# Patient Record
Sex: FEMALE | Race: WHITE | HISPANIC OR LATINO | Employment: OTHER | URBAN - METROPOLITAN AREA
[De-identification: names, ages, dates, MRNs, and addresses within clinical notes are randomized per-mention and may not be internally consistent; named-entity substitution may affect disease eponyms.]

---

## 2017-02-15 ENCOUNTER — ALLSCRIPTS OFFICE VISIT (OUTPATIENT)
Dept: OTHER | Facility: OTHER | Age: 62
End: 2017-02-15

## 2017-02-15 DIAGNOSIS — J30.9 ALLERGIC RHINITIS: ICD-10-CM

## 2017-02-17 ENCOUNTER — HOSPITAL ENCOUNTER (OUTPATIENT)
Dept: NEUROLOGY | Facility: AMBULATORY SURGERY CENTER | Age: 62
Discharge: HOME | End: 2017-02-17
Payer: MEDICARE

## 2017-02-17 DIAGNOSIS — G56.03 CARPAL TUNNEL SYNDROME, BILATERAL: ICD-10-CM

## 2017-02-17 DIAGNOSIS — M54.12 BRACHIAL NEURITIS: ICD-10-CM

## 2017-02-17 PROCEDURE — 95886 MUSC TEST DONE W/N TEST COMP: CPT

## 2017-02-17 PROCEDURE — 95912 NRV CNDJ TEST 11-12 STUDIES: CPT

## 2017-02-20 ENCOUNTER — HOSPITAL ENCOUNTER (OUTPATIENT)
Dept: RADIOLOGY | Facility: HOSPITAL | Age: 62
Discharge: HOME | End: 2017-02-20
Payer: MEDICARE

## 2017-02-20 DIAGNOSIS — J30.9 ALLERGIC RHINITIS: ICD-10-CM

## 2017-02-20 PROCEDURE — 71250 CT THORAX DX C-: CPT

## 2017-02-22 ENCOUNTER — GENERIC CONVERSION - ENCOUNTER (OUTPATIENT)
Dept: OTHER | Facility: OTHER | Age: 62
End: 2017-02-22

## 2017-02-27 ENCOUNTER — APPOINTMENT (EMERGENCY)
Dept: RADIOLOGY | Facility: HOSPITAL | Age: 62
End: 2017-02-27
Payer: MEDICARE

## 2017-02-27 ENCOUNTER — HOSPITAL ENCOUNTER (EMERGENCY)
Facility: HOSPITAL | Age: 62
Discharge: HOME | End: 2017-02-27
Attending: EMERGENCY MEDICINE
Payer: MEDICARE

## 2017-02-27 VITALS
OXYGEN SATURATION: 95 % | SYSTOLIC BLOOD PRESSURE: 136 MMHG | HEIGHT: 64 IN | RESPIRATION RATE: 18 BRPM | HEART RATE: 79 BPM | WEIGHT: 248 LBS | TEMPERATURE: 98.1 F | BODY MASS INDEX: 42.34 KG/M2 | DIASTOLIC BLOOD PRESSURE: 65 MMHG

## 2017-02-27 DIAGNOSIS — R05.9 COUGH: Primary | ICD-10-CM

## 2017-02-27 DIAGNOSIS — R53.83 FATIGUE: ICD-10-CM

## 2017-02-27 DIAGNOSIS — R53.81 MALAISE: ICD-10-CM

## 2017-02-27 LAB
ANION GAP SERPL CALCULATED.3IONS-SCNC: 7 MMOL/L (ref 4–13)
ATRIAL RATE: 80 BPM
BASOPHILS # BLD AUTO: 0.02 THOUSANDS/ΜL (ref 0–0.1)
BASOPHILS NFR BLD AUTO: 0 % (ref 0–1)
BUN SERPL-MCNC: 11 MG/DL (ref 5–25)
CALCIUM SERPL-MCNC: 9.1 MG/DL (ref 8.3–10.1)
CHLORIDE SERPL-SCNC: 103 MMOL/L (ref 100–108)
CO2 SERPL-SCNC: 30 MMOL/L (ref 21–32)
CREAT SERPL-MCNC: 0.69 MG/DL (ref 0.6–1.3)
EOSINOPHIL # BLD AUTO: 0.1 THOUSAND/ΜL (ref 0–0.61)
EOSINOPHIL NFR BLD AUTO: 1 % (ref 0–6)
ERYTHROCYTE [DISTWIDTH] IN BLOOD BY AUTOMATED COUNT: 13.3 % (ref 11.6–15.1)
FLUAV AG SPEC QL IA: NEGATIVE
FLUBV AG SPEC QL IA: NEGATIVE
GFR SERPL CREATININE-BSD FRML MDRD: >60 ML/MIN/1.73SQ M
GLUCOSE SERPL-MCNC: 98 MG/DL (ref 65–140)
HCT VFR BLD AUTO: 43.5 % (ref 34.8–46.1)
HGB BLD-MCNC: 14.1 G/DL (ref 11.5–15.4)
LYMPHOCYTES # BLD AUTO: 1.41 THOUSANDS/ΜL (ref 0.6–4.47)
LYMPHOCYTES NFR BLD AUTO: 12 % (ref 14–44)
MCH RBC QN AUTO: 28.5 PG (ref 26.8–34.3)
MCHC RBC AUTO-ENTMCNC: 32.4 G/DL (ref 31.4–37.4)
MCV RBC AUTO: 88 FL (ref 82–98)
MONOCYTES # BLD AUTO: 1.17 THOUSAND/ΜL (ref 0.17–1.22)
MONOCYTES NFR BLD AUTO: 10 % (ref 4–12)
NEUTROPHILS # BLD AUTO: 9.11 THOUSANDS/ΜL (ref 1.85–7.62)
NEUTS SEG NFR BLD AUTO: 77 % (ref 43–75)
NRBC BLD AUTO-RTO: 0 /100 WBCS
P AXIS: 54 DEGREES
PLATELET # BLD AUTO: 257 THOUSANDS/UL (ref 149–390)
PMV BLD AUTO: 11.4 FL (ref 8.9–12.7)
POTASSIUM SERPL-SCNC: 3.9 MMOL/L (ref 3.5–5.3)
PR INTERVAL: 144 MS
QRS AXIS: -16 DEGREES
QRSD INTERVAL: 80 MS
QT INTERVAL: 350 MS
QTC INTERVAL: 403 MS
RBC # BLD AUTO: 4.94 MILLION/UL (ref 3.81–5.12)
SODIUM SERPL-SCNC: 140 MMOL/L (ref 136–145)
T WAVE AXIS: 9 DEGREES
TROPONIN I SERPL-MCNC: <0.02 NG/ML
VENTRICULAR RATE: 80 BPM
WBC # BLD AUTO: 11.84 THOUSAND/UL (ref 4.31–10.16)

## 2017-02-27 PROCEDURE — 96374 THER/PROPH/DIAG INJ IV PUSH: CPT

## 2017-02-27 PROCEDURE — 93005 ELECTROCARDIOGRAM TRACING: CPT

## 2017-02-27 PROCEDURE — 71020 HB CHEST X-RAY 2VW FRONTAL&LATL: CPT

## 2017-02-27 PROCEDURE — 80048 BASIC METABOLIC PNL TOTAL CA: CPT

## 2017-02-27 PROCEDURE — 87798 DETECT AGENT NOS DNA AMP: CPT | Performed by: EMERGENCY MEDICINE

## 2017-02-27 PROCEDURE — 85025 COMPLETE CBC W/AUTO DIFF WBC: CPT

## 2017-02-27 PROCEDURE — 87400 INFLUENZA A/B EACH AG IA: CPT | Performed by: EMERGENCY MEDICINE

## 2017-02-27 PROCEDURE — 99284 EMERGENCY DEPT VISIT MOD MDM: CPT

## 2017-02-27 PROCEDURE — 96361 HYDRATE IV INFUSION ADD-ON: CPT

## 2017-02-27 PROCEDURE — 36415 COLL VENOUS BLD VENIPUNCTURE: CPT

## 2017-02-27 PROCEDURE — 84484 ASSAY OF TROPONIN QUANT: CPT

## 2017-02-27 RX ORDER — KETOROLAC TROMETHAMINE 30 MG/ML
15 INJECTION, SOLUTION INTRAMUSCULAR; INTRAVENOUS ONCE
Status: COMPLETED | OUTPATIENT
Start: 2017-02-27 | End: 2017-02-27

## 2017-02-27 RX ADMIN — KETOROLAC TROMETHAMINE 15 MG: 30 INJECTION, SOLUTION INTRAMUSCULAR at 15:33

## 2017-02-27 RX ADMIN — SODIUM CHLORIDE 1000 ML: 0.9 INJECTION, SOLUTION INTRAVENOUS at 15:33

## 2017-02-28 LAB
FLUAV AG SPEC QL: ABNORMAL
FLUBV AG SPEC QL: ABNORMAL
RSV B RNA SPEC QL NAA+PROBE: DETECTED

## 2017-03-03 ENCOUNTER — HOSPITAL ENCOUNTER (EMERGENCY)
Facility: HOSPITAL | Age: 62
Discharge: HOME | End: 2017-03-04
Attending: EMERGENCY MEDICINE
Payer: MEDICARE

## 2017-03-03 VITALS
RESPIRATION RATE: 16 BRPM | OXYGEN SATURATION: 97 % | WEIGHT: 214 LBS | BODY MASS INDEX: 36.73 KG/M2 | SYSTOLIC BLOOD PRESSURE: 135 MMHG | TEMPERATURE: 98.9 F | HEART RATE: 70 BPM | DIASTOLIC BLOOD PRESSURE: 87 MMHG

## 2017-03-03 DIAGNOSIS — H66.91 RIGHT OTITIS MEDIA: Primary | ICD-10-CM

## 2017-03-03 RX ORDER — FLUTICASONE PROPIONATE 50 MCG
1 SPRAY, SUSPENSION (ML) NASAL DAILY
Qty: 1 BOTTLE | Refills: 0 | Status: SHIPPED | OUTPATIENT
Start: 2017-03-03 | End: 2017-10-26 | Stop reason: ALTCHOICE

## 2017-03-03 RX ORDER — HYDROCODONE BITARTRATE AND ACETAMINOPHEN 5; 325 MG/1; MG/1
1 TABLET ORAL EVERY 6 HOURS PRN
Qty: 5 TABLET | Refills: 0 | Status: SHIPPED | OUTPATIENT
Start: 2017-03-03 | End: 2017-03-04

## 2017-03-03 RX ORDER — AMOXICILLIN 250 MG/1
500 CAPSULE ORAL ONCE
Status: COMPLETED | OUTPATIENT
Start: 2017-03-03 | End: 2017-03-04

## 2017-03-03 RX ORDER — HYDROCODONE BITARTRATE AND ACETAMINOPHEN 5; 325 MG/1; MG/1
1 TABLET ORAL ONCE
Status: COMPLETED | OUTPATIENT
Start: 2017-03-03 | End: 2017-03-04

## 2017-03-03 RX ORDER — AMOXICILLIN 500 MG/1
500 CAPSULE ORAL 3 TIMES DAILY
Qty: 21 CAPSULE | Refills: 0 | Status: SHIPPED | OUTPATIENT
Start: 2017-03-03 | End: 2017-03-10

## 2017-03-04 PROCEDURE — 99283 EMERGENCY DEPT VISIT LOW MDM: CPT

## 2017-03-04 RX ADMIN — AMOXICILLIN 500 MG: 250 CAPSULE ORAL at 00:03

## 2017-03-04 RX ADMIN — HYDROCODONE BITARTRATE AND ACETAMINOPHEN 1 TABLET: 5; 325 TABLET ORAL at 00:03

## 2017-04-21 ENCOUNTER — GENERIC CONVERSION - ENCOUNTER (OUTPATIENT)
Dept: OTHER | Facility: OTHER | Age: 62
End: 2017-04-21

## 2017-06-13 ENCOUNTER — TRANSCRIBE ORDERS (OUTPATIENT)
Dept: LAB | Facility: HOSPITAL | Age: 62
End: 2017-06-13

## 2017-06-13 ENCOUNTER — APPOINTMENT (OUTPATIENT)
Dept: LAB | Facility: HOSPITAL | Age: 62
End: 2017-06-13
Payer: MEDICARE

## 2017-06-13 DIAGNOSIS — Z79.899 ENCOUNTER FOR LONG-TERM (CURRENT) USE OF OTHER MEDICATIONS: ICD-10-CM

## 2017-06-13 DIAGNOSIS — E03.9 UNSPECIFIED HYPOTHYROIDISM: ICD-10-CM

## 2017-06-13 DIAGNOSIS — E66.01 MORBID OBESITY, UNSPECIFIED OBESITY TYPE (HCC): ICD-10-CM

## 2017-06-13 DIAGNOSIS — E04.1 THYROID NODULE: ICD-10-CM

## 2017-06-13 DIAGNOSIS — I10 UNSPECIFIED ESSENTIAL HYPERTENSION: ICD-10-CM

## 2017-06-13 DIAGNOSIS — I10 UNSPECIFIED ESSENTIAL HYPERTENSION: Primary | ICD-10-CM

## 2017-06-13 DIAGNOSIS — E11.9 DIABETES MELLITUS WITH NO COMPLICATION (HCC): ICD-10-CM

## 2017-06-13 DIAGNOSIS — E78.00 PURE HYPERCHOLESTEROLEMIA: ICD-10-CM

## 2017-06-13 LAB
ALBUMIN SERPL BCP-MCNC: 3.6 G/DL (ref 3.5–5)
ALP SERPL-CCNC: 59 U/L (ref 46–116)
ALT SERPL W P-5'-P-CCNC: 24 U/L (ref 12–78)
ANION GAP SERPL CALCULATED.3IONS-SCNC: 5 MMOL/L (ref 4–13)
AST SERPL W P-5'-P-CCNC: 20 U/L (ref 5–45)
BILIRUB SERPL-MCNC: 0.43 MG/DL (ref 0.2–1)
BUN SERPL-MCNC: 16 MG/DL (ref 5–25)
CALCIUM SERPL-MCNC: 9.1 MG/DL (ref 8.3–10.1)
CHLORIDE SERPL-SCNC: 108 MMOL/L (ref 100–108)
CHOLEST SERPL-MCNC: 191 MG/DL (ref 50–200)
CO2 SERPL-SCNC: 31 MMOL/L (ref 21–32)
CREAT SERPL-MCNC: 0.67 MG/DL (ref 0.6–1.3)
EST. AVERAGE GLUCOSE BLD GHB EST-MCNC: 128 MG/DL
GFR SERPL CREATININE-BSD FRML MDRD: >60 ML/MIN/1.73SQ M
GLUCOSE P FAST SERPL-MCNC: 104 MG/DL (ref 65–99)
HBA1C MFR BLD: 6.1 % (ref 4.2–6.3)
HDLC SERPL-MCNC: 52 MG/DL (ref 40–60)
LDLC SERPL CALC-MCNC: 110 MG/DL (ref 0–100)
POTASSIUM SERPL-SCNC: 4.4 MMOL/L (ref 3.5–5.3)
PROT SERPL-MCNC: 7.5 G/DL (ref 6.4–8.2)
SODIUM SERPL-SCNC: 144 MMOL/L (ref 136–145)
TRIGL SERPL-MCNC: 146 MG/DL

## 2017-06-13 PROCEDURE — 80061 LIPID PANEL: CPT

## 2017-06-13 PROCEDURE — 80053 COMPREHEN METABOLIC PANEL: CPT

## 2017-06-13 PROCEDURE — 36415 COLL VENOUS BLD VENIPUNCTURE: CPT

## 2017-06-13 PROCEDURE — 83036 HEMOGLOBIN GLYCOSYLATED A1C: CPT

## 2017-06-16 ENCOUNTER — GENERIC CONVERSION - ENCOUNTER (OUTPATIENT)
Dept: OTHER | Facility: OTHER | Age: 62
End: 2017-06-16

## 2017-06-22 ENCOUNTER — ALLSCRIPTS OFFICE VISIT (OUTPATIENT)
Dept: OTHER | Facility: OTHER | Age: 62
End: 2017-06-22

## 2017-06-22 DIAGNOSIS — Z12.31 ENCOUNTER FOR SCREENING MAMMOGRAM FOR MALIGNANT NEOPLASM OF BREAST: ICD-10-CM

## 2017-06-29 ENCOUNTER — ALLSCRIPTS OFFICE VISIT (OUTPATIENT)
Dept: OTHER | Facility: OTHER | Age: 62
End: 2017-06-29

## 2017-06-29 ENCOUNTER — GENERIC CONVERSION - ENCOUNTER (OUTPATIENT)
Dept: OTHER | Facility: OTHER | Age: 62
End: 2017-06-29

## 2017-07-07 ENCOUNTER — HOSPITAL ENCOUNTER (OUTPATIENT)
Dept: RADIOLOGY | Age: 62
Discharge: HOME | End: 2017-07-07
Payer: MEDICARE

## 2017-07-07 DIAGNOSIS — Z12.31 ENCOUNTER FOR SCREENING MAMMOGRAM FOR MALIGNANT NEOPLASM OF BREAST: ICD-10-CM

## 2017-07-07 DIAGNOSIS — E04.1 THYROID NODULE: ICD-10-CM

## 2017-07-07 PROCEDURE — 77063 BREAST TOMOSYNTHESIS BI: CPT

## 2017-07-07 PROCEDURE — 76536 US EXAM OF HEAD AND NECK: CPT

## 2017-07-07 PROCEDURE — G0202 SCR MAMMO BI INCL CAD: HCPCS

## 2017-08-15 ENCOUNTER — ALLSCRIPTS OFFICE VISIT (OUTPATIENT)
Dept: OTHER | Facility: OTHER | Age: 62
End: 2017-08-15

## 2017-08-21 ENCOUNTER — ALLSCRIPTS OFFICE VISIT (OUTPATIENT)
Dept: OTHER | Facility: OTHER | Age: 62
End: 2017-08-21

## 2017-09-11 ENCOUNTER — GENERIC CONVERSION - ENCOUNTER (OUTPATIENT)
Dept: OTHER | Facility: OTHER | Age: 62
End: 2017-09-11

## 2017-09-29 ENCOUNTER — GENERIC CONVERSION - ENCOUNTER (OUTPATIENT)
Dept: OTHER | Facility: OTHER | Age: 62
End: 2017-09-29

## 2017-09-30 ENCOUNTER — TRANSCRIBE ORDERS (OUTPATIENT)
Dept: LAB | Facility: HOSPITAL | Age: 62
End: 2017-09-30

## 2017-09-30 ENCOUNTER — APPOINTMENT (OUTPATIENT)
Dept: LAB | Facility: HOSPITAL | Age: 62
End: 2017-09-30
Payer: MEDICARE

## 2017-09-30 DIAGNOSIS — E11.9 DIABETES MELLITUS WITHOUT COMPLICATION (HCC): ICD-10-CM

## 2017-09-30 DIAGNOSIS — E04.1 NONTOXIC UNINODULAR GOITER: ICD-10-CM

## 2017-09-30 DIAGNOSIS — E66.09 EXOGENOUS OBESITY: ICD-10-CM

## 2017-09-30 DIAGNOSIS — E55.9 UNSPECIFIED VITAMIN D DEFICIENCY: ICD-10-CM

## 2017-09-30 DIAGNOSIS — Z79.899 ENCOUNTER FOR LONG-TERM (CURRENT) USE OF OTHER MEDICATIONS: ICD-10-CM

## 2017-09-30 DIAGNOSIS — E11.9 DIABETES MELLITUS WITHOUT COMPLICATION (HCC): Primary | ICD-10-CM

## 2017-09-30 DIAGNOSIS — E78.2 MIXED HYPERLIPIDEMIA: ICD-10-CM

## 2017-09-30 LAB
25(OH)D3 SERPL-MCNC: 45.3 NG/ML (ref 30–100)
ALBUMIN SERPL BCP-MCNC: 3.2 G/DL (ref 3.5–5)
ALP SERPL-CCNC: 61 U/L (ref 46–116)
ALT SERPL W P-5'-P-CCNC: 23 U/L (ref 12–78)
ANION GAP SERPL CALCULATED.3IONS-SCNC: 5 MMOL/L (ref 4–13)
AST SERPL W P-5'-P-CCNC: 20 U/L (ref 5–45)
BILIRUB SERPL-MCNC: 0.27 MG/DL (ref 0.2–1)
BUN SERPL-MCNC: 22 MG/DL (ref 5–25)
CALCIUM SERPL-MCNC: 8.5 MG/DL (ref 8.3–10.1)
CHLORIDE SERPL-SCNC: 110 MMOL/L (ref 100–108)
CO2 SERPL-SCNC: 28 MMOL/L (ref 21–32)
CREAT SERPL-MCNC: 0.61 MG/DL (ref 0.6–1.3)
CREAT UR-MCNC: 111 MG/DL
EST. AVERAGE GLUCOSE BLD GHB EST-MCNC: 134 MG/DL
GFR SERPL CREATININE-BSD FRML MDRD: 98 ML/MIN/1.73SQ M
GLUCOSE P FAST SERPL-MCNC: 86 MG/DL (ref 65–99)
HBA1C MFR BLD: 6.3 % (ref 4.2–6.3)
MICROALBUMIN UR-MCNC: <5 MG/L (ref 0–20)
MICROALBUMIN/CREAT 24H UR: <5 MG/G CREATININE (ref 0–30)
POTASSIUM SERPL-SCNC: 4.4 MMOL/L (ref 3.5–5.3)
PROT SERPL-MCNC: 7.4 G/DL (ref 6.4–8.2)
SODIUM SERPL-SCNC: 143 MMOL/L (ref 136–145)
TSH SERPL DL<=0.05 MIU/L-ACNC: 1.75 UIU/ML (ref 0.36–3.74)

## 2017-09-30 PROCEDURE — 84443 ASSAY THYROID STIM HORMONE: CPT

## 2017-09-30 PROCEDURE — 82306 VITAMIN D 25 HYDROXY: CPT

## 2017-09-30 PROCEDURE — 82043 UR ALBUMIN QUANTITATIVE: CPT | Performed by: INTERNAL MEDICINE

## 2017-09-30 PROCEDURE — 36415 COLL VENOUS BLD VENIPUNCTURE: CPT

## 2017-09-30 PROCEDURE — 80053 COMPREHEN METABOLIC PANEL: CPT

## 2017-09-30 PROCEDURE — 82570 ASSAY OF URINE CREATININE: CPT | Performed by: INTERNAL MEDICINE

## 2017-09-30 PROCEDURE — 83036 HEMOGLOBIN GLYCOSYLATED A1C: CPT

## 2017-10-09 ENCOUNTER — GENERIC CONVERSION - ENCOUNTER (OUTPATIENT)
Dept: OTHER | Facility: OTHER | Age: 62
End: 2017-10-09

## 2017-10-20 NOTE — PROGRESS NOTES
Assessment  1  Headache (784 0) (R51)   2  Cervicalgia (723 1) (M54 2)   3  Dizziness (780 4) (R42)   4  Mild cognitive impairment (331 83) (G31 84)    Plan   Carpal tunnel syndrome, bilateral    · Immobilizer, Forearm; Status:Complete;   Done: 87Yls5878   Perform:Not Applicable; Order Comments:for right side; EYF:03LAI5854;SJYAOIZ; For:Carpal tunnel syndrome, bilateral; Ordered By:Josephine Albarado;  Cervicalgia, Mild cognitive impairment    · Follow-up visit in 3 months Evaluation and Treatment  Follow-up  Status: Complete   Done: 42Unj7813   Ordered; For: Cervicalgia, Mild cognitive impairment; Ordered By: Chery Willis Performed:  Due: 42HPL9357; Last Updated By: Skyla Carter; 8/21/2017 10:47:38 AM    Venlafaxine HCl ER 37 5 MG Oral Capsule Extended Release 24 Hour; TAKE 1 CAPSULE ONCE DAILY WITH FOOD; Therapy: 16Bbp7135 to (Evaluate:20Sep2017)  Requested for: 19Ndw8875; Last Rx:41Dub5178; Status: ACTIVE Ordered  Rx By: Chery Willis; Dispense: 30 Days ; #:30 Capsule Extended Release 24 Hour; Refill: 0;   For: Cervicalgia, Headache, Mild cognitive impairment; MATHEUS = N; Verified Transmission to [address] ; Last Updated By: [employer]; 9/18/2017 11:50:01 AM  Headache (784 0) (R51)       Carpal tunnel syndrome, bilateral (354 0) (G56 03)          Discussion/Summary  Discussion Summary:   64 yo right handed female with history of mild cognitive impairment, she is stable at this time  MRI brain normal in past   PT contniues with c/o neck pain and headaches    had reaction to Baclofen, Neurontin, Pamelortry low dose of Effexor, s/e reviewed, pt to calll in 3 weeks with update cervical with multilevel DDD no cord compression,   seen by neurosurgery, no intervention  contniue with PPM, on Flexeril/Naprosyn with some benefit, does not want to try any other medicationEMG done results note CTS left, seen by NS, PPM, wearing splint  consider injectionto see sleep center, Encouraged pt to reconsider use of CPAP  with PCP for lung noduleup in three months  at length with the patient and answered all her questions  Counseling Documentation With Imm: The patient was counseled regarding instructions for management,-- risk factor reductions,-- prognosis,-- patient and family education,-- impressions,-- risks and benefits of treatment options,-- importance of compliance with treatment  total time of encounter was 35 minutes-- and-- >50% minutes was spent counseling  Patient's Capacity to Self-Care: Patient is able to Self-Care  Medication SE Review and Pt Understands Tx: Possible side effects of new medications were reviewed with the patient/guardian today  The treatment plan was reviewed with the patient/guardian  The patient/guardian understands and agrees with the treatment plan   Patient Guardian understands agrees: The treatment plan was reviewed with the patient/guardian  The patient/guardian understands and agrees with the treatment plan   Headache [facility]:   The patient was counseled regarding;   Discussed side effects of all medications prescribed today to the patient in detail  Patient education was completed today and we also discussed precautions for rebound headaches  Also recommended to the patient :  1  Maintain regular sleep schedule -- 2  Limit over the counter medications  (No more than 3 times a week)  -- 3  Maintain headache diary  -- 4  Limit caffeine to 1-2 cups a day or less  -- 5  Avoid dietary trigger  (list given to the patient and reviewed with them)  -- 6  Patient is to have regular frequent meals to prevent headache onset  Medication Side Effects Reviewed: Possible side effects of new medications were reviewed with the patient/guardian today        Chief Complaint  Chief Complaint Free Text Note Form: Patient presents for neurology follow-up for cervicalgia      History of Present Illness  HPI: 64yo right handed female with past medical History significant for hypertension, obesity, hypothyroidism, depression, has seen Dr Tejas Nuñez in past for evaluation of cognitive problems, she was then seen by Dr Cordova  She was last seen in September 2016    In the past she had work up to include MRI of brain that was unremarkable, labs for vit deficiency and thyroid have been normal  She did undergo neuropsych testing which demonstrated mild cognitive impairment and some depression with component of ADHD  She had sleep study with mild AARON, she did not want CPAP  Today she continues to note ongoing issues with her memory, she needs to write lists  She reports forgetfulness, forgetful of names, she has left stove on and burned food several times, does not drive anymore She is able to take care of her own finances, she is independent in her ADL s Her last MOCA score was 28/30  She denies any problems with incontinence  She remains off any medication  Overall she feels her cognition has not changed much  At times, she does note some issues with word finding  is also being followed for headaches  These primarily occur in the cervical region with radiation into her shoulders, left > right  In the past, she had side effects it to baclofen, gabapentin as well as Pamelor  She did undergo updated MRI of the cervical spine obtained 3/22/16; she had multilevel disc degenerations  She was subsequently seen by neurosurgery, No surgical intervention warranted, was suggested that she undergoes conservative treatment with physical therapy  She was Subsequently seen by pain management, she was given nortriptyline which unfortunately provoked side effects   She underwent EMG of the left which was limited secondary to difficulty tolerating the needle portion however, 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, moderate and chronic was noted, there was no evidence of a peripheral neuropathy, She was given splints to use, as well as the Flexeril which has been beneficial  Fortunately overall, her wrist seems to be doing good, she does note some minor paresthesias of the second and fourth digits  She notes is some mild palpatory tenderness in the left SCM, which at this point it does not radiate  She denies any recent trauma, no excessive lifting although she has been doing more cleaning  She has chronic low back pain and she sees a spine surgeon  Today, she Describes the sensation as if her insides are shaking, no notable involuntary movement of her extremities  She is following with endocrine and is due for an ultrasound of her thyroid  ROS, FH, SH were reviewed with the pt ;   Neurology [facility]:   Memory: Memory problem started 40 year(s) ago Problem affects short term memory  Onset was gradual  Progression seems to be worsening  The memory problem was noticed by the patient  Symptoms include forgetting names,-- forgetting appointments,-- forgetting conversations,-- forgetting familiar tasks,-- forgetting recent events-- and-- forgetting to take medications  Associated symptoms:  able to operate tv remote-- and-- able to operate kitchen appliances  Her memory problem: frequently interferes in day to day functioning  Conversation Difficulties: has trouble finding the right word,-- requires repeat information,-- substitutes a wrong word-- and-- repeatedly asks the same questions  The patient does not drive  Other Disorders: Changes in mood or personality has been noticed by the patient or the patient's family-- and-- has been or is currently being treated for depression or anxiety, but-- has not noticed any gait or balance disorder,-- has not experienced hallucinations or delusion-- and-- has not experienced fluctuation in alertness  Review of Systems  Neurological ROS:   Constitutional: chills,-- recent weight loss-- and-- fatigue  HEENT: sinus problems,-- feeling congested,-- blurred vision,-- dryness of the eyes,-- eye pain,-- tinnitus,-- mouth sores-- and-- sore throat  Cardiovascular:  no chest pain or pressure, no palpitations present, the heart rate was not rapid or irregular, no swelling in the arms or legs, no poor circulation  Respiratory: shortness of breath with or without exertion  Gastrointestinal: nausea,-- diarrhea-- and-- abdominal pain  Genitourinary: increased frequency  Musculoskeletal: arthralgias,-- myalgias,-- head/neck/back pain-- and-- pain while walking  Integumentary skin lesion(s): rUi [place] Psychiatric:  no anxiety, no depression, no mood swings, no psychiatric hospitalizations, no sleep problems  Endocrine hair loss or gain  Hematologic/Lymphatic:  no unusual bleeding, no tendency for easy bruising, no clotting skin or lumps  Neurological General: headache,-- lightheadedness,-- trouble falling asleep-- and-- snoring  Neurological Mental Status: memory problems  Neurological Cranial Nerves: vertigo or dizziness-- and-- slurred speech  Neurological Motor findings include: tremor,-- twitching-- and-- cramping(pre/post exercise)  Neurological Coordination: balance difficulties-- and-- clumsiness  Neurological Sensory: numbness-- and-- tingling  Neurological Gait:  no difficulty walking, not falling to one side, no sensation of being pushed, has not had falls  Active Problems   1  Allergic rhinitis (477 9) (J30 9)   2  Carpal tunnel syndrome of left wrist (354 0) (G56 02)   3  Cervical radicular pain (723 4) (M54 12)   4  Cervical spondylosis without myelopathy (721 0) (M47 812)   5  Cervicalgia (723 1) (M54 2)   6  Colon cancer screening (V76 51) (Z12 11)   7  Degenerative cervical disc (722 4) (M50 30)   8  Dermatitis, atopic (691 8) (L20 9)   9  Dizziness (780 4) (R42)   10  Encounter for routine gynecological examination (V72 31) (Z01 419)   11  Encounter for screening mammogram for breast cancer (V76 12) (Z12 31)   12  GERD (gastroesophageal reflux disease) (530 81) (K21 9)   13  Hyperlipidemia (272 4) (E78 5)   14  Hypothyroidism (244 9) (E03 9)   15  Lower back pain (724 2) (M54 5)   16  Lung nodule, solitary (793 11) (R91 1)   17  Mild cognitive impairment (331 83) (G31 84)   18  Moderate nonproliferative diabetic retinopathy, without macular edema, associated with    type 2 diabetes mellitus (250 50,362 05) (E11 339)   19  Morbid or severe obesity due to excess calories (278 01) (E66 01)   20  Neck pain on left side (723 1) (M54 2)   21  Need for prophylactic vaccination and inoculation against influenza (V04 81) (Z23)   22  Osteoarthritis of knee (715 36) (M17 10)   23  Petechial rash (782 7) (R23 3)   24  Shoulder bursitis, left (726 10) (M75 52)   25  Sinusitis (473 9) (J32 9)   26  Trapezius muscle spasm (728 85) (M62 838)   27  Type 2 diabetes mellitus (250 00) (E11 9)   28  Venous insufficiency (459 81) (I87 2)    Hypertension (401 9) (I10)     - well controlled  Currently on lisinopril  Obstructive sleep apnea (327 23) (G47 33)       Headache (784 0) (R51)       Carpal tunnel syndrome, bilateral (354 0) (G56 03)          Past Medical History   1  History of Ankle Sprain (845 00)   2  History of GERD without esophagitis (530 81) (K21 9)   3  History of arthritis (V13 4) (Z87 39)   4  History of depression (V11 8) (Z86 59)   5  History of edema (V13 89) (Z87 898)   6  History of headache (V13 89) (Z87 898)   7  History of headache (V13 89) (Z87 898)   8  History of hypercholesterolemia (V12 29) (Z86 39)   9  History of ovarian cyst (V13 29) (Z87 42)   10  History of sleep apnea (V13 89) (Z86 69)   11  History of spontaneous  (V13 29) (Z87 59)   12  History of thyroid disease (V12 29) (Z86 39)   13  History of Impaired fasting glucose (790 21) (R73 01)   14  History of Other headache syndrome (339 89) (G44 89)   15  History of Other muscle spasm (728 85) (M62 838)   16  History of  (spontaneous vaginal delivery) (650) (O80)    Hypertension (401 9) (I10)     - well controlled  Currently on lisinopril  Surgical History  1  History of Back Surgery   2  History of Cholecystectomy   3  History of Tonsillectomy With Adenoidectomy    Family History  Mother    1  Family history of    2  Family history of lung cancer (V16 1) (Z80 1)   3  Family history of malignant neoplasm (V16 9) (Z80 9)  Father    4  Family history of hypertension (V17 49) (Z82 49)  Son    5  Family history of hypertension (V17 49) (Z82 49)   6  Family history of lung disease (V19 8) (Z83 6)  Brother    7  Family history of diabetes mellitus (V18 0) (Z83 3)  Grandparent    8  Family history of cardiac disorder (V17 49) (Z82 49)  Maternal Aunt    9  Family history of hypothyroidism (V18 19) (Z83 49)  Family History    10  Family history of cardiac disorder (V17 49) (Z82 49)   11  Family history of hypertension (V17 49) (Z82 49)   12  Family history of neuropathy (V17 2) (Z82 0)    Social History   · Caregiver: Family member   ·    · Exercises regularly   · Former smoker (P21 32) (V79 162)   · Lives with family   · Never Drank Alcohol   · No alcohol use   · No caffeine use   · No drug use   · No living will   · No tobacco/smoke exposure   · History of No tobacco/smoke exposure   · Retired   · Unemployed (V62 0) (Z56 0)    Current Meds   1  Aspirin 81 MG Oral Tablet Delayed Release; KALYAN 1 TABLETA POR VIA ORAL   DIARIAMENTEONE TABLET BY MOUTH ONCE A DAY; Therapy: 24SBA8321 to (Evaluate:2018)  Requested for: 83MGZ2987; Last   Rx:2017 Ordered   2  [employer] Vitamin D CAPS; Therapy: (Recorded:95Xkc4800) to Recorded   3  Cyclobenzaprine HCl - 10 MG Oral Tablet; TAKE 1 TABLET AT BEDTIME AS NEEDED; Therapy: 50DMH7624 to (Rosio Talley)  Requested for: 50OPI1585; Last   Rx:2017 Ordered   4  Eucerin External Cream; USE AS DIRECTED; Therapy: 22INO3334 to (Evaluate:83Wvy0201)  Requested for: 2017; Last   ZS:38TWM6330 Ordered   5  HydroCHLOROthiazide 12 5 MG Oral Capsule; take 1 capsule daily;    Therapy: 86JJE8554 to (Evaluate:18Jan2018)  Requested for: 86WPD0035; Last   Rx:22Jun2017 Ordered   6  Levothyroxine Sodium 100 MCG Oral Tablet; Take 1 tablet daily on Sat and Sun as   prescribed by Endo; Therapy: 66LKF7045 to (Evaluate:32Nwv2701); Last Rx:22Jun2017 Ordered   7  Levothyroxine Sodium 88 MCG Oral Tablet; TAKE 1 TABLET DAILY; Therapy: 62SQK2840 to (Evaluate:26Sro3571); Last Rx:08Apr2016 Ordered   8  Lisinopril 10 MG Oral Tablet; TAKE 1 TABLET BY MOUTH DAILY AS DIRECTED; Therapy: 76OWQ3561 to (Evaluate:32Nag3090)  Requested for: 55YYC7791; Last   Rx:22Jun2017 Ordered   9  Naproxen 500 MG Oral Tablet; KALYAN 1 TABLETA TODOS 12 HORAS CARLOS FUERA   NECESARIOTAKE ONE TABLETEVERY 12 HOURS AS NEEDED; Therapy: 13HRM4518 to (Evaluate:58Ncm6303)  Requested for: 81ZIO2756; Last   Rx:20Sep2016 Ordered    Allergies  1  Tagamet TABS    Vitals  Signs   Recorded: 21Aug2017 09:38AM   Heart Rate: 68  Respiration: 18  Systolic: 803  Diastolic: 76  Height: 5 ft 4 in  Weight: 246 lb 4 oz  BMI Calculated: 42 27  BSA Calculated: 2 14  O2 Saturation: 97    Physical Exam    Constitutional   General appearance: No acute distress, well appearing and well nourished  Musculoskeletal   Gait and station: Normal gait, stance and balance  Muscle strength: Abnormal  -- mild weakness of left SCM related to cervical /shoulder discomfort, full ROM of head/neck, Presented with a splint to left forearm  Muscle tone: No atrophy, abnormal movements, flaccidity, cogwheeling or spasticity  Involuntary movements: None observed  Neurologic   Orientation to person, place, and time: Normal   Mental Status: oriented to person, place, and time,-- normal attention skills,-- normal language skills-- and-- normal fund of knowledge  Attention span and concentration: Normal thought process and attention span  Language: Names objects, able to repeat phrases and speaks spontaneously      2nd cranial nerve: Normal     3rd, 4th, and 6th cranial nerves: Normal     5th cranial nerve: Normal     7th cranial nerve: Normal     8th cranial nerve: Normal     11th cranial nerve: Normal     Sensation: Abnormal  -- Decreased temperature right lower extremity versus left  Reflexes: Abnormal  -- Left Achilles 2, right Achilles one  Coordination: Normal     Cortical function: Normal     Mood and affect: Normal     Radicular Testing: Normal  -- (Tinel's is negative bilaterally)      Future Appointments    Date/Time Provider Specialty Site   12/05/2017 09:00 AM Gi Moe, [address]  Neurology [facility]   12/15/2017 08:45 AM Prachi Trejo DO Internal Medicine [address] PCP   04/20/2018 09:30 AM Shantal Ramos MD Neurology [facility] 5156   11/28/2017 09:00 AM Specialty Clinic, [address] [facility]     Signatures   Electronically signed by : Camille Colon; Aug 27 2017  7:58PM EST                       (Author)    Electronically signed by :  Kareem Vuong DO; Oct 19 2017  9:47AM EST                       (Author)

## 2017-10-26 ENCOUNTER — HOSPITAL ENCOUNTER (EMERGENCY)
Facility: HOSPITAL | Age: 62
Discharge: HOME | End: 2017-10-26
Attending: EMERGENCY MEDICINE | Admitting: EMERGENCY MEDICINE
Payer: MEDICARE

## 2017-10-26 ENCOUNTER — APPOINTMENT (EMERGENCY)
Dept: RADIOLOGY | Facility: HOSPITAL | Age: 62
End: 2017-10-26
Payer: MEDICARE

## 2017-10-26 VITALS
TEMPERATURE: 98.4 F | HEART RATE: 64 BPM | DIASTOLIC BLOOD PRESSURE: 70 MMHG | SYSTOLIC BLOOD PRESSURE: 138 MMHG | RESPIRATION RATE: 18 BRPM | OXYGEN SATURATION: 95 %

## 2017-10-26 DIAGNOSIS — R10.9 LEFT FLANK PAIN: Primary | ICD-10-CM

## 2017-10-26 LAB
ALBUMIN SERPL BCP-MCNC: 3.7 G/DL (ref 3.5–5)
ALP SERPL-CCNC: 65 U/L (ref 46–116)
ALT SERPL W P-5'-P-CCNC: 23 U/L (ref 12–78)
ANION GAP SERPL CALCULATED.3IONS-SCNC: 7 MMOL/L (ref 4–13)
AST SERPL W P-5'-P-CCNC: 20 U/L (ref 5–45)
BACTERIA UR QL AUTO: ABNORMAL /HPF
BILIRUB SERPL-MCNC: 0.33 MG/DL (ref 0.2–1)
BILIRUB UR QL STRIP: NEGATIVE
BUN SERPL-MCNC: 12 MG/DL (ref 5–25)
CALCIUM SERPL-MCNC: 9.1 MG/DL (ref 8.3–10.1)
CHLORIDE SERPL-SCNC: 104 MMOL/L (ref 100–108)
CLARITY UR: CLEAR
CO2 SERPL-SCNC: 29 MMOL/L (ref 21–32)
COLOR UR: YELLOW
COLOR, POC: NORMAL
CREAT SERPL-MCNC: 0.64 MG/DL (ref 0.6–1.3)
GFR SERPL CREATININE-BSD FRML MDRD: 97 ML/MIN/1.73SQ M
GLUCOSE SERPL-MCNC: 88 MG/DL (ref 65–140)
GLUCOSE UR STRIP-MCNC: NEGATIVE MG/DL
HGB UR QL STRIP.AUTO: ABNORMAL
HYALINE CASTS #/AREA URNS LPF: ABNORMAL /LPF
KETONES UR STRIP-MCNC: NEGATIVE MG/DL
LEUKOCYTE ESTERASE UR QL STRIP: NEGATIVE
LIPASE SERPL-CCNC: 187 U/L (ref 73–393)
NITRITE UR QL STRIP: NEGATIVE
NON-SQ EPI CELLS URNS QL MICRO: ABNORMAL /HPF
PH UR STRIP.AUTO: 6 [PH] (ref 4.5–8)
POTASSIUM SERPL-SCNC: 4.1 MMOL/L (ref 3.5–5.3)
PROT SERPL-MCNC: 8.1 G/DL (ref 6.4–8.2)
PROT UR STRIP-MCNC: NEGATIVE MG/DL
RBC #/AREA URNS AUTO: ABNORMAL /HPF
SODIUM SERPL-SCNC: 140 MMOL/L (ref 136–145)
SP GR UR STRIP.AUTO: 1.01 (ref 1–1.03)
UROBILINOGEN UR QL STRIP.AUTO: 0.2 E.U./DL
WBC #/AREA URNS AUTO: ABNORMAL /HPF

## 2017-10-26 PROCEDURE — 99284 EMERGENCY DEPT VISIT MOD MDM: CPT

## 2017-10-26 PROCEDURE — 81002 URINALYSIS NONAUTO W/O SCOPE: CPT | Performed by: EMERGENCY MEDICINE

## 2017-10-26 PROCEDURE — 74176 CT ABD & PELVIS W/O CONTRAST: CPT

## 2017-10-26 PROCEDURE — 80053 COMPREHEN METABOLIC PANEL: CPT | Performed by: EMERGENCY MEDICINE

## 2017-10-26 PROCEDURE — 36415 COLL VENOUS BLD VENIPUNCTURE: CPT

## 2017-10-26 PROCEDURE — 96361 HYDRATE IV INFUSION ADD-ON: CPT

## 2017-10-26 PROCEDURE — 81001 URINALYSIS AUTO W/SCOPE: CPT

## 2017-10-26 PROCEDURE — 96374 THER/PROPH/DIAG INJ IV PUSH: CPT

## 2017-10-26 PROCEDURE — 83690 ASSAY OF LIPASE: CPT | Performed by: EMERGENCY MEDICINE

## 2017-10-26 RX ORDER — KETOROLAC TROMETHAMINE 30 MG/ML
15 INJECTION, SOLUTION INTRAMUSCULAR; INTRAVENOUS ONCE
Status: COMPLETED | OUTPATIENT
Start: 2017-10-26 | End: 2017-10-26

## 2017-10-26 RX ADMIN — KETOROLAC TROMETHAMINE 15 MG: 30 INJECTION, SOLUTION INTRAMUSCULAR at 18:07

## 2017-10-26 RX ADMIN — SODIUM CHLORIDE 1000 ML: 0.9 INJECTION, SOLUTION INTRAVENOUS at 18:56

## 2017-10-26 NOTE — ED ATTENDING ATTESTATION
Dank Perez MD, saw and evaluated the patient  I have discussed the patient with the resident/non-physician practitioner and agree with the resident's/non-physician practitioner's findings, Plan of Care, and MDM as documented in the resident's/non-physician practitioner's note, except where noted  All available labs and Radiology studies were reviewed  At this point I agree with the current assessment done in the Emergency Department  I have conducted an independent evaluation of this patient a history and physical is as follows:    Patient presents with 1 day of left flank pain  Reports some burning with urination  No fevers  No additional complaints  Exma: AAOx3, NAD, RRR, CTA, S/NT/ND, mild L CVA TTP  A/P: Flank pain  Concern for kidney stone  Urine positive for blood  Will CT A/P to evaluate for stone      Critical Care Time  CritCare Time

## 2017-10-26 NOTE — ED NOTES
Pt wanted to get up and urinate but when standing got dizzy, bed pan was used      Olivia Nelson RN  10/26/17 [id number]

## 2017-10-26 NOTE — ED NOTES
bloodwork which was previously resulted under this patietns name is in error, was not this patient       yAdee Clarke RN  10/26/17 [id number]

## 2017-10-26 NOTE — ED PROVIDER NOTES
History  Chief Complaint   Patient presents with    Flank Pain     Patient presents to ER with left flank pain since yesterday  This is a 20-year-old female with a past medical history of hypertension and hypothyroidism who presents to the emergency room this evening with left flank pain for the past 48 hours  Patient states that she woke up with the pain yesterday morning and it gradually increased throughout the day yesterday, which she was able to tolerate  However, today the pain increased to the point where she felt she needed to come to the emergency room  Patient states the pain is constant, 10/10, and does not radiate anywhere  Patient has a history of kidney stones approximately 30 years ago and her current pain feels very similar  Patient states that she has a little nausea but no vomiting  She denies any dysuria, hematuria, vaginal discharge or bleeding  Prior to Admission Medications   Prescriptions Last Dose Informant Patient Reported? Taking? Cholecalciferol (VITAMIN D HIGH POTENCY PO) 10/26/2017 at Unknown time  Yes Yes   Sig: Take by mouth  Takes 1x/week   HYDROcodone-acetaminophen (NORCO) 5-325 mg per tablet Unknown at Unknown time  No No   Sig: Take 1 tablet by mouth every 6 (six) hours as needed for pain Max Daily Amount: 4 tablets   aspirin 81 MG tablet 10/26/2017 at Unknown time  Yes Yes   Sig: Take 81 mg by mouth daily  cephalexin (KEFLEX) 500 mg capsule Unknown at Unknown time  No No   Sig: Take 1 capsule by mouth 2 (two) times a day   cyclobenzaprine (FLEXERIL) 10 mg tablet Past Week at Unknown time  Yes Yes   Sig: Take 10 mg by mouth as needed for muscle spasms  levothyroxine 75 mcg tablet 10/26/2017 at Unknown time  Yes Yes   Sig: Take 88 mcg by mouth daily  lisinopril-hydrochlorothiazide (PRINZIDE,ZESTORETIC) 10-12 5 MG per tablet 10/26/2017 at Unknown time  Yes Yes   Sig: Take 1 tablet by mouth daily     naproxen (NAPROSYN) 250 mg tablet More than a month at Unknown time  Yes No   Sig: Take 250 mg by mouth as needed for mild pain  Facility-Administered Medications: None       Past Medical History:   Diagnosis Date    Disease of thyroid gland     hypo    GERD (gastroesophageal reflux disease)     Hyperlipidemia     Hypertension     Renal calculi     Sleep apnea        Past Surgical History:   Procedure Laterality Date    BACK SURGERY      CHOLECYSTECTOMY         History reviewed  No pertinent family history  I have reviewed and agree with the history as documented  Social History   Substance Use Topics    Smoking status: Former Smoker    Smokeless tobacco: Not on file    Alcohol use No        Review of Systems   Constitutional: Negative for chills, fatigue and fever  HENT: Negative for congestion, rhinorrhea, sinus pressure and sore throat  Eyes: Negative for visual disturbance  Respiratory: Negative for cough and shortness of breath  Cardiovascular: Negative for chest pain  Gastrointestinal: Negative for abdominal pain, constipation, diarrhea, nausea and vomiting  Genitourinary: Positive for flank pain (Left-sided)  Negative for dysuria, frequency, hematuria and urgency  Musculoskeletal: Negative for arthralgias and myalgias  Skin: Negative for color change and rash  Neurological: Negative for dizziness, light-headedness and numbness         Physical Exam  ED Triage Vitals   Temperature Pulse Respirations Blood Pressure SpO2   10/26/17 1201 10/26/17 1201 10/26/17 1201 10/26/17 1201 10/26/17 1201   98 4 °F (36 9 °C) 72 18 154/77 97 %      Temp Source Heart Rate Source Patient Position - Orthostatic VS BP Location FiO2 (%)   10/26/17 1201 10/26/17 1617 10/26/17 1617 10/26/17 1201 --   Oral Monitor Lying Left arm       Pain Score       --                  Orthostatic Vital Signs  Vitals:    10/26/17 1815 10/26/17 1845 10/26/17 1915 10/26/17 2015   BP: 147/67 147/69 146/70 138/70   Pulse: (!) 54 60 56 64   Patient Position - Orthostatic VS: Lying   Lying       Physical Exam   Constitutional: She is oriented to person, place, and time  She appears well-developed and well-nourished  No distress  HENT:   Head: Normocephalic and atraumatic  Eyes: Conjunctivae are normal  Pupils are equal, round, and reactive to light  Cardiovascular: Normal rate, regular rhythm and normal heart sounds  Exam reveals no gallop and no friction rub  No murmur heard  Pulmonary/Chest: Effort normal and breath sounds normal  No respiratory distress  She has no wheezes  She has no rales  Abdominal: Soft  Bowel sounds are normal  She exhibits no distension  There is tenderness (Left upper and left lower quadrants  )  There is no guarding  Genitourinary:   Genitourinary Comments: Negative CVA tenderness  Musculoskeletal: Normal range of motion  Neurological: She is alert and oriented to person, place, and time  Skin: Skin is warm and dry  No rash noted  She is not diaphoretic  No zoster rash  Psychiatric: She has a normal mood and affect  Her behavior is normal    Nursing note and vitals reviewed        ED Medications  Medications   ketorolac (TORADOL) 30 mg/mL injection 15 mg (15 mg Intravenous Given 10/26/17 1807)   sodium chloride 0 9 % bolus 1,000 mL (0 mL Intravenous Stopped 10/26/17 2035)       Diagnostic Studies  Results Reviewed     Procedure Component Value Units Date/Time    POCT urinalysis dipstick [[id number]]  (Normal) Resulted:  10/26/17 1615    Lab Status:  Final result Specimen:  Urine from Urine, Other Updated:  10/26/17 1615     Color, UA completed    Urine Microscopic [[id number]]  (Abnormal) Collected:  10/26/17 1524    Lab Status:  Final result Specimen:  Urine from Urine, Clean Catch Updated:  10/26/17 1543     RBC, UA 4-10 (A) /hpf      WBC, UA None Seen /hpf      Epithelial Cells None Seen /hpf      Bacteria, UA Occasional /hpf      Hyaline Casts, UA None Seen /lpf     CBC and differential [[id number]] Collected:  10/26/17 1430    Lab Status:  Edited Result - FINAL Specimen:  Blood from Arm, Right Updated:  10/26/17 1527     WBC -- Thousand/uL      RBC -- Million/uL      Hemoglobin -- g/dL      Hematocrit -- %      MCV -- fL      MCH -- pg      MCHC -- g/dL      RDW -- %      MPV -- fL      Platelets -- Thousands/uL      nRBC -- /100 WBCs      Neutrophils Relative -- %      Lymphocytes Relative -- %      Monocytes Relative -- %      Eosinophils Relative -- %      Basophils Relative -- %      Neutrophils Absolute -- Thousands/µL      Lymphocytes Absolute -- Thousands/µL      Monocytes Absolute -- Thousand/µL      Eosinophils Absolute -- Thousand/µL      Basophils Absolute -- Thousands/µL     Comprehensive metabolic panel [[id number]] Collected:  10/26/17 1430    Lab Status:  Final result Specimen:  Blood from Arm, Right Updated:  10/26/17 1520     Sodium 140 mmol/L      Potassium 4 1 mmol/L      Chloride 104 mmol/L      CO2 29 mmol/L      Anion Gap 7 mmol/L      BUN 12 mg/dL      Creatinine 0 64 mg/dL      Glucose 88 mg/dL      Calcium 9 1 mg/dL      AST 20 U/L      ALT 23 U/L      Alkaline Phosphatase 65 U/L      Total Protein 8 1 g/dL      Albumin 3 7 g/dL      Total Bilirubin 0 33 mg/dL      eGFR 97 ml/min/1 73sq m     Narrative:         National Kidney Disease Education Program recommendations are as follows:  GFR calculation is accurate only with a steady state creatinine  Chronic Kidney disease less than 60 ml/min/1 73 sq  meters  Kidney failure less than 15 ml/min/1 73 sq  meters      Lipase [[id number]]  (Normal) Collected:  10/26/17 1430    Lab Status:  Final result Specimen:  Blood from Arm, Right Updated:  10/26/17 1520     Lipase 187 u/L     ED Urine Macroscopic [[id number]]  (Abnormal) Collected:  10/26/17 1524    Lab Status:  Final result Specimen:  Urine Updated:  10/26/17 1519     Color, UA Yellow     Clarity, UA Clear     pH, UA 6 0     Leukocytes, UA Negative     Nitrite, UA Negative     Protein, UA Negative mg/dl Glucose, UA Negative mg/dl      Ketones, UA Negative mg/dl      Urobilinogen, UA 0 2 E U /dl      Bilirubin, UA Negative     Blood, UA Trace (A)     Specific Traer, UA 1 015    Narrative:       CLINITEK RESULT    CBC and differential [[id number]] Resulted:  10/26/17 1513    Lab Status:  Edited Result - FINAL Specimen:  Blood Updated:  10/26/17 1513     WBC -- Thousand/uL      RBC -- Million/uL      Hemoglobin -- g/dL      Hematocrit -- %      MCV -- fL      MCH -- pg      MCHC -- g/dL      RDW -- %      MPV -- fL      Platelets -- Thousands/uL      nRBC -- /100 WBCs      Neutrophils Relative -- %      Lymphocytes Relative -- %      Monocytes Relative -- %      Eosinophils Relative -- %      Basophils Relative -- %      Neutrophils Absolute -- Thousands/µL      Lymphocytes Absolute -- Thousands/µL      Monocytes Absolute -- Thousand/µL      Eosinophils Absolute -- Thousand/µL      Basophils Absolute -- Thousands/µL     Lipase [[id number]] Resulted:  10/26/17 1506    Lab Status:  Edited Result - FINAL Specimen:  Blood Updated:  10/26/17 1506     Lipase -- u/L     Comprehensive metabolic panel [[id number]] Resulted:  10/26/17 1506    Lab Status:  Edited Result - FINAL Specimen:  Blood Updated:  10/26/17 1506     Sodium -- mmol/L      Potassium -- mmol/L      Chloride -- mmol/L      CO2 -- mmol/L      Anion Gap -- mmol/L      BUN -- mg/dL      Creatinine -- mg/dL      Glucose -- mg/dL      Calcium -- mg/dL      Corrected Calcium -- mg/dL      AST -- U/L      ALT -- U/L      Alkaline Phosphatase -- U/L      Total Protein -- g/dL      Albumin -- g/dL      Total Bilirubin -- mg/dL      eGFR -- ml/min/1 73sq m     Narrative:         National Kidney Disease Education Program recommendations are as follows:  GFR calculation is accurate only with a steady state creatinine  Chronic Kidney disease less than 60 ml/min/1 73 sq  meters  Kidney failure less than 15 ml/min/1 73 sq  meters                   CT renal stone study abdomen pelvis wo contrast   Final Result by Juancho Larios MD (10/26 1819)   1  Negative for urolithiasis  2   Colonic diverticulosis without diverticulitis  Workstation performed: [id number]               Procedures  Procedures      Phone Consults  ED Phone Contact    ED Course  ED Course                                MDM  Number of Diagnoses or Management Options  Left flank pain:   Diagnosis management comments: Patient's CT scan and laboratory values were unremarkable  She stated that she felt better after the Toradol administration and desired to go home  She was discharged home in good condition and given instructions to follow up with her primary care provider or return to the emergency department if her symptoms worsened  Patient understood and agreed with this plan  CritCare Time    Disposition  Final diagnoses:   Left flank pain     Time reflects when diagnosis was documented in both MDM as applicable and the Disposition within this note     Time User Action Codes Description Comment    10/26/2017  8:26 PM Vaibhav Precious Add [R10 9] Left flank pain       ED Disposition     ED Disposition Condition Comment    Discharge  Michela Mckee discharge to home/self care  Condition at discharge: Good        Follow-up Information     Follow up With Specialties Details Why [address], DO Internal Medicine   [address]  [phone]          Discharge Medication List as of 10/26/2017  8:27 PM      CONTINUE these medications which have NOT CHANGED    Details   aspirin 81 MG tablet Take 81 mg by mouth daily  , Until Discontinued, Historical Med      Cholecalciferol (VITAMIN D HIGH POTENCY PO) Take by mouth  Takes 1x/week, Until Discontinued, Historical Med      cyclobenzaprine (FLEXERIL) 10 mg tablet Take 10 mg by mouth as needed for muscle spasms  , Until Discontinued, Historical Med      levothyroxine 75 mcg tablet Take 88 mcg by mouth daily  , Until Discontinued, Historical Med      lisinopril-hydrochlorothiazide (PRINZIDE,ZESTORETIC) 10-12 5 MG per tablet Take 1 tablet by mouth daily  , Until Discontinued, Historical Med      cephalexin (KEFLEX) 500 mg capsule Take 1 capsule by mouth 2 (two) times a day, Starting 10/31/2016, Until Discontinued, Print      HYDROcodone-acetaminophen (NORCO) 5-325 mg per tablet Take 1 tablet by mouth every 6 (six) hours as needed for pain Max Daily Amount: 4 tablets, Starting 10/31/2016, Until Discontinued, Print      naproxen (NAPROSYN) 250 mg tablet Take 250 mg by mouth as needed for mild pain , Until Discontinued, Historical Med           No discharge procedures on file  ED Provider  Attending physically available and evaluated Ruth Aviles I managed the patient along with the ED Attending      Electronically Signed by         Mira Robin MD  Resident  10/27/17 9251

## 2017-10-27 NOTE — DISCHARGE INSTRUCTIONS
Dolor abdominal, cuidados ambulatorios   INFORMACIÓN GENERAL:   El dolor abdominal  puede ser sordo, molesto o [place]  Puede sentir dolor en aziza reva del abdomen o en todo el abdomen  El dolor puede deberse a ciertos estados edwin estreñimiento, sensibilidad o intoxicación alimentaria, infección o aziza obstrucción  Asimismo, el dolor abdominal puede deberse a aziza hernia, apendicitis o úlcera  La causa del dolor abdominal puede ser desconocida  Busque cuidados inmediatos para los siguientes síntomas:   · O'Brien dolor de pecho o falta de aliento    · Dolor pulsátil en el abdomen superior o en la parte inferior de la espalda que de repente es prerna    · Dolor que se localiza en el abdomen inferior derecho y empeora con el movimiento    · Fiebre por encima de 100 4°F (38°C) o escalofríos andrew    · Vómito de todo lo que usted come y pieter    · Dolor que no mejora o más chilo empeora maria teresa las siguientes 8 a 12 horas    · Eleazar en el vómito o heces que se guille negras y alquitranadas    · La piel o el vo de los ojos se vuelven amarillentos    · Grandes cantidades de sangrado vaginal que no es greenberg periodo menstrual  El tratamiento para el dolor abdominal  puede llegar a incluir medicamentos para calmar greenberg estómago, prevenir el vómito o disminuir el dolor  Programe aziza tami con greenberg proveedor de [employer] se le haya indicado: Anote compa preguntas para que se acuerde de hacerlas maria teresa compa visitas  ACUERDOS SOBRE GREENBERG CUIDADO:   Usted tiene el derecho de participar en la planificación de greenberg cuidado  Aprenda todo lo que pueda sobre greenberg condición y edwin darle tratamiento  Discuta con compa médicos compa opciones de tratamiento para juntos decidir el cuidado que usted quiere recibir  Usted siempre tiene el derecho a rechazar greenberg tratamiento  Esta información es sólo para uso en educación  Greenberg intención no es darle un consejo médico sobre enfermedades o tratamientos   Colsulte con greenberg Dominique Cranker farmacéutico antes de seguir cualquier régimen médico para saber si es seguro y efectivo para usted  © 2014 [address] is for End User's use only and may not be sold, redistributed or otherwise used for commercial purposes  All illustrations and images included in CareNotes® are the copyrighted property of A D A M , Inc  or Monty Sheldon

## 2017-12-08 ENCOUNTER — HOSPITAL ENCOUNTER (EMERGENCY)
Facility: HOSPITAL | Age: 62
Discharge: HOME | End: 2017-12-08
Attending: EMERGENCY MEDICINE
Payer: MEDICARE

## 2017-12-08 ENCOUNTER — APPOINTMENT (EMERGENCY)
Dept: RADIOLOGY | Facility: HOSPITAL | Age: 62
End: 2017-12-08
Payer: MEDICARE

## 2017-12-08 VITALS
RESPIRATION RATE: 18 BRPM | BODY MASS INDEX: 44.39 KG/M2 | TEMPERATURE: 98.1 F | DIASTOLIC BLOOD PRESSURE: 67 MMHG | HEIGHT: 64 IN | HEART RATE: 60 BPM | WEIGHT: 260 LBS | SYSTOLIC BLOOD PRESSURE: 143 MMHG | OXYGEN SATURATION: 96 %

## 2017-12-08 DIAGNOSIS — B34.9 ACUTE VIRAL SYNDROME: Primary | ICD-10-CM

## 2017-12-08 LAB
ALBUMIN SERPL BCP-MCNC: 3.3 G/DL (ref 3.5–5)
ALP SERPL-CCNC: 68 U/L (ref 46–116)
ALT SERPL W P-5'-P-CCNC: 24 U/L (ref 12–78)
ANION GAP SERPL CALCULATED.3IONS-SCNC: 5 MMOL/L (ref 4–13)
AST SERPL W P-5'-P-CCNC: 22 U/L (ref 5–45)
BACTERIA UR QL AUTO: NORMAL /HPF
BASOPHILS # BLD AUTO: 0.03 THOUSANDS/ΜL (ref 0–0.1)
BASOPHILS NFR BLD AUTO: 0 % (ref 0–1)
BILIRUB SERPL-MCNC: 0.33 MG/DL (ref 0.2–1)
BILIRUB UR QL STRIP: NEGATIVE
BUN SERPL-MCNC: 10 MG/DL (ref 5–25)
CALCIUM SERPL-MCNC: 9 MG/DL (ref 8.3–10.1)
CHLORIDE SERPL-SCNC: 106 MMOL/L (ref 100–108)
CLARITY UR: CLEAR
CO2 SERPL-SCNC: 29 MMOL/L (ref 21–32)
COLOR UR: YELLOW
CREAT SERPL-MCNC: 0.63 MG/DL (ref 0.6–1.3)
EOSINOPHIL # BLD AUTO: 0.23 THOUSAND/ΜL (ref 0–0.61)
EOSINOPHIL NFR BLD AUTO: 2 % (ref 0–6)
ERYTHROCYTE [DISTWIDTH] IN BLOOD BY AUTOMATED COUNT: 13.3 % (ref 11.6–15.1)
GFR SERPL CREATININE-BSD FRML MDRD: 97 ML/MIN/1.73SQ M
GLUCOSE SERPL-MCNC: 89 MG/DL (ref 65–140)
GLUCOSE UR STRIP-MCNC: NEGATIVE MG/DL
HCT VFR BLD AUTO: 39 % (ref 34.8–46.1)
HGB BLD-MCNC: 12.7 G/DL (ref 11.5–15.4)
HGB UR QL STRIP.AUTO: ABNORMAL
HYALINE CASTS #/AREA URNS LPF: NORMAL /LPF
KETONES UR STRIP-MCNC: NEGATIVE MG/DL
LEUKOCYTE ESTERASE UR QL STRIP: NEGATIVE
LIPASE SERPL-CCNC: 158 U/L (ref 73–393)
LYMPHOCYTES # BLD AUTO: 2.59 THOUSANDS/ΜL (ref 0.6–4.47)
LYMPHOCYTES NFR BLD AUTO: 27 % (ref 14–44)
MCH RBC QN AUTO: 28.3 PG (ref 26.8–34.3)
MCHC RBC AUTO-ENTMCNC: 32.6 G/DL (ref 31.4–37.4)
MCV RBC AUTO: 87 FL (ref 82–98)
MONOCYTES # BLD AUTO: 0.96 THOUSAND/ΜL (ref 0.17–1.22)
MONOCYTES NFR BLD AUTO: 10 % (ref 4–12)
NEUTROPHILS # BLD AUTO: 5.93 THOUSANDS/ΜL (ref 1.85–7.62)
NEUTS SEG NFR BLD AUTO: 61 % (ref 43–75)
NITRITE UR QL STRIP: NEGATIVE
NON-SQ EPI CELLS URNS QL MICRO: NORMAL /HPF
NRBC BLD AUTO-RTO: 0 /100 WBCS
PH UR STRIP.AUTO: 6 [PH] (ref 4.5–8)
PLATELET # BLD AUTO: 268 THOUSANDS/UL (ref 149–390)
PMV BLD AUTO: 10.9 FL (ref 8.9–12.7)
POTASSIUM SERPL-SCNC: 3.6 MMOL/L (ref 3.5–5.3)
PROT SERPL-MCNC: 7.6 G/DL (ref 6.4–8.2)
PROT UR STRIP-MCNC: NEGATIVE MG/DL
RBC # BLD AUTO: 4.49 MILLION/UL (ref 3.81–5.12)
RBC #/AREA URNS AUTO: NORMAL /HPF
SODIUM SERPL-SCNC: 140 MMOL/L (ref 136–145)
SP GR UR STRIP.AUTO: 1.02 (ref 1–1.03)
SPECIMEN SOURCE: NORMAL
TROPONIN I BLD-MCNC: 0 NG/ML (ref 0–0.08)
UROBILINOGEN UR QL STRIP.AUTO: 0.2 E.U./DL
WBC # BLD AUTO: 9.77 THOUSAND/UL (ref 4.31–10.16)
WBC #/AREA URNS AUTO: NORMAL /HPF

## 2017-12-08 PROCEDURE — 84484 ASSAY OF TROPONIN QUANT: CPT

## 2017-12-08 PROCEDURE — 83690 ASSAY OF LIPASE: CPT | Performed by: EMERGENCY MEDICINE

## 2017-12-08 PROCEDURE — 36415 COLL VENOUS BLD VENIPUNCTURE: CPT

## 2017-12-08 PROCEDURE — 71020 HB CHEST X-RAY 2VW FRONTAL&LATL: CPT

## 2017-12-08 PROCEDURE — 93005 ELECTROCARDIOGRAM TRACING: CPT | Performed by: EMERGENCY MEDICINE

## 2017-12-08 PROCEDURE — 99284 EMERGENCY DEPT VISIT MOD MDM: CPT

## 2017-12-08 PROCEDURE — 80053 COMPREHEN METABOLIC PANEL: CPT | Performed by: EMERGENCY MEDICINE

## 2017-12-08 PROCEDURE — 81001 URINALYSIS AUTO W/SCOPE: CPT

## 2017-12-08 PROCEDURE — 85025 COMPLETE CBC W/AUTO DIFF WBC: CPT | Performed by: EMERGENCY MEDICINE

## 2017-12-08 RX ORDER — ACETAMINOPHEN 325 MG/1
650 TABLET ORAL ONCE
Status: COMPLETED | OUTPATIENT
Start: 2017-12-08 | End: 2017-12-08

## 2017-12-08 RX ORDER — CETIRIZINE HYDROCHLORIDE 10 MG/1
10 TABLET ORAL DAILY
Qty: 20 TABLET | Refills: 0 | Status: SHIPPED | OUTPATIENT
Start: 2017-12-08 | End: 2019-08-22 | Stop reason: ALTCHOICE

## 2017-12-08 RX ORDER — FLUTICASONE PROPIONATE 50 MCG
1 SPRAY, SUSPENSION (ML) NASAL DAILY
Qty: 16 G | Refills: 0 | Status: SHIPPED | OUTPATIENT
Start: 2017-12-08 | End: 2018-11-12 | Stop reason: SDUPTHER

## 2017-12-08 RX ADMIN — ACETAMINOPHEN 650 MG: 325 TABLET, FILM COATED ORAL at 17:58

## 2017-12-08 NOTE — ED ATTENDING ATTESTATION
Cinthya Erickson MD, saw and evaluated the patient  I have discussed the patient with the resident/non-physician practitioner and agree with the resident's/non-physician practitioner's findings, Plan of Care, and MDM as documented in the resident's/non-physician practitioner's note, except where noted  All available labs and Radiology studies were reviewed  At this point I agree with the current assessment done in the Emergency Department  I have conducted an independent evaluation of this patient a history and physical is as follows:    Patient presents with 5 days of cough, subjective fevers, dyspnea, sore throat, chest pain with coughing, and headache  Patient does report getting any flu shot this year  Additionally, patient reports some diarrhea and mild abdominal pain  No additional complaints  Exam: AAOx3, NAD, RRR, CTA, mild chest wall tenderness,  S/NT/ND  A/P:  Viral syndrome  Will check labs, chest x-ray, and EKG to rule out any other potential diseases  Will treat with Flonase and Zyrtec      Critical Care Time  CritCare Time

## 2017-12-08 NOTE — DISCHARGE INSTRUCTIONS
Síndrome viral, cuidados ambulatorios   INFORMACIÓN GENERAL:   Un síndrome viral  es un término que los proveedores de julio c usan para los síntomas generales de aziza infección viral que no tiene aziza causa aparente  Los siguientes son los síntomas más comunes:   · [place] y escalofríos, o un salpullido    · Congestión o escurrimiento nasal    · Tos, dolor de garganta y voz ronca    · Dolor de [place], o dolor y presión alrededor de compa ojos    · Dolor en los músculos y de las articulaciones    · Falta la respiración o sibilancias (cruz silbante o chillón en el pecho)    · Dolor y retorcijones en el abdomen y diarrea    · [place], vómito o falta de apetito  Busque atención inmediata al presentar los siguientes síntomas:   · Vómito y diarrea persistente    · Dolor en el pecho o dificultad para respirar  El tratamiento para el síndrome viral puede incluir medicamentos para disminuir la fiebre, tos o la congestión nasal  También es posible que necesite medicamento para aliviar el salpullido, comezón o para ayudar tratar la infección viral   Controle compa síntomas:  Consuma líquidos según le indicaron para ayudar a evitar la deshidratación  Pregunte cuál es la cantidad de líquido que debe ingerir al día y cuáles son las bebidas recomendadas  Es posible que tenga que david aziza solución de sales rehidratantes SRO o jose m oral  La solución de rehidratación contiene la combinación [facility], sales y azúcar para reemplazar los líquidos corporales  Prevención de la propagación del virus:   · Lave compa michael con frecuencia  Use agua y Abhi  Lávese compa michael después de ir al baño, [employer] pañales o estornudar  Lávese compa michael antes y después de preparar o consumir alimentos  Use un gel limpiador en seco si no hay agua disponible  · Lleve aziza máscara  para ayudar a evitar el contagio del virus a otras personas  · Coc[place] y [employer]  Cocine chilo los alimentos   Limpie con un desinfectante las superficies donde se preparan los alimentos  · Consulte sobre la inmunización  Usted puede necesitar la vacuna contra la influenza (gripe), la vacuna antimeningocócica o vacuna contra el meningococos  Estas vacunas sirven para prevenir la gripe, neumonía y enfermedades meningocócicas  Programe aziza tami con kaplan proveedor de [employer] se le haya indicado: Anote compa preguntas para que se acuerde de hacerlas maria teresa compa visitas  ACUERDOS SOBRE KAPLAN CUIDADO:   Usted tiene el derecho de participar en la planificación de kaplan cuidado  Aprenda todo lo que pueda sobre kaplan condición y edwin darle tratamiento  Discuta con compa médicos compa opciones de tratamiento para juntos decidir el cuidado que usted quiere recibir  Usted siempre tiene el derecho a rechazar kaplan tratamiento  Esta información es sólo para uso en educación  [place] intención no es darle un consejo médico sobre enfermedades o tratamientos  Colsulte con kaplan Apurva Richard farmacéutico antes de seguir cualquier régimen médico para saber si es seguro y efectivo para usted  © 2014 [address] is for End User's use only and may not be sold, redistributed or otherwise used for commercial purposes  All illustrations and images included in CareNotes® are the copyrighted property of A D A M , Inc  or [address]

## 2017-12-08 NOTE — ED PROVIDER NOTES
History  Chief Complaint   Patient presents with    Cough     Patient reports cough for 5 days, states neck pain that radiates to her head which started yesterday  Patient reports SOB when she coughs, denies CP   Abdominal Pain     Patient reports abdominal pain that started yesterday as well as diarrhea for 2 days  This is a 20-year-old female with a past medical history of hypertension, hypothyroidism, and borderline diabetes presents to the emergency room this evening with one week of headache, diarrhea, productive cough, subjective fevers, and chest pain when she coughs  Patient states that the symptoms started approximately five days ago  She has been trying different over-the-counter medications to help the symptoms to include guaifenesin, cetirizine, and NSAIDs with no relief of symptoms  Patient states that she felt warm the in the day but did not take her temperature and is unsure if she had a fever or not  Lastly, patient notes that she is having a sore throat as well as a cough productive of yellow sputum  Patient states that she got the flu shot a little while ago and she has been sick for times since then and is concerned the flu shot is making her sick  Patient is concerned she is getting sick because her father just had a stroke and is admitted on the seventh floor currently  She does not want to get him sick  Patient denies any sudden change in vision, shortness of breath, nausea/vomiting, hematuria, dysuria, constipation, or any numbness/weakness/tingling  Prior to Admission Medications   Prescriptions Last Dose Informant Patient Reported? Taking? Cholecalciferol (VITAMIN D HIGH POTENCY PO)   Yes Yes   Sig: Take by mouth  Takes 1x/week   HYDROcodone-acetaminophen (NORCO) 5-325 mg per tablet   No Yes   Sig: Take 1 tablet by mouth every 6 (six) hours as needed for pain Max Daily Amount: 4 tablets   aspirin 81 MG tablet   Yes Yes   Sig: Take 81 mg by mouth daily  cephalexin (KEFLEX) 500 mg capsule   No Yes   Sig: Take 1 capsule by mouth 2 (two) times a day   cyclobenzaprine (FLEXERIL) 10 mg tablet   Yes Yes   Sig: Take 10 mg by mouth as needed for muscle spasms  levothyroxine 75 mcg tablet   Yes Yes   Sig: Take 88 mcg by mouth daily  lisinopril-hydrochlorothiazide (PRINZIDE,ZESTORETIC) 10-12 5 MG per tablet   Yes Yes   Sig: Take 1 tablet by mouth daily  naproxen (NAPROSYN) 250 mg tablet   Yes Yes   Sig: Take 250 mg by mouth as needed for mild pain  Facility-Administered Medications: None       Past Medical History:   Diagnosis Date    Disease of thyroid gland     hypo    GERD (gastroesophageal reflux disease)     Hyperlipidemia     Hypertension     Renal calculi     Sleep apnea        Past Surgical History:   Procedure Laterality Date    BACK SURGERY      CHOLECYSTECTOMY         History reviewed  No pertinent family history  I have reviewed and agree with the history as documented  Social History   Substance Use Topics    Smoking status: Former Smoker    Smokeless tobacco: Never Used    Alcohol use No        Review of Systems   Constitutional: Negative for chills, fatigue and fever  HENT: Negative for congestion, rhinorrhea, sinus pressure and sore throat  Eyes: Negative for visual disturbance  Respiratory: Positive for cough  Negative for shortness of breath  Cardiovascular: Positive for chest pain (Only when she coughs)  Gastrointestinal: Positive for diarrhea  Negative for abdominal pain, constipation, nausea and vomiting  Genitourinary: Negative for dysuria, frequency, hematuria and urgency  Musculoskeletal: Negative for arthralgias and myalgias  Skin: Negative for color change and rash  Neurological: Negative for dizziness, light-headedness and numbness         Physical Exam  ED Triage Vitals [12/08/17 1425]   Temperature Pulse Respirations Blood Pressure SpO2   98 1 °F (36 7 °C) 74 16 128/74 96 %      Temp Source Heart Rate Source Patient Position - Orthostatic VS BP Location FiO2 (%)   Oral Monitor Sitting Left arm --      Pain Score       8           Orthostatic Vital Signs  Vitals:    12/08/17 1425 12/08/17 1627 12/08/17 1741 12/08/17 1859   BP: 128/74 133/73 144/74 143/67   Pulse: 74 67 65 60   Patient Position - Orthostatic VS: Sitting Lying Lying Lying       Physical Exam   Constitutional: She is oriented to person, place, and time  She appears well-developed and well-nourished  No distress  HENT:   Head: Normocephalic and atraumatic  Eyes: Conjunctivae are normal  Pupils are equal, round, and reactive to light  Cardiovascular: Normal rate, regular rhythm and normal heart sounds  Exam reveals no gallop and no friction rub  No murmur heard  Pulmonary/Chest: Effort normal and breath sounds normal  No respiratory distress  She has no wheezes  She has no rales  She exhibits tenderness (Chest pain is reproducible with palpation)  Abdominal: Soft  Bowel sounds are normal  She exhibits no distension  There is no tenderness  There is no guarding  Musculoskeletal: Normal range of motion  She exhibits no edema, tenderness or deformity  Neurological: She is alert and oriented to person, place, and time  Skin: Skin is warm and dry  No rash noted  She is not diaphoretic  No erythema  Psychiatric: She has a normal mood and affect  Her behavior is normal    Nursing note and vitals reviewed        ED Medications  Medications   acetaminophen (TYLENOL) tablet 650 mg (650 mg Oral Given 12/8/17 1758)       Diagnostic Studies  Results Reviewed     Procedure Component Value Units Date/Time    Comprehensive metabolic panel [[id number]]  (Abnormal) Collected:  12/08/17 1619    Lab Status:  Final result Specimen:  Blood from Arm, Right Updated:  12/08/17 1653     Sodium 140 mmol/L      Potassium 3 6 mmol/L      Chloride 106 mmol/L      CO2 29 mmol/L      Anion Gap 5 mmol/L      BUN 10 mg/dL      Creatinine 0 63 mg/dL      Glucose 89 mg/dL      Calcium 9 0 mg/dL      AST 22 U/L      ALT 24 U/L      Alkaline Phosphatase 68 U/L      Total Protein 7 6 g/dL      Albumin 3 3 (L) g/dL      Total Bilirubin 0 33 mg/dL      eGFR 97 ml/min/1 73sq m     Narrative:         National Kidney Disease Education Program recommendations are as follows:  GFR calculation is accurate only with a steady state creatinine  Chronic Kidney disease less than 60 ml/min/1 73 sq  meters  Kidney failure less than 15 ml/min/1 73 sq  meters      Lipase [[id number]]  (Normal) Collected:  12/08/17 1619    Lab Status:  Final result Specimen:  Blood from Arm, Right Updated:  12/08/17 1653     Lipase 158 u/L     Urine Microscopic [[id number]]  (Normal) Collected:  12/08/17 1616    Lab Status:  Final result Specimen:  Urine from Urine, Clean Catch Updated:  12/08/17 1647     RBC, UA None Seen /hpf      WBC, UA None Seen /hpf      Epithelial Cells None Seen /hpf      Bacteria, UA None Seen /hpf      Hyaline Casts, UA None Seen /lpf     CBC and differential [[id number]]  (Normal) Collected:  12/08/17 1619    Lab Status:  Final result Specimen:  Blood from Arm, Right Updated:  12/08/17 1645     WBC 9 77 Thousand/uL      RBC 4 49 Million/uL      Hemoglobin 12 7 g/dL      Hematocrit 39 0 %      MCV 87 fL      MCH 28 3 pg      MCHC 32 6 g/dL      RDW 13 3 %      MPV 10 9 fL      Platelets 971 Thousands/uL      nRBC 0 /100 WBCs      Neutrophils Relative 61 %      Lymphocytes Relative 27 %      Monocytes Relative 10 %      Eosinophils Relative 2 %      Basophils Relative 0 %      Neutrophils Absolute 5 93 Thousands/µL      Lymphocytes Absolute 2 59 Thousands/µL      Monocytes Absolute 0 96 Thousand/µL      Eosinophils Absolute 0 23 Thousand/µL      Basophils Absolute 0 03 Thousands/µL     POCT troponin [[id number]]  (Normal) Collected:  12/08/17 1623    Lab Status:  Final result Updated:  12/08/17 1637     POC Troponin I 0 00 ng/ml      Specimen Type VENOUS    Narrative:         [employer] i-Stat handheld analyzer 99% cutoff is > 0 08ng/mL in network Emergency Departments    o cTnI 99% cutoff is useful only when applied to patients in the clinical setting of myocardial ischemia  o cTnI 99% cutoff should be interpreted in the context of clinical history, ECG findings and possibly cardiac imaging to establish correct diagnosis  o cTnI 99% cutoff may be suggestive but clearly not indicative of a coronary event without the clinical setting of myocardial ischemia  ED Urine Macroscopic [[id number]]  (Abnormal) Collected:  12/08/17 1616    Lab Status:  Final result Specimen:  Urine Updated:  12/08/17 1616     Color, UA Yellow     Clarity, UA Clear     pH, UA 6 0     Leukocytes, UA Negative     Nitrite, UA Negative     Protein, UA Negative mg/dl      Glucose, UA Negative mg/dl      Ketones, UA Negative mg/dl      Urobilinogen, UA 0 2 E U /dl      Bilirubin, UA Negative     Blood, UA Trace (A)     Specific [place], UA 1 020    Narrative:       [facility] RESULT                 XR chest 2 views   ED Interpretation by Sean Alvarenga MD (12/08 1856)   No acute cardiopulmonary process is identified  Final Result by Luanne Mar DO (12/08 2023)      No acute pulmonary disease  Please see additional comments above               Workstation performed: [id number]               Procedures  ECG 12 Lead Documentation  Date/Time: 12/8/2017 5:56 PM  Performed by: Veronika Washington  Authorized by: Dennis Church     Indications / Diagnosis:  Chest pain  ECG reviewed by me, the ED Provider: yes    Patient location:  ED  Previous ECG:     Previous ECG:  Compared to current    Similarity:  No change  Interpretation:     Interpretation: normal    Rate:     ECG rate assessment: normal    Rhythm:     Rhythm: sinus rhythm    Ectopy:     Ectopy: none    QRS:     QRS axis:  Normal    QRS intervals:  Normal  Conduction:     Conduction: normal    ST segments:     ST segments:  Normal  T waves:     T waves: normal Phone Consults  ED Phone Contact    ED Course  ED Course                                MDM  Number of Diagnoses or Management Options  Acute viral syndrome:   Diagnosis management comments: Patient's lab work and chest x-ray were unremarkable  She is likely suffering from a viral URI  She was instructed to follow up with her primary care provider and was discharged home in good condition with prescription for Flonase and Zyrtec  Patient was instructed to continue getting her annual flu shot  CritCare Time    Disposition  Final diagnoses:   Acute viral syndrome     Time reflects when diagnosis was documented in both MDM as applicable and the Disposition within this note     Time User Action Codes Description Comment    12/8/2017  5:59 PM Hailey Busch Add [B34 9] Acute viral syndrome       ED Disposition     ED Disposition Condition Comment    Discharge  Karina Madrid discharge to home/self care  Condition at discharge: Good        Follow-up Information     Follow up With Specialties Details Why [address], DO Internal Medicine   [address]  [phone]          Discharge Medication List as of 12/8/2017  7:35 PM      CONTINUE these medications which have NOT CHANGED    Details   aspirin 81 MG tablet Take 81 mg by mouth daily  , Until Discontinued, Historical Med      cephalexin (KEFLEX) 500 mg capsule Take 1 capsule by mouth 2 (two) times a day, Starting 10/31/2016, Until Discontinued, Print      Cholecalciferol (VITAMIN D HIGH POTENCY PO) Take by mouth  Takes 1x/week, Until Discontinued, Historical Med      cyclobenzaprine (FLEXERIL) 10 mg tablet Take 10 mg by mouth as needed for muscle spasms  , Until Discontinued, Historical Med      HY[facility]aminophen ([facility]) 5-325 mg per tablet Take 1 tablet by mouth every 6 (six) hours as needed for pain Max Daily Amount: 4 tablets, Starting 10/31/2016, Until Discontinued, Print      levothyroxine 75 mcg tablet Take 88 mcg by mouth daily  , Until Discontinued, Historical Med      lisinopril-hydrochlorothiazide (PRINZIDE,ZESTORETIC) 10-12 5 MG per tablet Take 1 tablet by mouth daily  , Until Discontinued, Historical Med      naproxen (NAPROSYN) 250 mg tablet Take 250 mg by mouth as needed for mild pain , Until Discontinued, Historical Med           No discharge procedures on file  ED Provider  Attending physically available and evaluated Dank Regan  MARTIN managed the patient along with the ED Attending      Electronically Signed by         Olivia Yanes MD  Resident  12/08/17 4779

## 2017-12-11 LAB
ATRIAL RATE: 71 BPM
P AXIS: 64 DEGREES
PR INTERVAL: 160 MS
QRS AXIS: 68 DEGREES
QRSD INTERVAL: 78 MS
QT INTERVAL: 378 MS
QTC INTERVAL: 410 MS
T WAVE AXIS: 13 DEGREES
VENTRICULAR RATE: 71 BPM

## 2018-01-09 NOTE — PROGRESS NOTES
Assessment    1  Cervicalgia (723 1) (M54 2)   2  Headache (784 0) (R51)   3  Neck pain on left side (723 1) (M54 2)   4  Carpal tunnel syndrome of left wrist (354 0) (G56 02)   5  Trapezius muscle spasm (728 85) (M62 838)   6  Mild cognitive impairment (331 83) (G31 84)    Plan   Lidoderm 5 % External Patch; APPLY 1 PATCH TO THE AFFECTED AREA AND LEAVE IN PLACE FOR 12 HOURS, THEN REMOVE AND LEAVE OFF FOR 12 HOURS; Therapy: 15XRO0195 to (Evaluate:24Jan2017)  Requested for: 92IWX9353; Last Rx:86Wia7972; Status: ACTIVE Ordered  Rx By: Stewart Kiran; Dispense: 30 Days ; #:1 X 30 Patch Box; Refill: 3;   For: Cervical radicular pain, Cervicalgia, Neck pain on left side, Trapezius muscle spasm; MATHEUS = N; [facility] [address] ; Last Updated By: SystemDiav[employer]; 9/27/2016 10:12:46 AM  Follow-up visit in 6 months Evaluation and Treatment  Follow-up  Status: Hold For - Scheduling  Requested for: 96YMD9281  Ordered; For: Cervical radicular pain, Cervicalgia, Headache, Neck pain on left side, Trapezius muscle spasm;  Ordered By: Stewart Kiran  Performed:   Due: 10ZDO6980     Discussion/Summary  Discussion Summary:   56 yo right handed female with history of mild cognitive impairment, she is stable at this time  MRI brain normal in past    PT contniues with c/o left sided neck pain and headaches  however this has improved   Pt had reaction to Baclofen, Neurontin, Pamelor  MRI cervical with multilevel DDD no cord compression,    seen by neurosurgery, no intervention   will try Lidoderm patches   contniue with PPM, on Flexeril/Naprosyn with some benefit, does not want to try any other medication  had EMG done results note CTS left, seen by NS, PPM, wearing splint   consider injection   EMG limited, unable to get results regarding potential radiculopathy, pt to have redone study 10/24   PT had [facility] done, 28/30 discussed potential medication, at this point will continue to monitor  Encouraged pt to reconsider use of CPAP  following with PCP for lung nodule  follow up in three months  Discussed at length with the patient and answered all her questions  Medication Side Effects Reviewed: Possible side effects of new medications were reviewed with the patient/guardian today  Patient Guardian understands agrees: The treatment plan was reviewed with the patient/guardian  The patient/guardian understands and agrees with the treatment plan   Counseling Documentation With Imm: The patient was counseled regarding instructions for management, risk factor reductions, prognosis, patient and family education, impressions, risks and benefits of treatment options, importance of compliance with treatment  total time of encounter was 35 minutes and >50% minutes was spent counseling  Headache [facility]:   The patient was counseled regarding;   Discussed side effects of all medications prescribed today to the patient in detail  Patient education was completed today and we also discussed precautions for rebound headaches  Also recommended to the patient :  1  Maintain regular sleep schedule  2  Limit over the counter medications  (No more than 3 times a week)  3  Maintain headache diary  4  Limit caffeine to 1-2 cups a day or less  5  Avoid dietary trigger  (list given to the patient and reviewed with them)  6  Patient is to have regular frequent meals to prevent headache onset  Chief Complaint  Chief Complaint Free Text Note Form: Patient presents today a follow up for her headaches and pain in her neck  History of Present Illness  HPI: 66yo right handed female with past medical History significant for hypertension, obesity, hypothyroidism, depression, has seen Dr Fidela Saint in past for evaluation of cognitive problems, she was then seen by Dr Cordova  She was last seen in March 2016    In the past she had work up to include MRI of brain that was unremarkable, labs for vit deficiency and thyroid have been normal  She did undergo neuropsych testing which demonstrated mild cognitive impairment and some depression with component of ADHD  She had sleep study with mild AARON, she did not want CPAP  Today she continues to note ongoing issues with her memory, she needs to write lists  She reports forgetfulness, , forgetful of names, she has left stove on and burned food several times, does not drive anymore She is able to take care of her own finances, she is independent in her ADL s Her last MOCA score was 28/30  She denies any problems with incontinence  She remains off any medication  Overall she feels her cognition has not changed much  At times, she does note some issues with word finding  She is also being followed for headaches  In the past these occur holocranial  At her last visit she relayed having had increased headaches occurring in the cervical region with radiation into her shoulders, left > right  She was is using Naprosyn without much relief  In the past, she had side effects it to baclofen, gabapentin as well as Pamelor  Due to her ongoing complaints, in the interim, she did undergo updated MRI of the cervical spine obtained 3/22/16; disc degeneration with focal right paramedian disc herniation at C4-5  Disc degeneration with a broad disc protrusion to the right at C5-6  Disc degeneration with broad disc protrusion to left at C6-7  Focal disc herniation at C2-3 and C3-4  Lajean Cassette She was subsequently seen by neurosurgery, No surgical intervention warranted, was suggested that she undergoes conservative treatment with physical therapy, the study was somewhat limited due to motion artifact, he was also suggested that she obtain EMG, as well as to see pain management  Should she have increased difficulty, repeat MRI With anesthesia may be beneficial  She was Subsequently seen by pain management, she was given nortriptyline which unfortunately provoked side effects   She underwent subsequent EMG of the left which was limited secondary to difficulty tolerating the needle portion however, definitive median  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, moderate and chronic was noted, there was no evidence of a peripheral neuropathy, She was given splints to use, as well as the Flexeril which has been beneficial  Fortunately overall, her wrist seems to be doing good, she does note some minor paresthesias of the second and fourth digits  She notes is some mild palpatory tenderness in the left SCM, which at this point it does not radiate  She is due to undergo a repeat EMG  She denies any recent trauma, no excessive lifting although she has been doing more cleaning  She has chronic low back pain and she sees a spine surgeon  ROS, FH, SH were reviewed with the pt ;Of note, back in August, her father suffered from a stroke, she has been under increased stress  She has noted some mild headaches   Neurology HPI [facility]:   Memory: Memory problem started 40 year(s) ago Problem affects short term memory  Onset was gradual  Progression seems to be worsening  The memory problem was noticed by the patient  Symptoms include forgetting names, forgetting appointments, forgetting conversations, forgetting familiar tasks, forgetting recent events and forgetting to take medications  Associated symptoms:  able to operate tv remote and able to operate kitchen appliances  Her memory problem: frequently interferes in day to day functioning  Conversation Difficulties: has trouble finding the right word, requires repeat information, substitutes a wrong word and repeatedly asks the same questions  The patient does not drive  Other Disorders: Changes in mood or personality has been noticed by the patient or the patient's family and has been or is currently being treated for depression or anxiety, but has not noticed any gait or balance disorder, has not experienced hallucinations or delusion and has not experienced fluctuation in alertness  Review of Systems  Neurological ROS:   Constitutional: chills, recent weight loss, fatigue and appetite changes  HEENT: blurred vision, dryness of the eyes, eye pain, hearing loss and tinnitus  Cardiovascular:  no chest pain or pressure, no palpitations present, the heart rate was not rapid or irregular, no swelling in the arms or legs, no poor circulation  Respiratory: shortness of breath with or without exertion  Gastrointestinal: nausea and diarrhea  Genitourinary: feelings of urinary urgency  Musculoskeletal: arthralgias, myalgias, immobility or loss of function, head/neck/back pain and pain while walking  Integumentary  no masses, no rash, no skin lesions, no livedo reticularis  Psychiatric:  no anxiety, no depression, no mood swings, no psychiatric hospitalizations, no sleep problems  Endocrine hair loss or gain  Hematologic/Lymphatic:  no unusual bleeding, no tendency for easy bruising, no clotting skin or lumps  Neurological General: headache and snoring  Neurological Mental Status: memory problems  Neurological Cranial Nerves: blurry or double vision and vertigo or dizziness  Neurological Motor findings include: cramping(pre/post exercise)  Neurological Coordination: balance difficulties and clumsiness  Neurological Sensory: numbness  Neurological Gait: difficulty walking  Active Problems    1  Carpal tunnel syndrome of left wrist (354 0) (G56 02)   2  Cervical radicular pain (723 4) (M54 12)   3  Cervical spondylosis without myelopathy (721 0) (M47 812)   4  Cervicalgia (723 1) (M54 2)   5  Degenerative cervical disc (722 4) (M50 30)   6  Encounter for routine gynecological examination (V72 31) (Z01 419)   7  Encounter for screening mammogram for breast cancer (V76 12) (Z12 31)   8  Headache (784 0) (R51)   9  Hyperlipidemia (272 4) (E78 5)   10  Hypertension (401 9) (I10)   11  Hypothyroidism (244 9) (E03 9)   12  Lower back pain (724 2) (M54 5)   13   Lung nodule, solitary (793 11) (R91 1)   14  Mild cognitive impairment (331 83) (G31 84)   15  Moderate nonproliferative diabetic retinopathy, without macular edema, associated with    type 2 diabetes mellitus (250 50,362 05) (E11 339)   16  Morbid or severe obesity due to excess calories (278 01) (E66 01)   17  Neck pain on left side (723 1) (M54 2)   18  Need for prophylactic vaccination and inoculation against influenza (V04 81) (Z23)   19  Obstructive sleep apnea (327 23) (G47 33)   20  Osteoarthritis of knee (715 36) (M17 9)   21  Shoulder bursitis, left (726 10) (M75 52)   22  Trapezius muscle spasm (728 85) (M62 838)   23  Type 2 diabetes mellitus (250 00) (E11 9)   24  Venous insufficiency (459 81) (I87 2)    Past Medical History    1  History of Ankle Sprain (845 00)   2  History of GERD without esophagitis (530 81) (K21 9)   3  History of arthritis (V13 4) (Z87 39)   4  History of depression (V11 8) (Z86 59)   5  History of dizziness (V13 89) (Z87 898)   6  History of edema (V13 89) (Z87 898)   7  History of headache (V13 89) (Z87 898)   8  History of headache (V13 89) (Z87 898)   9  History of hypercholesterolemia (V12 29) (Z86 39)   10  History of ovarian cyst (V13 29) (Z87 42)   11  History of sleep apnea (V13 89) (Z87 09)   12  History of spontaneous  (V13 29) (Z87 59)   13  History of thyroid disease (V12 29) (Z86 39)   14  Hypertension (401 9) (I10)   15  History of Impaired fasting glucose (790 21) (R73 01)   16  History of Other headache syndrome (339 89) (G44 89)   17  History of Other muscle spasm (728 85) (M62 838)   18  History of  (spontaneous vaginal delivery) (650) (O80)    Surgical History    1  History of Back Surgery   2  History of Cholecystectomy   3  History of Tonsillectomy With Adenoidectomy    Family History  Mother    1  Family history of    2  Family history of lung cancer (V16 1) (Z80 1)   3  Family history of malignant neoplasm (V16 9) (Z80 9)  Father    4   Family history of hypertension (V17 49) (Z82 49)  Son    5  Family history of hypertension (V17 49) (Z82 49)   6  Family history of lung disease (V19 8) (Z83 6)  Brother    7  Family history of diabetes mellitus (V18 0) (Z83 3)  Grandparent    8  Family history of cardiac disorder (V17 49) (Z82 49)  Maternal Aunt    9  Family history of hypothyroidism (V18 19) (Z83 49)  Family History    10  Family history of cardiac disorder (V17 49) (Z82 49)   11  Family history of hypertension (V17 49) (Z82 49)   12  Family history of neuropathy (V17 2) (Z82 0)    Social History    · Caregiver: Family member   ·    · Exercises regularly   · Former smoker (B40 07) (E96 559)   · Lives with family   · Never Drank Alcohol   · No alcohol use   · No caffeine use   · No drug use   · No living will   · No tobacco/smoke exposure   · History of No tobacco/smoke exposure   · Retired   · Unemployed (V62 0) (Z56 0)    Current Meds   1  Aspirin 81 MG Oral Tablet Delayed Release; KALYAN 1 TABLETA POR VIA ORAL   DIARIAMENTETAKE ONE TABLET BY MOUTH ONCE A DAY; Therapy: 21FOL3248 to (Evaluate:11Jan2017)  Requested for: 42PTA3748; Last   Rx:17Mar2016 Ordered   2  Cyclobenzaprine HCl - 10 MG Oral Tablet; TAKE 1 TABLET AT BEDTIME AS NEEDED; Therapy: 12TYD2375 to (Davis Valencia)  Requested for: 50QTI9616; Last   Rx:20Sep2016 Ordered   3  Levothyroxine Sodium 88 MCG Oral Tablet; TAKE 1 TABLET DAILY; Therapy: 35IFT2210 to (Evaluate:37Tsp2339); Last Rx:08Apr2016 Ordered   4  Lisinopril-Hydrochlorothiazide 10-12 5 MG Oral Tablet; TAKE 1 TABLET DAILY; Therapy: 45Vys1046 to (Evaluate:05Oct2016); Last Rx:08Apr2016 Ordered   5  Naproxen 500 MG Oral Tablet; KALYAN 1 TABLETA TODOS 12 HORAS CARLOS FUERA   NECESARIO TAKE ONE TABLETEVERY 12 HOURS AS NEEDED; Therapy: 65JAJ2049 to (Evaluate:23Xau9005)  Requested for: 19KJX7128; Last   Rx:20Sep2016 Ordered   6  Vitamin D3 1000 UNIT Oral Tablet Chewable;    Therapy: 06YNF1063 to Recorded    Allergies 1  Tagamet TABS    Vitals  Signs   Recorded: 29QPF1372 44:47ST   Systolic: 436  Diastolic: 55  Heart Rate: 68  Respiration: 18  Weight: 239 lb 12 oz  BMI Calculated: 41 15  BSA Calculated: 2 11    Physical Exam    Constitutional   General appearance: No acute distress, well appearing and well nourished  Musculoskeletal   Gait and station: Normal gait, stance and balance  Muscle strength: Abnormal   mild weakness of left SCM related to cervical /shoulder discomfort, full ROM of head/neck, Presented with a splint to left forearm  Muscle tone: No atrophy, abnormal movements, flaccidity, cogwheeling or spasticity  Involuntary movements: None observed  Neurologic   Orientation to person, place, and time: Normal   Mental Status: oriented to person, place, and time, normal attention skills, normal language skills and normal fund of knowledge  Attention span and concentration: Normal thought process and attention span  Language: Names objects, able to repeat phrases and speaks spontaneously  2nd cranial nerve: Normal     3rd, 4th, and 6th cranial nerves: Normal     5th cranial nerve: Normal     7th cranial nerve: Normal     8th cranial nerve: Normal     11th cranial nerve: Normal     Sensation: Abnormal   Decreased temperature right lower extremity versus left  Reflexes: Abnormal   Left Achilles 2, right Achilles one  Coordination: Normal     Cortical function: Normal     Mood and affect: Normal        Results/Data  Diagnostic Studies Reviewed:   Diagnostic Review MRI cervical spine obtained 3/22/16; disc degeneration with focal right paramedian disc herniation at C4-5  Disc degeneration with a broad disc protrusion to the right at C5-6  Disc degeneration with broad disc protrusion to left at C6-7  Focal disc herniation at C2-3 and C3-4   (1) BASIC METABOLIC PROFILE 28MWH9512 09:36AM Estefania Rodney     Test Name Result Flag Reference   GLUCOSE,RANDM 97 mg/dL     If the patient is fasting, the ADA then defines impaired fasting glucose as > 100 mg/dL and diabetes as > or equal to 123 mg/dL  SODIUM 142 mmol/L  136-145   POTASSIUM 4 3 mmol/L  3 5-5 3   CHLORIDE 107 mmol/L  100-108   CARBON DIOXIDE 32 mmol/L  21-32   ANION GAP (CALC) 3 mmol/L L 4-13   BLOOD UREA NITROGEN 15 mg/dL  5-25   CREATININE 0 70 mg/dL  0 60-1 30   Standardized to IDMS reference method   CALCIUM 9 3 mg/dL  8 3-10 1   eGFR Non-African American      >60 0 ml/min/1 73sq    [employer] Disease Education Program recommendations are as follows:  GFR calculation is accurate only with a steady state creatinine  Chronic Kidney disease less than 60 ml/min/1 73 sq  meters  Kidney failure less than 15 ml/min/1 73 sq  meters  (1) HEMOGLOBIN A1C 20ZNI9452 08:33AM EPIC, Provider   Test ordered by: CourseHorse     Test Name Result Flag Reference   HEMOGLOBIN A1C 6 1 %  4 2-6 3   EST  AVG  GLUCOSE 128 mg/dl       (1) COMPREHENSIVE METABOLIC PANEL 34VJD1035 34:84RY EPIC, Provider   Test ordered by: CourseHorse     Test Name Result Flag Reference   GLUCOSE,RANDM 114 mg/dL     If the patient is fasting, the ADA then defines impaired fasting glucose as > 100 mg/dL and diabetes as > or equal to 123 mg/dL  SODIUM 140 mmol/L  136-145   POTASSIUM 3 7 mmol/L  3 5-5 3   CHLORIDE 106 mmol/L  100-108   CARBON DIOXIDE 29 mmol/L  21-32   ANION GAP (CALC) 5 mmol/L  4-13   BLOOD UREA NITROGEN 13 mg/dL  5-25   CREATININE 0 69 mg/dL  0 60-1 30   Standardized to IDMS reference method   CALCIUM 8 9 mg/dL  8 3-10 1   BILI, TOTAL 0 52 mg/dL  0 20-1 00   ALK PHOSPHATAS 65 U/L     ALT (SGPT) 26 U/L  12-78   AST(SGOT) 26 U/L  5-45   ALBUMIN 3 6 g/dL  3 5-5 0   TOTAL PROTEIN 7 9 g/dL  6 4-8 2   eGFR Non-African American      >60 0 ml/min/1 73sq    [employer] Disease Education Program recommendations are as follows:  GFR calculation is accurate only with a steady state creatinine  Chronic Kidney disease less than 60 ml/min/1 73 sq  meters  Kidney failure less than 15 ml/min/1 73 sq  meters  (1) TSH 61UMF6301 08:33AM EPIC, Provider   Test ordered by: Deena Kiko     Test Name Result Flag Reference   TSH 4 190 uIU/mL H 0 358-3 740   Patients undergoing fluorescein dye angiography may retain small amounts of fluorescein in the body for 48-72 hours post procedure  Samples containing fluorescein can produce falsely depressed TSH values  If the patient had this procedure,a specimen should be resubmitted post fluorescein clearance  The recommended reference ranges for TSH during pregnancy are as follows:  First trimester 0 1 to 2 5 uIU/mL  Second trimester  0 2 to 3 0 uIU/mL  Third trimester 0 3 to 3 0 uIU/m     (1) VITAMIN D 25-HYDROXY 40HFX5389 08:33AM EPIC, Provider   Test ordered by: Quintonlouie Hoover     Test Name Result Flag Reference   VIT D 25-HYDROX 40 2 ng/mL  30 0-100 0   This assay is a certified procedure of the CDC Vitamin D Standardization Certification Program (VDSCP)     Deficiency <20ng/ml   Insufficiency 20-30ng/ml   Sufficient  ng/ml     *Patients undergoing fluorescein dye angiography may retain small amounts of fluorescein in the body for 48-72 hours post procedure  Samples containing fluorescein can produce falsely elevated Vitamin D values  If the patient had this procedure, a specimen should be resubmitted post fluorescein clearance  Attending Note  Collaborating Physician Note: Collaborating Note: I agree with the Advanced Practitioner note        Future Appointments    Date/Time Provider Specialty Site   03/28/2017 11:00 AM Davey Taveras, [address] Neurology [facility]  [facility]   01/19/2017 02:00 PM Melynda Favre, DO Internal Medicine ST [address] [place] [address] PCP   10/31/2016 02:30 PM Kyrie Muller, [facility] Neurosurgery [facility]   12/27/2016 08:10 AM Specialty Clinic, Podiatry  ST [address] [place] [address]     Signatures   Electronically signed by : Nigel Bence; Oct 16 2016  5:58PM EST                       (Author)    Electronically signed by : JESSICA Elias ; Oct 17 2016  6:08AM EST                       (Co-author)

## 2018-01-11 NOTE — MISCELLANEOUS
Provider Comments  Provider Comments:   First no show for patient  Opal Rinaldi CATHRYN called and rescheduled appointment for 8/23/16 at 1PM  Mailed first no show letter to patient        Signatures   Electronically signed by : Ceci Noel; Jul 18 2016  3:28PM EST                       (Author)    Electronically signed by : JESSICA Olivera ; Jul 24 2016  5:06PM EST                       (Review)

## 2018-01-11 NOTE — PROGRESS NOTES
Assessment    1  Cervical radicular pain (723 4) (M54 12)   2  Hypertension (401 9) (I10)   3  Hypothyroidism (244 9) (E03 9)   4  Lower back pain (724 2) (M54 5)   5  Hyperlipidemia (272 4) (E78 5)   6  Lung nodule, solitary (793 11) (R91 1)    Plan  Lung nodule, solitary    · Follow-up visit in 4 Months Evaluation and Treatment  Follow-up  Status: Hold For -  Scheduling  Requested for: 28Jun2016    Discussion/Summary  Discussion Summary:   The patient's chronic medical problems appear to be well-controlled  She'll continue with her blood pressure medications as well as her medications for her pain  The patient also is on a statin medication for her cholesterol which I will continue  She is currently not due for any testing, but I will have the patient return in 3-4 months at which time we'll check a lipid panel, as well as a repeat CT of the chest for her lung nodule  I will also check a hemoglobin A1c at that time  She has been diet controlled for her diabetes thus far  History of Present Illness  HPI: This is a 18-year-old female who is here for review of her chronic medical problems  #1 hypertension the patient is on lisinopril hydrochlorthiazide  She states that this helps control her blood pressure  She denies any chest pain, lightheadedness, dizziness  #2 cervical spine pain  The patient sees pain management  She is also seeing a neurologist  She has an EMG pending  #3 headaches  The patient sees neurology for this as well  She states that her headaches elbow better controlled  #4 chronic lower spine pain  The patient sees [facility]  She currently is on Flexeril and naproxen  She states that this does help with her discomfort  #5 hypothyroid  The patient follows up with endocrinology  She is currently on levothyroxine  She states this medication works well for her  #64 mm lung nodule  This was seen on a CT of her chest in October 2015   The patient will have a repeat CT of her chest and this upcoming October  She does have a 60-pack-year history, though she quit a few years ago  Review of Systems  Complete-Female:   Constitutional: No fever, no chills, feels well, no tiredness, no recent weight gain or weight loss  Cardiovascular: No complaints of slow heart rate, no fast heart rate, no chest pain, no palpitations, no leg claudication, no lower extremity edema  Respiratory: No complaints of shortness of breath, no wheezing, no cough, no SOB on exertion, no orthopnea, no PND  Gastrointestinal: No complaints of abdominal pain, no constipation, no nausea or vomiting, no diarrhea, no bloody stools  Genitourinary: No complaints of dysuria, no incontinence, no pelvic pain, no dysmenorrhea, no vaginal discharge or bleeding  Musculoskeletal: arthralgias  Integumentary: No complaints of skin rash or lesions, no itching, no skin wounds, no breast pain or lump  Neurological: numbness and tingling  Psychiatric: Not suicidal, no sleep disturbance, no anxiety or depression, no change in personality, no emotional problems  Endocrine: No complaints of proptosis, no hot flashes, no muscle weakness, no deepening of the voice, no feelings of weakness  Hematologic/Lymphatic: No complaints of swollen glands, no swollen glands in the neck, does not bleed easily, does not bruise easily  Active Problems    1  Cervical radicular pain (723 4) (M54 12)   2  Cervical spondylosis without myelopathy (721 0) (M47 812)   3  Cervicalgia (723 1) (M54 2)   4  Degenerative cervical disc (722 4) (M50 30)   5  Encounter for routine gynecological examination (V72 31) (Z01 419)   6  Encounter for screening mammogram for breast cancer (V76 12) (Z12 31)   7  Headache (784 0) (R51)   8  Hyperlipidemia (272 4) (E78 5)   9  Hypertension (401 9) (I10)   10  Hypothyroidism (244 9) (E03 9)   11  Lower back pain (724 2) (M54 5)   12  Mild cognitive impairment (331 83) (G31 84)   13  Moderate nonproliferative diabetic retinopathy, without macular edema, associated with    type 2 diabetes mellitus (250 50,362 05) (E11 339)   14  Morbid or severe obesity due to excess calories (278 01) (E66 01)   15  Neck pain on left side (723 1) (M54 2)   16  Need for prophylactic vaccination and inoculation against influenza (V04 81) (Z23)   17  Obstructive sleep apnea (327 23) (G47 33)   18  Osteoarthritis of knee (715 36) (M17 9)   19  Shoulder bursitis, left (726 10) (M75 52)   20  Trapezius muscle spasm (728 85) (M62 838)   21  Type 2 diabetes mellitus (250 00) (E11 9)   22  Venous insufficiency (459 81) (I87 2)    Past Medical History    1  History of Ankle Sprain (845 00)   2  History of GERD without esophagitis (530 81) (K21 9)   3  History of arthritis (V13 4) (Z87 39)   4  History of depression (V11 8) (Z86 59)   5  History of dizziness (V13 89) (Z87 898)   6  History of edema (V13 89) (Z87 898)   7  History of headache (V13 89) (Z87 898)   8  History of headache (V13 89) (Z87 898)   9  History of hypercholesterolemia (V12 29) (Z86 39)   10  History of ovarian cyst (V13 29) (Z87 42)   11  History of sleep apnea (V13 89) (Z87 09)   12  History of spontaneous  (V13 29) (Z87 59)   13  History of thyroid disease (V12 29) (Z86 39)   14  Hypertension (401 9) (I10)   15  History of Impaired fasting glucose (790 21) (R73 01)   16  History of Other headache syndrome (339 89) (G44 89)   17  History of Other muscle spasm (728 85) (M62 838)   18  History of  (spontaneous vaginal delivery) (650) (O80)  Active Problems And Past Medical History Reviewed: The active problems and past medical history were reviewed and updated today  Surgical History    1  History of Back Surgery   2  History of Cholecystectomy   3  History of Tonsillectomy With Adenoidectomy  Surgical History Reviewed: The surgical history was reviewed and updated today  Family History  Mother    1   Family history of    2  Family history of lung cancer (V16 1) (Z80 1)   3  Family history of malignant neoplasm (V16 9) (Z80 9)  Father    4  Family history of hypertension (V17 49) (Z82 49)  Son    5  Family history of hypertension (V17 49) (Z82 49)   6  Family history of lung disease (V19 8) (Z83 6)  Brother    7  Family history of diabetes mellitus (V18 0) (Z83 3)  Grandparent    8  Family history of cardiac disorder (V17 49) (Z82 49)  Maternal Aunt    9  Family history of hypothyroidism (V18 19) (Z83 49)  Family History    10  Family history of cardiac disorder (V17 49) (Z82 49)   11  Family history of hypertension (V17 49) (Z82 49)   12  Family history of neuropathy (V17 2) (Z82 0)    Social History    · Caregiver: Family member   ·    · Exercises regularly   · Former smoker (I38 25) (J10 470)   · Lives with family   · Never Drank Alcohol   · No alcohol use   · No caffeine use   · No drug use   · No living will   · No tobacco/smoke exposure   · History of No tobacco/smoke exposure   · Retired   · Unemployed (V62 0) (Z56 0)  Social History Reviewed: The social history was reviewed and updated today  Current Meds   1  Aspirin 81 MG Oral Tablet Delayed Release; KALYAN 1 TABLETA POR VIA ORAL   DIARIAMENTETAKE ONE TABLET BY MOUTH ONCE A DAY; Therapy: 55ANO3627 to (Evaluate:2017)  Requested for: [id number]; Last   Rx:2016 Ordered   2  Cyclobenzaprine HCl - 10 MG Oral Tablet; TAKE 1 TABLET AT BEDTIME AS NEEDED; Therapy: 2016 to (Todd Hoover)  Requested for: 2016; Last   Rx:2016 Ordered   3  Levothyroxine Sodium 88 MCG Oral Tablet; TAKE 1 TABLET DAILY; Therapy: 20QXI8130 to (Evaluate:38Ppn1964); Last Rx:2016 Ordered   4  Lisinopril-Hydrochlorothiazide 10-12 5 MG Oral Tablet; TAKE 1 TABLET DAILY; Therapy: 61Psv2466 to (Evaluate:2016); Last Rx:2016 Ordered   5   Naproxen 500 MG Oral Tablet; KALYAN 1 TABLETA TODOS 12 HORAS CARLOS FUERA   NECESARIO TAKE ONE TABLETEVERY 12 HOURS AS NEEDED; Therapy: 12WNE8407 to (Francoise Huang)  Requested for: 08Apr2016; Last   Rx:[id number] Ordered   6  Vitamin D3 1000 UNIT Oral Tablet Chewable; Therapy: 47ADQ3311 to Recorded    Allergies    1  Tagamet TABS    Vitals  Signs [Data Includes: Current Encounter]   Recorded: 28Jun2016 02:55PM   Heart Rate: 84, L Radial  Pulse Quality: Normal, L Radial  Respiration: 16  Respiration Quality: Normal  Systolic: 834, LUE, Sitting  Diastolic: 68, LUE, Sitting  Height: 5 ft 4 25 in  Weight: 248 lb   BMI Calculated: 42 24  BSA Calculated: 2 15  O2 Saturation: 95, RA    Physical Exam    Constitutional   General appearance: No acute distress, well appearing and well nourished  Pulmonary   Respiratory effort: No increased work of breathing or signs of respiratory distress  Auscultation of lungs: Clear to auscultation  Cardiovascular   Auscultation of heart: Normal rate and rhythm, normal S1 and S2, without murmurs  Examination of extremities for edema and/or varicosities: Normal     Abdomen   Abdomen: Non-tender, no masses  Liver and spleen: No hepatomegaly or splenomegaly  Lymphatic   Palpation of lymph nodes in neck: No lymphadenopathy  Musculoskeletal   Gait and station: Normal     Inspection/palpation of joints, bones, and muscles: Normal     Skin   Skin and subcutaneous tissue: Normal without rashes or lesions  Neurologic   Cranial nerves: Cranial nerves 2-12 intact  Sensation: No sensory loss      Psychiatric   Orientation to person, place, and time: Normal     Mood and affect: Normal          Future Appointments    Date/Time Provider Specialty Site   07/18/2016 09:30 AM Chloe Blum, [address] Neurology [facility] [address] [facility]   08/17/2016 02:45 PM BENEDICT Zepeda Pain Management [address] [facility]   07/27/2016 01:50 PM Ifrah Vazquez DO Internal Medicine  [facility] PCP   08/12/2016 01:45 PM Joi Henderson [facility]surgery [facility]   12/27/2016 08:10 AM Specialty Clinic, Podiatry  [address]     Signatures   Electronically signed by : JESSICA Adan ; Jun 28 2016  3:26PM EST                       (Author)

## 2018-01-11 NOTE — MISCELLANEOUS
Signatures   Electronically signed by : BENEDICT Palafox; Jun 22 2016  4:15PM EST                       (Author)    Electronically signed by : Koko Irene DO; Jun 22 2016  4:29PM EST

## 2018-01-11 NOTE — PROGRESS NOTES
Patient Health Assessment    Date:            04/21/2017  Blood Pressure:  148/91  Pulse:           67  Age:             62  Weight:          252 lbs  Height/Length:   5' 4"  Body Mass Index: 43 2  Provider:        CYDNEY  [facility]: Allergies    Fainting    High Blood Pressure    Respiratory Problems    Thyroid Problems    Frequent Headaches  Medications: Allergies:  Since Last Visit: Medical Alert: No Change    Medications: No Change    Allergies:        No Change  Pain Scale Type: Numeric Pain ScalePain Level: 0  Description: 65 yrs old pt presented for perio charting and finishing the  comp exam  Did not notice any decay, completed perio charting  Explained to the   pt the importance of having a deep cleaning  Pt was also interested in  bleaching her teeth  gave her a copy of the tx plan with sc/rp and bleaching  fees  Pt told me that her doctor said that she has border-line diabetes  Asked her to give me a proof of that so i can send it to her insurance with the   pre-auth for sc/rp  Pt understood and left in good health  n v sc/rp  n  n v sc/rp  n n n v bleaching    ----- Signed on Friday, April 21, 2017 at 3:33:07 PM  -----  ----- Provider: [email or web address] - [facility], Dentist -- Clinic: [place]  -----

## 2018-01-11 NOTE — CONSULTS
Assessment    1  Cervicalgia (723 1) (M54 2)   2  Degenerative cervical disc (722 4) (M50 30)   3  Cervical radicular pain (723 4) (M54 12)   4  Trapezius muscle spasm (728 85) (M62 838)    Plan   Cervicalgia, Trapezius muscle spasm    · *1 - [facility] Therapy  Consult and treat, modalities as indicated   Status: Hold For - Scheduling  Requested for: 17ECQ1744   Ordered; For: Cervicalgia, Trapezius muscle spasm; Ordered By: Mckenzie Pitt Performed:  Order Comments: 3N¢E¬D6 times weekly, 0Z¢S¬J9 weeks Due: 19VNU0537  Care Summary provided  : Yes  Trapezius muscle spasm    · 1 - [facility] (Anesthesia) Physician Referral  Consult  Status: Hold For -  Scheduling  Requested for: 12QHJ6883   Ordered; For: Trapezius muscle spasm; Ordered By: Mckenzie Pitt Performed:  Due: 11OJP3383; Last Updated By: Sara Galvez; 5/4/2016 2:14:51 PM  Care Summary provided  : Yes    EMG ONE EXTREMITY WITH OR W/O RELATED PARASPINAL AREAS; Status:Hold For - Scheduling; Requested CPV:58DZU2298;   Perform:[facility]; Order Comments:[facility] [address] [facility] june 8 2016 at 1pm second floor [address] -ba; [facility]:[id number]; Last Updated By:Anca Harding; 5/4/2016 3:46:20 PM;Ordered; For:Cervical radicular pain; Ordered By:Mike Henderson Points; Follow Up After Tests Complete Evaluation and Treatment  Follow-up  Status: Hold For - Scheduling  Requested for: 48UOJ5955  Ordered; For: Trapezius muscle spasm;  Ordered By: Mckenzie Pitt  Performed:   Due: 28RZU2120     Discussion/Summary    81-year-old female presents for review of MRI cervical spine 3/22/16 ([facility] [place]) study was reviewed in detail by Dr Nancie Laureano chronic degenerative changes of cervical spine are noted, no charles central or foraminal stenosis is appreciated, however the overall quality of the study is exceedingly poor   Clinical examination revealed tenderness of the SCM on the left, without other neurologic deficits, (motor or sensory) pain is of radicular nature however also involves the left chest  These films were reviewed with the patient in detail    An EMG to rule out central versus peripheral etiology is planned, additionally no conservative management has been attempted to date, pain management consultation with Dr Harris Joyner is requested additionally and course of physical therapy is also planned  In the future repeat MRI in a traditional unit with anesthesia may be considered for further cervical evaluation should conservative management proved unsuccessful  Additionally consultation for breast reduction surgery may also be an option as she does report that removal of her bra gives her significant relief in her left neck left shoulder pain  These findings, impressions and recommendations are reviewed in great detail with the patient, she expressed understanding and agreement, her questions were answered completely and to her satisfaction       Chief Complaint    1  Neck Pain  Initial Consultation left neck, left shoulder, left chest, and left arm pain      History of Present Illness  52-year-old female with the above complaint  She reports the onset approximately August 2015  She reports no specific traumatic event preceded the onset of these symptoms  She reports there persistent regardless of time a day, and recall is of activity  She has seen her primary care provider as well as neurology for these and other complaints, MRI of her cervical spine was ordered, she was unable to complete a typical MRI and was referred ultimately for an open MRI cervical spine [id number]  She reports she is frequently awaken her at night by her pain, and then unable to return to sleep once awakened  She denies dropping things or decrease in  strength  She does report additionally the index finger of her left hand occasionally will cramp and be painful   She also reports sharp pain with rotation of her neck a proximally 3 or 4 times over the last 6 months very transient in nature (see points to the trapezius left)  She currently takes Naprosyn with little relief, she has recently been started on cyclobenzaprine (Flexeril) one of which she takes at bedtime with some help  She has a distant history of L4 disc surgery which was relatively successful, however she has had chronic low back pain and chronic muscle spasm of her entire back since that surgery  She also reports she removes her bra whenever possible because of the chronic neck and left shoulder pain associated with this  She otherwise denies motor or sensory deficits in the upper extremities, activities only being restricted however with chronic left arm left neck and left shoulder pain  She denies bowel or bladder symptoms associated with this  She denies gait disturbance or ambulatory difficulty  Lorraine Delgado presents with complaints of gradual onset of constant episodes of severe bilateral posterior neck pain, described as sharp, aching and burning, radiating to the left trapezius, left shoulder, left arm, left upper arm, left forearm and left hand  On a scale of 1 to 10, the patient rates the pain as 10  The symptoms resulted from a no known event  Symptoms are improved by NSAIDs  Symptoms are made worse by head turn to left, left arm use and neck movement  Symptoms are worsening Pertinent Medical History: neck pain (constant gradually worsening pain )   Associated symptoms include neck stiffness, muscle spasm, tenderness, impaired range of motion, shoulder pain and headache, but no impaired hearing and no impaired vision  tinnitus (left ear)  Review of Systems    Constitutional: chills and feeling tired    The patient presents with complaints of visual disturbances, described as blurry vision and spots  ENT: sore throat     Cardiovascular: No complaints of slow heart rate, no fast heart rate, no chest pain, no palpitations, no leg claudication, no lower extremity edema  Respiratory: shortness of breath during exertion and sleep apnea  Gastrointestinal: diarrhea  Genitourinary: No complaints of dysuria, no incontinence, no pelvic pain, no dysmenorrhea, no vaginal discharge or bleeding  Musculoskeletal: arthralgias, limb pain, myalgias and joint stiffness  Integumentary: No complaints of skin rash or lesions, no itching, no skin wounds, no breast pain or lump  Neurological: headache, numbness, tingling, confusion, N/T in the feet and left hand pointer finger  , memory loss, decreased concentrating ability, speech difficulties, changed thought patterns and repeated questioning about recent events  Psychiatric: anxiety  Endocrine: No complaints of proptosis, no hot flashes, no muscle weakness, no deepening of the voice, no feelings of weakness  Hematologic/Lymphatic: a tendency for easy bruising  ROS reviewed  Active Problems    1  Acute hyperkalemia (276 7) (E87 5)   2  Cervical spondylosis without myelopathy (721 0) (M47 812)   3  Chest pain, unspecified type (786 50) (R07 9)   4  Encounter for screening mammogram for breast cancer (V76 12) (Z12 31)   5  Headache (784 0) (R51)   6  Hyperlipidemia (272 4) (E78 5)   7  Hypertension (401 9) (I10)   8  Hypothyroidism (244 9) (E03 9)   9  Lower back pain (724 2) (M54 5)   10  Mild cognitive impairment (331 83) (G31 84)   11  Moderate nonproliferative diabetic retinopathy, without macular edema, associated with    type 2 diabetes mellitus (250 50,362 05) (E11 339)   12  Morbid or severe obesity due to excess calories (278 01) (E66 01)   13  Neck pain on left side (723 1) (M54 2)   14  Need for prophylactic vaccination and inoculation against influenza (V04 81) (Z23)   15  Obstructive sleep apnea (327 23) (G47 33)   16  Osteoarthritis of knee (715 36) (M17 9)   17  Smoking hx (V15 82) (Z87 898)   18  Type 2 diabetes mellitus (250 00) (E11 9)   19  Venous insufficiency (459 81) (I87 2)   20  Wheezing (786 07) (R06 2)    Past Medical History    1  History of Ankle Sprain (845 00)   2  History of dizziness (V13 89) (Z87 898)   3  History of edema (V13 89) (Z87 898)   4  History of headache (V13 89) (Z87 898)   5  History of headache (V13 89) (Z87 898)   6  History of ovarian cyst (V13 29) (Z87 42)   7  History of sleep apnea (V13 89) (Z87 09)   8  History of spontaneous  (V13 29) (Z87 59)   9  History of Impaired fasting glucose (790 21) (R73 01)   10  History of Other headache syndrome (339 89) (G44 89)   11  History of Other muscle spasm (728 85) (M62 838)   12  History of  (spontaneous vaginal delivery) (650) (O80)    The active problems and past medical history were reviewed and updated today  Surgical History    1  History of Back Surgery   2  History of Tonsillectomy With Adenoidectomy    The surgical history was reviewed and updated today  Family History  Brother    1  Family history of diabetes mellitus (V18 0) (Z83 3)  Maternal Aunt    2  Family history of hypothyroidism (V18 19) (Z83 49)    The family history was reviewed and updated today  Social History    · Caregiver: Family member   · Former smoker (M71 56) (J16 201)   · Never Drank Alcohol   · Smoking hx (V15 82) (Z87 898)  The social history was reviewed and updated today  Current Meds   1  AeroChamber Mini Chamber Device; Use with inhaler; Therapy: 2016 to ([facility])  Requested for: 2016; Last   Rx:2016 Ordered   2  Aspirin 81 MG Oral Tablet Delayed Release; KALYAN 1 TABLETA POR VIA ORAL   DIARIAMENTETAKE ONE TABLET BY MOUTH ONCE A DAY; Therapy: 79OOP2746 to (Evaluate:2017)  Requested for: 19HWN0107; Last   Rx:2016 Ordered   3  Cyclobenzaprine HCl - 10 MG Oral Tablet; TAKE 1 TABLET AT BEDTIME AS NEEDED; Therapy: 2016 to ([facility])  Requested for: 2016; Last   Rx:2016 Ordered   4   Levothyroxine Sodium 88 MCG Oral Tablet; TAKE 1 TABLET DAILY; Therapy: 16VDE1347 to (Evaluate:85Jhs2814); Last Rx:08Apr2016 Ordered   5  Lisinopril-Hydrochlorothiazide 10-12 5 MG Oral Tablet; TAKE 1 TABLET DAILY; Therapy: 67Ycp2474 to (Evaluate:05Oct2016); Last Rx:08Apr2016 Ordered   6  Lovastatin 20 MG Oral Tablet; Take 1 tablet by mouth at bedtime; Therapy: 87VSL2662 to (Evaluate:06Aug2016); Last Rx:08Apr2016 Ordered   7  Naproxen 500 MG Oral Tablet; KALYAN 1 TABLETA TODOS 12 HORAS CARLOS FUERA   NECESARIO TAKE ONE TABLETEVERY 12 HOURS AS NEEDED; Therapy: 84CCN6787 to (Larry Vences)  Requested for: 08Apr2016; Last   Rx:08Apr2016 Ordered   8  ProAir  (90 Base) MCG/ACT Inhalation Aerosol Solution; INHALE 1 TO 2 PUFFS   EVERY 4 TO 6 HOURS AS NEEDED; Therapy: 08Apr2016 to (Larry Vences)  Requested for: 08Apr2016; Last   Rx:08Apr2016 Ordered   9  Vitamin D3 1000 UNIT Oral Tablet Chewable; Therapy: 25FCJ5027 to Recorded    The medication list was reviewed and updated today  Allergies    1  Tagamet TABS    Vitals  Vital Signs [Data Includes: Current Encounter]    Recorded: 26SWH5352 01:16PM   Temperature 97 3 F   Heart Rate 68   Respiration 16   Systolic 490   Diastolic 85   Height 5 ft 4 in   Weight 258 lb    BMI Calculated 44 29   BSA Calculated 2 18     Physical Exam     Constitutional   General appearance: Abnormal   uncomfortable, morbidly obese and appearance reflects stated age  Head and Face Normal on inspection  Respiratory Respiratory effort: Normal   Auscultation of lungs: Clear to auscultation bilaterally  Cardiovascular Auscultation of heart: Rate is regular  Rhythm is regular  No heart murmur appreciated  Musculo: Spine   Spine:    Cervical Spine examination demonstrates Cervical Spine: Appearance: Normal  Tenderness: left trapezius muscle, but not the cervical spine, not the left paraspinal, not the right paraspinal and not the right trapezius muscle  Palpatory findings include left-sided muscle spasms  ROM: Full  Motor: Normal  Special Tests: negative Spurling's Maneuver  Motor Exam: all motor groups within normal limits of strength & tone bilaterally  Sensory Exam: all sensory within normal limits bilaterally  Deep Tendon Reflexes: all reflexes within normal limits bilaterally  Skin warm and dry  Neurologic - Mental Status: Alert and Oriented x3  Mood and affect: Affect is normal   Grossly nonfocal    Motor System General Motor Strength: No pronator drift and no parietal drift  Motor System - Upper Extremities: Normal to inspection and palpation  Strength: Deltoids 5/5 bilaterally  Biceps 5/5 bilaterally  Triceps 5/5 bilaterally  Extensor carpi radials is 5/5 bilaterally  Extensor digitorum 5/5 bilaterally  Intrinsic 5/5 bilaterally   5/5 bilaterally  Muscle tone: Normal bilaterally  Motor System - Lower Extremities: Normal to inspection and palpation  Strength: iliopsoas 5/5 bilaterally  Quadriceps 5/5 bilaterally  Hamstrings 5/5 bilaterally  Gastrocnemius 5/5 bilaterally  Muscle tone: Normal bilaterally  Reflexes: Biceps reflexes are 2+ bilaterally  Triceps reflexes are 2+ bilaterally  Achilles reflexes are 2+ bilaterally  Babinski's reflex is 2+ down going bilaterally  Ankle clonus is absent bilaterally  Benjamin sign was not present:   Coordination: Finger to nose was normal    Sensory: Sensation grossly intact to light touch  Sensation grossly intact to light touch  Gait and Station: [place] with a normal gait  Attending Note  Collaborating Note: I supervised the Advanced Practitioner and I agree with the Advanced Practitioner note  I discussed the case with the Advanced Practitioner and reviewed the AP note I agree with the Advanced Practitioner note except personally reviewed imaging; and remotely directed plan of care        Future Appointments    Date/Time Provider Specialty Site   07/18/2016 09:30 AM Verónica Rasheed, [facility]   06/10/2016 01:00 PM Nora Degroot, [facility] NEUR[facility]   12/27/2016 08:10 AM Specialty Clinic, [facility]     Signatures   Electronically signed by : Otilio Kovacs, [facility]; May  4 2016  3:15PM EST                       (Author)    Electronically signed by : Eliz Graham MD PhD; May  4 2016  4:25PM EST                       (Review)

## 2018-01-12 VITALS
WEIGHT: 246.25 LBS | RESPIRATION RATE: 18 BRPM | SYSTOLIC BLOOD PRESSURE: 124 MMHG | BODY MASS INDEX: 42.04 KG/M2 | HEART RATE: 68 BPM | HEIGHT: 64 IN | DIASTOLIC BLOOD PRESSURE: 76 MMHG | OXYGEN SATURATION: 97 %

## 2018-01-12 NOTE — PROGRESS NOTES
Assessment    1  Cervical radicular pain (723 4) (M54 12)   2  Hypertension (401 9) (I10)   · well controlled  Currently on lisinopril  3  Hypothyroidism (244 9) (E03 9)   · F/U Endocrinologist Dr Duran  Last TSH was checked 5/2015= WNL  continue      levothyroxine  4  Lower back pain (724 2) (M54 5)   5  Hyperlipidemia (272 4) (E78 5)   6  Lung nodule, solitary (793 11) (R91 1)    Plan  Lung nodule, solitary    · Follow-up visit in 4 Months Evaluation and Treatment  Follow-up  Status: Hold For -  Scheduling  Requested for: 28Jun2016    Discussion/Summary  Impression: Welcome to Medicare Visit  Cardiovascular screening and counseling: screening is current  Diabetes screening and counseling: screening is current  Colorectal cancer screening and counseling: screening is current  Breast cancer screening and counseling: screening is current  Cervical cancer screening and counseling: screening is current  Abdominal aortic aneurysm screening and counseling: screening is current  Advance Directive Planning: she was encouraged to follow-up with me to discuss her questions and/or decisions  Patient Discussion: plan discussed with the patient, follow-up visit needed in one year  History of Present Illness  Welcome to Medicare and Wellness Visits: The patient is being seen for the welcome to medicare visit  Medicare Screening and Risk Factors   Medicare Screening Tests Risk Questions   Abdominal aortic aneurysm risk assessment: none indicated  Osteoporosis risk assessment: tobacco use  HIV risk assessment: none indicated  Drug and Alcohol Use: The patient is a former cigarette smoker and quit smoking 2013  The patient reports occasional alcohol use  She has never used illicit drugs     Diet and Physical Activity: Current diet includes well balanced meals, 3 servings of fruit per day, 3 servings of vegetables per day, 1-3 servings of meat per day, 3 servings of whole grains per day, 0 servings of dairy products per day, 0 cups of coffee per day, 1 cups of tea per day, 0 cans of regular soda per day and 0 cans of diet soda per day  She exercises 3 times per week  Exercise: walking 3 hours per week  Mood Disorder and Cognitive Impairment Screening: PHQ-9 Depression Scale   Over the past 2 weeks, how often have you been bothered by the following problems? 1 ) Little interest or pleasure in doing things? Several days  2 ) Feeling down, depressed or hopeless? Several days  3 ) Trouble falling asleep or sleeping too much? Several days  4 ) Feeling tired or having little energy? Half the days or more  5 ) Poor appetite or overeating? Half the days or more  7 ) Trouble concentrating on things, such as reading a newspaper or watching television? Nearly every day  TOTAL SCORE: 10, severity of depression is moderate  How difficult have these problems made it for you to do your work, take care of things at home, or get along with people? Somewhat difficult  She reports feeling down, depressed, or hopeless over the past two weeks  She reports feeling little interest or pleasure in doing things over the past two weeks  Functional Ability/Level of Safety: Hearing is slightly decreased, slightly decreased in the right ear, slightly decreased in the left ear and a hearing aid is not used  The patient is currently able to participate in social activities with limitations and unable to drive  Activities of daily living details: needs help shopping, meal preparation help needed, needs help doing housework and needs help doing laundry  Fall risk factors: The patient fell 0 times in the past 12 months  Home safety risk factors:  no grab bars in the bathroom  Advance Directives: Advance directives: no living will, no durable power of  for health care directives and no advance directives  end of life decisions were reviewed with the patient     Co-Managers and Medical Equipment/Suppliers: See Patient Care Team   Preventive Quality Program 65 and Older: The patient is currently asymptomatic Symptoms Include: The patient currently has no urinary incontinence symptoms  Patient Care Team    Care Team Member Role Specialty Office Number   Liliana Aubrie BOYLE  Family Medicine [phone]   Vince [facility] Specialist Podiatry [phone]   Janina Tony   Internal Medicine [phone]   Shanita [facility] Specialist Neurosurgery [phone]   [facility] Specialist Neurology [phone]     Review of Systems    Constitutional: negative  Eyes: negative  ENT: negative  Cardiovascular: negative  Respiratory: negative  Gastrointestinal: negative  Genitourinary: negative  Musculoskeletal: back muscle spasm  Integumentary and Breasts: negative  Neurological: paresthesias  Psychiatric: negative  Endocrine: negative  Hematologic and Lymphatic: negative  Active Problems    1  Cervical radicular pain (723 4) (M54 12)   2  Cervical spondylosis without myelopathy (721 0) (M47 812)   3  Cervicalgia (723 1) (M54 2)   4  Degenerative cervical disc (722 4) (M50 30)   5  Encounter for routine gynecological examination (V72 31) (Z01 419)   6  Encounter for screening mammogram for breast cancer (V76 12) (Z12 31)   7  Headache (784 0) (R51)   8  Hyperlipidemia (272 4) (E78 5)   9  Hypertension (401 9) (I10)   10  Hypothyroidism (244 9) (E03 9)   11  Lower back pain (724 2) (M54 5)   12  Mild cognitive impairment (331 83) (G31 84)   13  Moderate nonproliferative diabetic retinopathy, without macular edema, associated with    type 2 diabetes mellitus (250 50,362 05) (E11 339)   14  Morbid or severe obesity due to excess calories (278 01) (E66 01)   15  Neck pain on left side (723 1) (M54 2)   16  Need for prophylactic vaccination and inoculation against influenza (V04 81) (Z23)   17  Obstructive sleep apnea (327 23) (G47 33)   18   Osteoarthritis of knee (715 36) (M17 9)   19  Shoulder bursitis, left (726 10) (M75 52)   20  Trapezius muscle spasm (728 85) (M62 838)   21  Type 2 diabetes mellitus (250 00) (E11 9)   22  Venous insufficiency (459 81) (I87 2)    Past Medical History    · History of Ankle Sprain (845 00)   · History of GERD without esophagitis (530 81) (K21 9)   · History of arthritis (V13 4) (Z87 39)   · History of depression (V11 8) (Z86 59)   · History of dizziness (V13 89) (B04 108)   · History of edema (V13 89) (O35 677)   · History of headache (V13 89) (F72 056)   · History of headache (V13 89) (R53 838)   · History of hypercholesterolemia (V12 29) (Z86 39)   · History of ovarian cyst (V13 29) (Z87 42)   · History of sleep apnea (V13 89) (Z87 09)   · History of spontaneous  (V13 29) (Z87 59)   · History of thyroid disease (V12 29) (Z86 39)   · Hypertension (401 9) (I10)   · History of Impaired fasting glucose (790 21) (R73 01)   · History of Other headache syndrome (339 89) (G44 89)   · History of Other muscle spasm (728 85) (Q67 696)   · History of  (spontaneous vaginal delivery) (650) (O80)    The active problems and past medical history were reviewed and updated today        Surgical History    · History of Back Surgery   · History of Cholecystectomy   · History of Tonsillectomy With Adenoidectomy    Family History  Mother    · Family history of    · Family history of lung cancer (V16 1) (Z80 1)   · Family history of malignant neoplasm (V16 9) (Z80 9)  Father    · Family history of hypertension (V17 49) (Z82 49)  Son    · Family history of hypertension (V17 49) (Z82 49)   · Family history of lung disease (V19 8) (Z83 6)  Brother    · Family history of diabetes mellitus (V18 0) (Z83 3)  Grandparent    · Family history of cardiac disorder (V17 49) (Z82 49)  Maternal Aunt    · Family history of hypothyroidism (V18 19) (Z83 49)  Family History    · Family history of cardiac disorder (V17 49) (Z82 49)   · Family history of hypertension (V17 49) (Z82 49)   · Family history of neuropathy (V17 2) (Z82 0)    The family history was reviewed and updated today  Social History    · Caregiver: Family member   · Patient is sole caregiver for her brother who is disabled   ·    · Exercises regularly   · Former smoker (T00 96) (O89 911)   · Lives with family   · Never Drank Alcohol   · No alcohol use   · No caffeine use   · No drug use   · No living will   · History of No tobacco/smoke exposure   · No tobacco/smoke exposure   · Retired   · Unemployed (V62 0) (Z56 0)    Current Meds   1  Aspirin 81 MG Oral Tablet Delayed Release; KALYAN 1 TABLETA POR VIA ORAL   DIARIAMENTETAKE ONE TABLET BY MOUTH ONCE A DAY; Therapy: 93IAD1569 to (Evaluate:11Jan2017)  Requested for: 01JOH2114; Last   Rx:17Mar2016 Ordered   2  Cyclobenzaprine HCl - 10 MG Oral Tablet; TAKE 1 TABLET AT BEDTIME AS NEEDED; Therapy: 28Apr2016 to (Juan Pablo Natalia)  Requested for: 28Apr2016; Last   Rx:28Apr2016 Ordered   3  Levothyroxine Sodium 88 MCG Oral Tablet; TAKE 1 TABLET DAILY; Therapy: 16ELK5480 to (Evaluate:56Lal5279); Last Rx:08Apr2016 Ordered   4  Lisinopril-Hydrochlorothiazide 10-12 5 MG Oral Tablet; TAKE 1 TABLET DAILY; Therapy: 18Jek1962 to (Evaluate:05Oct2016); Last Rx:08Apr2016 Ordered   5  Naproxen 500 MG Oral Tablet; KALYAN 1 TABLETA TODOS 12 HORAS CARLOS FUERA   NECESARIO TAKE ONE TABLETEVERY 12 HOURS AS NEEDED; Therapy: 48EZY7987 to ([place] Natalia)  Requested for: 08Apr2016; Last   Rx:08Apr2016 Ordered   6  Vitamin D3 1000 UNIT Oral Tablet Chewable; Therapy: 29LPB0611 to Recorded    The medication list was reviewed and updated today  Allergies    1   Tagamet TABS    Immunizations   1 2 3    Influenza  10Oct2013 99COB9827 22Oct2015     Vitals  Signs [Data Includes: Current Encounter]    Heart Rate: 84, L Radial  Pulse Quality: Normal, L Radial  Respiration: 16  Respiration Quality: Normal  Systolic: 350, LUE, Sitting  Diastolic: 68, LUE, Sitting  Height: 5 ft 4 25 in  Weight: 248 lb   BMI Calculated: 42 24  BSA Calculated: 2 15  O2 Saturation: 95, RA    Physical Exam    Constitutional   General appearance: No acute distress, well appearing and well nourished  Head and Face   Head and face: Normal     Palpation of the face and sinuses: No sinus tenderness  Eyes   Conjunctiva and lids: No swelling, erythema or discharge  Pupils and irises: Equal, round, reactive to light  Ears, Nose, Mouth, and Throat   External inspection of ears and nose: Normal     Nasal mucosa, septum, and turbinates: Normal without edema or erythema  Lips, teeth, and gums: Normal, good dentition  Oropharynx: Normal with no erythema, edema, exudate or lesions  Neck   Neck: Supple, symmetric, trachea midline, no masses  Thyroid: Normal, no thyromegaly  Pulmonary   Respiratory effort: No increased work of breathing or signs of respiratory distress  Auscultation of lungs: Clear to auscultation  Cardiovascular   Auscultation of heart: Normal rate and rhythm, normal S1 and S2, no murmurs  Examination of extremities for edema and/or varicosities: Normal     Abdomen   Abdomen: Non-tender, no masses  Lymphatic   Palpation of lymph nodes in neck: No lymphadenopathy  Musculoskeletal   Gait and station: Normal     Digits and nails: Normal without clubbing or cyanosis  Range of motion: Normal     Stability: Normal     Skin   Skin and subcutaneous tissue: Normal without rashes or lesions  Palpation of skin and subcutaneous tissue: Normal turgor  Neurologic   Cranial nerves: Cranial nerves II-XII intact  Psychiatric   Judgment and insight: Normal     Recent and remote memory: Intact  Health Management  Encounter for routine gynecological examination   BREAST EXAM; every 1 year; Next Due: 91MPL6370; Overdue  PELVIC EXAM; every 1 year; Next Due: 59LRN1716;  Overdue    Future Appointments    Date/Time Provider Specialty Site   07/18/2016 09:30 AM Lucy Hearn, [address] Neurology [facility]   08/17/2016 02:45 PM BENEDICT [facility] [address] [place] [address]   07/27/2016 01:50 PM Walter Khan, DO Internal Medicine [address] PCP   08/12/2016 01:45 PM Apolonia Connors [facility] Neurosurgery [facility]   12/27/2016 08:10 AM Specialty Clinic, Podiatry  [place] [address]     Signatures   Electronically signed by : JESSICA Lemos ; Jun 28 2016  3:29PM EST                       (Author)

## 2018-01-12 NOTE — MISCELLANEOUS
Reason For Visit  Reason For Visit Free Text Note Form: Assistance with elderly father and disabled brother  Case Management Documentation [facility]:   Information obtained from the patient and medical record  Action Plan: information provided  plan reviewed  Progress Note  The patient was referred via the medical team for assistance with her elderly father, 80years of age and her disabled brother 64years of age  The patient's father has only Medicare but her brother has Medicare and Medicaid  The patient was given the [address]  telephone number [phone] to inquire if one or both may be eligible for the Waiver program  If they are unable to assist the patient was given the [facility] [employer] telephone number [phone] for further assistance and referrals  Active Problems    1  Allergic rhinitis (477 9) (J30 9)   2  Carpal tunnel syndrome of left wrist (354 0) (G56 02)   3  Cervical radicular pain (723 4) (M54 12)   4  Cervical spondylosis without myelopathy (721 0) (M47 812)   5  Cervicalgia (723 1) (M54 2)   6  Colon cancer screening (V76 51) (Z12 11)   7  Degenerative cervical disc (722 4) (M50 30)   8  Encounter for routine gynecological examination (V72 31) (Z01 419)   9  Encounter for screening mammogram for breast cancer (V76 12) (Z12 31)   10  Headache (784 0) (R51)   11  Hyperlipidemia (272 4) (E78 5)   12  Hypertension (401 9) (I10)   13  Hypothyroidism (244 9) (E03 9)   14  Lower back pain (724 2) (M54 5)   15  Lung nodule, solitary (793 11) (R91 1)   16  Mild cognitive impairment (331 83) (G31 84)   17  Moderate nonproliferative diabetic retinopathy, without macular edema, associated with    type 2 diabetes mellitus (250 50,362 05) (E11 339)   18  Morbid or severe obesity due to excess calories (278 01) (E66 01)   19  Neck pain on left side (723 1) (M54 2)   20  Need for prophylactic vaccination and inoculation against influenza (V04 81) (Z23)   21   Obstructive sleep apnea (327 23) (G47 33)   22  Osteoarthritis of knee (715 36) (M17 9)   23  Shoulder bursitis, left (726 10) (M75 52)   24  Sinusitis (473 9) (J32 9)   25  Trapezius muscle spasm (728 85) (M62 838)   26  Type 2 diabetes mellitus (250 00) (E11 9)   27  Venous insufficiency (459 81) (I87 2)    Current Meds   1  Aspirin 81 MG Oral Tablet Delayed Release; KALYAN 1 TABLETA POR VIA ORAL   DIARIAMENTETAKE ONE TABLET BY MOUTH ONCE A DAY; Therapy: 89BAR9276 to (Evaluate:11Jan2017)  Requested for: 68FZY4643; Last   Rx:17Mar2016 Ordered   2  Claritin-D 12 Hour 5-120 MG Oral Tablet Extended Release 12 Hour; TAKE 1 TABLET   EVERY 12 HOURS AS NEEDED; Therapy: 76KHC5852 to (Evaluate:01Mar2017)  Requested for: 15ZYT5934; Last   Rx:40Ikk7213 Ordered   3  Cyclobenzaprine HCl - 10 MG Oral Tablet; TAKE 1 TABLET AT BEDTIME AS NEEDED; Therapy: 47EZU5413 to (Geryl Misenheimer)  Requested for: 24VBI5459; Last   Rx:20Sep2016 Ordered   4  Fluticasone Propionate 50 MCG/ACT Nasal Suspension; USE 1 TO 2 SPRAYS IN EACH   NOSTRIL ONCE DAILY; Therapy: 17CDC3125 to (Evaluate:17Mar2017)  Requested for: 25SES0226; Last   Rx:21Jzz1905 Ordered   5  Levothyroxine Sodium 88 MCG Oral Tablet; TAKE 1 TABLET DAILY; Therapy: 50SON2198 to (Evaluate:06Aug2016); Last Rx:08Apr2016 Ordered   6  Lidoderm 5 % External Patch (Lidocaine); APPLY 1 PATCH TO THE AFFECTED AREA   AND LEAVE IN PLACE FOR 12 HOURS, THEN REMOVE AND LEAVE OFF FOR 12   HOURS; Therapy: 68MUH8189 to (Evaluate:24Jan2017)  Requested for: 58HYD5469; Last   Rx:94Hyu9823; Status: ACTIVE - Transmit to Pharmacy - Awaiting Verification Ordered   7  Lisinopril-Hydrochlorothiazide 10-12 5 MG Oral Tablet; TAKE 1 TABLET DAILY; Therapy: 32Yhz5662 to (Evaluate:05Oct2016); Last Rx:08Apr2016 Ordered   8  Naproxen 500 MG Oral Tablet; KALYAN 1 TABLETA TODOS 12 HORAS CARLOS FUERA   NECESARIO TAKE ONE TABLETEVERY 12 HOURS AS NEEDED;    Therapy: 37LUV9727 to (Evaluate:83Dar0938)  Requested for: 36UMS6065; Last Rx: 08UXK1146 Ordered   9  Vitamin D3 1000 UNIT Oral Tablet Chewable; Therapy: 20KEE0318 to Recorded    Allergies    1   Tagamet TABS    Future Appointments    Date/Time Provider Specialty Site   03/28/2017 11:00 AM Les Pedro, [address] Neurology [facility]   06/22/2017 02:20 PM Caleb Carter, DO Internal Medicine [address] PCP     Signatures   Electronically signed by : SUGAR Kiser; Feb 22 2017 11:41AM EST                       (Author)

## 2018-01-13 VITALS
HEART RATE: 60 BPM | DIASTOLIC BLOOD PRESSURE: 72 MMHG | TEMPERATURE: 97.6 F | SYSTOLIC BLOOD PRESSURE: 110 MMHG | WEIGHT: 252.21 LBS | HEIGHT: 64 IN | BODY MASS INDEX: 43.06 KG/M2

## 2018-01-14 VITALS
DIASTOLIC BLOOD PRESSURE: 69 MMHG | SYSTOLIC BLOOD PRESSURE: 96 MMHG | BODY MASS INDEX: 41.66 KG/M2 | HEART RATE: 75 BPM | HEIGHT: 64 IN | WEIGHT: 244 LBS

## 2018-01-15 VITALS
BODY MASS INDEX: 44.11 KG/M2 | WEIGHT: 258.38 LBS | HEART RATE: 64 BPM | TEMPERATURE: 97.8 F | DIASTOLIC BLOOD PRESSURE: 76 MMHG | SYSTOLIC BLOOD PRESSURE: 108 MMHG | HEIGHT: 64 IN

## 2018-01-15 NOTE — PROGRESS NOTES
Assessment    1  Chest pain, unspecified type (786 50) (R07 9)   2  Hyperlipidemia (272 4) (E78 5)   3  Hypertension (401 9) (I10)   · well controlled  Currently on lisinopril  4  Neck pain on left side (723 1) (M54 2)   5  Acute hyperkalemia (276 7) (E87 5)   6  Wheezing (786 07) (R06 2)   7  Former smoker (V15 82) (Q58 850)   8  Caregiver: Family member   · Patient is sole caregiver for her brother who is disabled    Plan  Acute hyperkalemia    · (1) BASIC METABOLIC PROFILE; Status:Active; Requested for:08Apr2016;   Chest pain, unspecified type    · EKG/ECG- POC; Status:Active; Requested for:08Apr2016;   Headache, Neck pain on left side    · Naproxen 500 MG Oral Tablet; KALYAN 1 TABLETA TODOS 12 HORAS CARLOS FUERA  NECESARIO TAKE ONE TABLETEVERY 12 HOURS AS NEEDED  Hyperlipidemia    · Lovastatin 20 MG Oral Tablet; Take 1 tablet by mouth at bedtime  Hypertension    · Lisinopril-Hydrochlorothiazide 10-12 5 MG Oral Tablet; TAKE 1 TABLET DAILY  Hypothyroidism    · Levothyroxine Sodium 88 MCG Oral Tablet; TAKE 1 TABLET DAILY  Type 2 diabetes mellitus    · Gabapentin 100 MG Oral Capsule; TAKE 1 CAPSULE 3 TIMES DAILY  Wheezing    · AeroChamber Mini Chamber Device; Use with inhaler   · ProAir  (90 Base) MCG/ACT Inhalation Aerosol Solution; INHALE 1 TO 2 PUFFS EVERY  4 TO 6 HOURS AS NEEDED    Discussion/Summary  Discussion Summary:   1  Chest discomfort  -due to recent labs with K of 5 8 and chest pain, will check EKG   -EKG reviewed: NSR P 65, no peaked T waves, no ST-T changes evident, appears specimen was hemolyzed will recheck BMP asap    2  Neck pain with numbness of LUE  -follow-up with Neurology  -MRI reviewed on disc provided by pt, advise to have pt call for sooner apt then July, possible lower cervical disc protrusion  -continue to take Naproxen as per Neurology, also use moist heat to back of neck to relieve muscle tension    3   L flank pain  -improved since earlier this week, if returns keep hydrated and call if pain worsens, possibly renal calculus as pt has had them in past    4  Wheezing  -will start Albuterol for symptomatic relief and check spirometry at next visit    5  Hyperkalemia  -moderate hemolyzed specimen repeat BMP and monitor clinically    Pt is planning on going to visit her father in June/July who she has not seen in approx 20yrs and she wants to feel better prior to traveling to see him  Counseling Documentation With Imm: The patient was counseled regarding diagnostic results, instructions for management, risk factor reductions, prognosis, impressions, risks and benefits of treatment options, importance of compliance with treatment  Medication SE Review and Pt Understands Tx: Possible side effects of new medications were reviewed with the patient/guardian today  The treatment plan was reviewed with the patient/guardian  The patient/guardian understands and agrees with the treatment plan      Chief Complaint  Chief Complaint Free Text Note Form: Patient is here for f/u of chronic conditions   Chief Complaint Chronic Condition [facility]: Patient is here today for follow up of chronic conditions described in HPI  History of Present Illness  HPI: Patient is here today for follow up of chronic conditions  For the past few weeks she has been having dizzy spells occurring intermittently throughout the day  Last week she also had left flank pain that radiated to her anterior groin and was painful like previous kidney stones  She did not pass a stone or sand contents suggestive of passing stones but the pain has subsided  She continues to have left side neck pain with radiation to her left arm  She also continues to have pins and needles down her left arm  She has been seen by Neurology and they ordered a repeat C-Spine MRI which she brought the disc of today   Also for the past few days she has been having shortness of breath and some chest tightness deep in her left anterior chest which is associated with wheezing  She has not had spirometry done in the past but is open to having it performed in the future when she is feeling better  Hospital Based Practices Required Assessment:   Pain Assessment   the patient states they have pain  The pain is located in the left side of body  The patient describes the pain as sharp  (on a scale of 0 to 10, the patient rates the pain at 8 )    Prefered Language is  Austrian  Primary Language is  Austrian  Review of Systems  Complete-Female:   Constitutional: feeling poorly, but as noted in HPI  Eyes: No complaints of eye pain, no red eyes, no eyesight problems, no discharge, no dry eyes, no itching of eyes  ENT: no complaints of earache, no loss of hearing, no nose bleeds, no nasal discharge, no sore throat, no hoarseness  Cardiovascular: chest pain, but as noted in HPI  Respiratory: shortness of breath and wheezing  Gastrointestinal: No complaints of abdominal pain, no constipation, no nausea or vomiting, no diarrhea, no bloody stools  Genitourinary: as noted in HPI  Musculoskeletal: limb pain  Integumentary: No complaints of skin rash or lesions, no itching, no skin wounds, no breast pain or lump  Neurological: numbness, tingling, dizziness and LUE  Psychiatric: Not suicidal, no sleep disturbance, no anxiety or depression, no change in personality, no emotional problems  Endocrine: No complaints of proptosis, no hot flashes, no muscle weakness, no deepening of the voice, no feelings of weakness  Hematologic/Lymphatic: No complaints of swollen glands, no swollen glands in the neck, does not bleed easily, does not bruise easily  ROS Reviewed:   ROS reviewed  Active Problems    1  Encounter for screening mammogram for breast cancer (V76 12) (Z12 31)   2  Headache (784 0) (R51)   3  Hyperlipidemia (272 4) (E78 5)   4  Hypertension (401 9) (I10)   5  Hypothyroidism (244 9) (E03 9)   6  Lower back pain (724 2) (M54 5)   7   Mild cognitive impairment (331 83) (G31 84)   8  Moderate nonproliferative diabetic retinopathy, without macular edema, associated with type 2   diabetes mellitus (250 50,362 05) (E11 339)   9  Morbid or severe obesity due to excess calories (278 01) (E66 01)   10  Neck pain on left side (723 1) (M54 2)   11  Need for prophylactic vaccination and inoculation against influenza (V04 81) (Z23)   12  Obstructive sleep apnea (327 23) (G47 33)   13  Osteoarthritis of knee (715 36) (M17 9)   14  Smoking hx (V15 82) (Z87 898)   15  Type 2 diabetes mellitus (250 00) (E11 9)   16  Venous insufficiency (459 81) (I87 2)   17  Wheezing (786 07) (R06 2)    Past Medical History    1  History of Ankle Sprain (845 00)   2  History of dizziness (V13 89) (Z87 898)   3  History of edema (V13 89) (Z87 898)   4  History of headache (V13 89) (Z87 898)   5  History of headache (V13 89) (Z87 898)   6  History of ovarian cyst (V13 29) (Z87 42)   7  History of sleep apnea (V13 89) (Z87 09)   8  History of spontaneous  (V13 29) (Z87 59)   9  History of Impaired fasting glucose (790 21) (R73 01)   10  History of Other headache syndrome (339 89) (G44 89)   11  History of Other muscle spasm (728 85) (M62 838)   12  History of  (spontaneous vaginal delivery) (650) (O80)  Active Problems And Past Medical History Reviewed: The active problems and past medical history were reviewed and updated today  Surgical History    1  History of Back Surgery   2  History of Tonsillectomy With Adenoidectomy  Surgical History Reviewed: The surgical history was reviewed and updated today  Family History    1  Family history of diabetes mellitus (V18 0) (Z83 3)    2  Family history of hypothyroidism (V18 19) (Z83 49)  Family History Reviewed: The family history was reviewed and updated today         Social History    · Caregiver: Family member   · Former smoker (I70 46) (Q96 240)   · Never Drank Alcohol   · Smoking hx (V15 82) (Y53 032)  Social History Reviewed: The social history was reviewed and updated today  The social history was reviewed and is unchanged  Current Meds   1  Aspirin 81 MG Oral Tablet Delayed Release; KALYAN 1 TABLETA POR VIA ORAL DIARIAMENTETAKE   ONE TABLET BY MOUTH ONCE A DAY; Therapy: 09KQM5076 to (Evaluate:11Jan2017)  Requested for: 47CAX0083; Last Rx:17Mar2016   Ordered   2  Gabapentin 100 MG Oral Capsule; TAKE 1 CAPSULE 3 TIMES DAILY; Therapy: 17XZH9555 to (Evaluate:24Mar2016)  Requested for: 95CWR9139; Last Rx:55Wpf8093   Ordered   3  Levothyroxine Sodium 88 MCG Oral Tablet; TAKE 1 TABLET DAILY; Therapy: 20PSW1371 to (Evaluate:01Sep2015); Last NT:95DLV1502 Ordered   4  Lisinopril-Hydrochlorothiazide 10-12 5 MG Oral Tablet; TAKE 1 TABLET DAILY; Therapy: 58Ojd7776 to (Evaluate:31Oct2015); Last FW:[id number] Ordered   5  Lovastatin 20 MG Oral Tablet; Take 1 tablet by mouth at bedtime; Therapy: 49KFF8173 to (Evaluate:19Feb2016); Last Rx:22Oct2015 Ordered   6  Naproxen 500 MG Oral Tablet; KALYAN 1 TABLETA TODOS 12 HORAS CARLOS FUERA NECESARIO   TAKE ONE TABLETEVERY 12 HOURS AS NEEDED; Therapy: 75DOE8498 to (Dominga Siemens)  Requested for: 37XCN3924; Last Rx:02Mar2016   Ordered   7  Vitamin D3 1000 UNIT Oral Tablet Chewable; Therapy: 78JTL3649 to Recorded  Medication List Reviewed: The medication list was reviewed and updated today  Allergies    1  Tagamet TABS    Vitals  Vital Signs [Data Includes: Current Encounter]    Recorded: 08Apr2016 01:17PM   Temperature 98 F   Heart Rate 80   Systolic 467   Diastolic 80   Height 5 ft 3 in   Weight 258 lb 13 09 oz   BMI Calculated 45 85   BSA Calculated 2 16     Physical Exam    Constitutional   General appearance: No acute distress, well appearing and well nourished  Eyes   Conjunctiva and lids: No swelling, erythema or discharge  Pupils and irises: Equal, round and reactive to light      Ears, Nose, Mouth, and Throat   External inspection of ears and nose: Normal     Otoscopic examination: Tympanic membranes translucent with normal light reflex  Canals patent without erythema  Nasal mucosa, septum, and turbinates: Normal without edema or erythema  Oropharynx: Normal with no erythema, edema, exudate or lesions  Pulmonary   Respiratory effort: No increased work of breathing or signs of respiratory distress  Auscultation of lungs: Abnormal   faint wheezes  Cardiovascular   Palpation of heart: Normal PMI, no thrills  Auscultation of heart: Normal rate and rhythm, normal S1 and S2, without murmurs  Examination of extremities for edema and/or varicosities: Normal     Abdomen   Abdomen: Non-tender, no masses  Lymphatic   Palpation of lymph nodes in neck: No lymphadenopathy  Musculoskeletal   Gait and station: Normal     Digits and nails: Normal without clubbing or cyanosis  Inspection/palpation of joints, bones, and muscles: Normal     Skin   Skin and subcutaneous tissue: Abnormal   fatty deposits under forearms bilaterllay  Neurologic   Cranial nerves: Cranial nerves 2-12 intact  Sensation: No sensory loss  Psychiatric   Orientation to person, place, and time: Normal     Mood and affect: Normal          Results/Data  Encounter Results   PHQ-2 Adult Depression Screening [id number] 01:16PM User, Ahs     Test Name Result Flag Reference   PHQ-2 Adult Depression Score 0     Q1: 0, Q2: 0   PHQ-2 Adult Depression Screening Negative         Attending Note  Attending Note: Attending Note: I discussed the case with the Resident and reviewed the Resident's note, I supervised the Resident and I agree with the Resident management plan as it was presented to me  Level of Participation: I was present in clinic, but did not examine the patient  Comments/Additional Findings: Her K+ was up but hemolyzed specimen, so will repeat, ECG w/o changes, continue HCTZ/ACEI  I agree with the Resident's note        Future Appointments    Date/Time Provider Specialty Site   07/18/2016 09:30 AM Rip Reinoso, [address] [facility]rology [facility] [address] [facility]   12/27/2016 08:10 AM Specialty Clinic, Podiatry  [facility]     Signatures   Electronically signed by : Deidre Cisneros DO; Apr 8 2016  3:06PM EST                       (Author)    Electronically signed by : Feli Reese DO;  Apr 8 2016  3:23PM EST                       (Co-author)

## 2018-01-16 NOTE — PROGRESS NOTES
Assessment    1  Encounter for routine gynecological examination (V72 31) (Z01 419)    Plan   Encounter for routine gynecological examination    · *VB-Depression Screening; Status:Active; Requested ZENOBIA:[id number];    Perform: In Office; LVL:[id number];VYZKESACNHEZ;  Kobe Lowe for routine gynecological examination; Ordered By:Anahy Tam;   · After you empty your bladder, wait a moment and try to go again  Do not strain or push to  empty ; Status:Active; Requested [id number];    Ordered;  For:Encounter for routine gynecological examination; Ordered By:Anahy Tam;   · Begin a bladder training program to help you control your urgency   Start by urinating  every 2 hours ; Status:Complete;   Done: 92OCB8376   Ordered;  For:Encounter for routine gynecological examination; Ordered By:Maggy Tam;   · Begin a limited exercise program ; Status:Complete;   Done: 85USG2239   Ordered;  For:Encounter for routine gynecological examination; Ordered By:Anahy Tam;   · Decreasing the stress in your life may help your condition improve ; Status:Complete;    Done: 51ZBM1462   Ordered;  For:Encounter for routine gynecological examination; Ordered By:Anahy Tam;   · Drink plenty of fluids ; Status:Complete;   Done: 78IUG4783   Ordered;  For:Encounter for routine gynecological examination; Ordered By:Maggy Tam;   · Eat a low fat and low cholesterol diet ; Status:Complete;   Done: 22HHQ1129   Ordered;  For:Encounter for routine gynecological examination; Ordered By:Maggy Tam;   · Eat no more than 30 grams of fat per day ; Status:Complete;   Done: 50YSZ4112   Ordered;  For:Encounter for routine gynecological examination; Ordered By:Maggy Tam;   · Keep a diary of when and what you eat ; Status:Complete;   Done: 67GXU2335   Ordered;  For:Encounter for routine gynecological examination; Ordered By:Anahy Tam;   · Regular aerobic exercise can help reduce stress ; Status:Complete; Done: 35FLM6389   Ordered;  For:Encounter for routine gynecological examination; Ordered By:Anahy Tam;   · Some eating tips that can help you lose weight ; Status:Complete;   Done: 26HET0886   Ordered;  For:Encounter for routine gynecological examination; Ordered By:Anahy Tam;   · The plan of care for falls is detailed in the plan and/or discussion section of today's note ;  Status:Complete;   Done: 54KLR6061   Ordered;  For:Encounter for routine gynecological examination; Ordered By:Anahy Tam;   · The plan of care for urinary incontinence is detailed in the plan and/or discussion section  of today's note ; Status:Complete;   Done: 49UTV8661   Ordered;  For:Encounter for routine gynecological examination; Ordered By:Anahy Tam;   · There are many exercise options for seniors ; Status:Complete;   Done: 90BHG4671   Ordered;  For:Encounter for routine gynecological examination; Ordered By:Anahy Tam;   · There ways to avoid falling ; Status:Complete;   Done: 73KKZ7183   Ordered;  For:Encounter for routine gynecological examination; Ordered By:Gauri Tam;   · These are things you can do to prevent falls in and around the home ; Status:Complete;    Done: 38ZDE9992   Ordered;  For:Encounter for routine gynecological examination; Ordered By:Anahy Tam;   · We have prescribed Kegel exercises to be done in a set of 15 repetitions, 3 times a day ;  Status:Complete;   Done: 20EGQ2359   Ordered;  For:Encounter for routine gynecological examination; Ordered By:Anahy Tam;   · We recommend that you bring your body mass index down to 26 ; Status:Complete;    Done: 62MVX8317   Ordered;  For:Encounter for routine gynecological examination; Ordered By:Anahy Tam;   · We recommend that you follow these rules for gun safety ; Status:Complete;   Done:  68AHZ3439   Ordered;  For:Encounter for routine gynecological examination; Ordered By:Anahy Tam;   · Call [phone] if: You find a new or different kind of lump in your breast ;  Status:Complete;   Done: 89LUX7690   Ordered;  For:Encounter for routine gynecological examination; Ordered By:Anahy Tam;   · Call [phone] if: You have any bleeding from the vagina ; Status:Complete;    Done: 31QMB0411   Ordered;  For:Encounter for routine gynecological examination; Ordered By:Anahy Tam;   · Call [phone] if: You have any warning signs of skin cancer ; Status:Complete;    Done: 92OTW9049   Ordered;  For:Encounter for routine gynecological examination; Ordered By:Anahy Tam;   · BREAST EXAM ; every 1 year; Next 55XJX6995; Status:Active   For: 'Encounter for routine gynecological examination'Ordered By: 'Sixto Tam'   · [facility] ; every 1 year; Next 06ACN7166; Status:Active   For: 'Encounter for routine gynecological examination'Ordered By: 'Sixto Tam'    * MAMMO SCREENING BILATERAL W CAD; Status:Hold For - Scheduling; Requested for:09Jun2016;   Perform:[facility]; VICKI:[id number];XBTBYQQ;    Suleman Min for screening mammogram for breast cancer; Ordered By:Sixto Tam; Chief Complaint  Pt here for Annual exam  Pt is a non-smoker  Pt c/o Occ  Right side Pelvic Pain  Depression screening reflects son's drug addiction  History of Present Illness  GYN HM, Adult Female [facility]: The patient is being seen for a gynecology evaluation  Social History: Household members include adult children and Lives with Brother  She is   Work status: not currently employed  The patient is a former cigarette smoker and quit smoking 2006  She reports rare alcohol use  She has never used illicit drugs  General Health: The patient's health since the last visit is described as fair   Due to health Issues  She has regular dental visits  She complains of vision problems  She has hearing loss  Immunizations status: up to date  Lifestyle:   She consumes a diverse and healthy diet  She does not have any weight concerns  She exercises regularly  She does not use tobacco  She denies alcohol use  She denies drug use  Reproductive health: the patient is postmenopausal   she uses no contraception  she is not sexually active  pregnancy history: G 4P 1, 3  Screening: cancer screening reviewed and updated  metabolic screening reviewed and current  risk screening reviewed and current  Additional History:  Pt reports intentional weight loss of ~40lbs in past month  Review of Systems  urinary loss of control, but no pelvic pain, no vaginal pain, no vaginal discharge, no vaginal itching, no vaginal lump or mass, no vaginal odor, no nonmenstrual bleeding, no postmenopausal bleeding, no dysuria, no change in urinary frequency and no incomplete emptying of bladder  Constitutional: No fever, no chills, feels well, no tiredness, no recent weight gain or loss  ENT: no ear ache, no loss of hearing, no nosebleeds or nasal discharge, no sore throat or hoarseness  Cardiovascular: no complaints of slow or fast heart rate, no chest pain, no palpitations, no leg claudication or lower extremity edema  Respiratory: no complaints of shortness of breath, no wheezing, no dyspnea on exertion, no orthopnea or PND  Breasts: no complaints of breast pain, breast lump or nipple discharge  Gastrointestinal: constipation, but no abdominal pain and no diarrhea  Genitourinary: incontinence, but no unexplained vaginal bleeding  Musculoskeletal: no complaints of arthralgia, no myalgia, no joint swelling or stiffness, no limb pain or swelling  Integumentary: no complaints of skin rash or lesion, no itching or dry skin, no skin wounds  Over the past 2 weeks, how often have you been bothered by the following problems? 1 ) Little interest or pleasure in doing things? Several days  2 ) Feeling down, depressed or hopeless? Several days     3 ) Trouble falling asleep or sleeping too much? Several days  4 ) Feeling tired or having little energy? Half the days or more  5 ) Poor appetite or overeating? Not at all    6 ) Feeling bad about yourself, or that you are a failure, or have let yourself or your family down? Nearly every day    7 ) Trouble concentrating on things, such as reading a newspaper or watching television? Nearly every day  8 ) Moving or speaking so slowly that other people could have noticed, or the opposite, moving or speaking faster than usual? Several days  9 ) Thoughts that you would be better off dead or of hurting yourself in some way? Not at all  Score score 12,declines SW consult,no thoughts of hurting self      OB History  Pregnancy History (Brief):   Prior pregnancies: : 4  Para: 1 (full-term) and 1 (living)   Delivery type: 1 vaginal   Additional pregnancy history details: 2 elective (s) and 1 miscarriage(s)       Active Problems    1  Acute hyperkalemia (276 7) (E87 5)   2  Cervical radicular pain (723 4) (M54 12)   3  Cervical spondylosis without myelopathy (721 0) (M47 812)   4  Cervicalgia (723 1) (M54 2)   5  Chest pain, unspecified type (786 50) (R07 9)   6  Degenerative cervical disc (722 4) (M50 30)   7  Encounter for routine gynecological examination (V72 31) (Z01 419)   8  Encounter for screening mammogram for breast cancer (V76 12) (Z12 31)   9  Headache (784 0) (R51)   10  Hyperlipidemia (272 4) (E78 5)   11  Hypertension (401 9) (I10)   12  Hypothyroidism (244 9) (E03 9)   13  Lower back pain (724 2) (M54 5)   14  Mild cognitive impairment (331 83) (G31 84)   15  Moderate nonproliferative diabetic retinopathy, without macular edema, associated with    type 2 diabetes mellitus (250 50,362 05) (E11 339)   16  Morbid or severe obesity due to excess calories (278 01) (E66 01)   17  Neck pain on left side (723 1) (M54 2)   18  Need for prophylactic vaccination and inoculation against influenza (V04 81) (Z23)   19   Obstructive sleep apnea (327 23) (G47 33)   20  Osteoarthritis of knee (715 36) (M17 9)   21  Shoulder bursitis, left (726 10) (M75 52)   22  Smoking hx (V15 82) (Z87 898)   23  Trapezius muscle spasm (728 85) (M62 838)   24  Type 2 diabetes mellitus (250 00) (E11 9)   25  Venous insufficiency (459 81) (I87 2)   26   Wheezing (786 07) (R06 2)    Past Medical History    · History of Ankle Sprain (845 00)   · History of GERD without esophagitis (530 81) (K21 9)   · History of arthritis (V13 4) (Z87 39)   · History of depression (V11 8) (Z86 59)   · History of dizziness (V13 89) (T91 099)   · History of edema (V13 89) (P20 440)   · History of headache (V13 89) (C21 358)   · History of headache (V13 89) (J17 711)   · History of hypercholesterolemia (V12 29) (Z86 39)   · History of ovarian cyst (V13 29) (Z87 42)   · History of sleep apnea (V13 89) (Z87 09)   · History of spontaneous  (V13 29) (Z87 59)   · History of thyroid disease (V12 29) (Z86 39)   · Hypertension (401 9) (I10)   · History of Impaired fasting glucose (790 21) (R73 01)   · History of Other headache syndrome (339 89) (G44 89)   · History of Other muscle spasm (728 85) (M62 838)   · History of  (spontaneous vaginal delivery) (650) (O80)    Surgical History    · History of Back Surgery   · History of Cholecystectomy   · History of Tonsillectomy With Adenoidectomy    Family History  Mother    · Family history of    · Family history of lung cancer (V16 1) (Z80 1)  Brother    · Family history of diabetes mellitus (V18 0) (Z83 3)  Maternal Aunt    · Family history of hypothyroidism (V18 19) (Z83 49)  Family History    · Family history of cardiac disorder (V17 49) (Z82 49)   · Family history of hypertension (V17 49) (Z82 49)   · Family history of neuropathy (V17 2) (Z82 0)    Social History    · Caregiver: Family member   · Patient is sole caregiver for her brother who is disabled   · Former smoker (V15 82) (S90 357)   · Never Drank Alcohol   · Single   · Smoking hx (V15 82) (Z87 898)   · Unemployed (V62 0) (Z56 0)    Current Meds   1  Aspirin 81 MG Oral Tablet Delayed Release; KALYAN 1 TABLETA POR VIA ORAL   DIARIAMENTETAKE ONE TABLET BY MOUTH ONCE A DAY; Therapy: 69HTD5023 to (Evaluate:11Jan2017)  Requested for: 42SUC8849; Last   Rx:17Mar2016 Ordered   2  Cyclobenzaprine HCl - 10 MG Oral Tablet; TAKE 1 TABLET AT BEDTIME AS NEEDED; Therapy: 28Apr2016 to (Linda Lowe)  Requested for: 28Apr2016; Last   Rx:28Apr2016 Ordered   3  Levothyroxine Sodium 88 MCG Oral Tablet; TAKE 1 TABLET DAILY; Therapy: 33AQY8628 to (Evaluate:06Aug2016); Last Rx:08Apr2016 Ordered   4  Lisinopril-Hydrochlorothiazide 10-12 5 MG Oral Tablet; TAKE 1 TABLET DAILY; Therapy: 35Wwi5402 to (Evaluate:05Oct2016); Last Rx:08Apr2016 Ordered   5  Lovastatin 20 MG Oral Tablet; Take 1 tablet by mouth at bedtime; Therapy: 48EEQ4098 to (Evaluate:68Bge2673); Last Rx:08Apr2016 Ordered   6  Naproxen 500 MG Oral Tablet; KALYAN 1 TABLETA TODOS 12 HORAS CARLOS FUERA   NECESARIO TAKE ONE TABLETEVERY 12 HOURS AS NEEDED; Therapy: 41VGL0561 to (Linda Lowe)  Requested for: 08Apr2016; Last   Rx:08Apr2016 Ordered   7  Vitamin D3 1000 UNIT Oral Tablet Chewable; Therapy: 81REL0147 to Recorded    Allergies    1  Tagamet TABS    Vitals   Recorded: 52LIV0228 10:63IR   Systolic 230   Diastolic 68   Height 5 ft 4 in   Weight 248 lb    BMI Calculated 42 57   BSA Calculated 2 14     Physical Exam    Constitutional   General appearance: No acute distress, well appearing and well nourished  well developed, chronically ill, appears tired and appears older than stated age  Neck   Thyroid: Normal, no thyromegaly  Pulmonary   Respiratory effort: No increased work of breathing or signs of respiratory distress  Auscultation of lungs: Clear to auscultation  Cardiovascular   Auscultation of heart: Normal rate and rhythm, normal S1 and S2, no murmurs      Genitourinary   External genitalia: Normal and no lesions appreciated  Examination of the external genitalia showed vulvar atrophy  Vagina: Normal, no lesions or dryness appreciated  Vagina: atrophy, but no discharge, no uterine prolapse and no bleeding  Cervix: Normal, no palpable masses  Uterus: Normal, non-tender, not enlarged, and no palpable masses  Adnexa/parametria: Normal, non-tender and no fullness or masses appreciated  Chest   Breasts: Normal and no dimpling or skin changes noted  Abdomen   Abdomen: Normal, non-tender, and no organomegaly noted  The abdomen was obese  Results/Data  Screen Ebola Flowsheet 12IFD9936 01:37PM      Test Name Result Flag Reference   Exposure to Ebola Virus - Within 21 Days No         Attending Note  Attending Note: Attending Note: I agree with the Resident management plan as it was presented to me  Level of Participation: I was present in clinic, but did not examine the patient  I agree with the Resident's note  Future Appointments    Date/Time Provider Specialty Site   06/28/2016 02:20 PM JESSICA Loomis   Internal Medicine  [address] [place] [address] PRIMARY CARE   07/18/2016 09:30 AM Lin Alonso, [address] Neurology [facility]   07/27/2016 01:50 PM Vernon Cano,  Internal Medicine  [facility] PCP   06/10/2016 01:00 PM Phoebe Ryan, [address] Neurosurgery [facility] NEUROS[facility]ICAL   06/22/2016 03:15 PM Jacque MedicBENEDICT [facility] Pain Management [address]   12/27/2016 08:10 AM Specialty Clinic, Podiatry  [address]     Signatures   Electronically signed by : Cassandra Contreras MD; Jun 9 2016  3:02PM EST                       (Author)    Electronically signed by : JESSICA Gordon ; Jun 9 2016  3:31PM EST                       (Author)

## 2018-01-17 NOTE — PROGRESS NOTES
Assessment    1  Mild cognitive impairment (331 83) (G31 84)   2  Neck pain on left side (723 1) (M54 2)   3  Headache (784 0) (R51)    Plan   Headache    · * MRI CERVICAL SPINE WO CONTRAST; Status:Active; Requested for:01Mar2016;    Perform:[facility]; UBL:[id number]; Last Updated Chace Rayide; 3/1/2016 1:16:37 PM;Ordered;  For:Headache; Ordered By:Toribio Albarado; Headache, Neck pain on left side    · Baclofen 10 MG Oral Tablet   Rx By: Jose R Conde; Dispense: 25 Days ; #:45 TAB; Refill: 0; For: Headache, Neck pain on left side; MATHEUS = N; Sent To: [place]; Last Updated By: Kim Vee; 3/1/2016 12:49:33 PM  Mild cognitive impairment, Neck pain on left side    · Follow-up visit in 3 months Evaluation and Treatment  Follow-up  Status: Hold For -  Scheduling  Requested for: 26BZF4228   Ordered; For: Mild cognitive impairment, Neck pain on left side; Ordered By: Kim Vee Performed:  Due: 56HJT1354    Naproxen 500 MG Oral Tablet; 1 every 12 hrs PRN; Therapy: 62POO3457 to (Evaluate:31Mar2016)  Requested for: 17KWE2799; Last Rx:01Mar2016; Status: ACTIVE Ordered  Rx By: Kim Vee; Dispense: 30 Days ; #:10 Tablet; Refill: 0;   For: Headache, Neck pain on left side; MATHEUS = N; Verified Transmission to [place] ; Last Updated By: System, [employer]; 3/2/2016 11:07:04 AM     Discussion/Summary  Discussion Summary:   61 yo right handed female with history of mild cognitive impairment, she is stable at this time  MRI brain normal in past    PT contniues with c/o left sided neck pain and headaches       Pt had reaction to Baclofen,   recently started Neurontin from PCP, c/o increased nausea and lethargy   will obtain MRI cervical to look for spinal process, radic   does not want meds at present  PT to limit Naprosyn, s/e reviewed  PT to call in 2 weeks with update   , PT had MOCA done, 28/30 discussed potential medication, at this point will continue to monitor  Encouraged pt to reconsider use of CPAP  following with PCP for lung nodule  follow up in three months  Discussed at length with the patient and answered all her questions  Medication Side Effects Reviewed: Possible side effects of new medications were reviewed with the patient/guardian today  Patient Guardian understands agrees: The treatment plan was reviewed with the patient/guardian  The patient/guardian understands and agrees with the treatment plan   Counseling Documentation With Imm: The patient was counseled regarding instructions for management, risk factor reductions, prognosis, patient and family education, impressions, risks and benefits of treatment options, importance of compliance with treatment  total time of encounter was 25 minutes and >50% minutes was spent counseling  Headache [facility]:   The patient was counseled regarding;   Discussed side effects of all medications prescribed today to the patient in detail  Patient education was completed today and we also discussed precautions for rebound headaches  Also recommended to the patient :  1  Maintain regular sleep schedule  2  Limit over the counter medications  (No more than 3 times a week)  3  Maintain headache diary  4  Limit caffeine to 1-2 cups a day or less  5  Avoid dietary trigger  (list given to the patient and reviewed with them)  6  Patient is to have regular frequent meals to prevent headache onset  Chief Complaint  Chief Complaint Free Text Note Form: Follow up appt      History of Present Illness  HPI: 64yo right handed female with past medical History significant for hypertension, obesity, hypothyroidism, depression, has seen Dr Mikey Rouse in past for evaluation of cognitive problems, she was then seen by Dr Cordova  She was last seen in November 2015   In the past she had work up to include MRI of brain that was unremarkable, labs for vit deficiency and thyroid have been normal  She did undergo neuropsych testing which demonstrated mild cognitive impairment and some depression with component of ADHD  She had sleep study with mild AARON, she did not want CPAP  Today she continues to note ongoing issues with her memory, she needs to write lists  She reports forgetfulness, , forgetful of names, she has left stove on and burned food several times, does not drive anymore She is able to take care of her own finances, she is independent in her ADL s Her last MOCA score was 28/30  She denies any problems with incontinence  She remains off any medication  Overall she feels her cognition has not changed much  She is also being followed for headaches  In the past these occur holocranial  At her last visit she relayed having had increased headaches occurring in the cervical region with radiation into her shoulders, left > right  She was seen in the ED for these complaints and is using Naprosyn without much relief  She denies any recent trauma, no excessive lifting although she has been doing more cleaning  She has chronic low back pain and she sees a spine surgeon  Given her complaints, she was initiated on low-dose baclofen  Unfortunately, this made her quite lethargic, and she felt was ineffective  Today, she continues with complaints of left sided discomfort, radiating from her neck, down into her left forearm and fourth digit  She describes some intermittent burning in the left midthoracic region, no evidence of lesion or rash  She was seen by her primary and was initiated on low-dose gabapentin, 100 mg 3 times a day  Unfortunately, provoked issues with nausea, and significant lethargy  She has since stopped her medication  ROS, FH, SH were reviewed with the pt  Neurology HPI Cheri Harris:   Memory: Memory problem started 40 year(s) ago Problem affects short term memory  Onset was gradual  Progression seems to be worsening  The memory problem was noticed by the patient   Symptoms include forgetting names, forgetting appointments, forgetting conversations, forgetting familiar tasks, forgetting recent events and forgetting to take medications  Associated symptoms:  able to operate tv remote and able to operate kitchen appliances  Her memory problem: frequently interferes in day to day functioning  Conversation Difficulties: has trouble finding the right word, requires repeat information, substitutes a wrong word and repeatedly asks the same questions  The patient does not drive  Other Disorders: Changes in mood or personality has been noticed by the patient or the patient's family and has been or is currently being treated for depression or anxiety, but has not noticed any gait or balance disorder, has not experienced hallucinations or delusion and has not experienced fluctuation in alertness  Review of Systems  ROS Reviewed:   ROS reviewed  Active Problems    1  Encounter for screening mammogram for breast cancer (V76 12) (Z12 31)   2  Headache (784 0) (R51)   3  Hyperlipidemia (272 4) (E78 5)   4  Hypertension (401 9) (I10)   5  Hypothyroidism (244 9) (E03 9)   6  Lower back pain (724 2) (M54 5)   7  Mild cognitive impairment (331 83) (G31 84)   8  Moderate nonproliferative diabetic retinopathy, without macular edema, associated with   type 2 diabetes mellitus (250 50,362 05) (E11 339)   9  Morbid or severe obesity due to excess calories (278 01) (E66 01)   10  Neck pain on left side (723 1) (M54 2)   11  Need for prophylactic vaccination and inoculation against influenza (V04 81) (Z23)   12  Obstructive sleep apnea (327 23) (G47 33)   13  Osteoarthritis of knee (715 36) (M17 9)   14  Smoking hx (V15 82) (Z87 898)   15  Type 2 diabetes mellitus (250 00) (E11 9)   16  Venous insufficiency (459 81) (I87 2)   17  Wheezing (786 07) (R06 2)    Past Medical History    1  History of Ankle Sprain (845 00)   2  History of dizziness (V13 89) (Z87 898)   3  History of edema (V13 89) (Z87 898)   4   History of headache (V13 89) (Z87 898)   5  History of headache (V13 89) (Z87 898)   6  History of ovarian cyst (V13 29) (Z87 42)   7  History of sleep apnea (V13 89) (Z87 09)   8  History of spontaneous  (V13 29) (Z87 59)   9  History of Impaired fasting glucose (790 21) (R73 01)   10  History of Other headache syndrome (339 89) (G44 89)   11  History of Other muscle spasm (728 85) (M62 838)   12  History of  (spontaneous vaginal delivery) (650) (O80)  Active Problems And Past Medical History Reviewed: The active problems and past medical history were reviewed and updated today  Surgical History    1  History of Back Surgery   2  History of Tonsillectomy With Adenoidectomy    Family History    1  Family history of diabetes mellitus (V18 0) (Z83 3)    2  Family history of hypothyroidism (V18 19) (Z83 49)  Family History Reviewed: The family history was reviewed and updated today  Social History    · Former smoker (R94 55) (A35 302)   · Never Drank Alcohol   · Smoking hx (C37 33) (O16 640)  Social History Reviewed: The social history was reviewed and is unchanged  Current Meds   1  Aspirin 81 MG Oral Tablet Delayed Release; KALYAN 1 TABLETA POR VIA ORAL   DIARIAMENTETAKE ONE TABLET BY MOUTH ONCE A DAY; Therapy: 91FOB6279 to (Evaluate:2016)  Requested for: 11XDJ6929; Last   Rx:2015 Ordered   2  Baclofen 10 MG Oral Tablet; KALYAN 1 TABLETA POR VIA ORAL ANTES DE DORMIR FOR   3 NIGHTS THEN KALYAN 1 TABLETA POR VIA ORAL DOS VECES AL DIATAKE ONE   TABLET BY MOUTH AT BED; Therapy: 81WLN8482 to (Tari Bates)  Requested for: 58ZDU6116; Last   Rx:2016 Ordered   3  Gabapentin 100 MG Oral Capsule; TAKE 1 CAPSULE 3 TIMES DAILY; Therapy: 52DXO8458 to (Evaluate:2016)  Requested for: 78Vgr5081; Last   Rx:39Rjk4948 Ordered   4  Levothyroxine Sodium 88 MCG Oral Tablet; TAKE 1 TABLET DAILY; Therapy: 69UXK8390 to (Evaluate:46His8481); Last XR:76COO2771 Ordered   5  Lisinopril-Hydrochlorothiazide 10-12 5 MG Oral Tablet; TAKE 1 TABLET DAILY; Therapy: 73Hbm9386 to (Evaluate:31Oct2015); Last PL:69NUJ6037 Ordered   6  Lovastatin 20 MG Oral Tablet; Take 1 tablet by mouth at bedtime; Therapy: 69DNT0753 to (Evaluate:68Ymp7523); Last Rx:22Oct2015 Ordered   7  Vitamin D3 1000 UNIT Oral Tablet Chewable; Therapy: 51HMP9626 to Recorded  Medication List Reviewed: The medication list was reviewed and updated today  Allergies    1  Tagamet TABS    Vitals  Signs [Data Includes: Current Encounter]   Recorded: 25QZV3679 12:26PM   Temperature: 98 2 F  Heart Rate: 79  Respiration: 18  Systolic: 647  Diastolic: 73  Height: 5 ft 3 in  Weight: 250 lb   BMI Calculated: 44 29  BSA Calculated: 2 13    Physical Exam    Constitutional   General appearance: No acute distress, well appearing and well nourished  Musculoskeletal   Gait and station: Normal gait, stance and balance  Muscle strength: Normal strength throughout   mild weakness of left SCM related to cervical /shoulder discomfort, full ROM of head/neck  Muscle tone: No atrophy, abnormal movements, flaccidity, cogwheeling or spasticity  Involuntary movements: None observed  Neurologic   Orientation to person, place, and time: Normal   Mental Status: oriented to person, place, and time, normal attention skills, normal language skills and normal fund of knowledge  Recent and remote memory: Demonstrates normal memory  (stm 2/3)   Attention span and concentration: Normal thought process and attention span  Language: Names objects, able to repeat phrases and speaks spontaneously  2nd cranial nerve: Normal     3rd, 4th, and 6th cranial nerves: Normal     5th cranial nerve: Normal     7th cranial nerve: Normal     8th cranial nerve: Normal     11th cranial nerve: Normal     Sensation: Abnormal   decreased vibration left LE     Reflexes: Normal     Coordination: Normal     Cortical function: Normal     Mood and affect: Normal        Attending Note  Collaborating Physician Note: Collaborating Note: I agree with the Advanced Practitioner note        Future Appointments    Date/Time Provider Specialty Site   07/18/2016 09:30 AM Arely Ellison, [address] Neurology [facility]   04/08/2016 01:45 PM Edin Romero, DO Internal Medicine  [facility] PCP   12/27/2016 08:10 AM Specialty Clinic, [address] [facility]     Signatures   Electronically signed by : César Westfall; Mar  1 2016  3:55PM EST                       (Author)    Electronically signed by : JESSICA Pan ; Mar  7 2016  5:49AM EST                       (Co-author)

## 2018-01-17 NOTE — PROGRESS NOTES
Assessment    1  Carpal tunnel syndrome, bilateral (354 0) (G56 03)    Plan    · Follow-up PRN Evaluation and Treatment  Follow-up  Status: Complete  Done:  91AMY2310   Ordered; For: Carpal tunnel syndrome, bilateral; Ordered By: Thierry Luna Performed:  Due: 71SEE6080    Discussion/Summary    Pleasant 67-year-old female, returns to review EMG studies 2/17/17  The most recent study reveals mild bilateral carpal tunnel syndrome without evidence of cervical radiculopathy  At her last visit she was advised to follow with pain management, which she has done, Dr Travis Guevara advised injections or surgery  At this time she is advised to continue follow Dr Travis Guevara as needed and/or may consult with Dr Tacho Clarke for surgery, carpal tunnel syndrome  Patient understands to continue to follow with her primary care provider and proceed as he and she decide relative to her carpal tunnel syndrome  In addition she is advised to continue to wear her cockup splints at night for temporary relief  Further follow-up with neurosurgery on an as-needed basis  These findings, impressions and recommendations are reviewed in great detail with the patient, she expressed understanding and agreement, her questions were answered completely and to her satisfaction  The patient was counseled regarding diagnostic results, instructions for management, patient and family education, importance of compliance with treatment  The patient has the current Goals: Continue with all usual activities without restriction  Patient is able to Self-Care  Chief Complaint  Follow-up, history mild bilateral tunnel syndrome  History of Present Illness  67-year-old female, presents as noted above  She has history of EMG 7/27/16 by Dr Trenton Canela, incomplete study as patient refused complete needle electrode examination  Study did reveal left wrist moderate chronic median mononeuropathy      She had additional study 2/17/17 by   Sarbjit Bermudez, the study was completed and did reveal mild bilateral median neuropathy, slightly improved from the study of 7/27 16, in addition there was no evidence of cervical radiculopathy brachioplexopathy or other entrapment  Patient has seen Dr Zander Barth for pain management, he advised injections, the patient has refused this he also suggested she may consider surgery  She does have bilateral cockup splints which she wears at night, and reports the most part these are effective however she does occasionally have breakthrough pain during the day  She reports as she is caring for 2 adult, her father and another, she is not interested in intervention at this time, she reports  No interval change in her medical or surgical history is otherwise reported  Review of Systems    Constitutional: feeling tired  Eyes: No complaints of eye pain, no red eyes, no eyesight problems, no discharge, no dry eyes, no itching of eyes  ENT: tinnitus  Cardiovascular: No complaints of slow heart rate, no fast heart rate, no chest pain, no palpitations, no leg claudication, no lower extremity edema  Respiratory: No complaints of shortness of breath, no wheezing, no cough, no SOB on exertion, no orthopnea, no PND  Gastrointestinal: No complaints of abdominal pain, no constipation, no nausea or vomiting, no diarrhea, no bloody stools  Genitourinary: No complaints of dysuria, no incontinence, no pelvic pain, no dysmenorrhea, no vaginal discharge or bleeding  Musculoskeletal: arthralgias, limb pain and joint stiffness  Integumentary: No complaints of skin rash or lesions, no itching, no skin wounds, no breast pain or lump  Neurological: numbness (right leg)  Psychiatric: Not suicidal, no sleep disturbance, no anxiety or depression, no change in personality, no emotional problems  Endocrine: No complaints of proptosis, no hot flashes, no muscle weakness, no deepening of the voice, no feelings of weakness  Hematologic/Lymphatic: No complaints of swollen glands, no swollen glands in the neck, does not bleed easily, does not bruise easily  ROS reviewed  Active Problems    1  Allergic rhinitis (477 9) (J30 9)   2  Carpal tunnel syndrome of left wrist (354 0) (G56 02)   3  Cervical radicular pain (723 4) (M54 12)   4  Cervical spondylosis without myelopathy (721 0) (M47 812)   5  Cervicalgia (723 1) (M54 2)   6  Colon cancer screening (V76 51) (Z12 11)   7  Degenerative cervical disc (722 4) (M50 30)   8  Dermatitis, atopic (691 8) (L20 9)   9  Dizziness (780 4) (R42)   10  Encounter for routine gynecological examination (V72 31) (Z01 419)   11  Encounter for screening mammogram for breast cancer (V76 12) (Z12 31)   12  GERD (gastroesophageal reflux disease) (530 81) (K21 9)   13  Headache (784 0) (R51)   14  Hyperlipidemia (272 4) (E78 5)   15  Hypertension (401 9) (I10)   16  Hypothyroidism (244 9) (E03 9)   17  Lower back pain (724 2) (M54 5)   18  Lung nodule, solitary (793 11) (R91 1)   19  Mild cognitive impairment (331 83) (G31 84)   20  Moderate nonproliferative diabetic retinopathy, without macular edema, associated with    type 2 diabetes mellitus (250 50,362 05) (E11 339)   21  Morbid or severe obesity due to excess calories (278 01) (E66 01)   22  Neck pain on left side (723 1) (M54 2)   23  Need for prophylactic vaccination and inoculation against influenza (V04 81) (Z23)   24  Obstructive sleep apnea (327 23) (G47 33)   25  Osteoarthritis of knee (715 36) (M17 10)   26  Petechial rash (782 7) (R23 3)   27  Shoulder bursitis, left (726 10) (M75 52)   28  Sinusitis (473 9) (J32 9)   29  Trapezius muscle spasm (728 85) (M62 838)   30  Type 2 diabetes mellitus (250 00) (E11 9)   31  Venous insufficiency (459 81) (I87 2)    Past Medical History    1  History of Ankle Sprain (845 00)   2  History of GERD without esophagitis (530 81) (K21 9)   3  History of arthritis (V13 4) (Z87 39)   4   History of depression (V11 8) (Z86 59)   5  History of edema (V13 89) (Z87 898)   6  History of headache (V13 89) (Z87 898)   7  History of headache (V13 89) (Z87 898)   8  History of hypercholesterolemia (V12 29) (Z86 39)   9  History of ovarian cyst (V13 29) (Z87 42)   10  History of sleep apnea (V13 89) (Z86 69)   11  History of spontaneous  (V13 29) (Z87 59)   12  History of thyroid disease (V12 29) (Z86 39)   13  Hypertension (401 9) (I10)   14  History of Impaired fasting glucose (790 21) (R73 01)   15  History of Other headache syndrome (339 89) (G44 89)   16  History of Other muscle spasm (728 85) (M62 838)   17  History of  (spontaneous vaginal delivery) (650) (O80)    The active problems and past medical history were reviewed and updated today  Surgical History    1  History of Back Surgery   2  History of Cholecystectomy   3  History of Tonsillectomy With Adenoidectomy    The surgical history was reviewed and updated today  Family History  Mother    1  Family history of    2  Family history of lung cancer (V16 1) (Z80 1)   3  Family history of malignant neoplasm (V16 9) (Z80 9)  Father    4  Family history of hypertension (V17 49) (Z82 49)  Son    5  Family history of hypertension (V17 49) (Z82 49)   6  Family history of lung disease (V19 8) (Z83 6)  Brother    7  Family history of diabetes mellitus (V18 0) (Z83 3)  Grandparent    8  Family history of cardiac disorder (V17 49) (Z82 49)  Maternal Aunt    9  Family history of hypothyroidism (V18 19) (Z83 49)  Family History    10  Family history of cardiac disorder (V17 49) (Z82 49)   11  Family history of hypertension (V17 49) (Z82 49)   12  Family history of neuropathy (V17 2) (Z82 0)    The family history was reviewed and updated today         Social History    · Caregiver: Family member   ·    · Exercises regularly   · Former smoker (R82 14) (F92 416)   · Lives with family   · Never Drank Alcohol   · No alcohol use   · No caffeine use   · No drug use   · No living will   · No tobacco/smoke exposure   · History of No tobacco/smoke exposure   · Retired   · Unemployed (V62 0) (Z56 0)  The social history was reviewed and updated today  Current Meds   1  Aspirin 81 MG Oral Tablet Delayed Release; KALYAN 1 TABLETA POR VIA ORAL   DIARIAMENTETAKE ONE TABLET BY MOUTH ONCE A DAY; Therapy: 79BHL8224 to (Evaluate:17Jun2018)  Requested for: 64YGH8018; Last   Rx:22Jun2017 Ordered   2  [employer] Vitamin D CAPS; Therapy: (Recorded:70Twy4311) to Recorded   3  Cyclobenzaprine HCl - 10 MG Oral Tablet; TAKE 1 TABLET AT BEDTIME AS NEEDED; Therapy: 71MIZ4134 to (Ira Eli)  Requested for: 50TPQ7984; Last   Rx:22Jun2017 Ordered   4  Eucerin External Cream; USE AS DIRECTED; Therapy: 85QOV6775 to (Evaluate:21Hkn0894)  Requested for: 27Jun2017; Last   OY:03GBZ7877 Ordered   5  HydroCHLOROthiazide 12 5 MG Oral Capsule; take 1 capsule daily; Therapy: 49BQS0328 to (Evaluate:18Jan2018)  Requested for: 05USP4342; Last   Rx:22Jun2017 Ordered   6  Levothyroxine Sodium 100 MCG Oral Tablet; Take 1 tablet daily on Sat and Sun as   prescribed by Endo; Therapy: 82ZUP7165 to (Evaluate:39Gsf4524); Last Rx:22Jun2017 Ordered   7  Levothyroxine Sodium 88 MCG Oral Tablet; TAKE 1 TABLET DAILY; Therapy: 92BBW0830 to (Evaluate:14Grg4008); Last Rx:08Apr2016 Ordered   8  Lisinopril 10 MG Oral Tablet; TAKE 1 TABLET BY MOUTH DAILY AS DIRECTED; Therapy: 14UJB6523 to (Evaluate:33Wxa3614)  Requested for: 58IGB1088; Last   Rx:22Jun2017 Ordered   9  Naproxen 500 MG Oral Tablet; KALYAN 1 TABLETA TODOS 12 HORAS CARLOS FUERA   NECESARIO TAKE ONE TABLETEVERY 12 HOURS AS NEEDED; Therapy: 59ODK3764 to (Evaluate:78Cgx6464)  Requested for: 02BRN7504; Last   Rx:79Gtv7918 Ordered    The medication list was reviewed and updated today  Allergies    1   Tagamet TABS    Vitals  Vital Signs    Recorded: 01Fsm4990 09:36AM   Temperature 97 1 F   Heart Rate 66   Respiration 18 Systolic 315   Diastolic 66   Height 5 ft 4 in   Weight 245 lb 12 8 oz   BMI Calculated 42 19   BSA Calculated 2 14     Physical Exam     Constitutional Patient appears healthy and well developed  No signs of acute distress present  comfortable, obese and appearance reflects stated age  Respiratory Respiratory effort: Normal   Auscultation of lungs: Clear to auscultation bilaterally  Cardiovascular Auscultation of heart: Rate is regular  Rhythm is regular  No heart murmur appreciated  Neurologic - Mental Status: Alert and Oriented x3  Mood and affect: Affect is normal   Grossly nonfocal    Motor System General Motor Strength: No pronator drift and no parietal drift  Motor System - Upper Extremities: Normal to inspection and palpation  Strength: Deltoids 5/5 bilaterally  Biceps 5/5 bilaterally  Triceps 5/5 bilaterally  Extensor carpi radials is 5/5 bilaterally  Extensor digitorum 5/5 bilaterally  Intrinsic 5/5 bilaterally   5/5 bilaterally  Phalen's and Tinel's are mildly positive in both wrists  Motor System - Lower Extremities: Normal to inspection and palpation  Strength: iliopsoas 5/5 bilaterally  Quadriceps 5/5 bilaterally  Hamstrings 5/5 bilaterally  Gastrocnemius 5/5 bilaterally  Reflexes: Biceps reflexes are 2+ bilaterally  Triceps reflexes are 2+ bilaterally  Achilles reflexes are 2+ bilaterally  Babinski's reflex is 2+ down going bilaterally  Ankle clonus is absent bilaterally  Benjamin sign was not present:   Gait and Station: [place] with a normal gait  Results/Data  Diagnostic Studies Reviewed Neurosurger [facility]:   Testing EMG/nerve conduction study, 2/17/17, Dr Aysha Jimenez: There is electrophysiological evidence and consistent with:    "#1 A mild left median neuropathy across the wrist(carpal tunnel syndrome) without that information  The findings are very similar or slightly better compared to prior EMG in July 2016      #2 a mild, right median neuropathy across the wrist (carpal tunnel syndrome)  In addition there is no electrophysiological evidence of cervical radiculopathy, brachial plexopathy or other entrapment neuropathy in the left upper extremity "  Health Management  Encounter for routine gynecological examination   BREAST EXAM; every 1 year; Next Due: 14DUK1795; Overdue  PELVIC EXAM; every 1 year; Next Due: 71JPP0143; Overdue    Future Appointments    Date/Time Provider Specialty Site   08/21/2017 09:30 AM Taryn David, [address] [facility]rology [facility]  [place]   09/29/2017 09:25 AM Luis Xiong,  Internal Medicine [address] [place] [address] PCP   11/28/2017 09:00 AM Specialty Clinic, [address] [facility]     Signatures   Electronically signed by : TYREL Saldana;  Aug 15 2017 10:08AM EST                       (Author)    Electronically signed by : JESSICA Abdi ; Aug 15 2017  1:26PM EST                       (Author)    Electronically signed by : JESSICA Abdi ; Aug 15 2017  1:27PM EST                       (Author)

## 2018-01-17 NOTE — MISCELLANEOUS
Provider Comments  Provider Comments:   No show for 45 minute EDX study        Signatures   Electronically signed by : Bettye Marroquin DO; Jun 8 2016  1:36PM EST                       (Author)

## 2018-01-17 NOTE — PROCEDURES
Procedures signed  by Ines Gamble DO at 7/28/2016  7:50 AM       Author:  Ines Gamble DO Service:  (none) Author Type:  Physician     Filed:  7/28/2016  7:50 AM Date of Service:  7/27/2016  7:50 PM Status:  Signed     :  Ines Gamble DO (Physician)            Perlita Pineda  [id number]  NEUROLOGY REPORT  07/27/2016    ELECTRODIAGNOSTIC STUDY     REFERRING PHYSICIAN   Kvng Elmore PA-C/neurosurgery  HISTORY OF PRESENT ILLNESS  The patient is a 61-year-old, right-hand dominant female with about 1   year of nontraumatic onset of left-sided neck and shoulder pain with   radiation into the biceps and lateral elbow region  There is some   radiation into the dorsum of the wrist and numbness and tingling in   the index finger  She has, per the medical record, an unremarkable   cervical spine MRI  She is now seen today for an electrodiagnostic   study of the left upper extremity  Thus far, she has not had any   shoulder imaging  PAST MEDICAL HISTORY   Currently hypothyroid  PHYSICAL EXAMINATION  She is morbidly obese  There are mild dysesthesias in the left index   finger  Proprioception is intact  Otherwise, there are no focal or   lateralizing findings  Reflex and motor exam are intact  No long tract   signs  Positive painful arc and supraspinatus test for the left   shoulder  ELECTRODIAGNOSTIC FINDINGS     ELECTRONEUROGRAPHY   The left median sensory fibers demonstrated a borderline prolonged   latency of 3 8 ms with a normal amplitude  The left median motor   distal latency was prolonged at 4 6 ms with a normal amplitude and   normal conduction through the forelimb  Studies of the left ulnar   nerve including motor and sensory fibers were entirely normal       ELECTROMYOGRAPHY  Monopolar needle exploration failed to reveal any abnormal spontaneous   potentials in the left deltoid and left biceps   The patient complained   of pain and, after discussion, she terminated the study at this point  IMPRESSION  1  Limited study due to patient's termination of the needle electrode   examination  Radiculopathy cannot be ruled out  2  Definite median mononeuropathy at the left wrist, moderate and   chronic  3  No electrical evidence of peripheral neuropathy  4  Clinically, examination suggests shoulder pathology  RECOMMENDATION  Careful clinical correlation is advised  Respectfully avelino,            Kevin Clarkston  [id number]  D:  07/27/2016 16:01:00  Dictated by:  Pasha Parish DO, [address] [facility] Board of Electrodiagnostic Medicine    T:  07/27/2016 19:50:07             Received for:Clemente Parish DO  Jul 28 2016  7:49AM Eastern Standard Time

## 2018-01-18 NOTE — PROGRESS NOTES
Patient Health Assessment    Date:            12/28/2016  Blood Pressure:  136/74  Pulse:           57  Age:             61  Weight:          0 lbs  Height/Length:   0' 0"  Body Mass Index: 0 0  Provider:        CYDNEY  Clinic:          [facility]: Allergies    Fainting    High Blood Pressure    Respiratory Problems    Thyroid Problems    Frequent Headaches  Medications: Allergies:  Since Last Visit: Medical Alert: No Change    Medications: No Change    Allergies:        No Change  Pain Scale Type: Numeric Pain ScalePain Level: 0  Description:  Pt presents for periodic exam  Pt reports pain in gums from chewing on UL  Last   periodic exam 2013, no BWs in several years, last pan from 2008  Completed  oral cancer screening, no abnormal findings  Reviewed PMH, no changes  Obtained BWs and reviewed findings  Completed restorative exam  No new  restorative needs at this time, watch #16, occlusal discoloration but no stick,   pt low caries risk  Completed perio spot probing, PDs 4-6 posteriorly, BOP  Gingiva otherwise pink, stippled, OH good  Discussed with pt need to return for   full perio exam and tx plan  Pt understands  Prophy completed with ultrasonic  and hand instrumentation  Soft plaque  removed and supragingival calculus removed from mandibular anterior lingual   Polished with prophy cup and paste  Flossed and provided OHI  Pt left satisfied   and ambulatory      NV: probing depths, FMX, perio tx plan    JSimpson/MQ    ----- Signed on Wednesday, December 28, 2016 at 11:07:54 AM  -----  ----- Provider: [email or web address] - Resident Two, Dentist -- Clinic: [facility] -----

## 2018-01-19 ENCOUNTER — OFFICE VISIT (OUTPATIENT)
Dept: URGENT CARE | Age: 63
End: 2018-01-19
Payer: MEDICARE

## 2018-01-19 PROCEDURE — G0463 HOSPITAL OUTPT CLINIC VISIT: HCPCS | Performed by: FAMILY MEDICINE

## 2018-01-19 PROCEDURE — 99213 OFFICE O/P EST LOW 20 MIN: CPT | Performed by: FAMILY MEDICINE

## 2018-01-20 NOTE — PROGRESS NOTES
Assessment   1  Influenza (487 1) (J11 1)    Plan   Influenza    · Oseltamivir Phosphate 75 MG Oral Capsule (Tamiflu); 1 capsule twice a day until    finished    Discussion/Summary   Discussion Summary:    Rest, limit activity  twice a day until finished ( 10 doses)  medication as needed  or Advil / Motrin as needed  and swish mouth with warm salt water or mouthwash  follow-up with family physician as needed  go to the hospital emergency department if worse  Medication Side Effects Reviewed: Possible side effects of new medications were reviewed with the patient/guardian today  Understands and agrees with treatment plan: The treatment plan was reviewed with the patient/guardian  The patient/guardian understands and agrees with the treatment plan    Counseling Documentation With Imm: The patient was counseled regarding  Follow Up Instructions: Follow Up with your Primary Care Provider in 5-7 days  If your symptoms worsen, go to the nearest [place] [address] Emergency Department  Chief Complaint   1  Cold Symptoms  Chief Complaint Free Text Note Form: c/o chest congestion, body aches, head congested x wednesday, with use tylenol prn  History of Present Illness   HPI: Congestion with clear mucus and body aches since Wednesday ( 01/17/2018)    Hospital Based Practices Required Assessment:      Pain Assessment      the patient states they have pain  (on a scale of 0 to 10, the patient rates the pain at 10 )      Abuse And Domestic Violence Screen       Yes, the patient is safe at home  -- The patient states no one is hurting them  Depression And Suicide Screen  No, the patient has not had thoughts of hurting themself  No, the patient has not felt depressed in the past 7 days  Prefered Language is  english  Review of Systems   Focused-Female:      Constitutional: as noted in HPI       ENT: nasal discharge, but-- as noted in HPI        Cardiovascular: no complaints of slow or fast heart rate, no chest pain, no palpitations, no leg claudication or lower extremity edema  Respiratory: no complaints of shortness of breath, no wheezing, no dyspnea on exertion, no orthopnea or PND  Breasts: no complaints of breast pain, breast lump or nipple discharge  Gastrointestinal: no complaints of abdominal pain, no constipation, no nausea or diarrhea, no vomiting, no bloody stools  Genitourinary: no complaints of dysuria, no incontinence, no pelvic pain, no dysmenorrhea, no vaginal discharge or abnormal vaginal bleeding  Musculoskeletal: myalgias, but-- as noted in HPI  Integumentary: no complaints of skin rash or lesion, no itching or dry skin, no skin wounds  Neurological: no complaints of headache, no confusion, no numbness or tingling, no dizziness or fainting  ROS Reviewed:    ROS reviewed  Active Problems   1  Acute left ankle pain (719 47) (M25 572)   2  Allergic rhinitis (477 9) (J30 9)   3  Carpal tunnel syndrome of left wrist (354 0) (G56 02)   4  Carpal tunnel syndrome, bilateral (354 0) (G56 03)   5  Cervical radicular pain (723 4) (M54 12)   6  Cervical spondylosis without myelopathy (721 0) (M47 812)   7  Cervicalgia (723 1) (M54 2)   8  Colon cancer screening (V76 51) (Z12 11)   9  Degenerative cervical disc (722 4) (M50 30)   10  Dermatitis, atopic (691 8) (L20 9)   11  Dizziness (780 4) (R42)   12  Encounter for routine gynecological examination (V72 31) (Z01 419)   13  Encounter for screening mammogram for breast cancer (V76 12) (Z12 31)   14  GERD (gastroesophageal reflux disease) (530 81) (K21 9)   15  Headache (784 0) (R51)   16  Hyperlipidemia (272 4) (E78 5)   17  Hypertension (401 9) (I10)   18  Hypothyroidism (244 9) (E03 9)   19  Lower back pain (724 2) (M54 5)   20  Lung nodule, solitary (793 11) (R91 1)   21  Mild cognitive impairment (331 83) (G31 84)   22   Moderate nonproliferative diabetic retinopathy, without macular edema, associated with      type 2 diabetes mellitus (250 50,362 05) (E11 339)   23  Morbid or severe obesity due to excess calories (278 01) (E66 01)   24  Nausea (787 02) (R11 0)   25  Neck pain on left side (723 1) (M54 2)   26  Need for prophylactic vaccination and inoculation against influenza (V04 81) (Z23)   27  Obstructive sleep apnea (327 23) (G47 33)   28  Osteoarthritis of knee (715 36) (M17 10)   29  Petechial rash (782 7) (R23 3)   30  Shoulder bursitis, left (726 10) (M75 52)   31  Sinusitis (473 9) (J32 9)   32  Trapezius muscle spasm (728 85) (M62 838)   33  Type 2 diabetes mellitus (250 00) (E11 9)   34  Venous insufficiency (459 81) (I87 2)    Past Medical History   1  History of Ankle Sprain (845 00)   2  History of GERD without esophagitis (530 81) (K21 9)   3  History of arthritis (V13 4) (Z87 39)   4  History of depression (V11 8) (Z86 59)   5  History of edema (V13 89) (Z87 898)   6  History of headache (V13 89) (Z87 898)   7  History of headache (V13 89) (Z87 898)   8  History of hypercholesterolemia (V12 29) (Z86 39)   9  History of ovarian cyst (V13 29) (Z87 42)   10  History of sleep apnea (V13 89) (Z86 69)   11  History of spontaneous  (V13 29) (Z87 59)   12  History of thyroid disease (V12 29) (Z86 39)   13  Hypertension (401 9) (I10)   14  History of Impaired fasting glucose (790 21) (R73 01)   15  History of Other headache syndrome (339 89) (G44 89)   16  History of Other muscle spasm (728 85) (M62 838)   17  History of  (spontaneous vaginal delivery) (650) (O80)  Active Problems And Past Medical History Reviewed: The active problems and past medical history were reviewed and updated today  Family History   Mother    1  Family history of    2  Family history of lung cancer (V16 1) (Z80 1)   3  Family history of malignant neoplasm (V16 9) (Z80 9)  Father    4  Family history of hypertension (V17 49) (Z82 49)  Son    5   Family history of hypertension (V17 49) (Z82 49)   6  Family history of lung disease (V19 8) (Z83 6)  Brother    7  Family history of diabetes mellitus (V18 0) (Z83 3)  Grandparent    8  Family history of cardiac disorder (V17 49) (Z82 49)  Maternal Aunt    9  Family history of hypothyroidism (V18 19) (Z83 49)  Family History    10  Family history of cardiac disorder (V17 49) (Z82 49)   11  Family history of hypertension (V17 49) (Z82 49)   12  Family history of neuropathy (V17 2) (Z82 0)  Family History Reviewed: The family history was reviewed and updated today  Social History    · Caregiver: Family member   ·    · Exercises regularly   · Former smoker (L88 08) (B62 362)   · Lives with family   · Never Drank Alcohol   · No alcohol use   · No caffeine use   · No drug use   · No living will   · No tobacco/smoke exposure   · History of No tobacco/smoke exposure   · Retired   · Unemployed (V62 0) (Z56 0)  Social History Reviewed: The social history was reviewed and updated today  The social history was reviewed and is unchanged  Surgical History   1  History of Back Surgery   2  History of Cholecystectomy   3  History of Tonsillectomy With Adenoidectomy  Surgical History Reviewed: The surgical history was reviewed and updated today  Current Meds    1  Aspirin 81 MG Oral Tablet Delayed Release; KALYAN 1 TABLETA POR VIA ORAL     DIARIAMENTEONE TABLET BY MOUTH ONCE A DAY; Therapy: 63HAH1311 to (Evaluate:17Jun2018)  Requested for: 88YIH1220; Last     Rx:22Jun2017 Ordered   2  [employer] Vitamin D CAPS; Therapy: (Recorded:52Hzk2626) to Recorded   3  Eucerin External Cream; USE AS DIRECTED; Therapy: 81LUK6767 to ( Dye)  Requested for: 27Jun2017; Last     IP:45LUO6422 Ordered   4  HydroCHLOROthiazide 12 5 MG Oral Capsule; take 1 capsule daily; Therapy: 97AZT5956 to (Evaluate:18Jan2018)  Requested for: 34HSJ6479; Last     Rx:22Jun2017 Ordered   5  Levothyroxine Sodium 100 MCG Oral Tablet;  Take 1 tablet daily on Sat and Sun as     prescribed by Endo; Therapy: 82LGI6146 to (Evaluate:50Gjd4239); Last Rx:22Jun2017 Ordered   6  Levothyroxine Sodium 88 MCG Oral Tablet; TAKE 1 TABLET DAILY; Therapy: 77AIY4095 to (Evaluate:55Zny2402); Last Rx:40Wgr3445 Ordered   7  Lisinopril 10 MG Oral Tablet; TAKE 1 TABLET BY MOUTH DAILY AS DIRECTED; Therapy: 06BOE7109 to (Evaluate:89Rgw9869)  Requested for: 81TKS7726; Last     Rx:22Jun2017 Ordered  Medication List Reviewed: The medication list was reviewed and updated today  Allergies   1  Tagamet TABS    Vitals   Signs   Recorded: 52EYT6030 11:02AM   Temperature: 99 F, Temporal  Heart Rate: 78  Respiration: 20  Systolic: 984  Diastolic: 62  Weight: 609 lb   BMI Calculated: 42 91  BSA Calculated: 2 15  O2 Saturation: 97  LMP:     Physical Exam        Constitutional patient appears ill  Ears, Nose, Mouth, and Throat      External inspection of ears and nose: Normal        Otoscopic examination: Tympanic membranes translucent with normal light reflex  Canals patent without erythema  -- nasal congestion with clear mucus  -- injection of the oropharynx  Pulmonary      Respiratory effort: No increased work of breathing or signs of respiratory distress  Auscultation of lungs: Clear to auscultation  Cardiovascular      Auscultation of heart: Normal rate and rhythm, normal S1 and S2, without murmurs  Lymphatic      Palpation of lymph nodes in neck: No lymphadenopathy  Skin good color and turgor  Neurologic grossly intact, no nuchal rigidity  Psychiatric      Orientation to person, place, and time: Normal        Mood and affect: Normal        Message   Return to work or school:         No work or contact with other people through 01/22/2018           Future Appointments      Date/Time Provider Specialty Site   01/29/2018 09:30 AM Iker Hines Neurology [facility]  [place]   04/20/2018 09:30 AM Keeley Su MD Neurology [address]     Signatures    Electronically signed by : Darío Arambula DO; Jan 19 2018 11:22AM EST                       (Author)

## 2018-01-22 VITALS
SYSTOLIC BLOOD PRESSURE: 118 MMHG | HEART RATE: 68 BPM | BODY MASS INDEX: 42.83 KG/M2 | WEIGHT: 250.88 LBS | TEMPERATURE: 97.7 F | HEIGHT: 64 IN | DIASTOLIC BLOOD PRESSURE: 80 MMHG

## 2018-01-22 VITALS
HEART RATE: 66 BPM | TEMPERATURE: 97.1 F | WEIGHT: 245.8 LBS | RESPIRATION RATE: 18 BRPM | HEIGHT: 64 IN | BODY MASS INDEX: 41.96 KG/M2 | SYSTOLIC BLOOD PRESSURE: 122 MMHG | DIASTOLIC BLOOD PRESSURE: 66 MMHG

## 2018-01-23 VITALS
RESPIRATION RATE: 20 BRPM | HEART RATE: 78 BPM | OXYGEN SATURATION: 97 % | SYSTOLIC BLOOD PRESSURE: 130 MMHG | TEMPERATURE: 99 F | BODY MASS INDEX: 42.91 KG/M2 | DIASTOLIC BLOOD PRESSURE: 62 MMHG | WEIGHT: 250 LBS

## 2018-01-23 NOTE — PROGRESS NOTES
Assessment   1  Hypertension (401 9) (I10)   · well controlled  Currently on lisinopril  2  Hypothyroidism (244 9) (E03 9)   · F/U Endocrinologist Dr Duran  3  Cervical radicular pain (723 4) (M54 12)  4  Carpal tunnel syndrome of left wrist (354 0) (G56 02)    Plan  Neck pain on left side    · Renew: Cyclobenzaprine HCl - 10 MG Oral Tablet; TAKE 1 TABLET AT BEDTIME AS NEEDED   · Renew: Naproxen 500 MG Oral Tablet; KALYAN 1 TABLETA TODOS 12 HORAS CARLOS FUERA  NECESARIO TAKE ONE TABLETEVERY 12 HOURS AS NEEDED    Discussion/Summary  Discussion Summary:   1  Cervical radiculopathy with L carpal tunnel  Osteopathic exam: pt has restriction with rotation of cervical spine to left as well as pain with supination of left wrist limiting her ADLs, pain improved with Flexeril and Naproxen as well as cock up splint, will renew both meds today  -continue to follow w/Neurosurg, has EMG planned for end of month, will proceed with carpal tunnel release if PT does not start to provide relief    2  Hypothyroid  -following with Endocrinology, titrating up Synthroid, symptoms well controlled, continue to f u w endo    3  HTN  -well controlled on lisinopril-hctz, renewed by Endo, will continue to follow    4  HLD  -last lipid panel WNL, dieting as well as exercising, has lost 20lbs intentionally over last 6 mo    5  Return for follow w Dr Reji Lomax in 4-6 mo, sooner if needed  -will discuss Zoster vaccine at next visit  Counseling Documentation With Imm: The patient was counseled regarding diagnostic results, instructions for management, risk factor reductions, prognosis, patient and family education, impressions, risks and benefits of treatment options, importance of compliance with treatment  Medication SE Review and Pt Understands Tx: Possible side effects of new medications were reviewed with the patient/guardian today  The treatment plan was reviewed with the patient/guardian   The patient/guardian understands and agrees with the treatment plan      History of Present Illness  HPI: Brayan Dumont is here today for followup of left sided neck and radicular pain  She has been seeing Neurology and has had an EMG which will be repeated at the end of the month as the first was inconclusive  She has had an MRI of the C-spine to evaluate for cervical nerve compression and has reviewed these studies with Neurosurgery  She has been using the wrist splint prescribed by Neurosurgery and it has been helping significantly  She has been doing well otherwise and has been following with Endo for her hypothyroidism and they have been titrating her medicaitons for her  She has had significant life stress lately with her father who recently suffered from a CVA causing her to accidentally miss appointments but she states that he is doing better now and that she feels things are under control as far as his care and getting her appointments back on track  Hospital Based Practices Required Assessment:   Pain Assessment   the patient states they have pain  The pain is located in the neck down to her right hand  The patient describes the pain as dull and aching  (on a scale of 0 to 10, the patient rates the pain at 5 )    Prefered Language is  english  Primary Language is  english  Review of Systems  Complete-Female:   Constitutional: no fever and no chills  Cardiovascular: No complaints of slow heart rate, no fast heart rate, no chest pain, no palpitations, no leg claudication, no lower extremity edema  Respiratory: No complaints of shortness of breath, no wheezing, no cough, no SOB on exertion, no orthopnea, no PND  Musculoskeletal: arthralgias, limb pain, myalgias and joint stiffness, but as noted in HPI  Integumentary: No complaints of skin rash or lesions, no itching, no skin wounds, no breast pain or lump  Neurological: tingling and LUE     Endocrine: No complaints of proptosis, no hot flashes, no muscle weakness, no deepening of the voice, no feelings of weakness  ROS Reviewed:   ROS reviewed  Active Problems   1  Carpal tunnel syndrome of left wrist (354 0) (G56 02)  2  Cervical radicular pain (723 4) (M54 12)  3  Cervical spondylosis without myelopathy (721 0) (M47 812)  4  Cervicalgia (723 1) (M54 2)  5  Degenerative cervical disc (722 4) (M50 30)  6  Encounter for routine gynecological examination (V72 31) (Z01 419)  7  Encounter for screening mammogram for breast cancer (V76 12) (Z12 31)  8  Headache (784 0) (R51)  9  Hyperlipidemia (272 4) (E78 5)  10  Hypertension (401 9) (I10)  11  Hypothyroidism (244 9) (E03 9)  12  Lower back pain (724 2) (M54 5)  13  Lung nodule, solitary (793 11) (R91 1)  14  Mild cognitive impairment (331 83) (G31 84)  15  Moderate nonproliferative diabetic retinopathy, without macular edema, associated with type 2    diabetes mellitus (250 50,362 05) (E11 339)  16  Morbid or severe obesity due to excess calories (278 01) (E66 01)  17  Neck pain on left side (723 1) (M54 2)  18  Need for prophylactic vaccination and inoculation against influenza (V04 81) (Z23)  19  Obstructive sleep apnea (327 23) (G47 33)  20  Osteoarthritis of knee (715 36) (M17 9)  21  Shoulder bursitis, left (726 10) (M75 52)  22  Trapezius muscle spasm (728 85) (M62 838)  23  Type 2 diabetes mellitus (250 00) (E11 9)  24  Venous insufficiency (459 81) (I87 2)    Past Medical History   1  History of Ankle Sprain (845 00)  2  History of GERD without esophagitis (530 81) (K21 9)  3  History of arthritis (V13 4) (Z87 39)  4  History of depression (V11 8) (Z86 59)  5  History of dizziness (V13 89) (Z87 898)  6  History of edema (V13 89) (Z87 898)  7  History of headache (V13 89) (Z87 898)  8  History of headache (V13 89) (Z87 898)  9  History of hypercholesterolemia (V12 29) (Z86 39)  10  History of ovarian cyst () (Z87 42)  11  History of sleep apnea (V13 89) (Z87 09)  12  History of spontaneous  () (Z87 59)  13   History of thyroid disease (V12 29) (Z86 39)  14  Hypertension (401 9) (I10)  15  History of Impaired fasting glucose (790 21) (R73 01)  16  History of Other headache syndrome (339 89) (G44 89)  17  History of Other muscle spasm (728 85) (M62 838)  18  History of  (spontaneous vaginal delivery) (650) (O80)  Active Problems And Past Medical History Reviewed: The active problems and past medical history were reviewed and updated today  Surgical History   1  History of Back Surgery  2  History of Cholecystectomy  3  History of Tonsillectomy With Adenoidectomy  Surgical History Reviewed: The surgical history was reviewed and updated today  Family History  Mother   1  Family history of   2  Family history of lung cancer (V16 1) (Z80 1)  3  Family history of malignant neoplasm (V16 9) (Z80 9)  Father   4  Family history of hypertension (V17 49) (Z82 49)  Son   5  Family history of hypertension (V17 49) (Z82 49)  6  Family history of lung disease (V19 8) (Z83 6)  Brother   7  Family history of diabetes mellitus (V18 0) (Z83 3)  Grandparent   8  Family history of cardiac disorder (V17 49) (Z82 49)  Maternal Aunt   9  Family history of hypothyroidism (V18 19) (Z83 49)  Family History   10  Family history of cardiac disorder (V17 49) (Z82 49)  11  Family history of hypertension (V17 49) (Z82 49)  12  Family history of neuropathy (V17 2) (Z82 0)  Family History Reviewed: The family history was reviewed and updated today  Social History    · Caregiver: Family member   ·    · Exercises regularly   · Former smoker (V50 67) (A60 797)   · Lives with family   · Never Drank Alcohol   · No alcohol use   · No caffeine use   · No drug use   · No living will   · No tobacco/smoke exposure   · History of No tobacco/smoke exposure   · Retired   · Unemployed (V62 0) (Z56 0)  Social History Reviewed: The social history was reviewed and updated today  The social history was reviewed and is unchanged  Current Meds  1  Aspirin 81 MG Oral Tablet Delayed Release; KALYAN 1 TABLETA POR VIA ORAL DIARIAMENTETAKE   ONE TABLET BY MOUTH ONCE A DAY; Therapy: 71QDC4015 to (Evaluate:11Jan2017)  Requested for: 12EGD0827; Last Rx:17Mar2016   Ordered  2  Cyclobenzaprine HCl - 10 MG Oral Tablet; TAKE 1 TABLET AT BEDTIME AS NEEDED; Therapy: 28Apr2016 [facility]  Requested for: 28Apr2016; Last Rx:28Apr2016   Ordered  3  Levothyroxine Sodium 100 MCG Oral Tablet; 1;   Therapy: (Recorded:01Dag0571) to Recorded  4  Levothyroxine Sodium 88 MCG Oral Tablet; TAKE 1 TABLET DAILY; Therapy: 45XMW7040 to (Evaluate:26Xnr8376); Last Rx:08Apr2016 Ordered  5  Lisinopril-Hydrochlorothiazide 10-12 5 MG Oral Tablet; TAKE 1 TABLET DAILY; Therapy: 52Vfw7706 to (Evaluate:05Oct2016); Last Rx:08Apr2016 Ordered  6  Naproxen 500 MG Oral Tablet; KALYAN 1 TABLETA TODOS 12 HORAS CARLOS FUERA NECESARIO   TAKE ONE TABLETEVERY 12 HOURS AS NEEDED; Therapy: 11XUR2219 to ([facility])  Requested for: 08Apr2016; Last Rx:08Apr2016   Ordered  7  Vitamin D3 1000 UNIT Oral Tablet Chewable; Therapy: 79DVU1337 to Recorded  Medication List Reviewed: The medication list was reviewed and updated today  Allergies   1  Tagamet TABS    Vitals  Vital Signs    Recorded: 92LDJ0748 83:88HI   Systolic 463   Diastolic 60   Heart Rate 78   Temperature 98 1 F   Height 5 ft 4 in   Weight 238 lb 1 52 oz   BMI Calculated 40 87   BSA Calculated 2 11     Physical Exam    Constitutional   General appearance: No acute distress, well appearing and well nourished  Eyes   Conjunctiva and lids: No swelling, erythema or discharge  Pupils and irises: Equal, round and reactive to light  Ears, Nose, Mouth, and Throat   External inspection of ears and nose: Normal     Nasal mucosa, septum, and turbinates: Normal without edema or erythema  Oropharynx: Normal with no erythema, edema, exudate or lesions      Pulmonary   Respiratory effort: No increased work of breathing or signs of respiratory distress  Auscultation of lungs: Clear to auscultation  Cardiovascular   Palpation of heart: Normal PMI, no thrills  Auscultation of heart: Normal rate and rhythm, normal S1 and S2, without murmurs  Musculoskeletal   Gait and station: Normal     Digits and nails: Normal without clubbing or cyanosis  Inspection/palpation of joints, bones, and muscles: Abnormal   Pain in LUE limiting flexion and extenison of Left wrist, pain with supination of LUE  Skin   Skin and subcutaneous tissue: Normal without rashes or lesions  Neurologic   Cranial nerves: Cranial nerves 2-12 intact  Sensation: No sensory loss  Psychiatric   Orientation to person, place, and time: Normal     Mood and affect: Normal          Health Management  Encounter for routine gynecological examination   BREAST EXAM; every 1 year; Next Due: 77OGD9576; Overdue  PELVIC EXAM; every 1 year; Next Due: 74UOZ0170; Overdue    Attending Note  Attending Note: Attending Note:1  I discussed the case with the Resident and reviewed the Resident's note1 , I supervised the Resident1  and I agree with the Resident management plan as it was presented to [facility]   Level of Participation:1  I was present in clinic, but did not examine the patient1         1 Amended By: Sharmaine Fair;  Sep 20 2016 10:29 AM EST    Future Appointments    Date/Time Provider Specialty Site   09/26/2016 01:00 PM Zoila Weinberg, [address]  Neurology [facility] [address]   09/23/2016 02:45 PM Tamiko Nixon DO Pain Management [facility] SPINE   01/19/2017 02:00 PM Fredrick Howard DO Internal Medicine [address] PCP   10/31/2016 02:30 PM Caroline Yeager, [facility] Neurosurgery [facility]   12/27/2016 08:10 AM Specialty Clinic, Podiatry  [address]     Signatures   Electronically signed by : Jesus Manuel Bryant DO; Sep 20 2016  8:52AM EST                       (Author) Electronically signed by : Niharika Waldrop DO; Sep 20 2016  8:53AM EST                       (Author)    Electronically signed by : JESSICA Mahoney ; Sep 20 2016 10:29AM EST                       (Review)    Electronically signed by : JESSICA Mahoney ; Sep 20 2016 10:29AM EST                       (Review)

## 2018-01-24 NOTE — MISCELLANEOUS
Message  Return to work or school:        No work or contact with other people through 01/22/2018          Future Appointments    Signatures   Electronically signed by : Yaniv Almendarez DO; Jan 19 2018 11:22AM EST                       (Author)

## 2018-02-26 PROBLEM — K21.9 GERD (GASTROESOPHAGEAL REFLUX DISEASE): Status: ACTIVE | Noted: 2017-06-22

## 2018-02-26 PROBLEM — R91.1 LUNG NODULE < 6CM ON CT: Status: ACTIVE | Noted: 2018-02-26

## 2018-02-26 PROBLEM — IMO0001 LUNG NODULE < 6CM ON CT: Status: ACTIVE | Noted: 2018-02-26

## 2018-02-26 PROBLEM — Z12.4 CERVICAL CANCER SCREENING: Status: ACTIVE | Noted: 2018-02-26

## 2018-02-26 PROBLEM — G56.03 CARPAL TUNNEL SYNDROME, BILATERAL: Status: ACTIVE | Noted: 2017-08-15

## 2018-02-28 ENCOUNTER — TRANSCRIBE ORDERS (OUTPATIENT)
Dept: ADMINISTRATIVE | Facility: HOSPITAL | Age: 63
End: 2018-02-28

## 2018-02-28 ENCOUNTER — TRANSCRIBE ORDERS (OUTPATIENT)
Dept: LAB | Facility: HOSPITAL | Age: 63
End: 2018-02-28

## 2018-02-28 ENCOUNTER — APPOINTMENT (OUTPATIENT)
Dept: LAB | Facility: HOSPITAL | Age: 63
End: 2018-02-28
Payer: MEDICARE

## 2018-02-28 DIAGNOSIS — E66.01 MORBID OBESITY (HCC): ICD-10-CM

## 2018-02-28 DIAGNOSIS — Z79.899 NEED FOR PROPHYLACTIC CHEMOTHERAPY: ICD-10-CM

## 2018-02-28 DIAGNOSIS — E08.00 DIABETES MELLITUS DUE TO UNDERLYING CONDITION WITH HYPEROSMOLARITY WITHOUT COMA, WITHOUT LONG-TERM CURRENT USE OF INSULIN (HCC): ICD-10-CM

## 2018-02-28 DIAGNOSIS — E08.00 DIABETES MELLITUS DUE TO UNDERLYING CONDITION WITH HYPEROSMOLARITY WITHOUT COMA, WITHOUT LONG-TERM CURRENT USE OF INSULIN (HCC): Primary | ICD-10-CM

## 2018-02-28 DIAGNOSIS — E04.2 MULTIPLE THYROID NODULES: Primary | ICD-10-CM

## 2018-02-28 DIAGNOSIS — I10 ESSENTIAL HYPERTENSION, MALIGNANT: ICD-10-CM

## 2018-02-28 DIAGNOSIS — E89.0 POSTSURGICAL HYPOTHYROIDISM: ICD-10-CM

## 2018-02-28 DIAGNOSIS — E78.2 MIXED HYPERLIPIDEMIA: ICD-10-CM

## 2018-02-28 LAB
ALBUMIN SERPL BCP-MCNC: 3.7 G/DL (ref 3.5–5)
ALP SERPL-CCNC: 59 U/L (ref 46–116)
ALT SERPL W P-5'-P-CCNC: 32 U/L (ref 12–78)
ANION GAP SERPL CALCULATED.3IONS-SCNC: 5 MMOL/L (ref 4–13)
AST SERPL W P-5'-P-CCNC: 25 U/L (ref 5–45)
BILIRUB SERPL-MCNC: 0.37 MG/DL (ref 0.2–1)
BUN SERPL-MCNC: 13 MG/DL (ref 5–25)
CALCIUM SERPL-MCNC: 8.7 MG/DL (ref 8.3–10.1)
CHLORIDE SERPL-SCNC: 107 MMOL/L (ref 100–108)
CHOLEST SERPL-MCNC: 194 MG/DL (ref 50–200)
CO2 SERPL-SCNC: 28 MMOL/L (ref 21–32)
CREAT SERPL-MCNC: 0.64 MG/DL (ref 0.6–1.3)
EST. AVERAGE GLUCOSE BLD GHB EST-MCNC: 134 MG/DL
GFR SERPL CREATININE-BSD FRML MDRD: 97 ML/MIN/1.73SQ M
GLUCOSE P FAST SERPL-MCNC: 89 MG/DL (ref 65–99)
HBA1C MFR BLD: 6.3 % (ref 4.2–6.3)
HDLC SERPL-MCNC: 57 MG/DL (ref 40–60)
LDLC SERPL CALC-MCNC: 103 MG/DL (ref 0–100)
POTASSIUM SERPL-SCNC: 4 MMOL/L (ref 3.5–5.3)
PROT SERPL-MCNC: 7.8 G/DL (ref 6.4–8.2)
SODIUM SERPL-SCNC: 140 MMOL/L (ref 136–145)
TRIGL SERPL-MCNC: 171 MG/DL

## 2018-02-28 PROCEDURE — 80061 LIPID PANEL: CPT

## 2018-02-28 PROCEDURE — 83036 HEMOGLOBIN GLYCOSYLATED A1C: CPT

## 2018-02-28 PROCEDURE — 80053 COMPREHEN METABOLIC PANEL: CPT

## 2018-02-28 PROCEDURE — 36415 COLL VENOUS BLD VENIPUNCTURE: CPT

## 2018-03-06 ENCOUNTER — TRANSCRIBE ORDERS (OUTPATIENT)
Dept: INTERNAL MEDICINE CLINIC | Facility: CLINIC | Age: 63
End: 2018-03-06

## 2018-03-06 DIAGNOSIS — E11.8 TYPE 2 DIABETES MELLITUS WITH COMPLICATION, UNSPECIFIED LONG TERM INSULIN USE STATUS: Primary | ICD-10-CM

## 2018-03-09 ENCOUNTER — HOSPITAL ENCOUNTER (OUTPATIENT)
Dept: RADIOLOGY | Facility: HOSPITAL | Age: 63
Discharge: HOME | End: 2018-03-09
Payer: MEDICARE

## 2018-03-09 DIAGNOSIS — E04.2 MULTIPLE THYROID NODULES: ICD-10-CM

## 2018-03-09 PROCEDURE — 76536 US EXAM OF HEAD AND NECK: CPT

## 2018-04-03 ENCOUNTER — TELEPHONE (OUTPATIENT)
Dept: INTERNAL MEDICINE CLINIC | Facility: CLINIC | Age: 63
End: 2018-04-03

## 2018-04-04 NOTE — TELEPHONE ENCOUNTER
Ok, ill address it at her follow-up  It was more patient preference then necessity given her excellent control  Thank you!

## 2018-04-12 ENCOUNTER — TELEPHONE (OUTPATIENT)
Dept: INTERNAL MEDICINE CLINIC | Facility: CLINIC | Age: 63
End: 2018-04-12

## 2018-04-12 DIAGNOSIS — I10 HYPERTENSION, UNSPECIFIED TYPE: Primary | ICD-10-CM

## 2018-04-12 DIAGNOSIS — I10 HYPERTENSION, UNSPECIFIED TYPE: ICD-10-CM

## 2018-04-12 RX ORDER — LISINOPRIL AND HYDROCHLOROTHIAZIDE 12.5; 1 MG/1; MG/1
1 TABLET ORAL DAILY
Qty: 90 TABLET | Refills: 3 | Status: SHIPPED | OUTPATIENT
Start: 2018-04-12 | End: 2018-04-12 | Stop reason: SDUPTHER

## 2018-04-12 RX ORDER — LISINOPRIL AND HYDROCHLOROTHIAZIDE 12.5; 1 MG/1; MG/1
1 TABLET ORAL DAILY
Qty: 90 TABLET | Refills: 3 | Status: SHIPPED | OUTPATIENT
Start: 2018-04-12 | End: 2020-02-25 | Stop reason: HOSPADM

## 2018-04-13 NOTE — TELEPHONE ENCOUNTER
Spoke with patient and made her aware that due to her sugars being controlled the forms were not filled out, but her concerns are that she is itchy , having frequent urinating and thirst, tingling in her feet  She wants to be seen ahead of 05/20/2018  Please schedule patient

## 2018-04-16 ENCOUNTER — OFFICE VISIT (OUTPATIENT)
Dept: INTERNAL MEDICINE CLINIC | Facility: CLINIC | Age: 63
End: 2018-04-16
Payer: MEDICARE

## 2018-04-16 ENCOUNTER — APPOINTMENT (OUTPATIENT)
Dept: LAB | Facility: CLINIC | Age: 63
End: 2018-04-16
Payer: MEDICARE

## 2018-04-16 ENCOUNTER — TELEPHONE (OUTPATIENT)
Dept: INTERNAL MEDICINE CLINIC | Facility: CLINIC | Age: 63
End: 2018-04-16

## 2018-04-16 VITALS
DIASTOLIC BLOOD PRESSURE: 92 MMHG | BODY MASS INDEX: 45.62 KG/M2 | HEIGHT: 64 IN | WEIGHT: 267.2 LBS | SYSTOLIC BLOOD PRESSURE: 130 MMHG | TEMPERATURE: 97.7 F | HEART RATE: 80 BPM

## 2018-04-16 DIAGNOSIS — R10.2 VAGINAL PAIN: ICD-10-CM

## 2018-04-16 DIAGNOSIS — L29.9 ITCHING: ICD-10-CM

## 2018-04-16 DIAGNOSIS — E11.9 TYPE 2 DIABETES MELLITUS WITHOUT COMPLICATION, WITHOUT LONG-TERM CURRENT USE OF INSULIN (HCC): Primary | ICD-10-CM

## 2018-04-16 DIAGNOSIS — Z12.39 SCREENING BREAST EXAMINATION: ICD-10-CM

## 2018-04-16 DIAGNOSIS — M47.812 CERVICAL SPONDYLOSIS WITHOUT MYELOPATHY: Primary | ICD-10-CM

## 2018-04-16 PROCEDURE — 87389 HIV-1 AG W/HIV-1&-2 AB AG IA: CPT

## 2018-04-16 PROCEDURE — 86803 HEPATITIS C AB TEST: CPT

## 2018-04-16 PROCEDURE — 86704 HEP B CORE ANTIBODY TOTAL: CPT

## 2018-04-16 PROCEDURE — 99213 OFFICE O/P EST LOW 20 MIN: CPT | Performed by: INTERNAL MEDICINE

## 2018-04-16 PROCEDURE — 87340 HEPATITIS B SURFACE AG IA: CPT

## 2018-04-16 PROCEDURE — 86705 HEP B CORE ANTIBODY IGM: CPT

## 2018-04-16 PROCEDURE — 36415 COLL VENOUS BLD VENIPUNCTURE: CPT

## 2018-04-16 RX ORDER — LEVOTHYROXINE SODIUM 0.1 MG/1
100 TABLET ORAL 2 TIMES WEEKLY
COMMUNITY
End: 2020-04-24 | Stop reason: SDUPTHER

## 2018-04-16 NOTE — TELEPHONE ENCOUNTER
Hi this patient needs a refill on Vitamin D, she was getting this prescription from her Endocrinologist but they will not refill this prescription because she has not been seen but she explained to me that she is having transportation issues and that is why she has not seen this specialist but she needs her Vitamin D medication

## 2018-04-16 NOTE — PROGRESS NOTES
ASSESSMENT/PLAN:  Diagnoses and all orders for this visit:    Type 2 diabetes mellitus without complication, without long-term current use of insulin (HCC)  -Well controlled off medications, will defer periodic glucose monitoring at this time given control  Itching: Unclear etiology, patient has had extensive lab workup, and no detergent changes  Will pursue infectious evaluation, if negative consider further workup  -     HIV 1/2 AG-AB combo; Future  -     Chronic Hepatitis Panel; Future      Has follow up appointment with Dr Ester Raymond 5/28/2018  CHIEF COMPLAINT: Diffuse itching    HISTORY OF PRESENT ILLNESS:  Karina Madrid is a 59 y o  with PMH significant for Type 2 diabetes controlled off medications (last A1C 2/28/2018 6 3%), hypothyroidism, AARON, GERD  Karina Madrid is in clinic today for diffuse itching over her entire body, worst on the scalp, bilateral hands, and bilateral feet  She reports that over the past 2 months this has particularly worsened to where she is scratching herself  She denies plant exposure or change in detergents  Denies known sick contacts  Regarding her DM2 she reports she has been denied glucose testing supplies due to her diabetic control  She does not take medications at this time and denies hypoglycemia  Admits to polydypsia  Review of Systems   Constitutional: Negative for chills, fever and unexpected weight change  HENT: Negative for congestion and sinus pain  Respiratory: Negative for cough, shortness of breath and wheezing  Cardiovascular: Negative for chest pain and palpitations  Gastrointestinal: Negative for abdominal pain, diarrhea, nausea and vomiting  Endocrine: Positive for polydipsia  Negative for cold intolerance, heat intolerance and polyuria  Musculoskeletal: Negative for arthralgias and myalgias  (+)itching as per HPI   Skin: Negative for rash  Neurological: Negative for dizziness, weakness and light-headedness  Psychiatric/Behavioral: The patient is not nervous/anxious and is not hyperactive  OBJECTIVE:  Vitals:    04/16/18 1202   BP: 130/92   Pulse: 80   Temp: 97 7 °F (36 5 °C)   Weight: 121 kg (267 lb 3 2 oz)   Height: 5' 4" (1 626 m)     Physical Exam   Constitutional: She is oriented to person, place, and time  She appears well-developed and well-nourished  No distress  HENT:   Head: Normocephalic and atraumatic  Mouth/Throat: Oropharynx is clear and moist  No oropharyngeal exudate  Eyes: Conjunctivae are normal  No scleral icterus  Neck: Normal range of motion  Neck supple  Cardiovascular: Normal rate, regular rhythm and normal heart sounds  Exam reveals no friction rub  No murmur heard  Pulmonary/Chest: Effort normal and breath sounds normal  No respiratory distress  Abdominal: Soft  Bowel sounds are normal  She exhibits no distension  There is no tenderness  Musculoskeletal: She exhibits no edema or tenderness  Lymphadenopathy:     She has no cervical adenopathy  Neurological: She is alert and oriented to person, place, and time  No cranial nerve deficit  Skin: No rash noted  She is not diaphoretic  Multiple excoriations from itching over bilateral arms, legs   Psychiatric: She has a normal mood and affect  Her behavior is normal    Vitals reviewed  Current Outpatient Prescriptions:     ACCU-CHEK FASTCLIX LANCETS MISC, , Disp: , Rfl:     aspirin 81 MG tablet, Take 1 tablet (81 mg total) by mouth daily, Disp: 90 tablet, Rfl: 3    Blood Glucose Monitoring Suppl (ACCU-CHEK ESVIN SMARTVIEW) w/Device KIT, use as directed, Disp: , Rfl:     levothyroxine 100 mcg tablet, Take 100 mcg by mouth 2 (two) times a week, Disp: , Rfl:     levothyroxine 75 mcg tablet, Take 88 mcg by mouth daily  , Disp: , Rfl:     lisinopril-hydrochlorothiazide (PRINZIDE,ZESTORETIC) 10-12 5 MG per tablet, Take 1 tablet by mouth daily, Disp: 90 tablet, Rfl: 3    cephalexin (KEFLEX) 500 mg capsule, Take 1 capsule by mouth 2 (two) times a day, Disp: 10 capsule, Rfl: 0    cetirizine (ZyrTEC) 10 mg tablet, Take 1 tablet by mouth daily, Disp: 20 tablet, Rfl: 0    Cholecalciferol ([employer] VITAMIN D) 2000 units CAPS, Take by mouth, Disp: , Rfl:     Cholecalciferol (VITAMIN D HIGH POTENCY PO), Take by mouth  Takes 1x/week, Disp: , Rfl:     cyclobenzaprine (FLEXERIL) 10 mg tablet, Take 10 mg by mouth as needed for muscle spasms  , Disp: , Rfl:     fluticasone (FLONASE) 50 mcg/act nasal spray, 1 spray into each nostril daily, Disp: 16 g, Rfl: 0    glucose blood (ACCU-CHEK SMARTVIEW) test strip, , Disp: , Rfl:     hydrochlorothiazide (MICROZIDE) 12 5 mg capsule, Take 1 capsule by mouth daily, Disp: , Rfl:     HYDROcodone-acetaminophen (NORCO) 5-325 mg per tablet, Take 1 tablet by mouth every 6 (six) hours as needed for pain Max Daily Amount: 4 tablets, Disp: 10 tablet, Rfl: 0    naproxen (NAPROSYN) 250 mg tablet, Take 250 mg by mouth as needed for mild pain , Disp: , Rfl:     ondansetron (ZOFRAN) 4 mg tablet, Take 1 tablet by mouth every 8 (eight) hours as needed, Disp: , Rfl:     oseltamivir (TAMIFLU) 75 mg capsule, Take 1 capsule by mouth 2 (two) times a day, Disp: , Rfl:     venlafaxine (EFFEXOR-XR) 37 5 mg 24 hr capsule, Take 1 capsule by mouth Daily, Disp: , Rfl:     white petrolatum-mineral oil (EUCERIN) cream, Apply topically, Disp: , Rfl:     Past Medical History:   Diagnosis Date    Arthritis     Depression     Disease of thyroid gland     hypo    Edema     LAST ASSESSED: [id number]    GERD (gastroesophageal reflux disease)     GERD without esophagitis     Hypercholesterolemia     Hyperlipidemia     Hypertension     WELL CONTROLLED  CURRENTLY ON LISINOPRIL   LAST ASSESSED: [id number]    Impaired fasting glucose     LAST ASSESSED: [id number]    Influenza     LAST ASSESSED: [id number]    Other headache syndrome     Other muscle spasm     LAST ASSESSED: [id number]    Ovarian cyst 2006    Renal calculi  Sleep apnea     LAST ASSESSED: [id number]    Spontaneous      1977 (2ND TRIMESTER)     (spontaneous vaginal delivery)     FEMALE    Thyroid disease      Past Surgical History:   Procedure Laterality Date    BACK SURGERY      HERNIATED DISC (L4/L5)    CHOLECYSTECTOMY      TONSILLECTOMY      TONSILLECTOMY AND ADENOIDECTOMY      LAST ASSESSED: [id number]     Social History     Social History    Marital status: Single     Spouse name: N/A    Number of children: N/A    Years of education: N/A     Occupational History    RETIRED      Social History Main Topics    Smoking status: Former Smoker    Smokeless tobacco: Never Used    Alcohol use No    Drug use: No    Sexual activity: Not on file     Other Topics Concern    Not on file     Social History Narrative    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         AS PER ALLSCRISpontaneously    EXERCISES REGULARLY    LIVES WITH FAMILY    NO CAFFEINE USE    NO LIVING WILL    NO TOBACCO/SMOKE EXPOSURE    UNEMPLOYED     Family History   Problem Relation Age of Onset    Lung cancer Mother     Cancer Mother      MALIGNANT NEOPLASM    Hypertension Father     Diabetes Brother     Hypertension Son     Lung disease Son     Hyperthyroidism Maternal Aunt     Hypothyroidism Maternal Aunt     Heart disease Other      CARDIAC DISORDER    Neuropathy Family        ==  DO Iraida oSrenson [facility] Internal Medicine PGY-3    [address] , [address] [address] [facility] [facility]  Office: [phone]  Fax: [phone]

## 2018-04-17 ENCOUNTER — TELEPHONE (OUTPATIENT)
Dept: INTERNAL MEDICINE CLINIC | Facility: CLINIC | Age: 63
End: 2018-04-17

## 2018-04-17 LAB
HBV CORE AB SER QL: NORMAL
HBV CORE IGM SER QL: NORMAL
HBV SURFACE AG SER QL: NORMAL
HCV AB SER QL: NORMAL

## 2018-04-17 NOTE — TELEPHONE ENCOUNTER
I attempted to contact patients pharmacy to clarify but there was no response, I was left going back and forth with the   Will attempt again later on

## 2018-04-18 LAB — HIV 1+2 AB+HIV1 P24 AG SERPL QL IA: NORMAL

## 2018-04-23 ENCOUNTER — OFFICE VISIT (OUTPATIENT)
Dept: MULTI SPECIALTY CLINIC | Facility: CLINIC | Age: 63
End: 2018-04-23
Payer: MEDICARE

## 2018-04-23 VITALS
HEIGHT: 64 IN | DIASTOLIC BLOOD PRESSURE: 90 MMHG | BODY MASS INDEX: 45.32 KG/M2 | TEMPERATURE: 97.8 F | HEART RATE: 76 BPM | WEIGHT: 265.43 LBS | SYSTOLIC BLOOD PRESSURE: 128 MMHG

## 2018-04-23 DIAGNOSIS — E11.8 TYPE 2 DIABETES MELLITUS WITH COMPLICATION (HCC): ICD-10-CM

## 2018-04-23 DIAGNOSIS — M72.2 PLANTAR FASCIITIS OF LEFT FOOT: ICD-10-CM

## 2018-04-23 DIAGNOSIS — E11.9 TYPE 2 DIABETES MELLITUS WITHOUT COMPLICATION, WITHOUT LONG-TERM CURRENT USE OF INSULIN (HCC): Primary | ICD-10-CM

## 2018-04-23 PROCEDURE — 99213 OFFICE O/P EST LOW 20 MIN: CPT | Performed by: PODIATRIST

## 2018-04-23 NOTE — PROGRESS NOTES
Podiatry Clinic Visit  Demario Gilliland 59 y o  female MRN: [id number]  Encounter: [id number]    Assessment/Plan     Assessment:  1  Plantar fasciitis, left   2  Diabetes type 2    Plan:  - Pt seen and examined  - discussed etiology of plantar fasciitis  Discussed importance of stretching at least 2x daily, and importance of wearing supportive shoes  - advised pt to obtain OTC orthotics such as powersteps or superfeet  - rtc 2 month    History of Present Illness     HPI:  Demario Gilliland is a 59 y o  female who presents with painful left plantar heel for 2 months  Pt states that it is most painful in the morning or after a period of rest  Pt has not tried much for the pain however is worried as she was told she is a diabetic  She does not take meds for her diabetes per pcp and was told to diet/exercise to manage her diabetes  Pt states she normally wears sneakers but  ambulates to clinic in moccasin slip-ons as they are easier to take on/off  She states she has tried gel inserts but without any relief  She went to the [facility] recently due to the heel pain and got xrays which she states where normal  States the pain is now 24/7 and constant  Pt denies recent nausea, vomiting, fever, chills, shortness of breath or chest pain  Consults  Review of Systems   Constitutional: Negative  HENT: Negative  Eyes: Negative  Respiratory: Negative  Cardiovascular: Negative  Gastrointestinal: Negative  Musculoskeletal: L heel pain   Skin: Negative  Neurological: Negative  Historical Information   Past Medical History:   Diagnosis Date    Arthritis     Depression     Disease of thyroid gland     hypo    Edema     LAST ASSESSED: [id number]    GERD (gastroesophageal reflux disease)     GERD without esophagitis     Hypercholesterolemia     Hyperlipidemia     Hypertension     WELL CONTROLLED  CURRENTLY ON LISINOPRIL   LAST ASSESSED: [id number]    Impaired fasting glucose     LAST ASSESSED: [id number]    Influenza     LAST ASSESSED: [id number]    Other headache syndrome     Other muscle spasm     LAST ASSESSED: [id number]    Ovarian cyst 2006    Renal calculi     Sleep apnea     LAST ASSESSED: [id number]    Spontaneous      1977 (2ND TRIMESTER)     (spontaneous vaginal delivery)     FEMALE    Thyroid disease      Past Surgical History:   Procedure Laterality Date    BACK SURGERY      HERNIATED DISC (L4/L5)    CHOLECYSTECTOMY      TONSILLECTOMY      TONSILLECTOMY AND ADENOIDECTOMY      LAST ASSESSED: [id number]     Social History   History   Alcohol Use No     History   Drug Use No     History   Smoking Status    Former Smoker   Smokeless Tobacco    Never Used     Family History:   Family History   Problem Relation Age of Onset    Lung cancer Mother     Cancer Mother      MALIGNANT NEOPLASM    Hypertension Father     Diabetes Brother     Hypertension Son     Lung disease Son     Hyperthyroidism Maternal Aunt     Hypothyroidism Maternal Aunt     Heart disease Other      CARDIAC DISORDER    Neuropathy Family        Meds/Allergies     (Not in a hospital admission)  Allergies   Allergen Reactions    Tagamet [Cimetidine]        Objective   First Vitals:   @VSFIRST2(5,8,6,7,9,11,14,10:FIRST)@    Current Vitals:   Blood Pressure: 128/90 (18 1331)  Pulse: 76 (18 1331)  Temperature: 97 8 °F (36 6 °C) (18 1331)  Temp Source: Oral (18 1331)  Height: 5' 4" (162 6 cm) (18 1331)  Weight - Scale: 120 kg (265 lb 6 9 oz) (18 1331)        /90 (BP Location: Left arm, Patient Position: Sitting, Cuff Size: Adult)   Pulse 76   Temp 97 8 °F (36 6 °C) (Oral)   Ht 5' 4" (1 626 m)   Wt 120 kg (265 lb 6 9 oz)   BMI 45 56 kg/m²     General Appearance:    Alert, cooperative, no distress   Extremities:   MMT is 5/5 to all compartments of the LE, Digital ROM is intact, Pain upon palpation of left plantar tubercle of calcaneus  Equinus deformity noted bilateral   Pulses:   R DP is +2/4, R PT is +1/4, L DP is +2/4, L PT is +1/4, CFT< 3sec to all digits   Skin:   No open lesions, no edema, no erythema, no clinical signs of infection  No maceration in interspaces  Skin is of normal turgor   Neurologic:    Gross sensation is intact   Protective sensation is intact           Imaging: I have personally reviewed pertinent films in PACS

## 2018-05-17 ENCOUNTER — OFFICE VISIT (OUTPATIENT)
Dept: INTERNAL MEDICINE CLINIC | Facility: CLINIC | Age: 63
End: 2018-05-17
Payer: MEDICARE

## 2018-05-17 VITALS
HEIGHT: 64 IN | TEMPERATURE: 97.9 F | WEIGHT: 267.64 LBS | DIASTOLIC BLOOD PRESSURE: 80 MMHG | HEART RATE: 68 BPM | SYSTOLIC BLOOD PRESSURE: 140 MMHG | BODY MASS INDEX: 45.69 KG/M2

## 2018-05-17 DIAGNOSIS — E11.3399 MODERATE NONPROLIFERATIVE DIABETIC RETINOPATHY WITHOUT MACULAR EDEMA ASSOCIATED WITH TYPE 2 DIABETES MELLITUS (HCC): ICD-10-CM

## 2018-05-17 DIAGNOSIS — Z12.39 SCREENING FOR BREAST CANCER: ICD-10-CM

## 2018-05-17 DIAGNOSIS — E11.9 TYPE 2 DIABETES MELLITUS WITHOUT COMPLICATION, WITHOUT LONG-TERM CURRENT USE OF INSULIN (HCC): ICD-10-CM

## 2018-05-17 DIAGNOSIS — I10 ESSENTIAL HYPERTENSION: ICD-10-CM

## 2018-05-17 DIAGNOSIS — Z12.11 SCREENING FOR COLON CANCER: Primary | ICD-10-CM

## 2018-05-17 DIAGNOSIS — R07.9 CHEST PAIN: ICD-10-CM

## 2018-05-17 DIAGNOSIS — Z23 NEED FOR DIPHTHERIA-TETANUS-PERTUSSIS (TDAP) VACCINE: ICD-10-CM

## 2018-05-17 DIAGNOSIS — G56.03 CARPAL TUNNEL SYNDROME, BILATERAL: ICD-10-CM

## 2018-05-17 DIAGNOSIS — R10.2 VAGINAL PAIN: ICD-10-CM

## 2018-05-17 DIAGNOSIS — L29.9 ITCHING: ICD-10-CM

## 2018-05-17 DIAGNOSIS — E03.9 HYPOTHYROIDISM: ICD-10-CM

## 2018-05-17 DIAGNOSIS — Z12.4 CERVICAL CANCER SCREENING: ICD-10-CM

## 2018-05-17 DIAGNOSIS — M54.10 RADICULAR PAIN: ICD-10-CM

## 2018-05-17 PROCEDURE — 99213 OFFICE O/P EST LOW 20 MIN: CPT | Performed by: INTERNAL MEDICINE

## 2018-05-17 RX ORDER — HYDROXYZINE HYDROCHLORIDE 25 MG/1
25 TABLET, FILM COATED ORAL EVERY 12 HOURS PRN
Qty: 30 TABLET | Refills: 2 | Status: SHIPPED | OUTPATIENT
Start: 2018-05-17 | End: 2020-04-24 | Stop reason: ALTCHOICE

## 2018-05-17 NOTE — PROGRESS NOTES
INTERNAL MEDICINE FOLLOW-UP OFFICE VISIT  [facility]  [address] [place] [address]    NAME: Andres Lovell  AGE: 59 y o   SEX: female    DATE OF ENCOUNTER: 5/17/2018    Assessment and Plan     Problem List Items Addressed This Visit        Endocrine    Hypothyroidism     Continue regular follow-up with Endocrinology and current Synthroid dose         Moderate nonproliferative diabetic retinopathy without macular edema associated with type 2 diabetes mellitus ([address] )    Type 2 diabetes mellitus ([address] )     Diet controlled, continue monitoring off medication            Cardiovascular and Mediastinum    Hypertension     Well controlled on Lisinopril-HCTZ            Nervous and Auditory    Carpal tunnel syndrome, bilateral     Continue symptomatic care, considering possible surgery in the future            Other    Cervical cancer screening     Referral to ob/gyn provided today for vaginal pain         Relevant Orders    Ambulatory referral to Gynecology    Itching    Relevant Medications    hydrOXYzine HCL (ATARAX) 25 mg tablet    Chest pain    Relevant Orders    Stress test only, exercise      Other Visit Diagnoses     Screening for colon cancer    -  Primary    Need for diphtheria-tetanus-pertussis (Tdap) vaccine        Screening for breast cancer        Relevant Orders    Mammo screening bilateral w cad    Radicular pain        Relevant Orders    XR spine lumbar minimum 4 views non injury    Vaginal pain        Relevant Orders    US abdomen and pelvis with transvaginal        Orders Placed This Encounter   Procedures    Mammo screening bilateral w cad    XR spine lumbar minimum 4 views non injury    US abdomen and pelvis with transvaginal    Ambulatory referral to Gynecology    Stress test only, exercise     - Counseling Documentation: patient was counseled regarding: diagnostic results, instructions for management, risk factor reductions, prognosis, patient and family education, impressions, risks and benefits of treatment options and importance of compliance with treatment    Chief Complaint     Chief Complaint   Patient presents with    Follow-up     History of Present Illness     Chest Pain    This is a new problem  The current episode started 1 to 4 weeks ago  The onset quality is sudden  The problem occurs intermittently  The problem has been gradually worsening  The pain is present in the substernal region and lateral region  The pain is moderate  The quality of the pain is described as heavy, pressure and stabbing  The pain does not radiate  Associated symptoms include exertional chest pressure, numbness and shortness of breath  Pertinent negatives include no abdominal pain, back pain, cough, diaphoresis or fever  Prior diagnostic workup includes exercise treadmill test    Vaginal Pain   The patient's primary symptoms include pelvic pain  The patient's pertinent negatives include no genital itching, genital lesions, vaginal bleeding or vaginal discharge  This is a new problem  The current episode started 1 to 4 weeks ago  The problem occurs intermittently  The problem has been gradually improving  The pain is mild  The problem affects both sides  She is not pregnant  Pertinent negatives include no abdominal pain, back pain, dysuria, fever, flank pain, frequency, hematuria or rash  Nothing aggravates the symptoms  She has tried nothing for the symptoms  She is not sexually active  She uses abstinence for contraception  She is postmenopausal    Diabetes   She presents for her follow-up diabetic visit  She has type 2 diabetes mellitus  No MedicAlert identification noted  Her disease course has been stable  There are no hypoglycemic associated symptoms  Associated symptoms include chest pain  There are no hypoglycemic complications  Symptoms are stable  Diabetic complications include peripheral neuropathy and retinopathy   Risk factors for coronary artery disease include hypertension, sedentary lifestyle and stress  Current diabetic treatment includes diet  She is compliant with treatment most of the time  She is following a generally healthy diet  She has not had a previous visit with a dietitian  She rarely participates in exercise  There is no change in her home blood glucose trend  She sees a podiatrist Eye exam is current  The following portions of the patient's history were reviewed and updated as appropriate: allergies, current medications, past family history, past medical history, past social history, past surgical history and problem list     Review of Systems     Review of Systems   Constitutional: Negative for diaphoresis and fever  HENT: Negative  Eyes: Negative  Respiratory: Positive for shortness of breath  Negative for cough  Cardiovascular: Positive for chest pain  Gastrointestinal: Negative for abdominal pain  Endocrine: Negative  Genitourinary: Positive for pelvic pain and vaginal pain  Negative for dysuria, flank pain, frequency, hematuria and vaginal discharge  Musculoskeletal: Negative for back pain  Skin: Negative for rash  Allergic/Immunologic: Negative  Neurological: Positive for numbness  Psychiatric/Behavioral: Negative  All other systems reviewed and are negative        Active Problem List     Patient Active Problem List   Diagnosis    Carpal tunnel syndrome, bilateral    Cervical spondylosis without myelopathy    Degenerative cervical disc    GERD (gastroesophageal reflux disease)    Hypertension    Hypothyroidism    Moderate nonproliferative diabetic retinopathy without macular edema associated with type 2 diabetes mellitus ([address] )    Morbid obesity (HCC)    Obstructive sleep apnea    Type 2 diabetes mellitus (HCC)    Cervical cancer screening    Lung nodule < 6cm on CT    Itching    Chest pain       Objective     /80   Pulse 68   Temp 97 9 °F (36 6 °C)   Ht 5' 4" (1 626 m)   Wt 121 kg (267 lb 10 2 oz)   BMI 45 94 kg/m²     Physical Exam   Constitutional: She is oriented to person, place, and time  She appears well-developed and well-nourished  No distress  HENT:   Head: Normocephalic and atraumatic  Mouth/Throat: Oropharynx is clear and moist  No oropharyngeal exudate  Eyes: Conjunctivae are normal  Pupils are equal, round, and reactive to light  No scleral icterus  Neck: Normal range of motion  Neck supple  No JVD present  No tracheal deviation present  No thyromegaly present  Cardiovascular: Normal rate, regular rhythm, normal heart sounds and intact distal pulses  Exam reveals no gallop and no friction rub  Pulses are no weak pulses  No murmur heard  Pulses:       Dorsalis pedis pulses are 2+ on the right side, and 2+ on the left side  Pulmonary/Chest: Effort normal and breath sounds normal  No respiratory distress  She has no wheezes  Abdominal: Soft  Bowel sounds are normal    Musculoskeletal: Normal range of motion  She exhibits edema  She exhibits no tenderness or deformity  1+ ble   Feet:   Right Foot:   Skin Integrity: Negative for ulcer, skin breakdown, erythema, warmth, callus or dry skin  Left Foot:   Skin Integrity: Negative for ulcer, skin breakdown, erythema, warmth, callus or dry skin  Neurological: She is alert and oriented to person, place, and time  Skin: Skin is warm and dry  She is not diaphoretic  Psychiatric: She has a normal mood and affect  Nursing note and vitals reviewed  Patient's shoes and socks removed  Right Foot/Ankle   Right Foot Inspection  Skin Exam: skin normal and skin intact no dry skin, no warmth, no callus, no erythema, no maceration, no abnormal color, no pre-ulcer, no ulcer and no callus                          Toe Exam: ROM and strength within normal limits  Sensory   Vibration: intact  Proprioception: intact   Monofilament testing: intact  Vascular  Capillary refills: < 3 seconds  The right DP pulse is 2+       Left Foot/Ankle  Left Foot Inspection  Skin Exam: skin normal and skin intactno dry skin, no warmth, no erythema, no maceration, normal color, no pre-ulcer, no ulcer and no callus                         Toe Exam: ROM and strength within normal limits                   Sensory   Vibration: intact  Proprioception: intact  Monofilament: diminished  Vascular  Capillary refills: < 3 seconds  The left DP pulse is 2+  Assign Risk Category:  No deformity present; No loss of protective sensation;  No weak pulses       Risk: 0   shanice  Pertinent Laboratory/Diagnostic Studies:    CBC:   Results from last 6 Months  Lab Units 12/08/17  1619   WBC Thousand/uL 9 77   RBC Million/uL 4 49   HEMOGLOBIN g/dL 12 7   HEMATOCRIT % 39 0   MCV fL 87   MCH pg 28 3   MCHC g/dL 32 6   RDW % 13 3   MPV fL 10 9   PLATELETS Thousands/uL 268   NRBC AUTO /100 WBCs 0   NEUTROS PCT % 61   LYMPHS PCT % 27   MONOS PCT % 10   EOS PCT % 2   BASOS PCT % 0   NEUTROS ABS Thousands/µL 5 93   LYMPHS ABS Thousands/µL 2 59   MONOS ABS Thousand/µL 0 96   EOS ABS Thousand/µL 0 23     Chemistry Profile:   Results from last 6 Months  Lab Units 02/28/18  1200 12/08/17  1619   SODIUM mmol/L 140 140   POTASSIUM mmol/L 4 0 3 6   CHLORIDE mmol/L 107 106   CO2 mmol/L 28 29   ANION GAP mmol/L 5 5   BUN mg/dL 13 10   CREATININE mg/dL 0 64 0 63   GLUCOSE FASTING mg/dL 89  --    GLUCOSE RANDOM mg/dL  --  89   CALCIUM mg/dL 8 7 9 0   AST U/L 25 22   ALT U/L 32 24   ALK PHOS U/L 59 68   TOTAL PROTEIN g/dL 7 8 7 6   BILIRUBIN TOTAL mg/dL 0 37 0 33   EGFR ml/min/1 73sq m 97 97     Current Medications     Current Outpatient Prescriptions:     aspirin 81 MG tablet, Take 1 tablet (81 mg total) by mouth daily, Disp: 90 tablet, Rfl: 3    Cholecalciferol (CVS VITAMIN D) 2000 units CAPS, Take 1 capsule (4,000 Units total) by mouth daily for 180 days, Disp: 30 capsule, Rfl: 5    levothyroxine 100 mcg tablet, Take 100 mcg by mouth 2 (two) times a week, Disp: , Rfl:     levothyroxine 75 mcg tablet, Take 88 mcg by mouth daily  , Disp: , Rfl:     lisinopril-hydrochlorothiazide (PRINZIDE,ZESTORETIC) 10-12 5 MG per tablet, Take 1 tablet by mouth daily, Disp: 90 tablet, Rfl: 3    venlafaxine (EFFEXOR-XR) 37 5 mg 24 hr capsule, Take 1 capsule by mouth Daily, Disp: , Rfl:     ACCU-CHEK FASTCLIX LANCETS MISC, , Disp: , Rfl:     Blood Glucose Monitoring Suppl (ACCU-CHEK ESVIN SMARTVIEW) w/Device KIT, use as directed, Disp: , Rfl:     cephalexin (KEFLEX) 500 mg capsule, Take 1 capsule by mouth 2 (two) times a day, Disp: 10 capsule, Rfl: 0    cetirizine (ZyrTEC) 10 mg tablet, Take 1 tablet by mouth daily, Disp: 20 tablet, Rfl: 0    Cholecalciferol (VITAMIN D HIGH POTENCY PO), Take by mouth  Takes 1x/week, Disp: , Rfl:     cyclobenzaprine (FLEXERIL) 10 mg tablet, Take 10 mg by mouth as needed for muscle spasms  , Disp: , Rfl:     fluticasone (FLONASE) 50 mcg/act nasal spray, 1 spray into each nostril daily, Disp: 16 g, Rfl: 0    glucose blood (ACCU-CHEK SMARTVIEW) test strip, , Disp: , Rfl:     hydrochlorothiazide (MICROZIDE) 12 5 mg capsule, Take 1 capsule by mouth daily, Disp: , Rfl:     HYDROcodone-acetaminophen (NORCO) 5-325 mg per tablet, Take 1 tablet by mouth every 6 (six) hours as needed for pain Max Daily Amount: 4 tablets, Disp: 10 tablet, Rfl: 0    hydrOXYzine HCL (ATARAX) 25 mg tablet, Take 1 tablet (25 mg total) by mouth every 12 (twelve) hours as needed for itching, Disp: 30 tablet, Rfl: 2    naproxen (NAPROSYN) 250 mg tablet, Take 250 mg by mouth as needed for mild pain , Disp: , Rfl:     ondansetron (ZOFRAN) 4 mg tablet, Take 1 tablet by mouth every 8 (eight) hours as needed, Disp: , Rfl:     oseltamivir (TAMIFLU) 75 mg capsule, Take 1 capsule by mouth 2 (two) times a day, Disp: , Rfl:     white petrolatum-mineral oil (EUCERIN) cream, Apply topically, Disp: , Rfl:     Health Maintenance     Health Maintenance   Topic Date Due    COLONOSCOPY  1955    PNEUMOCOCCAL POLYSACCHARIDE VACCINE AGE 2-64 HIGH RISK  03/18/1958    Diabetic Foot Exam  03/18/1966    Depression Screening PHQ-9  03/18/1968    DTaP,Tdap,and Td Vaccines (1 - Tdap) 03/18/1977    HEMOGLOBIN A1C  08/28/2018    INFLUENZA VACCINE  09/01/2018    OPHTHALMOLOGY EXAM  09/11/2018    URINE MICROALBUMIN  09/30/2018    HIV SCREENING  04/16/2021    Hepatitis C Screening  Completed     Immunization History   Administered Date(s) Administered    Influenza Quadrivalent, 6-35 Months IM 09/29/2017    Influenza TIV (IM) 10/10/2013, 12/03/2014, 10/22/2015     Fannie MCKEON    Internal Medicine PGY-3  5/17/2018 1:18 PM

## 2018-05-17 NOTE — PATIENT INSTRUCTIONS
Angina   AMBULATORY CARE:   Angina  is pain, pressure, or tightness that is usually felt in your chest  It is caused by decreased blood flow and oxygen to your heart  Angina is usually caused by atherosclerosis (hardening of the arteries)  Chest pain may come on when you are stressed or do physical activities, such as walking or exercising  If left untreated, angina may get worse, increase your risk for a heart attack, or become life-threatening  Common symptoms include the following:   · Pressure, tightness, or pain in your neck, jaw, shoulder, or back    · Pain or numbness in either arm    · Discomfort that feels like heartburn    · Shortness of breath, sweating, nausea, or lightheadedness  Call 911 if:   · You have any of the following signs of a heart attack:      ¨ Squeezing, pressure, or pain in your chest that lasts longer than 5 minutes or returns    ¨ Discomfort or pain in your back, neck, jaw, stomach, or arm     ¨ Trouble breathing    ¨ Nausea or vomiting    ¨ Lightheadedness or a sudden cold sweat, especially with chest pain or trouble breathing    · You have chest pain that does not go away after you take medicine as directed  · You lose feeling in your face, arms, or legs, or you suddenly feel weak  Seek care immediately if:   · Your angina is happening more frequently, lasting longer, or causing worse pain  Contact your healthcare provider if:   · You are dizzy or nauseated after you take your medicine  · You have shortness of breath at rest     · You have new or worse swelling in your feet or ankles  · You have questions or concerns about your condition or care  Treatment for angina  may include any of the following  Take your medicine as directed  Call your healthcare provider if you think your medicine is not helping or if you have side effects  Tell him if you are allergic to any medicine  Keep a list of the medicines, vitamins, and herbs you take   Include the amounts, and when and why you take them  Bring the list or the pill bottles to follow-up visits  Carry your medicine list with you in case of an emergency  · Antiplatelets , such as aspirin, help prevent blood clots  Take your antiplatelet medicine exactly as directed  These medicines make it more likely for you to bleed or bruise  If you are told to take aspirin, do not take acetaminophen or ibuprofen instead  · Blood thinners  keep clots from forming in your blood  Clots may cause heart attacks, strokes, or death  This medicine makes it more likely for you to bleed or bruise  · Other medicines  may be given to open the arteries to your heart, slow your heartbeat, or decrease your blood pressure or cholesterol  · Do not take certain medicines without asking your healthcare provider first   These include NSAIDs, herbal or vitamin supplements, or hormones (estrogen or progestin)  · Angioplasty and stenting  help open the coronary arteries and allow blood to flow to the heart  Ask for more information about these procedures  · Coronary artery bypass graft (CABG) , or open heart surgery, can improve blood flow to the heart  This will help decrease your chest pain and prevent a heart attack  Manage angina:   · Do not smoke  Nicotine and other chemicals in cigarettes and cigars can cause heart and lung damage  Ask your healthcare provider for information if you currently smoke and need help to quit  E-cigarettes or smokeless tobacco still contain nicotine  Talk to your healthcare provider before you use these products  · Maintain a healthy weight  When you weigh more than is healthy for you, your heart must work harder  You are at higher risk for serious health problems if you are overweight  Ask your healthcare provider how much you should weigh  Ask him to help you create a weight loss plan if you are overweight  · Ask about activity  Your healthcare provider will tell you which activities to limit or avoid  Ask about the best exercise plan for you  · Eat heart-healthy foods  Include fresh fruits and vegetables in your meal plan  Choose low-fat foods, such as skim or 1% fat milk, low-fat cheese and yogurt, fish, chicken (without skin), and lean meats  Eat two 4-ounce servings of fish high in omega-3 fats each week, such as salmon, fresh tuna, and herring  Do not eat foods that are high in sodium, such as canned foods, potato chips, salty snacks, and cold cuts  Put less table salt on your food  · Avoid activities that cause angina  Pay attention to your symptoms and find out what seems to make your angina worse  · Ask if you should have a flu vaccine  The flu vaccine will decrease your risk for an infection  An infection can put more stress on your heart and worsen your angina  Follow up with your healthcare provider as directed:  Write down your questions so you remember to ask them during your visits  © 2017 [facility] Information is for End User's use only and may not be sold, redistributed or otherwise used for commercial purposes  All illustrations and images included in CareNotes® are the copyrighted property of [employer]  or [employer]  The above information is an  only  It is not intended as medical advice for individual conditions or treatments  Talk to your doctor, nurse or pharmacist before following any medical regimen to see if it is safe and effective for you

## 2018-05-29 DIAGNOSIS — N64.4 BREAST PAIN: Primary | ICD-10-CM

## 2018-06-01 ENCOUNTER — TRANSCRIBE ORDERS (OUTPATIENT)
Dept: RADIOLOGY | Facility: HOSPITAL | Age: 63
End: 2018-06-01

## 2018-06-01 ENCOUNTER — HOSPITAL ENCOUNTER (OUTPATIENT)
Dept: RADIOLOGY | Facility: HOSPITAL | Age: 63
Discharge: HOME | End: 2018-06-01
Payer: MEDICARE

## 2018-06-01 DIAGNOSIS — R10.2 VAGINAL PAIN: ICD-10-CM

## 2018-06-01 DIAGNOSIS — M54.10 RADICULAR PAIN: ICD-10-CM

## 2018-06-01 PROCEDURE — 76830 TRANSVAGINAL US NON-OB: CPT

## 2018-06-01 PROCEDURE — 72110 X-RAY EXAM L-2 SPINE 4/>VWS: CPT

## 2018-06-01 PROCEDURE — 76856 US EXAM PELVIC COMPLETE: CPT

## 2018-06-04 ENCOUNTER — HOSPITAL ENCOUNTER (OUTPATIENT)
Dept: NON INVASIVE DIAGNOSTICS | Facility: CLINIC | Age: 63
Discharge: HOME | End: 2018-06-04
Payer: MEDICARE

## 2018-06-04 ENCOUNTER — TELEPHONE (OUTPATIENT)
Dept: INTERNAL MEDICINE CLINIC | Facility: CLINIC | Age: 63
End: 2018-06-04

## 2018-06-04 DIAGNOSIS — R93.89 ENDOMETRIAL THICKENING ON ULTRASOUND: Primary | ICD-10-CM

## 2018-06-04 DIAGNOSIS — R07.9 CHEST PAIN: ICD-10-CM

## 2018-06-04 LAB
CHEST PAIN STATEMENT: NORMAL
MAX DIASTOLIC BP: 82 MMHG
MAX HEART RATE: 121 BPM
MAX PREDICTED HEART RATE: 158 BPM
MAX. SYSTOLIC BP: 178 MMHG
PROTOCOL NAME: NORMAL
REASON FOR TERMINATION: NORMAL
TARGET HR FORMULA: NORMAL
TEST INDICATION: NORMAL
TIME IN EXERCISE PHASE: NORMAL

## 2018-06-04 PROCEDURE — 93017 CV STRESS TEST TRACING ONLY: CPT

## 2018-06-04 PROCEDURE — 93018 CV STRESS TEST I&R ONLY: CPT | Performed by: INTERNAL MEDICINE

## 2018-06-04 PROCEDURE — 93016 CV STRESS TEST SUPVJ ONLY: CPT | Performed by: INTERNAL MEDICINE

## 2018-06-04 NOTE — TELEPHONE ENCOUNTER
Patient was called and informed about abnormal US with thick endometrium, she was explained about the need to be evaluated by Gyn, Gyn cansult was ordered, please send the referral to the patient

## 2018-06-04 NOTE — TELEPHONE ENCOUNTER
JESSY FROM RADIOLOGY CALLED, THERE ARE SIGNIFICANT FINDINGS ON THE PT'S U/S FROM 6/1    PLEASE REVIEW ASAP!

## 2018-06-07 DIAGNOSIS — R91.1 LUNG NODULE < 6CM ON CT: Primary | ICD-10-CM

## 2018-06-08 ENCOUNTER — TELEPHONE (OUTPATIENT)
Dept: MULTI SPECIALTY CLINIC | Facility: CLINIC | Age: 63
End: 2018-06-08

## 2018-06-08 NOTE — TELEPHONE ENCOUNTER
----- Message from Rashaun Lewis DO sent at 6/7/2018  3:58 PM EDT -----   Please let this patient know it is time for her follow-up of CT chest for the nodule noted last year  The order has been placed and she can go at her earliest convenience  Thank you!

## 2018-06-13 ENCOUNTER — HOSPITAL ENCOUNTER (OUTPATIENT)
Dept: MAMMOGRAPHY | Facility: CLINIC | Age: 63
Discharge: HOME | End: 2018-06-13
Payer: MEDICARE

## 2018-06-13 DIAGNOSIS — N64.4 BREAST PAIN: ICD-10-CM

## 2018-06-13 PROCEDURE — 77066 DX MAMMO INCL CAD BI: CPT

## 2018-06-13 PROCEDURE — G0279 TOMOSYNTHESIS, MAMMO: HCPCS

## 2018-06-15 ENCOUNTER — OFFICE VISIT (OUTPATIENT)
Dept: OBGYN CLINIC | Facility: HOSPITAL | Age: 63
End: 2018-06-15
Payer: MEDICARE

## 2018-06-15 VITALS
SYSTOLIC BLOOD PRESSURE: 132 MMHG | HEART RATE: 74 BPM | HEIGHT: 65 IN | BODY MASS INDEX: 44.65 KG/M2 | WEIGHT: 268 LBS | DIASTOLIC BLOOD PRESSURE: 71 MMHG

## 2018-06-15 DIAGNOSIS — R93.89 THICKENED ENDOMETRIUM: ICD-10-CM

## 2018-06-15 PROCEDURE — 88305 TISSUE EXAM BY PATHOLOGIST: CPT | Performed by: PATHOLOGY

## 2018-06-15 PROCEDURE — 58100 BIOPSY OF UTERUS LINING: CPT | Performed by: OBSTETRICS & GYNECOLOGY

## 2018-06-15 NOTE — PROGRESS NOTES
Subjective:     Cirilo Cross is a 59 y o   female who presents for consultation regarding pelvic ultrasound results  Patient was seen by internal medicine for vaginal pain on   Patient states that she is still having intermittent bilateral lower quadrant pelvic pain today  Transvaginal ultrasound showed a thickened endometrial lining of 5 mm as well as a 1 x 1 x 1 cm submucosal fibroid on posterior margin, a 2 x 2 x 3cm fibroid that abuts the endometrium that may have a small submucosal component, and an intramural fibroid measuring 2 x 2 x 2cm  Patient is postmenopausal, LMP in   Patient denies vaginal bleeding, spotting, or postcoital bleeding after menopause  Denies menopausal symptoms  Prior to her menopause, she had regular menstrual cycles q28-30 days, lasting 4-6 days each time  Menarche at 9yo  She denies a history of sexually transmitted diseases  Denies h/o gynecologic surgeries  Denies family history of gyn cancer  Has a history of constipation but recently, more episodes of diarrhea  She is s/p cholecystectomy  Denies GI complaints at this time  Last pap in 2016 NILM, HPV neg  Objective:    Vitals: Blood pressure 132/71, pulse 74, height 5' 4 5" (1 638 m), weight 122 kg (268 lb), last menstrual period 2003  Body mass index is 45 29 kg/m²  Physical Exam   Constitutional: She is oriented to person, place, and time  She appears well-developed and well-nourished  Cardiovascular: Normal rate, regular rhythm and normal heart sounds  Pulmonary/Chest: Effort normal and breath sounds normal    Abdominal: Soft  Bowel sounds are normal    Genitourinary:   Genitourinary Comments: Minimal vaginal discharge noted  Cervix appears multiparous  Uterus is freely mobile  Patient was uncomfortable during exam but was able to tolerate  Neurological: She is alert and oriented to person, place, and time  Vitals reviewed         Assessment/Plan:    1) Thickened endometrial lining  Given thickened endometrial lining of 5 mm on recent ultrasound with BMI 45 in a postmenopausal women, discussed with patient importance of completing EMB today to rule out endometrial cancer  Risks and benefits were discussed with her  Risks include bleeding, cramping, uterine perforation, failure to complete procedure due to cervical stenosis or inability to obtain adequate sample  Patient verbalized understanding and signed consent form  Patient had cervical stenosis that was successfully dilated  Three passes with endometrial pipelle was completed  Will f/u results  Patient to RTC in 2 weeks for results and further discussion of pelvic pain         Isadora Hackett MD  6/15/2018  10:15 AM

## 2018-06-15 NOTE — PROGRESS NOTES
Endometrial biopsy  Date/Time: 6/15/2018 12:57 PM  Performed by: Sadia Rider  Authorized by: Sadia Rider     Consent:     Consent obtained:  Verbal and written    Consent given by:  Patient    Procedural risks discussed:  Bleeding, failure rate, infection and possible continued pain    Patient questions answered: yes      Patient agrees, verbalizes understanding, and wants to proceed: yes      Educational handouts given: no      Instructions and paperwork completed: yes    Universal protocol:     Patient states understanding of procedure being performed: yes      Relevant documents present and verified: yes      Test results available and properly labeled: yes      Imaging studies available: yes      Required blood products, implants, devices, and special equipment available: no      Site marked: no    Indication:     Indications comment:   Thickened endometrial lining of 5mm  Pre-procedure:     Pre-procedure timeout performed: yes    Procedure:     Procedure: endometrial biopsy with Pipelle      A bivalve speculum was placed in the vagina: yes      Cervix cleaned and prepped: yes      A paracervical block was performed: no      An intracervical block was performed: no      The cervix was dilated: yes      Uterus sounded: yes      Uterus sound depth (cm):  8    Specimen collected: specimen collected and sent to pathology      Patient tolerated procedure well with no complications: yes    Findings:     Uterus size:  Non-gravid    Cervix: stenotic      Adnexa: normal

## 2018-06-26 ENCOUNTER — HOSPITAL ENCOUNTER (OUTPATIENT)
Dept: RADIOLOGY | Facility: HOSPITAL | Age: 63
Discharge: HOME | End: 2018-06-26
Payer: MEDICARE

## 2018-06-26 DIAGNOSIS — R91.1 LUNG NODULE < 6CM ON CT: ICD-10-CM

## 2018-06-26 PROCEDURE — 71250 CT THORAX DX C-: CPT

## 2018-06-28 ENCOUNTER — PROCEDURE VISIT (OUTPATIENT)
Dept: OBGYN CLINIC | Facility: HOSPITAL | Age: 63
End: 2018-06-28
Payer: MEDICARE

## 2018-06-28 VITALS
HEIGHT: 64 IN | BODY MASS INDEX: 45.41 KG/M2 | SYSTOLIC BLOOD PRESSURE: 128 MMHG | HEART RATE: 85 BPM | WEIGHT: 266 LBS | DIASTOLIC BLOOD PRESSURE: 68 MMHG

## 2018-06-28 DIAGNOSIS — R93.89 THICKENED ENDOMETRIUM: Primary | ICD-10-CM

## 2018-06-28 PROCEDURE — 99213 OFFICE O/P EST LOW 20 MIN: CPT | Performed by: OBSTETRICS & GYNECOLOGY

## 2018-06-28 NOTE — PROGRESS NOTES
Subjective:     Marci Dejesus is a 59 y o   female who is here for EMB results  Tissue exam showed strips of endometrial glandular cells, negative for atypia  No endometrial stromal tissue identified, a repeat biopsy may be indicated  Patient had difficulty tolerating endometrial biopsy, secondary to a stenotic cervix  Discussed that due to lack of endometrial stromal tissue, we can do a hysteroscopy dilation & curettage for better endometrial sampling  However, with a 5 mm endometrial stripe found incidentally on TVUS and no symptoms in the postmenopausal period, including vaginal bleeding, spotting, or postcoital bleeding, expectant management is also a viable option  Patient would like to watch for now  Her pelvic pain has largely resolved  Understands to call us with any symptoms  Objective:    Vitals: Blood pressure 128/68, pulse 85, height 5' 4" (1 626 m), weight 121 kg (266 lb)  Body mass index is 45 66 kg/m²  Physical Exam   Deferred     Assessment/Plan:    1) EMB results  Patient would like expectant management vs going to OR for hysteroscopy D&C  Encouraged patient to call if she develops any symptoms, including vaginal spotting or bleeding, postcoital bleeding, abnormal vaginal discharge  She will return to clinic for annual visit      D/w Dr Grant Retana MD  2018  12:44 PM

## 2018-07-31 NOTE — PROGRESS NOTES
At this point, it is uncertain but the pain may be musculoskeletal     Please ask patient where exactly her chest pain is located  She recently had stress test which was unfortunately indeterminate because she was not able to complete the exercise protocol, but it does not appear to be heart-related  She should have follow-up appointment scheduled to see if she needs to have alternative cardiac testing performed and for further eval of chest pain  If she would like something for the pain until she is seen, she could try OTC lidocaine ointment and ibuprofen or Tylenol

## 2018-08-03 ENCOUNTER — OFFICE VISIT (OUTPATIENT)
Dept: INTERNAL MEDICINE CLINIC | Facility: CLINIC | Age: 63
End: 2018-08-03
Payer: MEDICARE

## 2018-08-03 VITALS
DIASTOLIC BLOOD PRESSURE: 68 MMHG | HEIGHT: 64 IN | SYSTOLIC BLOOD PRESSURE: 122 MMHG | HEART RATE: 68 BPM | WEIGHT: 267.64 LBS | BODY MASS INDEX: 45.69 KG/M2 | TEMPERATURE: 97.9 F

## 2018-08-03 DIAGNOSIS — R91.1 LUNG NODULE < 6CM ON CT: ICD-10-CM

## 2018-08-03 DIAGNOSIS — I10 ESSENTIAL HYPERTENSION: ICD-10-CM

## 2018-08-03 DIAGNOSIS — R42 DIZZINESS: Primary | ICD-10-CM

## 2018-08-03 DIAGNOSIS — E11.9 TYPE 2 DIABETES MELLITUS WITHOUT COMPLICATION, WITHOUT LONG-TERM CURRENT USE OF INSULIN (HCC): ICD-10-CM

## 2018-08-03 PROCEDURE — 99213 OFFICE O/P EST LOW 20 MIN: CPT | Performed by: INTERNAL MEDICINE

## 2018-08-03 RX ORDER — MECLIZINE HCL 12.5 MG/1
12.5 TABLET ORAL EVERY 8 HOURS PRN
Qty: 30 TABLET | Refills: 0 | Status: SHIPPED | OUTPATIENT
Start: 2018-08-03 | End: 2019-08-22 | Stop reason: ALTCHOICE

## 2018-08-03 NOTE — PROGRESS NOTES
ASSESSMENT/PLAN:  Problem List Items Addressed This Visit        Endocrine    Type 2 diabetes mellitus ([facility] [address] )     Lab Results   Component Value Date    HGBA1C 6 3 02/28/2018       Diet controlled  Continue dietary modifications  Hb1AC next visit  Cardiovascular and Mediastinum    Hypertension     Well controlled  Continue current regimen  Other    Lung nodule < 6cm on CT     CT of the chest "Right lung nodules as above are unchanged since 11/19/2015  Appears benign given no further change  No follow-up is necessary  Patient is aware  All questions answered appropriately  Dizziness - Primary     Dizziness is chronic in nature  Been ongoing for the past several years  Sensation of herself spinning  Denies any neurological symptoms such as visual changes  She does get intermittent headaches with each episode of dizziness  · Prescribe meclizine 12 5 mg to take as needed for dizziness  · Referral for vestibular rehab evaluation  · Follow-up in 3-6 months         Relevant Medications    meclizine (ANTIVERT) 12 5 MG tablet    Other Relevant Orders    Basic vestibular eval          Health Maintenance:  Uptodate      CHIEF COMPLAINT: Dizziness    HISTORY OF PRESENT ILLNESS:  Dizziness   This is a chronic problem  The current episode started more than 1 year ago  The problem occurs constantly  The problem has been unchanged  Associated symptoms include headaches and vertigo  Pertinent negatives include no congestion, coughing, myalgias, nausea, numbness, visual change or vomiting  Nothing aggravates the symptoms  She has tried nothing for the symptoms  The treatment provided no relief  Review of Systems   HENT: Negative for congestion  Respiratory: Negative for cough  Gastrointestinal: Negative for nausea and vomiting  Musculoskeletal: Negative for myalgias  Neurological: Positive for dizziness, vertigo and headaches  Negative for numbness         OBJECTIVE:  Vitals: 08/03/18 0842   BP: 122/68   BP Location: Left arm   Patient Position: Sitting   Cuff Size: Adult   Pulse: 68   Temp: 97 9 °F (36 6 °C)   TempSrc: Oral   Weight: 121 kg (267 lb 10 2 oz)   Height: 5' 4" (1 626 m)     Physical Exam   Constitutional: She is oriented to person, place, and time  She appears well-developed and well-nourished  HENT:   Head: Normocephalic and atraumatic  Rotational nystagmus with Kindra-Ku pike maneuver   Eyes: Conjunctivae are normal  Pupils are equal, round, and reactive to light  Neck: Normal range of motion  Neck supple  Cardiovascular: Normal rate, regular rhythm and normal heart sounds  Pulmonary/Chest: Effort normal and breath sounds normal    Neurological: She is alert and oriented to person, place, and time  No cranial nerve deficit or sensory deficit  She exhibits normal muscle tone  Coordination (Poor balance) abnormal    Skin: Skin is warm and dry  Current Outpatient Prescriptions:     aspirin 81 MG tablet, Take 1 tablet (81 mg total) by mouth daily, Disp: 90 tablet, Rfl: 3    Cholecalciferol (CVS VITAMIN D) 2000 units CAPS, Take 1 capsule (4,000 Units total) by mouth daily for 180 days, Disp: 30 capsule, Rfl: 5    Cholecalciferol (VITAMIN D HIGH POTENCY PO), Take by mouth  Takes 1x/week, Disp: , Rfl:     levothyroxine 100 mcg tablet, Take 100 mcg by mouth 2 (two) times a week, Disp: , Rfl:     levothyroxine 75 mcg tablet, Take 88 mcg by mouth daily  , Disp: , Rfl:     lisinopril-hydrochlorothiazide (PRINZIDE,ZESTORETIC) 10-12 5 MG per tablet, Take 1 tablet by mouth daily, Disp: 90 tablet, Rfl: 3    ACCU-CHEK FASTCLIX LANCETS MISC, , Disp: , Rfl:     Blood Glucose Monitoring Suppl (ACCU-CHEK ESVIN SMARTVIEW) w/Device KIT, use as directed, Disp: , Rfl:     cephalexin (KEFLEX) 500 mg capsule, Take 1 capsule by mouth 2 (two) times a day, Disp: 10 capsule, Rfl: 0    cetirizine (ZyrTEC) 10 mg tablet, Take 1 tablet by mouth daily, Disp: 20 tablet, Rfl: 0    cyclobenzaprine (FLEXERIL) 10 mg tablet, Take 10 mg by mouth as needed for muscle spasms  , Disp: , Rfl:     fluticasone (FLONASE) 50 mcg/act nasal spray, 1 spray into each nostril daily, Disp: 16 g, Rfl: 0    glucose blood (ACCU-CHEK SMARTVIEW) test strip, , Disp: , Rfl:     hydrochlorothiazide (MICROZIDE) 12 5 mg capsule, Take 1 capsule by mouth daily, Disp: , Rfl:     HYDROcodone-acetaminophen (NORCO) 5-325 mg per tablet, Take 1 tablet by mouth every 6 (six) hours as needed for pain Max Daily Amount: 4 tablets, Disp: 10 tablet, Rfl: 0    hydrOXYzine HCL (ATARAX) 25 mg tablet, Take 1 tablet (25 mg total) by mouth every 12 (twelve) hours as needed for itching, Disp: 30 tablet, Rfl: 2    meclizine (ANTIVERT) 12 5 MG tablet, Take 1 tablet (12 5 mg total) by mouth every 8 (eight) hours as needed for dizziness, Disp: 30 tablet, Rfl: 0    naproxen (NAPROSYN) 250 mg tablet, Take 250 mg by mouth as needed for mild pain , Disp: , Rfl:     ondansetron (ZOFRAN) 4 mg tablet, Take 1 tablet by mouth every 8 (eight) hours as needed, Disp: , Rfl:     oseltamivir (TAMIFLU) 75 mg capsule, Take 1 capsule by mouth 2 (two) times a day, Disp: , Rfl:     venlafaxine (EFFEXOR-XR) 37 5 mg 24 hr capsule, Take 1 capsule by mouth Daily, Disp: , Rfl:     white petrolatum-mineral oil (EUCERIN) cream, Apply topically, Disp: , Rfl:     Past Medical History:   Diagnosis Date    Arthritis     Depression     Disease of thyroid gland     hypo    Edema     LAST ASSESSED: [id number]    GERD (gastroesophageal reflux disease)     GERD without esophagitis     Hypercholesterolemia     Hyperlipidemia     Hypertension     WELL CONTROLLED  CURRENTLY ON LISINOPRIL   LAST ASSESSED: [id number]    Impaired fasting glucose     LAST ASSESSED: [id number]    Influenza     LAST ASSESSED: [id number]    Other headache syndrome     Other muscle spasm     LAST ASSESSED: [id number]    Ovarian cyst 2006    Renal calculi     Sleep apnea     LAST ASSESSED: [id number]    Spontaneous      1977 ([address])     (spontaneous vaginal delivery)     FEMALE    Thyroid disease      Past Surgical History:   Procedure Laterality Date    BACK SURGERY      HERNIATED [address] (L4/L5)    CHOLECYSTECTOMY      TONSILLECTOMY       Social History     Social History    Marital status: Single     Spouse name: N/A    Number of children: N/A    Years of education: N/A     Occupational History    RETIRED      Social History Main Topics    Smoking status: Former Smoker    Smokeless tobacco: Never Used      Comment: pt "quit about 15 years ago"    Alcohol use No    Drug use: No    Sexual activity: No     Other Topics Concern    Not on file     Social History Narrative    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         AS PER [id number]    EXERCISES REGULARLY    LIVES WITH FAMILY    NO CAFFEINE USE    NO LIVING WILL    NO TOBACCO/SMOKE EXPOSURE    UNEMPLOYED     Family History   Problem Relation Age of Onset    Lung cancer Mother     Cancer Mother         MALIGNANT NEOPLASM    Hypertension Father     Diabetes Brother     Hypertension Son     Lung disease Son     Hyperthyroidism Maternal Aunt     Hypothyroidism Maternal Aunt     Heart disease Other         CARDIAC DISORDER    Neuropathy Family        JESSICA Atwood 73 Internal Medicine PGY-2  [facility]  [facility]  [address] [facility],[address]  [place], [address] [place] [address]

## 2018-08-03 NOTE — ASSESSMENT & PLAN NOTE
Dizziness is chronic in nature  Been ongoing for the past several years  Sensation of herself spinning  Denies any neurological symptoms such as visual changes  She does get intermittent headaches with each episode of dizziness  · Prescribe meclizine 12 5 mg to take as needed for dizziness  · Referral for vestibular rehab evaluation    · Follow-up in 3-6 months

## 2018-08-03 NOTE — ASSESSMENT & PLAN NOTE
Lab Results   Component Value Date    HGBA1C 6 3 02/28/2018       Diet controlled  Continue dietary modifications  Hb1AC next visit

## 2018-08-03 NOTE — ASSESSMENT & PLAN NOTE
CT of the chest "Right lung nodules as above are unchanged since 11/19/2015  Appears benign given no further change  No follow-up is necessary  Patient is aware  All questions answered appropriately

## 2018-08-21 ENCOUNTER — OFFICE VISIT (OUTPATIENT)
Dept: OBGYN CLINIC | Facility: HOSPITAL | Age: 63
End: 2018-08-21
Payer: MEDICARE

## 2018-08-21 VITALS
SYSTOLIC BLOOD PRESSURE: 125 MMHG | HEIGHT: 64 IN | DIASTOLIC BLOOD PRESSURE: 85 MMHG | BODY MASS INDEX: 45.75 KG/M2 | WEIGHT: 268 LBS | HEART RATE: 82 BPM

## 2018-08-21 DIAGNOSIS — Z01.419 ENCOUNTER FOR ANNUAL ROUTINE GYNECOLOGICAL EXAMINATION: Primary | ICD-10-CM

## 2018-08-21 PROCEDURE — G0101 CA SCREEN;PELVIC/BREAST EXAM: HCPCS | Performed by: OBSTETRICS & GYNECOLOGY

## 2018-08-21 NOTE — PROGRESS NOTES
Subjective     Meghana Garibay is a 59 y o  female who presents for annual well woman exam  She is a postmenopausal female, LMP   Denies vaginal bleeding, menopausal symptoms, postcoital bleeding  GYN:  · Denies vaginal discharge, labial erythema or lesions, dyspareunia  · Menarche at 7  · Menses were regular, q 28-30 days, lasting 4-6 days  · Contraception: menopausal   · Patient is not sexually active  · Last pap smear was in   Results were NILM, HPV negative  · EMB 6/15/18 for endometrial lining of 5mm found incidentally on ultrasound was WNL, negative for atypia  There was no endometrial stromal tissue identified  · No gynecologic surgeries  OB:  ·  female  · Pregnancy was uncomplicated   :  · Denies dysuria, urinary frequency or urgency  · Denies hematuria, flank pain, incontinence  Breast:  · Denies breast mass, skin changes, dimpling, reddening, nipple retraction  · Denies breast discharge  · Last mammogram was in 2018  Results were Swati Anglees  Routine screening in 1 year  · Patient has family history of breast cancer in maternal relative > 54yo and paternal relative <51yo, denies family history of endometrial or ovarian ca  General:  · Diet: Healthy  · Exercise: Walks 1 hour 5x/week  · Work: Retired  · ETOH use: none  · Tobacco use: Former smoker, stopped in   · Recreational drug use: none    Screening:  · Cervical cancer: last pap smear in   Results were NILM, neg HPV  · Breast cancer: last mammogram in 2018  Results were [facility]-2   · Colon cancer: last colonoscopy some time between [id number]  Patient will go home and locate records  Discussed importance of obtaining colon cancer screening if > 10 years since last colonoscopy  She has appointment with PCP   · STD screening: declines  Review of Systems  Pertinent items are noted in HPI        Objective      /85 (BP Location: Right arm, Patient Position: Sitting)   Pulse 82   Ht 5' 4" (1 626 m)   Wt 122 kg (268 lb)   LMP  (LMP Unknown)   BMI 46 00 kg/m²     General:   alert, appears stated age and cooperative   Heart: regular rate and rhythm, S1, S2 normal, no murmur, click, rub or gallop   Lungs: clear to auscultation bilaterally   Abdomen: soft, non-tender, without masses or organomegaly   Vulva: normal   Vagina: normal mucosa   Cervix: multiparous appearance   Uterus: normal size, non-tender, normal shape and consistency   Adnexa: normal adnexa             Assessment     62yo postmenopausal  female here for annual visit     Plan     1) Annual exam  Breast and pelvic exam completed  EMB 2018 WNL (incidental finding of 5mm stripe on US)  Pap 2016 NILM, HPV negative  Up to date  Mammogram 2018 BiRad 2  Repeat in 1 year  Colonoscopy up to date per pt  Discussed importance of obtaining documentation of colonoscopy results and receiving screening if colonoscopy > 10 years ago       D/w Dr Renée Ibarra

## 2018-09-11 LAB
LEFT EYE DIABETIC RETINOPATHY: NORMAL
RIGHT EYE DIABETIC RETINOPATHY: NORMAL

## 2018-09-29 ENCOUNTER — TRANSCRIBE ORDERS (OUTPATIENT)
Dept: LAB | Facility: HOSPITAL | Age: 63
End: 2018-09-29

## 2018-09-29 ENCOUNTER — APPOINTMENT (OUTPATIENT)
Dept: LAB | Facility: HOSPITAL | Age: 63
End: 2018-09-29
Attending: OTOLARYNGOLOGY
Payer: MEDICARE

## 2018-09-29 DIAGNOSIS — R42 VERTIGO: ICD-10-CM

## 2018-09-29 DIAGNOSIS — R42 VERTIGO: Primary | ICD-10-CM

## 2018-09-29 LAB
ALBUMIN SERPL BCP-MCNC: 3.4 G/DL (ref 3.5–5)
ALP SERPL-CCNC: 66 U/L (ref 46–116)
ALT SERPL W P-5'-P-CCNC: 27 U/L (ref 12–78)
ANION GAP SERPL CALCULATED.3IONS-SCNC: 6 MMOL/L (ref 4–13)
AST SERPL W P-5'-P-CCNC: 21 U/L (ref 5–45)
BILIRUB SERPL-MCNC: 0.48 MG/DL (ref 0.2–1)
BUN SERPL-MCNC: 13 MG/DL (ref 5–25)
CALCIUM SERPL-MCNC: 8.6 MG/DL (ref 8.3–10.1)
CHLORIDE SERPL-SCNC: 106 MMOL/L (ref 100–108)
CHOLEST SERPL-MCNC: 188 MG/DL (ref 50–200)
CO2 SERPL-SCNC: 28 MMOL/L (ref 21–32)
CREAT SERPL-MCNC: 0.65 MG/DL (ref 0.6–1.3)
GFR SERPL CREATININE-BSD FRML MDRD: 95 ML/MIN/1.73SQ M
GLUCOSE P FAST SERPL-MCNC: 107 MG/DL (ref 65–99)
HDLC SERPL-MCNC: 49 MG/DL (ref 40–60)
LDLC SERPL CALC-MCNC: 110 MG/DL (ref 0–100)
NONHDLC SERPL-MCNC: 139 MG/DL
POTASSIUM SERPL-SCNC: 4 MMOL/L (ref 3.5–5.3)
PROT SERPL-MCNC: 7.6 G/DL (ref 6.4–8.2)
SODIUM SERPL-SCNC: 140 MMOL/L (ref 136–145)
T4 SERPL-MCNC: 9.9 UG/DL (ref 4.7–13.3)
TRIGL SERPL-MCNC: 143 MG/DL
TSH SERPL DL<=0.05 MIU/L-ACNC: 1.28 UIU/ML (ref 0.36–3.74)

## 2018-09-29 PROCEDURE — 84443 ASSAY THYROID STIM HORMONE: CPT

## 2018-09-29 PROCEDURE — 80053 COMPREHEN METABOLIC PANEL: CPT

## 2018-09-29 PROCEDURE — 84436 ASSAY OF TOTAL THYROXINE: CPT

## 2018-09-29 PROCEDURE — 36415 COLL VENOUS BLD VENIPUNCTURE: CPT

## 2018-09-29 PROCEDURE — 80061 LIPID PANEL: CPT

## 2018-10-07 ENCOUNTER — APPOINTMENT (OUTPATIENT)
Dept: LAB | Facility: HOSPITAL | Age: 63
End: 2018-10-07
Payer: MEDICARE

## 2018-10-07 ENCOUNTER — TRANSCRIBE ORDERS (OUTPATIENT)
Dept: LAB | Facility: HOSPITAL | Age: 63
End: 2018-10-07

## 2018-10-07 DIAGNOSIS — E66.01 MORBID OBESITY (HCC): ICD-10-CM

## 2018-10-07 DIAGNOSIS — I10 ESSENTIAL HYPERTENSION, MALIGNANT: ICD-10-CM

## 2018-10-07 DIAGNOSIS — E78.2 MIXED HYPERLIPIDEMIA: ICD-10-CM

## 2018-10-07 DIAGNOSIS — E13.8 DIABETES MELLITUS OF OTHER TYPE WITH COMPLICATION, UNSPECIFIED WHETHER LONG TERM INSULIN USE: Primary | ICD-10-CM

## 2018-10-07 DIAGNOSIS — I51.9 MYXEDEMA HEART DISEASE: ICD-10-CM

## 2018-10-07 DIAGNOSIS — E04.1 NONTOXIC UNINODULAR GOITER: ICD-10-CM

## 2018-10-07 DIAGNOSIS — E03.9 MYXEDEMA HEART DISEASE: ICD-10-CM

## 2018-10-07 DIAGNOSIS — E13.8 DIABETES MELLITUS OF OTHER TYPE WITH COMPLICATION, UNSPECIFIED WHETHER LONG TERM INSULIN USE: ICD-10-CM

## 2018-10-07 PROCEDURE — 36415 COLL VENOUS BLD VENIPUNCTURE: CPT

## 2018-11-11 PROBLEM — Z00.00 HEALTHCARE MAINTENANCE: Status: ACTIVE | Noted: 2018-11-11

## 2018-11-12 ENCOUNTER — OFFICE VISIT (OUTPATIENT)
Dept: INTERNAL MEDICINE CLINIC | Facility: CLINIC | Age: 63
End: 2018-11-12
Payer: MEDICARE

## 2018-11-12 VITALS
TEMPERATURE: 97.7 F | HEIGHT: 64 IN | DIASTOLIC BLOOD PRESSURE: 90 MMHG | BODY MASS INDEX: 47.2 KG/M2 | SYSTOLIC BLOOD PRESSURE: 140 MMHG | HEART RATE: 72 BPM | WEIGHT: 276.46 LBS

## 2018-11-12 DIAGNOSIS — Z23 NEED FOR PNEUMOCOCCAL VACCINATION: ICD-10-CM

## 2018-11-12 DIAGNOSIS — R10.11 RUQ PAIN: ICD-10-CM

## 2018-11-12 DIAGNOSIS — E11.9 TYPE 2 DIABETES MELLITUS WITHOUT COMPLICATION, WITHOUT LONG-TERM CURRENT USE OF INSULIN (HCC): ICD-10-CM

## 2018-11-12 DIAGNOSIS — Z00.00 HEALTHCARE MAINTENANCE: ICD-10-CM

## 2018-11-12 DIAGNOSIS — Z23 NEED FOR INFLUENZA VACCINATION: ICD-10-CM

## 2018-11-12 DIAGNOSIS — J32.9 SINUSITIS: Primary | ICD-10-CM

## 2018-11-12 LAB — SL AMB POCT HEMOGLOBIN AIC: 6.3

## 2018-11-12 PROCEDURE — 90682 RIV4 VACC RECOMBINANT DNA IM: CPT | Performed by: INTERNAL MEDICINE

## 2018-11-12 PROCEDURE — 90732 PPSV23 VACC 2 YRS+ SUBQ/IM: CPT | Performed by: INTERNAL MEDICINE

## 2018-11-12 PROCEDURE — 99213 OFFICE O/P EST LOW 20 MIN: CPT | Performed by: INTERNAL MEDICINE

## 2018-11-12 PROCEDURE — 83036 HEMOGLOBIN GLYCOSYLATED A1C: CPT | Performed by: INTERNAL MEDICINE

## 2018-11-12 PROCEDURE — G0008 ADMIN INFLUENZA VIRUS VAC: HCPCS | Performed by: INTERNAL MEDICINE

## 2018-11-12 PROCEDURE — G0009 ADMIN PNEUMOCOCCAL VACCINE: HCPCS | Performed by: INTERNAL MEDICINE

## 2018-11-12 RX ORDER — AMOXICILLIN AND CLAVULANATE POTASSIUM 875; 125 MG/1; MG/1
1 TABLET, FILM COATED ORAL EVERY 12 HOURS SCHEDULED
Qty: 14 TABLET | Refills: 0 | Status: SHIPPED | OUTPATIENT
Start: 2018-11-12 | End: 2018-11-19

## 2018-11-12 RX ORDER — FLUTICASONE PROPIONATE 50 MCG
1 SPRAY, SUSPENSION (ML) NASAL DAILY
Qty: 16 G | Refills: 0 | Status: SHIPPED | OUTPATIENT
Start: 2018-11-12 | End: 2018-11-12 | Stop reason: SDUPTHER

## 2018-11-12 RX ORDER — LORATADINE 10 MG/1
10 TABLET ORAL DAILY
Qty: 30 TABLET | Refills: 0 | Status: SHIPPED | OUTPATIENT
Start: 2018-11-12 | End: 2020-04-24 | Stop reason: SDUPTHER

## 2018-11-12 RX ORDER — FLUTICASONE PROPIONATE 50 MCG
1 SPRAY, SUSPENSION (ML) NASAL DAILY
Qty: 16 G | Refills: 0 | Status: SHIPPED | OUTPATIENT
Start: 2018-11-12 | End: 2020-04-24 | Stop reason: SDUPTHER

## 2018-11-12 NOTE — ASSESSMENT & PLAN NOTE
Patient having symptoms approximately 2 and half weeks however is not improving with nasal decongestants, allergy medications, tylenol  · Will give augmentin for 7 days     · Continue flonase, claritin

## 2018-11-12 NOTE — ASSESSMENT & PLAN NOTE
Right upper quadrant pain- beach associated with meal usually after meals  Patient states that the she has diarrhea after she gets abdominal pain  She stated severe enough to interfere with her food intake  Patient denies any fevers, chills

## 2018-11-12 NOTE — ASSESSMENT & PLAN NOTE
Cervical Cancer Screening  · Last pap smear in 2016  Results were NILM, neg HPV  Breast Cancer Screening  · Last mammogram in 6/2018  Results were [facility]Rad-2  Colon cancer:   · No records of last colonoscopy  Discuss next visit     Immunizations  · Obtain Flu Vaccine this visit and PPSV23

## 2018-11-12 NOTE — ASSESSMENT & PLAN NOTE
Lab Results   Component Value Date    HGBA1C 6 3 11/12/2018     Hb1AC- Last POCT 6 3% 2/2018  Currently 6 3%  Continue current management since glucose is controlled  Diabetic Foot Exam- Last Podiatry visit- 5/2018  Diabetic Margaret Dailey 6/2018  Microalbumin/creatinine ratio- Due next visit ACE-inhibitor Lisinopril-TC  Diet controlled diabetes

## 2018-11-12 NOTE — LETTER
November 12, 2018     Patient: Ramiro Conway   YOB: 1955   Date of Visit: 11/12/2018       To Whom it May Concern:    Ramiro Conway is under my professional care  She was seen in my office on 11/12/2018  Patient needs [employer] bus for medical necessities such as appointment for medications and other health Issues  It is of uptmost importance patient goes to her appointments for health reason  Thank you for your cooperation in this matter  If you have any questions or concerns, please don't hesitate to call           Sincerely,          Boni Valencia MD  [phone]      CC: No Recipients

## 2018-11-12 NOTE — PROGRESS NOTES
ASSESSMENT/PLAN:  Problem List Items Addressed This Visit        Endocrine    Type 2 diabetes mellitus ([facility] [address] )     Lab Results   Component Value Date    HGBA1C 6 3 11/12/2018     Hb1AC- Last POCT 6 3% 2/2018  Currently 6 3%  Continue current management since glucose is controlled  Diabetic Foot Exam- Last Podiatry visit- 5/2018  Diabetic aBla York 6/2018  Microalbumin/creatinine ratio- Due next visit ACE-inhibitor Lisinopril-[facility]  Diet controlled diabetes  Relevant Orders    POCT hemoglobin A1c (Completed)       Respiratory    Sinusitis - Primary     Patient having symptoms approximately 2 and half weeks however is not improving with nasal decongestants, allergy medications, tylenol  · Will give augmentin for 7 days  · Continue flonase, claritin          Relevant Medications    amoxicillin-clavulanate (AUGMENTIN) 875-125 mg per tablet    loratadine (CLARITIN) 10 mg tablet    fluticasone (FLONASE) 50 mcg/act nasal spray       Other    Healthcare maintenance     Cervical Cancer Screening  · Last pap smear in 2016  Results were NILM, neg HPV  Breast Cancer Screening  · Last mammogram in 6/2018  Results were [facility]-2  Colon cancer:   · No records of last colonoscopy  Discuss next visit  Immunizations  · Obtain Flu Vaccine this visit and PPSV23             RUQ pain     Right upper quadrant pain- beach associated with meal usually after meals  Patient states that the she has diarrhea after she gets abdominal pain  She stated severe enough to interfere with her food intake  Patient denies any fevers, chills            Relevant Orders    US right upper quadrant      Other Visit Diagnoses     Need for influenza vaccination        Relevant Orders    influenza vaccine, [id number], quadrivalent, recombinant, PF, 0 5 mL, for patients 18 yr+ (FLUBLOK) (Completed)    Need for pneumococcal vaccination        Relevant Orders    Pneumococcal polysaccharide vaccine 23-valent greater than or equal to 1yo subcutaneous/IM (Completed)            CHIEF COMPLAINT: F/U     HISTORY OF PRESENT ILLNESS:  Abdominal Pain   This is a recurrent problem  The current episode started more than 1 month ago  The onset quality is gradual  The problem occurs every several days  The pain is located in the RUQ  The pain is at a severity of 7/10  The pain is mild  The quality of the pain is cramping and colicky  The abdominal pain does not radiate  Associated symptoms include anorexia, diarrhea and vomiting  Pertinent negatives include no constipation, fever, nausea or weight loss  The pain is aggravated by eating  The pain is relieved by nothing  She has tried nothing for the symptoms  The treatment provided no relief  Her past medical history is significant for abdominal surgery (cholestectomy) and gallstones  Sinusitis   This is a recurrent problem  The current episode started 1 to 4 weeks ago  The problem has been gradually worsening since onset  The fever has been present for 1 to 2 days  Associated symptoms include congestion, sinus pressure, sneezing and swollen glands  Pertinent negatives include no chills, coughing, ear pain, shortness of breath or sore throat  Past treatments include oral decongestants, saline sprays, spray decongestants and acetaminophen  The treatment provided mild relief  Patient was also given Lanta Letter for transportation  Review of Systems   Constitutional: Negative for activity change, chills, fever and weight loss  HENT: Positive for congestion, sinus pain, sinus pressure and sneezing  Negative for ear discharge, ear pain, hearing loss, sore throat and trouble swallowing  Eyes: Negative for visual disturbance  Respiratory: Negative for cough, chest tightness and shortness of breath  Cardiovascular: Negative for chest pain and leg swelling  Gastrointestinal: Positive for abdominal pain, anorexia, diarrhea and vomiting   Negative for abdominal distention, constipation and nausea  Endocrine: Negative for cold intolerance and heat intolerance  Genitourinary: Negative for difficulty urinating  Musculoskeletal: Negative for gait problem  Skin: Negative for rash  Allergic/Immunologic: Negative  Neurological: Negative  Negative for dizziness, weakness and light-headedness  Hematological: Negative  Psychiatric/Behavioral: Negative  All other systems reviewed and are negative  OBJECTIVE:  Vitals:    11/12/18 1035   BP: 140/90   BP Location: Left arm   Patient Position: Sitting   Cuff Size: Adult   Pulse: 72   Temp: 97 7 °F (36 5 °C)   TempSrc: Oral   Weight: 125 kg (276 lb 7 3 oz)   Height: 5' 4" (1 626 m)     Physical Exam   Constitutional: She appears well-developed and well-nourished  HENT:   Right Ear: External ear normal    Left Ear: External ear normal    Mouth/Throat: Oropharynx is clear and moist  No oropharyngeal exudate  Bilateral erythematous turbinates, cervical left adenopathy, frontal and maxillary sinus tenderness   Eyes: Right eye exhibits no discharge  Left eye exhibits no discharge  Neck: Neck supple  Cardiovascular: Normal rate, regular rhythm and normal heart sounds  Pulmonary/Chest: Effort normal and breath sounds normal  No respiratory distress  Abdominal: Soft  Bowel sounds are normal  She exhibits no distension  There is no tenderness  Musculoskeletal: She exhibits no edema  Lymphadenopathy:     She has cervical adenopathy  Current Outpatient Prescriptions:     aspirin 81 MG tablet, Take 1 tablet (81 mg total) by mouth daily, Disp: 90 tablet, Rfl: 3    Cholecalciferol (VITAMIN D HIGH POTENCY PO), Take by mouth  Takes 1x/week, Disp: , Rfl:     fluticasone (FLONASE) 50 mcg/act nasal spray, 1 spray into each nostril daily, Disp: 16 g, Rfl: 0    levothyroxine 100 mcg tablet, Take 100 mcg by mouth 2 (two) times a week, Disp: , Rfl:     levothyroxine 75 mcg tablet, Take 88 mcg by mouth daily  , Disp: , Rfl:    lisinopril-hydrochlorothiazide (PRINZIDE,ZESTORETIC) 10-12 5 MG per tablet, Take 1 tablet by mouth daily, Disp: 90 tablet, Rfl: 3    ACCU-CHEK FASTCLIX LANCETS MISC, , Disp: , Rfl:     amoxicillin-clavulanate (AUGMENTIN) 875-125 mg per tablet, Take 1 tablet by mouth every 12 (twelve) hours for 7 days, Disp: 14 tablet, Rfl: 0    Blood Glucose Monitoring Suppl (ACCU-CHEK ESVIN SMARTVIEW) w/Device KIT, use as directed, Disp: , Rfl:     cephalexin (KEFLEX) 500 mg capsule, Take 1 capsule by mouth 2 (two) times a day (Patient not taking: Reported on 11/12/2018 ), Disp: 10 capsule, Rfl: 0    cetirizine (ZyrTEC) 10 mg tablet, Take 1 tablet by mouth daily (Patient not taking: Reported on 11/12/2018 ), Disp: 20 tablet, Rfl: 0    Cholecalciferol (CVS VITAMIN D) 2000 units CAPS, Take 1 capsule (4,000 Units total) by mouth daily for 180 days, Disp: 30 capsule, Rfl: 5    cyclobenzaprine (FLEXERIL) 10 mg tablet, Take 10 mg by mouth as needed for muscle spasms  , Disp: , Rfl:     glucose blood (ACCU-CHEK SMARTVIEW) test strip, , Disp: , Rfl:     hydrochlorothiazide (MICROZIDE) 12 5 mg capsule, Take 1 capsule by mouth daily, Disp: , Rfl:     HYDROcodone-acetaminophen (NORCO) 5-325 mg per tablet, Take 1 tablet by mouth every 6 (six) hours as needed for pain Max Daily Amount: 4 tablets, Disp: 10 tablet, Rfl: 0    hydrOXYzine HCL (ATARAX) 25 mg tablet, Take 1 tablet (25 mg total) by mouth every 12 (twelve) hours as needed for itching, Disp: 30 tablet, Rfl: 2    loratadine (CLARITIN) 10 mg tablet, Take 1 tablet (10 mg total) by mouth daily, Disp: 30 tablet, Rfl: 0    meclizine (ANTIVERT) 12 5 MG tablet, Take 1 tablet (12 5 mg total) by mouth every 8 (eight) hours as needed for dizziness, Disp: 30 tablet, Rfl: 0    naproxen (NAPROSYN) 250 mg tablet, Take 250 mg by mouth as needed for mild pain , Disp: , Rfl:     ondansetron (ZOFRAN) 4 mg tablet, Take 1 tablet by mouth every 8 (eight) hours as needed, Disp: , Rfl:    oseltamivir (TAMIFLU) 75 mg capsule, Take 1 capsule by mouth 2 (two) times a day, Disp: , Rfl:     venlafaxine (EFFEXOR-XR) 37 5 mg 24 hr capsule, Take 1 capsule by mouth Daily, Disp: , Rfl:     white petrolatum-mineral oil (EUCERIN) cream, Apply topically, Disp: , Rfl:     Past Medical History:   Diagnosis Date    Arthritis     Depression     Disease of thyroid gland     hypo    Edema     LAST ASSESSED: [id number]    GERD (gastroesophageal reflux disease)     GERD without esophagitis     Hypercholesterolemia     Hyperlipidemia     Hypertension     WELL CONTROLLED  CURRENTLY ON LISINOPRIL   LAST ASSESSED: [id number]    Impaired fasting glucose     LAST ASSESSED: [id number]    Influenza     LAST ASSESSED: [id number]    Other headache syndrome     Other muscle spasm     LAST ASSESSED: [id number]    Ovarian cyst 2006    Renal calculi     Sleep apnea     LAST ASSESSED: [id number]    Spontaneous      ,  (2ND TRIMESTER)     (spontaneous vaginal delivery)     FEMALE    Thyroid disease      Past Surgical History:   Procedure Laterality Date    BACK SURGERY      HERNIATED DISC (L4/L5)    CHOLECYSTECTOMY      TONSILLECTOMY       Social History     Social History    Marital status: Single     Spouse name: N/A    Number of children: N/A    Years of education: N/A     Occupational History    RETIRED      Social History Main Topics    Smoking status: Former Smoker    Smokeless tobacco: Never Used      Comment: pt "quit about 15 years ago"    Alcohol use No    Drug use: No    Sexual activity: No     Other Topics Concern    Not on file     Social History Narrative    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         AS PER [employer]    EXERCISES REGULARLY    LIVES WITH FAMILY    NO CAFFEINE USE    NO LIVING WILL    NO TOBACCO/SMOKE EXPOSURE    UNEMPLOYED     Family History   Problem Relation Age of Onset    Lung cancer Mother  Cancer Mother         MALIGNANT NEOPLASM    Hypertension Father     Diabetes Brother     Hypertension Son     Lung disease Son     Hyperthyroidism Maternal Aunt     Hypothyroidism Maternal Aunt     Heart disease Other         CARDIAC DISORDER    Neuropathy Family        JESSICA Davis 73 Internal Medicine PGY-2  [facility]  [address] [facility]  [address] [place] [address],[facility] [address]  [address]

## 2018-12-26 ENCOUNTER — TELEPHONE (OUTPATIENT)
Dept: INTERNAL MEDICINE CLINIC | Facility: CLINIC | Age: 63
End: 2018-12-26

## 2019-01-24 ENCOUNTER — TELEPHONE (OUTPATIENT)
Dept: INTERNAL MEDICINE CLINIC | Facility: CLINIC | Age: 64
End: 2019-01-24

## 2019-01-24 NOTE — TELEPHONE ENCOUNTER
Please review (INDEPENDENT ENROLLMENT) form placed in the (GREEN) team folder in the provider flow station  ([phone]) when form is complete  If the form requires a signature by a MD or DO, please review it with them and sign the form  Please place the form in the (GREEN) team folder in the Clinical flow station at the [address] [facility] and reply to this task to the Clinical Pool

## 2019-01-28 ENCOUNTER — APPOINTMENT (EMERGENCY)
Dept: RADIOLOGY | Facility: HOSPITAL | Age: 64
End: 2019-01-28
Payer: MEDICARE

## 2019-01-28 ENCOUNTER — HOSPITAL ENCOUNTER (EMERGENCY)
Facility: HOSPITAL | Age: 64
Discharge: HOME | End: 2019-01-28
Attending: EMERGENCY MEDICINE
Payer: MEDICARE

## 2019-01-28 VITALS
OXYGEN SATURATION: 96 % | RESPIRATION RATE: 20 BRPM | DIASTOLIC BLOOD PRESSURE: 84 MMHG | BODY MASS INDEX: 47.3 KG/M2 | TEMPERATURE: 98.2 F | HEART RATE: 70 BPM | SYSTOLIC BLOOD PRESSURE: 136 MMHG | WEIGHT: 275.57 LBS

## 2019-01-28 DIAGNOSIS — M54.9 BACK PAIN: ICD-10-CM

## 2019-01-28 DIAGNOSIS — R05.9 COUGH: Primary | ICD-10-CM

## 2019-01-28 LAB
ALBUMIN SERPL BCP-MCNC: 3.4 G/DL (ref 3.5–5)
ALP SERPL-CCNC: 73 U/L (ref 46–116)
ALT SERPL W P-5'-P-CCNC: 30 U/L (ref 12–78)
ANION GAP SERPL CALCULATED.3IONS-SCNC: 6 MMOL/L (ref 4–13)
AST SERPL W P-5'-P-CCNC: 24 U/L (ref 5–45)
BASOPHILS # BLD AUTO: 0.03 THOUSANDS/ΜL (ref 0–0.1)
BASOPHILS NFR BLD AUTO: 0 % (ref 0–1)
BILIRUB SERPL-MCNC: 0.26 MG/DL (ref 0.2–1)
BUN SERPL-MCNC: 10 MG/DL (ref 5–25)
CALCIUM SERPL-MCNC: 8.8 MG/DL (ref 8.3–10.1)
CHLORIDE SERPL-SCNC: 104 MMOL/L (ref 100–108)
CO2 SERPL-SCNC: 28 MMOL/L (ref 21–32)
CREAT SERPL-MCNC: 0.65 MG/DL (ref 0.6–1.3)
EOSINOPHIL # BLD AUTO: 0.13 THOUSAND/ΜL (ref 0–0.61)
EOSINOPHIL NFR BLD AUTO: 2 % (ref 0–6)
ERYTHROCYTE [DISTWIDTH] IN BLOOD BY AUTOMATED COUNT: 13.4 % (ref 11.6–15.1)
GFR SERPL CREATININE-BSD FRML MDRD: 95 ML/MIN/1.73SQ M
GLUCOSE SERPL-MCNC: 85 MG/DL (ref 65–140)
HCT VFR BLD AUTO: 40.7 % (ref 34.8–46.1)
HGB BLD-MCNC: 12.5 G/DL (ref 11.5–15.4)
IMM GRANULOCYTES # BLD AUTO: 0.03 THOUSAND/UL (ref 0–0.2)
IMM GRANULOCYTES NFR BLD AUTO: 0 % (ref 0–2)
LYMPHOCYTES # BLD AUTO: 1.95 THOUSANDS/ΜL (ref 0.6–4.47)
LYMPHOCYTES NFR BLD AUTO: 29 % (ref 14–44)
MCH RBC QN AUTO: 27.1 PG (ref 26.8–34.3)
MCHC RBC AUTO-ENTMCNC: 30.7 G/DL (ref 31.4–37.4)
MCV RBC AUTO: 88 FL (ref 82–98)
MONOCYTES # BLD AUTO: 0.88 THOUSAND/ΜL (ref 0.17–1.22)
MONOCYTES NFR BLD AUTO: 13 % (ref 4–12)
NEUTROPHILS # BLD AUTO: 3.66 THOUSANDS/ΜL (ref 1.85–7.62)
NEUTS SEG NFR BLD AUTO: 56 % (ref 43–75)
NRBC BLD AUTO-RTO: 0 /100 WBCS
PLATELET # BLD AUTO: 243 THOUSANDS/UL (ref 149–390)
PMV BLD AUTO: 10.6 FL (ref 8.9–12.7)
POTASSIUM SERPL-SCNC: 3.5 MMOL/L (ref 3.5–5.3)
PROT SERPL-MCNC: 7.5 G/DL (ref 6.4–8.2)
RBC # BLD AUTO: 4.61 MILLION/UL (ref 3.81–5.12)
SODIUM SERPL-SCNC: 138 MMOL/L (ref 136–145)
TROPONIN I SERPL-MCNC: <0.02 NG/ML
WBC # BLD AUTO: 6.68 THOUSAND/UL (ref 4.31–10.16)

## 2019-01-28 PROCEDURE — 84484 ASSAY OF TROPONIN QUANT: CPT | Performed by: EMERGENCY MEDICINE

## 2019-01-28 PROCEDURE — 93005 ELECTROCARDIOGRAM TRACING: CPT

## 2019-01-28 PROCEDURE — 71046 X-RAY EXAM CHEST 2 VIEWS: CPT

## 2019-01-28 PROCEDURE — 85025 COMPLETE CBC W/AUTO DIFF WBC: CPT | Performed by: EMERGENCY MEDICINE

## 2019-01-28 PROCEDURE — 99284 EMERGENCY DEPT VISIT MOD MDM: CPT

## 2019-01-28 PROCEDURE — 80053 COMPREHEN METABOLIC PANEL: CPT | Performed by: EMERGENCY MEDICINE

## 2019-01-28 PROCEDURE — 36415 COLL VENOUS BLD VENIPUNCTURE: CPT | Performed by: EMERGENCY MEDICINE

## 2019-01-28 RX ORDER — ACETAMINOPHEN 325 MG/1
650 TABLET ORAL ONCE
Status: COMPLETED | OUTPATIENT
Start: 2019-01-28 | End: 2019-01-28

## 2019-01-28 RX ADMIN — ACETAMINOPHEN 650 MG: 325 TABLET, FILM COATED ORAL at 21:43

## 2019-01-29 LAB
ATRIAL RATE: 71 BPM
P AXIS: 71 DEGREES
PR INTERVAL: 156 MS
QRS AXIS: 0 DEGREES
QRSD INTERVAL: 82 MS
QT INTERVAL: 374 MS
QTC INTERVAL: 406 MS
T WAVE AXIS: 13 DEGREES
VENTRICULAR RATE: 71 BPM

## 2019-01-29 PROCEDURE — 93010 ELECTROCARDIOGRAM REPORT: CPT | Performed by: INTERNAL MEDICINE

## 2019-01-29 NOTE — DISCHARGE INSTRUCTIONS
Workup was negative  Please follow-up with the primary care doctor in the next 3 days  Any changes worsens, please return emergency department  Tos aguda   LO QUE NECESITA SABER:   Sade Darling tos aguda puede durar hasta 3 semanas  Las causas comunes de aziza tos aguda incluyen un resfriado, alergias o aziza infección pulmonar  INSTRUCCIONES SOBRE EL JALIL HOSPITALARIA:   Regrese a la pilar de emergencias si:   · Tiene dificultad para respirar o le falta el aire  · Usted tose irais o nota irais en kaplan mucosidad  · Se desmaya, se siente débil o mareado  · Le duele el pecho cuando respira hondo o tose  · Tiene respiración silbante  Pregúntele a kaplan Zuleima Mcgraw vitaminas y minerales son adecuados para usted  · Usted tiene fiebre  · La tos dura más de 4 semanas  · Anita síntomas no mejoran con el tratamiento  · Usted tiene preguntas o inquietudes acerca de kaplan condición o cuidado  Medicamentos:   · Medicamentos,  para detener la tos, disminuir la inflamación de las vías respiratorias o ayudar a abrirlas  Los medicamentos también pueden despejar las vías respiratorias  Pregunte a kaplan médico qué medicamentos de venta carmen puede david  Si tiene aziza infección causada por bacterias, es posible que necesite antibióticos  · [place] anita medicamentos edwin se le haya indicado  Consulte con kaplan médico si usted britta que kaplan medicamento no le está ayudando o si presenta efectos secundarios  Infórmele si es alérgico a cualquier medicamento  Mantenga aziza lista actualizada de los [employer], las vitaminas y los productos herbales que pieter  Incluya los siguientes datos de los medicamentos: cantidad, frecuencia y motivo de administración  Traiga con usted la lista o los envases de la píldoras a anita citas de seguimiento  Lleve la lista de los medicamentos con usted en vero de aziza emergencia  El manejo de kaplan síntomas:   · No fume y permanezca lejos de otras personas que fuman    La nicotina y otros químicos contenidos en los cigarrillos y puros pueden causar daño pulmonar y empeorar francois tos  Pida información a francois médico si usted actualmente fuma y necesita ayuda para dejar de fumar  Los cigarrillos electrónicos o tabaco sin humo todavía contienen nicotina  Consulte con francois médico antes de [employer]  · Remy líquidos adicionales edwin se le indique  Los líquidos le ayudarán a aflojar y disolver la mucosidad para que la pueda expectorar  Los líquidos ayudarán a evitar la deshidratación  Los mejores líquidos para david son el agua, el jugo y el caldo  No tome líquidos que contienen cafeína  La cafeína puede aumentar el riesgo de deshidratación  Pregúntele a francois médico cuál es la cantidad de líquido que necesita ingerir a diario  · Repose según le indicaron  No realice actividades que empeoren la tos, edwin el ejercicio físico      · Use un humidificador o vaporizador  Use un humidificador de jacque frío o un vaporizador para elevar la humedad en francois casa  [place] podría ayudarle a respirar más fácilmente y al mismo tiempo disminuir la tos  · Ingiera de 2 a 5 ml de [employer] al día  La miel puede ayudar a diluir los mocos y [employer] tos  · Utilice gotas o pastillas para la tos  Estas pueden ayudar a disminuir la irritación de la garganta y la tos  Acuda a compa consultas de control con francois médico según le indicaron  Anote compa preguntas para que se acuerde de hacerlas maria teresa compa visitas  © 2017 [address] Information is for End User's use only and may not be sold, redistributed or otherwise used for commercial purposes  All illustrations and images included in CareNotes® are the copyrighted property of A D A M , Inc  or Monty Sheldon  Esta información es sólo para uso en educación  Francois intención no es darle un consejo médico sobre enfermedades o tratamientos   Colsulte con francois Sidney Lesser farmacéutico antes de seguir cualquier régimen médico para saber si es seguro y efectivo para usted

## 2019-01-29 NOTE — ED PROVIDER NOTES
History  Chief Complaint   Patient presents with    Flu Symptoms     pt c/o sore throat, fevers, eye pressure and CP with coughing since thursday  HPI   81-year-old woman comes in for evaluation of cough and congestion  Patient states that symptoms started 4 days ago with fever and cough  The fever lasted 2 days but the cough progressively worsened  Patient states she is no longer having cough  She had some nausea without vomiting and some diarrhea, diarrhea Ng has gotten better but now patient has sinus congestion, throat pain, cough that is productive of yellow sputum  Patient states that she has pleuritic pain with coughing, and a little bit of chest pain that is still there when she is not coughing  Denies any history of MI or ACS but has numerous cardiac risk factors  Patient states she has had 4 episodes of the same symptoms since November  Prior to Admission Medications   Prescriptions Last Dose Informant Patient Reported? Taking? ACCU-CHEK FASTCLIX LANCETS MISC  Self Yes No   Blood Glucose Monitoring Suppl (ACCU-CHEK ESVIN SMARTVIEW) w/Device KIT  Self Yes No   Sig: use as directed   Cholecalciferol ([employer] VITAMIN D) 2000 units CAPS  Self No No   Sig: Take 1 capsule (4,000 Units total) by mouth daily for 180 days   Cholecalciferol (VITAMIN D HIGH POTENCY PO)  Self Yes No   Sig: Take by mouth   Takes 1x/week   HYDROcodone-acetaminophen (NORCO) 5-325 mg per tablet  Self No No   Sig: Take 1 tablet by mouth every 6 (six) hours as needed for pain Max Daily Amount: 4 tablets   aspirin 81 MG tablet  Self No No   Sig: Take 1 tablet (81 mg total) by mouth daily   cephalexin (KEFLEX) 500 mg capsule  Self No No   Sig: Take 1 capsule by mouth 2 (two) times a day   Patient not taking: Reported on 11/12/2018    cetirizine (ZyrTEC) 10 mg tablet  Self No No   Sig: Take 1 tablet by mouth daily   Patient not taking: Reported on 11/12/2018    cyclobenzaprine (FLEXERIL) 10 mg tablet  Self Yes No   Sig: Take 10 mg by mouth as needed for muscle spasms  fluticasone (FLONASE) 50 mcg/act nasal spray   No No   Si spray into each nostril daily   glucose blood (ACCU-CHEK SMARTVIEW) test strip  Self Yes No   hydrOXYzine HCL (ATARAX) 25 mg tablet  Self No No   Sig: Take 1 tablet (25 mg total) by mouth every 12 (twelve) hours as needed for itching   hydrochlorothiazide (MICROZIDE) 12 5 mg capsule  Self Yes No   Sig: Take 1 capsule by mouth daily   levothyroxine 100 mcg tablet  Self Yes No   Sig: Take 100 mcg by mouth 2 (two) times a week   levothyroxine 75 mcg tablet  Self Yes No   Sig: Take 88 mcg by mouth daily  lisinopril-hydrochlorothiazide (PRINZIDE,ZESTORETIC) 10-12 5 MG per tablet  Self No No   Sig: Take 1 tablet by mouth daily   loratadine (CLARITIN) 10 mg tablet   No No   Sig: Take 1 tablet (10 mg total) by mouth daily   meclizine (ANTIVERT) 12 5 MG tablet  Self No No   Sig: Take 1 tablet (12 5 mg total) by mouth every 8 (eight) hours as needed for dizziness   naproxen (NAPROSYN) 250 mg tablet  Self Yes No   Sig: Take 250 mg by mouth as needed for mild pain  ondansetron (ZOFRAN) 4 mg tablet  Self Yes No   Sig: Take 1 tablet by mouth every 8 (eight) hours as needed   oseltamivir (TAMIFLU) 75 mg capsule  Self Yes No   Sig: Take 1 capsule by mouth 2 (two) times a day   venlafaxine (EFFEXOR-XR) 37 5 mg 24 hr capsule  Self Yes No   Sig: Take 1 capsule by mouth Daily   white petrolatum-mineral oil (EUCERIN) cream  Self Yes No   Sig: Apply topically      Facility-Administered Medications: None       Past Medical History:   Diagnosis Date    Arthritis     Depression     Disease of thyroid gland     hypo    Edema     LAST ASSESSED: [id number]    GERD (gastroesophageal reflux disease)     GERD without esophagitis     Hypercholesterolemia     Hyperlipidemia     Hypertension     WELL CONTROLLED  CURRENTLY ON LISINOPRIL   LAST ASSESSED: [id number]    Impaired fasting glucose     LAST ASSESSED: [id number]    Influenza LAST ASSESSED: [id number]    Other headache syndrome     Other muscle spasm     LAST ASSESSED: [id number]    Ovarian cyst 2006    Renal calculi     Sleep apnea     LAST ASSESSED: [id number]    Spontaneous      ,  (2ND TRIMESTER)     (spontaneous vaginal delivery)     FEMALE    Thyroid disease        Past Surgical History:   Procedure Laterality Date    BACK SURGERY      HERNIATED DISC (L4/L5)    CHOLECYSTECTOMY      TONSILLECTOMY         Family History   Problem Relation Age of Onset    Lung cancer Mother     Cancer Mother         MALIGNANT NEOPLASM    Hypertension Father     Diabetes Brother     Hypertension Son     Lung disease Son     Hyperthyroidism Maternal Aunt     Hypothyroidism Maternal Aunt     Heart disease Other         CARDIAC DISORDER    Neuropathy Family      I have reviewed and agree with the history as documented  Social History   Substance Use Topics    Smoking status: Former Smoker    Smokeless tobacco: Never Used      Comment: pt "quit about 15 years ago"    Alcohol use No        Review of Systems   Constitutional: Positive for fever  Negative for chills  HENT: Negative  Negative for congestion and sore throat  Eyes: Negative  Negative for discharge and redness  Respiratory: Positive for cough, chest tightness and shortness of breath  Cardiovascular: Negative for chest pain and palpitations  Gastrointestinal: Negative  Negative for abdominal pain, nausea and vomiting  Endocrine: Negative  Negative for cold intolerance and polyphagia  Genitourinary: Negative  Negative for difficulty urinating and dysuria  Musculoskeletal: Negative  Negative for arthralgias and back pain  Skin: Negative  Negative for color change and wound  Allergic/Immunologic: Negative  Negative for environmental allergies  Neurological: Negative  Negative for dizziness, weakness and headaches  Hematological: Negative      Psychiatric/Behavioral: Negative  Negative for behavioral problems  The patient is not nervous/anxious  All other systems reviewed and are negative  Physical Exam  ED Triage Vitals   Temperature Pulse Respirations Blood Pressure SpO2   01/28/19 1624 01/28/19 1624 01/28/19 1624 01/28/19 1624 01/28/19 1624   98 2 °F (36 8 °C) 84 18 166/83 95 %      Temp Source Heart Rate Source Patient Position - Orthostatic VS BP Location FiO2 (%)   01/28/19 1624 01/28/19 1624 01/28/19 1624 01/28/19 1624 --   Oral Monitor Sitting Right arm       Pain Score       01/28/19 1840       (S) 5           Orthostatic Vital Signs  Vitals:    01/28/19 1624 01/28/19 2031   BP: 166/83 136/84   Pulse: 84 70   Patient Position - Orthostatic VS: Sitting Lying       Physical Exam   Constitutional: She is oriented to person, place, and time  She appears well-developed and well-nourished  No distress  HENT:   Head: Normocephalic and atraumatic  Right Ear: External ear normal    Left Ear: External ear normal    Mouth/Throat: Oropharynx is clear and moist    Postnasal drip   Eyes: Pupils are equal, round, and reactive to light  Conjunctivae and EOM are normal  Right eye exhibits no discharge  Left eye exhibits no discharge  No scleral icterus  Neck: Normal range of motion  Neck supple  No tracheal deviation present  No thyromegaly present  Cardiovascular: Normal rate, regular rhythm and intact distal pulses  Exam reveals no gallop and no friction rub  No murmur heard  Pulmonary/Chest: Effort normal and breath sounds normal  No stridor  No respiratory distress  She has no wheezes  She has no rales  Abdominal: Soft  Bowel sounds are normal  She exhibits no distension  There is no tenderness  There is no rebound and no guarding  Musculoskeletal: Normal range of motion  She exhibits no edema or deformity  Neurological: She is alert and oriented to person, place, and time  No cranial nerve deficit  Skin: Skin is warm and dry  No rash noted   She is not diaphoretic  No erythema  Psychiatric: She has a normal mood and affect  Her behavior is normal  Thought content normal    Nursing note and vitals reviewed  ED Medications  Medications   acetaminophen (TYLENOL) tablet 650 mg (650 mg Oral Given 1/28/19 2143)       Diagnostic Studies  Results Reviewed     Procedure Component Value Units Date/Time    Comprehensive metabolic panel [[id number]]  (Abnormal) Collected:  01/28/19 1939    Lab Status:  Final result Specimen:  Blood from Arm, Left Updated:  01/28/19 2007     Sodium 138 mmol/L      Potassium 3 5 mmol/L      Chloride 104 mmol/L      CO2 28 mmol/L      ANION GAP 6 mmol/L      BUN 10 mg/dL      Creatinine 0 65 mg/dL      Glucose 85 mg/dL      Calcium 8 8 mg/dL      AST 24 U/L      ALT 30 U/L      Alkaline Phosphatase 73 U/L      Total Protein 7 5 g/dL      Albumin 3 4 (L) g/dL      Total Bilirubin 0 26 mg/dL      eGFR 95 ml/min/1 73sq m     Narrative:         National Kidney Disease Education Program recommendations are as follows:  GFR calculation is accurate only with a steady state creatinine  Chronic Kidney disease less than 60 ml/min/1 73 sq  meters  Kidney failure less than 15 ml/min/1 73 sq  meters      Troponin I [[id number]]  (Normal) Collected:  01/28/19 1939    Lab Status:  Final result Specimen:  Blood from Arm, Left Updated:  01/28/19 2007     Troponin I <0 02 ng/mL     CBC and differential [[id number]]  (Abnormal) Collected:  01/28/19 1939    Lab Status:  Final result Specimen:  Blood from Arm, Left Updated:  01/28/19 1948     WBC 6 68 Thousand/uL      RBC 4 61 Million/uL      Hemoglobin 12 5 g/dL      Hematocrit 40 7 %      MCV 88 fL      MCH 27 1 pg      MCHC 30 7 (L) g/dL      RDW 13 4 %      MPV 10 6 fL      Platelets 976 Thousands/uL      nRBC 0 /100 WBCs      Neutrophils Relative 56 %      Immat GRANS % 0 %      Lymphocytes Relative 29 %      Monocytes Relative 13 (H) %      Eosinophils Relative 2 %      Basophils Relative 0 % Neutrophils Absolute 3 66 Thousands/µL      Immature Grans Absolute 0 03 Thousand/uL      Lymphocytes Absolute 1 95 Thousands/µL      Monocytes Absolute 0 88 Thousand/µL      Eosinophils Absolute 0 13 Thousand/µL      Basophils Absolute 0 03 Thousands/µL                  XR chest 2 views   ED Interpretation by Roberth Aly MD (01/28 2048)   No cardiopulmonary disease  Final Result by Gloria Carrion MD (01/29 1599)      No acute cardiopulmonary disease  Also refer to CT chest dated 6/26/2018  Workstation performed: [id number]               Procedures  Procedures      Phone Consults  ED Phone Contact    ED Course  ED Course as of Feb 01 1231 Mon Jan 28, 2019   2135 Workup negative, chest x-ray normal, EKG unremarkable  Will discharge  Return precautions were discussed with the patient verbalized understanding and she was discharged  MDM  Number of Diagnoses or Management Options  Back pain:   Cough:   Diagnosis management comments: The his 4year-old woman presents for evaluation of the URI symptoms  With the chest read x-ray to evaluate for pneumonia  Will get basic labs  Given her risk factors, will get EKG and troponin  Will do 1 EKG and troponin and will not get any more if negative id the setting of symptoms for days  If the workup is negative, will discharge  Disposition  Final diagnoses:   Cough   Back pain     Time reflects when diagnosis was documented in both MDM as applicable and the Disposition within this note     Time User Action Codes Description Comment    1/28/2019  9:34 PM Aure Moses Add [R05] Cough     1/28/2019  9:34 PM Aure Moses Add [M54 9] Back pain       ED Disposition     ED Disposition Condition Date/Time Comment    Discharge  Mon Jan 28, 2019  9:35 PM Neysa Najjar discharge to home/self care      Condition at discharge: Stable        Follow-up Information     Follow up With Specialties Details Why Contact Info Additional [address] [facility] Emergency Department Emergency Medicine Go to As needed, If symptoms worsen [address]  [phone]  ED, [address], [address] [facility] Internal Medicine, Physician Assistant Schedule an appointment as soon as possible for a visit in 3 days Follow-up being seen emergency department [address]  [address] [facility]  [phone]             Discharge Medication List as of 1/28/2019  9:37 PM      CONTINUE these medications which have NOT CHANGED    Details   ACCU-CHEK FASTCLIX LANCETS MISC Starting Sat 3/3/2018, Historical Med      aspirin 81 MG tablet Take 1 tablet (81 mg total) by mouth daily, Starting Thu 4/12/2018, Normal      Blood Glucose Monitoring Suppl (ACCU-CHEK ESVIN SMARTVIEW) w/Device KIT use as directed, Historical Med      cephalexin (KEFLEX) 500 mg capsule Take 1 capsule by mouth 2 (two) times a day, Starting Mon 10/31/2016, Print      cetirizine (ZyrTEC) 10 mg tablet Take 1 tablet by mouth daily, Starting Fri 12/8/2017, Print      Cholecalciferol (VITAMIN D HIGH POTENCY PO) Take by mouth  Takes 1x/week, Historical Med      cyclobenzaprine (FLEXERIL) 10 mg tablet Take 10 mg by mouth as needed for muscle spasms  , Historical Med      fluticasone (FLONASE) 50 mcg/act nasal spray 1 spray into each nostril daily, Starting Mon 11/12/2018, Normal      glucose blood (ACCU-CHEK SMARTVIEW) test strip Historical Med      hydrochlorothiazide (MICROZIDE) 12 5 mg capsule Take 1 capsule by mouth daily, Starting Thu 6/22/2017, Historical Med      HYDROcodone-acetaminophen (NORCO) 5-325 mg per tablet Take 1 tablet by mouth every 6 (six) hours as needed for pain Max Daily Amount: 4 tablets, Starting Mon 10/31/2016, Print      hydrOXYzine HCL (ATARAX) 25 mg tablet Take 1 tablet (25 mg total) by mouth every 12 (twelve) hours as needed for itching, Starting Thu 5/17/2018, Normal      !! levothyroxine 100 mcg tablet Take 100 mcg by mouth 2 (two) times a week, Historical Med      !! levothyroxine 75 mcg tablet Take 88 mcg by mouth daily  , Historical Med      lisinopril-hydrochlorothiazide (PRINZIDE,ZESTORETIC) 10-12 5 MG per tablet Take 1 tablet by mouth daily, Starting Thu 4/12/2018, Normal      loratadine (CLARITIN) 10 mg tablet Take 1 tablet (10 mg total) by mouth daily, Starting Mon 11/12/2018, Normal      meclizine (ANTIVERT) 12 5 MG tablet Take 1 tablet (12 5 mg total) by mouth every 8 (eight) hours as needed for dizziness, Starting Fri 8/3/2018, Normal      naproxen (NAPROSYN) 250 mg tablet Take 250 mg by mouth as needed for mild pain , Historical Med      ondansetron (ZOFRAN) 4 mg tablet Take 1 tablet by mouth every 8 (eight) hours as needed, Starting Fri 9/29/2017, Historical Med      oseltamivir (TAMIFLU) 75 mg capsule Take 1 capsule by mouth 2 (two) times a day, Starting Fri 1/19/2018, Historical Med      venlafaxine (EFFEXOR-XR) 37 5 mg 24 hr capsule Take 1 capsule by mouth Daily, Starting Mon 8/21/2017, Historical Med      white petrolatum-mineral oil (EUCERIN) cream Apply topically, Starting Tue 6/27/2017, Historical Med       !! - Potential duplicate medications found  Please discuss with provider  No discharge procedures on file  ED Provider  Attending physically available and evaluated Darrall Frankel  MARTIN managed the patient along with the ED Attending      Electronically Signed by         Stanley Morales MD  02/01/19 4140

## 2019-01-29 NOTE — ED ATTENDING ATTESTATION
Aleksey Fitzgerald DO, saw and evaluated the patient  I have discussed the patient with the resident/non-physician practitioner and agree with the resident's/non-physician practitioner's findings, Plan of Care, and MDM as documented in the resident's/non-physician practitioner's note, except where noted  All available labs and Radiology studies were reviewed  At this point I agree with the current assessment done in the Emergency Department  I have conducted an independent evaluation of this patient a history and physical is as follows:    18-year-old female presents emergency department with flu-like symptoms for 4-5 days, including productive cough, myalgias, fatigue, nasal congestion and rhinorrhea  She has recently had fevers but they have improved  She states she has chest soreness when she coughs as well  She also had diarrhea recently but has improved as well  /83 (BP Location: Right arm)   Pulse 84   Temp 98 2 °F (36 8 °C) (Oral)   Resp 18   LMP  (LMP Unknown)   SpO2 95%   Patient is alert and oriented x4, mucous membranes moist, pharynx without erythema, lungs clear, heart regular rate and rhythm, abdomen soft and nontender, normal range of motion and ability to ambulate  EKG unremarkable without ST segment elevation or depression  Check troponin and rule out pneumonia  Presentation otherwise consistent with flu-like illness  She states she is been sick like this several times since November and has had her flu shot; however, her father, 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

## 2019-02-05 ENCOUNTER — APPOINTMENT (EMERGENCY)
Dept: RADIOLOGY | Facility: HOSPITAL | Age: 64
End: 2019-02-05
Payer: MEDICARE

## 2019-02-05 ENCOUNTER — APPOINTMENT (EMERGENCY)
Dept: NON INVASIVE DIAGNOSTICS | Facility: HOSPITAL | Age: 64
End: 2019-02-05
Payer: MEDICARE

## 2019-02-05 ENCOUNTER — HOSPITAL ENCOUNTER (EMERGENCY)
Facility: HOSPITAL | Age: 64
Discharge: HOME | End: 2019-02-05
Attending: EMERGENCY MEDICINE
Payer: MEDICARE

## 2019-02-05 VITALS
BODY MASS INDEX: 47.3 KG/M2 | OXYGEN SATURATION: 96 % | WEIGHT: 275.57 LBS | HEART RATE: 68 BPM | TEMPERATURE: 97.4 F | RESPIRATION RATE: 20 BRPM | DIASTOLIC BLOOD PRESSURE: 63 MMHG | SYSTOLIC BLOOD PRESSURE: 132 MMHG

## 2019-02-05 DIAGNOSIS — R79.82 ELEVATED C-REACTIVE PROTEIN: ICD-10-CM

## 2019-02-05 DIAGNOSIS — M25.571 BILATERAL ANKLE PAIN: Primary | ICD-10-CM

## 2019-02-05 DIAGNOSIS — M25.572 BILATERAL ANKLE PAIN: Primary | ICD-10-CM

## 2019-02-05 LAB
ANION GAP SERPL CALCULATED.3IONS-SCNC: 7 MMOL/L (ref 4–13)
BASOPHILS # BLD AUTO: 0.03 THOUSANDS/ΜL (ref 0–0.1)
BASOPHILS NFR BLD AUTO: 0 % (ref 0–1)
BUN SERPL-MCNC: 10 MG/DL (ref 5–25)
CALCIUM SERPL-MCNC: 8.8 MG/DL (ref 8.3–10.1)
CHLORIDE SERPL-SCNC: 106 MMOL/L (ref 100–108)
CO2 SERPL-SCNC: 27 MMOL/L (ref 21–32)
CREAT SERPL-MCNC: 0.67 MG/DL (ref 0.6–1.3)
CRP SERPL QL: 20.7 MG/L
EOSINOPHIL # BLD AUTO: 0.08 THOUSAND/ΜL (ref 0–0.61)
EOSINOPHIL NFR BLD AUTO: 1 % (ref 0–6)
ERYTHROCYTE [DISTWIDTH] IN BLOOD BY AUTOMATED COUNT: 13.4 % (ref 11.6–15.1)
ERYTHROCYTE [SEDIMENTATION RATE] IN BLOOD: 34 MM/HOUR (ref 0–20)
GFR SERPL CREATININE-BSD FRML MDRD: 95 ML/MIN/1.73SQ M
GLUCOSE SERPL-MCNC: 88 MG/DL (ref 65–140)
HCT VFR BLD AUTO: 41.9 % (ref 34.8–46.1)
HGB BLD-MCNC: 13.2 G/DL (ref 11.5–15.4)
IMM GRANULOCYTES # BLD AUTO: 0.02 THOUSAND/UL (ref 0–0.2)
IMM GRANULOCYTES NFR BLD AUTO: 0 % (ref 0–2)
LYMPHOCYTES # BLD AUTO: 2.37 THOUSANDS/ΜL (ref 0.6–4.47)
LYMPHOCYTES NFR BLD AUTO: 24 % (ref 14–44)
MCH RBC QN AUTO: 27.6 PG (ref 26.8–34.3)
MCHC RBC AUTO-ENTMCNC: 31.5 G/DL (ref 31.4–37.4)
MCV RBC AUTO: 88 FL (ref 82–98)
MONOCYTES # BLD AUTO: 0.76 THOUSAND/ΜL (ref 0.17–1.22)
MONOCYTES NFR BLD AUTO: 8 % (ref 4–12)
NEUTROPHILS # BLD AUTO: 6.51 THOUSANDS/ΜL (ref 1.85–7.62)
NEUTS SEG NFR BLD AUTO: 67 % (ref 43–75)
NRBC BLD AUTO-RTO: 0 /100 WBCS
PLATELET # BLD AUTO: 311 THOUSANDS/UL (ref 149–390)
PMV BLD AUTO: 11 FL (ref 8.9–12.7)
POTASSIUM SERPL-SCNC: 3.5 MMOL/L (ref 3.5–5.3)
RBC # BLD AUTO: 4.78 MILLION/UL (ref 3.81–5.12)
SODIUM SERPL-SCNC: 140 MMOL/L (ref 136–145)
WBC # BLD AUTO: 9.77 THOUSAND/UL (ref 4.31–10.16)

## 2019-02-05 PROCEDURE — 86617 LYME DISEASE ANTIBODY: CPT | Performed by: EMERGENCY MEDICINE

## 2019-02-05 PROCEDURE — 99285 EMERGENCY DEPT VISIT HI MDM: CPT

## 2019-02-05 PROCEDURE — 85025 COMPLETE CBC W/AUTO DIFF WBC: CPT | Performed by: EMERGENCY MEDICINE

## 2019-02-05 PROCEDURE — 86140 C-REACTIVE PROTEIN: CPT | Performed by: EMERGENCY MEDICINE

## 2019-02-05 PROCEDURE — 85652 RBC SED RATE AUTOMATED: CPT | Performed by: EMERGENCY MEDICINE

## 2019-02-05 PROCEDURE — 36415 COLL VENOUS BLD VENIPUNCTURE: CPT | Performed by: EMERGENCY MEDICINE

## 2019-02-05 PROCEDURE — 93971 EXTREMITY STUDY: CPT | Performed by: SURGERY

## 2019-02-05 PROCEDURE — 93971 EXTREMITY STUDY: CPT

## 2019-02-05 PROCEDURE — 96360 HYDRATION IV INFUSION INIT: CPT

## 2019-02-05 PROCEDURE — 73610 X-RAY EXAM OF ANKLE: CPT

## 2019-02-05 PROCEDURE — 93005 ELECTROCARDIOGRAM TRACING: CPT

## 2019-02-05 PROCEDURE — 80048 BASIC METABOLIC PNL TOTAL CA: CPT | Performed by: EMERGENCY MEDICINE

## 2019-02-05 RX ORDER — IBUPROFEN 600 MG/1
600 TABLET ORAL EVERY 6 HOURS PRN
Qty: 30 TABLET | Refills: 0 | Status: SHIPPED | OUTPATIENT
Start: 2019-02-05 | End: 2020-04-24 | Stop reason: ALTCHOICE

## 2019-02-05 RX ORDER — IBUPROFEN 600 MG/1
600 TABLET ORAL ONCE
Status: COMPLETED | OUTPATIENT
Start: 2019-02-05 | End: 2019-02-05

## 2019-02-05 RX ORDER — KETOROLAC TROMETHAMINE 30 MG/ML
15 INJECTION, SOLUTION INTRAMUSCULAR; INTRAVENOUS ONCE
Status: DISCONTINUED | OUTPATIENT
Start: 2019-02-05 | End: 2019-02-05

## 2019-02-05 RX ORDER — ACETAMINOPHEN 325 MG/1
650 TABLET ORAL ONCE
Status: COMPLETED | OUTPATIENT
Start: 2019-02-05 | End: 2019-02-05

## 2019-02-05 RX ADMIN — IBUPROFEN 600 MG: 600 TABLET ORAL at 17:50

## 2019-02-05 RX ADMIN — SODIUM CHLORIDE 1000 ML: 0.9 INJECTION, SOLUTION INTRAVENOUS at 18:00

## 2019-02-05 RX ADMIN — ACETAMINOPHEN 650 MG: 325 TABLET, FILM COATED ORAL at 19:38

## 2019-02-05 NOTE — ED PROVIDER NOTES
History  Chief Complaint   Patient presents with    Leg Swelling     Pt presents with bilateral leg swelling, more so on the R leg  Pt reports R ankle pain   Dizziness     Pt reports feeling lightheaded  Pt states "It comes and goes  "       28-year-old female presents to the emergency department for evaluation of bilateral ankle swelling and pain over the past 36 hr as well as right calf pain  Patient states that she recently had a viral upper respiratory infection which has subsequently resolved approximately 10 days ago then last night started to noticed ankle discomfort and swelling  Patient states that swelling and pain is localized to the bilateral ankles with primary focal spot for pain the insertion site of the Achilles bilaterally  Denies drug or alcohol use, trauma, fever, chills, nausea, vomiting, diarrhea, dysuria, hematuria, hematochezia, melena, genital discharge, abdominal pain, flank pain, chest pain, shortness of breath, dyspnea, pleuritic-type chest pain, history DVT/PE, recent travel, history cancer, hemoptysis, cough, rash, extremity weakness, hormonal therapy use  Prior to Admission Medications   Prescriptions Last Dose Informant Patient Reported? Taking? ACCU-CHEK FASTCLIX LANCETS MISC  Self Yes No   Blood Glucose Monitoring Suppl (ACCU-CHEK ESVIN SMARTVIEW) w/Device KIT  Self Yes No   Sig: use as directed   Cholecalciferol ([employer] VITAMIN D) 2000 units CAPS  Self No No   Sig: Take 1 capsule (4,000 Units total) by mouth daily for 180 days   Cholecalciferol (VITAMIN D HIGH POTENCY PO)  Self Yes No   Sig: Take by mouth   Takes 1x/week   HYDROcodone-acetaminophen (NORCO) 5-325 mg per tablet  Self No No   Sig: Take 1 tablet by mouth every 6 (six) hours as needed for pain Max Daily Amount: 4 tablets   aspirin 81 MG tablet  Self No No   Sig: Take 1 tablet (81 mg total) by mouth daily   cephalexin (KEFLEX) 500 mg capsule  Self No No   Sig: Take 1 capsule by mouth 2 (two) times a day   Patient not taking: Reported on 2018    cetirizine (ZyrTEC) 10 mg tablet  Self No No   Sig: Take 1 tablet by mouth daily   Patient not taking: Reported on 2018    cyclobenzaprine (FLEXERIL) 10 mg tablet  Self Yes No   Sig: Take 10 mg by mouth as needed for muscle spasms  fluticasone (FLONASE) 50 mcg/act nasal spray   No No   Si spray into each nostril daily   glucose blood (ACCU-CHEK SMARTVIEW) test strip  Self Yes No   hydrOXYzine HCL (ATARAX) 25 mg tablet  Self No No   Sig: Take 1 tablet (25 mg total) by mouth every 12 (twelve) hours as needed for itching   hydrochlorothiazide (MICROZIDE) 12 5 mg capsule  Self Yes No   Sig: Take 1 capsule by mouth daily   levothyroxine 100 mcg tablet  Self Yes No   Sig: Take 100 mcg by mouth 2 (two) times a week   levothyroxine 75 mcg tablet  Self Yes No   Sig: Take 88 mcg by mouth daily  lisinopril-hydrochlorothiazide (PRINZIDE,ZESTORETIC) 10-12 5 MG per tablet  Self No No   Sig: Take 1 tablet by mouth daily   loratadine (CLARITIN) 10 mg tablet   No No   Sig: Take 1 tablet (10 mg total) by mouth daily   meclizine (ANTIVERT) 12 5 MG tablet  Self No No   Sig: Take 1 tablet (12 5 mg total) by mouth every 8 (eight) hours as needed for dizziness   naproxen (NAPROSYN) 250 mg tablet  Self Yes No   Sig: Take 250 mg by mouth as needed for mild pain     ondansetron (ZOFRAN) 4 mg tablet  Self Yes No   Sig: Take 1 tablet by mouth every 8 (eight) hours as needed   oseltamivir (TAMIFLU) 75 mg capsule  Self Yes No   Sig: Take 1 capsule by mouth 2 (two) times a day   venlafaxine (EFFEXOR-XR) 37 5 mg 24 hr capsule  Self Yes No   Sig: Take 1 capsule by mouth Daily   white petrolatum-mineral oil (EUCERIN) cream  Self Yes No   Sig: Apply topically      Facility-Administered Medications: None       Past Medical History:   Diagnosis Date    Arthritis     Depression     Disease of thyroid gland     hypo    Edema     LAST ASSESSED: [id number]    GERD (gastroesophageal reflux disease)     GERD without esophagitis     Hypercholesterolemia     Hyperlipidemia     Hypertension     WELL CONTROLLED  CURRENTLY ON LISINOPRIL  LAST ASSESSED: [id number]    Impaired fasting glucose     LAST ASSESSED: [id number]    Influenza     LAST ASSESSED: [id number]    Other headache syndrome     Other muscle spasm     LAST ASSESSED: [id number]    Ovarian cyst 2006    Renal calculi     Sleep apnea     LAST ASSESSED: [id number]    Spontaneous      ,  (2ND TRIMESTER)     (spontaneous vaginal delivery)     FEMALE    Thyroid disease        Past Surgical History:   Procedure Laterality Date    BACK SURGERY      HERNIATED DISC (L4/L5)    CHOLECYSTECTOMY      TONSILLECTOMY         Family History   Problem Relation Age of Onset    Lung cancer Mother     Cancer Mother         MALIGNANT NEOPLASM    Hypertension Father     Diabetes Brother     Hypertension Son     Lung disease Son     Hyperthyroidism Maternal Aunt     Hypothyroidism Maternal Aunt     Heart disease Other         CARDIAC DISORDER    Neuropathy Family      I have reviewed and agree with the history as documented  Social History   Substance Use Topics    Smoking status: Former Smoker    Smokeless tobacco: Never Used      Comment: pt "quit about 15 years ago"    Alcohol use No        Review of Systems   Constitutional: Negative for chills, diaphoresis, fatigue and fever  HENT: Negative for congestion, ear discharge, facial swelling, hearing loss, rhinorrhea, sinus pain, sinus pressure, sneezing, sore throat, tinnitus and trouble swallowing  Eyes: Negative for pain, discharge and redness  Respiratory: Negative for cough, choking, chest tightness, shortness of breath, wheezing and stridor  Cardiovascular: Negative for chest pain, palpitations and leg swelling     Gastrointestinal: Negative for abdominal distention, abdominal pain, blood in stool, constipation, diarrhea, nausea and vomiting  Endocrine: Negative for cold intolerance, polydipsia and polyuria  Genitourinary: Negative for difficulty urinating, dysuria, enuresis, flank pain, frequency and hematuria  Musculoskeletal: Positive for arthralgias  Negative for back pain, gait problem and neck stiffness  Bilateral ankle pain as well as right calf pain   Skin: Negative for rash and wound  Neurological: Negative for dizziness, seizures, syncope, weakness, numbness and headaches  Hematological: Negative for adenopathy  Psychiatric/Behavioral: Negative for agitation, confusion, hallucinations, sleep disturbance and suicidal ideas  All other systems reviewed and are negative  Physical Exam  ED Triage Vitals [02/05/19 1456]   Temperature Pulse Respirations Blood Pressure SpO2   (!) 97 4 °F (36 3 °C) 80 18 (!) 174/83 95 %      Temp src Heart Rate Source Patient Position - Orthostatic VS BP Location FiO2 (%)   -- Monitor Sitting Left arm --      Pain Score       6           Orthostatic Vital Signs  Vitals:    02/05/19 1456 02/05/19 1803 02/05/19 1900   BP: (!) 174/83 165/87 132/63   Pulse: 80 71 68   Patient Position - Orthostatic VS: Sitting  Sitting       Physical Exam   Constitutional: She is oriented to person, place, and time  She appears well-developed and well-nourished  No distress  HENT:   Head: Normocephalic and atraumatic  Right Ear: External ear normal    Left Ear: External ear normal    Nose: No sinus tenderness  No epistaxis  Mouth/Throat: No oropharyngeal exudate  Eyes: Pupils are equal, round, and reactive to light  Conjunctivae and EOM are normal  Right eye exhibits no discharge  Left eye exhibits no discharge  Neck: No JVD present  Cardiovascular: Normal rate, regular rhythm, normal heart sounds and intact distal pulses  Exam reveals no gallop and no friction rub  No murmur heard  Pulmonary/Chest: Effort normal and breath sounds normal  No stridor  No respiratory distress  She has no wheezes  She has no rales  Abdominal: Soft  Bowel sounds are normal  She exhibits no distension and no mass  There is no tenderness  There is no rebound and no guarding  Musculoskeletal: Normal range of motion  She exhibits no edema, tenderness or deformity  Feet:    Lymphadenopathy:     She has no cervical adenopathy  Neurological: She is alert and oriented to person, place, and time  She has normal strength  No cranial nerve deficit or sensory deficit  GCS eye subscore is 4  GCS verbal subscore is 5  GCS motor subscore is 6  Reflex Scores:       Patellar reflexes are 2+ on the right side and 2+ on the left side  UE and LE 5/5 strength, No focal neuro deficits  Skin: Skin is warm, dry and intact  Capillary refill takes less than 2 seconds  She is not diaphoretic  Psychiatric: She has a normal mood and affect  Her speech is normal and behavior is normal  Judgment and thought content normal    Nursing note and vitals reviewed        ED Medications  Medications   sodium chloride 0 9 % bolus 1,000 mL (0 mL Intravenous Stopped 2/5/19 1925)   ibuprofen (MOTRIN) tablet 600 mg (600 mg Oral Given 2/5/19 1750)   acetaminophen (TYLENOL) tablet 650 mg (650 mg Oral Given 2/5/19 1938)       Diagnostic Studies  Results Reviewed     Procedure Component Value Units Date/Time    Sedimentation rate, automated [[id number]]  (Abnormal) Collected:  02/05/19 1800    Lab Status:  Final result Specimen:  Blood from Arm, Left Updated:  02/05/19 1911     Sed Rate 34 (H) mm/hour     Basic metabolic panel [[id number]] Collected:  02/05/19 1800    Lab Status:  Final result Specimen:  Blood from Arm, Left Updated:  02/05/19 1823     Sodium 140 mmol/L      Potassium 3 5 mmol/L      Chloride 106 mmol/L      CO2 27 mmol/L      ANION GAP 7 mmol/L      BUN 10 mg/dL      Creatinine 0 67 mg/dL      Glucose 88 mg/dL      Calcium 8 8 mg/dL      eGFR 95 ml/min/1 73sq m     Narrative:         National Kidney Disease Education Program recommendations are as follows:  GFR calculation is accurate only with a steady state creatinine  Chronic Kidney disease less than 60 ml/min/1 73 sq  meters  Kidney failure less than 15 ml/min/1 73 sq  meters  C-reactive protein [[id number]]  (Abnormal) Collected:  02/05/19 1800    Lab Status:  Final result Specimen:  Blood from Arm, Left Updated:  02/05/19 1823     CRP 20 7 (H) mg/L     CBC and differential [[id number]] Collected:  02/05/19 1800    Lab Status:  Final result Specimen:  Blood from Arm, Left Updated:  02/05/19 1810     WBC 9 77 Thousand/uL      RBC 4 78 Million/uL      Hemoglobin 13 2 g/dL      Hematocrit 41 9 %      MCV 88 fL      MCH 27 6 pg      MCHC 31 5 g/dL      RDW 13 4 %      MPV 11 0 fL      Platelets 833 Thousands/uL      nRBC 0 /100 WBCs      Neutrophils Relative 67 %      Immat GRANS % 0 %      Lymphocytes Relative 24 %      Monocytes Relative 8 %      Eosinophils Relative 1 %      Basophils Relative 0 %      Neutrophils Absolute 6 51 Thousands/µL      Immature Grans Absolute 0 02 Thousand/uL      Lymphocytes Absolute 2 37 Thousands/µL      Monocytes Absolute 0 76 Thousand/µL      Eosinophils Absolute 0 08 Thousand/µL      Basophils Absolute 0 03 Thousands/µL     Lyme disease, western blot [[id number]] Collected:  02/05/19 1800    Lab Status:   In process Specimen:  Blood from Arm, Left Updated:  02/05/19 1807                 VAS lower limb venous duplex study, unilateral/limited    (Results Pending)   XR ankle 3+ views RIGHT    (Results Pending)   XR ankle 3+ views LEFT    (Results Pending)         Procedures  Procedures      Phone Consults  ED Phone Contact    ED Course  ED Course as of Feb 05 1941   Tue Feb 05, 2019   1911 VAS lower limb venous duplex study, unilateral/limited                               MDM  Number of Diagnoses or Management Options  Bilateral ankle pain: new and requires workup  Elevated C-reactive protein: new and requires workup  Diagnosis management comments: Negative Doppler of venous system in the right lower extremity, plain films are negative for acute fracture and malalignments, patient does have elevated CRP and ESR had a recent upper respiratory infection  Patient possibly suffering from reactive arthritis vs other systemic inflammatory etiologies  1   Bilateral lower extremity ankle swelling and pain  -Motrin 600 mg   -Rheumatology follow-up   -PCP follow-up   -ED p r n  Disposition  Final diagnoses:   Bilateral ankle pain   Elevated C-reactive protein     Time reflects when diagnosis was documented in both MDM as applicable and the Disposition within this note     Time User Action Codes Description Comment    2/5/2019  7:17 PM Norman Holland Add [E43 [id number],  M25 [id number]] Bilateral ankle pain     2/5/2019  7:17 PM Norman Holland Add [R79 [id number]] Elevated C-reactive protein       ED Disposition     ED Disposition Condition Date/Time Comment    Discharge  Tue Feb 5, 2019  7:18 PM [address] discharge to home/self care      Condition at discharge: Good        Follow-up Information     Follow up With Specialties Details Why Contact Info Additional Information    [employer]    [phone]       [address] Rheumatology Schedule an appointment as soon as possible for a visit in 1 day  [address] [phone] [address], [address] Emergency Department Emergency Medicine Go to If symptoms worsen, As needed [address]  [phone]  ED, [address], [address]          Patient's Medications   Discharge Prescriptions    IBUPROFEN (MOTRIN) 600 MG TABLET    Take 1 tablet (600 mg total) by mouth every 6 (six) hours as needed for moderate pain       Start Date: 2/5/2019  End Date: --       Order Dose: 600 mg       Quantity: 30 tablet    Refills: 0     No discharge procedures on file  ED Provider  Attending physically available and evaluated Beverly Langston I managed the patient along with the ED Attending      Electronically Signed by         Felecia Russo DO  02/05/19 1941

## 2019-02-05 NOTE — ED ATTENDING ATTESTATION
Familia Coats MD, saw and evaluated the patient  I have discussed the patient with the resident/non-physician practitioner and agree with the resident's/non-physician practitioner's findings, Plan of Care, and MDM as documented in the resident's/non-physician practitioner's note, except where noted  All available labs and Radiology studies were reviewed  At this point I agree with the current assessment done in the Emergency Department  I have conducted an independent evaluation of this patient a history and physical is as follows: 60 y/o female with c/o right ankle swelling and pain that began last night  Slowly progressive over the past 1 5 days  Now with left ankle swelling  Some pain throughout the forefoot and lateral aspect of ankle  No new shoes  Negative ellen's sign  Normal capillary refill  Swelling over lateral and medial ankle and tender over dorsal aspect of foot  No erythema        Critical Care Time  Procedures

## 2019-02-06 LAB
B BURGDOR IGG PATRN SER IB-IMP: NEGATIVE
B BURGDOR IGM PATRN SER IB-IMP: NEGATIVE
B BURGDOR18KD IGG SER QL IB: NORMAL
B BURGDOR23KD IGG SER QL IB: NORMAL
B BURGDOR23KD IGM SER QL IB: NORMAL
B BURGDOR28KD IGG SER QL IB: NORMAL
B BURGDOR30KD IGG SER QL IB: NORMAL
B BURGDOR39KD IGG SER QL IB: NORMAL
B BURGDOR39KD IGM SER QL IB: NORMAL
B BURGDOR41KD IGG SER QL IB: NORMAL
B BURGDOR41KD IGM SER QL IB: NORMAL
B BURGDOR45KD IGG SER QL IB: NORMAL
B BURGDOR58KD IGG SER QL IB: NORMAL
B BURGDOR66KD IGG SER QL IB: NORMAL
B BURGDOR93KD IGG SER QL IB: NORMAL

## 2019-02-06 NOTE — DISCHARGE INSTRUCTIONS
Artralgia   LO QUE NECESITA SABER:   La artralgia es dolor en aziza o más articulaciones, yesi sin inflamación  El dolor podría ser de corta duración y mejorar dentro de 6 a 8 semanas  La artralgia puede ser zaiza señal temprana de artritis  La artralgia puede ser causada por zaiza condición médica edwin un trastorno hormonal o un tumor  Además podría ser la consecuencia de aziza infección o [place]  INSTRUCCIONES SOBRE EL [facility] HOSPITALARIA:   Medicamentos:  A usted le pueden  prescribir los siguientes medicamentos:  · El acetaminofén  fortunato el dolor  Pregunte la cantidad y la frecuencia con que debe tomarlos  [address]  El acetaminofén puede causar daño en el hígado cuando no se pieter de forma correcta  · AINEs (Analgésicos antiinflamatorios no esteroides)  disminuyen el dolor y previenen la inflamación  Consulte con kaplan médico cuál medicamento es el adecuado para usted  Pregunte cuánto david y cuándo  Tómelos edwin se le indique  Cuando no se stephanie de la [employer], los medicamentos antiinflamatorios no esteroides pueden causar sangrado estomacal y problemas renales  · La crema para el alivio del dolor  fortunato el dolor  [address]  · [place] compa medicamentos edwin se le haya indicado  Consulte con kaplan médico si usted britta que kaplan medicamento no le está ayudando o si presenta efectos secundarios  Infórmele si es alérgico a algún medicamento  Mantenga aziza lista actualizada de los [place], las vitaminas y los productos herbales que pieter  Incluya los siguientes datos de los medicamentos: cantidad, frecuencia y motivo de administración  Traiga con usted la lista o los envases de la píldoras a compa citas de seguimiento  Lleve la lista de los medicamentos con usted en vero de aziza emergencia  [place]oper aziza tami de control con kaplan médico o especialista edwin se lo indicaron:  Anote compa preguntas para que se acuerde de hacerlas maria teresa compa visitas     Cuidados personales:   · La aplicación de calor  para ayudar a reducir el dolor  Use aziza compresa o envoltura caliente  Aplíquese calor de 20 a 30 minutos cada 2 horas maria teresa los días que le indiquen  · Descanse  lo más posible  Evite actividades que causan [employer] articulaciones  · Aplique hielo  para ayudar a bajar la inflamación y el dolor  El hielo también puede contribuir a evitar el daño de los tejidos  Use un paquete de hielo o ponga hielo molido dentro de [employer]  [place]ala con aziza toalla y póngasela en la articulación adolorida cada hora maria teresa 15 o 20 minutos o según se le haya indicado  · [place]  la articulación con aziza férula o envoltura elástica según indicaciones  · Eleve  la articulación de [place] que quede por encima del nivel de kaplan corazón tan seguido edwin pueda para ayudar a reducir la inflamación y el dolor  Use almohadas o cobijas dobladas para elevar la articulación de forma cómoda  · Pierda peso  si tiene sobrepeso  El peso extra puede ejercer presión sobre compa articulaciones y causarle más dolor  Consulte con kaplan médico cuánto debería pesar  También pídale que le ayude a diseñar un buen plan de pérdida de peso  · El ejercicio  con regularidad para ayudar a mejorar el movimiento de las articulaciones y disminuir el dolor  Pida más información acerca de un plan de ejercicio adecuado para usted  Los ejercicios de bajo impacto pueden ayudar a quitar algo de la presión en las articulaciones  Dontrell Drape de ejercicios de bajo impacto son caminar, nadar y practicar aeróbicos acuáticos  Fisioterapia:  Un fisioterapeuta le puede enseñar ejercicios para ayudarle a mejorar el movimiento y la fuerza, y para disminuir el dolor  Pregúntele a kaplan médico si la fisioterapia es apropiada para usted  Comuníquese con kaplan especialista o médico sí:   · Usted tiene fiebre  · Usted continúa sintiendo dolor en las articulaciones y el dolor no se le fortunato con calor, hielo o medicamento      · Usted tiene dolor e inflamación alrededor de la articulación  · Usted tiene preguntas o inquietudes acerca de francois condición o cuidado  Regrese a la pilar de emergencias si:   · Usted de repente siente un dolor severo cuando mueve la articulación  · Usted tiene fiebre y escalofríos andrew  · Usted no puede  la articulación  · Usted pierde sensibilidad en el lado del cuerpo donde está la articulación adolorida  © 2017 [address] Information is for End User's use only and may not be sold, redistributed or otherwise used for commercial purposes  All illustrations and images included in CareNotes® are the copyrighted property of A D A M , Inc  or oMnty Sheldon  Esta información es sólo para uso en educación  Francois intención no es darle un consejo médico sobre enfermedades o tratamientos  Colsulte con francois Alexander Guadarrama farmacéutico antes de seguir cualquier régimen médico para saber si es seguro y efectivo para usted

## 2019-02-07 LAB
ATRIAL RATE: 71 BPM
P AXIS: 50 DEGREES
PR INTERVAL: 162 MS
QRS AXIS: -18 DEGREES
QRSD INTERVAL: 80 MS
QT INTERVAL: 388 MS
QTC INTERVAL: 421 MS
T WAVE AXIS: -1 DEGREES
VENTRICULAR RATE: 71 BPM

## 2019-02-07 PROCEDURE — 93010 ELECTROCARDIOGRAM REPORT: CPT | Performed by: INTERNAL MEDICINE

## 2019-04-01 ENCOUNTER — TRANSCRIBE ORDERS (OUTPATIENT)
Dept: LAB | Facility: HOSPITAL | Age: 64
End: 2019-04-01

## 2019-04-01 ENCOUNTER — APPOINTMENT (OUTPATIENT)
Dept: LAB | Facility: HOSPITAL | Age: 64
End: 2019-04-01
Payer: MEDICARE

## 2019-04-01 DIAGNOSIS — I51.9 MYXEDEMA HEART DISEASE: ICD-10-CM

## 2019-04-01 DIAGNOSIS — E55.9 VITAMIN D DEFICIENCY DISEASE: ICD-10-CM

## 2019-04-01 DIAGNOSIS — E04.1 NONTOXIC UNINODULAR GOITER: ICD-10-CM

## 2019-04-01 DIAGNOSIS — E13.8 DIABETES MELLITUS OF OTHER TYPE WITH COMPLICATION, UNSPECIFIED WHETHER LONG TERM INSULIN USE: Primary | ICD-10-CM

## 2019-04-01 DIAGNOSIS — E13.8 DIABETES MELLITUS OF OTHER TYPE WITH COMPLICATION, UNSPECIFIED WHETHER LONG TERM INSULIN USE: ICD-10-CM

## 2019-04-01 DIAGNOSIS — E03.9 MYXEDEMA HEART DISEASE: ICD-10-CM

## 2019-04-01 DIAGNOSIS — E78.9 DISORDER OF LIPOPROTEIN AND LIPID METABOLISM: ICD-10-CM

## 2019-04-01 DIAGNOSIS — I10 ESSENTIAL HYPERTENSION, MALIGNANT: ICD-10-CM

## 2019-04-01 LAB
ALBUMIN SERPL BCP-MCNC: 3.4 G/DL (ref 3.5–5)
ALP SERPL-CCNC: 67 U/L (ref 46–116)
ALT SERPL W P-5'-P-CCNC: 32 U/L (ref 12–78)
ANION GAP SERPL CALCULATED.3IONS-SCNC: 4 MMOL/L (ref 4–13)
AST SERPL W P-5'-P-CCNC: 25 U/L (ref 5–45)
BASOPHILS # BLD AUTO: 0.02 THOUSANDS/ΜL (ref 0–0.1)
BASOPHILS NFR BLD AUTO: 0 % (ref 0–1)
BILIRUB SERPL-MCNC: 0.44 MG/DL (ref 0.2–1)
BUN SERPL-MCNC: 12 MG/DL (ref 5–25)
CALCIUM SERPL-MCNC: 8.9 MG/DL (ref 8.3–10.1)
CHLORIDE SERPL-SCNC: 106 MMOL/L (ref 100–108)
CHOLEST SERPL-MCNC: 144 MG/DL (ref 50–200)
CO2 SERPL-SCNC: 29 MMOL/L (ref 21–32)
CREAT SERPL-MCNC: 0.68 MG/DL (ref 0.6–1.3)
EOSINOPHIL # BLD AUTO: 0.07 THOUSAND/ΜL (ref 0–0.61)
EOSINOPHIL NFR BLD AUTO: 1 % (ref 0–6)
ERYTHROCYTE [DISTWIDTH] IN BLOOD BY AUTOMATED COUNT: 13.2 % (ref 11.6–15.1)
EST. AVERAGE GLUCOSE BLD GHB EST-MCNC: 137 MG/DL
GFR SERPL CREATININE-BSD FRML MDRD: 93 ML/MIN/1.73SQ M
GLUCOSE P FAST SERPL-MCNC: 102 MG/DL (ref 65–99)
HBA1C MFR BLD: 6.4 % (ref 4.2–6.3)
HCT VFR BLD AUTO: 41.7 % (ref 34.8–46.1)
HDLC SERPL-MCNC: 48 MG/DL (ref 40–60)
HGB BLD-MCNC: 13.1 G/DL (ref 11.5–15.4)
IMM GRANULOCYTES # BLD AUTO: 0.02 THOUSAND/UL (ref 0–0.2)
IMM GRANULOCYTES NFR BLD AUTO: 0 % (ref 0–2)
LDLC SERPL CALC-MCNC: 61 MG/DL (ref 0–100)
LYMPHOCYTES # BLD AUTO: 2.14 THOUSANDS/ΜL (ref 0.6–4.47)
LYMPHOCYTES NFR BLD AUTO: 27 % (ref 14–44)
MCH RBC QN AUTO: 27.5 PG (ref 26.8–34.3)
MCHC RBC AUTO-ENTMCNC: 31.4 G/DL (ref 31.4–37.4)
MCV RBC AUTO: 87 FL (ref 82–98)
MONOCYTES # BLD AUTO: 0.84 THOUSAND/ΜL (ref 0.17–1.22)
MONOCYTES NFR BLD AUTO: 10 % (ref 4–12)
NEUTROPHILS # BLD AUTO: 4.98 THOUSANDS/ΜL (ref 1.85–7.62)
NEUTS SEG NFR BLD AUTO: 62 % (ref 43–75)
NONHDLC SERPL-MCNC: 96 MG/DL
NRBC BLD AUTO-RTO: 0 /100 WBCS
PLATELET # BLD AUTO: 268 THOUSANDS/UL (ref 149–390)
PMV BLD AUTO: 11.1 FL (ref 8.9–12.7)
POTASSIUM SERPL-SCNC: 3.8 MMOL/L (ref 3.5–5.3)
PROT SERPL-MCNC: 7.3 G/DL (ref 6.4–8.2)
RBC # BLD AUTO: 4.77 MILLION/UL (ref 3.81–5.12)
SODIUM SERPL-SCNC: 139 MMOL/L (ref 136–145)
TRIGL SERPL-MCNC: 174 MG/DL
TSH SERPL DL<=0.05 MIU/L-ACNC: 1.12 UIU/ML (ref 0.36–3.74)
WBC # BLD AUTO: 8.07 THOUSAND/UL (ref 4.31–10.16)

## 2019-04-01 PROCEDURE — 80053 COMPREHEN METABOLIC PANEL: CPT

## 2019-04-01 PROCEDURE — 84443 ASSAY THYROID STIM HORMONE: CPT

## 2019-04-01 PROCEDURE — 83036 HEMOGLOBIN GLYCOSYLATED A1C: CPT

## 2019-04-01 PROCEDURE — 36415 COLL VENOUS BLD VENIPUNCTURE: CPT

## 2019-04-01 PROCEDURE — 85025 COMPLETE CBC W/AUTO DIFF WBC: CPT

## 2019-04-01 PROCEDURE — 80061 LIPID PANEL: CPT

## 2019-04-02 ENCOUNTER — OFFICE VISIT (OUTPATIENT)
Dept: INTERNAL MEDICINE CLINIC | Facility: CLINIC | Age: 64
End: 2019-04-02

## 2019-04-02 VITALS
SYSTOLIC BLOOD PRESSURE: 118 MMHG | WEIGHT: 279.54 LBS | DIASTOLIC BLOOD PRESSURE: 82 MMHG | HEART RATE: 72 BPM | HEIGHT: 64 IN | TEMPERATURE: 97.8 F | BODY MASS INDEX: 47.72 KG/M2

## 2019-04-02 DIAGNOSIS — Z00.00 WELLNESS EXAMINATION: Primary | ICD-10-CM

## 2019-04-02 PROCEDURE — 99213 OFFICE O/P EST LOW 20 MIN: CPT | Performed by: INTERNAL MEDICINE

## 2019-04-02 RX ORDER — LEVOTHYROXINE SODIUM 88 UG/1
88 CAPSULE ORAL
COMMUNITY
End: 2020-04-24 | Stop reason: SDUPTHER

## 2019-04-02 RX ORDER — ATORVASTATIN CALCIUM 10 MG/1
10 TABLET, FILM COATED ORAL DAILY
COMMUNITY
End: 2020-04-24 | Stop reason: SDUPTHER

## 2019-04-03 ENCOUNTER — TELEPHONE (OUTPATIENT)
Dept: INTERNAL MEDICINE CLINIC | Facility: CLINIC | Age: 64
End: 2019-04-03

## 2019-04-03 DIAGNOSIS — K59.00 CONSTIPATION: Primary | ICD-10-CM

## 2019-04-03 DIAGNOSIS — G62.9 PERIPHERAL NEUROPATHY: ICD-10-CM

## 2019-05-07 PROBLEM — Z01.419 ENCOUNTER FOR ANNUAL ROUTINE GYNECOLOGICAL EXAMINATION: Status: RESOLVED | Noted: 2018-08-21 | Resolved: 2019-05-07

## 2019-05-24 ENCOUNTER — TELEPHONE (OUTPATIENT)
Dept: MULTI SPECIALTY CLINIC | Facility: CLINIC | Age: 64
End: 2019-05-24

## 2019-06-06 ENCOUNTER — TRANSCRIBE ORDERS (OUTPATIENT)
Dept: ADMINISTRATIVE | Facility: HOSPITAL | Age: 64
End: 2019-06-06

## 2019-06-06 DIAGNOSIS — E04.1 NONTOXIC UNINODULAR GOITER: Primary | ICD-10-CM

## 2019-08-15 ENCOUNTER — HOSPITAL ENCOUNTER (OUTPATIENT)
Dept: RADIOLOGY | Facility: HOSPITAL | Age: 64
Discharge: HOME | End: 2019-08-15
Payer: MEDICARE

## 2019-08-15 ENCOUNTER — APPOINTMENT (OUTPATIENT)
Dept: LAB | Facility: HOSPITAL | Age: 64
End: 2019-08-15
Payer: MEDICARE

## 2019-08-15 ENCOUNTER — TRANSCRIBE ORDERS (OUTPATIENT)
Dept: LAB | Facility: HOSPITAL | Age: 64
End: 2019-08-15

## 2019-08-15 DIAGNOSIS — E13.8 DIABETES MELLITUS OF OTHER TYPE WITH COMPLICATION, UNSPECIFIED WHETHER LONG TERM INSULIN USE: ICD-10-CM

## 2019-08-15 DIAGNOSIS — E66.01 MORBID OBESITY (HCC): ICD-10-CM

## 2019-08-15 DIAGNOSIS — E78.2 MIXED HYPERLIPIDEMIA: ICD-10-CM

## 2019-08-15 DIAGNOSIS — I10 ESSENTIAL HYPERTENSION, MALIGNANT: ICD-10-CM

## 2019-08-15 DIAGNOSIS — E04.1 NONTOXIC UNINODULAR GOITER: ICD-10-CM

## 2019-08-15 DIAGNOSIS — R10.11 RUQ PAIN: ICD-10-CM

## 2019-08-15 DIAGNOSIS — E13.8 DIABETES MELLITUS OF OTHER TYPE WITH COMPLICATION, UNSPECIFIED WHETHER LONG TERM INSULIN USE: Primary | ICD-10-CM

## 2019-08-15 LAB
ALBUMIN SERPL BCP-MCNC: 3.5 G/DL (ref 3.5–5)
ALP SERPL-CCNC: 79 U/L (ref 46–116)
ALT SERPL W P-5'-P-CCNC: 39 U/L (ref 12–78)
ANION GAP SERPL CALCULATED.3IONS-SCNC: 6 MMOL/L (ref 4–13)
AST SERPL W P-5'-P-CCNC: 31 U/L (ref 5–45)
BASOPHILS # BLD AUTO: 0.02 THOUSANDS/ΜL (ref 0–0.1)
BASOPHILS NFR BLD AUTO: 0 % (ref 0–1)
BILIRUB SERPL-MCNC: 0.38 MG/DL (ref 0.2–1)
BUN SERPL-MCNC: 11 MG/DL (ref 5–25)
CALCIUM SERPL-MCNC: 8.8 MG/DL (ref 8.3–10.1)
CHLORIDE SERPL-SCNC: 107 MMOL/L (ref 100–108)
CHOLEST SERPL-MCNC: 186 MG/DL (ref 50–200)
CO2 SERPL-SCNC: 28 MMOL/L (ref 21–32)
CREAT SERPL-MCNC: 0.68 MG/DL (ref 0.6–1.3)
EOSINOPHIL # BLD AUTO: 0.07 THOUSAND/ΜL (ref 0–0.61)
EOSINOPHIL NFR BLD AUTO: 1 % (ref 0–6)
ERYTHROCYTE [DISTWIDTH] IN BLOOD BY AUTOMATED COUNT: 13.4 % (ref 11.6–15.1)
EST. AVERAGE GLUCOSE BLD GHB EST-MCNC: 140 MG/DL
GFR SERPL CREATININE-BSD FRML MDRD: 93 ML/MIN/1.73SQ M
GLUCOSE P FAST SERPL-MCNC: 111 MG/DL (ref 65–99)
HBA1C MFR BLD: 6.5 % (ref 4.2–6.3)
HCT VFR BLD AUTO: 42 % (ref 34.8–46.1)
HDLC SERPL-MCNC: 47 MG/DL (ref 40–60)
HGB BLD-MCNC: 13.2 G/DL (ref 11.5–15.4)
IMM GRANULOCYTES # BLD AUTO: 0.03 THOUSAND/UL (ref 0–0.2)
IMM GRANULOCYTES NFR BLD AUTO: 0 % (ref 0–2)
LDLC SERPL CALC-MCNC: 112 MG/DL (ref 0–100)
LYMPHOCYTES # BLD AUTO: 2.21 THOUSANDS/ΜL (ref 0.6–4.47)
LYMPHOCYTES NFR BLD AUTO: 30 % (ref 14–44)
MCH RBC QN AUTO: 27.5 PG (ref 26.8–34.3)
MCHC RBC AUTO-ENTMCNC: 31.4 G/DL (ref 31.4–37.4)
MCV RBC AUTO: 88 FL (ref 82–98)
MONOCYTES # BLD AUTO: 0.73 THOUSAND/ΜL (ref 0.17–1.22)
MONOCYTES NFR BLD AUTO: 10 % (ref 4–12)
NEUTROPHILS # BLD AUTO: 4.39 THOUSANDS/ΜL (ref 1.85–7.62)
NEUTS SEG NFR BLD AUTO: 59 % (ref 43–75)
NONHDLC SERPL-MCNC: 139 MG/DL
NRBC BLD AUTO-RTO: 0 /100 WBCS
PLATELET # BLD AUTO: 289 THOUSANDS/UL (ref 149–390)
PMV BLD AUTO: 11.4 FL (ref 8.9–12.7)
POTASSIUM SERPL-SCNC: 4.2 MMOL/L (ref 3.5–5.3)
PROT SERPL-MCNC: 7.7 G/DL (ref 6.4–8.2)
RBC # BLD AUTO: 4.8 MILLION/UL (ref 3.81–5.12)
SODIUM SERPL-SCNC: 141 MMOL/L (ref 136–145)
TRIGL SERPL-MCNC: 134 MG/DL
TSH SERPL DL<=0.05 MIU/L-ACNC: 1.77 UIU/ML (ref 0.36–3.74)
WBC # BLD AUTO: 7.45 THOUSAND/UL (ref 4.31–10.16)

## 2019-08-15 PROCEDURE — 36415 COLL VENOUS BLD VENIPUNCTURE: CPT

## 2019-08-15 PROCEDURE — 80053 COMPREHEN METABOLIC PANEL: CPT

## 2019-08-15 PROCEDURE — 76705 ECHO EXAM OF ABDOMEN: CPT

## 2019-08-15 PROCEDURE — 76536 US EXAM OF HEAD AND NECK: CPT

## 2019-08-15 PROCEDURE — 85025 COMPLETE CBC W/AUTO DIFF WBC: CPT

## 2019-08-15 PROCEDURE — 80061 LIPID PANEL: CPT

## 2019-08-15 PROCEDURE — 83036 HEMOGLOBIN GLYCOSYLATED A1C: CPT

## 2019-08-15 PROCEDURE — 84443 ASSAY THYROID STIM HORMONE: CPT

## 2019-08-22 ENCOUNTER — OFFICE VISIT (OUTPATIENT)
Dept: INTERNAL MEDICINE CLINIC | Facility: CLINIC | Age: 64
End: 2019-08-22

## 2019-08-22 VITALS
HEIGHT: 64 IN | SYSTOLIC BLOOD PRESSURE: 116 MMHG | TEMPERATURE: 97.8 F | BODY MASS INDEX: 48.4 KG/M2 | HEART RATE: 72 BPM | WEIGHT: 283.51 LBS | DIASTOLIC BLOOD PRESSURE: 70 MMHG

## 2019-08-22 DIAGNOSIS — E03.9 HYPOTHYROIDISM, UNSPECIFIED TYPE: ICD-10-CM

## 2019-08-22 DIAGNOSIS — Z00.00 HEALTHCARE MAINTENANCE: ICD-10-CM

## 2019-08-22 DIAGNOSIS — I10 ESSENTIAL HYPERTENSION: ICD-10-CM

## 2019-08-22 DIAGNOSIS — K21.9 GASTROESOPHAGEAL REFLUX DISEASE, ESOPHAGITIS PRESENCE NOT SPECIFIED: Primary | ICD-10-CM

## 2019-08-22 DIAGNOSIS — E11.9 TYPE 2 DIABETES MELLITUS WITHOUT COMPLICATION, WITHOUT LONG-TERM CURRENT USE OF INSULIN (HCC): ICD-10-CM

## 2019-08-22 DIAGNOSIS — Z12.39 SCREENING FOR BREAST CANCER: ICD-10-CM

## 2019-08-22 DIAGNOSIS — M50.30 DEGENERATIVE CERVICAL DISC: ICD-10-CM

## 2019-08-22 DIAGNOSIS — M62.838 MUSCLE SPASM: ICD-10-CM

## 2019-08-22 PROCEDURE — 99213 OFFICE O/P EST LOW 20 MIN: CPT | Performed by: INTERNAL MEDICINE

## 2019-08-22 RX ORDER — CYCLOBENZAPRINE HCL 10 MG
10 TABLET ORAL
Qty: 30 TABLET | Refills: 3 | Status: SHIPPED | OUTPATIENT
Start: 2019-08-22 | End: 2020-04-24 | Stop reason: SDUPTHER

## 2019-08-22 RX ORDER — OMEPRAZOLE 20 MG/1
20 CAPSULE, DELAYED RELEASE ORAL DAILY
Qty: 40 CAPSULE | Refills: 0 | Status: SHIPPED | OUTPATIENT
Start: 2019-08-22 | End: 2020-04-24 | Stop reason: SDUPTHER

## 2019-08-22 RX ORDER — SENNOSIDES 8.6 MG
650 CAPSULE ORAL EVERY 8 HOURS PRN
Qty: 90 TABLET | Refills: 3 | Status: SHIPPED | OUTPATIENT
Start: 2019-08-22 | End: 2020-04-24 | Stop reason: SDUPTHER

## 2019-08-22 RX ORDER — SENNOSIDES 8.6 MG
650 CAPSULE ORAL EVERY 8 HOURS PRN
COMMUNITY
End: 2019-08-22 | Stop reason: SDUPTHER

## 2019-08-22 NOTE — ASSESSMENT & PLAN NOTE
Currently taking 88 mcg of levothyroxine Monday through Friday and 100 mcg on the weekends  TSH within normal limits in 8/2019  Stable thyroid nodules on recent ultrasound which did not meet the criteria for biopsy  Will monitor nodules yearly

## 2019-08-22 NOTE — ASSESSMENT & PLAN NOTE
Cervical Cancer Screening  · Last pap smear in 2016  Results were NILM, neg HPV  · F/u with Gyn for annual meeting  Breast Cancer Screening  · Last mammogram in 6/2018  Results were BiRad-2  Due 6/2019  Slip given today  Colon cancer:   · No records of last colonoscopy  Needs FIT test or Colonoscopy     Immunizations  · Needs Tdap

## 2019-08-22 NOTE — PROGRESS NOTES
ASSESSMENT/PLAN:  Problem List Items Addressed This Visit        Digestive    GERD (gastroesophageal reflux disease) - Primary     Very slightly more exacerbated  Patient takes Tums with minimal relief  Will prescribe omeprazole at this time and do a trial of PPI  Relevant Medications    omeprazole (PriLOSEC) 20 mg delayed release capsule       Endocrine    Hypothyroidism     Currently taking 88 mcg of levothyroxine Monday through Friday and 100 mcg on the weekends  TSH within normal limits in 8/2019  Stable thyroid nodules on recent ultrasound which did not meet the criteria for biopsy  Will monitor nodules yearly  Type 2 diabetes mellitus (HCC)     Lab Results   Component Value Date    HGBA1C 6 5 (H) 08/15/2019       Hemoglobin A1C:  Last hemoglobin A1c was 6 3 currently 6 5    Diabetic Medications:   Currently denies some modifications and diet control diabetes  Patient was supposed to be on metformin however is unable to tolerated  Microvascular Complications  Retinopathy   Diabetic Eye Exam-Due for eye exam      Neuropathy   Diabetic Foot Exam- last podiatry visit was 5/2018  Needs foot exam next visit   Podiatry follow-up    Nephropathy   Microalbumin/Creatinine Ratio- Due next visit   ARB/ACE     Macrovascular Complications- No   No hx of Coronary Artery Disease, Peripheral Artery Disease, Stroke   Uses aspirin for primary prevention   On atorvastatin with poor compliance                    Cardiovascular and Mediastinum    Hypertension     Controlled continue current management            Musculoskeletal and Integument    Degenerative cervical disc     Stable  Use Tylenol for pain as needed  Relevant Medications    acetaminophen (TYLENOL 8 HOUR ARTHRITIS PAIN) 650 mg CR tablet       Other    Healthcare maintenance     Cervical Cancer Screening  · Last pap smear in 2016  Results were NILM, neg HPV  · F/u with Gyn for annual meeting     Breast Cancer Screening  · Last mammogram in 6/2018  Results were BiRad-2  Due 6/2019  Slip given today  Colon cancer:   · No records of last colonoscopy  Needs FIT test or Colonoscopy  Immunizations  · Needs Tdap            Relevant Orders    Ambulatory referral to Gynecology      Other Visit Diagnoses     Screening for breast cancer        Relevant Orders    Ambulatory referral to Gynecology    Mammo screening bilateral w 3d & cad    Muscle spasm      Exacerbated by movements at home and physical activity  Flexeril helped in the past      Relevant Medications    cyclobenzaprine (FLEXERIL) 10 mg tablet              CHIEF COMPLAINT: F/U    HISTORY OF PRESENT ILLNESS:  *73-year-old female past medical history of type 2 diabetes, hyperlipidemia, obesity, history of thyroid nodules, degenerative disc disease presented for follow-up of chronic medical conditions  Patient states she has been having symptoms of urinary urgency and loss of urine during coughing or laughing  Patient states the symptoms have been ongoing for the past several months  As require her to order stress incontinence diapers for these symptoms  She states the "water pill" does not help with the symptoms  She is also experiencing muscle spasms lateral side of her spine  She does a lot of lifting and is the primary caregiver of her brother and father which requires her to be physically active and this has exacerbated her symptoms  Otherwise she states she's been having a lot of anxiety with food  Review of Systems   Constitutional: Negative for activity change, chills and fever  Eyes: Negative for visual disturbance  Respiratory: Negative for cough, chest tightness and shortness of breath  Cardiovascular: Negative for chest pain and leg swelling  Gastrointestinal: Negative for abdominal pain, constipation, diarrhea, nausea and vomiting  Endocrine: Negative for cold intolerance and heat intolerance     Genitourinary: Positive for urgency  Negative for difficulty urinating, frequency, vaginal discharge and vaginal pain  Musculoskeletal: Positive for arthralgias and back pain  Negative for gait problem  Skin: Negative for rash  Allergic/Immunologic: Negative  Neurological: Negative  Negative for dizziness, weakness and light-headedness  Hematological: Negative  Psychiatric/Behavioral: Negative  All other systems reviewed and are negative  OBJECTIVE:  Vitals:    08/22/19 1114   BP: 116/70   Pulse: 72   Temp: 97 8 °F (36 6 °C)   Weight: 129 kg (283 lb 8 2 oz)   Height: 5' 4" (1 626 m)     Physical Exam   Constitutional: She is oriented to person, place, and time  She appears well-developed and well-nourished  HENT:   Head: Normocephalic and atraumatic  Mouth/Throat: No oropharyngeal exudate  Eyes: Pupils are equal, round, and reactive to light  EOM are normal    Neck: Normal range of motion  Neck supple  Cardiovascular: Normal rate, regular rhythm, normal heart sounds and intact distal pulses  Pulmonary/Chest: Effort normal and breath sounds normal  No respiratory distress  Abdominal: Soft  Bowel sounds are normal  There is no tenderness  Musculoskeletal: She exhibits tenderness (paraspinal region with tension of muscles)  She exhibits no edema  Neurological: She is alert and oriented to person, place, and time  Skin: Skin is warm and dry  Capillary refill takes less than 2 seconds  Psychiatric: She has a normal mood and affect  Current Outpatient Medications:     acetaminophen (TYLENOL 8 HOUR ARTHRITIS PAIN) 650 mg CR tablet, Take 1 tablet (650 mg total) by mouth every 8 (eight) hours as needed for mild pain, Disp: 90 tablet, Rfl: 3    aspirin 81 MG tablet, Take 1 tablet (81 mg total) by mouth daily, Disp: 90 tablet, Rfl: 3    atorvastatin (LIPITOR) 10 mg tablet, Take 10 mg by mouth daily, Disp: , Rfl:     Cholecalciferol (VITAMIN D HIGH POTENCY PO), Take by mouth   Takes 1x/week, Disp: , Rfl:     fluticasone (FLONASE) 50 mcg/act nasal spray, 1 spray into each nostril daily, Disp: 16 g, Rfl: 0    levothyroxine 100 mcg tablet, Take 100 mcg by mouth 2 (two) times a week, Disp: , Rfl:     Levothyroxine Sodium 88 MCG CAPS, Take 88 mcg by mouth, Disp: , Rfl:     lisinopril-hydrochlorothiazide (PRINZIDE,ZESTORETIC) 10-12 5 MG per tablet, Take 1 tablet by mouth daily, Disp: 90 tablet, Rfl: 3    naproxen (NAPROSYN) 250 mg tablet, Take 250 mg by mouth as needed for mild pain , Disp: , Rfl:     omeprazole (PriLOSEC) 20 mg delayed release capsule, Take 1 capsule (20 mg total) by mouth daily, Disp: 40 capsule, Rfl: 0    psyllium (METAMUCIL SMOOTH TEXTURE) 28 % packet, Take 1 packet by mouth 2 (two) times a day as needed (constipation), Disp: 30 packet, Rfl: 2    ACCU-CHEK FASTCLIX LANCETS MISC, , Disp: , Rfl:     Blood Glucose Monitoring Suppl (ACCU-CHEK ESVIN SMARTVIEW) w/Device KIT, use as directed, Disp: , Rfl:     Cholecalciferol ([employer]S VITAMIN D) 2000 units CAPS, Take 1 capsule (4,000 Units total) by mouth daily for 180 days, Disp: 30 capsule, Rfl: 5    cyclobenzaprine (FLEXERIL) 10 mg tablet, Take 1 tablet (10 mg total) by mouth daily at bedtime, Disp: 30 tablet, Rfl: 3    glucose blood (ACCU-CHEK SMARTVIEW) test strip, , Disp: , Rfl:     hydrOXYzine HCL (ATARAX) 25 mg tablet, Take 1 tablet (25 mg total) by mouth every 12 (twelve) hours as needed for itching (Patient not taking: Reported on 4/2/2019), Disp: 30 tablet, Rfl: 2    ibuprofen (MOTRIN) 600 mg tablet, Take 1 tablet (600 mg total) by mouth every 6 (six) hours as needed for moderate pain (Patient not taking: Reported on 8/22/2019), Disp: 30 tablet, Rfl: 0    loratadine (CLARITIN) 10 mg tablet, Take 1 tablet (10 mg total) by mouth daily (Patient not taking: Reported on 4/2/2019), Disp: 30 tablet, Rfl: 0    Past Medical History:   Diagnosis Date    Arthritis     Depression     Disease of thyroid gland     hypo    Edema     LAST ASSESSED: [id number]    GERD (gastroesophageal reflux disease)     Hypercholesterolemia     Hyperlipidemia     Hypertension     WELL CONTROLLED  CURRENTLY ON LISINOPRIL   LAST ASSESSED: [id number]    Other headache syndrome     Other muscle spasm     LAST ASSESSED: [id number]    Ovarian cyst 2006    Renal calculi      (spontaneous vaginal delivery) 1975    FEMALE    Thyroid disease      Past Surgical History:   Procedure Laterality Date    BACK SURGERY      HERNIATED DISC (L4/L5)    CHOLECYSTECTOMY      TONSILLECTOMY       Social History     Socioeconomic History    Marital status: Single     Spouse name: Not on file    Number of children: Not on file    Years of education: Not on file    Highest education level: Not on file   Occupational History    Occupation: RETIRED   Social Needs    Financial resource strain: Not on file    Food insecurity:     Worry: Not on file     Inability: Not on file    Transportation needs:     Medical: Not on file     Non-medical: Not on file   Tobacco Use    Smoking status: Former Smoker    Smokeless tobacco: Never Used    Tobacco comment: pt "quit about 16 years ago"   Substance and Sexual Activity    Alcohol use: No    Drug use: No    Sexual activity: Never   Lifestyle    Physical activity:     Days per week: Not on file     Minutes per session: Not on file    Stress: Not on file   Relationships    Social connections:     Talks on phone: Not on file     Gets together: Not on file     Attends [employer] witness service: Not on file     Active member of club or organization: Not on file     Attends meetings of clubs or organizations: Not on file     Relationship status: Not on file    Intimate partner violence:     Fear of current or ex partner: Not on file     Emotionally abused: Not on file     Physically abused: Not on file     Forced sexual activity: Not on file   Other Topics Concern    Not on file   Social History Narrative    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         AS PER [employer]    EXERCISES REGULARLY    LIVES WITH FAMILY    NO CAFFEINE USE    NO LIVING WILL    NO TOBACCO/SMOKE EXPOSURE    UNEMPLOYED     Family History   Problem Relation Age of Onset    Lung cancer Mother     Cancer Mother         MALIGNANT NEOPLASM    Hypertension Father     Diabetes Brother     Hypertension Son     Lung disease Son     Hyperthyroidism Maternal Aunt     Hypothyroidism Maternal Aunt     Heart disease Other         Francia Klinefelter    Neuropathy Family        JESSICA Ann 74 Internal Medicine PGY-3  [facility]  [address]  [address] [facility]  [address] [facility]  [facility], [address] [place] [address]

## 2019-08-22 NOTE — ASSESSMENT & PLAN NOTE
Lab Results   Component Value Date    HGBA1C 6 5 (H) 08/15/2019       Hemoglobin A1C:  Last hemoglobin A1c was 6 3 currently 6 5    Diabetic Medications:   Currently denies some modifications and diet control diabetes  Patient was supposed to be on metformin however is unable to tolerated      Microvascular Complications  Retinopathy   Diabetic Eye Exam-    Neuropathy   Diabetic Foot Exam-    Podiatry follow-up    Nephropathy   Microalbumin/Creatinine Ratio   ARB/ACE     Macrovascular Complications- No   Coronary Artery Disease   Peripheral Artery Disease   Stroke

## 2019-08-22 NOTE — ASSESSMENT & PLAN NOTE
Very slightly more exacerbated  Patient takes Tums with minimal relief  Will prescribe omeprazole at this time and do a trial of PPI

## 2019-08-29 ENCOUNTER — TELEPHONE (OUTPATIENT)
Dept: INTERNAL MEDICINE CLINIC | Facility: CLINIC | Age: 64
End: 2019-08-29

## 2019-08-29 NOTE — TELEPHONE ENCOUNTER
Patient call back for the US upper quadrant results   Inform patient the everything came back normal  No more questions at this time

## 2019-10-21 ENCOUNTER — CLINICAL SUPPORT (OUTPATIENT)
Dept: INTERNAL MEDICINE CLINIC | Facility: CLINIC | Age: 64
End: 2019-10-21

## 2019-10-21 DIAGNOSIS — Z23 NEED FOR PROPHYLACTIC VACCINATION AND INOCULATION AGAINST INFLUENZA: Primary | ICD-10-CM

## 2019-10-21 PROCEDURE — 90682 RIV4 VACC RECOMBINANT DNA IM: CPT | Performed by: INTERNAL MEDICINE

## 2019-10-21 PROCEDURE — 90471 IMMUNIZATION ADMIN: CPT | Performed by: INTERNAL MEDICINE

## 2019-10-21 NOTE — PROGRESS NOTES
Patient on nurse schedule today for flu shot  Flublok was administered in patient's left deltoid and patient tolerated well

## 2020-02-14 ENCOUNTER — TELEPHONE (OUTPATIENT)
Dept: INTERNAL MEDICINE CLINIC | Facility: CLINIC | Age: 65
End: 2020-02-14

## 2020-02-21 ENCOUNTER — HOSPITAL ENCOUNTER (INPATIENT)
Facility: HOSPITAL | Age: 65
LOS: 3 days | Discharge: NURSING, REHAB OR LONG-TERM CARE | DRG: 312 | End: 2020-02-25
Attending: EMERGENCY MEDICINE | Admitting: INTERNAL MEDICINE
Payer: MEDICARE

## 2020-02-21 DIAGNOSIS — M54.2 NECK PAIN: ICD-10-CM

## 2020-02-21 DIAGNOSIS — E03.9 HYPOTHYROIDISM, UNSPECIFIED TYPE: ICD-10-CM

## 2020-02-21 DIAGNOSIS — I66.3 CEREBELLAR ARTERY OCCLUSION OR STENOSIS: Primary | ICD-10-CM

## 2020-02-21 DIAGNOSIS — I67.1 ANEURYSM OF INTERNAL CAROTID ARTERY: ICD-10-CM

## 2020-02-21 DIAGNOSIS — E11.9 TYPE 2 DIABETES MELLITUS WITHOUT COMPLICATION, WITHOUT LONG-TERM CURRENT USE OF INSULIN (HCC): ICD-10-CM

## 2020-02-21 DIAGNOSIS — K21.9 GASTROESOPHAGEAL REFLUX DISEASE, ESOPHAGITIS PRESENCE NOT SPECIFIED: ICD-10-CM

## 2020-02-21 DIAGNOSIS — I10 ESSENTIAL HYPERTENSION: ICD-10-CM

## 2020-02-21 DIAGNOSIS — M62.838 TRAPEZIUS MUSCLE SPASM: ICD-10-CM

## 2020-02-21 DIAGNOSIS — M47.812 CERVICAL SPONDYLOSIS WITHOUT MYELOPATHY: ICD-10-CM

## 2020-02-21 PROCEDURE — 96372 THER/PROPH/DIAG INJ SC/IM: CPT

## 2020-02-21 PROCEDURE — 99285 EMERGENCY DEPT VISIT HI MDM: CPT

## 2020-02-21 RX ORDER — LIDOCAINE 50 MG/G
1 PATCH TOPICAL ONCE
Status: DISCONTINUED | OUTPATIENT
Start: 2020-02-21 | End: 2020-02-22

## 2020-02-21 RX ORDER — DIAZEPAM 5 MG/1
5 TABLET ORAL ONCE
Status: COMPLETED | OUTPATIENT
Start: 2020-02-22 | End: 2020-02-22

## 2020-02-21 RX ORDER — KETOROLAC TROMETHAMINE 30 MG/ML
15 INJECTION, SOLUTION INTRAMUSCULAR; INTRAVENOUS ONCE
Status: COMPLETED | OUTPATIENT
Start: 2020-02-21 | End: 2020-02-21

## 2020-02-21 RX ORDER — ACETAMINOPHEN 325 MG/1
975 TABLET ORAL ONCE
Status: COMPLETED | OUTPATIENT
Start: 2020-02-21 | End: 2020-02-21

## 2020-02-21 RX ADMIN — ACETAMINOPHEN 975 MG: 325 TABLET ORAL at 23:33

## 2020-02-21 RX ADMIN — KETOROLAC TROMETHAMINE 15 MG: 30 INJECTION, SOLUTION INTRAMUSCULAR at 23:34

## 2020-02-22 ENCOUNTER — APPOINTMENT (EMERGENCY)
Dept: RADIOLOGY | Facility: HOSPITAL | Age: 65
DRG: 312 | End: 2020-02-22
Payer: MEDICARE

## 2020-02-22 ENCOUNTER — APPOINTMENT (OUTPATIENT)
Dept: RADIOLOGY | Facility: HOSPITAL | Age: 65
DRG: 312 | End: 2020-02-22
Payer: MEDICARE

## 2020-02-22 PROBLEM — I67.1 ANEURYSM OF INTERNAL CAROTID ARTERY: Status: ACTIVE | Noted: 2020-02-22

## 2020-02-22 LAB
ANION GAP SERPL CALCULATED.3IONS-SCNC: 6 MMOL/L (ref 4–13)
BUN SERPL-MCNC: 17 MG/DL (ref 5–25)
CALCIUM SERPL-MCNC: 9.1 MG/DL (ref 8.3–10.1)
CHLORIDE SERPL-SCNC: 107 MMOL/L (ref 100–108)
CHOLEST SERPL-MCNC: 190 MG/DL (ref 50–200)
CO2 SERPL-SCNC: 27 MMOL/L (ref 21–32)
CREAT SERPL-MCNC: 0.69 MG/DL (ref 0.6–1.3)
ERYTHROCYTE [DISTWIDTH] IN BLOOD BY AUTOMATED COUNT: 13.3 % (ref 11.6–15.1)
EST. AVERAGE GLUCOSE BLD GHB EST-MCNC: 143 MG/DL
GFR SERPL CREATININE-BSD FRML MDRD: 93 ML/MIN/1.73SQ M
GLUCOSE SERPL-MCNC: 106 MG/DL (ref 65–140)
GLUCOSE SERPL-MCNC: 110 MG/DL (ref 65–140)
GLUCOSE SERPL-MCNC: 124 MG/DL (ref 65–140)
GLUCOSE SERPL-MCNC: 91 MG/DL (ref 65–140)
GLUCOSE SERPL-MCNC: 95 MG/DL (ref 65–140)
HBA1C MFR BLD: 6.6 %
HCT VFR BLD AUTO: 39 % (ref 34.8–46.1)
HDLC SERPL-MCNC: 46 MG/DL
HGB BLD-MCNC: 12 G/DL (ref 11.5–15.4)
LDLC SERPL CALC-MCNC: 117 MG/DL (ref 0–100)
MCH RBC QN AUTO: 27.4 PG (ref 26.8–34.3)
MCHC RBC AUTO-ENTMCNC: 30.8 G/DL (ref 31.4–37.4)
MCV RBC AUTO: 89 FL (ref 82–98)
NONHDLC SERPL-MCNC: 144 MG/DL
PLATELET # BLD AUTO: 234 THOUSANDS/UL (ref 149–390)
PMV BLD AUTO: 11.3 FL (ref 8.9–12.7)
POTASSIUM SERPL-SCNC: 4.3 MMOL/L (ref 3.5–5.3)
RBC # BLD AUTO: 4.38 MILLION/UL (ref 3.81–5.12)
SODIUM SERPL-SCNC: 140 MMOL/L (ref 136–145)
TRIGL SERPL-MCNC: 137 MG/DL
TSH SERPL DL<=0.05 MIU/L-ACNC: 2.17 UIU/ML (ref 0.36–3.74)
WBC # BLD AUTO: 9.5 THOUSAND/UL (ref 4.31–10.16)

## 2020-02-22 PROCEDURE — 99223 1ST HOSP IP/OBS HIGH 75: CPT | Performed by: PHYSICIAN ASSISTANT

## 2020-02-22 PROCEDURE — 70498 CT ANGIOGRAPHY NECK: CPT

## 2020-02-22 PROCEDURE — 70496 CT ANGIOGRAPHY HEAD: CPT

## 2020-02-22 PROCEDURE — 83036 HEMOGLOBIN GLYCOSYLATED A1C: CPT | Performed by: STUDENT IN AN ORGANIZED HEALTH CARE EDUCATION/TRAINING PROGRAM

## 2020-02-22 PROCEDURE — 99285 EMERGENCY DEPT VISIT HI MDM: CPT | Performed by: EMERGENCY MEDICINE

## 2020-02-22 PROCEDURE — 80048 BASIC METABOLIC PNL TOTAL CA: CPT | Performed by: EMERGENCY MEDICINE

## 2020-02-22 PROCEDURE — 70551 MRI BRAIN STEM W/O DYE: CPT

## 2020-02-22 PROCEDURE — 80061 LIPID PANEL: CPT | Performed by: STUDENT IN AN ORGANIZED HEALTH CARE EDUCATION/TRAINING PROGRAM

## 2020-02-22 PROCEDURE — 97166 OT EVAL MOD COMPLEX 45 MIN: CPT

## 2020-02-22 PROCEDURE — 82948 REAGENT STRIP/BLOOD GLUCOSE: CPT

## 2020-02-22 PROCEDURE — 36415 COLL VENOUS BLD VENIPUNCTURE: CPT | Performed by: EMERGENCY MEDICINE

## 2020-02-22 PROCEDURE — 84443 ASSAY THYROID STIM HORMONE: CPT | Performed by: STUDENT IN AN ORGANIZED HEALTH CARE EDUCATION/TRAINING PROGRAM

## 2020-02-22 PROCEDURE — 85027 COMPLETE CBC AUTOMATED: CPT | Performed by: STUDENT IN AN ORGANIZED HEALTH CARE EDUCATION/TRAINING PROGRAM

## 2020-02-22 PROCEDURE — 70544 MR ANGIOGRAPHY HEAD W/O DYE: CPT

## 2020-02-22 RX ORDER — ATORVASTATIN CALCIUM 40 MG/1
40 TABLET, FILM COATED ORAL
Status: DISCONTINUED | OUTPATIENT
Start: 2020-02-22 | End: 2020-02-25 | Stop reason: HOSPADM

## 2020-02-22 RX ORDER — ONDANSETRON 4 MG/1
4 TABLET, ORALLY DISINTEGRATING ORAL ONCE
Status: COMPLETED | OUTPATIENT
Start: 2020-02-22 | End: 2020-02-22

## 2020-02-22 RX ORDER — LIDOCAINE 50 MG/G
1 PATCH TOPICAL ONCE
Status: COMPLETED | OUTPATIENT
Start: 2020-02-22 | End: 2020-02-22

## 2020-02-22 RX ORDER — LEVOTHYROXINE SODIUM 88 UG/1
88 TABLET ORAL
Status: DISCONTINUED | OUTPATIENT
Start: 2020-02-24 | End: 2020-02-25 | Stop reason: HOSPADM

## 2020-02-22 RX ORDER — IBUPROFEN 600 MG/1
600 TABLET ORAL EVERY 6 HOURS PRN
Status: DISCONTINUED | OUTPATIENT
Start: 2020-02-22 | End: 2020-02-22

## 2020-02-22 RX ORDER — ACETAMINOPHEN 325 MG/1
975 TABLET ORAL EVERY 8 HOURS PRN
Status: DISCONTINUED | OUTPATIENT
Start: 2020-02-22 | End: 2020-02-22

## 2020-02-22 RX ORDER — ACETAMINOPHEN 325 MG/1
975 TABLET ORAL EVERY 8 HOURS PRN
Status: DISCONTINUED | OUTPATIENT
Start: 2020-02-22 | End: 2020-02-25 | Stop reason: HOSPADM

## 2020-02-22 RX ORDER — ASPIRIN 81 MG/1
81 TABLET, CHEWABLE ORAL DAILY
Status: DISCONTINUED | OUTPATIENT
Start: 2020-02-22 | End: 2020-02-25 | Stop reason: HOSPADM

## 2020-02-22 RX ORDER — DIAZEPAM 5 MG/1
5 TABLET ORAL EVERY 6 HOURS PRN
Status: DISCONTINUED | OUTPATIENT
Start: 2020-02-22 | End: 2020-02-25 | Stop reason: HOSPADM

## 2020-02-22 RX ORDER — LEVOTHYROXINE SODIUM 0.1 MG/1
100 TABLET ORAL
Status: COMPLETED | OUTPATIENT
Start: 2020-02-22 | End: 2020-02-23

## 2020-02-22 RX ORDER — LORAZEPAM 2 MG/ML
1 INJECTION INTRAMUSCULAR ONCE
Status: COMPLETED | OUTPATIENT
Start: 2020-02-22 | End: 2020-02-22

## 2020-02-22 RX ORDER — METHOCARBAMOL 750 MG/1
750 TABLET, FILM COATED ORAL EVERY 6 HOURS PRN
Status: DISCONTINUED | OUTPATIENT
Start: 2020-02-22 | End: 2020-02-25 | Stop reason: HOSPADM

## 2020-02-22 RX ADMIN — DIAZEPAM 5 MG: 5 TABLET ORAL at 08:12

## 2020-02-22 RX ADMIN — METHOCARBAMOL TABLETS 750 MG: 750 TABLET, COATED ORAL at 08:12

## 2020-02-22 RX ADMIN — ACETAMINOPHEN 975 MG: 325 TABLET ORAL at 19:15

## 2020-02-22 RX ADMIN — ASPIRIN 81 MG 81 MG: 81 TABLET ORAL at 08:12

## 2020-02-22 RX ADMIN — METHOCARBAMOL TABLETS 750 MG: 750 TABLET, COATED ORAL at 19:15

## 2020-02-22 RX ADMIN — IOHEXOL 100 ML: 350 INJECTION, SOLUTION INTRAVENOUS at 01:54

## 2020-02-22 RX ADMIN — LORAZEPAM 1 MG: 2 INJECTION INTRAMUSCULAR; INTRAVENOUS at 09:59

## 2020-02-22 RX ADMIN — LIDOCAINE 1 PATCH: 50 PATCH TOPICAL at 00:05

## 2020-02-22 RX ADMIN — LIDOCAINE 1 PATCH: 50 PATCH TOPICAL at 08:17

## 2020-02-22 RX ADMIN — LEVOTHYROXINE SODIUM 100 MCG: 100 TABLET ORAL at 05:45

## 2020-02-22 RX ADMIN — DIAZEPAM 5 MG: 5 TABLET ORAL at 00:05

## 2020-02-22 RX ADMIN — LISINOPRIL: 10 TABLET ORAL at 05:44

## 2020-02-22 RX ADMIN — ACETAMINOPHEN 975 MG: 325 TABLET ORAL at 08:12

## 2020-02-22 RX ADMIN — ONDANSETRON 4 MG: 4 TABLET, ORALLY DISINTEGRATING ORAL at 00:43

## 2020-02-22 RX ADMIN — ATORVASTATIN CALCIUM 40 MG: 40 TABLET, FILM COATED ORAL at 16:41

## 2020-02-22 RX ADMIN — DIAZEPAM 5 MG: 5 TABLET ORAL at 19:15

## 2020-02-22 NOTE — PLAN OF CARE
Problem: OCCUPATIONAL THERAPY ADULT  Goal: Performs self-care activities at highest level of function for planned discharge setting  See evaluation for individualized goals  Description  Treatment Interventions: ADL retraining, Functional transfer training, Endurance training, Cognitive reorientation, Patient/family training, Fine motor coordination activities, Activityengagement          See flowsheet documentation for full assessment, interventions and recommendations  Outcome: Progressing  Note:   Limitation: Decreased ADL status, Decreased endurance, Decreased self-care trans, Decreased high-level ADLs  Prognosis: Good  Assessment: Patient is a 63 y/o female admitted with c/o neck pain, HA and chronic dizziness  CTA: ? occlusion R cerebellar artery with 4-5 mm aneurysm R ICA  and 2mm anneurysm L ICA, neurosurgery consult is pending  Patient is alert and oriented, noted self-limiting movement in cervical spine and turning entire body due to onset of dizziness  Patient also demonstrates decreased standing balance and general muscle weakness impacting occupational performance in areas of LB bathing/dressing, toileting, ADL transfers and functional mobility for homemaking tasks  Patient reports 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 who is a stroke survivor, she typically provides physical A with ADLs for him however brother is helping  From OT standpoint will await medical course of care to provide discharge recommendations however patient is not functioning at safe level to manage father's care if discharged home in current condition  Will continue to follow for OT 2-3x/week   Recommendation: PT consult  OT Discharge Recommendation: (will depend on medical course of care )  OT - OK to Discharge:  Yes

## 2020-02-22 NOTE — ASSESSMENT & PLAN NOTE
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

## 2020-02-22 NOTE — PLAN OF CARE
Problem: MUSCULOSKELETAL - ADULT  Goal: Maintain or return mobility to safest level of function  Description  INTERVENTIONS:  - Assess patient's ability to carry out ADLs; assess patient's baseline for ADL function and identify physical deficits which impact ability to perform ADLs (bathing, care of mouth/teeth, toileting, grooming, dressing, etc )  - Assess/evaluate cause of self-care deficits   - Assess range of motion  - Assess patient's mobility  - Assess patient's need for assistive devices and provide as appropriate  - Encourage maximum independence but intervene and supervise when necessary  - Involve family in performance of ADLs  - Assess for home care needs following discharge   - Consider OT consult to assist with ADL evaluation and planning for discharge  - Provide patient education as appropriate  Outcome: Progressing  Goal: Maintain proper alignment of affected body part  Description  INTERVENTIONS:  - Support, maintain and protect limb and body alignment  - Provide patient/ family with appropriate education  Outcome: Progressing     Problem: PAIN - ADULT  Goal: Verbalizes/displays adequate comfort level or baseline comfort level  Description  Interventions:  - Encourage patient to monitor pain and request assistance  - Assess pain using appropriate pain scale  - Administer analgesics based on type and severity of pain and evaluate response  - Implement non-pharmacological measures as appropriate and evaluate response  - Consider cultural and social influences on pain and pain management  - Notify physician/advanced practitioner if interventions unsuccessful or patient reports new pain  Outcome: Progressing     Problem: SAFETY ADULT  Goal: Patient will remain free of falls  Description  INTERVENTIONS:  - Assess patient frequently for physical needs  -  Identify cognitive and physical deficits and behaviors that affect risk of falls  -  [place] fall precautions as indicated by assessment    - Educate patient/family on patient safety including physical limitations  - Instruct patient to call for assistance with activity based on assessment  - Modify environment to reduce risk of injury  - Consider OT/PT consult to assist with strengthening/mobility  Outcome: Progressing  Goal: Maintain or return to baseline ADL function  Description  INTERVENTIONS:  -  Assess patient's ability to carry out ADLs; assess patient's baseline for ADL function and identify physical deficits which impact ability to perform ADLs (bathing, care of mouth/teeth, toileting, grooming, dressing, etc )  - Assess/evaluate cause of self-care deficits   - Assess range of motion  - Assess patient's mobility; develop plan if impaired  - Assess patient's need for assistive devices and provide as appropriate  - Encourage maximum independence but intervene and supervise when necessary  - Involve family in performance of ADLs  - Assess for home care needs following discharge   - Consider OT consult to assist with ADL evaluation and planning for discharge  - Provide patient education as appropriate  Outcome: Progressing  Goal: Maintain or return mobility status to optimal level  Description  INTERVENTIONS:  - Assess patient's baseline mobility status (ambulation, transfers, stairs, etc )    - Identify cognitive and physical deficits and behaviors that affect mobility  - Identify mobility aids required to assist with transfers and/or ambulation (gait belt, sit-to-stand, lift, walker, cane, etc )  - [place] fall precautions as indicated by assessment  - Record patient progress and toleration of activity level on Mobility SBAR; progress patient to next Phase/Stage  - Instruct patient to call for assistance with activity based on assessment  - Consider rehabilitation consult to assist with strengthening/weightbearing, etc   Outcome: Progressing

## 2020-02-22 NOTE — OCCUPATIONAL THERAPY NOTE
Occupational Therapy Evaluation      Christiano 55    2020    Principal Problem:    Dizziness  Active Problems:    Degenerative cervical disc    Hypertension    Hypothyroidism    Morbid obesity ([facility] [address] )    Type 2 diabetes mellitus ([facility] [address] )    Aneurysm of internal carotid artery      Past Medical History:   Diagnosis Date    Arthritis     Depression     Disease of thyroid gland     hypo    Edema     LAST ASSESSED: [id number]    GERD (gastroesophageal reflux disease)     Hypercholesterolemia     Hyperlipidemia     Hypertension     WELL CONTROLLED  CURRENTLY ON LISINOPRIL   LAST ASSESSED: [id number]    Other headache syndrome     Other muscle spasm     LAST ASSESSED: [id number]    Ovarian cyst 2006    Renal calculi      (spontaneous vaginal delivery) 1975    FEMALE    Thyroid disease        Past Surgical History:   Procedure Laterality Date    BACK SURGERY      HERNIATED DISC (L4/L5)    CHOLECYSTECTOMY      TONSILLECTOMY        20 0910   Note Type   Note type Eval only   Restrictions/Precautions   Other Precautions Fall Risk   Pain Assessment   Pain Assessment 0-10   Pain Score 2   Pain Type Acute pain   Pain Location Head;Neck   Pain Orientation Anterior;Bilateral   Hospital Pain Intervention(s) Medication (See MAR)   Response to Interventions   (Reports having medication earlier )   Home Living   Type of [facility] One level   Bathroom Shower/Tub Tub/shower unit   Bathroom Toilet Standard   Bathroom Equipment Shower chair  (uses for her elderly father )   Prior Function   Level of Desha Independent with ADLs and functional mobility   Lives With Family  (Patient is primary caregiver for father who had a stroke )   ADL Assistance Independent   IADLs Independent  (no driving, takes the bus)   Lifestyle   Autonomy   (Patient is fully I with ADLs and IADLs PTA)   Reciprocal Relationships Primary caregiver to elderly father with CVA, reports her brothers are also in the area and they help him   Service to Others Retired   Psychosocial   Psychosocial (WDL) WDL   Subjective   Subjective   (I am too dizzy to be walking alone" )   ADL   Where Assessed Edge of bed   Eating Assistance 5  Supervision/Setup   Grooming Assistance 5  Supervision/Setup   UB Bathing Assistance 4  Minimal Assistance   LB [address] 3  Moderate Assistance   [address] 4  Minimal Assistance  (states brother has needed to A her with donning/doff shoes)   LB Dressing Assistance 3  Moderate Assistance  (reports brother A to don/doff shoes lately)   Toileting Assistance  5  Supervision/Setup   Transfers   Sit to Stand 4  Minimal assistance   Stand to Sit 4  Minimal assistance   Stand pivot 4  Minimal assistance   Additional items   (c/o dizziness with turning )   Functional Mobility   Functional Mobility 4  Minimal assistance   Balance   Static Sitting Good   Dynamic Sitting Fair   Static Standing Fair   Dynamic Standing Fair -   Ambulatory Fair -   Activity Tolerance   Activity Tolerance Patient limited by fatigue   RUE Assessment   RUE Assessment WFL  (limited internal rotation R shoulder, overall strength 3+/5 )   LUE Assessment   LUE Assessment WFL  (overall stregth 3+/5 )   Hand Function   Gross Motor Coordination   (limits cervical ROM in all ranges due to onset of dizziness )   Sensation   Additional Comments reports occasional numbness in lower legs/feet    Vision-Basic Assessment   Patient Visual Report   (no change in vision )   Vision - Complex Assessment   Ocular Range of Motion [facility]   Additional Comments d   Cognition   Overall Cognitive Status WFL   Arousal/Participation Alert; Responsive   Attention Within functional limits   Orientation Level Oriented X4   Memory Within functional limits; Other (Comment)   Following Commands Follows all commands and directions without difficulty   Assessment   Limitation Decreased ADL status; Decreased endurance;Decreased self-care trans;Decreased high-level ADLs   Prognosis Good   Assessment Patient is a 63 y/o female admitted with c/o neck pain, HA and chronic dizziness  CTA: ? occlusion R cerebellar artery with 4-5 mm aneurysm R ICA  and 2mm anneurysm L ICA, neurosurgery consult is pending  Patient is alert and oriented, noted self-limiting movement in cervical spine and turning entire body due to onset of dizziness  Patient also demonstrates decreased standing balance and general muscle weakness impacting occupational performance in areas of LB bathing/dressing, toileting, ADL transfers and functional mobility for homemaking tasks  Patient reports 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 who is a stroke survivor, she typically provides physical A with ADLs for him however brother is helping  From OT standpoint will await medical course of care to provide discharge recommendations however patient is not functioning at safe level to manage father's care if discharged home in current condition  Will continue to follow for OT 2-3x/week    Goals   Patient Goals   ("have less dizziness" )   LTG Time Frame 3-7   Long Term Goal #1 1  Patient will demonstrate G activity tolerance for ADL tasks such as grooming and bathing in stance at sink 2  Patient will increase LB ADLs to Mod I level with use of AE as needed to avoid positions that bring on dizziness and/or risk of falls 3  Patient will compelte all apsects of toileting at Mod I level 4  Patient will complete ADL transfers at Mod I Level with/without use of DME; 5  Patient will demonstrate G safety awanres during functional ADL/IADL tasks in order to avoid falls    Plan   Treatment Interventions ADL retraining;Functional transfer training; Endurance training;Cognitive reorientation;Patient/family training;Fine motor coordination activities; Activityengagement   Goal Expiration Date 02/29/20   OT Frequency 2-3x/wk   Recommendation   Recommendation PT consult   OT Discharge Recommendation   (will depend on medical course of care )   OT - OK to Discharge Yes   Barthel Index   Feeding 10   Bathing 0   Grooming Score 0   Dressing Score 5   Bladder Score 10   Bowels Score 10   Toilet Use Score 5   Transfers (Bed/Chair) Score 10   Mobility (Level Surface) Score 10   Stairs Score 0   Barthel Index Score 60   Michelle Cárdenas, OT

## 2020-02-22 NOTE — UTILIZATION REVIEW
Patient was observation 2/21/20 converted to inpatient admission 2/22/20 for continue stay workup ica aneurysm    Initial Clinical Review  Orders Placed This Encounter    Inpatient Admission     Standing Status:   Standing     Number of Occurrences:   1     Order Specific Question:   Admitting Physician     Answer:   Jhoana Swan [[id number]]     Order Specific Question:   Level of Care     Answer:   Med Surg [[id number]]     Order Specific Question:   Estimated length of stay     Answer:   More than 2 Midnights     Order Specific Question:   Certification     Answer:   I certify that inpatient services are medically necessary for this patient for a duration of greater than two midnights  See H&P and MD Progress Notes for additional information about the patient's course of treatment  ED Arrival Information     Expected Arrival Acuity Means of Arrival Escorted By Service Admission Type    - 2/21/2020 22:55 Urgent Wheelchair Family Member General Medicine Urgent    Arrival Complaint    neck pain/ headache        Chief Complaint   Patient presents with    Neck Pain     Pt c/o right sided neck pain since yesterday  Pt denies trauma or MVA     Assessment/Plan: 61-year-old female to ED from home with a past medical history of hypertension, type 2 diabetes- diet controlled, degenerative cervical disc disease, morbid obesity, hypothyroidism presents to the hospital with neck pain and chronic dizziness  Patient says the neck pain started yesterday and her neck feels very stiff  This is associated with an occipital headache  Patient reports dizziness since the last few months along with ambulatory dysfunction where she is swaying sometimes the right and sometimes to the left  She is very anxious about her symptoms    Patient denies any numbness or tingling, weakness in any extremity, falls  Dizziness  Assessment & Plan  · Patient reports ongoing dizziness since the last couple of months, associated with unsteady gait and swaying sometimes to the left and sometimes to the right side  · On exam patient has dysdiadochokinesia on the right side  · CTA of the head shows possible occlusion of the right cerebellar artery along with 4-5 mm aneurysm in the right ICA and 2 mm aneurysm in the left ICA  · Patient admitted for MRI/MRA of the brain  · Neurosurgery consulted for right and left ICA aneurysm for further recommendations and outpatient follow up  · Obtain lipid panel  · Change statin to high-intensity  · Continue aspirin 81 mg daily     Aneurysm of internal carotid artery  Assessment & Plan  · CTA of the head shows possible occlusion of the right cerebellar artery along with 4-5 mm aneurysm in the right ICA and 2 mm aneurysm in the left ICA  · Patient admitted for MRI/MRA of the brain  · Neurosurgery consulted for right and left ICA aneurysm for further recommendations and outpatient follow up  Type 2 diabetes mellitus ([facility] [address] )  Assessment & Plan        Lab Results   Component Value Date     HGBA1C 6 5 (H) 08/15/2019      No results for input(s): POCGLU in the last 72 hours  Blood Sugar Average: Last 72 hrs:  · Not on any medications  · SSI while in the hospital     Hypothyroidism  Assessment & Plan  · Continue levothyroxine  · TSH pending   Hypertension  Assessment & Plan  · Continue lisinopril-HCTZ   Degenerative cervical disc  Assessment & Plan  · On muscle relaxants, lidocaine patch, scheduled Tylenol  Neurosurgery   ? NSx evaluated the patient  Patient appears slightly sleepy because of the sedative medication for the procedure; otherwise alert and oriented x3  Neuro exam nonfocal, except slight tenderness on her posterior cervical spine and limited range of motion  Image findings as described above  Discussed with the neurovascular surgeon, Dr Ash Ellison and he reviewed the images, no MRI findings  He recommends If there is concern for SAH do  LP   Otherwise the CTA and MRA findings could be incidental, so  recommends outpatient follow-up in 2 weeks with Dr Renuka Ceja  No Neurosurgical procedure at this juncture  ED Triage Vitals   Temperature Pulse Respirations Blood Pressure SpO2   02/21/20 2307 02/21/20 2307 02/21/20 2307 02/21/20 2307 02/21/20 2307   97 5 °F (36 4 °C) 75 20 (!) 181/87 95 %      Temp Source Heart Rate Source Patient Position - Orthostatic VS BP Location FiO2 (%)   02/22/20 0500 02/21/20 2307 02/22/20 0500 02/22/20 0500 --   Oral Monitor Lying Left arm       Pain Score       02/21/20 2307       Worst Possible Pain        Wt Readings from Last 1 Encounters:   02/21/20 132 kg (290 lb)     Additional Vital Signs:   02/22/20 0500  97 7 °F (36 5 °C)  64  20  137/74  --  94 %  None (Room air)  Lying   02/22/20 0450  --  67  20  188/91Abnormal   --  94 %  None (Room air)  --   02/22/20 0330  --  68  20  164/97  122  94 %  None (Room air)  --   02/22/20 0205  --  61  20  160/72  --  93 %           Pertinent Labs/Diagnostic Test Results:   cta neck and brain  No acute intracranial process is seen  Attenuation of the right cerebellar artery approximately 1 cm distal to the origin, consider atherosclerotic disease/occlusion   Correlation with the patient's symptoms recommended   If there is concern for ischemia, MR of the brain may be of benefit for   further evaluation  4-5 mm superior medially oriented aneurysm of the right internal carotid artery in the supraclinoid region (axial image 80, series 5)       2 mm inferiorly oriented aneurysm of the supraclinoid portion of the left internal carotid artery (axial image 163, series 6 and sagittal image 61, series 602)       Severely hypoplastic right A1 segment   The left A1 segment appears unremarkable   Bilateral anterior cerebral arteries with normal enhancement otherwise  No significant stenosis within either internal carotid artery       Patent bilateral vertebral arteries  Degenerative changes of the cervical spine; most prominent at C5/C6 on the right     Results from last 7 days   Lab Units 02/22/20  0542   WBC Thousand/uL 9 50   HEMOGLOBIN g/dL 12 0   HEMATOCRIT % 39 0   PLATELETS Thousands/uL 234         Results from last 7 days   Lab Units 02/22/20  0031   SODIUM mmol/L 140   POTASSIUM mmol/L 4 3   CHLORIDE mmol/L 107   CO2 mmol/L 27   ANION GAP mmol/L 6   BUN mg/dL 17   CREATININE mg/dL 0 69   EGFR ml/min/1 73sq m 93   CALCIUM mg/dL 9 1         Results from last 7 days   Lab Units 02/22/20  0644   POC GLUCOSE mg/dl 106     Results from last 7 days   Lab Units 02/22/20  0031   GLUCOSE RANDOM mg/dL 91         Results from last 7 days   Lab Units 02/22/20  0542   HEMOGLOBIN A1C % 6 6*   EAG mg/dl 143     Results from last 7 days   Lab Units 02/22/20  0542   TSH 3RD GENERATON uIU/mL 2 170     ED Treatment:   Medication Administration from 02/21/2020 2255 to 02/22/2020 0510       Date/Time Order Dose Route Action Action by Comments     02/21/2020 2334 ketorolac (TORADOL) injection 15 mg 15 mg Intramuscular Given Therese Solomon RN      02/21/2020 2333 acetaminophen (TYLENOL) tablet 975 mg 975 mg Oral Given Therese Solomon RN      02/22/2020 0005 lidocaine (LIDODERM) 5 % patch 1 patch 1 patch Topical Medication Applied Therese Solomon RN      02/22/2020 0005 diazepam (VALIUM) tablet 5 mg 5 mg Oral Given Therese Solomon RN      02/22/2020 0043 ondansetron (ZOFRAN-ODT) dispersible tablet 4 mg 4 mg Oral Given Therese Solomon RN      02/22/2020 0154 iohexol (OMNIPAQUE) 350 MG/ML injection (MULTI-DOSE) 100 mL 100 mL Intravenous Given [facility]         Past Medical History:   Diagnosis Date    Arthritis     Depression     Disease of thyroid gland     hypo    Edema     LAST ASSESSED: [id number]    GERD (gastroesophageal reflux disease)     Hypercholesterolemia     Hyperlipidemia     Hypertension     WELL CONTROLLED  CURRENTLY ON LISINOPRIL   LAST ASSESSED: [id number]    Other headache syndrome     Other muscle spasm     LAST ASSESSED: [id number]    Ovarian cyst 2006    Renal calculi      (spontaneous vaginal delivery)     FEMALE    Thyroid disease      Present on Admission:   Degenerative cervical disc   Hypertension   Hypothyroidism   Morbid obesity ([facility] [address]   Type 2 diabetes mellitus ([facility] [address] )   Dizziness      Admitting Diagnosis: Aneurysm of internal carotid artery [I67 1]  Neck pain [M54 2]  Essential hypertension [I10]  Trapezius muscle spasm [M62 838]  Cerebellar artery occlusion or stenosis [I66 3]  Gastroesophageal reflux disease, esophagitis presence not specified [K21 9]  Type 2 diabetes mellitus without complication, without long-term current use of insulin (HCC) [E11 9]  Hypothyroidism, unspecified type [E03 9]  Age/Sex: 62 y o  female  Admission Orders:  Mri mra head brain  Pt ot  Scheduled Medications:    Medications:  aspirin 81 mg Oral Daily   atorvastatin 40 mg Oral Daily With Dinner   insulin lispro 1-6 Units Subcutaneous TID AC   levothyroxine 100 mcg Oral Early Morning   [START ON 2020] levothyroxine 88 mcg Oral Early Morning   lidocaine 1 patch Topical Once   lisinopril-hydrochlorothiazide (PRINZIDE 10/12  5) combo dose  Oral Daily   LORazepam 1 mg Intravenous Once     Continuous IV Infusions:     PRN Meds:    acetaminophen 975 mg Oral Q8H PRN   diazepam 5 mg Oral Q6H PRN   methocarbamol 750 mg Oral Q6H PRN       IP CONSULT TO CASE MANAGEMENT  IP CONSULT TO NEUROSURGERY    Network Utilization Review Department  [email or web address]  ATTENTION: Please call with any questions or concerns to [phone] and carefully listen to the prompts so that you are directed to the right person  All voicemails are confidential   Sarah Mann all requests for admission clinical reviews, approved or denied determinations and any other requests to dedicated fax number below belonging to the campus where the patient is receiving treatment   List of dedicated fax numbers for the Facilities:  FACILITY NAME UR FAX NUMBER   ADMISSION DENIALS (Administrative/Medical Necessity) [address] [facility] (Maternity/NICU/Pediatrics) [place] [phone]   Everette Velarde [phone]   Lissette Uriarte [phone]   [address] [phone]   [facility]  [phone]   [address] [facility], [facility]  [address] [phone]

## 2020-02-22 NOTE — ED PROVIDER NOTES
History  Chief Complaint   Patient presents with    Neck Pain     Pt c/o right sided neck pain since yesterday  Pt denies trauma or MVA     81-year-old female history of hypertension, hypothyroidism, Type 2 diabetes, severe obesity, degenerative cervical disc, low back pain presents with gradual onset neck pain and headache  Was in her usual state of health until about 3 days ago, said she had cold-like symptoms including runny nose and cough a low-grade fever  Her URI symptoms have essentially resolved however 2 days ago she developed a gradual onset headache in a bandlike distribution at the top of her head, described as a throbbing severe headache  Says she gets headaches however this one is different in that it is more persistent and severe than usual   The neck pain was also gradual in onset, worse with movement  Now she says it feels very tight and stiff  Points to the shoulders saying that the pain radiates, worse on the right side  Denies any recent injury, trauma, heavy lifting  Denies any constitutional symptoms including fever, chills, fatigue, change in appetite  Denies nausea, vomiting, diarrhea  Denies any chest pain, abdominal pain, SOB    Denies pain at the front of her neck  The pain is not tearing in nature  Denies any focal deficits but endorses dizziness, unsteadiness on her feet which has been going on for months, not worse with head movements  Patient endorses significant stress associated with caring for sick family member  Prior to Admission Medications   Prescriptions Last Dose Informant Patient Reported? Taking? ACCU-CHEK FASTCLIX LANCETS MISC Not Taking at Unknown time Self Yes No   Blood Glucose Monitoring Suppl (ACCU-CHEK ESVIN SMARTVIEW) w/Device KIT Not Taking at Unknown time Self Yes No   Sig: use as directed   Cholecalciferol (VITAMIN D HIGH POTENCY PO) Past Week at Unknown time Self Yes Yes   Sig: Take by mouth   Takes 1x/week   Levothyroxine Sodium 88 MCG CAPS 2020 at Unknown time Self Yes Yes   Sig: Take 88 mcg by mouth   acetaminophen (TYLENOL 8 HOUR ARTHRITIS PAIN) 650 mg CR tablet 2020 at Unknown time  No Yes   Sig: Take 1 tablet (650 mg total) by mouth every 8 (eight) hours as needed for mild pain   aspirin 81 MG tablet Past Week at Unknown time Self No Yes   Sig: Take 1 tablet (81 mg total) by mouth daily   atorvastatin (LIPITOR) 10 mg tablet Past Week at Unknown time Self Yes Yes   Sig: Take 10 mg by mouth daily   cyclobenzaprine (FLEXERIL) 10 mg tablet More than a month at Unknown time  No No   Sig: Take 1 tablet (10 mg total) by mouth daily at bedtime   fluticasone (FLONASE) 50 mcg/act nasal spray More than a month at Unknown time  No No   Si spray into each nostril daily   Patient not taking: Reported on 2020   glucose blood (ACCU-CHEK SMARTVIEW) test strip Past Week at Unknown time Self Yes Yes   hydrOXYzine HCL (ATARAX) 25 mg tablet Not Taking at Unknown time Self No No   Sig: Take 1 tablet (25 mg total) by mouth every 12 (twelve) hours as needed for itching   Patient not taking: Reported on 2019   ibuprofen (MOTRIN) 600 mg tablet Not Taking at Unknown time  No No   Sig: Take 1 tablet (600 mg total) by mouth every 6 (six) hours as needed for moderate pain   Patient not taking: Reported on 2019   levothyroxine 100 mcg tablet Past Week at Unknown time Self Yes Yes   Sig: Take 100 mcg by mouth 2 (two) times a week    lisinopril-hydrochlorothiazide (PRINZIDE,ZESTORETIC) 10-12 5 MG per tablet 2020 at Unknown time Self No Yes   Sig: Take 1 tablet by mouth daily   loratadine (CLARITIN) 10 mg tablet Past Week at Unknown time  No Yes   Sig: Take 1 tablet (10 mg total) by mouth daily   naproxen (NAPROSYN) 250 mg tablet Not Taking at Unknown time Self Yes No   Sig: Take 250 mg by mouth as needed for mild pain     omeprazole (PriLOSEC) 20 mg delayed release capsule Not Taking at Unknown time  No No   Sig: Take 1 capsule (20 mg total) by mouth daily   Patient not taking: Reported on 2020   psyllium (METAMUCIL SMOOTH TEXTURE) 28 % packet Not Taking at Unknown time  No No   Sig: Take 1 packet by mouth 2 (two) times a day as needed (constipation)   Patient not taking: Reported on 2020      Facility-Administered Medications: None       Past Medical History:   Diagnosis Date    Arthritis     Depression     Disease of thyroid gland     hypo    Edema     LAST ASSESSED: [id number]    GERD (gastroesophageal reflux disease)     Hypercholesterolemia     Hyperlipidemia     Hypertension     WELL CONTROLLED  CURRENTLY ON LISINOPRIL  LAST ASSESSED: [id number]    Other headache syndrome     Other muscle spasm     LAST ASSESSED: [id number]    Ovarian cyst 2006    Renal calculi      (spontaneous vaginal delivery)     FEMALE    Thyroid disease        Past Surgical History:   Procedure Laterality Date    BACK SURGERY      HERNIATED DISC (L4/L5)    CHOLECYSTECTOMY      TONSILLECTOMY         Family History   Problem Relation Age of Onset    Lung cancer Mother     Cancer Mother         MALIGNANT NEOPLASM    Hypertension Father     Diabetes Brother     Hypertension Son     Lung disease Son     Hyperthyroidism Maternal Aunt     Hypothyroidism Maternal Aunt     Heart disease Other         CARDIAC DISORDER    Neuropathy Family      I have reviewed and agree with the history as documented  Social History     Tobacco Use    Smoking status: Former Smoker    Smokeless tobacco: Never Used    Tobacco comment: pt "quit about 16 years ago"   Substance Use Topics    Alcohol use: Never     Frequency: Never    Drug use: No        Review of Systems   Constitutional: Negative for appetite change, chills, diaphoresis, fatigue and fever  HENT: Negative for ear pain, sinus pain and sore throat  Eyes: Negative for pain  Respiratory: Negative for shortness of breath  Cardiovascular: Negative for chest pain     Gastrointestinal: Negative for abdominal pain, diarrhea, nausea and vomiting  Genitourinary: Negative for difficulty urinating and flank pain  Musculoskeletal: Negative for back pain and neck pain  Neurological: Negative for weakness and headaches  All other systems reviewed and are negative  Physical Exam  ED Triage Vitals   Temperature Pulse Respirations Blood Pressure SpO2   02/21/20 2307 02/21/20 2307 02/21/20 2307 02/21/20 2307 02/21/20 2307   97 5 °F (36 4 °C) 75 20 (!) 181/87 95 %      Temp Source Heart Rate Source Patient Position - Orthostatic VS BP Location FiO2 (%)   02/22/20 0500 02/21/20 2307 02/22/20 0500 02/22/20 0500 --   Oral Monitor Lying Left arm       Pain Score       02/21/20 2307       Worst Possible Pain             Orthostatic Vital Signs  Vitals:    02/22/20 0330 02/22/20 0450 02/22/20 0500 02/22/20 1514   BP: 164/97 (!) 188/91 137/74 119/69   Pulse: 68 67 64 65   Patient Position - Orthostatic VS:   Lying        Physical Exam   Constitutional: She is oriented to person, place, and time  She appears well-developed and well-nourished  No distress  Severely obese  Appears uncomfortable   HENT:   Head: Normocephalic and atraumatic  Nose: Nose normal    Mouth/Throat: Oropharynx is clear and moist    Eyes: Pupils are equal, round, and reactive to light  Conjunctivae and EOM are normal  Right eye exhibits no discharge  Left eye exhibits no discharge  No scleral icterus  Neck: No tracheal deviation present  No thyromegaly present  Range motion limited due to pain  Cardiovascular: Normal rate, regular rhythm, normal heart sounds and intact distal pulses  Exam reveals no gallop and no friction rub  No murmur heard  Pulmonary/Chest: Effort normal and breath sounds normal  No stridor  No respiratory distress  She has no wheezes  She has no rales  Abdominal: Soft  She exhibits no distension  There is no tenderness  There is no rebound and no guarding     Lymphadenopathy:     She has no cervical adenopathy  Neurological: She is alert and oriented to person, place, and time  No cranial nerve deficit or sensory deficit  She exhibits normal muscle tone  Skin: Skin is warm and dry  Capillary refill takes less than 2 seconds  She is not diaphoretic  Psychiatric: Her behavior is normal    Tearful   Nursing note and vitals reviewed  ED Medications  Medications   acetaminophen (TYLENOL) tablet 975 mg (975 mg Oral Given 2/22/20 0812)   methocarbamol (ROBAXIN) tablet 750 mg (750 mg Oral Given 2/22/20 0812)   aspirin chewable tablet 81 mg (81 mg Oral Given 2/22/20 0812)   levothyroxine tablet 100 mcg (100 mcg Oral Given 2/22/20 0545)   levothyroxine tablet 88 mcg (has no administration in time range)   atorvastatin (LIPITOR) tablet 40 mg (40 mg Oral Given 2/22/20 1641)   lisinopril-hydrochlorothiazide (PRINZIDE 10/12  5) combo dose ( Oral Given 2/22/20 0544)   insulin lispro (HumaLOG) 100 units/mL subcutaneous injection 1-6 Units (1 Units Subcutaneous Not Given 2/22/20 1640)   diazepam (VALIUM) tablet 5 mg (5 mg Oral Given 2/22/20 0812)   lidocaine (LIDODERM) 5 % patch 1 patch (1 patch Topical Medication Applied 2/22/20 0817)   ketorolac (TORADOL) injection 15 mg (15 mg Intramuscular Given 2/21/20 2334)   acetaminophen (TYLENOL) tablet 975 mg (975 mg Oral Given 2/21/20 2333)   diazepam (VALIUM) tablet 5 mg (5 mg Oral Given 2/22/20 0005)   ondansetron (ZOFRAN-ODT) dispersible tablet 4 mg (4 mg Oral Given 2/22/20 0043)   iohexol (OMNIPAQUE) 350 MG/ML injection (MULTI-DOSE) 100 mL (100 mL Intravenous Given 2/22/20 0154)   LORazepam (ATIVAN) injection 1 mg (1 mg Intravenous Given 2/22/20 0959)       Diagnostic Studies  Results Reviewed     Procedure Component Value Units Date/Time    CBC (With Platelets) [[id number]]  (Abnormal) Collected:  02/22/20 0542    Lab Status:  Final result Specimen:  Blood from Arm, Right Updated:  02/22/20 0706     WBC 9 50 Thousand/uL      RBC 4 38 Million/uL      Hemoglobin 12 0 g/dL      Hematocrit 39 0 %      MCV 89 fL      MCH 27 4 pg      MCHC 30 8 g/dL      RDW 13 3 %      Platelets 145 Thousands/uL      MPV 11 3 fL     TSH, 3rd generation with Free T4 reflex [[id number]]  (Normal) Collected:  02/22/20 0542    Lab Status:  Final result Specimen:  Blood from Arm, Right Updated:  02/22/20 0631     TSH 3RD GENERATON 2 170 uIU/mL     Narrative:       Patients undergoing fluorescein dye angiography may retain small amounts of fluorescein in the body for 48-72 hours post procedure  Samples containing fluorescein can produce falsely depressed TSH values  If the patient had this procedure,a specimen should be resubmitted post fluorescein clearance        Lipid panel [[id number]]  (Abnormal) Collected:  02/22/20 0542    Lab Status:  Final result Specimen:  Blood from Arm, Right Updated:  02/22/20 0631     Cholesterol 190 mg/dL      Triglycerides 137 mg/dL      HDL, Direct 46 mg/dL      LDL Calculated 117 mg/dL      Non-HDL-Chol (CHOL-HDL) 144 mg/dl     Hemoglobin A1C w/ EAG Estimation [[id number]]  (Abnormal) Collected:  02/22/20 0542    Lab Status:  Final result Specimen:  Blood from Arm, Right Updated:  02/22/20 0618     Hemoglobin A1C 6 6 %       mg/dl     Basic metabolic panel [[id number]] Collected:  02/22/20 0031    Lab Status:  Final result Specimen:  Blood from Arm, Left Updated:  02/22/20 0106     Sodium 140 mmol/L      Potassium 4 3 mmol/L      Chloride 107 mmol/L      CO2 27 mmol/L      ANION GAP 6 mmol/L      BUN 17 mg/dL      Creatinine 0 69 mg/dL      Glucose 91 mg/dL      Calcium 9 1 mg/dL      eGFR 93 ml/min/1 73sq m     Narrative:       [facility] guidelines for Chronic Kidney Disease (CKD):     Stage 1 with normal or high GFR (GFR > 90 mL/min/1 73 square meters)    Stage 2 Mild CKD (GFR = 60-89 mL/min/1 73 square meters)    Stage 3A Moderate CKD (GFR = 45-59 mL/min/1 73 square meters)    Stage 3B Moderate CKD (GFR = 30-44 mL/min/1 73 square meters)    Stage 4 Severe CKD (GFR = 15-29 mL/min/1 73 square meters)    Stage 5 End Stage CKD (GFR <15 mL/min/1 73 square meters)  Note: GFR calculation is accurate only with a steady state creatinine                 MRA head wo contrast   Final Result by Jack Ohara DO (02/22 1509)      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      2 mm abnormality arising from the left supraclinoid internal carotid artery appears to be an infundibulum at the origin of the posterior communicating artery  Hypoplastic right A1 segment  Normal posterior cerebral artery circulation  Recommend consultation with the [facility], a division of [address] [place] [address] Neuroscience at [phone]  The study was marked in [facility] for immediate notification  Workstation performed: [id number]         MRI brain wo contrast   Final Result by Jack Ohara DO (02/22 3964)      Small white matter hyperintensities on T2 and FLAIR imaging suggestive of mild chronic microangiopathic change within the cerebral hemispheres  No acute ischemia, mass or hemorrhage  Workstation performed: [id number]         CTA head and neck with and without contrast   Final Result by Chanelle Delatorre DO (02/22 9074)      No acute intracranial process is seen  Attenuation of the right cerebellar artery approximately 1 cm distal to the origin, consider atherosclerotic disease/occlusion  Correlation with the patient's symptoms recommended  If there is concern for ischemia, MR of the brain may be of benefit for    further evaluation  4-5 mm superior medially oriented aneurysm of the right internal carotid artery in the supraclinoid region (axial image 80, series 5)  2 mm inferiorly oriented aneurysm of the supraclinoid portion of the left internal carotid artery (axial image 163, series 6 and sagittal image 61, series 602)          Severely hypoplastic right A1 segment  The left A1 segment appears unremarkable  Bilateral anterior cerebral arteries with normal enhancement otherwise  No significant stenosis within either internal carotid artery  Patent bilateral vertebral arteries  Degenerative changes of the cervical spine; most prominent at C5/C6 on the right  Other findings as above  Findings discussed with Dr Jim Wilson by Dr Esvin Rivera at 3:50 AM on 2/22/2020  Workstation performed: [id number]               Procedures  Procedures      ED Course                               MDM  Number of Diagnoses or Management Options  Cerebellar artery occlusion or stenosis:   Essential hypertension:   Gastroesophageal reflux disease, esophagitis presence not specified:   Hypothyroidism, unspecified type:   Neck pain:   Trapezius muscle spasm:   Type 2 diabetes mellitus without complication, without long-term current use of insulin [facility]):   Diagnosis management comments: Suspect muscle spasm, will treat as such  However patient does have many risk factors, will scan head and neck to rule out dissection  Patient feels significantly better  Reviewed scan with radiologist   Patient has carotid aneurysm, right cerebellar artery occlusion versus stenosis  Should have further workup with MRI  Will admit to SOB for further evaluation and imaging          Disposition  Final diagnoses:   Neck pain   Trapezius muscle spasm   Type 2 diabetes mellitus without complication, without long-term current use of insulin (HCC)   Gastroesophageal reflux disease, esophagitis presence not specified   Essential hypertension   Hypothyroidism, unspecified type   Cerebellar artery occlusion or stenosis     Time reflects when diagnosis was documented in both MDM as applicable and the Disposition within this note     Time User Action Codes Description Comment    2/22/2020  3:26 AM Willy CHEW Add [M54 2] Neck pain     2/22/2020  3:26 AM Jim Wilson, Graciela Zavala Add [M39 159] Trapezius muscle spasm     2/22/2020  3:28 AM Mallissa Deyanira T Add [E11 9] Type 2 diabetes mellitus without complication, without long-term current use of insulin ([facility] [address] )     2/22/2020  3:28 AM Mallissa Deyanira T Add [K21 9] Gastroesophageal reflux disease, esophagitis presence not specified     2/22/2020  3:29 AM Mallissa Deyanira T Add [I10] Essential hypertension     2/22/2020  3:29 AM Mallissa Deyanira T Add [E03 9] Hypothyroidism, unspecified type     2/22/2020  4:20 AM Mallissa Deyanira T Add [I66 3] Cerebellar artery occlusion     2/22/2020  4:22 AM Wolm Hung Add [I66 3] Cerebellar artery occlusion or stenosis     2/22/2020  4:22 AM Wolm Hung Remove [I66 3] Cerebellar artery occlusion     2/22/2020  4:22 AM Wolm Hung Modify [M54 2] Neck pain     2/22/2020  4:22 AM Wolm Hung Modify [I66 3] Cerebellar artery occlusion or stenosis     2/22/2020  4:53 AM Paul Lynn Add [I67 1] Aneurysm of internal carotid artery     2/22/2020  5:25 AM Carl Todd Modify [I67 1] Aneurysm of internal carotid artery       ED Disposition     ED Disposition Condition Date/Time Comment    Admit Stable Sat Feb 22, 2020  4:23 AM Case was discussed with SOD resident and the patient's admission status was agreed to be Admission Status: observation status to the service of Dr Irene Rutherford           Follow-up Information     Follow up With Specialties Details Why Contact Info Additional [address] Emergency Department Emergency Medicine Go to  If symptoms worsen or for any new or concerning symptoms [address] [place] [address]  [phone] 802 [facility] ED, [address], [address] [facility] [address] Program Physical Therapy Schedule an appointment as soon as possible for a visit in 2 days  [phone]          Current Discharge Medication List      CONTINUE these medications which have NOT CHANGED    Details   acetaminophen (TYLENOL 8 HOUR ARTHRITIS PAIN) 650 mg CR tablet Take 1 tablet (650 mg total) by mouth every 8 (eight) hours as needed for mild pain  Qty: 90 tablet, Refills: 3    Associated Diagnoses: Degenerative cervical disc      aspirin 81 MG tablet Take 1 tablet (81 mg total) by mouth daily  Qty: 90 tablet, Refills: 3    Associated Diagnoses: Hypertension, unspecified type      atorvastatin (LIPITOR) 10 mg tablet Take 10 mg by mouth daily      Cholecalciferol (VITAMIN D HIGH POTENCY PO) Take by mouth   Takes 1x/week      glucose blood (ACCU-CHEK SMARTVIEW) test strip       levothyroxine 100 mcg tablet Take 100 mcg by mouth 2 (two) times a week       Levothyroxine Sodium 88 MCG CAPS Take 88 mcg by mouth      lisinopril-hydrochlorothiazide (PRINZIDE,ZESTORETIC) 10-12 5 MG per tablet Take 1 tablet by mouth daily  Qty: 90 tablet, Refills: 3    Associated Diagnoses: Hypertension, unspecified type      loratadine (CLARITIN) 10 mg tablet Take 1 tablet (10 mg total) by mouth daily  Qty: 30 tablet, Refills: 0    Associated Diagnoses: Sinusitis      ACCU-CHEK FASTCLIX LANCETS MISC       Blood Glucose Monitoring Suppl (ACCU-CHEK ESIVN SMARTVIEW) w/Device KIT use as directed      cyclobenzaprine (FLEXERIL) 10 mg tablet Take 1 tablet (10 mg total) by mouth daily at bedtime  Qty: 30 tablet, Refills: 3    Associated Diagnoses: Muscle spasm      fluticasone (FLONASE) 50 mcg/act nasal spray 1 spray into each nostril daily  Qty: 16 g, Refills: 0    Associated Diagnoses: Sinusitis      hydrOXYzine HCL (ATARAX) 25 mg tablet Take 1 tablet (25 mg total) by mouth every 12 (twelve) hours as needed for itching  Qty: 30 tablet, Refills: 2    Associated Diagnoses: Itching      ibuprofen (MOTRIN) 600 mg tablet Take 1 tablet (600 mg total) by mouth every 6 (six) hours as needed for moderate pain  Qty: 30 tablet, Refills: 0    Associated Diagnoses: Bilateral ankle pain      naproxen (NAPROSYN) 250 mg tablet Take 250 mg by mouth as needed for mild pain  omeprazole (PriLOSEC) 20 mg delayed release capsule Take 1 capsule (20 mg total) by mouth daily  Qty: 40 capsule, Refills: 0    Associated Diagnoses: Gastroesophageal reflux disease, esophagitis presence not specified      psyllium (METAMUCIL SMOOTH TEXTURE) 28 % packet Take 1 packet by mouth 2 (two) times a day as needed (constipation)  Qty: 30 packet, Refills: 2    Associated Diagnoses: Constipation           No discharge procedures on file  ED Provider  Attending physically available and evaluated Donel [place]  I managed the patient along with the ED Attending      Electronically Signed by         Alexy Abernathy MD  02/22/20 3146

## 2020-02-22 NOTE — H&P
INTERNAL MEDICINE RESIDENCY ADMISSION H&P     Name: Aureliano Perkins   Age & Sex: 62 y o  female   MRN: [id number]  Unit/Bed#: -01   Encounter: [id number]  Primary Care Provider: Audra Phillips MD    Code Status: Level 1 - Full Code  Admission Status: OBSERVATION  Disposition: Patient requires Med/Surg    ASSESSMENT/PLAN     Principal Problem:    Dizziness  Active Problems:    Aneurysm of internal carotid artery    Degenerative cervical disc    Hypertension    Hypothyroidism    Morbid obesity ([place] [address] )    Type 2 diabetes mellitus ([place] [address] )      * Dizziness  Assessment & Plan  · Patient reports ongoing dizziness since the last couple of months, associated with unsteady gait and swaying sometimes to the left and sometimes to the right side  · On exam patient has dysdiadochokinesia on the right side  · CTA of the head shows possible occlusion of the right cerebellar artery along with 4-5 mm aneurysm in the right ICA and 2 mm aneurysm in the left ICA  · Patient admitted for MRI/MRA of the brain  · Neurosurgery consulted for right and left ICA aneurysm for further recommendations and outpatient follow up  · Obtain lipid panel  · Change statin to high-intensity  · Continue aspirin 81 mg daily    Aneurysm of internal carotid artery  Assessment & Plan  · CTA of the head shows possible occlusion of the right cerebellar artery along with 4-5 mm aneurysm in the right ICA and 2 mm aneurysm in the left ICA  · Patient admitted for MRI/MRA of the brain  · Neurosurgery consulted for right and left ICA aneurysm for further recommendations and outpatient follow up    Type 2 diabetes mellitus ([facility] )  Assessment & Plan  Lab Results   Component Value Date    HGBA1C 6 5 (H) 08/15/2019       No results for input(s): POCGLU in the last 72 hours      Blood Sugar Average: Last 72 hrs:     · Not on any medications  · SSI while in the hospital    Hypothyroidism  Assessment & Plan  · Continue levothyroxine  · TSH pending Hypertension  Assessment & Plan  · Continue lisinopril-HCTZ     Degenerative cervical disc  Assessment & Plan  · On muscle relaxants, lidocaine patch, scheduled Tylenol      VTE Pharmacologic Prophylaxis: Enoxaparin (Lovenox)  VTE Mechanical Prophylaxis: sequential compression device    CHIEF COMPLAINT     Chief Complaint   Patient presents with    Neck Pain     Pt c/o right sided neck pain since yesterday  Pt denies trauma or MVA      HISTORY OF PRESENT ILLNESS     60-year-old female with a past medical history of hypertension, type 2 diabetes- diet controlled, degenerative cervical disc disease, morbid obesity, hypothyroidism presents to the hospital with neck pain and chronic dizziness  Patient says the neck pain started yesterday and her neck feels very stiff  This is associated with an occipital headache  Patient reports dizziness since the last few months along with ambulatory dysfunction where she is swaying sometimes the right and sometimes to the left  She is very anxious about her symptoms  Patient denies any numbness or tingling, weakness in any extremity, falls  REVIEW OF SYSTEMS     Review of Systems   Musculoskeletal: Positive for neck pain and neck stiffness  Neurological: Positive for dizziness and headaches  Negative for syncope, weakness and numbness  Psychiatric/Behavioral: The patient is nervous/anxious  All other systems reviewed and are negative  OBJECTIVE     Vitals:    20 0205 20 0330 20 0450 20 0500   BP: 160/72 164/97 (!) 188/91 137/74   BP Location:    Left arm   Pulse: 61 68 67 64   Resp:    Temp:    97 7 °F (36 5 °C)   TempSrc:    Oral   SpO2: 93% 94% 94% 94%   Weight:       Height:          Temperature:   Temp (24hrs), Av 6 °F (36 4 °C), Min:97 5 °F (36 4 °C), Max:97 7 °F (36 5 °C)    Temperature: 97 7 °F (36 5 °C)  Intake & Output:  I/O     None        Weights:   IBW: 54 7 kg    Body mass index is 49 78 kg/m²    Weight (last 2 days)     Date/Time   Weight    20 2308   132 (290)            Physical Exam   Constitutional: She appears well-developed and well-nourished  Morbidly obese patient   Eyes: Pupils are equal, round, and reactive to light  EOM are normal    Neck: Decreased range of motion present  Cardiovascular: Normal rate, regular rhythm and normal heart sounds  Pulmonary/Chest: Effort normal and breath sounds normal    Abdominal: Soft  Bowel sounds are normal    Neurological:   No motor or sensory deficits  Finger-nose test:  Slow but normal on both sides  Patient does have dysdiadochokinesia on the right side  Gait:  Normal  No nystagmus seen   Skin: Skin is warm  Psychiatric: Her mood appears anxious  PAST MEDICAL HISTORY     Past Medical History:   Diagnosis Date    Arthritis     Depression     Disease of thyroid gland     hypo    Edema     LAST ASSESSED: [id number]    GERD (gastroesophageal reflux disease)     Hypercholesterolemia     Hyperlipidemia     Hypertension     WELL CONTROLLED  CURRENTLY ON LISINOPRIL   LAST ASSESSED: [id number]    Other headache syndrome     Other muscle spasm     LAST ASSESSED: [id number]    Ovarian cyst 2006    Renal calculi      (spontaneous vaginal delivery) 1975    FEMALE    Thyroid disease      PAST SURGICAL HISTORY     Past Surgical History:   Procedure Laterality Date    BACK SURGERY      HERNIATED DISC (L4/L5)    CHOLECYSTECTOMY      TONSILLECTOMY       SOCIAL & FAMILY HISTORY     Social History     Substance and Sexual Activity   Alcohol Use Never    Frequency: Never     Substance and Sexual Activity   Alcohol Use Never    Frequency: Never        Substance and Sexual Activity   Drug Use No     Social History     Tobacco Use   Smoking Status Former Smoker   Smokeless Tobacco Never Used   Tobacco Comment    pt "quit about 16 years ago"     Family History   Problem Relation Age of Onset    Lung cancer Mother     Cancer Mother         MALIGNANT NEOPLASM    Hypertension Father     Diabetes Brother     Hypertension Son     Lung disease Son     Hyperthyroidism Maternal Aunt     Hypothyroidism Maternal Aunt     Heart disease Other         CARDIAC DISORDER    Neuropathy Family      LABORATORY DATA     Labs: I have personally reviewed pertinent reports  Invalid input(s):  EOSPCT   Results from last 7 days   Lab Units 02/22/20  0031   POTASSIUM mmol/L 4 3   CHLORIDE mmol/L 107   CO2 mmol/L 27   BUN mg/dL 17   CREATININE mg/dL 0 69   CALCIUM mg/dL 9 1                          Micro:  No results found for: [place], WOUNDCULT, Yonathan [place] [address] DIAGNOSTIC TESTS     Imaging: I have personally reviewed pertinent reports  Cta Head And Neck With And Without Contrast    Result Date: 2/22/2020  Impression: No acute intracranial process is seen  Attenuation of the right cerebellar artery approximately 1 cm distal to the origin, consider atherosclerotic disease/occlusion  Correlation with the patient's symptoms recommended  If there is concern for ischemia, MR of the brain may be of benefit for  further evaluation  4-5 mm superior medially oriented aneurysm of the right internal carotid artery in the supraclinoid region (axial image 80, series 5)  2 mm inferiorly oriented aneurysm of the supraclinoid portion of the left internal carotid artery (axial image 163, series 6 and sagittal image 61, series 602)  Severely hypoplastic right A1 segment  The left A1 segment appears unremarkable  Bilateral anterior cerebral arteries with normal enhancement otherwise  No significant stenosis within either internal carotid artery  Patent bilateral vertebral arteries  Degenerative changes of the cervical spine; most prominent at C5/C6 on the right  Other findings as above  Findings discussed with Dr Savage Self by Dr Mindy Chacon at 3:50 AM on 2/22/2020   Workstation performed: [id number]     EKG, Pathology, and Other Studies: I have personally reviewed pertinent reports  ALLERGIES     Allergies   Allergen Reactions    Tagamet [Cimetidine] Hives     MEDICATIONS PRIOR TO ARRIVAL     Prior to Admission medications    Medication Sig Start Date End Date Taking? Authorizing Provider   acetaminophen (TYLENOL 8 HOUR ARTHRITIS PAIN) 650 mg CR tablet Take 1 tablet (650 mg total) by mouth every 8 (eight) hours as needed for mild pain 8/22/19  Yes Lora Beavers MD   aspirin 81 MG tablet Take 1 tablet (81 mg total) by mouth daily 4/12/18  Yes Giuliano Arriaga   atorvastatin (LIPITOR) 10 mg tablet Take 10 mg by mouth daily   Yes Historical Provider, MD   Cholecalciferol (VITAMIN D HIGH POTENCY PO) Take by mouth   Takes 1x/week   Yes Historical Provider, MD   glucose blood (ACCU-CHEK SMARTVIEW) test strip  3/3/18  Yes Historical Provider, MD   levothyroxine 100 mcg tablet Take 100 mcg by mouth 2 (two) times a week    Yes Historical Provider, MD   Levothyroxine Sodium 88 MCG CAPS Take 88 mcg by mouth   Yes Historical Provider, MD   lisinopril-hydrochlorothiazide (PRINZIDE,ZESTORETIC) 10-12 5 MG per tablet Take 1 tablet by mouth daily 4/12/18  Yes Giuliano Arriaga   loratadine (CLARITIN) 10 mg tablet Take 1 tablet (10 mg total) by mouth daily 11/12/18  Yes Lora Beavers MD   ACCU-CHEK FASTCLIX LANCETS [address]  3/3/18   Historical Provider, MD   Blood Glucose Monitoring Suppl (ACCU-CHEK ESVIN SMARTVIEW) w/Device KIT use as directed 1/17/18   Historical Provider, MD   cyclobenzaprine (FLEXERIL) 10 mg tablet Take 1 tablet (10 mg total) by mouth daily at bedtime 8/22/19   Lora Beavers MD   fluticasone (FLONASE) 50 mcg/act nasal spray 1 spray into each nostril daily  Patient not taking: Reported on 2/21/2020 11/12/18   Lora Beavers MD   hydrOXYzine HCL (ATARAX) 25 mg tablet Take 1 tablet (25 mg total) by mouth every 12 (twelve) hours as needed for itching  Patient not taking: Reported on 4/2/2019 5/17/18   Magalis Hernandez DO   ibuprofen (MOTRIN) 600 mg tablet Take 1 tablet (600 mg total) by mouth every 6 (six) hours as needed for moderate pain  Patient not taking: Reported on 8/22/2019 2/5/19   Cornelio Mejia DO   naproxen (NAPROSYN) 250 mg tablet Take 250 mg by mouth as needed for mild pain  Historical Provider, MD   omeprazole (PriLOSEC) 20 mg delayed release capsule Take 1 capsule (20 mg total) by mouth daily  Patient not taking: Reported on 2/21/2020 8/22/19   Stuart Gary MD   psyllium (METAMUCIL SMOOTH TEXTURE) 28 % packet Take 1 packet by mouth 2 (two) times a day as needed (constipation)  Patient not taking: Reported on 2/22/2020 4/3/19   Stuart Gary MD     MEDICATIONS ADMINISTERED IN LAST 24 HOURS     Medication Administration - last 24 hours from 02/21/2020 0610 to 02/22/2020 9272       Date/Time Order Dose Route Action Action by     02/21/2020 2334 ketorolac (TORADOL) injection 15 mg 15 mg Intramuscular Given Katlyn Starr, RN     02/21/2020 2333 acetaminophen (TYLENOL) tablet 975 mg 975 mg Oral Given Katlyn Starr RN     02/22/2020 0005 lidocaine (LIDODERM) 5 % patch 1 patch 1 patch Topical Medication Applied Katlyn Zhuing, MICHAEL     02/22/2020 0005 diazepam (VALIUM) tablet 5 mg 5 mg Oral Given Katlyn Starr, RN     02/22/2020 0043 ondansetron (ZOFRAN-ODT) dispersible tablet 4 mg 4 mg Oral Given Katlyn Starr RN     02/22/2020 0154 iohexol (OMNIPAQUE) 350 MG/ML injection (MULTI-DOSE) 100 mL 100 mL Intravenous Given Bev Lowe     02/22/2020 0545 levothyroxine tablet 100 mcg 100 mcg Oral Given René Lozano RN     02/22/2020 0544 lisinopril-hydrochlorothiazide (PRINZIDE 10/12  5) combo dose   Oral Given René Lozano RN        CURRENT MEDICATIONS       Current Facility-Administered Medications:  acetaminophen 975 mg Oral Q8H PRN Odalis Iglesias MD   aspirin 81 mg Oral Daily Odalis Iglesias MD   atorvastatin 40 mg Oral Daily With Caleb Rodrigez MD   enoxaparin 40 mg Subcutaneous BID Odalis Iglesias MD ibuprofen 600 mg Oral Q6H PRN Kevyn Rowley MD   insulin lispro 1-6 Units Subcutaneous TID AC Magali Gomes MD   levothyroxine 100 mcg Oral Early Morning Kevyn Rowley MD   [START ON 2/24/2020] levothyroxine 88 mcg Oral Early Morning Kevyn Rowley MD   lidocaine 1 patch Topical Once Renu Romero MD   lisinopril-hydrochlorothiazide [facility] 10/12  5) combo dose  Oral Daily Magali Gomes MD   methocarbamol 750 mg Oral Q6H PRN Kevyn Rowley MD          acetaminophen 975 mg Q8H PRN   ibuprofen 600 mg Q6H PRN   methocarbamol 750 mg Q6H PRN       Admission Time  I spent 20 minutes admitting the patient  This involved direct patient contact where I performed a full history and physical, reviewing previous records, and reviewing laboratory and other diagnostic studies  Portions of the record may have been created with voice recognition software  Occasional wrong word or "sound a like" substitutions may have occurred due to the inherent limitations of voice recognition software    Read the chart carefully and recognize, using context, where substitutions have occurred     ==  Magali Gomes MD  [address]  Internal Medicine Residency PGY-2

## 2020-02-22 NOTE — CONSULTS
Consultation - Neurosurgery   [employer] 62 y o  female MRN: [id number]  Unit/Bed#: -01 Encounter: [id number]    Images reviewed at morning rounds on 02/22/2020 at   Patient was seen and examined on 02/22/2020 at 2:00 p m  Inpatient consult to Neurosurgery  Consult performed by: Carey Pastor PA-C  Consult ordered by: Gely Gary MD          Assessment/Plan               Assessment:  1  Right ICA aneurysm, 4-5 mm  2  Left ICA aneurysm 2 mm  3  Hypertension  4  Depression  5  Hypercholesterolemia      Plan:   · Exam: A&OX3, EOMI, 2 mm conj bilat  SF-more Ivorian speaker  Katelyn, finger-to-nose normal and without drift bilaterally  Strength is 5/5 bilaterally  Limited range of motion of cervical spine because of pain  Sensation to light touch is intact throughout  DTR 2+ without clonus and Babinski negative bilaterally  · Imagings reviewed personally and by attending status follows:  · CTA of head and neck on 02/22/2020 with and without contrast demonstrates 4-5 mm superior  right internal carotid artery in the supraclinoid region  2 mm inferiorly oriented aneurysm of the supraclinoid portion of the left internal carotid artery  Severely hypoplastic right A1 segment  No significant stenosis within a other internal carotid artery  Patent bilateral vertebral arteries  Degenerative changes of cervical spine most prominent at C5-6  · MRA of brain without on 02/22/2020 demonstrates 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 2 mm abnormality radiating from the left supraclinoid internal carotid artery appears to be an infundibulum at the origin of the posterior communicating artery  Hypoplastic right A1 segment normal posterior cerebral artery circulation  · MRI of brain on 02/22/2020-radiology  reports pending    · Pain control:  Per primary team  · DVT ppx:   · SCDs bilateral legs  · On aspirin and Lovenox  · Seizure ppx:  None  · Activity:  As tolerated  · PT/OT evaluation & treatment  · Medical Mx:  Per primary team  · SBP<160-hypertension on lisinopril  · Neurocheck:routine  · HOB>30-45 degree  · NSx evaluated the patient  Patient appears slightly sleepy because of the sedative medication for the procedure; otherwise alert and oriented x3  Neuro exam nonfocal, except slight tenderness on her posterior cervical spine and limited range of motion  Image findings as described above  Discussed with the neurovascular surgeon, Dr Chidi Mcfarlane and he reviewed the images, no MRI findings  He recommends If there is concern for SAH do  LP  Otherwise the CTA and MRA findings could be incidental, so  recommends outpatient follow-up in 2 weeks with Dr Chidi Mcfarlane  No Neurosurgical procedure at this juncture  Will sign off  Call for concern or problem  History of Present Illness     HPI: Mark Garza is a 62 y o  female with PMHx of hypertension, morbid obesity, and chronic diffuse headache admitted with severe pressure-type headache in the right frontal region and also left parietal area associated with dizziness, nausea and loss of balance  She reports her headache severity increased over the past few weeks, she was visiting her PCP and he was she was given Tylenol which takes the edge of  This time headache became worse and she should up to come to the emergency department  Patient also reports months of blurry vision and occasional diplopia  She is more Cameroonian speaker-her brother helped her with interpretation  Patient denies history of seizure, previous stroke, numbness, weakness in the extremities  Denies any bowel/bladder dysfunction  Denies history of fall  Denies history of fever, chills, rigors, cough or chest pain  Patient denies history of diabetes mellitus, congestive heart failure, COPD, bleeding disorder or taking anticoagulant medication except baby aspirin  Patient used to smoke, but quit 17 years ago  Denies drinking alcohol  CT, MRI and MRA of brain findings as described in the exam section above  Review of Systems   Constitutional: Positive for appetite change and diaphoresis  Negative for activity change, chills, fatigue, fever and unexpected weight change  HENT: Negative for trouble swallowing and voice change  Eyes: Positive for visual disturbance  Negative for photophobia  Respiratory: Negative for cough, chest tightness and shortness of breath  Cardiovascular: Negative for chest pain, palpitations and leg swelling  Gastrointestinal: Negative for nausea and vomiting  Genitourinary: Negative for difficulty urinating  Musculoskeletal: Positive for gait problem, neck pain and neck stiffness  Negative for back pain  Neurological: Positive for dizziness and headaches  Negative for tremors, seizures, syncope, facial asymmetry, speech difficulty, weakness, light-headedness and numbness  Hematological: Does not bruise/bleed easily  Psychiatric/Behavioral: Negative for confusion and decreased concentration  Historical Information   Past Medical History:   Diagnosis Date    Arthritis     Depression     Disease of thyroid gland     hypo    Edema     LAST ASSESSED: [id number]    GERD (gastroesophageal reflux disease)     Hypercholesterolemia     Hyperlipidemia     Hypertension     WELL CONTROLLED  CURRENTLY ON LISINOPRIL   LAST ASSESSED: [id number]    Other headache syndrome     Other muscle spasm     LAST ASSESSED: [id number]    Ovarian cyst 2006    Renal calculi      (spontaneous vaginal delivery) 1975    FEMALE    Thyroid disease      Past Surgical History:   Procedure Laterality Date    BACK SURGERY      HERNIATED DISC (L4/L5)    CHOLECYSTECTOMY      TONSILLECTOMY       Social History     Substance and Sexual Activity   Alcohol Use Never    Frequency: Never     Social History     Substance and Sexual Activity   Drug Use No     Social History     Tobacco Use   Smoking Status Former Smoker   Smokeless Tobacco Never Used   Tobacco Comment    pt "quit about 16 years ago"     Family History   Problem Relation Age of Onset    Lung cancer Mother     Cancer Mother         MALIGNANT NEOPLASM    Hypertension Father     Diabetes Brother     Hypertension Son     Lung disease Son     Hyperthyroidism Maternal Aunt     Hypothyroidism Maternal Aunt     Heart disease Other         CARDIAC DISORDER    Neuropathy Family        Meds/Allergies   all current active meds have been reviewed, current meds:   Current Facility-Administered Medications   Medication Dose Route Frequency    acetaminophen (TYLENOL) tablet 975 mg  975 mg Oral Q8H PRN    aspirin chewable tablet 81 mg  81 mg Oral Daily    atorvastatin (LIPITOR) tablet 40 mg  40 mg Oral Daily With Dinner    diazepam (VALIUM) tablet 5 mg  5 mg Oral Q6H PRN    insulin lispro (HumaLOG) 100 units/mL subcutaneous injection 1-6 Units  1-6 Units Subcutaneous TID AC    levothyroxine tablet 100 mcg  100 mcg Oral Early Morning    [START ON 2/24/2020] levothyroxine tablet 88 mcg  88 mcg Oral Early Morning    lidocaine (LIDODERM) 5 % patch 1 patch  1 patch Topical Once    lisinopril-hydrochlorothiazide (PRINZIDE 10/12  5) combo dose   Oral Daily    methocarbamol (ROBAXIN) tablet 750 mg  750 mg Oral Q6H PRN    and PTA meds:   Prior to Admission Medications   Prescriptions Last Dose Informant Patient Reported? Taking? ACCU-CHEK FASTCLIX LANCETS MISC Not Taking at Unknown time Self Yes No   Blood Glucose Monitoring Suppl (ACCU-CHEK ESVIN SMARTVIEW) w/Device KIT Not Taking at Unknown time Self Yes No   Sig: use as directed   Cholecalciferol (VITAMIN D HIGH POTENCY PO) Past Week at Unknown time Self Yes Yes   Sig: Take by mouth   Takes 1x/week   Levothyroxine Sodium 88 MCG CAPS 2/21/2020 at Unknown time Self Yes Yes   Sig: Take 88 mcg by mouth   acetaminophen (TYLENOL 8 HOUR ARTHRITIS PAIN) 650 mg CR tablet 2/21/2020 at Unknown time  No Yes   Sig: Take 1 tablet (650 mg total) by mouth every 8 (eight) hours as needed for mild pain   aspirin 81 MG tablet Past Week at Unknown time Self No Yes   Sig: Take 1 tablet (81 mg total) by mouth daily   atorvastatin (LIPITOR) 10 mg tablet Past Week at Unknown time Self Yes Yes   Sig: Take 10 mg by mouth daily   cyclobenzaprine (FLEXERIL) 10 mg tablet More than a month at Unknown time  No No   Sig: Take 1 tablet (10 mg total) by mouth daily at bedtime   fluticasone (FLONASE) 50 mcg/act nasal spray More than a month at Unknown time  No No   Si spray into each nostril daily   Patient not taking: Reported on 2020   glucose blood (ACCU-CHEK SMARTVIEW) test strip Past Week at Unknown time Self Yes Yes   hydrOXYzine HCL (ATARAX) 25 mg tablet Not Taking at Unknown time Self No No   Sig: Take 1 tablet (25 mg total) by mouth every 12 (twelve) hours as needed for itching   Patient not taking: Reported on 2019   ibuprofen (MOTRIN) 600 mg tablet Not Taking at Unknown time  No No   Sig: Take 1 tablet (600 mg total) by mouth every 6 (six) hours as needed for moderate pain   Patient not taking: Reported on 2019   levothyroxine 100 mcg tablet Past Week at Unknown time Self Yes Yes   Sig: Take 100 mcg by mouth 2 (two) times a week    lisinopril-hydrochlorothiazide (PRINZIDE,ZESTORETIC) 10-12 5 MG per tablet 2020 at Unknown time Self No Yes   Sig: Take 1 tablet by mouth daily   loratadine (CLARITIN) 10 mg tablet Past Week at Unknown time  No Yes   Sig: Take 1 tablet (10 mg total) by mouth daily   naproxen (NAPROSYN) 250 mg tablet Not Taking at Unknown time Self Yes No   Sig: Take 250 mg by mouth as needed for mild pain     omeprazole (PriLOSEC) 20 mg delayed release capsule Not Taking at Unknown time  No No   Sig: Take 1 capsule (20 mg total) by mouth daily   Patient not taking: Reported on 2020   psyllium (METAMUCIL SMOOTH TEXTURE) 28 % packet Not Taking at Unknown time  No No   Sig: Take 1 packet by mouth 2 (two) times a day as needed (constipation)   Patient not taking: Reported on 2/22/2020      Facility-Administered Medications: None     Allergies   Allergen Reactions    Tagamet [Cimetidine] Hives       Objective   I/O     None          Physical Exam   Constitutional: She is oriented to person, place, and time  She appears well-developed and well-nourished  HENT:   Head: Normocephalic and atraumatic  Eyes: EOM are normal    Neck:   Limited range of motion of cervical spine   Cardiovascular: Normal rate  Pulmonary/Chest: Effort normal    Musculoskeletal: She exhibits tenderness  Posterior cervical spine   Neurological: She is alert and oriented to person, place, and time  She has normal strength  No cranial nerve deficit or sensory deficit  She has a normal Finger-Nose-Finger Test  GCS eye subscore is 4  GCS verbal subscore is 5  GCS motor subscore is 6  She displays no Babinski's sign on the right side  She displays no Babinski's sign on the left side  Reflex Scores:       Tricep reflexes are 2+ on the right side and 2+ on the left side  Bicep reflexes are 2+ on the right side and 2+ on the left side  Brachioradialis reflexes are 2+ on the right side and 2+ on the left side  Patellar reflexes are 2+ on the right side and 2+ on the left side  Achilles reflexes are 2+ on the right side and 2+ on the left side  Psychiatric: Her speech is normal      Neurologic Exam     Mental Status   Oriented to person, place, and time  Speech: speech is normal   Level of consciousness: alert  Knowledge: good       Cranial Nerves     CN III, IV, VI   Extraocular motions are normal    Nystagmus: none     CN V   Right facial sensation deficit: none  Left facial sensation deficit: none    CN VII   Right facial weakness: none  Left facial weakness: none    CN XI   CN XI normal      CN XII   CN XII normal      Motor Exam   Muscle bulk: normal  Overall muscle tone: normal  Right arm tone: normal  Left arm tone: normal  Right arm pronator drift: absent  Left arm pronator drift: absent  Right leg tone: normal  Left leg tone: normal    Strength   Strength 5/5 throughout  Sensory Exam   Light touch normal      Gait, Coordination, and Reflexes     Coordination   Finger to nose coordination: normal    Reflexes   Right brachioradialis: 2+  Left brachioradialis: 2+  Right biceps: 2+  Left biceps: 2+  Right triceps: 2+  Left triceps: 2+  Right patellar: 2+  Left patellar: 2+  Right achilles: 2+  Left achilles: 2+  Right : 2+  Left : 2+  Right plantar: normal  Left plantar: normal  Right Benjamin: absent  Left Benjamin: absent  Right ankle clonus: absent  Left ankle clonus: absent      Vitals:Blood pressure 137/74, pulse 64, temperature 97 7 °F (36 5 °C), temperature source Oral, resp  rate 20, height 5' 4" (1 626 m), weight 132 kg (290 lb), SpO2 94 %  ,Body mass index is 49 78 kg/m²       Lab Results:   Results from last 7 days   Lab Units 02/22/20  0542   WBC Thousand/uL 9 50   HEMOGLOBIN g/dL 12 0   HEMATOCRIT % 39 0   PLATELETS Thousands/uL 234     Results from last 7 days   Lab Units 02/22/20  0031   POTASSIUM mmol/L 4 3   CHLORIDE mmol/L 107   CO2 mmol/L 27   BUN mg/dL 17   CREATININE mg/dL 0 69   CALCIUM mg/dL 9 1                 No results found for: TROPONINT  ABG:No results found for: PHART, EYM2RTM, PO2ART, OXS0GFU, E4PMPALU, BEART, SOURCE    Imaging Studies: I have personally reviewed pertinent reports   , I have personally reviewed pertinent films in PACS and I have personally reviewed pertinent films in PACS with a Radiologist     EKG, Pathology, and Other Studies: I have personally reviewed pertinent reports   , I have personally reviewed pertinent films in PACS and I have personally reviewed pertinent films in PACS with a Radiologist     VTE Prophylaxis: Sequential compression device (Venodyne)  aspirin and Lovenox    Code Status: Level 1 - Full Code  Advance Directive and Living Will:      Power of : POLST:      Counseling / Coordination of Care  I spent 20 minutes with the patient

## 2020-02-22 NOTE — ASSESSMENT & PLAN NOTE
· Patient reports ongoing dizziness lasts for seconds since the last couple of months, associated with unsteady gait and swaying sometimes to the left and sometimes to the right side  · Orthostatic blood pressure positive Systolic dropped from 845 to 109 while standing  · See HTN and orthostatic hypotension  · CTA of the head shows possible occlusion of the right cerebellar artery along with 4-5 mm aneurysm in the right ICA and 2 mm aneurysm in the left ICA  Patient admitted for MRI/MRA of the brain with 6 mm aneurysm at right periophthalmic artery  Normal posterior cerebral artery circulation  No acute ischemia, mass or hemorrhage  ·    · Neurosurgery Rec outpatient follow up in two weeks, no surgical intervention, if there is concern for SAH do LP  No headache, and neck stiffness improved  · Check orthostatic BP discussed with nursing  · Obtain lipid panel with , 's  · Change statin to high-intensity, 40 mg Atorvastatin    · Continue aspirin 81 mg daily  · PT/OT eval pending, likely placement

## 2020-02-22 NOTE — ASSESSMENT & PLAN NOTE
· Discontinue HCTZ due to orthostatic hypotension, blood pressure dropped from 063 to 850 systolic  · Continue lisinopril 10 mg daily

## 2020-02-22 NOTE — ASSESSMENT & PLAN NOTE
Lab Results   Component Value Date    HGBA1C 6 5 (H) 08/15/2019       No results for input(s): POCGLU in the last 72 hours      Blood Sugar Average: Last 72 hrs:     · Not on any medications  · SSI while in the hospital

## 2020-02-23 LAB
GLUCOSE SERPL-MCNC: 135 MG/DL (ref 65–140)
GLUCOSE SERPL-MCNC: 135 MG/DL (ref 65–140)
GLUCOSE SERPL-MCNC: 90 MG/DL (ref 65–140)
GLUCOSE SERPL-MCNC: 99 MG/DL (ref 65–140)

## 2020-02-23 PROCEDURE — 82948 REAGENT STRIP/BLOOD GLUCOSE: CPT

## 2020-02-23 RX ADMIN — ACETAMINOPHEN 975 MG: 325 TABLET ORAL at 08:59

## 2020-02-23 RX ADMIN — METHOCARBAMOL TABLETS 750 MG: 750 TABLET, COATED ORAL at 01:36

## 2020-02-23 RX ADMIN — DIAZEPAM 5 MG: 5 TABLET ORAL at 08:59

## 2020-02-23 RX ADMIN — DIAZEPAM 5 MG: 5 TABLET ORAL at 01:36

## 2020-02-23 RX ADMIN — ASPIRIN 81 MG 81 MG: 81 TABLET ORAL at 09:00

## 2020-02-23 RX ADMIN — LEVOTHYROXINE SODIUM 100 MCG: 100 TABLET ORAL at 05:01

## 2020-02-23 RX ADMIN — LISINOPRIL: 10 TABLET ORAL at 08:59

## 2020-02-23 RX ADMIN — METHOCARBAMOL TABLETS 750 MG: 750 TABLET, COATED ORAL at 08:59

## 2020-02-23 RX ADMIN — ATORVASTATIN CALCIUM 40 MG: 40 TABLET, FILM COATED ORAL at 16:25

## 2020-02-23 NOTE — PLAN OF CARE
Problem: MUSCULOSKELETAL - ADULT  Goal: Maintain or return mobility to safest level of function  Description  INTERVENTIONS:  - Assess patient's ability to carry out ADLs; assess patient's baseline for ADL function and identify physical deficits which impact ability to perform ADLs (bathing, care of mouth/teeth, toileting, grooming, dressing, etc )  - Assess/evaluate cause of self-care deficits   - Assess range of motion  - Assess patient's mobility  - Assess patient's need for assistive devices and provide as appropriate  - Encourage maximum independence but intervene and supervise when necessary  - Involve family in performance of ADLs  - Assess for home care needs following discharge   - Consider OT consult to assist with ADL evaluation and planning for discharge  - Provide patient education as appropriate  Outcome: Progressing  Goal: Maintain proper alignment of affected body part  Description  INTERVENTIONS:  - Support, maintain and protect limb and body alignment  - Provide patient/ family with appropriate education  Outcome: Progressing     Problem: NEUROSENSORY - ADULT  Goal: Achieves stable or improved neurological status  Description  INTERVENTIONS  - Monitor and report changes in neurological status  - Monitor vital signs such as temperature, blood pressure, glucose, and any other labs ordered   - Initiate measures to prevent increased intracranial pressure  - Monitor for seizure activity and implement precautions if appropriate      Outcome: Progressing  Goal: Remains free of injury related to seizures activity  Description  INTERVENTIONS  - Maintain airway, patient safety  and administer oxygen as ordered  - Monitor patient for seizure activity, document and report duration and description of seizure to physician/advanced practitioner  - If seizure occurs,  ensure patient safety during seizure  - Reorient patient post seizure  - Seizure pads on all 4 side rails  - Instruct patient/family to notify RN of any seizure activity including if an aura is experienced  - Instruct patient/family to call for assistance with activity based on nursing assessment  - Administer anti-seizure medications if ordered    Outcome: Progressing

## 2020-02-23 NOTE — PLAN OF CARE
Problem: MUSCULOSKELETAL - ADULT  Goal: Maintain or return mobility to safest level of function  Description  INTERVENTIONS:  - Assess patient's ability to carry out ADLs; assess patient's baseline for ADL function and identify physical deficits which impact ability to perform ADLs (bathing, care of mouth/teeth, toileting, grooming, dressing, etc )  - Assess/evaluate cause of self-care deficits   - Assess range of motion  - Assess patient's mobility  - Assess patient's need for assistive devices and provide as appropriate  - Encourage maximum independence but intervene and supervise when necessary  - Involve family in performance of ADLs  - Assess for home care needs following discharge   - Consider OT consult to assist with ADL evaluation and planning for discharge  - Provide patient education as appropriate  Outcome: Progressing  Goal: Maintain proper alignment of affected body part  Description  INTERVENTIONS:  - Support, maintain and protect limb and body alignment  - Provide patient/ family with appropriate education  Outcome: Progressing     Problem: PAIN - ADULT  Goal: Verbalizes/displays adequate comfort level or baseline comfort level  Description  Interventions:  - Encourage patient to monitor pain and request assistance  - Assess pain using appropriate pain scale  - Administer analgesics based on type and severity of pain and evaluate response  - Implement non-pharmacological measures as appropriate and evaluate response  - Consider cultural and social influences on pain and pain management  - Notify physician/advanced practitioner if interventions unsuccessful or patient reports new pain  Outcome: Progressing     Problem: SAFETY ADULT  Goal: Patient will remain free of falls  Description  INTERVENTIONS:  - Assess patient frequently for physical needs  -  Identify cognitive and physical deficits and behaviors that affect risk of falls  -  [place] fall precautions as indicated by assessment    - Educate patient/family on patient safety including physical limitations  - Instruct patient to call for assistance with activity based on assessment  - Modify environment to reduce risk of injury  - Consider OT/PT consult to assist with strengthening/mobility  Outcome: Progressing  Goal: Maintain or return to baseline ADL function  Description  INTERVENTIONS:  -  Assess patient's ability to carry out ADLs; assess patient's baseline for ADL function and identify physical deficits which impact ability to perform ADLs (bathing, care of mouth/teeth, toileting, grooming, dressing, etc )  - Assess/evaluate cause of self-care deficits   - Assess range of motion  - Assess patient's mobility; develop plan if impaired  - Assess patient's need for assistive devices and provide as appropriate  - Encourage maximum independence but intervene and supervise when necessary  - Involve family in performance of ADLs  - Assess for home care needs following discharge   - Consider OT consult to assist with ADL evaluation and planning for discharge  - Provide patient education as appropriate  Outcome: Progressing  Goal: Maintain or return mobility status to optimal level  Description  INTERVENTIONS:  - Assess patient's baseline mobility status (ambulation, transfers, stairs, etc )    - Identify cognitive and physical deficits and behaviors that affect mobility  - Identify mobility aids required to assist with transfers and/or ambulation (gait belt, sit-to-stand, lift, walker, cane, etc )  - [place] fall precautions as indicated by assessment  - Record patient progress and toleration of activity level on Mobility SBAR; progress patient to next Phase/Stage  - Instruct patient to call for assistance with activity based on assessment  - Consider rehabilitation consult to assist with strengthening/weightbearing, etc   Outcome: Progressing

## 2020-02-23 NOTE — PROGRESS NOTES
INTERNAL MEDICINE RESIDENCY PROGRESS NOTE     Name: Alanis Irwin   Age & Sex: 62 y o  female   MRN: [id number]  Unit/Bed#: -01   Encounter: [id number]  Team: SOD Team B     PATIENT INFORMATION     Name: Alanis Irwin   Age & Sex: 62 y o  female   MRN: [id number]  Hospital Stay Days: 1    ASSESSMENT/PLAN     Principal Problem:    Dizziness  Active Problems:    Degenerative cervical disc    Hypertension    Hypothyroidism    Morbid obesity ([facility] )    Type 2 diabetes mellitus ([place] [address] )    Aneurysm of internal carotid artery      Aneurysm of internal carotid artery  Assessment & Plan  · CTA of the head shows possible occlusion of the right cerebellar artery along with 4-5 mm aneurysm in the right ICA and 2 mm aneurysm in the left ICA  · Patient admitted for MRI/MRA of the brain  · Neurosurgery consulted for right and left ICA aneurysm for further recommendations and outpatient follow up    Type 2 diabetes mellitus ([facility] )  Assessment & Plan  Lab Results   Component Value Date    HGBA1C 6 5 (H) 08/15/2019       No results for input(s): POCGLU in the last 72 hours  Blood Sugar Average: Last 72 hrs:     · Not on any medications  · SSI while in the hospital    Hypothyroidism  Assessment & Plan  · TSH within normal limits  · Continue levothyroxine      Hypertension  Assessment & Plan  · Continue lisinopril-HCTZ     Degenerative cervical disc  Assessment & Plan  · On muscle relaxants, lidocaine patch, scheduled Tylenol    * Dizziness  Assessment & Plan  · Patient reports ongoing dizziness since the last couple of months, associated with unsteady gait and swaying sometimes to the left and sometimes to the right side  · On exam patient has dysdiadochokinesia on the right side    · CTA of the head shows possible occlusion of the right cerebellar artery along with 4-5 mm aneurysm in the right ICA and 2 mm aneurysm in the left ICA  · Patient admitted for MRI/MRA of the brain  · Neurosurgery consulted for right and left ICA aneurysm for further recommendations and outpatient follow up  · Obtain lipid panel  · Change statin to high-intensity  · Continue aspirin 81 mg daily  · PT/OT eval pending, likely placement      Disposition:  Awaiting PT/OT evaluation, likely placement    SUBJECTIVE     Patient seen and examined  No acute events overnight  Patient still feels dizzy and off balance when ambulating and needs assistance  OBJECTIVE     Vitals:    20 1514 20 2144 20 0735 20 0900   BP: 119/69 129/74 106/54 116/68   BP Location:  Left arm Left arm    Pulse: 65 67 69 74   Resp:     Temp: 98 °F (36 7 °C) 98 3 °F (36 8 °C) 97 8 °F (36 6 °C)    TempSrc:  Oral Oral    SpO2: 93% 95% 91%    Weight:       Height:          Temperature:   Temp (24hrs), Av °F (36 7 °C), Min:97 8 °F (36 6 °C), Max:98 3 °F (36 8 °C)    Temperature: 97 8 °F (36 6 °C)  Intake & Output:  I/O        07 -  0700  07 -  0700  07 -  0700    P  O   660 118    Total Intake(mL/kg)  660 (5) 118 (0 9)    Urine (mL/kg/hr)   300 (0 7)    Total Output   300    Net  +660 -182           Unmeasured Urine Occurrence  2 x         Weights:   IBW: 54 7 kg    Body mass index is 49 78 kg/m²  Weight (last 2 days)     Date/Time   Weight    20 2308   132 (290)            Physical Exam   Constitutional: She is oriented to person, place, and time  She appears well-developed and well-nourished  No distress  HENT:   Head: Normocephalic and atraumatic  Eyes: Pupils are equal, round, and reactive to light  Conjunctivae and EOM are normal    Cardiovascular: Normal rate, regular rhythm and normal heart sounds  No murmur heard  Pulmonary/Chest: Effort normal and breath sounds normal  No stridor  No respiratory distress  She has no wheezes  Musculoskeletal: She exhibits no edema, tenderness or deformity  Neurological: She is alert and oriented to person, place, and time  No cranial nerve deficit     Skin: Skin is warm and dry  She is not diaphoretic  Psychiatric: She has a normal mood and affect  Her behavior is normal  Judgment and thought content normal      LABORATORY DATA     Labs: I have personally reviewed pertinent reports  Results from last 7 days   Lab Units 02/22/20  0542   WBC Thousand/uL 9 50   HEMOGLOBIN g/dL 12 0   HEMATOCRIT % 39 0   PLATELETS Thousands/uL 234      Results from last 7 days   Lab Units 02/22/20  0031   POTASSIUM mmol/L 4 3   CHLORIDE mmol/L 107   CO2 mmol/L 27   BUN mg/dL 17   CREATININE mg/dL 0 69   CALCIUM mg/dL 9 1                            IMAGING & DIAGNOSTIC TESTING     Radiology Results: I have personally reviewed pertinent reports  Cta Head And Neck With And Without Contrast    Result Date: 2/22/2020  Impression: No acute intracranial process is seen  Attenuation of the right cerebellar artery approximately 1 cm distal to the origin, consider atherosclerotic disease/occlusion  Correlation with the patient's symptoms recommended  If there is concern for ischemia, MR of the brain may be of benefit for  further evaluation  4-5 mm superior medially oriented aneurysm of the right internal carotid artery in the supraclinoid region (axial image 80, series 5)  2 mm inferiorly oriented aneurysm of the supraclinoid portion of the left internal carotid artery (axial image 163, series 6 and sagittal image 61, series 602)  Severely hypoplastic right A1 segment  The left A1 segment appears unremarkable  Bilateral anterior cerebral arteries with normal enhancement otherwise  No significant stenosis within either internal carotid artery  Patent bilateral vertebral arteries  Degenerative changes of the cervical spine; most prominent at C5/C6 on the right  Other findings as above  Findings discussed with Dr Swati Rodríguez by Dr Cherri Kline at 3:50 AM on 2/22/2020   Workstation performed: [id number]     Mra Head Wo Contrast    Result Date: 2/22/2020  Impression: 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 2 mm abnormality arising from the left supraclinoid internal carotid artery appears to be an infundibulum at the origin of the posterior communicating artery  Hypoplastic right A1 segment  Normal posterior cerebral artery circulation  Recommend consultation with the [facility], a division of [facility] at [phone]  The study was marked in [facility] for immediate notification  Workstation performed: [id number]     Mri Brain Wo Contrast    Result Date: 2/22/2020  Impression: Small white matter hyperintensities on T2 and FLAIR imaging suggestive of mild chronic microangiopathic change within the cerebral hemispheres  No acute ischemia, mass or hemorrhage  Workstation performed: [id number]     Other Diagnostic Testing: I have personally reviewed pertinent reports  ACTIVE MEDICATIONS     Current Facility-Administered Medications   Medication Dose Route Frequency    acetaminophen (TYLENOL) tablet 975 mg  975 mg Oral Q8H PRN    aspirin chewable tablet 81 mg  81 mg Oral Daily    atorvastatin (LIPITOR) tablet 40 mg  40 mg Oral Daily With Dinner    diazepam (VALIUM) tablet 5 mg  5 mg Oral Q6H PRN    insulin lispro (HumaLOG) 100 units/mL subcutaneous injection 1-6 Units  1-6 Units Subcutaneous TID AC    [START ON 2/24/2020] levothyroxine tablet 88 mcg  88 mcg Oral Early Morning    lisinopril-hydrochlorothiazide (PRINZIDE 10/12  5) combo dose   Oral Daily    methocarbamol (ROBAXIN) tablet 750 mg  750 mg Oral Q6H PRN       VTE Pharmacologic Prophylaxis: Reason for no pharmacologic prophylaxis Low risk, VTE screen for  VTE Mechanical Prophylaxis: sequential compression device    Portions of the record may have been created with voice recognition software  Occasional wrong word or "sound a like" substitutions may have occurred due to the inherent limitations of voice recognition software    Read the chart carefully and recognize, using context, where substitutions have occurred   ==  Edmar oJhn, [facility]  Internal Medicine Residency PGY-3

## 2020-02-23 NOTE — SOCIAL WORK
CM met with Pt with an introduction and explanation of role  Pt reported her brother Rui Orta resides with her in a first floor apt with no use of DME and 4 steps to enter  Pt reported being independent with ADLs with no hx of VNA, SNF, mental health or drug/alcohol placements  Pt denied having a living will, reported the use of [facility] pharmacy and has Moiz Mathew as a PCP  CM reviewed d/c planning process including the following: identifying help at home, patient preference for d/c planning needs, Discharge Lounge, [facility] Meds to Bed program, availability of treatment team to discuss questions or concerns patient and/or family may have regarding understanding medications and recognizing signs and symptoms once discharged  CM also encouraged patient to follow up with all recommended appointments after discharge  Patient advised of importance for patient and family to participate in managing patients medical well being

## 2020-02-24 ENCOUNTER — TELEPHONE (OUTPATIENT)
Dept: NEUROSURGERY | Facility: CLINIC | Age: 65
End: 2020-02-24

## 2020-02-24 LAB
GLUCOSE SERPL-MCNC: 110 MG/DL (ref 65–140)
GLUCOSE SERPL-MCNC: 111 MG/DL (ref 65–140)
GLUCOSE SERPL-MCNC: 139 MG/DL (ref 65–140)
GLUCOSE SERPL-MCNC: 96 MG/DL (ref 65–140)

## 2020-02-24 PROCEDURE — 82948 REAGENT STRIP/BLOOD GLUCOSE: CPT

## 2020-02-24 PROCEDURE — 97163 PT EVAL HIGH COMPLEX 45 MIN: CPT

## 2020-02-24 RX ADMIN — LISINOPRIL: 10 TABLET ORAL at 08:23

## 2020-02-24 RX ADMIN — METHOCARBAMOL TABLETS 750 MG: 750 TABLET, COATED ORAL at 01:57

## 2020-02-24 RX ADMIN — ACETAMINOPHEN 975 MG: 325 TABLET ORAL at 01:57

## 2020-02-24 RX ADMIN — ACETAMINOPHEN 975 MG: 325 TABLET ORAL at 22:48

## 2020-02-24 RX ADMIN — ASPIRIN 81 MG 81 MG: 81 TABLET ORAL at 08:23

## 2020-02-24 RX ADMIN — ATORVASTATIN CALCIUM 40 MG: 40 TABLET, FILM COATED ORAL at 16:56

## 2020-02-24 RX ADMIN — LEVOTHYROXINE SODIUM 88 MCG: 88 TABLET ORAL at 05:34

## 2020-02-24 NOTE — PROGRESS NOTES
INTERNAL MEDICINE RESIDENCY PROGRESS NOTE     Name: Radha Chapman   Age & Sex: 62 y o  female   MRN: [id number]  Unit/Bed#: -01   Encounter: [id number]  Team: SOD Team B     PATIENT INFORMATION     Name: Radha Chapman   Age & Sex: 62 y o  female   MRN: [id number]  Hospital Stay Days: 2    ASSESSMENT/PLAN     Principal Problem:    Aneurysm of internal carotid artery  Active Problems:    Degenerative cervical disc    Hypertension    Hypothyroidism    Morbid obesity ([facility] )    Type 2 diabetes mellitus ([facility] )    Dizziness      * Aneurysm of internal carotid artery  Assessment & Plan  · CTA of the head shows possible occlusion of the right cerebellar artery along with 4-5 mm aneurysm in the right ICA and 2 mm aneurysm in the left ICA  · Patient admitted for MRI/MRA of the brain with  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  · Neurosurgery consulted for right and left ICA aneurysm and recommended no surgical intervention warranted, follow up with neurosurgery in two weeks, if there is concern for SAH to do LP  At this point no concern for [facility] due to no headache and improvement of neck stiffness  Dizziness  Assessment & Plan  · Patient reports ongoing dizziness lasts for seconds since the last couple of months, associated with unsteady gait and swaying sometimes to the left and sometimes to the right side  · On exam patient has dysdiadochokinesia on the right side  · CTA of the head shows possible occlusion of the right cerebellar artery along with 4-5 mm aneurysm in the right ICA and 2 mm aneurysm in the left ICA  Patient admitted for MRI/MRA of the brain with 6 mm aneurysm at right periophthalmic artery  Normal posterior cerebral artery circulation  No acute ischemia, mass or hemorrhage  ·    · Neurosurgery Rec outpatient follow up in two weeks, no surgical intervention, if there is concern for SAH do LP  No headache, and neck stiffness improved    · Check orthostatic BP discussed with nursing  · Obtain lipid panel with , 's  · Change statin to high-intensity, 40 mg Atorvastatin  · Continue aspirin 81 mg daily  · PT/OT eval pending, likely placement    Type 2 diabetes mellitus ([facility] )  Assessment & Plan  Lab Results   Component Value Date    HGBA1C 6 5 (H) 08/15/2019       No results for input(s): POCGLU in the last 72 hours  Blood Sugar Average: Last 72 hrs:     · Not on any medications  · SSI while in the hospital    Morbid obesity [facility])  Assessment & Plan  Outpatient management  Hypothyroidism  Assessment & Plan  · TSH within normal limits  · Continue levothyroxine 88 mcg      Hypertension  Assessment & Plan  · Continue lisinopril-HCTZ     Degenerative cervical disc  Assessment & Plan  · On muscle relaxants, lidocaine patch, scheduled Tylenol  · Diazepam 5 mg PRN Q 6       Disposition:Pending PT/OT eval, likely will require rehab  SUBJECTIVE     Patient seen and examined  No acute events overnight  She feels her headache,  neck pain and stiffness improved, she remains afebrile  She had no dizziness today, however yesterday she had two episodes lasted for seconds while sitting still  OBJECTIVE     Vitals:    20 1500 20 2259 20 0738 20 0826   BP: 116/73 117/83 108/69 118/81   BP Location:       Pulse: 66 76 72    Resp: 18 20 20    Temp: 97 9 °F (36 6 °C) 97 8 °F (36 6 °C) 97 8 °F (36 6 °C)    TempSrc: Oral  Oral    SpO2: 95% 96% 91%    Weight:       Height:          Temperature:   Temp (24hrs), Av 8 °F (36 6 °C), Min:97 8 °F (36 6 °C), Max:97 9 °F (36 6 °C)    Temperature: 97 8 °F (36 6 °C)  Intake & Output:  I/O        07 -  0700 701 -  07 -  0700    P  O  660 476     Total Intake(mL/kg) 660 (5) 476 (3 6)     Urine (mL/kg/hr)  550 (0 2)     Total Output  550     Net +660 -74            Unmeasured Urine Occurrence 2 x 1 x         Weights:   IBW: 54 7 kg    Body mass index is 49 78 kg/m²  Weight (last 2 days)     None        Physical Exam   Constitutional: She is oriented to person, place, and time  No distress  Stated age, obese  female  HENT:   Head: Normocephalic and atraumatic  Eyes: Pupils are equal, round, and reactive to light  EOM are normal    Neck: Neck supple  ROM limited   Cardiovascular: Normal rate, regular rhythm and normal heart sounds  Exam reveals no friction rub  No murmur heard  Pulmonary/Chest: Effort normal and breath sounds normal  No stridor  No respiratory distress  She has no wheezes  Abdominal: Soft  She exhibits no distension (Obese)  There is no tenderness  There is no guarding  Neurological: She is alert and oriented to person, place, and time  No cranial nerve deficit or sensory deficit  She exhibits normal muscle tone (RUE 4/5 , LUE 5/5)  Skin: She is not diaphoretic  Psychiatric: She has a normal mood and affect  Her behavior is normal    Vitals reviewed  LABORATORY DATA     Labs: I have personally reviewed pertinent reports  Results from last 7 days   Lab Units 02/22/20  0542   WBC Thousand/uL 9 50   HEMOGLOBIN g/dL 12 0   HEMATOCRIT % 39 0   PLATELETS Thousands/uL 234      Results from last 7 days   Lab Units 02/22/20  0031   POTASSIUM mmol/L 4 3   CHLORIDE mmol/L 107   CO2 mmol/L 27   BUN mg/dL 17   CREATININE mg/dL 0 69   CALCIUM mg/dL 9 1                            IMAGING & DIAGNOSTIC TESTING     Radiology Results: I have personally reviewed pertinent reports  Cta Head And Neck With And Without Contrast    Result Date: 2/22/2020  Impression: No acute intracranial process is seen  Attenuation of the right cerebellar artery approximately 1 cm distal to the origin, consider atherosclerotic disease/occlusion  Correlation with the patient's symptoms recommended  If there is concern for ischemia, MR of the brain may be of benefit for  further evaluation   4-5 mm superior medially oriented aneurysm of the right internal carotid artery in the supraclinoid region (axial image 80, series 5)  2 mm inferiorly oriented aneurysm of the supraclinoid portion of the left internal carotid artery (axial image 163, series 6 and sagittal image 61, series 602)  Severely hypoplastic right A1 segment  The left A1 segment appears unremarkable  Bilateral anterior cerebral arteries with normal enhancement otherwise  No significant stenosis within either internal carotid artery  Patent bilateral vertebral arteries  Degenerative changes of the cervical spine; most prominent at C5/C6 on the right  Other findings as above  Findings discussed with Dr Swati Rodríguez by Dr Cherri Kline at 3:50 AM on 2/22/2020  Workstation performed: [id number]     Mra Head Wo Contrast    Result Date: 2/22/2020  Impression: 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 2 mm abnormality arising from the left supraclinoid internal carotid artery appears to be an infundibulum at the origin of the posterior communicating artery  Hypoplastic right A1 segment  Normal posterior cerebral artery circulation  Recommend consultation with the [facility], a division of [address] [place] [address] Neuroscience at [phone]  The study was marked in [facility] for immediate notification  Workstation performed: [id number]     Mri Brain Wo Contrast    Result Date: 2/22/2020  Impression: Small white matter hyperintensities on T2 and FLAIR imaging suggestive of mild chronic microangiopathic change within the cerebral hemispheres  No acute ischemia, mass or hemorrhage  Workstation performed: [id number]     Other Diagnostic Testing: I have personally reviewed pertinent reports      ACTIVE MEDICATIONS     Current Facility-Administered Medications   Medication Dose Route Frequency    acetaminophen (TYLENOL) tablet 975 mg  975 mg Oral Q8H PRN    aspirin chewable tablet 81 mg  81 mg Oral Daily    atorvastatin (LIPITOR) tablet 40 mg  40 mg Oral Daily With Dinner    diazepam (VALIUM) tablet 5 mg  5 mg Oral Q6H PRN    insulin lispro (HumaLOG) 100 units/mL subcutaneous injection 1-6 Units  1-6 Units Subcutaneous TID AC    levothyroxine tablet 88 mcg  88 mcg Oral Early Morning    lisinopril-hydrochlorothiazide (PRINZIDE 10/12  5) combo dose   Oral Daily    methocarbamol (ROBAXIN) tablet 750 mg  750 mg Oral Q6H PRN       VTE Pharmacologic Prophylaxis: Reason for no pharmacologic prophylaxis Low risk  VTE Mechanical Prophylaxis: sequential compression device    Portions of the record may have been created with voice recognition software  Occasional wrong word or "sound a like" substitutions may have occurred due to the inherent limitations of voice recognition software    Read the chart carefully and recognize, using context, where substitutions have occurred   ==  Buffy Miller MD  [address] [facility]  Internal Medicine Residency PGY-3

## 2020-02-24 NOTE — TELEPHONE ENCOUNTER
02/27/2020-CALLED Shantel Barfield, SPOKE  [address] [facility] [address] 03/18/2020 APT       02/26/2020-PT STILL IN HOSPITAL    02/25/2020-PT STILL IN HOSPITAL  [facility]'S RESPONSE:  Richard Berry PA-C    No Imaging is required        02/24/2020-PT STILL IN HOSPITAL  SCHEDULED APT W/DR TORRES  ON 03/11/2020  SENT IN BASKET MESSAGE TO [facility] ASKING IF ANY STUDIES NEEDED PRIOR TO APT  WAITING FOR [facility]'S RESPONSE        02/23/2020-([facility])DISCUSSED WITH DR TORRES AND HE SUGGESTS PATIENT CAN FOLLOW UP WITH HIM AT OUTPATIENT CLINICAL IN 2 WEEKS  NEUROSURGERY SIGNED OFF

## 2020-02-24 NOTE — RESTORATIVE TECHNICIAN NOTE
Restorative Specialist Mobility Note       Activity: Ambulate in [place]     Assistive Device: None        Repositioned: Sitting, Up in chair

## 2020-02-24 NOTE — PLAN OF CARE
Problem: MUSCULOSKELETAL - ADULT  Goal: Maintain or return mobility to safest level of function  Description  INTERVENTIONS:  - Assess patient's ability to carry out ADLs; assess patient's baseline for ADL function and identify physical deficits which impact ability to perform ADLs (bathing, care of mouth/teeth, toileting, grooming, dressing, etc )  - Assess/evaluate cause of self-care deficits   - Assess range of motion  - Assess patient's mobility  - Assess patient's need for assistive devices and provide as appropriate  - Encourage maximum independence but intervene and supervise when necessary  - Involve family in performance of ADLs  - Assess for home care needs following discharge   - Consider OT consult to assist with ADL evaluation and planning for discharge  - Provide patient education as appropriate  Outcome: Progressing  Goal: Maintain proper alignment of affected body part  Description  INTERVENTIONS:  - Support, maintain and protect limb and body alignment  - Provide patient/ family with appropriate education  Outcome: Progressing     Problem: PAIN - ADULT  Goal: Verbalizes/displays adequate comfort level or baseline comfort level  Description  Interventions:  - Encourage patient to monitor pain and request assistance  - Assess pain using appropriate pain scale  - Administer analgesics based on type and severity of pain and evaluate response  - Implement non-pharmacological measures as appropriate and evaluate response  - Consider cultural and social influences on pain and pain management  - Notify physician/advanced practitioner if interventions unsuccessful or patient reports new pain  Outcome: Progressing     Problem: SAFETY ADULT  Goal: Patient will remain free of falls  Description  INTERVENTIONS:  - Assess patient frequently for physical needs  -  Identify cognitive and physical deficits and behaviors that affect risk of falls  -  [place] fall precautions as indicated by assessment    - Educate patient/family on patient safety including physical limitations  - Instruct patient to call for assistance with activity based on assessment  - Modify environment to reduce risk of injury  - Consider OT/PT consult to assist with strengthening/mobility  Outcome: Progressing  Goal: Maintain or return to baseline ADL function  Description  INTERVENTIONS:  -  Assess patient's ability to carry out ADLs; assess patient's baseline for ADL function and identify physical deficits which impact ability to perform ADLs (bathing, care of mouth/teeth, toileting, grooming, dressing, etc )  - Assess/evaluate cause of self-care deficits   - Assess range of motion  - Assess patient's mobility; develop plan if impaired  - Assess patient's need for assistive devices and provide as appropriate  - Encourage maximum independence but intervene and supervise when necessary  - Involve family in performance of ADLs  - Assess for home care needs following discharge   - Consider OT consult to assist with ADL evaluation and planning for discharge  - Provide patient education as appropriate  Outcome: Progressing  Goal: Maintain or return mobility status to optimal level  Description  INTERVENTIONS:  - Assess patient's baseline mobility status (ambulation, transfers, stairs, etc )    - Identify cognitive and physical deficits and behaviors that affect mobility  - Identify mobility aids required to assist with transfers and/or ambulation (gait belt, sit-to-stand, lift, walker, cane, etc )  - [place] fall precautions as indicated by assessment  - Record patient progress and toleration of activity level on Mobility SBAR; progress patient to next Phase/Stage  - Instruct patient to call for assistance with activity based on assessment  - Consider rehabilitation consult to assist with strengthening/weightbearing, etc   Outcome: Progressing     Problem: NEUROSENSORY - ADULT  Goal: Achieves stable or improved neurological status  Description  INTERVENTIONS  - Monitor and report changes in neurological status  - Monitor vital signs such as temperature, blood pressure, glucose, and any other labs ordered   - Initiate measures to prevent increased intracranial pressure  - Monitor for seizure activity and implement precautions if appropriate      Outcome: Progressing  Goal: Remains free of injury related to seizures activity  Description  INTERVENTIONS  - Maintain airway, patient safety  and administer oxygen as ordered  - Monitor patient for seizure activity, document and report duration and description of seizure to physician/advanced practitioner  - If seizure occurs,  ensure patient safety during seizure  - Reorient patient post seizure  - Seizure pads on all 4 side rails  - Instruct patient/family to notify RN of any seizure activity including if an aura is experienced  - Instruct patient/family to call for assistance with activity based on nursing assessment  - Administer anti-seizure medications if ordered    Outcome: Progressing  Goal: Achieves maximal functionality and self care  Description  INTERVENTIONS  - Monitor swallowing and airway patency with patient fatigue and changes in neurological status  - Encourage and assist patient to increase activity and self care     - Encourage visually impaired, hearing impaired and aphasic patients to use assistive/communication devices  Outcome: Progressing     Problem: CARDIOVASCULAR - ADULT  Goal: Maintains optimal cardiac output and hemodynamic stability  Description  INTERVENTIONS:  - Monitor I/O, vital signs and rhythm  - Monitor for S/S and trends of decreased cardiac output  - Administer and titrate ordered vasoactive medications to optimize hemodynamic stability  - Assess quality of pulses, skin color and temperature  - Assess for signs of decreased coronary artery perfusion  - Instruct patient to report change in severity of symptoms  Outcome: Progressing  Goal: Absence of cardiac dysrhythmias or at baseline rhythm  Description  INTERVENTIONS:  - Continuous cardiac monitoring, vital signs, obtain 12 lead EKG if ordered  - Administer antiarrhythmic and heart rate control medications as ordered  - Monitor electrolytes and administer replacement therapy as ordered  Outcome: Progressing

## 2020-02-24 NOTE — PHYSICAL THERAPY NOTE
PHYSICAL THERAPY Evaluation NOTE    Patient Name: Nirali HONEYCUTT Date: 2020     AGE:   62 y o  Mrn:   [id number]  ADMIT DX:  Aneurysm of internal carotid artery [I67 1]  Neck pain [M54 2]  Essential hypertension [I10]  Trapezius muscle spasm [M62 838]  Cerebellar artery occlusion or stenosis [I66 3]  Gastroesophageal reflux disease, esophagitis presence not specified [K21 9]  Type 2 diabetes mellitus without complication, without long-term current use of insulin (HCC) [E11 9]  Hypothyroidism, unspecified type [E03 9]    Past Medical History:   Diagnosis Date    Arthritis     Depression     Disease of thyroid gland     hypo    Edema     LAST ASSESSED: [id number]    GERD (gastroesophageal reflux disease)     Hypercholesterolemia     Hyperlipidemia     Hypertension     WELL CONTROLLED  CURRENTLY ON LISINOPRIL   LAST ASSESSED: [id number]    Other headache syndrome     Other muscle spasm     LAST ASSESSED: [id number]    Ovarian cyst 2006    Renal calculi      (spontaneous vaginal delivery) 1975    FEMALE    Thyroid disease      Length Of Stay: 2  PHYSICAL THERAPY EVALUATION :    20 1025   Note Type   Note type Eval only   Pain Assessment   Pain Assessment 0-10   Pain Score No Pain   Home Living   Type of [address] One level   Bathroom Shower/Tub Tub/shower unit   Bathroom Toilet Standard   Bathroom Equipment Shower chair  (Father uses)   [address]   (NO AD prior)   Additional Comments Pt  lives in a 1st floor apartment with 4 PRESTON   Prior Function   Level of [place] Independent with ADLs and functional mobility   Lives With Family  (brother and father, cares for both of them)   Receives Help From Family   ADL Assistance Independent   IADLs Independent   Falls in the last 6 months 0   Comments PTA, pt  reports INDEP with ADLs, IADLs and functional  mobility without AD  Restrictions/Precautions   Other Precautions Fall Risk   General   Additional Pertinent History Pt  is a 61 yo F who presents with dizziness  Family/Caregiver Present No   Cognition   Overall Cognitive Status WFL   Arousal/Participation Cooperative   Attention Within functional limits   Orientation Level Oriented X4   Memory Within functional limits   Following Commands Follows multistep commands with increased time or repetition   Comments Pt  was identified with full name and birthdate   RLE Assessment   RLE Assessment WFL   LLE Assessment   LLE Assessment WFL   Coordination   Movements are Fluid and Coordinated 1   Sensation   (reports mild dizziness with smooth pursuits and endrange EM)   Bed Mobility   Additional Comments unable to assess Pt  seated OOB in recliner prior to and following session  BP in sitting 130/80   Transfers   Sit to Stand 5  Supervision   Additional items Assist x 1; Increased time required;Verbal cues  (for hand placement and sequencing)   Stand to Sit 5  Supervision   Additional items Assist x 1; Impulsive;Verbal cues  (to reach back to control descent)   Additional Comments Pt  performed sit<>stand transfers with supervision requiring increased time to stand and impulsive to return to sitting with VCs for hand placement and sequencing and to reach back to control descent  Ambulation/Elevation   Gait pattern Improper Weight shift;Narrow WAYNE; Forward Flexion;Decreased foot clearance;Shuffling; Inconsistent winter; Redundant gait at times; Short stride; Step to;Excessively slow   Gait Assistance 4  Minimal assist   Additional items Assist x 1;Verbal cues  (for posture and gait mechanics)   Assistive Device None  (HHA, reaching for rail on wall )   Distance 75'x2, increased postural sway with turns noted, dizziness reported, resolving with in 15 seconds   Balance   Static Sitting Good   Dynamic Sitting Fair   Static Standing Fair   Dynamic Standing Fair - Ambulatory Fair -   Endurance Deficit   Endurance Deficit Yes   Endurance Deficit Description postural and gait degradation noted with fatigue   Activity Tolerance   Activity Tolerance Patient limited by fatigue   Medical Staff Made Aware Spoke to CM    Nurse Made Aware Spoke to RN   Assessment   Prognosis Good   Problem List Decreased strength;Decreased range of motion;Decreased endurance; Impaired balance;Decreased mobility; Decreased coordination;Decreased safety awareness; Impaired sensation; Impaired vision   Assessment Pt  is a 61 yo F who presents with dizziness  order placed for PT eval and tx, w/ activity order of up and OOB as tolerated  pt presents w/ comorbidities of Degenerative cervical disc, htn, hypothyroidism, DM2, Dizziness, GERD, Obstructive sleep apnea, and personal factors of living in 1 story house, stair(s) to enter home, inability to navigate community distances, inability to navigate level surfaces w/o external assistance, unable to perform dynamic tasks in community, limited home support, unable to perform caregiver support/tasks, unable to perform physical activity, inability to perform IADLs and inability to perform ADLs  pt presents w/ pain, weakness, decreased ROM, decreased endurance, impaired balance, gait deviations, visual impairment, impaired coordination, decreased safety awareness, impaired judgment, fall risk and LE edema  these impairments are evident in findings from physical examination (weakness, decreased ROM, impaired coordination and impaired vestibulo-occular function), mobility assessment (need for Angelic assist w/ all phases of mobility when usually mobilizing independently, tolerance to only 75'x2 feet of ambulation and need for cueing for mobility technique), and Barthel Index: 50/100  pt needed input for task focus and mobility technique  pt is at risk for falls due to physical and safety awareness deficits   pt's clinical presentation is unstable/unpredictable (evident in need for assist w/ all phases of mobility when usually mobilizing independently, tolerance to only 75'x2 feet of ambulation, need for input for mobility technique, need for input for task focus and mobility technique and need for input for mobility technique/safety)  pt needs inpatient PT tx to improve mobility deficits  discharge recommendation is for inpatient rehab to reduce fall risk and maximize level of functional independence  Goals   Patient Goals to get around safely   STG Expiration Date 03/05/20   Short Term Goal #1 pt will: Increase bilateral LE strength 1/2 grade to facilitate independent mobility, Perform all bed mobility tasks modified independent to decrease fall risk factors, Perform all transfers modified independent to improve independence, Ambulate 250 ft  with least restrictive assistive device w/ supervision w/o LOB, Navigate 12 stairs w/ supervision with unilateral handrail to facilitate return to previous living environment, Increase all balance 1/2 grade to decrease risk for falls and Improve Barthel Index score to 75 or greater to facilitate independence   PT Treatment Day 0   Plan   Treatment/Interventions Functional transfer training;LE strengthening/ROM; Therapeutic exercise; Endurance training;Cognitive reorientation;Patient/family training;Equipment eval/education; Bed mobility;Gait training;Spoke to nursing;Spoke to case management; Family   PT Frequency   (3-5x/wk)   Recommendation   Recommendation Post acute IP rehab   Equipment Recommended Walker  (PRN)   PT - OK to Discharge Yes   Additional Comments to rehab when medically appropriate   Barthel Index   Feeding 10   Bathing 0   Grooming Score 0   Dressing Score 5   Bladder Score 10   Bowels Score 10   Toilet Use Score 5   Transfers (Bed/Chair) Score 10   Mobility (Level Surface) Score 0   Stairs Score 0   Barthel Index Score 50     Skilled PT recommended while in hospital and upon DC to progress pt toward treatment goals  Brian Davis, PT, DPT 2/24/2020

## 2020-02-24 NOTE — SOCIAL WORK
CM met pt at bedside, introduce self and made aware of CM role at ID  CM informed pt that PT and OT's recommendation is IP rehab  A post acute care recommendation was made by your care team for STR  Discussed Freedom of Choice with patient  List of facilities given to patient via in person  patient aware the list is custom filtered for them by zip code location and that [facility] post acute providers are designated  Pt reviewed STR referral list and requested referrals to 1  [facility], 2  [facility], 3  [facility] and 4 [facility]  Referrals in [facility]

## 2020-02-24 NOTE — PLAN OF CARE
Problem: PHYSICAL THERAPY ADULT  Goal: Performs mobility at highest level of function for planned discharge setting  See evaluation for individualized goals  Description  Treatment/Interventions: Functional transfer training, LE strengthening/ROM, Therapeutic exercise, Endurance training, Cognitive reorientation, Patient/family training, Equipment eval/education, Bed mobility, Gait training, Spoke to nursing, Spoke to case management, Family  Equipment Recommended: Walker(PRN)       See flowsheet documentation for full assessment, interventions and recommendations  Outcome: Progressing  Note:   Prognosis: Good  Problem List: Decreased strength, Decreased range of motion, Decreased endurance, Impaired balance, Decreased mobility, Decreased coordination, Decreased safety awareness, Impaired sensation, Impaired vision  Assessment: Pt  is a 61 yo F who presents with dizziness  order placed for PT eval and tx, w/ activity order of up and OOB as tolerated  pt presents w/ comorbidities of Degenerative cervical disc, htn, hypothyroidism, DM2, Dizziness, GERD, Obstructive sleep apnea, and personal factors of living in 1 story house, stair(s) to enter home, inability to navigate community distances, inability to navigate level surfaces w/o external assistance, unable to perform dynamic tasks in community, limited home support, unable to perform caregiver support/tasks, unable to perform physical activity, inability to perform IADLs and inability to perform ADLs  pt presents w/ pain, weakness, decreased ROM, decreased endurance, impaired balance, gait deviations, visual impairment, impaired coordination, decreased safety awareness, impaired judgment, fall risk and LE edema   these impairments are evident in findings from physical examination (weakness, decreased ROM, impaired coordination and impaired vestibulo-occular function), mobility assessment (need for Angelic assist w/ all phases of mobility when usually mobilizing independently, tolerance to only 75'x2 feet of ambulation and need for cueing for mobility technique), and Barthel Index: 50/100  pt needed input for task focus and mobility technique  pt is at risk for falls due to physical and safety awareness deficits  pt's clinical presentation is unstable/unpredictable (evident in need for assist w/ all phases of mobility when usually mobilizing independently, tolerance to only 75'x2 feet of ambulation, need for input for mobility technique, need for input for task focus and mobility technique and need for input for mobility technique/safety)  pt needs inpatient PT tx to improve mobility deficits  discharge recommendation is for inpatient rehab to reduce fall risk and maximize level of functional independence  Recommendation: Post acute IP rehab     PT - OK to Discharge: Yes    See flowsheet documentation for full assessment

## 2020-02-25 ENCOUNTER — TRANSITIONAL CARE MANAGEMENT (OUTPATIENT)
Dept: INTERNAL MEDICINE CLINIC | Facility: CLINIC | Age: 65
End: 2020-02-25

## 2020-02-25 VITALS
RESPIRATION RATE: 19 BRPM | HEIGHT: 64 IN | WEIGHT: 290 LBS | TEMPERATURE: 98 F | SYSTOLIC BLOOD PRESSURE: 119 MMHG | HEART RATE: 62 BPM | BODY MASS INDEX: 49.51 KG/M2 | OXYGEN SATURATION: 93 % | DIASTOLIC BLOOD PRESSURE: 64 MMHG

## 2020-02-25 PROBLEM — R42 DIZZINESS: Status: RESOLVED | Noted: 2018-08-03 | Resolved: 2020-02-25

## 2020-02-25 PROBLEM — I95.1 ORTHOSTATIC HYPOTENSION: Status: ACTIVE | Noted: 2020-02-25

## 2020-02-25 LAB
GLUCOSE SERPL-MCNC: 111 MG/DL (ref 65–140)
GLUCOSE SERPL-MCNC: 132 MG/DL (ref 65–140)

## 2020-02-25 PROCEDURE — 82948 REAGENT STRIP/BLOOD GLUCOSE: CPT

## 2020-02-25 PROCEDURE — TCMNV: Performed by: INTERNAL MEDICINE

## 2020-02-25 RX ORDER — METHOCARBAMOL 750 MG/1
750 TABLET, FILM COATED ORAL EVERY 6 HOURS PRN
Qty: 30 TABLET | Refills: 0
Start: 2020-02-25 | End: 2020-04-24 | Stop reason: ALTCHOICE

## 2020-02-25 RX ORDER — LISINOPRIL 10 MG/1
10 TABLET ORAL DAILY
Qty: 90 TABLET | Refills: 0 | Status: SHIPPED | OUTPATIENT
Start: 2020-02-26 | End: 2020-04-24 | Stop reason: SDUPTHER

## 2020-02-25 RX ORDER — LISINOPRIL 10 MG/1
10 TABLET ORAL DAILY
Status: DISCONTINUED | OUTPATIENT
Start: 2020-02-25 | End: 2020-02-25 | Stop reason: HOSPADM

## 2020-02-25 RX ADMIN — LISINOPRIL 10 MG: 10 TABLET ORAL at 08:37

## 2020-02-25 RX ADMIN — LEVOTHYROXINE SODIUM 88 MCG: 88 TABLET ORAL at 05:36

## 2020-02-25 RX ADMIN — ASPIRIN 81 MG 81 MG: 81 TABLET ORAL at 08:37

## 2020-02-25 NOTE — PLAN OF CARE
Problem: MUSCULOSKELETAL - ADULT  Goal: Maintain or return mobility to safest level of function  Description  INTERVENTIONS:  - Assess patient's ability to carry out ADLs; assess patient's baseline for ADL function and identify physical deficits which impact ability to perform ADLs (bathing, care of mouth/teeth, toileting, grooming, dressing, etc )  - Assess/evaluate cause of self-care deficits   - Assess range of motion  - Assess patient's mobility  - Assess patient's need for assistive devices and provide as appropriate  - Encourage maximum independence but intervene and supervise when necessary  - Involve family in performance of ADLs  - Assess for home care needs following discharge   - Consider OT consult to assist with ADL evaluation and planning for discharge  - Provide patient education as appropriate  Outcome: Progressing  Goal: Maintain proper alignment of affected body part  Description  INTERVENTIONS:  - Support, maintain and protect limb and body alignment  - Provide patient/ family with appropriate education  Outcome: Progressing     Problem: PAIN - ADULT  Goal: Verbalizes/displays adequate comfort level or baseline comfort level  Description  Interventions:  - Encourage patient to monitor pain and request assistance  - Assess pain using appropriate pain scale  - Administer analgesics based on type and severity of pain and evaluate response  - Implement non-pharmacological measures as appropriate and evaluate response  - Consider cultural and social influences on pain and pain management  - Notify physician/advanced practitioner if interventions unsuccessful or patient reports new pain  Outcome: Progressing     Problem: SAFETY ADULT  Goal: Patient will remain free of falls  Description  INTERVENTIONS:  - Assess patient frequently for physical needs  -  Identify cognitive and physical deficits and behaviors that affect risk of falls  -  [place] fall precautions as indicated by assessment    - Educate patient/family on patient safety including physical limitations  - Instruct patient to call for assistance with activity based on assessment  - Modify environment to reduce risk of injury  - Consider OT/PT consult to assist with strengthening/mobility  Outcome: Progressing  Goal: Maintain or return to baseline ADL function  Description  INTERVENTIONS:  -  Assess patient's ability to carry out ADLs; assess patient's baseline for ADL function and identify physical deficits which impact ability to perform ADLs (bathing, care of mouth/teeth, toileting, grooming, dressing, etc )  - Assess/evaluate cause of self-care deficits   - Assess range of motion  - Assess patient's mobility; develop plan if impaired  - Assess patient's need for assistive devices and provide as appropriate  - Encourage maximum independence but intervene and supervise when necessary  - Involve family in performance of ADLs  - Assess for home care needs following discharge   - Consider OT consult to assist with ADL evaluation and planning for discharge  - Provide patient education as appropriate  Outcome: Progressing  Goal: Maintain or return mobility status to optimal level  Description  INTERVENTIONS:  - Assess patient's baseline mobility status (ambulation, transfers, stairs, etc )    - Identify cognitive and physical deficits and behaviors that affect mobility  - Identify mobility aids required to assist with transfers and/or ambulation (gait belt, sit-to-stand, lift, walker, cane, etc )  - [place] fall precautions as indicated by assessment  - Record patient progress and toleration of activity level on Mobility SBAR; progress patient to next Phase/Stage  - Instruct patient to call for assistance with activity based on assessment  - Consider rehabilitation consult to assist with strengthening/weightbearing, etc   Outcome: Progressing     Problem: NEUROSENSORY - ADULT  Goal: Achieves stable or improved neurological status  Description  INTERVENTIONS  - Monitor and report changes in neurological status  - Monitor vital signs such as temperature, blood pressure, glucose, and any other labs ordered   - Initiate measures to prevent increased intracranial pressure  - Monitor for seizure activity and implement precautions if appropriate      Outcome: Progressing  Goal: Remains free of injury related to seizures activity  Description  INTERVENTIONS  - Maintain airway, patient safety  and administer oxygen as ordered  - Monitor patient for seizure activity, document and report duration and description of seizure to physician/advanced practitioner  - If seizure occurs,  ensure patient safety during seizure  - Reorient patient post seizure  - Seizure pads on all 4 side rails  - Instruct patient/family to notify RN of any seizure activity including if an aura is experienced  - Instruct patient/family to call for assistance with activity based on nursing assessment  - Administer anti-seizure medications if ordered    Outcome: Progressing  Goal: Achieves maximal functionality and self care  Description  INTERVENTIONS  - Monitor swallowing and airway patency with patient fatigue and changes in neurological status  - Encourage and assist patient to increase activity and self care     - Encourage visually impaired, hearing impaired and aphasic patients to use assistive/communication devices  Outcome: Progressing     Problem: CARDIOVASCULAR - ADULT  Goal: Maintains optimal cardiac output and hemodynamic stability  Description  INTERVENTIONS:  - Monitor I/O, vital signs and rhythm  - Monitor for S/S and trends of decreased cardiac output  - Administer and titrate ordered vasoactive medications to optimize hemodynamic stability  - Assess quality of pulses, skin color and temperature  - Assess for signs of decreased coronary artery perfusion  - Instruct patient to report change in severity of symptoms  Outcome: Progressing  Goal: Absence of cardiac dysrhythmias or at baseline rhythm  Description  INTERVENTIONS:  - Continuous cardiac monitoring, vital signs, obtain 12 lead EKG if ordered  - Administer antiarrhythmic and heart rate control medications as ordered  - Monitor electrolytes and administer replacement therapy as ordered  Outcome: Progressing     Problem: Potential for Falls  Goal: Patient will remain free of falls  Description  INTERVENTIONS:  - Assess patient frequently for physical needs  -  Identify cognitive and physical deficits and behaviors that affect risk of falls    -  [place] fall precautions as indicated by assessment   - Educate patient/family on patient safety including physical limitations  - Instruct patient to call for assistance with activity based on assessment  - Modify environment to reduce risk of injury  - Consider OT/PT consult to assist with strengthening/mobility  Outcome: Progressing

## 2020-02-25 NOTE — ASSESSMENT & PLAN NOTE
· Discontinue HCTZ due to orthostatic hypotension, blood pressure dropped from 015 to 817 systolic  · Continue lisinopril 10 mg daily

## 2020-02-25 NOTE — DISCHARGE SUMMARY
[facility] CENTRAL Discharge Summary - [facility] 62 y o  female MRN: [id number]    [address] [facility]  Room / Bed: [phone] Encounter: [id number]    BRIEF OVERVIEW    Admitting Provider: Heike Hoffman MD  Discharge Provider: Kallie De Luna MD    Primary Care Physician at Discharge: Romi Dasilva MD    [address]  Admission Date: 2/21/2020     Discharge Date: 2/25/2020  3:32 PM    Hospital Course:  63 yo female with PMH of HTN, diet controlled DM, degenerative cervical disc disease, morbid obesity and hypothyroidism who initially was admitted for right side neck pain and dizziness  She was evaluated by neurosurgery after imaging CTA and MRA/MRI brain showed 6 mm right periophthalmic artery aneurysm  They felt no surgical intervention warranted and recommended outpatient follow up  She had positive orthostatic hypotension and therefore hydrochlorothiazide was discontinued  PT recommended short term rehab and was accepted to [facility] at [place]  She understood her diagnosis and agreed on plan of care and was feeling better upon discharge  There was no significant event during this hospitalization  Presenting Problem/History of Present Illness  Principal Problem:    Aneurysm of internal carotid artery  Active Problems:    Degenerative cervical disc    Hypertension    Hypothyroidism    Morbid obesity ([address] )    Type 2 diabetes mellitus (HCC)    Orthostatic hypotension  Resolved Problems:    Dizziness        Diagnostic Procedures Performed  Imaging Studies:  Mra Head Wo Contrast/MRA    Result Date: 2/22/2020  Narrative MRA BRAIN WITHOUT CONTRAST INDICATION:  R cerebellar artery abnormality; two aneurysms right ICA and left ICA; Right cerebellar artery abnormality and aneurysms COMPARISON:  None  TECHNIQUE:  Axial 3-D time-of-flight imaging with 3-D reconstructions performed without contrast    IV Contrast:  Not administered  FINDINGS: IMAGE QUALITY:  Diagnostic  ANATOMY INTERNAL CAROTID ARTERIES:  There is an aneurysm arising from the medial aspect of the right paraophthalmic internal carotid artery measuring 6 mm in transverse diameter  There is a small 2 mm aneurysm versus infundibulum arising from the supraclinoid internal carotid artery on the left, series 3 image 72  It does appear there is a vessel arising from the apex, suggesting this is an infundibulum  ANTERIOR CIRCULATION:  Severely hypoplastic right A1 segment  Dominant left A1 segment with normal anterior communicating artery and A2/A3 segments  MIDDLE CEREBRAL ARTERY CIRCULATION:  Both M1 segments and M2 branches are patent  There is attenuation of the distal right M1 segment and proximal M2 branches suggesting mild smooth narrowing  DISTAL VERTEBRAL ARTERIES:  Distal vertebral arteries are patient with a normal vertebrobasilar junction  The posterior inferior cerebellar artery origins are normal  BASILAR ARTERY:  Normal  POSTERIOR CEREBRAL ARTERIES:  Both posterior cerebral arteries arises from the basilar tip  Both arteries demonstrate normal flow-related enhancement  Tiny left posterior communicating artery  See above description of its origin with suspicion for infundibulum  Impression 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 2 mm abnormality arising from the left supraclinoid internal carotid artery appears to be an infundibulum at the origin of the posterior communicating artery  Hypoplastic right A1 segment  Normal posterior cerebral artery circulation  Recommend consultation with the [facility], a [facility] of [facility] at [phone]  Pertinent Labs: Therapeutic Procedures Performed  None  Test Results Pending at Discharge: None       Medications     Medication List to be Continued at Discharge  Discharge Medication List as of 2/25/2020  1:05 PM      CONTINUE these medications which have NOT CHANGED    Details   acetaminophen (TYLENOL 8 HOUR ARTHRITIS PAIN) 650 mg CR tablet Take 1 tablet (650 mg total) by mouth every 8 (eight) hours as needed for mild pain, Starting Thu 8/22/2019, Normal      aspirin 81 MG tablet Take 1 tablet (81 mg total) by mouth daily, Starting Thu 4/12/2018, Normal      atorvastatin (LIPITOR) 10 mg tablet Take 10 mg by mouth daily, Historical Med      Cholecalciferol (VITAMIN D HIGH POTENCY PO) Take by mouth   Takes 1x/week, Historical Med      glucose blood (ACCU-CHEK SMARTVIEW) test strip Historical Med      levothyroxine 100 mcg tablet Take 100 mcg by mouth 2 (two) times a week , Historical Med      Levothyroxine Sodium 88 MCG CAPS Take 88 mcg by mouth, Historical Med      loratadine (CLARITIN) 10 mg tablet Take 1 tablet (10 mg total) by mouth daily, Starting Mon 11/12/2018, Normal      ACCU-CHEK FASTCLIX LANCETS MISC Starting Sat 3/3/2018, Historical Med      Blood Glucose Monitoring Suppl (ACCU-CHEK ESVIN SMARTVIEW) w/Device KIT use as directed, Historical Med      cyclobenzaprine (FLEXERIL) 10 mg tablet Take 1 tablet (10 mg total) by mouth daily at bedtime, Starting Thu 8/22/2019, Normal      fluticasone (FLONASE) 50 mcg/act nasal spray 1 spray into each nostril daily, Starting Mon 11/12/2018, Normal      hydrOXYzine HCL (ATARAX) 25 mg tablet Take 1 tablet (25 mg total) by mouth every 12 (twelve) hours as needed for itching, Starting Thu 5/17/2018, Normal      ibuprofen (MOTRIN) 600 mg tablet Take 1 tablet (600 mg total) by mouth every 6 (six) hours as needed for moderate pain, Starting Tue 2/5/2019, Print      naproxen (NAPROSYN) 250 mg tablet Take 250 mg by mouth as needed for mild pain , Historical Med      omeprazole (PriLOSEC) 20 mg delayed release capsule Take 1 capsule (20 mg total) by mouth daily, Starting Thu 8/22/2019, Normal      psyllium (METAMUCIL SMOOTH TEXTURE) 28 % packet Take 1 packet by mouth 2 (two) times a day as needed (constipation), Starting Wed 4/3/2019, Normal           Discharge Medication List as of 2/25/2020  1:05 PM      START taking these medications    Details   lisinopril (ZESTRIL) 10 mg tablet Take 1 tablet (10 mg total) by mouth daily, Starting Wed 2/26/2020, Normal      methocarbamol (ROBAXIN) 750 mg tablet Take 1 tablet (750 mg total) by mouth every 6 (six) hours as needed for muscle spasms (right neck pain/spasm), Starting Tue 2/25/2020, No Print           Discharge Medication List as of 2/25/2020  1:05 PM      STOP taking these medications       lisinopril-hydrochlorothiazide (PRINZIDE,ZESTORETIC) 10-12 5 MG per tablet Comments:   Reason for Stopping:               Allergies  Allergies   Allergen Reactions    Tagamet [Cimetidine] Hives     Discharge Diet: diabetic diet  Activity restrictions: none  Discharge Condition: good  Discharged With Lines: no    Discharge Disposition: [address] / Family Member Name:   Extended Emergency Contact Information  Primary Emergency Contact: ZayBraden  Home Phone: [phone]  Relation: Brother  Secondary Emergency Contact: Lolis Craig  Mobile Phone: [phone]  Relation: Brother        Outpatient Follow-Up  yes      Follow up: 6 mm R periophthalmic artery aneurysm  Elan Mauro MD, Neurosurgery    Date and time: in two weeks  Location:    [facility]  [address]        Follow up: PCP  Date and time: in one week  Location:   [address]        Code Status: Level 1 - Full Code  Advance Directive and Living Will: <no information>  Power of :    POLST:      Discharge  Statement   I spent 30 minutes minutes discharging the patient  This time was spent on the day of discharge  I had direct contact with the patient on the day of discharge  Additional documentation is required if more than 30 minutes were spent on discharge  Daksha Purcell MD  Available on [employer]  [facility]  Internal medicine resident

## 2020-02-25 NOTE — SOCIAL WORK
Pt is medically clear for dc today  CM informed [facility] of such  CM received a call from Bishop Mooney with [facility] informing that they can accept pt and has bed availability today  CM met pt and informed of the above  Pt is agreeable with the dcp  CM received a call from Bishop Mooney that she spoke with pt and got the consent  Per Bishop Mooney, she will do the PASSR  Pt reported that her brother Ephraim Carney will transport her to [facility] at 3 pm today  CM informed Bishop Mooney with [facility], Dr Vinh Powell and flr MICHAEL Lopez of the above

## 2020-02-25 NOTE — PROGRESS NOTES
INTERNAL MEDICINE RESIDENCY PROGRESS NOTE     Name: Eladio Santiago   Age & Sex: 62 y o  female   MRN: [id number]  Unit/Bed#: -01   Encounter: [id number]  Team: SOD Team B     PATIENT INFORMATION     Name: Eladio Santiago   Age & Sex: 62 y o  female   MRN: [id number]  Hospital Stay Days: 3    ASSESSMENT/PLAN     Principal Problem:    Aneurysm of internal carotid artery  Active Problems:    Degenerative cervical disc    Hypertension    Hypothyroidism    Morbid obesity ([facility] )    Type 2 diabetes mellitus ([facility] )    Dizziness    Orthostatic hypotension      * Aneurysm of internal carotid artery  Assessment & Plan  · CTA of the head shows possible occlusion of the right cerebellar artery along with 4-5 mm aneurysm in the right ICA and 2 mm aneurysm in the left ICA  · Patient admitted for MRI/MRA of the brain with  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  · Neurosurgery consulted for right and left ICA aneurysm and recommended no surgical intervention warranted, follow up with neurosurgery in two weeks, if there is concern for SAH to do LP  At this point no concern for [facility] due to no headache and improvement of neck stiffness  Orthostatic hypotension  Assessment & Plan  · Discontinue HCTZ due to orthostatic hypotension, blood pressure dropped from 625 to 974 systolic  · Continue lisinopril 10 mg daily  Dizziness  Assessment & Plan  · Patient reports ongoing dizziness lasts for seconds since the last couple of months, associated with unsteady gait and swaying sometimes to the left and sometimes to the right side    · Orthostatic blood pressure positive Systolic dropped from 389 to 109 while standing  · See HTN and orthostatic hypotension  · CTA of the head shows possible occlusion of the right cerebellar artery along with 4-5 mm aneurysm in the right ICA and 2 mm aneurysm in the left ICA  Patient admitted for MRI/MRA of the brain with 6 mm aneurysm at right periophthalmic artery  Normal posterior cerebral artery circulation  No acute ischemia, mass or hemorrhage  ·    · Neurosurgery Rec outpatient follow up in two weeks, no surgical intervention, if there is concern for SAH do LP  No headache, and neck stiffness improved  · Check orthostatic BP discussed with nursing  · Obtain lipid panel with , 's  · Change statin to high-intensity, 40 mg Atorvastatin  · Continue aspirin 81 mg daily  · PT/OT eval pending, likely placement    Type 2 diabetes mellitus ([facility] )  Assessment & Plan  Lab Results   Component Value Date    HGBA1C 6 5 (H) 08/15/2019       No results for input(s): POCGLU in the last 72 hours  Blood Sugar Average: Last 72 hrs:     · Not on any medications  · SSI while in the hospital    Morbid obesity [facility])  Assessment & Plan  Outpatient management  Hypothyroidism  Assessment & Plan  · TSH within normal limits  · Continue levothyroxine 88 mcg      Hypertension  Assessment & Plan  · Discontinue HCTZ due to orthostatic hypotension, blood pressure dropped from 494 to 024 systolic  · Continue lisinopril 10 mg daily  Degenerative cervical disc  Assessment & Plan  · On muscle relaxants, lidocaine patch, scheduled Tylenol  · Diazepam 5 mg PRN Q 6       Disposition: Medically cleared for rehab  SUBJECTIVE     Patient seen and examined  No acute events overnight  She feels better  No acute complains  Yesterday during physical therapy she reported blurry vision and nausea, her orthostatic blood pressure positive       OBJECTIVE     Vitals:    20 1038 20 1039 20 2309 20 0718   BP:  117/78 119/76 119/64   Pulse: 77 74 78 62   Resp: 16 16  19   Temp: 98 1 °F (36 7 °C) 98 1 °F (36 7 °C)  98 °F (36 7 °C)   TempSrc:       SpO2: 93% 93% 96% 93%   Weight:       Height:          Temperature:   Temp (24hrs), Av 1 °F (36 7 °C), Min:98 °F (36 7 °C), Max:98 1 °F (36 7 °C)    Temperature: 98 °F (36 7 °C)  Intake & Output:  I/O 02/23 0701 - 02/24 0700 02/24 0701 - 02/25 0700 02/25 0701 - 02/26 0700    P  O  476 1310     Total Intake(mL/kg) 476 (3 6) 1310 (9 9)     Urine (mL/kg/hr) 550 (0 2) 520 (0 2)     Total Output 550 520     Net -74 +790            Unmeasured Urine Occurrence 1 x          Weights:   IBW: 54 7 kg    Body mass index is 49 78 kg/m²  Weight (last 2 days)     None        Physical Exam   Constitutional: She is oriented to person, place, and time  She appears well-developed and well-nourished  No distress  HENT:   Head: Normocephalic and atraumatic  Eyes: Pupils are equal, round, and reactive to light  EOM are normal    Neck: Neck supple  Cardiovascular: Normal rate, regular rhythm and normal heart sounds  No murmur heard  Pulmonary/Chest: Effort normal and breath sounds normal  No stridor  No respiratory distress  She has no wheezes  Abdominal: Soft  Distention: Obese  There is no tenderness  Neurological: She is alert and oriented to person, place, and time  Skin: She is not diaphoretic  Psychiatric: She has a normal mood and affect  Her behavior is normal    Vitals reviewed  LABORATORY DATA     Labs: I have personally reviewed pertinent reports  Results from last 7 days   Lab Units 02/22/20  0542   WBC Thousand/uL 9 50   HEMOGLOBIN g/dL 12 0   HEMATOCRIT % 39 0   PLATELETS Thousands/uL 234      Results from last 7 days   Lab Units 02/22/20  0031   POTASSIUM mmol/L 4 3   CHLORIDE mmol/L 107   CO2 mmol/L 27   BUN mg/dL 17   CREATININE mg/dL 0 69   CALCIUM mg/dL 9 1                            IMAGING & DIAGNOSTIC TESTING     Radiology Results: I have personally reviewed pertinent reports  Cta Head And Neck With And Without Contrast    Result Date: 2/22/2020  Impression: No acute intracranial process is seen  Attenuation of the right cerebellar artery approximately 1 cm distal to the origin, consider atherosclerotic disease/occlusion  Correlation with the patient's symptoms recommended    If there is concern for ischemia, MR of the brain may be of benefit for  further evaluation  4-5 mm superior medially oriented aneurysm of the right internal carotid artery in the supraclinoid region (axial image 80, series 5)  2 mm inferiorly oriented aneurysm of the supraclinoid portion of the left internal carotid artery (axial image 163, series 6 and sagittal image 61, series 602)  Severely hypoplastic right A1 segment  The left A1 segment appears unremarkable  Bilateral anterior cerebral arteries with normal enhancement otherwise  No significant stenosis within either internal carotid artery  Patent bilateral vertebral arteries  Degenerative changes of the cervical spine; most prominent at C5/C6 on the right  Other findings as above  Findings discussed with Dr Emiliano Joyner by Dr Alejandra Ospina at 3:50 AM on 2/22/2020  Workstation performed: [id number]     Mra Head Wo Contrast    Result Date: 2/22/2020  Impression: 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 2 mm abnormality arising from the left supraclinoid internal carotid artery appears to be an infundibulum at the origin of the posterior communicating artery  Hypoplastic right A1 segment  Normal posterior cerebral artery circulation  Recommend consultation with the [facility], a division of [facility] at [phone]  The study was marked in [facility] for immediate notification  Workstation performed: [id number]     Mri Brain Wo Contrast    Result Date: 2/22/2020  Impression: Small white matter hyperintensities on T2 and FLAIR imaging suggestive of mild chronic microangiopathic change within the cerebral hemispheres  No acute ischemia, mass or hemorrhage  Workstation performed: [id number]     Other Diagnostic Testing: I have personally reviewed pertinent reports      ACTIVE MEDICATIONS     Current Facility-Administered Medications   Medication Dose Route Frequency    acetaminophen (TYLENOL) tablet 975 mg 975 mg Oral Q8H PRN    aspirin chewable tablet 81 mg  81 mg Oral Daily    atorvastatin (LIPITOR) tablet 40 mg  40 mg Oral Daily With Dinner    diazepam (VALIUM) tablet 5 mg  5 mg Oral Q6H PRN    insulin lispro (HumaLOG) 100 units/mL subcutaneous injection 1-6 Units  1-6 Units Subcutaneous TID AC    levothyroxine tablet 88 mcg  88 mcg Oral Early Morning    lisinopril (ZESTRIL) tablet 10 mg  10 mg Oral Daily    methocarbamol (ROBAXIN) tablet 750 mg  750 mg Oral Q6H PRN       VTE Pharmacologic Prophylaxis: Sequential compression device (Venodyne)  and Reason for no pharmacologic prophylaxis low risk  VTE Mechanical Prophylaxis: sequential compression device    Portions of the record may have been created with voice recognition software  Occasional wrong word or "sound a like" substitutions may have occurred due to the inherent limitations of voice recognition software    Read the chart carefully and recognize, using context, where substitutions have occurred   ==  Ema Severs, MD  [address]  Internal Medicine Residency PGY-3

## 2020-02-26 ENCOUNTER — NURSING HOME VISIT (OUTPATIENT)
Dept: GERIATRICS | Facility: OTHER | Age: 65
End: 2020-02-26
Payer: MEDICARE

## 2020-02-26 DIAGNOSIS — I10 ESSENTIAL HYPERTENSION: ICD-10-CM

## 2020-02-26 DIAGNOSIS — R26.81 GAIT INSTABILITY: ICD-10-CM

## 2020-02-26 DIAGNOSIS — K21.9 GASTROESOPHAGEAL REFLUX DISEASE, ESOPHAGITIS PRESENCE NOT SPECIFIED: ICD-10-CM

## 2020-02-26 DIAGNOSIS — G47.33 OBSTRUCTIVE SLEEP APNEA: ICD-10-CM

## 2020-02-26 DIAGNOSIS — E66.01 MORBID OBESITY (HCC): ICD-10-CM

## 2020-02-26 DIAGNOSIS — M50.30 DEGENERATIVE CERVICAL DISC: ICD-10-CM

## 2020-02-26 DIAGNOSIS — I95.1 ORTHOSTATIC HYPOTENSION: ICD-10-CM

## 2020-02-26 DIAGNOSIS — E11.9 TYPE 2 DIABETES MELLITUS WITHOUT COMPLICATION, WITHOUT LONG-TERM CURRENT USE OF INSULIN (HCC): ICD-10-CM

## 2020-02-26 DIAGNOSIS — I67.1 ANEURYSM OF INTERNAL CAROTID ARTERY: Primary | ICD-10-CM

## 2020-02-26 DIAGNOSIS — E03.9 HYPOTHYROIDISM, UNSPECIFIED TYPE: ICD-10-CM

## 2020-02-26 PROCEDURE — 99306 1ST NF CARE HIGH MDM 50: CPT | Performed by: STUDENT IN AN ORGANIZED HEALTH CARE EDUCATION/TRAINING PROGRAM

## 2020-02-26 NOTE — PROGRESS NOTES
[address], [address] [place] [address]  Billing Code 31    History and Physical      NAME: Maylon Lefort  AGE: 62 y o   SEX: female    DATE OF ENCOUNTER:  02/26/2020    Code status:  CPR    Assessment and Plan     Problem List Items Addressed This Visit        Digestive    GERD (gastroesophageal reflux disease)     Stable  Continue omeprazole 20 mg daily  Continue anti reflux measures  Educated on side effect profile of PPI therapy  Will continue to follow and wean as tolerated            Endocrine    Hypothyroidism     Lab Results   Component Value Date    [id number] 2 170 02/22/2020     Remained stable  Continue levothyroxine 88 mcg on Monday Tuesday Wednesday, Thursday, Friday  Continue levothyroxine 100 mcg on the weekends  Patient currently following with endocrinology  Scheduled for repeat thyroid ultrasound as an outpatient with endocrinology  Will continue to monitor         Type [facility] )       Lab Results   Component Value Date    HGBA1C 6 6 (H) 02/22/2020     Accu-Cheks remain stable since arriving to short-term rehabilitation  Will continue Accu-Chek monitoring  Patient with a history of intolerance to metformin  Previously on [address] with endocrinology at [facility]  Will repeat CBC, CMP in 2 days  Recommended adherence to a diabetic, heart healthy diet  Discussed with patient, once she is able to ambulate better, to formulate an exercise program for weight loss in the future  Will continue to follow            Respiratory    Obstructive sleep apnea     Unable to obtain medical records supporting this diagnosis  Patient states she has not used a CPAP machine  Will continue to monitor            Cardiovascular and Mediastinum    Hypertension     Blood pressures remained stable since arriving to short-term rehabilitation  HCTZ discontinued in hospital due to positive orthostatics  Continue lisinopril 10 mg daily  Recommend adherence to a diabetic, heart healthy diet  Will continue to follow         Aneurysm of internal carotid artery - Primary     Presenting initially with right-sided neck pain, dizziness and chronic ambulatory dysfunction  Continues to have occasional dizziness with changes in body positioning from supine to seated to standing correlating moreso with orthostatic hypotension  CTA neck in brain showing attenuation of a right cerebral artery approximately 1 cm distal to the origin, 4-5 mm superior medially oriented aneurysm of the right RCA in the supraclinoid region, 2 mm inferiorly oriented aneurysm of the supraclinoid portion of the left ICA  MRA head showing a 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, 2 mm abnormality arising from left supraclinoid ICA  Patient reviewed by Neurosurgery in hospital with recommendations for conservative management  No concerns for a subarachnoid hemorrhage is a result of which LP was not done  Patient follow-up with neurosurgery as scheduled as an outpatient  Will consult physical/occupational/speech therapy  Will repeat CBC, CMP in 2 days  Will continue to follow         Orthostatic hypotension     Positive for orthostatic hypotension  s/p discontinuation of hydrochlorothiazide  Recommend management with physical measures  Raise head of bed by 10-20 degrees  Patient educated on importance of arising slowly in stages from supine to seated to standing  Dorsiflexion of the feet, hand  isometric, leg crossing/squatting before standing  Liberalize salt and fluid intake  Small meals and coffee only in the early morning  Start fitted compression stockings            Musculoskeletal and Integument    Degenerative cervical disc     Patient denies any pain during the interview  Her continue Tylenol protocol as needed  Patient currently on cyclobenzaprine 10 mg at nighttime  Currently on methocarbamol 750 mg p o  Q 6h p r n  Patient has not required any p r n  Dosing as of yet of methocarbamol  If pain is increased, will augment analgesia regimen with a lidocaine patch to her neck  Will continue to monitor, wean methocarbamol and discontinue over the next few days  Will consult physical therapy, occupational therapy and speech therapy  Maintain [place] precautions  Follow-up with neurosurgery as an outpatient            Other    Morbid obesity ([facility] [address] )     BMI 49 95  Recommend adherence to a diabetic, heart healthy diet  Patient previously on phentermine which has since been discontinued by endocrinology  Once patient has an improvement in gait, will recommend an exercise program for weight loss  Will consult dietitian  Will continue to follow         Gait instability     Gait instability secondary to symptoms of dizziness  Will consult physical therapy, occupational therapy, speech therapy  Maintain [place] precautions  Will repeat CBC, CMP in 2 days  Will continue to follow               All medications and routine orders were reviewed and updated as needed  Plan discussed with:  Patient and nursing staff    Chief Complaint     Seen for admission at [address] [place] [address]    History of Present Illness     This is a 61-year-old female with hypertension, type 2 diabetes controlled on diet, hypothyroidism, degenerative cervical disc disease and morbid obesity who was seen and examined at bedside for admission to [facility] short-term rehabilitation  The patient was recently admitted to hospital after complaints of right-sided neck pain, chronic dizziness and occipital headache contributing to chronic ambulatory dysfunction for the past few months  Imaging revealed a 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 for which Neurosurgery was consulted with recommendations for conservative management and outpatient follow-up with them     It was recommended that should there be concerns for a subarachnoid hemorrhage to do an LP otherwise CT and MRI findings could be incidental   During her hospitalization, she was noted to have positive orthostatic hypotension for which her medications were adjusted with discontinuation of hydrochlorothiazide  This morning, the patient states that she continues to have occasional dizziness when changing positions in going from lying to sitting to standing  She denies any headaches, change in vision or worsening neck pain  She states she has had a decreased appetite since her hospitalization, was not able to sleep well as a result of her roommate's loud television and has not had any bowel movements for the past 2 days  The patient continues to do well on levothyroxine for a history of hypothyroidism  Blood pressure remains stable since arriving to short-term rehabilitation while on lisinopril 10 mg daily  She will also continue on omeprazole 20 mg daily for a history of GERD with symptoms currently controlled  HISTORY:  Past Medical History:   Diagnosis Date    Arthritis     Depression     Disease of thyroid gland     hypo    Edema     LAST ASSESSED: [id number]    GERD (gastroesophageal reflux disease)     Hypercholesterolemia     Hyperlipidemia     Hypertension     WELL CONTROLLED  CURRENTLY ON LISINOPRIL   LAST ASSESSED: [id number]    Other headache syndrome     Other muscle spasm     LAST ASSESSED: [id number]    Ovarian cyst 2006    Renal calculi      (spontaneous vaginal delivery)     FEMALE    Thyroid disease      Past Surgical History:   Procedure Laterality Date    BACK SURGERY      HERNIATED DISC (L4/L5)    CHOLECYSTECTOMY      TONSILLECTOMY         Family History   Problem Relation Age of Onset    Lung cancer Mother     Cancer Mother         MALIGNANT NEOPLASM    Hypertension Father     Diabetes Brother     Hypertension Son     Lung disease Son     Hyperthyroidism Maternal Aunt     Hypothyroidism Maternal Aunt     Heart disease Other         CARDIAC DISORDER    Neuropathy Family      Social History     Socioeconomic History    Marital status:      Spouse name: None    Number of children: None    Years of education: None    Highest education level: None   Occupational History    Occupation: RETIRED   Social Needs    Financial resource strain: None    Food insecurity:     Worry: None     Inability: None    Transportation needs:     Medical: None     Non-medical: None   Tobacco Use    Smoking status: Former Smoker    Smokeless tobacco: Never Used    Tobacco comment: pt "quit about 16 years ago"   Substance and Sexual Activity    Alcohol use: Never     Frequency: Never    Drug use: No    Sexual activity: Never   Lifestyle    Physical activity:     Days per week: None     Minutes per session: None    Stress: None   Relationships    Social connections:     Talks on phone: None     Gets together: None     Attends Mosque service: None     Active member of club or organization: None     Attends meetings of clubs or organizations: None     Relationship status: None    Intimate partner violence:     Fear of current or ex partner: None     Emotionally abused: None     Physically abused: None     Forced sexual activity: None   Other Topics Concern    None   Social History Narrative    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         [facility]    EXERCISES REGULARLY    LIVES WITH FAMILY    NO CAFFEINE USE    NO LIVING WILL    NO TOBACCO/SMOKE EXPOSURE    UNEMPLOYED       Allergies: Allergies   Allergen Reactions    Tagamet [Cimetidine] Hives       Review of Systems     Review of Systems   Constitutional: Positive for activity change (Due to prior dizziness), appetite change (Since hospitalization) and fatigue (Chronic)  Negative for chills and fever  HENT: Negative for congestion, ear pain, rhinorrhea and sore throat      Eyes: Negative for discharge  Respiratory: Negative for cough, chest tightness, shortness of breath, wheezing and stridor  Cardiovascular: Negative for chest pain, palpitations and leg swelling  Gastrointestinal: Positive for constipation  Negative for abdominal pain, diarrhea, nausea and vomiting  Genitourinary: Negative for dysuria and hematuria  Musculoskeletal: Positive for arthralgias (Chronic), gait problem and neck pain (Improved)  Negative for back pain  Skin: Negative for color change, pallor, rash and wound  Neurological: Positive for dizziness and weakness (Improving)  Negative for facial asymmetry, speech difficulty and headaches  Hematological: Does not bruise/bleed easily  Psychiatric/Behavioral: Negative for agitation, behavioral problems, confusion, decreased concentration, dysphoric mood, hallucinations and sleep disturbance (Patient able to sleep due to roommate's loud television)  The patient is not nervous/anxious  Medications and orders     All medications reviewed and updated in Nursing Home EMR  Objective     Vitals:  /72, temp 96 6°, HR 74, RR 18, SpO2 94%, weight 283 lb, blood sugar 125 mg/dL    Physical Exam   Constitutional: She is oriented to person, place, and time  She appears well-developed  No distress  Obese female lying in bed in no obvious cardiorespiratory or painful distress   HENT:   Head: Normocephalic and atraumatic  Right Ear: External ear normal    Left Ear: External ear normal    Nose: Nose normal    Mouth/Throat: Oropharynx is clear and moist  No oropharyngeal exudate  Eyes: Conjunctivae are normal  Right eye exhibits no discharge  Left eye exhibits no discharge  No scleral icterus  Neck: Normal range of motion  Neck supple  No JVD present  Cardiovascular: Normal rate, regular rhythm, normal heart sounds and intact distal pulses  Exam reveals no gallop and no friction rub  No murmur heard    Pulmonary/Chest: Effort normal and breath sounds normal  No stridor  No respiratory distress  She has no wheezes  She has no rales  Abdominal: Soft  Bowel sounds are normal  She exhibits no distension  There is no tenderness  There is no rebound and no guarding  Unable to appreciate any masses given patient's morbid obesity   Musculoskeletal: Normal range of motion  She exhibits no edema, tenderness or deformity  Neurological: She is alert and oriented to person, place, and time  She has normal reflexes  No cranial nerve deficit  Skin: Skin is warm  No rash noted  She is not diaphoretic  No erythema  No pallor  Psychiatric: She has a normal mood and affect  Vitals reviewed  Pertinent Laboratory/Diagnostic Studies: The following labs/studies were reviewed please see chart or hospital paperwork for details  Lab Results   Component Value Date    WBC 9 50 02/22/2020    HGB 12 0 02/22/2020    HCT 39 0 02/22/2020    MCV 89 02/22/2020     02/22/2020     Lab Results   Component Value Date    SODIUM 140 02/22/2020    K 4 3 02/22/2020     02/22/2020    CO2 27 02/22/2020    BUN 17 02/22/2020    CREATININE 0 69 02/22/2020    GLUC 91 02/22/2020    CALCIUM 9 1 02/22/2020     Lipid panel (02/22/2020:  Cholesterol 190, triglycerides 137, HDL 46,     Lab Results   Component Value Date    HGBA1C 6 6 (H) 02/22/2020     Lab Results   Component Value Date    [id number] 2 170 02/22/2020     MRA head (02/22/2020):  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  2 mm abnormality, arising from left supraclinoid internal carotid artery appears to be an infundibulum at the origin of the posterior communicating artery  Hypoplastic right A1 segment  Normal posterior cerebral artery circulation  MRI brain (02/22/2020):  Small white matter hyperintensities on T2 suggestive of mild chronic microangiopathic change within the cerebral hemispheres    No acute ischemia, mass or hemorrhage  I personally reviewed the images on PACS    CTA neck and brain (2020): Attenuation of the right cerebral artery approximately 1 cm distal to the origin  4-5 mm superior medially oriented aneurysm of the right ICA in the supraclinoid region  2 mm inferiorly oriented aneurysm of the supraclinoid portion of left internal carotid artery  Severely hypoplastic right A1 segment  No significant stenosis within either ICA  Patent bilateral vertebral arteries  Degenerated changes of C5-C6 on the right    EKG:  NSR,       - Counseling Documentation: patient was counseled regarding: diagnostic results, instructions for management, risk factor reductions, prognosis, patient and family education, impressions, risks and benefits of treatment options and importance of compliance with treatment  Discussed with patient and nursing staff    Zoila BOYLE    Geriatric Medicine  2020 4:05 AM    Name: Silviamaggy Tomas  : 1955  MRN: [id number]  DOS: 2020

## 2020-02-27 PROBLEM — R26.81 GAIT INSTABILITY: Status: ACTIVE | Noted: 2020-02-27

## 2020-03-04 ENCOUNTER — NURSING HOME VISIT (OUTPATIENT)
Dept: GERIATRICS | Facility: OTHER | Age: 65
End: 2020-03-04
Payer: MEDICARE

## 2020-03-04 DIAGNOSIS — I67.1 ANEURYSM OF INTERNAL CAROTID ARTERY: Primary | ICD-10-CM

## 2020-03-04 DIAGNOSIS — R26.81 GAIT INSTABILITY: ICD-10-CM

## 2020-03-04 DIAGNOSIS — E11.9 TYPE 2 DIABETES MELLITUS WITHOUT COMPLICATION, WITHOUT LONG-TERM CURRENT USE OF INSULIN (HCC): ICD-10-CM

## 2020-03-04 DIAGNOSIS — I95.1 ORTHOSTATIC HYPOTENSION: ICD-10-CM

## 2020-03-04 DIAGNOSIS — E03.9 HYPOTHYROIDISM, UNSPECIFIED TYPE: ICD-10-CM

## 2020-03-04 DIAGNOSIS — E66.01 MORBID OBESITY (HCC): ICD-10-CM

## 2020-03-04 DIAGNOSIS — K21.9 GASTROESOPHAGEAL REFLUX DISEASE, ESOPHAGITIS PRESENCE NOT SPECIFIED: ICD-10-CM

## 2020-03-04 PROCEDURE — 99309 SBSQ NF CARE MODERATE MDM 30: CPT | Performed by: STUDENT IN AN ORGANIZED HEALTH CARE EDUCATION/TRAINING PROGRAM

## 2020-03-08 NOTE — PROGRESS NOTES
[facility]  [address]  [phone]  [facility] Younger Progress Note  Billing code:  32        NAME: Izetta Nageotte  AGE: 62 y o  SEX: female    DATE OF ENCOUNTER:  03/04/2020    Assessment and Plan     Problem List Items Addressed This Visit        Digestive    GERD (gastroesophageal reflux disease)     Symptoms remain controlled  Continue anti reflux measures  Continue omeprazole 20 mg daily  Will continue to follow            Endocrine    Hypothyroidism     Stable  Continue levothyroxine Monday-Friday, 88 mcg daily  Continue levothyroxine 100 mcg on weekends  Continue routine follow-up with endocrinology as scheduled  Patient for repeat ultrasound of thyroid per endocrinology as an outpatient  Will continue to follow         Type 2 diabetes mellitus ([facility] )       Lab Results   Component Value Date    HGBA1C 6 6 (H) 02/22/2020     Accu-Cheks remain controlled  Previously on Farxiga  Patient intolerant of metformin  Recommend adherence to a diabetic, heart healthy diet  Follow up with endocrinology as scheduled  Will continue to follow            Cardiovascular and Mediastinum    Aneurysm of internal carotid artery - Primary     Diagnosed during recent hospitalization after symptoms of dizziness, neck pain and ambulatory dysfunction    Patient seen by Neurosurgery in hospital recommendations for conservative management  Continue routine follow-up with neurosurgery as scheduled as an outpatient  Continue physical therapy, occupational therapy  Maintain [place] precautions  Will continue to follow         Orthostatic hypotension     Symptoms improving  Continue physical measures:  Raise head of bed by 10-20 degrees  Arise slowly in stages from supine to seated to standing  Dorsiflex feet, hand  isometric, leg crossing/cording before standing  Liberalize salt and fluid intake  Small veins and coffee only in early morning  Continue use of compression stockings Other    Morbid obesity ([facility] )     BMI 49 95  Discussed with patient importance for weight loss given comorbidities  Encourage lifestyle changes and incorporating exercise program once symptoms stabilized   Currently following with dietitian  Will continue to follow         Gait instability     Maintain [place] precautions  Continue physical therapy  Continue vitamin-D supplementation  Will continue to follow             - Counseling Documentation: patient was counseled regarding: diagnostic results, instructions for management, risk factor reductions, prognosis, patient and family education, impressions, risks and benefits of treatment options and importance of compliance with treatment  Discussed with patient and nursing staff    Chief Complaint     Patient seen for routine weekly nursing home rounds for follow-up of acute and chronic conditions  History of Present Illness     HPI  Patient seen and examined at bedside for routine weekly nursing home rounds for follow-up of acute and chronic conditions during short-term rehabilitation stay  Today the patient complains of generalized pain after participating in physical therapy  She feels she has over exerted herself during her therapy session this morning and feels tired  She does admit to having a decrease in frequency of episodes of dizziness secondary to  orthostatic hypotension as she has been adherent to physical measures recommended  The patient continues on levothyroxine for a history of hypothyroidism  She continues to do well on lisinopril for a history of hypertension with blood pressure logs remaining stable  She also continues on omeprazole for a history of GERD with symptoms remaining controlled      The following portions of the patient's history were reviewed and updated as appropriate: allergies, current medications, past family history, past medical history, past social history, past surgical history and problem list     Review of Systems     Review of Systems   Constitutional: Positive for activity change and fatigue  Negative for chills and fever  HENT: Negative for congestion, ear pain, rhinorrhea and sore throat  Eyes: Negative for discharge and visual disturbance  Respiratory: Negative for cough, chest tightness, shortness of breath, wheezing and stridor  Cardiovascular: Negative for chest pain, palpitations and leg swelling  Gastrointestinal: Negative for abdominal pain, constipation, diarrhea, nausea and vomiting  Genitourinary: Negative for decreased urine volume, dysuria and hematuria  Musculoskeletal: Positive for arthralgias (Chronic), back pain (Chronic) and neck pain (Chronic)  Gait problem: Using wheelchair  Skin: Negative for color change, pallor, rash and wound  Neurological: Positive for dizziness (Intermittent) and weakness  Negative for seizures, speech difficulty, numbness and headaches  Hematological: Does not bruise/bleed easily  Psychiatric/Behavioral: Negative for behavioral problems, confusion, decreased concentration, hallucinations and sleep disturbance  Active Problem List     Patient Active Problem List   Diagnosis    Carpal tunnel syndrome, bilateral    Cervical spondylosis without myelopathy    Degenerative cervical disc    GERD (gastroesophageal reflux disease)    Hypertension    Hypothyroidism    Morbid obesity ([facility] )    Obstructive sleep apnea    Type 2 diabetes mellitus (HCC)    Cervical cancer screening    Lung nodule < 6cm on CT    Chest pain    Thickened endometrium    Healthcare maintenance    RUQ pain    Sinusitis    Aneurysm of internal carotid artery    Orthostatic hypotension    Gait instability       Objective     Blood pressure 121/92, HR 82, temp 97 2, RR 18, O2 sat 98%, blood sugar 113 0mg/dl, Weight to 74 5 lbs    Physical Exam   Constitutional: She is oriented to person, place, and time  She appears well-developed and well-nourished  No distress  Female patient sitting comfortably in bed in no obvious cardiorespiratory or painful distress   HENT:   Head: Normocephalic and atraumatic  Right Ear: External ear normal    Left Ear: External ear normal    Nose: Nose normal    Mouth/Throat: Oropharynx is clear and moist  No oropharyngeal exudate  Eyes: Conjunctivae are normal  Right eye exhibits no discharge  Left eye exhibits no discharge  No scleral icterus  Neck: Normal range of motion  Neck supple  No JVD present  Cardiovascular: Normal rate, regular rhythm, normal heart sounds and intact distal pulses  Exam reveals no gallop and no friction rub  No murmur heard  Pulmonary/Chest: Effort normal and breath sounds normal  No stridor  No respiratory distress  She has no wheezes  She has no rales  Abdominal: Soft  Bowel sounds are normal  She exhibits no distension  There is no tenderness  There is no guarding  Morbidly obese, unable to elicit any masses due to obesity   Musculoskeletal: Normal range of motion  She exhibits no edema, tenderness or deformity  Neurological: She is alert and oriented to person, place, and time  She has normal reflexes  No cranial nerve deficit  Skin: Skin is warm  No rash noted  She is not diaphoretic  No erythema  No pallor  Psychiatric: She has a normal mood and affect  Nursing note and vitals reviewed  Pertinent Laboratory/Diagnostic Studies:  Reviewed prior blood work  No new lab orders placed today      Current Medications   Medications were reviewed and updated in facility paper chart      Rajinder Capellan MD  Geriatric Medicine  3/8/2020 9:26 PM

## 2020-03-09 NOTE — ASSESSMENT & PLAN NOTE
Lab Results   Component Value Date    HGBA1C 6 6 (H) 02/22/2020     Accu-Cheks remain controlled  Previously on Farxiga  Patient intolerant of metformin  Recommend adherence to a diabetic, heart healthy diet  Follow up with endocrinology as scheduled  Will continue to follow

## 2020-03-09 NOTE — ASSESSMENT & PLAN NOTE
BMI 49 95  Discussed with patient importance for weight loss given comorbidities  Encourage lifestyle changes and incorporating exercise program once symptoms stabilized   Currently following with dietitian  Will continue to follow

## 2020-03-09 NOTE — ASSESSMENT & PLAN NOTE
Symptoms improving  Continue physical measures:  Raise head of bed by 10-20 degrees  Arise slowly in stages from supine to seated to standing  Dorsiflex feet, hand  isometric, leg crossing/cording before standing  Liberalize salt and fluid intake  Small veins and coffee only in early morning  Continue use of compression stockings

## 2020-03-09 NOTE — ASSESSMENT & PLAN NOTE
Stable  Continue levothyroxine Monday-Friday, 88 mcg daily  Continue levothyroxine 100 mcg on weekends  Continue routine follow-up with endocrinology as scheduled  Patient for repeat ultrasound of thyroid per endocrinology as an outpatient  Will continue to follow

## 2020-03-09 NOTE — ASSESSMENT & PLAN NOTE
Symptoms remain controlled  Continue anti reflux measures  Continue omeprazole 20 mg daily  Will continue to follow

## 2020-03-09 NOTE — ASSESSMENT & PLAN NOTE
Maintain [place] precautions  Continue physical therapy  Continue vitamin-D supplementation  Will continue to follow

## 2020-03-09 NOTE — ASSESSMENT & PLAN NOTE
Diagnosed during recent hospitalization after symptoms of dizziness, neck pain and ambulatory dysfunction    Patient seen by Neurosurgery in hospital recommendations for conservative management  Continue routine follow-up with neurosurgery as scheduled as an outpatient  Continue physical therapy, occupational therapy  Maintain [place] precautions  Will continue to follow

## 2020-03-13 ENCOUNTER — NURSING HOME VISIT (OUTPATIENT)
Dept: GERIATRICS | Facility: OTHER | Age: 65
End: 2020-03-13
Payer: MEDICARE

## 2020-03-13 DIAGNOSIS — E03.8 OTHER SPECIFIED HYPOTHYROIDISM: ICD-10-CM

## 2020-03-13 DIAGNOSIS — R26.81 GAIT INSTABILITY: ICD-10-CM

## 2020-03-13 DIAGNOSIS — I95.1 ORTHOSTATIC HYPOTENSION: ICD-10-CM

## 2020-03-13 DIAGNOSIS — K21.9 GASTROESOPHAGEAL REFLUX DISEASE WITHOUT ESOPHAGITIS: ICD-10-CM

## 2020-03-13 DIAGNOSIS — E11.9 TYPE 2 DIABETES MELLITUS WITHOUT COMPLICATION, WITHOUT LONG-TERM CURRENT USE OF INSULIN (HCC): ICD-10-CM

## 2020-03-13 DIAGNOSIS — I10 ESSENTIAL HYPERTENSION: ICD-10-CM

## 2020-03-13 DIAGNOSIS — E66.01 MORBID OBESITY (HCC): ICD-10-CM

## 2020-03-13 DIAGNOSIS — I67.1 ANEURYSM OF INTERNAL CAROTID ARTERY: Primary | ICD-10-CM

## 2020-03-13 PROCEDURE — 99316 NF DSCHRG MGMT 30 MIN+: CPT | Performed by: STUDENT IN AN ORGANIZED HEALTH CARE EDUCATION/TRAINING PROGRAM

## 2020-03-14 NOTE — ASSESSMENT & PLAN NOTE
BMI 49 95  Educated patient on adherence to a low-fat, heart healthy diet  Also recommended starting a daily exercise program once symptoms have stabilized in an effort for weight loss  Follow-up with PCP for continued monitoring

## 2020-03-14 NOTE — PROGRESS NOTES
[facility]  [address]  [phone]  [facility] Progress Note  Billing code: 32  Discharge summary        NAME: Finn Post  AGE: 62 y o   SEX: female    DATE OF ENCOUNTER: 3/13/2020    Assessment and Plan     Problem List Items Addressed This Visit        Digestive    GERD (gastroesophageal reflux disease)     Symptoms stable  Continue omeprazole 20 mg daily  Educated on dietary precautions  Continue anti reflux measures  Follow-up with PCP for continued monitoring            Endocrine    Hypothyroidism     Stable  Patient currently following with endocrinology  Continue current regimen of levothyroxine 88 mcg daily Monday to Friday and levothyroxine 100 mcg on Saturday/Sunday  Follow-up with PCP upon discharge         [facility] ([facility] )       Lab Results   Component Value Date    HGBA1C 6 6 (H) 02/22/2020     Accu-Cheks remain controlled during her short-term rehabilitation  Currently following with endocrinology  Previously on Farxiga, intolerant of metformin  Educated on adherence to a diabetic, heart healthy diet  Follow-up with PCP upon discharge            Cardiovascular and Mediastinum    Hypertension     Blood pressure logs have remained stable for the most part during her short-term rehabilitation stay  Continue lisinopril 10 mg daily  Patient educated on adhering to a heart healthy diet  Also recommend starting a low-impact exercise regimen once symptoms have stabilized for weight loss  Follow-up with PCP for continued monitoring           Aneurysm of internal carotid artery - Primary     Previously presented to hospital with neck pain, dizziness in gait instability  CTA findings in hospital showed a 4-5 mm superior medially oriented aneurysm of the right internal carotid artery in the supraclinoid region, a 2 mm inferiorly oriented aneurysm of the supraclinoid portion of the left internal carotid artery  Patient follow-up with neurosurgery as scheduled as an outpatient  Dated on falls preventions  Advised to follow-up with PCP for continued monitoring           Orthostatic hypotension     Symptoms markedly improved  Maintain [place] precautions  Continue adherence to physical measures:  Advised raising the head of her bed by using pillows to 10-20 degrees  Patient educated on arising slowly and stages from supine to seated to standing  Dorsiflex feet, hand  isometric, leg crossing before standing  Small meals and coffee only in the morning  Continue use of compression stockings  Follow-up with PCP for continued monitoring            Other    Morbid obesity ([facility] )     BMI 49 95  Educated patient on adherence to a low-fat, heart healthy diet  Also recommended starting a daily exercise program once symptoms have stabilized in an effort for weight loss  Follow-up with PCP for continued monitoring         Gait instability     Markedly improved since physical therapy sessions  Patient currently now ambulating independently without an assistive device with no further recommendation for outpatient physical therapy upon discharge  Educated on falls preventions  Continue vitamin-D supplementation with 1000 International Units daily  Follow-up with PCP for continued monitoring upon discharge             - Counseling Documentation: patient was counseled regarding: diagnostic results, instructions for management, risk factor reductions, prognosis, patient and family education, impressions, risks and benefits of treatment options and importance of compliance with treatment    All medications and routine orders were reviewed and updated as needed  Plan discussed with:  Resident, nursing staff  Copy of note to be sent to primary care physician by social work department  Billing based on time  Time spent on the unit-40 minutes  Time spent counseling/coordinating care regarding discharge planning and chronic medical conditions -25 minutes    Chief Complaint     Patient seen and examined for routine weekly nursing home rounds for follow-up of acute and chronic conditions prior to discharge  History of Present Illness     HPI  Patient seen and examined at bedside for routine weekly nursing home rounds for follow-up of acute and chronic conditions prior to discharge home tomorrow  The patient has done well with rehabilitation services and was assessed as stable for discharge  Today the patient denies any acute focal neurological deficits  She does note significant improvement in dizziness however she continues to have only occasional neck pain since prior to hospitalization, though decreased in intensity and frequency  She denies any change in vision, nausea or vomiting and explains that her appetite has slowly improved  She currently is now able to ambulate without an assistive device  The patient continues to do well on levothyroxine for a history of hypothyroidism  She also continues on lisinopril for a history of hypertension with blood pressures remaining stable for the most part  She has been compliant with omeprazole for a history of GERD with symptoms also controlled  No acute concerns expressed by patient or nursing staff today  The following portions of the patient's history were reviewed and updated as appropriate: allergies, current medications, past family history, past medical history, past social history, past surgical history and problem list     Review of Systems     Review of Systems   Constitutional: Positive for appetite change (Improving) and fatigue (Improving)  Negative for activity change, chills and fever  HENT: Negative for congestion, ear pain, rhinorrhea, sore throat and trouble swallowing  Eyes: Negative for discharge  Respiratory: Negative for cough, chest tightness, shortness of breath, wheezing and stridor      Cardiovascular: Negative for chest pain, palpitations and leg swelling  Gastrointestinal: Positive for diarrhea  Negative for abdominal pain, constipation, nausea and vomiting  Genitourinary: Negative for dysuria  Musculoskeletal: Positive for arthralgias (Chronic)  Negative for back pain, gait problem (Markedly improved) and neck pain  Skin: Negative for color change, pallor, rash and wound  Neurological: Positive for dizziness (Markedly improved), weakness (Markedly improved) and headaches (Non less frequent)  Negative for seizures, facial asymmetry, speech difficulty, light-headedness and numbness  Hematological: Does not bruise/bleed easily  Psychiatric/Behavioral: Negative for behavioral problems, confusion, decreased concentration, dysphoric mood, hallucinations, self-injury and sleep disturbance  Active Problem List     Patient Active Problem List   Diagnosis    Carpal tunnel syndrome, bilateral    Cervical spondylosis without myelopathy    Degenerative cervical disc    GERD (gastroesophageal reflux disease)    Hypertension    Hypothyroidism    Morbid obesity ([facility] )    Obstructive sleep apnea    Type 2 diabetes mellitus without complication (HCC)    Cervical cancer screening    Lung nodule < 6cm on CT    Chest pain    Thickened endometrium    Healthcare maintenance    RUQ pain    Sinusitis    Aneurysm of internal carotid artery    Orthostatic hypotension    Gait instability       Objective     /93, temp 97 2°, HR 84, R 20, blood sugar 1 17 mg/dL, O2 sat 96%, weight 277 1 lbs    Physical Exam   Constitutional: She is oriented to person, place, and time  She appears well-developed and well-nourished  No distress  Obese female sitting comfortably in bed in no obvious cardiorespiratory or painful distress   HENT:   Head: Normocephalic and atraumatic  Right Ear: External ear normal    Left Ear: External ear normal    Nose: Nose normal    Mouth/Throat: Oropharynx is clear and moist  No oropharyngeal exudate     Eyes: Conjunctivae are normal  Right eye exhibits no discharge  Left eye exhibits no discharge  No scleral icterus  Neck: Normal range of motion  Neck supple  No JVD present  Cardiovascular: Normal rate, regular rhythm, normal heart sounds and intact distal pulses  Exam reveals no gallop and no friction rub  No murmur heard  Pulmonary/Chest: Effort normal and breath sounds normal  No stridor  No respiratory distress  She has no wheezes  She has no rales  Abdominal: Soft  Bowel sounds are normal  She exhibits no distension  There is no tenderness  There is no rebound and no guarding  No superficial palpable masses though given morbid obesity, difficult to elicit any masses   Musculoskeletal: Normal range of motion  She exhibits no edema, tenderness or deformity  Neurological: She is alert and oriented to person, place, and time  She has normal reflexes  No cranial nerve deficit  Skin: Skin is warm  No rash noted  She is not diaphoretic  No erythema  No pallor  Psychiatric: She has a normal mood and affect  Vitals reviewed  Pertinent Laboratory/Diagnostic Studies:  Reviewed prior blood work  No new lab 0rders placed today    Current Medications   Medications were reviewed and updated in facility paper chart      Alin Benitez MD  Geriatric Medicine  3/14/2020 1:36 PM

## 2020-03-14 NOTE — ASSESSMENT & PLAN NOTE
Previously presented to hospital with neck pain, dizziness in gait instability  CTA findings in hospital showed a 4-5 mm superior medially oriented aneurysm of the right internal carotid artery in the supraclinoid region, a 2 mm inferiorly oriented aneurysm of the supraclinoid portion of the left internal carotid artery  Patient follow-up with neurosurgery as scheduled as an outpatient  Dated on falls preventions  Advised to follow-up with PCP for continued monitoring

## 2020-03-14 NOTE — ASSESSMENT & PLAN NOTE
Symptoms stable  Continue omeprazole 20 mg daily  Educated on dietary precautions  Continue anti reflux measures  Follow-up with PCP for continued monitoring

## 2020-03-14 NOTE — ASSESSMENT & PLAN NOTE
Stable  Patient currently following with endocrinology  Continue current regimen of levothyroxine 88 mcg daily Monday to Friday and levothyroxine 100 mcg on Saturday/Sunday  Follow-up with PCP upon discharge

## 2020-03-14 NOTE — ASSESSMENT & PLAN NOTE
Markedly improved since physical therapy sessions  Patient currently now ambulating independently without an assistive device with no further recommendation for outpatient physical therapy upon discharge  Educated on falls preventions  Continue vitamin-D supplementation with 1000 International Units daily  Follow-up with PCP for continued monitoring upon discharge

## 2020-03-14 NOTE — ASSESSMENT & PLAN NOTE
Symptoms markedly improved  Maintain [place] precautions  Continue adherence to physical measures:  Advised raising the head of her bed by using pillows to 10-20 degrees  Patient educated on arising slowly and stages from supine to seated to standing  Dorsiflex feet, hand  isometric, leg crossing before standing  Small meals and coffee only in the morning  Continue use of compression stockings  Follow-up with PCP for continued monitoring

## 2020-03-14 NOTE — ASSESSMENT & PLAN NOTE
Blood pressure logs have remained stable for the most part during her short-term rehabilitation stay  Continue lisinopril 10 mg daily  Patient educated on adhering to a heart healthy diet  Also recommend starting a low-impact exercise regimen once symptoms have stabilized for weight loss  Follow-up with PCP for continued monitoring

## 2020-03-14 NOTE — ASSESSMENT & PLAN NOTE
Lab Results   Component Value Date    HGBA1C 6 6 (H) 02/22/2020     Accu-Cheks remain controlled during her short-term rehabilitation  Currently following with endocrinology  Previously on Farxiga, intolerant of metformin  Educated on adherence to a diabetic, heart healthy diet  Follow-up with PCP upon discharge

## 2020-03-18 ENCOUNTER — TELEPHONE (OUTPATIENT)
Dept: INTERNAL MEDICINE CLINIC | Facility: CLINIC | Age: 65
End: 2020-03-18

## 2020-03-18 ENCOUNTER — OFFICE VISIT (OUTPATIENT)
Dept: NEUROSURGERY | Facility: CLINIC | Age: 65
End: 2020-03-18
Payer: MEDICARE

## 2020-03-18 DIAGNOSIS — I67.1 ANEURYSM OF INTERNAL CAROTID ARTERY: Primary | ICD-10-CM

## 2020-03-18 PROCEDURE — 99442 PR PHYS/QHP TELEPHONE EVALUATION 11-20 MIN: CPT | Performed by: NEUROLOGICAL SURGERY

## 2020-03-18 NOTE — PROGRESS NOTES
Virtual Check-in Visit    Reason for visit is unruptured/incidental aneurysm    This virtual check-in was done via telephone  Encounter provider Jacque Desai MD    Provider located at [address] [address] [phone]      Recent Visits  No visits were found meeting these conditions  Showing recent visits within past 7 days and meeting all other requirements     Today's Visits  Date Type Provider Dept   03/18/20 Office Visit Jacque Desai MD [facility] today's visits and meeting all other requirements     Future Appointments  No visits were found meeting these conditions  Showing future appointments within next 150 days and meeting all other requirements        Patient agrees to participate in a virtual check in via telephone or video visit instead of presenting to the office to address urgent/immediate medical needs  Patient is aware this is a billable service  After connecting through telephone, the patient was identified by name and date of birth  Alonzo Schmitz was informed that this was a telemedicine visit and that the exam was being conducted confidentially over secure lines  My office door was closed  No one else was in the room  She acknowledged consent and understanding of privacy and security of the virtual check-in visit  I informed the patient that I have reviewed her record in Epic and presented the opportunity for her to ask any questions regarding the visit today  The patient initiated communication and agreed to participate  Subjective  Blane Ibarra is a 60 y o  female  This is a very pleasant 28-year-old female who was admitted to the hospital after having progressive headaches over the course of months to weeks that became the worst headache she has ever had  This was also accompanied by neck pain    She was admitted to the hospital and evaluation revealed 4-5 mm right ICA aneurysm as well as a small left ICA aneurysm  She was referred to Neurosurgery for further evaluation  At this time she states that she continues to have headaches almost daily  She has improved after rehab but these occur frequently  She was referred for physical therapy due to her neck pain however she does not want to go to the therapy appointment given the current situation and does not want to have an house therapy as well  She currently lives with her 43-year-old father as well as 60-year-old brother  Her past medical history is significant for borderline diabetes, hypertension, sleep apnea, hyperlipidemia  She has had an L5 diskectomy in the past and cholecystectomy  She is allergic to Tagamet  She is currently   She has 3child, 60-year-old male  She quit smoking in   She denies any alcohol use  She was previously in   There is no family history of subarachnoid hemorrhage or aneurysm  There is no family history of sudden death  There is family history of stroke and MI  Past Medical History:   Diagnosis Date    Arthritis     Depression     Disease of thyroid gland     hypo    Edema     LAST ASSESSED: [id number]    GERD (gastroesophageal reflux disease)     Hypercholesterolemia     Hyperlipidemia     Hypertension     WELL CONTROLLED  CURRENTLY ON LISINOPRIL   LAST ASSESSED: [id number]    Other headache syndrome     Other muscle spasm     LAST ASSESSED: [id number]    Ovarian cyst 2006    Renal calculi      (spontaneous vaginal delivery)     FEMALE    Thyroid disease        Past Surgical History:   Procedure Laterality Date    BACK SURGERY      HERNIATED DISC (L4/L5)    CHOLECYSTECTOMY      TONSILLECTOMY         Current Outpatient Medications   Medication Sig Dispense Refill    ACCU-CHEK FASTCLIX LANCETS MISC       acetaminophen (TYLENOL 8 HOUR ARTHRITIS PAIN) 650 mg CR tablet Take 1 tablet (650 mg total) by mouth every 8 (eight) hours as needed for mild pain 90 tablet 3    aspirin 81 MG tablet Take 1 tablet (81 mg total) by mouth daily 90 tablet 3    atorvastatin (LIPITOR) 10 mg tablet Take 10 mg by mouth daily      Blood Glucose Monitoring Suppl (ACCU-CHEK ESVIN SMARTVIEW) w/Device KIT use as directed      Cholecalciferol (VITAMIN D HIGH POTENCY PO) Take by mouth  Takes 1x/week      cyclobenzaprine (FLEXERIL) 10 mg tablet Take 1 tablet (10 mg total) by mouth daily at bedtime 30 tablet 3    fluticasone (FLONASE) 50 mcg/act nasal spray 1 spray into each nostril daily (Patient not taking: Reported on 2/21/2020) 16 g 0    glucose blood (ACCU-CHEK SMARTVIEW) test strip       hydrOXYzine HCL (ATARAX) 25 mg tablet Take 1 tablet (25 mg total) by mouth every 12 (twelve) hours as needed for itching (Patient not taking: Reported on 4/2/2019) 30 tablet 2    ibuprofen (MOTRIN) 600 mg tablet Take 1 tablet (600 mg total) by mouth every 6 (six) hours as needed for moderate pain (Patient not taking: Reported on 8/22/2019) 30 tablet 0    levothyroxine 100 mcg tablet Take 100 mcg by mouth 2 (two) times a week       Levothyroxine Sodium 88 MCG CAPS Take 88 mcg by mouth      lisinopril (ZESTRIL) 10 mg tablet Take 1 tablet (10 mg total) by mouth daily 90 tablet 0    loratadine (CLARITIN) 10 mg tablet Take 1 tablet (10 mg total) by mouth daily 30 tablet 0    methocarbamol (ROBAXIN) 750 mg tablet Take 1 tablet (750 mg total) by mouth every 6 (six) hours as needed for muscle spasms (right neck pain/spasm) 30 tablet 0    naproxen (NAPROSYN) 250 mg tablet Take 250 mg by mouth as needed for mild pain        omeprazole (PriLOSEC) 20 mg delayed release capsule Take 1 capsule (20 mg total) by mouth daily (Patient not taking: Reported on 2/21/2020) 40 capsule 0    psyllium (METAMUCIL SMOOTH TEXTURE) 28 % packet Take 1 packet by mouth 2 (two) times a day as needed (constipation) (Patient not taking: Reported on 2/22/2020) 30 packet 2     No current facility-administered medications for this visit  Allergies   Allergen Reactions    Tagamet [Cimetidine] Hives       Assessment    Minelia telephone assessment is Good, incidental unruptured aneurysm   Disposition:    We discussed the natural history of intracranial aneurysms and subarachnoid hemorrhage  We specifically discussed the risks associated with aneurysms based on the size and location  Given the size and location of her aneurysm her risk of rupture is less than 1% per year according to the [address] [place] [address] in [place] [address]  We discussed the warning signs of subarachnoid hemorrhage in reasons to go to the emergency room  In addition we discussed modifiable risk factors for aneurysmal growth and subarachnoid hemorrhage  Given the size and location of this aneurysm I would like to see her back in approximately 6 months with a repeat CTA  At this time we will discuss formal diagnostic cerebral arteriography for evaluation of possible treatment  I spent 20 minutes with the patient during this virtual check-in visit

## 2020-03-19 ENCOUNTER — PATIENT OUTREACH (OUTPATIENT)
Dept: INTERNAL MEDICINE CLINIC | Facility: CLINIC | Age: 65
End: 2020-03-19

## 2020-03-19 DIAGNOSIS — Z78.9 NEEDS ASSISTANCE WITH COMMUNITY RESOURCES: Primary | ICD-10-CM

## 2020-03-19 NOTE — PROGRESS NOTES
SW received call from our [address] [phone] that she received call from [employer] re pt and her brother both in need of PA Waiver services  HIRAM notes the MD's Medical certification form received at office but needs to be completed and sent in  Sw has attempted to call pt via  ID# [id number] and has asked for her to call SW back  Will f/u with [employer]  Lyudmila Navarro [phone] when pt approves same  ADDENDUM : 3/27/20  HIRAM has again attempted to reach pt and has left a message for pt to return SW call  SW also left brief message for Brent Rojas  # [phone] for him to have pt call  back  Brother is listed as an emergency contact  Since unable to reach pt HIRAM did call Lyudmila Navarro from [employer] [phone] to reach [employer]  Together we did reach pt who did share since her recent hospital admission she is now is in need of physical help  She reports she gets dizzy , has  head aches and swollen feet  It is difficult to get around  She is using a shower chair for bathing but still needs the help of her sister in law who comes 2/ day to assist her, her brother and their Father  Hiram informed her that HIRAM , the MD and staff was trying to reach her  Pt given # to call [place] so she can get rescheduled for the MD  HIRAM also cautioned she will need to listen for the [employer] and [employer] calls if MD approves PA Waiver referral     SW to remain available to assist as indicted

## 2020-03-24 NOTE — PROGRESS NOTES
FYI- We have not been able to reach the patient- see task in chart for further details    Domenica Dodge MA   to [facility]         8:56 AM   Note      Dr Elle Brown will not fill out form at this time  She stated she tried to call the patient and would like to speak with patient before filling out this form  Phone number on file is no in service  Will place form back in [facility]

## 2020-03-31 ENCOUNTER — PATIENT OUTREACH (OUTPATIENT)
Dept: INTERNAL MEDICINE CLINIC | Facility: CLINIC | Age: 65
End: 2020-03-31

## 2020-03-31 NOTE — PROGRESS NOTES
SW received return call from pt who spoke to SW in [place]  Pt understood all of the conversation  Pt relates that since being home from the hospital and rehab she is very tired and has been sleeping in the mornings  She shares she is in alot of pain still and will discuss same with the providers  She also indicates she is very weak and has difficulty even wiping herself  She needs her sister in law's help to bath and dress herself  SW has shared that the MD will evaluate her for the PA Waiver program   Pt shared she is scheduled for 4/24/20 with Dr Conchita Billings  SW has reviewed the PA Waiver applicatin process and pt aware it can take up to 3 months once the application started  SW to remain available to assist as indicated  7/19/20  ADDENDUM  As per MD 4/24/20 pt is not a candidate for PA Waiver   Pt reports managing at home s/p her rehab  Please re-consult SW if needed

## 2020-04-24 ENCOUNTER — TELEMEDICINE (OUTPATIENT)
Dept: INTERNAL MEDICINE CLINIC | Facility: CLINIC | Age: 65
End: 2020-04-24

## 2020-04-24 DIAGNOSIS — M62.838 MUSCLE SPASM: ICD-10-CM

## 2020-04-24 DIAGNOSIS — I10 HYPERTENSION, UNSPECIFIED TYPE: ICD-10-CM

## 2020-04-24 DIAGNOSIS — L29.9 ITCHING: ICD-10-CM

## 2020-04-24 DIAGNOSIS — J32.9 SINUSITIS: ICD-10-CM

## 2020-04-24 DIAGNOSIS — E11.9 TYPE 2 DIABETES MELLITUS WITHOUT COMPLICATION, WITHOUT LONG-TERM CURRENT USE OF INSULIN (HCC): ICD-10-CM

## 2020-04-24 DIAGNOSIS — K21.9 GASTROESOPHAGEAL REFLUX DISEASE, ESOPHAGITIS PRESENCE NOT SPECIFIED: ICD-10-CM

## 2020-04-24 DIAGNOSIS — M47.812 CERVICAL SPONDYLOSIS WITHOUT MYELOPATHY: ICD-10-CM

## 2020-04-24 DIAGNOSIS — E03.8 OTHER SPECIFIED HYPOTHYROIDISM: ICD-10-CM

## 2020-04-24 DIAGNOSIS — M50.30 DEGENERATIVE CERVICAL DISC: ICD-10-CM

## 2020-04-24 DIAGNOSIS — I10 ESSENTIAL HYPERTENSION: ICD-10-CM

## 2020-04-24 DIAGNOSIS — R26.81 GAIT INSTABILITY: Primary | ICD-10-CM

## 2020-04-24 PROBLEM — R07.9 CHEST PAIN: Status: RESOLVED | Noted: 2018-05-17 | Resolved: 2020-04-24

## 2020-04-24 PROBLEM — R10.11 RUQ PAIN: Status: RESOLVED | Noted: 2018-11-12 | Resolved: 2020-04-24

## 2020-04-24 PROCEDURE — G2025 DIS SITE TELE SVCS RHC/FQHC: HCPCS | Performed by: HOSPITALIST

## 2020-04-24 RX ORDER — SENNOSIDES 8.6 MG
650 CAPSULE ORAL EVERY 8 HOURS PRN
Qty: 90 TABLET | Refills: 3 | Status: SHIPPED | OUTPATIENT
Start: 2020-04-24

## 2020-04-24 RX ORDER — CYCLOBENZAPRINE HCL 10 MG
10 TABLET ORAL
Qty: 30 TABLET | Refills: 3 | Status: SHIPPED | OUTPATIENT
Start: 2020-04-24 | End: 2021-01-07 | Stop reason: CLARIF

## 2020-04-24 RX ORDER — LORATADINE 10 MG/1
10 TABLET ORAL DAILY
Qty: 30 TABLET | Refills: 0 | Status: SHIPPED | OUTPATIENT
Start: 2020-04-24 | End: 2020-07-16

## 2020-04-24 RX ORDER — NAPROXEN 250 MG/1
250 TABLET ORAL AS NEEDED
Qty: 30 TABLET | Refills: 0 | Status: SHIPPED | OUTPATIENT
Start: 2020-04-24 | End: 2020-09-21

## 2020-04-24 RX ORDER — LEVOTHYROXINE SODIUM 0.1 MG/1
100 TABLET ORAL 2 TIMES WEEKLY
Qty: 90 TABLET | Refills: 1 | Status: SHIPPED | OUTPATIENT
Start: 2020-04-27 | End: 2020-10-08 | Stop reason: SDUPTHER

## 2020-04-24 RX ORDER — OMEPRAZOLE 20 MG/1
20 CAPSULE, DELAYED RELEASE ORAL DAILY
Qty: 40 CAPSULE | Refills: 0 | Status: SHIPPED | OUTPATIENT
Start: 2020-04-24 | End: 2020-07-16

## 2020-04-24 RX ORDER — ATORVASTATIN CALCIUM 10 MG/1
10 TABLET, FILM COATED ORAL DAILY
Qty: 90 TABLET | Refills: 1 | Status: SHIPPED | OUTPATIENT
Start: 2020-04-24 | End: 2021-09-16 | Stop reason: SDUPTHER

## 2020-04-24 RX ORDER — LEVOTHYROXINE SODIUM 88 UG/1
88 CAPSULE ORAL DAILY
Qty: 90 CAPSULE | Refills: 1 | Status: SHIPPED | OUTPATIENT
Start: 2020-04-24 | End: 2020-10-08 | Stop reason: SDUPTHER

## 2020-04-24 RX ORDER — FLUTICASONE PROPIONATE 50 MCG
1 SPRAY, SUSPENSION (ML) NASAL DAILY
Qty: 16 G | Refills: 0 | Status: SHIPPED | OUTPATIENT
Start: 2020-04-24 | End: 2021-01-04 | Stop reason: SDUPTHER

## 2020-04-24 RX ORDER — LISINOPRIL 10 MG/1
10 TABLET ORAL DAILY
Qty: 90 TABLET | Refills: 0 | Status: SHIPPED | OUTPATIENT
Start: 2020-04-24 | End: 2020-12-10 | Stop reason: SDUPTHER

## 2020-04-30 DIAGNOSIS — I10 ESSENTIAL HYPERTENSION: ICD-10-CM

## 2020-05-04 RX ORDER — LISINOPRIL 10 MG/1
TABLET ORAL
Qty: 90 TABLET | Refills: 0 | OUTPATIENT
Start: 2020-05-04

## 2020-07-16 DIAGNOSIS — J32.9 SINUSITIS: ICD-10-CM

## 2020-07-16 DIAGNOSIS — K21.9 GASTROESOPHAGEAL REFLUX DISEASE, ESOPHAGITIS PRESENCE NOT SPECIFIED: ICD-10-CM

## 2020-07-16 RX ORDER — LORATADINE 10 MG/1
TABLET ORAL
Qty: 30 TABLET | Refills: 0 | Status: SHIPPED | OUTPATIENT
Start: 2020-07-16 | End: 2020-09-25 | Stop reason: SDUPTHER

## 2020-07-16 RX ORDER — OMEPRAZOLE 20 MG/1
CAPSULE, DELAYED RELEASE ORAL
Qty: 40 CAPSULE | Refills: 0 | Status: SHIPPED | OUTPATIENT
Start: 2020-07-16 | End: 2020-09-24 | Stop reason: SDUPTHER

## 2020-07-16 NOTE — TELEPHONE ENCOUNTER
Name of medication, dose, quantity and frequency:      Requested Prescriptions     Pending Prescriptions Disp Refills    omeprazole (PriLOSEC) 20 mg delayed release capsule [Pharmacy Med Name: OMEPRAZOLE 20MG CAP] 40 capsule 0     Sig: TAKE 1 CAPSULE BY MOUTH DAILY    loratadine (CLARITIN) 10 mg tablet [Pharmacy Med Name: LORATADINE 10MG TAB] 30 tablet 0     Sig: TAKE 1 TABLET BY MOUTH DAILY       Number of refills left: 0    Amount of medication left:    Pharmacy verified and updated: yes    Additional information:      Pharmacy called in requesting refills  Dr Cross is not receiving tasks

## 2020-09-14 ENCOUNTER — TRANSCRIBE ORDERS (OUTPATIENT)
Dept: RADIOLOGY | Facility: HOSPITAL | Age: 65
End: 2020-09-14

## 2020-09-14 ENCOUNTER — APPOINTMENT (OUTPATIENT)
Dept: LAB | Facility: HOSPITAL | Age: 65
End: 2020-09-14
Attending: NEUROLOGICAL SURGERY
Payer: COMMERCIAL

## 2020-09-14 ENCOUNTER — HOSPITAL ENCOUNTER (OUTPATIENT)
Dept: RADIOLOGY | Facility: HOSPITAL | Age: 65
Discharge: HOME | End: 2020-09-14
Attending: NEUROLOGICAL SURGERY
Payer: COMMERCIAL

## 2020-09-14 DIAGNOSIS — I67.1 ANEURYSM OF INTERNAL CAROTID ARTERY: ICD-10-CM

## 2020-09-14 LAB
BUN SERPL-MCNC: 11 MG/DL (ref 5–25)
CREAT SERPL-MCNC: 0.77 MG/DL (ref 0.6–1.3)
GFR SERPL CREATININE-BSD FRML MDRD: 82 ML/MIN/1.73SQ M

## 2020-09-14 PROCEDURE — 36415 COLL VENOUS BLD VENIPUNCTURE: CPT

## 2020-09-14 PROCEDURE — 70496 CT ANGIOGRAPHY HEAD: CPT

## 2020-09-14 PROCEDURE — 84520 ASSAY OF UREA NITROGEN: CPT

## 2020-09-14 PROCEDURE — 82565 ASSAY OF CREATININE: CPT

## 2020-09-14 RX ADMIN — IOHEXOL 85 ML: 350 INJECTION, SOLUTION INTRAVENOUS at 14:39

## 2020-09-15 DIAGNOSIS — J32.9 SINUSITIS: ICD-10-CM

## 2020-09-15 DIAGNOSIS — K21.9 GASTROESOPHAGEAL REFLUX DISEASE, ESOPHAGITIS PRESENCE NOT SPECIFIED: ICD-10-CM

## 2020-09-21 ENCOUNTER — APPOINTMENT (EMERGENCY)
Dept: RADIOLOGY | Facility: HOSPITAL | Age: 65
End: 2020-09-21
Payer: COMMERCIAL

## 2020-09-21 ENCOUNTER — HOSPITAL ENCOUNTER (EMERGENCY)
Facility: HOSPITAL | Age: 65
Discharge: HOME | End: 2020-09-21
Attending: EMERGENCY MEDICINE | Admitting: EMERGENCY MEDICINE
Payer: COMMERCIAL

## 2020-09-21 ENCOUNTER — OFFICE VISIT (OUTPATIENT)
Dept: NEUROSURGERY | Facility: CLINIC | Age: 65
End: 2020-09-21
Payer: COMMERCIAL

## 2020-09-21 VITALS
HEIGHT: 64 IN | HEART RATE: 87 BPM | SYSTOLIC BLOOD PRESSURE: 166 MMHG | DIASTOLIC BLOOD PRESSURE: 100 MMHG | RESPIRATION RATE: 16 BRPM | BODY MASS INDEX: 49.34 KG/M2 | WEIGHT: 289 LBS | TEMPERATURE: 97 F

## 2020-09-21 VITALS
OXYGEN SATURATION: 97 % | SYSTOLIC BLOOD PRESSURE: 163 MMHG | RESPIRATION RATE: 16 BRPM | TEMPERATURE: 97.8 F | HEART RATE: 71 BPM | DIASTOLIC BLOOD PRESSURE: 80 MMHG

## 2020-09-21 DIAGNOSIS — E66.01 MORBID OBESITY (HCC): ICD-10-CM

## 2020-09-21 DIAGNOSIS — R07.9 CHEST PAIN, UNSPECIFIED TYPE: Primary | ICD-10-CM

## 2020-09-21 DIAGNOSIS — I67.1 ANEURYSM OF INTERNAL CAROTID ARTERY: Primary | ICD-10-CM

## 2020-09-21 LAB
ALBUMIN SERPL BCP-MCNC: 3.6 G/DL (ref 3.5–5)
ALP SERPL-CCNC: 72 U/L (ref 46–116)
ALT SERPL W P-5'-P-CCNC: 51 U/L (ref 12–78)
ANION GAP SERPL CALCULATED.3IONS-SCNC: 5 MMOL/L (ref 4–13)
AST SERPL W P-5'-P-CCNC: 49 U/L (ref 5–45)
ATRIAL RATE: 65 BPM
BASOPHILS # BLD AUTO: 0.03 THOUSANDS/ΜL (ref 0–0.1)
BASOPHILS NFR BLD AUTO: 0 % (ref 0–1)
BILIRUB SERPL-MCNC: 0.5 MG/DL (ref 0.2–1)
BUN SERPL-MCNC: 10 MG/DL (ref 5–25)
CALCIUM SERPL-MCNC: 9.4 MG/DL (ref 8.3–10.1)
CHLORIDE SERPL-SCNC: 106 MMOL/L (ref 100–108)
CO2 SERPL-SCNC: 29 MMOL/L (ref 21–32)
CREAT SERPL-MCNC: 0.7 MG/DL (ref 0.6–1.3)
EOSINOPHIL # BLD AUTO: 0.09 THOUSAND/ΜL (ref 0–0.61)
EOSINOPHIL NFR BLD AUTO: 1 % (ref 0–6)
ERYTHROCYTE [DISTWIDTH] IN BLOOD BY AUTOMATED COUNT: 13.2 % (ref 11.6–15.1)
GFR SERPL CREATININE-BSD FRML MDRD: 92 ML/MIN/1.73SQ M
GLUCOSE SERPL-MCNC: 92 MG/DL (ref 65–140)
HCT VFR BLD AUTO: 40.1 % (ref 34.8–46.1)
HGB BLD-MCNC: 12.8 G/DL (ref 11.5–15.4)
IMM GRANULOCYTES # BLD AUTO: 0.02 THOUSAND/UL (ref 0–0.2)
IMM GRANULOCYTES NFR BLD AUTO: 0 % (ref 0–2)
LYMPHOCYTES # BLD AUTO: 2.56 THOUSANDS/ΜL (ref 0.6–4.47)
LYMPHOCYTES NFR BLD AUTO: 26 % (ref 14–44)
MCH RBC QN AUTO: 28.1 PG (ref 26.8–34.3)
MCHC RBC AUTO-ENTMCNC: 31.9 G/DL (ref 31.4–37.4)
MCV RBC AUTO: 88 FL (ref 82–98)
MONOCYTES # BLD AUTO: 0.85 THOUSAND/ΜL (ref 0.17–1.22)
MONOCYTES NFR BLD AUTO: 9 % (ref 4–12)
NEUTROPHILS # BLD AUTO: 6.23 THOUSANDS/ΜL (ref 1.85–7.62)
NEUTS SEG NFR BLD AUTO: 64 % (ref 43–75)
NRBC BLD AUTO-RTO: 0 /100 WBCS
P AXIS: 58 DEGREES
PLATELET # BLD AUTO: 256 THOUSANDS/UL (ref 149–390)
PMV BLD AUTO: 11.4 FL (ref 8.9–12.7)
POTASSIUM SERPL-SCNC: 3.9 MMOL/L (ref 3.5–5.3)
PR INTERVAL: 152 MS
PROT SERPL-MCNC: 7.9 G/DL (ref 6.4–8.2)
QRS AXIS: -1 DEGREES
QRSD INTERVAL: 82 MS
QT INTERVAL: 392 MS
QTC INTERVAL: 407 MS
RBC # BLD AUTO: 4.55 MILLION/UL (ref 3.81–5.12)
SODIUM SERPL-SCNC: 140 MMOL/L (ref 136–145)
T WAVE AXIS: -14 DEGREES
TROPONIN I SERPL-MCNC: <0.02 NG/ML
VENTRICULAR RATE: 65 BPM
WBC # BLD AUTO: 9.78 THOUSAND/UL (ref 4.31–10.16)

## 2020-09-21 PROCEDURE — 3077F SYST BP >= 140 MM HG: CPT | Performed by: NEUROLOGICAL SURGERY

## 2020-09-21 PROCEDURE — 99285 EMERGENCY DEPT VISIT HI MDM: CPT | Performed by: EMERGENCY MEDICINE

## 2020-09-21 PROCEDURE — 80053 COMPREHEN METABOLIC PANEL: CPT | Performed by: EMERGENCY MEDICINE

## 2020-09-21 PROCEDURE — 3080F DIAST BP >= 90 MM HG: CPT | Performed by: NEUROLOGICAL SURGERY

## 2020-09-21 PROCEDURE — 85025 COMPLETE CBC W/AUTO DIFF WBC: CPT | Performed by: EMERGENCY MEDICINE

## 2020-09-21 PROCEDURE — 71045 X-RAY EXAM CHEST 1 VIEW: CPT

## 2020-09-21 PROCEDURE — 1036F TOBACCO NON-USER: CPT | Performed by: NEUROLOGICAL SURGERY

## 2020-09-21 PROCEDURE — 93010 ELECTROCARDIOGRAM REPORT: CPT | Performed by: INTERNAL MEDICINE

## 2020-09-21 PROCEDURE — 96374 THER/PROPH/DIAG INJ IV PUSH: CPT

## 2020-09-21 PROCEDURE — 36415 COLL VENOUS BLD VENIPUNCTURE: CPT | Performed by: EMERGENCY MEDICINE

## 2020-09-21 PROCEDURE — 99215 OFFICE O/P EST HI 40 MIN: CPT | Performed by: NEUROLOGICAL SURGERY

## 2020-09-21 PROCEDURE — 84484 ASSAY OF TROPONIN QUANT: CPT | Performed by: EMERGENCY MEDICINE

## 2020-09-21 PROCEDURE — 99285 EMERGENCY DEPT VISIT HI MDM: CPT

## 2020-09-21 PROCEDURE — 3008F BODY MASS INDEX DOCD: CPT | Performed by: INTERNAL MEDICINE

## 2020-09-21 PROCEDURE — 93005 ELECTROCARDIOGRAM TRACING: CPT

## 2020-09-21 RX ORDER — OMEPRAZOLE 20 MG/1
CAPSULE, DELAYED RELEASE ORAL
Qty: 40 CAPSULE | Refills: 0 | OUTPATIENT
Start: 2020-09-21

## 2020-09-21 RX ORDER — KETOROLAC TROMETHAMINE 30 MG/ML
15 INJECTION, SOLUTION INTRAMUSCULAR; INTRAVENOUS ONCE
Status: COMPLETED | OUTPATIENT
Start: 2020-09-21 | End: 2020-09-21

## 2020-09-21 RX ORDER — ACETAMINOPHEN 325 MG/1
975 TABLET ORAL ONCE
Status: COMPLETED | OUTPATIENT
Start: 2020-09-21 | End: 2020-09-21

## 2020-09-21 RX ORDER — LIDOCAINE 50 MG/G
1 PATCH TOPICAL ONCE
Status: DISCONTINUED | OUTPATIENT
Start: 2020-09-21 | End: 2020-09-21 | Stop reason: HOSPADM

## 2020-09-21 RX ORDER — LORATADINE 10 MG/1
TABLET ORAL
Qty: 30 TABLET | Refills: 0 | OUTPATIENT
Start: 2020-09-21

## 2020-09-21 RX ADMIN — KETOROLAC TROMETHAMINE 15 MG: 30 INJECTION, SOLUTION INTRAMUSCULAR at 17:01

## 2020-09-21 RX ADMIN — ACETAMINOPHEN 975 MG: 325 TABLET, FILM COATED ORAL at 16:34

## 2020-09-21 RX ADMIN — LIDOCAINE 5% 1 PATCH: 700 PATCH TOPICAL at 16:34

## 2020-09-21 NOTE — DISCHARGE INSTRUCTIONS
Take Motrin and Tylenol for pain, you may take both every 6 hours or you may alternate Tylenol than Motrin

## 2020-09-21 NOTE — PROGRESS NOTES
Patient Id: Charlotte Diamond is a 60 y o  female        Handedness: Right     Assessment/Plan:    Diagnoses and all orders for this visit:    Aneurysm of internal carotid artery  -     IR cerebral angiography; Future  -     Ambulatory referral to Neurology; Future    Morbid obesity ([facility] [address] )  -     IR cerebral angiography; Future  -     Ambulatory referral to Neurology; Future        Discussion Summary:   1  Unruptured/incidental bilateral ICA aneurysms, 6 mm right para ophthalmic and 2-3 mm right posterior communicating artery aneurysms  We discussed the natural history of intracranial aneurysms and subarachnoid hemorrhage  We specifically discussed the risks associated with aneurysms based on the size and location  The risk of these is approximately 0 5% per year according to [address] [place] [address] in [place] [address]  Given her age and medical comorbidities as well as other risk factors including hypertension I believe it is reasonable to proceed with diagnostic cerebral arteriography to better understand the angio architecture of these aneurysms and treatment plan  We discussed the risks and benefits of diagnostic cerebral arteriography to better delineate the angio architecture of the aneurysm and surrounding vasculature  The patient understands the risks and benefits of this procedure including bleeding, hematoma, vessel injury, stroke, and paralysis  This will be scheduled at the earliest convenience  We discussed the warning signs of subarachnoid hemorrhage in reasons to go to the emergency room  In addition we discussed modifiable risk factors for aneurysmal growth and subarachnoid hemorrhage  2  Chest pain  This is new  Started last night  Also accompanied by shortness of breath  Will bring her to the emergency room  Chief Complaint: Follow-up    Subjective  Blane Navaon is a 60 y o  female    This is a very pleasant 56-year-old female who was admitted to the hospital after having progressive headaches over the course of months to weeks that became the worst headache she has ever had  This was also accompanied by neck pain  She was admitted to the hospital and evaluation revealed 6 mm right ICA aneurysm as well as a small left ICA aneurysm  She was referred to Neurosurgery for further evaluation at that time      She continues to have headaches  These occur frequently  In addition she complains of chest pain and shortness of breath that began last night  At 1st she thought it was gas/reflux  However has not improved with multiple medications  She is very anxious about this  She is tearful  Her blood pressure is elevated and this is abnormal for her  She currently lives with her 80-year-old father as well as 75-year-old brother      Her past medical history is significant for borderline diabetes, hypertension, sleep apnea, hyperlipidemia  She has had an L5 diskectomy in the past and cholecystectomy      She is allergic to Tagamet      She is currently   She has 3child, 75-year-old male  She quit smoking in 2003  She denies any alcohol use  She was previously in       There is no family history of subarachnoid hemorrhage or aneurysm  There is no family history of sudden death  There is family history of stroke and MI  Review of systems obtained by the MA reviewed and updated below  Review of Systems   Constitutional: Negative  HENT: Positive for tinnitus (not all the time)  Eyes: Positive for visual disturbance (everyday blurry vision)  Respiratory: Positive for shortness of breath (since last night)  Cardiovascular: Positive for chest pain (since last night)  Endocrine: Negative  Genitourinary: Negative  Musculoskeletal: Positive for gait problem (from dizziness)  Negative for back pain, myalgias and neck pain  Skin: Negative  Allergic/Immunologic: Negative      Neurological: Positive for dizziness (sometimes), speech difficulty (when she talks she forgets words), weakness (hands and legs), numbness (in her feet) and headaches  Negative for tremors, seizures and light-headedness  Memory issues, she keeps forgetting things     Hematological: Negative  Psychiatric/Behavioral: Negative  Physical Exam  Vitals:    09/21/20 1305   BP: 166/100   Pulse: 87   Resp: 16   Temp: (!) 97 °F (36 1 °C)   She appears anxious  She is tearful    she is mildly diaphoretic Body mass index is 49 61 kg/m²  [facility] She is awake alert and oriented  Hearing and vision are grossly intact  Her pupils are equal round reactive to light  Her extraocular movements are intact  Her face is symmetric  Tongue is midline  Facial sensation is intact and symmetric throughout  Shoulder shrug is 5/5  There is no drift or dysmetria  She has full strength in her bilateral upper and lower extremities  She has normal muscle tone muscle bulk  Her biceps reflexes and patellar reflexes are 2+ and symmetric  Fiorella sign negative bilaterally  Sensation intact to light touch and pinprick throughout  Her gait is normal      She is tachycardic however, her pulse appears regular regular  Her breath sounds are clear  2+ radial pulses no carotid bruit          The following portions of the patient's history were reviewed and updated as appropriate: allergies, current medications, past family history, past medical history, past social history, past surgical history and problem list     Active Ambulatory Problems     Diagnosis Date Noted    Carpal tunnel syndrome, bilateral 08/15/2017    Cervical spondylosis without myelopathy 04/26/2016    Degenerative cervical disc 05/04/2016    GERD (gastroesophageal reflux disease) 06/22/2017    Hypertension 10/19/2012    Hypothyroidism 06/14/2013    Morbid obesity ([facility] [address] ) 05/07/2014    Obstructive sleep apnea 06/14/2013    Type 2 diabetes mellitus without complication ([facility] [address] ) 85/66/9448    Cervical cancer screening 02/26/2018    Lung nodule < 6cm on CT 2018    Thickened endometrium 06/15/2018    Healthcare maintenance 2018    Sinusitis 2018    Aneurysm of internal carotid artery 2020    Orthostatic hypotension 2020    Gait instability 2020     Resolved Ambulatory Problems     Diagnosis Date Noted    Chest pain 2018    Dizziness 2018    Encounter for annual routine gynecological examination 2018    RUQ pain 2018     Past Medical History:   Diagnosis Date    Arthritis     Depression     Disease of thyroid gland     Edema     Hypercholesterolemia     Hyperlipidemia     Other headache syndrome     Other muscle spasm     Ovarian cyst     Renal calculi      (spontaneous vaginal delivery)        Past Surgical History:   Procedure Laterality Date    BACK SURGERY      HERNIATED DISC (L4/L5)    CHOLECYSTECTOMY      TONSILLECTOMY           Current Outpatient Medications:     acetaminophen (Tylenol 8 Hour Arthritis Pain) 650 mg CR tablet, Take 1 tablet (650 mg total) by mouth every 8 (eight) hours as needed for mild pain, Disp: 90 tablet, Rfl: 3    aspirin 81 MG tablet, Take 1 tablet (81 mg total) by mouth daily, Disp: 90 tablet, Rfl: 3    atorvastatin (LIPITOR) 10 mg tablet, Take 1 tablet (10 mg total) by mouth daily, Disp: 90 tablet, Rfl: 1    Cholecalciferol (VITAMIN D HIGH POTENCY PO), Take by mouth   Takes 1x/week, Disp: , Rfl:     cyclobenzaprine (FLEXERIL) 10 mg tablet, Take 1 tablet (10 mg total) by mouth daily at bedtime, Disp: 30 tablet, Rfl: 3    fluticasone (FLONASE) 50 mcg/act nasal spray, 1 spray into each nostril daily, Disp: 16 g, Rfl: 0    levothyroxine 100 mcg tablet, Take 1 tablet (100 mcg total) by mouth 2 (two) times a week, Disp: 90 tablet, Rfl: 1    Levothyroxine Sodium 88 MCG CAPS, Take 1 capsule (88 mcg total) by mouth daily, Disp: 90 capsule, Rfl: 1    lisinopril (ZESTRIL) 10 mg tablet, Take 1 tablet (10 mg total) by mouth daily, Disp: 90 tablet, Rfl: 0    loratadine (CLARITIN) 10 mg tablet, TAKE 1 TABLET BY MOUTH DAILY (Patient taking differently: as needed ), Disp: 30 tablet, Rfl: 0    omeprazole (PriLOSEC) 20 mg delayed release capsule, TAKE 1 CAPSULE BY MOUTH DAILY, Disp: 40 capsule, Rfl: 0    ACCU-CHEK FASTCLIX LANCETS MISC, , Disp: , Rfl:     Blood Glucose Monitoring Suppl (ACCU-CHEK ESVIN SMARTVIEW) w/Device KIT, use as directed, Disp: , Rfl:     glucose blood (ACCU-CHEK SMARTVIEW) test strip, , Disp: , Rfl:     Results/Data: We reviewed her CTA and MRA in detail  We also reviewed the report

## 2020-09-21 NOTE — ED PROVIDER NOTES
History  Chief Complaint   Patient presents with    Chest Pain     pt reports right sided chest pain since last night  denies N/V/D     HPI  52-year-old female with carotid artery aneurysm, morbid obesity, hypertension, GERD, thyroid disease and depression presents to ED for evaluation of right-sided chest pain  She states she is watching TV at home when the pain started  She describes it as dull but painful  The pain continued all night but she reports she was able to get some sleep  She was not evaluated in the ED until this  afternoon because she had a  appointment with her neurosurgeon and did not want to miss that appointment  The pain has not changed in severity and continues to be right-sided with mild radiation to the back and no radiation to the neck or arm  She does report some shortness of breath associated with the pain  She denies any cough, sore throat, fevers or chills  Movement makes the pain worse  Tylenol helps alleviate pain  Patient denies headache, visual changes, dizziness, palpitations, abdominal pain, diarrhea, melena, hematochezia, dysuria, new skin rashes or numbness or tingling of the extremities  Prior to Admission Medications   Prescriptions Last Dose Informant Patient Reported? Taking? ACCU-CHEK FASTCLIX LANCETS MISC  Self Yes No   Blood Glucose Monitoring Suppl (ACCU-CHEK ESVIN SMARTVIEW) w/Device KIT  Self Yes No   Sig: use as directed   Cholecalciferol (VITAMIN D HIGH POTENCY PO)  Self Yes No   Sig: Take by mouth   Takes 1x/week   Levothyroxine Sodium 88 MCG CAPS   No No   Sig: Take 1 capsule (88 mcg total) by mouth daily   acetaminophen (Tylenol 8 Hour Arthritis Pain) 650 mg CR tablet   No No   Sig: Take 1 tablet (650 mg total) by mouth every 8 (eight) hours as needed for mild pain   aspirin 81 MG tablet   No No   Sig: Take 1 tablet (81 mg total) by mouth daily   atorvastatin (LIPITOR) 10 mg tablet   No No   Sig: Take 1 tablet (10 mg total) by mouth daily cyclobenzaprine (FLEXERIL) 10 mg tablet   No No   Sig: Take 1 tablet (10 mg total) by mouth daily at bedtime   fluticasone (FLONASE) 50 mcg/act nasal spray   No No   Si spray into each nostril daily   glucose blood (ACCU-CHEK SMARTVIEW) test strip  Self Yes No   levothyroxine 100 mcg tablet   No No   Sig: Take 1 tablet (100 mcg total) by mouth 2 (two) times a week   lisinopril (ZESTRIL) 10 mg tablet   No No   Sig: Take 1 tablet (10 mg total) by mouth daily   loratadine (CLARITIN) 10 mg tablet   No No   Sig: TAKE 1 TABLET BY MOUTH DAILY   Patient taking differently: as needed    omeprazole (PriLOSEC) 20 mg delayed release capsule   No No   Sig: TAKE 1 CAPSULE BY MOUTH DAILY      Facility-Administered Medications: None       Past Medical History:   Diagnosis Date    Arthritis     Depression     Disease of thyroid gland     hypo    Edema     LAST ASSESSED: [id number]    GERD (gastroesophageal reflux disease)     Hypercholesterolemia     Hyperlipidemia     Hypertension     WELL CONTROLLED  CURRENTLY ON LISINOPRIL  LAST ASSESSED: [id number]    Other headache syndrome     Other muscle spasm     LAST ASSESSED: [id number]    Ovarian cyst 2006    Renal calculi      (spontaneous vaginal delivery) 1975    FEMALE       Past Surgical History:   Procedure Laterality Date    BACK SURGERY      HERNIATED DISC (L4/L5)    CHOLECYSTECTOMY      TONSILLECTOMY         Family History   Problem Relation Age of Onset    Lung cancer Mother     Cancer Mother         MALIGNANT NEOPLASM    Hypertension Father     Diabetes Brother     Hypertension Son     Lung disease Son     Hyperthyroidism Maternal Aunt     Hypothyroidism Maternal Aunt     Heart disease Other         CARDIAC DISORDER    Neuropathy Family      I have reviewed and agree with the history as documented      E-Cigarette/Vaping     E-Cigarette/Vaping Substances     Social History     Tobacco Use    Smoking status: Former Smoker    Smokeless tobacco: Never Used    Tobacco comment: pt "quit about 16 years ago"   Substance Use Topics    Alcohol use: Never     Frequency: Never    Drug use: No        Review of Systems   All other systems reviewed and are negative  Physical Exam  ED Triage Vitals [09/21/20 1411]   Temperature Pulse Respirations Blood Pressure SpO2   97 8 °F (36 6 °C) 71 16 163/80 97 %      Temp Source Heart Rate Source Patient Position - Orthostatic VS BP Location FiO2 (%)   Oral Monitor Sitting Left arm --      Pain Score       Worst Possible Pain             Orthostatic Vital Signs  Vitals:    09/21/20 1411   BP: 163/80   Pulse: 71   Patient Position - Orthostatic VS: Sitting       Physical Exam   PHYSICAL EXAM  Constitutional:  Well developed, non-toxic appearance, anxious  HEENT:  Conjunctiva normal  Oropharynx moist  Respiratory:  No respiratory distress, normal breath sounds  Cardiovascular:  Normal rate, normal rhythm, no murmurs  Extreme tenderness palpation over right parasternal region  GI:  Soft, obese, nondistended, normal bowel sounds, nontender  :  No costovertebral angle tenderness   Musculoskeletal:  No edema, no tenderness, no deformities     Integument:  Well hydrated, no rash   Lymphatic:  No lymphadenopathy noted   Neurologic:  Alert & oriented, normal motor function, normal sensory function, no focal deficits noted   Psychiatric:  Speech and behavior appropriate       ED Medications  Medications   lidocaine (LIDODERM) 5 % patch 1 patch (1 patch Topical Medication Applied 9/21/20 1634)   ketorolac (TORADOL) injection 15 mg (has no administration in time range)   acetaminophen (TYLENOL) tablet 975 mg (975 mg Oral Given 9/21/20 1634)       Diagnostic Studies  Results Reviewed     Procedure Component Value Units Date/Time    Troponin I [[id number]]  (Normal) Collected:  09/21/20 1552    Lab Status:  Final result Specimen:  Blood from Arm, Left Updated:  09/21/20 1626     Troponin I <0 02 ng/mL     Comprehensive metabolic panel [[id number]]  (Abnormal) Collected:  09/21/20 1552    Lab Status:  Final result Specimen:  Blood from Arm, Left Updated:  09/21/20 1622     Sodium 140 mmol/L      Potassium 3 9 mmol/L      Chloride 106 mmol/L      CO2 29 mmol/L      ANION GAP 5 mmol/L      BUN 10 mg/dL      Creatinine 0 70 mg/dL      Glucose 92 mg/dL      Calcium 9 4 mg/dL      AST 49 U/L      ALT 51 U/L      Alkaline Phosphatase 72 U/L      Total Protein 7 9 g/dL      Albumin 3 6 g/dL      Total Bilirubin 0 50 mg/dL      eGFR 92 ml/min/1 73sq m     Narrative:       [place] guidelines for Chronic Kidney Disease (CKD):     Stage 1 with normal or high GFR (GFR > 90 mL/min/1 73 square meters)    Stage 2 Mild CKD (GFR = 60-89 mL/min/1 73 square meters)    Stage 3A Moderate CKD (GFR = 45-59 mL/min/1 73 square meters)    Stage 3B Moderate CKD (GFR = 30-44 mL/min/1 73 square meters)    Stage 4 Severe CKD (GFR = 15-29 mL/min/1 73 square meters)    Stage 5 End Stage CKD (GFR <15 mL/min/1 73 square meters)  Note: GFR calculation is accurate only with a steady state creatinine    CBC and differential [[id number]] Collected:  09/21/20 1552    Lab Status:  Final result Specimen:  Blood from Arm, Left Updated:  09/21/20 1618     WBC 9 78 Thousand/uL      RBC 4 55 Million/uL      Hemoglobin 12 8 g/dL      Hematocrit 40 1 %      MCV 88 fL      MCH 28 1 pg      MCHC 31 9 g/dL      RDW 13 2 %      MPV 11 4 fL      Platelets 479 Thousands/uL      nRBC 0 /100 WBCs      Neutrophils Relative 64 %      Immat GRANS % 0 %      Lymphocytes Relative 26 %      Monocytes Relative 9 %      Eosinophils Relative 1 %      Basophils Relative 0 %      Neutrophils Absolute 6 23 Thousands/µL      Immature Grans Absolute 0 02 Thousand/uL      Lymphocytes Absolute 2 56 Thousands/µL      Monocytes Absolute 0 85 Thousand/µL      Eosinophils Absolute 0 09 Thousand/µL      Basophils Absolute 0 03 Thousands/µL                  XR chest 1 view portable   Final Result by Charli Cohen MD (09/21 1603)      No acute cardiopulmonary disease  Workstation performed: [id number]               Procedures  Procedures      ED Course  ED Course as of Sep 21 1656   Mon Sep 21, 2020   1505 EKG: normal EKG, normal sinus rhythm        1624 CXR IMPRESSION: No acute cardiopulmonary disease  HEART Risk Score      Most Recent Value   Heart Score Risk Calculator   History  0 Filed at: 09/21/2020 1626   ECG  0 Filed at: 09/21/2020 1626   Age  1 Filed at: 09/21/2020 1626   Risk Factors  2 Filed at: 09/21/2020 1626   Troponin  0 Filed at: 09/21/2020 1626   HEART Score  3 Filed at: 09/21/2020 1626                                  MDM  Number of Diagnoses or Management Options  Chest pain, unspecified type:   Diagnosis management comments: 73-year-old female with carotid artery aneurysm, morbid obesity, hypertension, GERD, thyroid disease and depression presents to ED for evaluation of right-sided chest pain  Heart score 3  Cardiac work up negative  Pt symptoms improved significantly with tylenol, toradol and lidocaine patch   Discharged with follow up with PCP       Amount and/or Complexity of Data Reviewed  Clinical lab tests: ordered  Tests in the radiology section of CPT®: ordered  Discussion of test results with the performing providers: yes  Review and summarize past medical records: yes  Discuss the patient with other providers: yes    Risk of Complications, Morbidity, and/or Mortality  Presenting problems: moderate  Diagnostic procedures: moderate  Management options: low    Patient Progress  Patient progress: improved        Disposition  Final diagnoses:   Chest pain, unspecified type     Time reflects when diagnosis was documented in both MDM as applicable and the Disposition within this note     Time User Action Codes Description Comment    9/21/2020  4:54 PM Josiane Hill Add [R07 9] Chest pain, unspecified type       ED Disposition     ED Disposition Condition Date/Time Comment    Discharge Scooter Fleming Sep 21, 2020  4:54 PM Aneesh Sood discharge to home/self care  Follow-up Information     Follow up With Specialties Details Why [address], MD Internal Medicine  If symptoms worsen [address]  [phone]            Patient's Medications   Discharge Prescriptions    No medications on file     No discharge procedures on file  PDMP Review     None           ED Provider  Attending physically available and evaluated Aneesh Sood  I managed the patient along with the ED Attending      Electronically Signed by         Whit Ceja MD  09/21/20 7792

## 2020-09-21 NOTE — ED ATTENDING ATTESTATION
9/21/2020  I, Thurnell Runner, MD, saw and evaluated the patient  I have discussed the patient with the resident/non-physician practitioner and agree with the resident's/non-physician practitioner's findings, Plan of Care, and MDM as documented in the resident's/non-physician practitioner's note, except where noted  All available labs and Radiology studies were reviewed  I was present for key portions of any procedure(s) performed by the resident/non-physician practitioner and I was immediately available to provide assistance  At this point I agree with the current assessment done in the Emergency Department  I have conducted an independent evaluation of this patient a history and physical is as follows:   PT started with chest pain last night while at rest Pain is r sided feels she can not lay back and is worse with movement PT feels she can not catch breath   PE: alert tender r parasternal area heart reg lungs clear abd soft nontender ext nad neuro nonfocal  MDM: will do cardiac [facility]  ED Course         Critical Care Time  Procedures

## 2020-09-22 DIAGNOSIS — K21.9 GASTROESOPHAGEAL REFLUX DISEASE, ESOPHAGITIS PRESENCE NOT SPECIFIED: ICD-10-CM

## 2020-09-24 DIAGNOSIS — K21.9 GASTROESOPHAGEAL REFLUX DISEASE, ESOPHAGITIS PRESENCE NOT SPECIFIED: ICD-10-CM

## 2020-09-24 RX ORDER — OMEPRAZOLE 20 MG/1
20 CAPSULE, DELAYED RELEASE ORAL DAILY
Qty: 40 CAPSULE | Refills: 0 | Status: SHIPPED | OUTPATIENT
Start: 2020-09-24 | End: 2020-09-25 | Stop reason: SDUPTHER

## 2020-09-25 DIAGNOSIS — K21.9 GASTROESOPHAGEAL REFLUX DISEASE, ESOPHAGITIS PRESENCE NOT SPECIFIED: ICD-10-CM

## 2020-09-25 DIAGNOSIS — J32.9 SINUSITIS: ICD-10-CM

## 2020-09-25 RX ORDER — OMEPRAZOLE 20 MG/1
20 CAPSULE, DELAYED RELEASE ORAL DAILY
Qty: 90 CAPSULE | Refills: 1 | Status: SHIPPED | OUTPATIENT
Start: 2020-09-25 | End: 2021-09-16 | Stop reason: SDUPTHER

## 2020-09-25 RX ORDER — LORATADINE 10 MG/1
10 TABLET ORAL DAILY PRN
Qty: 90 TABLET | Refills: 1 | Status: SHIPPED | OUTPATIENT
Start: 2020-09-25 | End: 2021-10-27

## 2020-09-26 RX ORDER — OMEPRAZOLE 20 MG/1
CAPSULE, DELAYED RELEASE ORAL
Qty: 40 CAPSULE | Refills: 0 | OUTPATIENT
Start: 2020-09-26

## 2020-10-06 DIAGNOSIS — E03.8 OTHER SPECIFIED HYPOTHYROIDISM: ICD-10-CM

## 2020-10-06 RX ORDER — LEVOTHYROXINE SODIUM 88 UG/1
88 TABLET ORAL DAILY
Qty: 90 TABLET | Refills: 1 | OUTPATIENT
Start: 2020-10-06 | End: 2021-01-04

## 2020-10-07 DIAGNOSIS — I67.1 ANEURYSM OF INTERNAL CAROTID ARTERY: Primary | ICD-10-CM

## 2020-10-08 DIAGNOSIS — E03.8 OTHER SPECIFIED HYPOTHYROIDISM: ICD-10-CM

## 2020-10-08 RX ORDER — LEVOTHYROXINE SODIUM 0.1 MG/1
100 TABLET ORAL 2 TIMES WEEKLY
Qty: 24 TABLET | Refills: 3 | Status: SHIPPED | OUTPATIENT
Start: 2020-10-08 | End: 2021-10-22

## 2020-10-08 RX ORDER — LEVOTHYROXINE SODIUM 88 UG/1
88 CAPSULE ORAL DAILY
Qty: 90 CAPSULE | Refills: 3 | Status: SHIPPED | OUTPATIENT
Start: 2020-10-08 | End: 2021-10-22

## 2020-10-19 ENCOUNTER — TELEPHONE (OUTPATIENT)
Dept: RADIOLOGY | Facility: HOSPITAL | Age: 65
End: 2020-10-19

## 2020-10-19 RX ORDER — SODIUM CHLORIDE 9 MG/ML
75 INJECTION, SOLUTION INTRAVENOUS CONTINUOUS
Status: CANCELLED | OUTPATIENT
Start: 2020-10-19

## 2020-10-20 ENCOUNTER — TELEPHONE (OUTPATIENT)
Dept: NEUROSURGERY | Facility: CLINIC | Age: 65
End: 2020-10-20

## 2020-10-20 ENCOUNTER — TELEPHONE (OUTPATIENT)
Dept: RADIOLOGY | Facility: HOSPITAL | Age: 65
End: 2020-10-20

## 2020-10-20 ENCOUNTER — TELEMEDICINE (OUTPATIENT)
Dept: INTERNAL MEDICINE CLINIC | Facility: CLINIC | Age: 65
End: 2020-10-20

## 2020-10-20 DIAGNOSIS — Z20.822 ENCOUNTER FOR SCREENING LABORATORY TESTING FOR COVID-19 VIRUS: Primary | ICD-10-CM

## 2020-10-20 PROCEDURE — 99213 OFFICE O/P EST LOW 20 MIN: CPT | Performed by: INTERNAL MEDICINE

## 2020-10-20 PROCEDURE — 1036F TOBACCO NON-USER: CPT | Performed by: INTERNAL MEDICINE

## 2020-10-22 ENCOUNTER — TELEPHONE (OUTPATIENT)
Dept: RADIOLOGY | Facility: HOSPITAL | Age: 65
End: 2020-10-22

## 2020-10-28 ENCOUNTER — LAB (OUTPATIENT)
Dept: LAB | Facility: HOSPITAL | Age: 65
End: 2020-10-28
Attending: NEUROLOGICAL SURGERY
Payer: COMMERCIAL

## 2020-10-28 ENCOUNTER — TRANSCRIBE ORDERS (OUTPATIENT)
Dept: LAB | Facility: HOSPITAL | Age: 65
End: 2020-10-28

## 2020-10-28 DIAGNOSIS — I67.1 ANEURYSM OF INTERNAL CAROTID ARTERY: ICD-10-CM

## 2020-10-28 LAB
APTT PPP: 30 SECONDS (ref 23–37)
INR PPP: 1.02 (ref 0.84–1.19)
PROTHROMBIN TIME: 13.4 SECONDS (ref 11.6–14.5)

## 2020-10-28 PROCEDURE — 85730 THROMBOPLASTIN TIME PARTIAL: CPT

## 2020-10-28 PROCEDURE — 36415 COLL VENOUS BLD VENIPUNCTURE: CPT

## 2020-10-28 PROCEDURE — 85610 PROTHROMBIN TIME: CPT

## 2020-10-28 PROCEDURE — 83036 HEMOGLOBIN GLYCOSYLATED A1C: CPT

## 2020-10-29 LAB
EST. AVERAGE GLUCOSE BLD GHB EST-MCNC: 137 MG/DL
HBA1C MFR BLD: 6.4 %

## 2020-10-29 PROCEDURE — 3044F HG A1C LEVEL LT 7.0%: CPT | Performed by: INTERNAL MEDICINE

## 2020-10-29 PROCEDURE — 3044F HG A1C LEVEL LT 7.0%: CPT | Performed by: NEUROLOGICAL SURGERY

## 2020-10-30 ENCOUNTER — CLINICAL SUPPORT (OUTPATIENT)
Dept: INTERNAL MEDICINE CLINIC | Facility: CLINIC | Age: 65
End: 2020-10-30

## 2020-10-30 ENCOUNTER — TELEPHONE (OUTPATIENT)
Dept: RADIOLOGY | Facility: HOSPITAL | Age: 65
End: 2020-10-30

## 2020-10-30 DIAGNOSIS — Z23 NEED FOR TDAP VACCINATION: Primary | ICD-10-CM

## 2020-10-30 DIAGNOSIS — Z23 NEED FOR INFLUENZA VACCINATION: ICD-10-CM

## 2020-10-30 PROCEDURE — 90715 TDAP VACCINE 7 YRS/> IM: CPT | Performed by: INTERNAL MEDICINE

## 2020-10-30 PROCEDURE — G0008 ADMIN INFLUENZA VIRUS VAC: HCPCS | Performed by: INTERNAL MEDICINE

## 2020-10-30 PROCEDURE — 90471 IMMUNIZATION ADMIN: CPT | Performed by: INTERNAL MEDICINE

## 2020-10-30 PROCEDURE — 90682 RIV4 VACC RECOMBINANT DNA IM: CPT | Performed by: INTERNAL MEDICINE

## 2020-11-02 ENCOUNTER — TELEPHONE (OUTPATIENT)
Dept: RADIOLOGY | Facility: HOSPITAL | Age: 65
End: 2020-11-02

## 2020-11-04 DIAGNOSIS — Z20.822 ENCOUNTER FOR SCREENING LABORATORY TESTING FOR COVID-19 VIRUS: ICD-10-CM

## 2020-11-04 PROCEDURE — U0003 INFECTIOUS AGENT DETECTION BY NUCLEIC ACID (DNA OR RNA); SEVERE ACUTE RESPIRATORY SYNDROME CORONAVIRUS 2 (SARS-COV-2) (CORONAVIRUS DISEASE [COVID-19]), AMPLIFIED PROBE TECHNIQUE, MAKING USE OF HIGH THROUGHPUT TECHNOLOGIES AS DESCRIBED BY CMS-2020-01-R: HCPCS | Performed by: STUDENT IN AN ORGANIZED HEALTH CARE EDUCATION/TRAINING PROGRAM

## 2020-11-05 ENCOUNTER — ANESTHESIA EVENT (OUTPATIENT)
Dept: RADIOLOGY | Facility: HOSPITAL | Age: 65
End: 2020-11-05
Payer: COMMERCIAL

## 2020-11-05 ENCOUNTER — TELEPHONE (OUTPATIENT)
Dept: INPATIENT UNIT | Facility: HOSPITAL | Age: 65
End: 2020-11-05

## 2020-11-05 LAB — SARS-COV-2 RNA SPEC QL NAA+PROBE: NOT DETECTED

## 2020-11-06 ENCOUNTER — ANESTHESIA (OUTPATIENT)
Dept: RADIOLOGY | Facility: HOSPITAL | Age: 65
End: 2020-11-06
Payer: COMMERCIAL

## 2020-11-06 ENCOUNTER — HOSPITAL ENCOUNTER (OUTPATIENT)
Dept: RADIOLOGY | Facility: HOSPITAL | Age: 65
Discharge: HOME | End: 2020-11-06
Attending: NEUROLOGICAL SURGERY | Admitting: NEUROLOGICAL SURGERY
Payer: COMMERCIAL

## 2020-11-06 VITALS
DIASTOLIC BLOOD PRESSURE: 85 MMHG | RESPIRATION RATE: 18 BRPM | HEART RATE: 71 BPM | OXYGEN SATURATION: 95 % | SYSTOLIC BLOOD PRESSURE: 141 MMHG | TEMPERATURE: 98.1 F

## 2020-11-06 VITALS — HEART RATE: 64 BPM

## 2020-11-06 DIAGNOSIS — I67.1 ANEURYSM OF INTERNAL CAROTID ARTERY: ICD-10-CM

## 2020-11-06 DIAGNOSIS — E66.01 MORBID OBESITY (HCC): ICD-10-CM

## 2020-11-06 PROCEDURE — 76937 US GUIDE VASCULAR ACCESS: CPT

## 2020-11-06 PROCEDURE — 36223 PLACE CATH CAROTID/INOM ART: CPT | Performed by: NEUROLOGICAL SURGERY

## 2020-11-06 PROCEDURE — 36224 PLACE CATH CAROTD ART: CPT

## 2020-11-06 PROCEDURE — 76937 US GUIDE VASCULAR ACCESS: CPT | Performed by: NEUROLOGICAL SURGERY

## 2020-11-06 PROCEDURE — 36223 PLACE CATH CAROTID/INOM ART: CPT

## 2020-11-06 PROCEDURE — 36226 PLACE CATH VERTEBRAL ART: CPT | Performed by: NEUROLOGICAL SURGERY

## 2020-11-06 PROCEDURE — NC001 PR NO CHARGE: Performed by: NEUROLOGICAL SURGERY

## 2020-11-06 PROCEDURE — 76377 3D RENDER W/INTRP POSTPROCES: CPT | Performed by: NEUROLOGICAL SURGERY

## 2020-11-06 PROCEDURE — 36224 PLACE CATH CAROTD ART: CPT | Performed by: NEUROLOGICAL SURGERY

## 2020-11-06 PROCEDURE — C1769 GUIDE WIRE: HCPCS

## 2020-11-06 PROCEDURE — 36226 PLACE CATH VERTEBRAL ART: CPT

## 2020-11-06 PROCEDURE — C1894 INTRO/SHEATH, NON-LASER: HCPCS

## 2020-11-06 RX ORDER — SODIUM CHLORIDE 9 MG/ML
75 INJECTION, SOLUTION INTRAVENOUS CONTINUOUS
Status: DISCONTINUED | OUTPATIENT
Start: 2020-11-06 | End: 2020-11-06 | Stop reason: HOSPADM

## 2020-11-06 RX ORDER — FENTANYL CITRATE 50 UG/ML
INJECTION, SOLUTION INTRAMUSCULAR; INTRAVENOUS AS NEEDED
Status: DISCONTINUED | OUTPATIENT
Start: 2020-11-06 | End: 2020-11-06

## 2020-11-06 RX ORDER — VERAPAMIL HYDROCHLORIDE 2.5 MG/ML
INJECTION, SOLUTION INTRAVENOUS CODE/TRAUMA/SEDATION MEDICATION
Status: COMPLETED | OUTPATIENT
Start: 2020-11-06 | End: 2020-11-06

## 2020-11-06 RX ORDER — MIDAZOLAM HYDROCHLORIDE 2 MG/2ML
INJECTION, SOLUTION INTRAMUSCULAR; INTRAVENOUS AS NEEDED
Status: DISCONTINUED | OUTPATIENT
Start: 2020-11-06 | End: 2020-11-06

## 2020-11-06 RX ORDER — NITROGLYCERIN 20 MG/100ML
INJECTION INTRAVENOUS CODE/TRAUMA/SEDATION MEDICATION
Status: COMPLETED | OUTPATIENT
Start: 2020-11-06 | End: 2020-11-06

## 2020-11-06 RX ORDER — PROPOFOL 10 MG/ML
INJECTION, EMULSION INTRAVENOUS AS NEEDED
Status: DISCONTINUED | OUTPATIENT
Start: 2020-11-06 | End: 2020-11-06

## 2020-11-06 RX ORDER — LIDOCAINE HYDROCHLORIDE 10 MG/ML
INJECTION, SOLUTION EPIDURAL; INFILTRATION; INTRACAUDAL; PERINEURAL CODE/TRAUMA/SEDATION MEDICATION
Status: COMPLETED | OUTPATIENT
Start: 2020-11-06 | End: 2020-11-06

## 2020-11-06 RX ORDER — ONDANSETRON 2 MG/ML
INJECTION INTRAMUSCULAR; INTRAVENOUS AS NEEDED
Status: DISCONTINUED | OUTPATIENT
Start: 2020-11-06 | End: 2020-11-06

## 2020-11-06 RX ORDER — ACETAMINOPHEN 325 MG/1
650 TABLET ORAL EVERY 6 HOURS PRN
Status: DISCONTINUED | OUTPATIENT
Start: 2020-11-06 | End: 2020-11-06 | Stop reason: HOSPADM

## 2020-11-06 RX ORDER — HEPARIN SODIUM 1000 [USP'U]/ML
INJECTION, SOLUTION INTRAVENOUS; SUBCUTANEOUS CODE/TRAUMA/SEDATION MEDICATION
Status: COMPLETED | OUTPATIENT
Start: 2020-11-06 | End: 2020-11-06

## 2020-11-06 RX ORDER — SODIUM CHLORIDE 9 MG/ML
INJECTION, SOLUTION INTRAVENOUS CONTINUOUS PRN
Status: DISCONTINUED | OUTPATIENT
Start: 2020-11-06 | End: 2020-11-06

## 2020-11-06 RX ADMIN — FENTANYL CITRATE 25 MCG: 50 INJECTION, SOLUTION INTRAMUSCULAR; INTRAVENOUS at 08:20

## 2020-11-06 RX ADMIN — MIDAZOLAM 1 MG: 1 INJECTION INTRAMUSCULAR; INTRAVENOUS at 08:12

## 2020-11-06 RX ADMIN — LIDOCAINE HYDROCHLORIDE 1 ML: 10 INJECTION, SOLUTION EPIDURAL; INFILTRATION; INTRACAUDAL; PERINEURAL at 08:29

## 2020-11-06 RX ADMIN — IODIXANOL 70 ML: 320 INJECTION, SOLUTION INTRAVASCULAR at 09:17

## 2020-11-06 RX ADMIN — PROPOFOL 80 MG: 10 INJECTION, EMULSION INTRAVENOUS at 08:26

## 2020-11-06 RX ADMIN — HEPARIN SODIUM 4000 UNITS: 1000 INJECTION INTRAVENOUS; SUBCUTANEOUS at 08:29

## 2020-11-06 RX ADMIN — Medication 400 MCG: at 08:29

## 2020-11-06 RX ADMIN — MIDAZOLAM 1 MG: 1 INJECTION INTRAMUSCULAR; INTRAVENOUS at 08:05

## 2020-11-06 RX ADMIN — SODIUM CHLORIDE 75 ML/HR: 0.9 INJECTION, SOLUTION INTRAVENOUS at 07:23

## 2020-11-06 RX ADMIN — VERAPAMIL HYDROCHLORIDE 5 MG: 2.5 INJECTION INTRAVENOUS at 08:29

## 2020-11-06 RX ADMIN — SODIUM CHLORIDE: 9 INJECTION, SOLUTION INTRAVENOUS at 08:05

## 2020-11-06 RX ADMIN — ACETAMINOPHEN 650 MG: 325 TABLET, FILM COATED ORAL at 13:03

## 2020-11-06 RX ADMIN — FENTANYL CITRATE 25 MCG: 50 INJECTION, SOLUTION INTRAMUSCULAR; INTRAVENOUS at 08:24

## 2020-11-06 RX ADMIN — FENTANYL CITRATE 25 MCG: 50 INJECTION, SOLUTION INTRAMUSCULAR; INTRAVENOUS at 08:55

## 2020-11-06 RX ADMIN — FENTANYL CITRATE 25 MCG: 50 INJECTION, SOLUTION INTRAMUSCULAR; INTRAVENOUS at 08:48

## 2020-11-06 RX ADMIN — ONDANSETRON 4 MG: 2 INJECTION INTRAMUSCULAR; INTRAVENOUS at 09:05

## 2020-11-09 DIAGNOSIS — J32.9 SINUSITIS: ICD-10-CM

## 2020-11-16 ENCOUNTER — OFFICE VISIT (OUTPATIENT)
Dept: NEUROSURGERY | Facility: CLINIC | Age: 65
End: 2020-11-16
Payer: COMMERCIAL

## 2020-11-16 ENCOUNTER — TELEPHONE (OUTPATIENT)
Dept: INTERNAL MEDICINE CLINIC | Facility: CLINIC | Age: 65
End: 2020-11-16

## 2020-11-16 VITALS
WEIGHT: 285 LBS | HEIGHT: 65 IN | BODY MASS INDEX: 47.48 KG/M2 | TEMPERATURE: 97.2 F | RESPIRATION RATE: 16 BRPM | SYSTOLIC BLOOD PRESSURE: 145 MMHG | DIASTOLIC BLOOD PRESSURE: 89 MMHG | HEART RATE: 75 BPM

## 2020-11-16 DIAGNOSIS — I67.1 ANEURYSM OF INTERNAL CAROTID ARTERY: Primary | ICD-10-CM

## 2020-11-16 PROCEDURE — 1036F TOBACCO NON-USER: CPT | Performed by: NEUROLOGICAL SURGERY

## 2020-11-16 PROCEDURE — 3008F BODY MASS INDEX DOCD: CPT | Performed by: INTERNAL MEDICINE

## 2020-11-16 PROCEDURE — 3077F SYST BP >= 140 MM HG: CPT | Performed by: NEUROLOGICAL SURGERY

## 2020-11-16 PROCEDURE — 3008F BODY MASS INDEX DOCD: CPT | Performed by: NEUROLOGICAL SURGERY

## 2020-11-16 PROCEDURE — 99215 OFFICE O/P EST HI 40 MIN: CPT | Performed by: NEUROLOGICAL SURGERY

## 2020-11-16 PROCEDURE — 3079F DIAST BP 80-89 MM HG: CPT | Performed by: NEUROLOGICAL SURGERY

## 2020-11-16 RX ORDER — CLOPIDOGREL BISULFATE 75 MG/1
75 TABLET ORAL DAILY
Qty: 90 TABLET | Refills: 0 | Status: SHIPPED | OUTPATIENT
Start: 2020-11-16 | End: 2021-02-04 | Stop reason: SDUPTHER

## 2020-11-16 RX ORDER — CALCIUM CARBONATE 200(500)MG
TABLET,CHEWABLE ORAL AS NEEDED
COMMUNITY

## 2020-11-16 RX ORDER — ASPIRIN 325 MG
325 TABLET, DELAYED RELEASE (ENTERIC COATED) ORAL DAILY
Qty: 30 TABLET | Refills: 0 | Status: SHIPPED | OUTPATIENT
Start: 2020-11-16 | End: 2021-02-04 | Stop reason: SDUPTHER

## 2020-11-20 RX ORDER — FLUTICASONE PROPIONATE 50 MCG
1 SPRAY, SUSPENSION (ML) NASAL DAILY
OUTPATIENT
Start: 2020-11-20

## 2020-12-07 ENCOUNTER — TELEPHONE (OUTPATIENT)
Dept: INTERNAL MEDICINE CLINIC | Facility: CLINIC | Age: 65
End: 2020-12-07

## 2020-12-07 DIAGNOSIS — Z11.59 SCREENING FOR VIRAL DISEASE: Primary | ICD-10-CM

## 2020-12-07 DIAGNOSIS — I67.1 ANEURYSM OF INTERNAL CAROTID ARTERY: ICD-10-CM

## 2020-12-10 ENCOUNTER — TELEPHONE (OUTPATIENT)
Dept: INTERNAL MEDICINE CLINIC | Facility: CLINIC | Age: 65
End: 2020-12-10

## 2020-12-10 DIAGNOSIS — I10 ESSENTIAL HYPERTENSION: ICD-10-CM

## 2020-12-10 RX ORDER — LISINOPRIL 10 MG/1
10 TABLET ORAL DAILY
Qty: 90 TABLET | Refills: 0 | Status: SHIPPED | OUTPATIENT
Start: 2020-12-10 | End: 2020-12-14 | Stop reason: SDUPTHER

## 2020-12-14 DIAGNOSIS — I10 ESSENTIAL HYPERTENSION: ICD-10-CM

## 2020-12-14 PROCEDURE — 4010F ACE/ARB THERAPY RXD/TAKEN: CPT | Performed by: INTERNAL MEDICINE

## 2020-12-14 PROCEDURE — 4010F ACE/ARB THERAPY RXD/TAKEN: CPT | Performed by: NEUROLOGICAL SURGERY

## 2020-12-14 RX ORDER — LISINOPRIL 10 MG/1
10 TABLET ORAL DAILY
Qty: 90 TABLET | Refills: 0 | Status: SHIPPED | OUTPATIENT
Start: 2020-12-14 | End: 2021-06-17 | Stop reason: SDUPTHER

## 2020-12-23 ENCOUNTER — TELEPHONE (OUTPATIENT)
Dept: INTERNAL MEDICINE CLINIC | Facility: CLINIC | Age: 65
End: 2020-12-23

## 2020-12-24 ENCOUNTER — TELEPHONE (OUTPATIENT)
Dept: NEUROSURGERY | Facility: CLINIC | Age: 65
End: 2020-12-24

## 2020-12-29 ENCOUNTER — LAB (OUTPATIENT)
Dept: LAB | Facility: HOSPITAL | Age: 65
End: 2020-12-29
Attending: NEUROLOGICAL SURGERY
Payer: COMMERCIAL

## 2020-12-29 DIAGNOSIS — I67.1 ANEURYSM OF INTERNAL CAROTID ARTERY: ICD-10-CM

## 2020-12-29 LAB
ANION GAP SERPL CALCULATED.3IONS-SCNC: 6 MMOL/L (ref 4–13)
APTT PPP: 29 SECONDS (ref 23–37)
BASOPHILS # BLD AUTO: 0.03 THOUSANDS/ΜL (ref 0–0.1)
BASOPHILS NFR BLD AUTO: 0 % (ref 0–1)
BILIRUB UR QL STRIP: NEGATIVE
BUN SERPL-MCNC: 15 MG/DL (ref 5–25)
CALCIUM SERPL-MCNC: 9.3 MG/DL (ref 8.3–10.1)
CHLORIDE SERPL-SCNC: 106 MMOL/L (ref 100–108)
CLARITY UR: NORMAL
CO2 SERPL-SCNC: 27 MMOL/L (ref 21–32)
COLOR UR: YELLOW
CREAT SERPL-MCNC: 0.7 MG/DL (ref 0.6–1.3)
EOSINOPHIL # BLD AUTO: 0.12 THOUSAND/ΜL (ref 0–0.61)
EOSINOPHIL NFR BLD AUTO: 1 % (ref 0–6)
ERYTHROCYTE [DISTWIDTH] IN BLOOD BY AUTOMATED COUNT: 13.7 % (ref 11.6–15.1)
GFR SERPL CREATININE-BSD FRML MDRD: 92 ML/MIN/1.73SQ M
GLUCOSE P FAST SERPL-MCNC: 109 MG/DL (ref 65–99)
GLUCOSE UR STRIP-MCNC: NEGATIVE MG/DL
HCT VFR BLD AUTO: 42.8 % (ref 34.8–46.1)
HGB BLD-MCNC: 13.2 G/DL (ref 11.5–15.4)
HGB UR QL STRIP.AUTO: NEGATIVE
IMM GRANULOCYTES # BLD AUTO: 0.03 THOUSAND/UL (ref 0–0.2)
IMM GRANULOCYTES NFR BLD AUTO: 0 % (ref 0–2)
INR PPP: 0.98 (ref 0.84–1.19)
KETONES UR STRIP-MCNC: NEGATIVE MG/DL
LEUKOCYTE ESTERASE UR QL STRIP: NEGATIVE
LYMPHOCYTES # BLD AUTO: 2.75 THOUSANDS/ΜL (ref 0.6–4.47)
LYMPHOCYTES NFR BLD AUTO: 30 % (ref 14–44)
MCH RBC QN AUTO: 27.3 PG (ref 26.8–34.3)
MCHC RBC AUTO-ENTMCNC: 30.8 G/DL (ref 31.4–37.4)
MCV RBC AUTO: 88 FL (ref 82–98)
MONOCYTES # BLD AUTO: 0.97 THOUSAND/ΜL (ref 0.17–1.22)
MONOCYTES NFR BLD AUTO: 11 % (ref 4–12)
NEUTROPHILS # BLD AUTO: 5.22 THOUSANDS/ΜL (ref 1.85–7.62)
NEUTS SEG NFR BLD AUTO: 58 % (ref 43–75)
NITRITE UR QL STRIP: NEGATIVE
NRBC BLD AUTO-RTO: 0 /100 WBCS
PA ADP BLD-ACNC: 256 PRU (ref 194–418)
PH UR STRIP.AUTO: 6 [PH]
PLATELET # BLD AUTO: 288 THOUSANDS/UL (ref 149–390)
PMV BLD AUTO: 11.1 FL (ref 8.9–12.7)
POTASSIUM SERPL-SCNC: 3.9 MMOL/L (ref 3.5–5.3)
PROT UR STRIP-MCNC: NEGATIVE MG/DL
PROTHROMBIN TIME: 13 SECONDS (ref 11.6–14.5)
RBC # BLD AUTO: 4.84 MILLION/UL (ref 3.81–5.12)
SODIUM SERPL-SCNC: 139 MMOL/L (ref 136–145)
SP GR UR STRIP.AUTO: 1.02 (ref 1–1.03)
UROBILINOGEN UR QL STRIP.AUTO: 1 E.U./DL
WBC # BLD AUTO: 9.12 THOUSAND/UL (ref 4.31–10.16)

## 2020-12-29 PROCEDURE — 36415 COLL VENOUS BLD VENIPUNCTURE: CPT

## 2020-12-29 PROCEDURE — 80048 BASIC METABOLIC PNL TOTAL CA: CPT

## 2020-12-29 PROCEDURE — 85576 BLOOD PLATELET AGGREGATION: CPT

## 2020-12-29 PROCEDURE — 85025 COMPLETE CBC W/AUTO DIFF WBC: CPT

## 2020-12-29 PROCEDURE — 85730 THROMBOPLASTIN TIME PARTIAL: CPT

## 2020-12-29 PROCEDURE — 85610 PROTHROMBIN TIME: CPT

## 2020-12-29 PROCEDURE — 81003 URINALYSIS AUTO W/O SCOPE: CPT

## 2020-12-30 ENCOUNTER — TELEPHONE (OUTPATIENT)
Dept: RADIOLOGY | Facility: HOSPITAL | Age: 65
End: 2020-12-30

## 2020-12-31 ENCOUNTER — TELEPHONE (OUTPATIENT)
Dept: RADIOLOGY | Facility: HOSPITAL | Age: 65
End: 2020-12-31

## 2021-01-04 ENCOUNTER — CONSULT (OUTPATIENT)
Dept: INTERNAL MEDICINE CLINIC | Facility: CLINIC | Age: 66
End: 2021-01-04

## 2021-01-04 ENCOUNTER — TELEPHONE (OUTPATIENT)
Dept: RADIOLOGY | Facility: HOSPITAL | Age: 66
End: 2021-01-04

## 2021-01-04 VITALS
HEART RATE: 59 BPM | WEIGHT: 293 LBS | SYSTOLIC BLOOD PRESSURE: 142 MMHG | DIASTOLIC BLOOD PRESSURE: 84 MMHG | BODY MASS INDEX: 49.36 KG/M2 | TEMPERATURE: 96.5 F

## 2021-01-04 DIAGNOSIS — Z12.31 BREAST CANCER SCREENING BY MAMMOGRAM: ICD-10-CM

## 2021-01-04 DIAGNOSIS — Z11.59 SCREENING FOR VIRAL DISEASE: ICD-10-CM

## 2021-01-04 DIAGNOSIS — I67.1 ANEURYSM OF INTERNAL CAROTID ARTERY: Primary | ICD-10-CM

## 2021-01-04 DIAGNOSIS — E11.9 TYPE 2 DIABETES MELLITUS WITHOUT COMPLICATION, WITHOUT LONG-TERM CURRENT USE OF INSULIN (HCC): ICD-10-CM

## 2021-01-04 DIAGNOSIS — J32.9 SINUSITIS: ICD-10-CM

## 2021-01-04 DIAGNOSIS — Z01.818 PREOP EXAMINATION: Primary | ICD-10-CM

## 2021-01-04 PROCEDURE — 3725F SCREEN DEPRESSION PERFORMED: CPT | Performed by: INTERNAL MEDICINE

## 2021-01-04 PROCEDURE — 1036F TOBACCO NON-USER: CPT | Performed by: INTERNAL MEDICINE

## 2021-01-04 PROCEDURE — 99213 OFFICE O/P EST LOW 20 MIN: CPT | Performed by: INTERNAL MEDICINE

## 2021-01-04 PROCEDURE — 3079F DIAST BP 80-89 MM HG: CPT | Performed by: INTERNAL MEDICINE

## 2021-01-04 PROCEDURE — 3077F SYST BP >= 140 MM HG: CPT | Performed by: INTERNAL MEDICINE

## 2021-01-04 PROCEDURE — U0003 INFECTIOUS AGENT DETECTION BY NUCLEIC ACID (DNA OR RNA); SEVERE ACUTE RESPIRATORY SYNDROME CORONAVIRUS 2 (SARS-COV-2) (CORONAVIRUS DISEASE [COVID-19]), AMPLIFIED PROBE TECHNIQUE, MAKING USE OF HIGH THROUGHPUT TECHNOLOGIES AS DESCRIBED BY CMS-2020-01-R: HCPCS | Performed by: NEUROLOGICAL SURGERY

## 2021-01-04 RX ORDER — SODIUM CHLORIDE 9 MG/ML
75 INJECTION, SOLUTION INTRAVENOUS CONTINUOUS
Status: CANCELLED | OUTPATIENT
Start: 2021-01-04

## 2021-01-04 RX ORDER — FLUTICASONE PROPIONATE 50 MCG
1 SPRAY, SUSPENSION (ML) NASAL DAILY
Qty: 16 G | Refills: 5 | Status: SHIPPED | OUTPATIENT
Start: 2021-01-04 | End: 2021-09-16

## 2021-01-04 NOTE — PRE-PROCEDURE INSTRUCTIONS
Pre procedure instructions reviewed with patient  Per patient she is getting a covid test today and for the P2Y12 redraw on Thursday 11/7/2021  NPO status review, COVID screening negative at this time, arrival time and location confirmed

## 2021-01-04 NOTE — PROGRESS NOTES
ASSESSMENT/PLAN:    Case and plan discussed with Dr Hailey So    Diagnoses and all orders for this visit:    Preop examination  Risk Factor Score (Yes=1, No=0)   Hx of TIA/CVA  0   Hx of prior ischemic heart disease (AMI, unstable angina, Q waves on EKG, CABG)  0   Hx of congestive heart failure  0    Serum Creatinine >2 mg/dl  0   Insulin dependent diabetes mellitus  0   Total Score   0     Risk of MACE (30-day)     Points Risk % (95% CI), Jeannette 2017 Risk % (95% CI) Bong, 1999   0 3 9 (2 8-5 4) 0 4 (0 05-1 5)   1 6 (4 9-7 4) 0 9 (0 3-2 1)   2 10 1 (8 1-12 6) 6 6 (3 9-10 3)   3 or more 15 (11 1-20) >11 (5 8-18  4)     Low risk 0 4% for intermidiate risk procedure  May proceed with procedure as planned  - depending with ambulation with on limited distances  Independent with ADLs  Denies chest pain, shortness of breath  - as per Neurosurgery recommendation she has been taking aspirin and Plavix started on 01/01/2021  Sinusitis  -     fluticasone (FLONASE) 50 mcg/act nasal spray; 1 spray into each nostril daily    Type 2 diabetes mellitus without complication, without long-term current use of insulin (HCC)  -     Microalbumin / creatinine urine ratio    Breast cancer screening by mammogram  -     Mammo screening bilateral w cad; Future          Health Maintenance:  BMI Counseling: Body mass index is 49 36 kg/m²  The BMI is above normal  Nutrition recommendations include reducing portion sizes, decreasing overall calorie intake, 3-5 servings of fruits/vegetables daily, reducing fast food intake, consuming healthier snacks, decreasing soda and/or juice intake and moderation in carbohydrate intake  Exercise recommendations include moderate aerobic physical activity for 150 minutes/week  Patient needs follow-up for chronic medical conditions on next appointment, including diabetes    Immunization History   Administered Date(s) Administered    INFLUENZA 11/12/2018    Influenza Quadrivalent, 6-35 Months IM 09/29/2017    Influenza, recombinant, quadrivalent,injectable, preservative free 11/12/2018, 10/21/2019, 10/30/2020    Influenza, seasonal, injectable 10/10/2013, 12/03/2014, 10/22/2015    Pneumococcal Polysaccharide PPV23 11/12/2018    Tdap 10/30/2020       CHIEF COMPLAINT:  Preop evaluation    HISTORY OF PRESENT ILLNESS:  Patient is a 28-year-old female past medical history significant for type 2 diabetes A1c 6 4 diet-controlled, AARON not on CPAP, hypertension, morbid obesity, hyperlipidemia, right para ophthalmic artery aneurysm  Patient presents for preop evaluation for right para ophthalmic artery aneurysm embolization with nurse surgery on 1/08/2021  As per chart review, patient was instructed to initiate aspirin and Plavix 7 days prior to procedure  She reports she has been compliant with this  Preop evaluation as above  Denies shortness of breath, chest pain  Patient is ambulatory and independent with ADLs  Does report nasal congestion and rhinorrhea  ROS as below  Review of Systems   Constitutional: Negative for activity change, chills, diaphoresis, fatigue and fever  HENT: Positive for congestion and rhinorrhea  Negative for sinus pain, sneezing and sore throat  Eyes: Negative  Negative for visual disturbance  Respiratory: Negative for cough, choking, chest tightness, shortness of breath and wheezing  Cardiovascular: Negative for chest pain, palpitations and leg swelling  Gastrointestinal: Negative for abdominal distention, abdominal pain, constipation, diarrhea, nausea and vomiting  Endocrine: Negative  Genitourinary: Negative  Negative for difficulty urinating, dysuria and urgency  Musculoskeletal: Negative  Skin: Negative  Negative for rash and wound  Neurological: Negative for dizziness, tremors, weakness, light-headedness, numbness and headaches  Psychiatric/Behavioral: Negative for confusion and sleep disturbance         OBJECTIVE:  Vitals:    01/04/21 1349 BP: 142/84   BP Location: Right arm   Patient Position: Sitting   Cuff Size: Standard   Pulse: 59   Temp: (!) 96 5 °F (35 8 °C)   TempSrc: Temporal   Weight: 135 kg (296 lb 9 6 oz)     Physical Exam  Physical Exam:   GENERAL: NAD, well-developed, well-nourished  Obese  NEUROLOGIC:  Alert/oriented x3  No motor or sensory deficits  HEENT:  NC/AT, PERRL, EOMI, MMM, no scleral icterus  CARDIAC:  RRR, +S1/S2, no S3/S4 heard, no m/g/r  PULMONARY:  non-labored breathing, CTA B/L, no wheezing/rales/rhonci appreciated at time of encounter  ABDOMEN:  Soft, NT/ND, +BS, no rebound/guarding/rigidity  Extremities:  2+ Pulses in DP/PT   No edema, cyanosis, or clubbing  SKIN:  No rashes or erythema      Current Outpatient Medications:     acetaminophen (Tylenol 8 Hour Arthritis Pain) 650 mg CR tablet, Take 1 tablet (650 mg total) by mouth every 8 (eight) hours as needed for mild pain, Disp: 90 tablet, Rfl: 3    aspirin (ECOTRIN) 325 mg EC tablet, Take 1 tablet (325 mg total) by mouth daily Start 7 days prior to procedure, Disp: 30 tablet, Rfl: 0    atorvastatin (LIPITOR) 10 mg tablet, Take 1 tablet (10 mg total) by mouth daily, Disp: 90 tablet, Rfl: 1    calcium carbonate (Tums) 500 mg chewable tablet, Chew as needed, Disp: , Rfl:     clopidogrel (PLAVIX) 75 mg tablet, Take 1 tablet (75 mg total) by mouth daily Start taking 7 days prior to procedure, Disp: 90 tablet, Rfl: 0    cyclobenzaprine (FLEXERIL) 10 mg tablet, Take 1 tablet (10 mg total) by mouth daily at bedtime (Patient taking differently: Take 10 mg by mouth as needed ), Disp: 30 tablet, Rfl: 3    fluticasone (FLONASE) 50 mcg/act nasal spray, 1 spray into each nostril daily, Disp: 16 g, Rfl: 5    levothyroxine 100 mcg tablet, Take 1 tablet (100 mcg total) by mouth 2 (two) times a week, Disp: 24 tablet, Rfl: 3    Levothyroxine Sodium 88 MCG CAPS, Take 1 capsule (88 mcg total) by mouth daily, Disp: 90 capsule, Rfl: 3    lisinopril (ZESTRIL) 10 mg tablet, Take 1 tablet (10 mg total) by mouth daily, Disp: 90 tablet, Rfl: 0    loratadine (CLARITIN) 10 mg tablet, Take 1 tablet (10 mg total) by mouth daily as needed for allergies, Disp: 90 tablet, Rfl: 1    omeprazole (PriLOSEC) 20 mg delayed release capsule, Take 1 capsule (20 mg total) by mouth daily, Disp: 90 capsule, Rfl: 1    aspirin 81 MG tablet, Take 1 tablet (81 mg total) by mouth daily (Patient not taking: Reported on 2021), Disp: 90 tablet, Rfl: 3    Cholecalciferol (VITAMIN D HIGH POTENCY PO), Take by mouth  Takes 1x/week, Disp: , Rfl:     Past Medical History:   Diagnosis Date    Arthritis     Depression     Disease of thyroid gland     hypo    Edema     LAST ASSESSED: [id number]    GERD (gastroesophageal reflux disease)     Hypercholesterolemia     Hyperlipidemia     Hypertension     WELL CONTROLLED  CURRENTLY ON LISINOPRIL   LAST ASSESSED: [id number]    Other headache syndrome     Other muscle spasm     LAST ASSESSED: [id number]    Ovarian cyst     Renal calculi      (spontaneous vaginal delivery)     FEMALE     Past Surgical History:   Procedure Laterality Date    BACK SURGERY      HERNIATED DISC (L4/L5)    CHOLECYSTECTOMY      IR CEREBRAL ANGIOGRAPHY  2020    TONSILLECTOMY       Family History   Problem Relation Age of Onset    Lung cancer Mother     Cancer Mother         MALIGNANT NEOPLASM    Hypertension Father     Seizures Father     Stroke Father     Diabetes Brother     Hypertension Son     Lung disease Son     Hyperthyroidism Maternal Aunt     Hypothyroidism Maternal Aunt     Heart disease Other         CARDIAC DISORDER    Neuropathy Family      Social History     Socioeconomic History    Marital status:      Spouse name: Not on file    Number of children: Not on file    Years of education: Not on file    Highest education level: Not on file   Occupational History    Occupation: RETIRED   Social Needs    Financial resource strain: Not on file    Food insecurity     Worry: Not on file     Inability: Not on file    Transportation needs     Medical: Not on file     Non-medical: Not on file   Tobacco Use    Smoking status: Former Smoker    Smokeless tobacco: Never Used    Tobacco comment: pt "quit about 16 years ago"   Substance and Sexual Activity    Alcohol use: Never     Frequency: Never    Drug use: No    Sexual activity: Never   Lifestyle    Physical activity     Days per week: Not on file     Minutes per session: Not on file    Stress: Not on file   Relationships    Social connections     Talks on phone: Not on file     Gets together: Not on file     Attends [employer] service: Not on file     Active member of club or organization: Not on file     Attends meetings of clubs or organizations: Not on file     Relationship status: Not on file    Intimate partner violence     Fear of current or ex partner: Not on file     Emotionally abused: Not on file     Physically abused: Not on file     Forced sexual activity: Not on file   Other Topics Concern    Not on file   Social History Narrative    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         AS PER [employer]    EXERCISES REGULARLY    LIVES WITH FAMILY    NO CAFFEINE USE    NO LIVING WILL    NO TOBACCO/SMOKE EXPOSURE    UNEMPLOYED     Social History     Tobacco Use   Smoking Status Former Smoker   Smokeless Tobacco Never Used   Tobacco Comment    pt "quit about 16 years ago"     Social History     Substance and Sexual Activity   Alcohol Use Never    Frequency: Never     Social History     Substance and Sexual Activity   Drug Use No         Javier Muniz MD  Internal Medicine Residency, PGY-2  [facility]  [address] [facility]  [address] [facility],[address]  [address]

## 2021-01-04 NOTE — PROGRESS NOTES
Received communication from IR that patients p2y12 was already completed when they called for their intake  This should be completed the day before procedure as was discussed with patient in our last telephone encounter  It is possible the patient went for her usual pre-procedure clearance labs and this was drawn as well  Replaced order for P2Y12  Patient aware should have completed day before procedure

## 2021-01-05 ENCOUNTER — TELEPHONE (OUTPATIENT)
Dept: INTERNAL MEDICINE CLINIC | Facility: CLINIC | Age: 66
End: 2021-01-05

## 2021-01-05 DIAGNOSIS — R51.9 INTRACTABLE HEADACHE, UNSPECIFIED CHRONICITY PATTERN, UNSPECIFIED HEADACHE TYPE: Primary | ICD-10-CM

## 2021-01-05 LAB — SARS-COV-2 RNA SPEC QL NAA+PROBE: NOT DETECTED

## 2021-01-05 NOTE — TELEPHONE ENCOUNTER
Skipper Hamman from [facility] called to request an order for Neurology  Diagnosis code is I72 [id number]  Her appointment is 1/7/21

## 2021-01-07 ENCOUNTER — TELEPHONE (OUTPATIENT)
Dept: NEUROLOGY | Facility: CLINIC | Age: 66
End: 2021-01-07

## 2021-01-07 ENCOUNTER — LAB (OUTPATIENT)
Dept: LAB | Facility: HOSPITAL | Age: 66
DRG: 026 | End: 2021-01-07
Attending: NEUROLOGICAL SURGERY
Payer: COMMERCIAL

## 2021-01-07 ENCOUNTER — CONSULT (OUTPATIENT)
Dept: NEUROLOGY | Facility: CLINIC | Age: 66
End: 2021-01-07
Payer: COMMERCIAL

## 2021-01-07 ENCOUNTER — TELEPHONE (OUTPATIENT)
Dept: INPATIENT UNIT | Facility: HOSPITAL | Age: 66
End: 2021-01-07

## 2021-01-07 VITALS
BODY MASS INDEX: 48.28 KG/M2 | HEART RATE: 81 BPM | TEMPERATURE: 97.2 F | SYSTOLIC BLOOD PRESSURE: 130 MMHG | WEIGHT: 289.8 LBS | DIASTOLIC BLOOD PRESSURE: 80 MMHG | HEIGHT: 65 IN

## 2021-01-07 DIAGNOSIS — G44.89 OTHER HEADACHE SYNDROME: ICD-10-CM

## 2021-01-07 DIAGNOSIS — R51.9 CHRONIC DAILY HEADACHE: ICD-10-CM

## 2021-01-07 DIAGNOSIS — I67.1 ANEURYSM OF INTERNAL CAROTID ARTERY: ICD-10-CM

## 2021-01-07 DIAGNOSIS — R51.9 INTRACTABLE HEADACHE, UNSPECIFIED CHRONICITY PATTERN, UNSPECIFIED HEADACHE TYPE: Primary | ICD-10-CM

## 2021-01-07 DIAGNOSIS — I67.1 CEREBRAL ANEURYSM: ICD-10-CM

## 2021-01-07 LAB — PA ADP BLD-ACNC: 122 PRU (ref 194–418)

## 2021-01-07 PROCEDURE — 36415 COLL VENOUS BLD VENIPUNCTURE: CPT

## 2021-01-07 PROCEDURE — 99205 OFFICE O/P NEW HI 60 MIN: CPT | Performed by: PSYCHIATRY & NEUROLOGY

## 2021-01-07 PROCEDURE — 85576 BLOOD PLATELET AGGREGATION: CPT

## 2021-01-07 RX ORDER — CYPROHEPTADINE HYDROCHLORIDE 4 MG/1
4 TABLET ORAL DAILY
Qty: 30 TABLET | Refills: 3 | Status: SHIPPED | OUTPATIENT
Start: 2021-01-07 | End: 2021-03-25 | Stop reason: CLARIF

## 2021-01-07 NOTE — PROGRESS NOTES
Patient ID: Wilber Serna is a 60 y o  female  Assessment/Plan: This is a 61 y/o Female who is here as a new patient to establish care with us  Her headaches are chronic daily headaches and she might have sinus headaches as well  PLAN:      Diagnoses and all orders for this visit:    Other headache syndrome  -likely sinus headaches  -start patient on periactin 4mg at bedtime  -     cyproheptadine (PERIACTIN) 4 mg tablet; Take 1 tablet (4 mg total) by mouth daily  -     protriptyline (VIVACTIL) 10 mg tablet; Take 1 tablet (10 mg total) by mouth daily at bedtime    Intractable headache, unspecified chronicity pattern, unspecified headache type  Preventive medications - start with protriptyline    Abortive medications - continue with tylenol    Botox - not at this time  Counseling / Headache management instructions-  - When patient has a moderate to severe headache, they should seek rest, initiate relaxation and apply cold compresses to the head  - Maintain regular sleep schedule  Adults need at least 7-8 hours of uninterrupted a night  - Limit over the counter medications such as Tylenol, Ibuprofen, Aleve, Excedrin  (No more than 3 times a week)  - Maintain headache diary  We discussed an RAMIRO for a smart phone is "Migraine micaela"  - Limit caffeine to 1-2 cups 8 to 16 oz a day or less, none after 3pm   - Avoid dietary trigger  (aged cheese, peanuts, MSG, aspartame and nitrates)  - Patient is to have regular frequent meals to prevent headache onset  - Please drink at least 64 ounces of water a day to help remain hydrated  -     Ambulatory referral to Neurology  -     cyproheptadine (PERIACTIN) 4 mg tablet; Take 1 tablet (4 mg total) by mouth daily  -     protriptyline (VIVACTIL) 10 mg tablet; Take 1 tablet (10 mg total) by mouth daily at bedtime    Chronic daily headache  -     cyproheptadine (PERIACTIN) 4 mg tablet;  Take 1 tablet (4 mg total) by mouth daily  -     protriptyline (VIVACTIL) 10 mg tablet; Take 1 tablet (10 mg total) by mouth daily at bedtime    Cerebral aneurysm  -scheduled for aneurysm repair tomorrow      Follow up - 3 months with an A/P  I would be happy to see the patient sooner if any new questions/concerns arise  Patient/Guardian was advised to the call the office if they have any questions and concerns in the meantime  Patient/Guardian does understand that if they have any new stroke like symptoms such as facial droop on one side, weakness/paralysis on either side, speech trouble, numbness on one side, balance issues, any vision changes, extreme dizziness or any new headache, to call  immediately or to proceed to the nearest ER immediately  Subjective:    HPI    This is a 59 y/o F who is here as a new patient to establish care with us for headaches  She has cerebral aneurysm (R ophthalmic artery aneursym) and will be getting it fixed tomorrow  She says that her headaches started 30 years ago  They are everyday  She says that they can last for hours  They are located on the occipital, tension, and temporal headaches  Description of headaches - pressure/shooting and she can missed her social or family activities  Associated symptoms of her headaches - nausea, insomnia, and nasal congestion, ringing in her ears, and tingling/numbness in hands and feet  She sometimes falls  Triggers for her headaches - she does not know      The following portions of the patient's history were reviewed and updated as appropriate:   She  has a past medical history of Arthritis, Depression, Disease of thyroid gland, Edema, GERD (gastroesophageal reflux disease), Hypercholesterolemia, Hyperlipidemia, Hypertension, Other headache syndrome, Other muscle spasm, Ovarian cyst (), Renal calculi, and  (spontaneous vaginal delivery) ()    She   Patient Active Problem List    Diagnosis Date Noted    Other headache syndrome 2021    Chronic daily headache 01/07/2021    Cerebral aneurysm 01/07/2021    Gait instability 02/27/2020    Orthostatic hypotension 02/25/2020    Aneurysm of internal carotid artery 02/22/2020    Sinusitis 11/12/2018    Healthcare maintenance 11/11/2018    Thickened endometrium 06/15/2018    Cervical cancer screening 02/26/2018    Lung nodule < 6cm on CT 02/26/2018    Carpal tunnel syndrome, bilateral 08/15/2017    GERD (gastroesophageal reflux disease) 06/22/2017    Degenerative cervical disc 05/04/2016    Cervical spondylosis without myelopathy 04/26/2016    Type 2 diabetes mellitus without complication ([facility] ) 61/35/9982    Morbid obesity ([facility] ) 05/07/2014    Hypothyroidism 06/14/2013    Obstructive sleep apnea 06/14/2013    Hypertension 10/19/2012     She  has a past surgical history that includes Cholecystectomy; Back surgery (1996); Tonsillectomy; and IR cerebral angiography (11/6/2020)  Her family history includes Cancer in her mother; Diabetes in her brother; Heart disease in her other; Hypertension in her father and son; Hyperthyroidism in her maternal aunt; Hypothyroidism in her maternal aunt; Lung cancer in her mother; Lung disease in her son; Neuropathy in her family; Seizures in her father; Stroke in her father  She  reports that she has quit smoking  She has never used smokeless tobacco  She reports that she does not drink alcohol or use drugs    Current Outpatient Medications   Medication Sig Dispense Refill    acetaminophen (Tylenol 8 Hour Arthritis Pain) 650 mg CR tablet Take 1 tablet (650 mg total) by mouth every 8 (eight) hours as needed for mild pain 90 tablet 3    aspirin (ECOTRIN) 325 mg EC tablet Take 1 tablet (325 mg total) by mouth daily Start 7 days prior to procedure 30 tablet 0    atorvastatin (LIPITOR) 10 mg tablet Take 1 tablet (10 mg total) by mouth daily 90 tablet 1    calcium carbonate (Tums) 500 mg chewable tablet Chew as needed      Cholecalciferol (VITAMIN D HIGH POTENCY PO) Take by mouth  Takes 1x/week      clopidogrel (PLAVIX) 75 mg tablet Take 1 tablet (75 mg total) by mouth daily Start taking 7 days prior to procedure 90 tablet 0    fluticasone (FLONASE) 50 mcg/act nasal spray 1 spray into each nostril daily 16 g 5    levothyroxine 100 mcg tablet Take 1 tablet (100 mcg total) by mouth 2 (two) times a week 24 tablet 3    lisinopril (ZESTRIL) 10 mg tablet Take 1 tablet (10 mg total) by mouth daily 90 tablet 0    loratadine (CLARITIN) 10 mg tablet Take 1 tablet (10 mg total) by mouth daily as needed for allergies 90 tablet 1    omeprazole (PriLOSEC) 20 mg delayed release capsule Take 1 capsule (20 mg total) by mouth daily 90 capsule 1    cyproheptadine (PERIACTIN) 4 mg tablet Take 1 tablet (4 mg total) by mouth daily 30 tablet 3    Levothyroxine Sodium 88 MCG CAPS Take 1 capsule (88 mcg total) by mouth daily 90 capsule 3    protriptyline (VIVACTIL) 10 mg tablet Take 1 tablet (10 mg total) by mouth daily at bedtime 30 tablet 3     No current facility-administered medications for this visit  Current Outpatient Medications on File Prior to Visit   Medication Sig    acetaminophen (Tylenol 8 Hour Arthritis Pain) 650 mg CR tablet Take 1 tablet (650 mg total) by mouth every 8 (eight) hours as needed for mild pain    aspirin (ECOTRIN) 325 mg EC tablet Take 1 tablet (325 mg total) by mouth daily Start 7 days prior to procedure    atorvastatin (LIPITOR) 10 mg tablet Take 1 tablet (10 mg total) by mouth daily    calcium carbonate (Tums) 500 mg chewable tablet Chew as needed    Cholecalciferol (VITAMIN D HIGH POTENCY PO) Take by mouth   Takes 1x/week    clopidogrel (PLAVIX) 75 mg tablet Take 1 tablet (75 mg total) by mouth daily Start taking 7 days prior to procedure    fluticasone (FLONASE) 50 mcg/act nasal spray 1 spray into each nostril daily    levothyroxine 100 mcg tablet Take 1 tablet (100 mcg total) by mouth 2 (two) times a week    lisinopril (ZESTRIL) 10 mg tablet Take 1 tablet (10 mg total) by mouth daily    loratadine (CLARITIN) 10 mg tablet Take 1 tablet (10 mg total) by mouth daily as needed for allergies    omeprazole (PriLOSEC) 20 mg delayed release capsule Take 1 capsule (20 mg total) by mouth daily    [DISCONTINUED] cyclobenzaprine (FLEXERIL) 10 mg tablet Take 1 tablet (10 mg total) by mouth daily at bedtime (Patient taking differently: Take 10 mg by mouth as needed )    Levothyroxine Sodium 88 MCG CAPS Take 1 capsule (88 mcg total) by mouth daily    [DISCONTINUED] aspirin 81 MG tablet Take 1 tablet (81 mg total) by mouth daily (Patient not taking: Reported on 1/7/2021)     No current facility-administered medications on file prior to visit  She is allergic to tagamet [cimetidine]            Objective:    Blood pressure 130/80, pulse 81, temperature (!) 97 2 °F (36 2 °C), temperature source Temporal, height 5' 5" (1 651 m), weight 131 kg (289 lb 12 8 oz)  Physical Exam  General: Patient is not in any acute/apparent distress, well nourished, well developed and cooperative  HEENT: normocephalic, atraumatic, moist membranes  Neck: supple  Heart: regular heart rate and rhythm, no murmurs, rubs and or gallops  Chest: Clear to auscultation bilaterally  Abdomen: soft and non-tender  Extremities: no edema noted   Skin: no lesions or rash  Musculosketal: no bony abnormalities    Neurologic Examination:   Mental status: alert, awake, oriented X 3 and following commands  Speech/Language: Speech is fluent without any dysarthria, no aphasia noted, can name, repeat, read and write and comprehension intact    Cranial Nerves:   CN I: smell not tested  CN II: Visual fields full to confrontation, fundus - no papilledema noted  CN III, IV, VI: Extraocular movements intact bilaterally  Pupils equal round and reactive to light bilaterally  CN V: Facial sensation is normal   CN VII: Full and symmetric facial movement    CN VIII: Hearing is normal   CN IX, X: Palate elevates symmetrically  Normal gag reflex  CN XI: Shoulder shrug strength is normal   CN XII: Tongue midline without atrophy or fasciculations  Motor:   Strength 5/5 in all 4 extremities  No pronator drift  Normal rapid alternating movements  Bulk/tone - normal   Fasiculations - none    Sensory:   Sensation intact to soft touch, vibration and pinprick in all 4 extremities  Cerebellar:   Finger-to-nose intact, normal heel to shin  Reflexes: 2+ in all 4 extremities  Pathologic reflexes - babinski reflex negative, Hoffmans reflex - negative    Gait:   Normal gait, able to tandem walk, able to tip-toe, able to walk on heels, normal arm to swing  Rhomberg sign negative    ROS:  I reviewed ROS  Review of Systems   Constitutional: Negative  Negative for appetite change and fever  HENT: Negative  Negative for hearing loss, tinnitus, trouble swallowing and voice change  Eyes: Negative  Negative for photophobia and pain  Respiratory: Negative  Negative for shortness of breath  Cardiovascular: Negative  Negative for palpitations  Gastrointestinal: Negative  Negative for nausea and vomiting  Endocrine: Negative  Negative for cold intolerance  Genitourinary: Negative  Negative for dysuria, frequency and urgency  Musculoskeletal: Negative  Negative for myalgias and neck pain  Skin: Negative  Negative for rash  Neurological: Positive for headaches  Negative for dizziness, tremors, seizures, syncope, facial asymmetry, speech difficulty, weakness, light-headedness and numbness  Patient stated that she has headaches everyday  Patient also stated that she has sharp pains back of head off and on in her head  Hematological: Negative  Does not bruise/bleed easily  Psychiatric/Behavioral: Negative  Negative for confusion, hallucinations and sleep disturbance

## 2021-01-08 ENCOUNTER — ANESTHESIA (OUTPATIENT)
Dept: RADIOLOGY | Facility: HOSPITAL | Age: 66
DRG: 026 | End: 2021-01-08
Payer: COMMERCIAL

## 2021-01-08 ENCOUNTER — ANESTHESIA EVENT (OUTPATIENT)
Dept: RADIOLOGY | Facility: HOSPITAL | Age: 66
DRG: 026 | End: 2021-01-08
Payer: COMMERCIAL

## 2021-01-08 ENCOUNTER — HOSPITAL ENCOUNTER (INPATIENT)
Dept: RADIOLOGY | Facility: HOSPITAL | Age: 66
LOS: 4 days | Discharge: HOME | DRG: 026 | End: 2021-01-12
Attending: NEUROLOGICAL SURGERY | Admitting: NEUROLOGICAL SURGERY
Payer: COMMERCIAL

## 2021-01-08 VITALS — HEART RATE: 82 BPM

## 2021-01-08 DIAGNOSIS — I67.1 CEREBRAL ANEURYSM: Primary | ICD-10-CM

## 2021-01-08 DIAGNOSIS — I67.1 ANEURYSM OF INTERNAL CAROTID ARTERY: ICD-10-CM

## 2021-01-08 PROBLEM — T88.4XXA DIFFICULT INTUBATION: Status: ACTIVE | Noted: 2021-01-08

## 2021-01-08 LAB
ANION GAP SERPL CALCULATED.3IONS-SCNC: 6 MMOL/L (ref 4–13)
APTT PPP: 38 SECONDS (ref 23–37)
BUN SERPL-MCNC: 12 MG/DL (ref 5–25)
CALCIUM SERPL-MCNC: 8.8 MG/DL (ref 8.3–10.1)
CHLORIDE SERPL-SCNC: 110 MMOL/L (ref 100–108)
CO2 SERPL-SCNC: 24 MMOL/L (ref 21–32)
CREAT SERPL-MCNC: 0.65 MG/DL (ref 0.6–1.3)
EST. AVERAGE GLUCOSE BLD GHB EST-MCNC: 140 MG/DL
GFR SERPL CREATININE-BSD FRML MDRD: 94 ML/MIN/1.73SQ M
GLUCOSE SERPL-MCNC: 144 MG/DL (ref 65–140)
GLUCOSE SERPL-MCNC: 147 MG/DL (ref 65–140)
GLUCOSE SERPL-MCNC: 155 MG/DL (ref 65–140)
HBA1C MFR BLD: 6.5 %
KCT BLD-ACNC: 127 SEC (ref 89–137)
KCT BLD-ACNC: 161 SEC (ref 89–137)
KCT BLD-ACNC: 184 SEC (ref 89–137)
MAGNESIUM SERPL-MCNC: 1.8 MG/DL (ref 1.6–2.6)
POTASSIUM SERPL-SCNC: 3.7 MMOL/L (ref 3.5–5.3)
SODIUM SERPL-SCNC: 140 MMOL/L (ref 136–145)
SPECIMEN SOURCE: ABNORMAL
SPECIMEN SOURCE: ABNORMAL
SPECIMEN SOURCE: NORMAL
TROPONIN I SERPL-MCNC: <0.02 NG/ML

## 2021-01-08 PROCEDURE — 84484 ASSAY OF TROPONIN QUANT: CPT | Performed by: NEUROLOGICAL SURGERY

## 2021-01-08 PROCEDURE — C1769 GUIDE WIRE: HCPCS

## 2021-01-08 PROCEDURE — 36224 PLACE CATH CAROTD ART: CPT | Performed by: NEUROLOGICAL SURGERY

## 2021-01-08 PROCEDURE — C1887 CATHETER, GUIDING: HCPCS

## 2021-01-08 PROCEDURE — 84484 ASSAY OF TROPONIN QUANT: CPT | Performed by: INTERNAL MEDICINE

## 2021-01-08 PROCEDURE — 76377 3D RENDER W/INTRP POSTPROCES: CPT | Performed by: NEUROLOGICAL SURGERY

## 2021-01-08 PROCEDURE — C1894 INTRO/SHEATH, NON-LASER: HCPCS

## 2021-01-08 PROCEDURE — 76937 US GUIDE VASCULAR ACCESS: CPT | Performed by: NEUROLOGICAL SURGERY

## 2021-01-08 PROCEDURE — 3044F HG A1C LEVEL LT 7.0%: CPT | Performed by: NEUROLOGICAL SURGERY

## 2021-01-08 PROCEDURE — 99223 1ST HOSP IP/OBS HIGH 75: CPT | Performed by: INTERNAL MEDICINE

## 2021-01-08 PROCEDURE — 93005 ELECTROCARDIOGRAM TRACING: CPT

## 2021-01-08 PROCEDURE — 80048 BASIC METABOLIC PNL TOTAL CA: CPT | Performed by: NEUROLOGICAL SURGERY

## 2021-01-08 PROCEDURE — 83735 ASSAY OF MAGNESIUM: CPT | Performed by: INTERNAL MEDICINE

## 2021-01-08 PROCEDURE — 82948 REAGENT STRIP/BLOOD GLUCOSE: CPT

## 2021-01-08 PROCEDURE — 3044F HG A1C LEVEL LT 7.0%: CPT | Performed by: INTERNAL MEDICINE

## 2021-01-08 PROCEDURE — 75898 FOLLOW-UP ANGIOGRAPHY: CPT | Performed by: NEUROLOGICAL SURGERY

## 2021-01-08 PROCEDURE — 85730 THROMBOPLASTIN TIME PARTIAL: CPT | Performed by: INTERNAL MEDICINE

## 2021-01-08 PROCEDURE — 83036 HEMOGLOBIN GLYCOSYLATED A1C: CPT | Performed by: INTERNAL MEDICINE

## 2021-01-08 PROCEDURE — 76937 US GUIDE VASCULAR ACCESS: CPT

## 2021-01-08 PROCEDURE — 85347 COAGULATION TIME ACTIVATED: CPT

## 2021-01-08 PROCEDURE — 61624 TCAT PERM OCCLS/EMBOLJ CNS: CPT | Performed by: NEUROLOGICAL SURGERY

## 2021-01-08 PROCEDURE — 03VG3HZ RESTRICTION OF INTRACRANIAL ARTERY WITH INTRALUMINAL DEVICE, FLOW DIVERTER, PERCUTANEOUS APPROACH: ICD-10-PCS | Performed by: NEUROLOGICAL SURGERY

## 2021-01-08 PROCEDURE — 75894 X-RAYS TRANSCATH THERAPY: CPT | Performed by: NEUROLOGICAL SURGERY

## 2021-01-08 PROCEDURE — 36224 PLACE CATH CAROTD ART: CPT

## 2021-01-08 PROCEDURE — 75894 X-RAYS TRANSCATH THERAPY: CPT

## 2021-01-08 RX ORDER — PROMETHAZINE HYDROCHLORIDE 25 MG/ML
12.5 INJECTION, SOLUTION INTRAMUSCULAR; INTRAVENOUS ONCE AS NEEDED
Status: DISCONTINUED | OUTPATIENT
Start: 2021-01-08 | End: 2021-01-08

## 2021-01-08 RX ORDER — PANTOPRAZOLE SODIUM 40 MG/1
40 TABLET, DELAYED RELEASE ORAL
Status: DISCONTINUED | OUTPATIENT
Start: 2021-01-09 | End: 2021-01-12 | Stop reason: HOSPADM

## 2021-01-08 RX ORDER — GLYCOPYRROLATE 0.2 MG/ML
INJECTION INTRAMUSCULAR; INTRAVENOUS AS NEEDED
Status: DISCONTINUED | OUTPATIENT
Start: 2021-01-08 | End: 2021-01-08

## 2021-01-08 RX ORDER — CHLORHEXIDINE GLUCONATE 0.12 MG/ML
15 RINSE ORAL EVERY 12 HOURS SCHEDULED
Status: DISCONTINUED | OUTPATIENT
Start: 2021-01-08 | End: 2021-01-12 | Stop reason: HOSPADM

## 2021-01-08 RX ORDER — LIDOCAINE WITH 8.4% SOD BICARB 0.9%(10ML)
SYRINGE (ML) INJECTION CODE/TRAUMA/SEDATION MEDICATION
Status: COMPLETED | OUTPATIENT
Start: 2021-01-08 | End: 2021-01-08

## 2021-01-08 RX ORDER — HYDROMORPHONE HCL/PF 1 MG/ML
0.5 SYRINGE (ML) INJECTION
Status: DISCONTINUED | OUTPATIENT
Start: 2021-01-08 | End: 2021-01-08

## 2021-01-08 RX ORDER — ROCURONIUM BROMIDE 10 MG/ML
INJECTION, SOLUTION INTRAVENOUS AS NEEDED
Status: DISCONTINUED | OUTPATIENT
Start: 2021-01-08 | End: 2021-01-08

## 2021-01-08 RX ORDER — CLOPIDOGREL BISULFATE 75 MG/1
75 TABLET ORAL DAILY
Status: DISCONTINUED | OUTPATIENT
Start: 2021-01-09 | End: 2021-01-12 | Stop reason: HOSPADM

## 2021-01-08 RX ORDER — LISINOPRIL 10 MG/1
10 TABLET ORAL DAILY
Status: DISCONTINUED | OUTPATIENT
Start: 2021-01-09 | End: 2021-01-12 | Stop reason: HOSPADM

## 2021-01-08 RX ORDER — VERAPAMIL HYDROCHLORIDE 2.5 MG/ML
INJECTION, SOLUTION INTRAVENOUS CODE/TRAUMA/SEDATION MEDICATION
Status: COMPLETED | OUTPATIENT
Start: 2021-01-08 | End: 2021-01-08

## 2021-01-08 RX ORDER — FENTANYL CITRATE/PF 50 MCG/ML
25 SYRINGE (ML) INJECTION
Status: DISCONTINUED | OUTPATIENT
Start: 2021-01-08 | End: 2021-01-08

## 2021-01-08 RX ORDER — METOCLOPRAMIDE HYDROCHLORIDE 5 MG/ML
10 INJECTION INTRAMUSCULAR; INTRAVENOUS ONCE AS NEEDED
Status: DISCONTINUED | OUTPATIENT
Start: 2021-01-08 | End: 2021-01-08

## 2021-01-08 RX ORDER — SODIUM CHLORIDE 9 MG/ML
75 INJECTION, SOLUTION INTRAVENOUS CONTINUOUS
Status: DISCONTINUED | OUTPATIENT
Start: 2021-01-08 | End: 2021-01-09

## 2021-01-08 RX ORDER — ASPIRIN 325 MG
325 TABLET ORAL ONCE
Status: COMPLETED | OUTPATIENT
Start: 2021-01-08 | End: 2021-01-08

## 2021-01-08 RX ORDER — SUCCINYLCHOLINE/SOD CL,ISO/PF 100 MG/5ML
SYRINGE (ML) INTRAVENOUS AS NEEDED
Status: DISCONTINUED | OUTPATIENT
Start: 2021-01-08 | End: 2021-01-08

## 2021-01-08 RX ORDER — HYDRALAZINE HYDROCHLORIDE 20 MG/ML
5 INJECTION INTRAMUSCULAR; INTRAVENOUS
Status: DISCONTINUED | OUTPATIENT
Start: 2021-01-08 | End: 2021-01-08

## 2021-01-08 RX ORDER — LORAZEPAM 2 MG/ML
1 INJECTION INTRAMUSCULAR
Status: DISCONTINUED | OUTPATIENT
Start: 2021-01-08 | End: 2021-01-08

## 2021-01-08 RX ORDER — PROPOFOL 10 MG/ML
INJECTION, EMULSION INTRAVENOUS AS NEEDED
Status: DISCONTINUED | OUTPATIENT
Start: 2021-01-08 | End: 2021-01-08

## 2021-01-08 RX ORDER — ASPIRIN 325 MG
325 TABLET, DELAYED RELEASE (ENTERIC COATED) ORAL DAILY
Status: DISCONTINUED | OUTPATIENT
Start: 2021-01-09 | End: 2021-01-12 | Stop reason: HOSPADM

## 2021-01-08 RX ORDER — LABETALOL 20 MG/4 ML (5 MG/ML) INTRAVENOUS SYRINGE
10
Status: DISCONTINUED | OUTPATIENT
Start: 2021-01-08 | End: 2021-01-08

## 2021-01-08 RX ORDER — HEPARIN SODIUM 1000 [USP'U]/ML
INJECTION, SOLUTION INTRAVENOUS; SUBCUTANEOUS CODE/TRAUMA/SEDATION MEDICATION
Status: COMPLETED | OUTPATIENT
Start: 2021-01-08 | End: 2021-01-08

## 2021-01-08 RX ORDER — NITROGLYCERIN 20 MG/100ML
INJECTION INTRAVENOUS CODE/TRAUMA/SEDATION MEDICATION
Status: COMPLETED | OUTPATIENT
Start: 2021-01-08 | End: 2021-01-08

## 2021-01-08 RX ORDER — ACETAMINOPHEN 325 MG/1
975 TABLET ORAL EVERY 8 HOURS PRN
Status: DISCONTINUED | OUTPATIENT
Start: 2021-01-08 | End: 2021-01-10

## 2021-01-08 RX ORDER — SODIUM CHLORIDE, SODIUM LACTATE, POTASSIUM CHLORIDE, CALCIUM CHLORIDE 600; 310; 30; 20 MG/100ML; MG/100ML; MG/100ML; MG/100ML
125 INJECTION, SOLUTION INTRAVENOUS CONTINUOUS
Status: DISCONTINUED | OUTPATIENT
Start: 2021-01-08 | End: 2021-01-09

## 2021-01-08 RX ORDER — HEPARIN SODIUM 10000 [USP'U]/100ML
5 INJECTION, SOLUTION INTRAVENOUS CONTINUOUS
Status: DISCONTINUED | OUTPATIENT
Start: 2021-01-08 | End: 2021-01-09

## 2021-01-08 RX ORDER — LEVOTHYROXINE SODIUM 88 UG/1
88 CAPSULE ORAL DAILY
Status: DISCONTINUED | OUTPATIENT
Start: 2021-01-09 | End: 2021-01-08

## 2021-01-08 RX ORDER — LEVOTHYROXINE SODIUM 0.1 MG/1
100 TABLET ORAL 2 TIMES WEEKLY
Status: DISCONTINUED | OUTPATIENT
Start: 2021-01-11 | End: 2021-01-12 | Stop reason: HOSPADM

## 2021-01-08 RX ORDER — ONDANSETRON 2 MG/ML
4 INJECTION INTRAMUSCULAR; INTRAVENOUS ONCE AS NEEDED
Status: DISCONTINUED | OUTPATIENT
Start: 2021-01-08 | End: 2021-01-08

## 2021-01-08 RX ORDER — CYPROHEPTADINE HYDROCHLORIDE 4 MG/1
4 TABLET ORAL DAILY
Status: DISCONTINUED | OUTPATIENT
Start: 2021-01-09 | End: 2021-01-12 | Stop reason: HOSPADM

## 2021-01-08 RX ORDER — ATORVASTATIN CALCIUM 10 MG/1
10 TABLET, FILM COATED ORAL
Status: DISCONTINUED | OUTPATIENT
Start: 2021-01-08 | End: 2021-01-12 | Stop reason: HOSPADM

## 2021-01-08 RX ORDER — EPHEDRINE SULFATE 50 MG/ML
INJECTION INTRAVENOUS AS NEEDED
Status: DISCONTINUED | OUTPATIENT
Start: 2021-01-08 | End: 2021-01-08

## 2021-01-08 RX ORDER — LIDOCAINE HYDROCHLORIDE 10 MG/ML
INJECTION, SOLUTION EPIDURAL; INFILTRATION; INTRACAUDAL; PERINEURAL AS NEEDED
Status: DISCONTINUED | OUTPATIENT
Start: 2021-01-08 | End: 2021-01-08

## 2021-01-08 RX ORDER — LIDOCAINE HYDROCHLORIDE 10 MG/ML
INJECTION, SOLUTION EPIDURAL; INFILTRATION; INTRACAUDAL; PERINEURAL CODE/TRAUMA/SEDATION MEDICATION
Status: COMPLETED | OUTPATIENT
Start: 2021-01-08 | End: 2021-01-08

## 2021-01-08 RX ORDER — HYDROMORPHONE HCL/PF 1 MG/ML
0.2 SYRINGE (ML) INJECTION
Status: DISCONTINUED | OUTPATIENT
Start: 2021-01-08 | End: 2021-01-08

## 2021-01-08 RX ORDER — CLOPIDOGREL BISULFATE 75 MG/1
75 TABLET ORAL ONCE
Status: COMPLETED | OUTPATIENT
Start: 2021-01-08 | End: 2021-01-08

## 2021-01-08 RX ORDER — HEPARIN SODIUM 1000 [USP'U]/ML
INJECTION, SOLUTION INTRAVENOUS; SUBCUTANEOUS AS NEEDED
Status: DISCONTINUED | OUTPATIENT
Start: 2021-01-08 | End: 2021-01-08

## 2021-01-08 RX ORDER — DOCUSATE SODIUM 100 MG/1
100 CAPSULE, LIQUID FILLED ORAL 2 TIMES DAILY
Status: DISCONTINUED | OUTPATIENT
Start: 2021-01-08 | End: 2021-01-12 | Stop reason: HOSPADM

## 2021-01-08 RX ORDER — FENTANYL CITRATE 50 UG/ML
INJECTION, SOLUTION INTRAMUSCULAR; INTRAVENOUS AS NEEDED
Status: DISCONTINUED | OUTPATIENT
Start: 2021-01-08 | End: 2021-01-08

## 2021-01-08 RX ORDER — ALBUTEROL SULFATE 2.5 MG/3ML
2.5 SOLUTION RESPIRATORY (INHALATION) ONCE AS NEEDED
Status: DISCONTINUED | OUTPATIENT
Start: 2021-01-08 | End: 2021-01-08

## 2021-01-08 RX ADMIN — ROCURONIUM BROMIDE 20 MG: 50 INJECTION, SOLUTION INTRAVENOUS at 11:36

## 2021-01-08 RX ADMIN — ROCURONIUM BROMIDE 10 MG: 50 INJECTION, SOLUTION INTRAVENOUS at 10:08

## 2021-01-08 RX ADMIN — ATORVASTATIN CALCIUM 10 MG: 10 TABLET, FILM COATED ORAL at 17:52

## 2021-01-08 RX ADMIN — Medication 25 MCG: at 12:31

## 2021-01-08 RX ADMIN — PHENYLEPHRINE HYDROCHLORIDE 100 MCG: 10 INJECTION INTRAVENOUS at 10:26

## 2021-01-08 RX ADMIN — LIDOCAINE HYDROCHLORIDE 50 MG: 10 INJECTION, SOLUTION EPIDURAL; INFILTRATION; INTRACAUDAL; PERINEURAL at 09:49

## 2021-01-08 RX ADMIN — Medication 25 MCG: at 13:15

## 2021-01-08 RX ADMIN — Medication 200 MG: at 09:49

## 2021-01-08 RX ADMIN — SODIUM CHLORIDE 75 ML/HR: 0.9 INJECTION, SOLUTION INTRAVENOUS at 07:45

## 2021-01-08 RX ADMIN — PHENYLEPHRINE HYDROCHLORIDE 100 MCG: 10 INJECTION INTRAVENOUS at 10:08

## 2021-01-08 RX ADMIN — SUGAMMADEX 400 MG: 100 INJECTION, SOLUTION INTRAVENOUS at 12:01

## 2021-01-08 RX ADMIN — HYDROMORPHONE HYDROCHLORIDE 0.2 MG: 1 INJECTION, SOLUTION INTRAMUSCULAR; INTRAVENOUS; SUBCUTANEOUS at 15:46

## 2021-01-08 RX ADMIN — PROPOFOL 150 MG: 10 INJECTION, EMULSION INTRAVENOUS at 09:49

## 2021-01-08 RX ADMIN — ASPIRIN 325 MG ORAL TABLET 325 MG: 325 PILL ORAL at 09:37

## 2021-01-08 RX ADMIN — PROPOFOL 50 MG: 10 INJECTION, EMULSION INTRAVENOUS at 10:18

## 2021-01-08 RX ADMIN — Medication 600 MCG: at 10:46

## 2021-01-08 RX ADMIN — HYDROMORPHONE HYDROCHLORIDE 0.2 MG: 1 INJECTION, SOLUTION INTRAMUSCULAR; INTRAVENOUS; SUBCUTANEOUS at 16:06

## 2021-01-08 RX ADMIN — Medication 400 MCG: at 10:48

## 2021-01-08 RX ADMIN — VERAPAMIL HYDROCHLORIDE 5 MG: 2.5 INJECTION, SOLUTION INTRAVENOUS at 10:48

## 2021-01-08 RX ADMIN — HEPARIN SODIUM 5 UNITS/KG/HR: 10000 INJECTION, SOLUTION INTRAVENOUS at 12:45

## 2021-01-08 RX ADMIN — HEPARIN SODIUM 5000 UNITS: 1000 INJECTION INTRAVENOUS; SUBCUTANEOUS at 10:48

## 2021-01-08 RX ADMIN — GLYCOPYRROLATE 0.2 MG: 0.2 INJECTION, SOLUTION INTRAMUSCULAR; INTRAVENOUS at 10:18

## 2021-01-08 RX ADMIN — HEPARIN SODIUM 2000 UNITS: 1000 INJECTION INTRAVENOUS; SUBCUTANEOUS at 11:16

## 2021-01-08 RX ADMIN — ACETAMINOPHEN 975 MG: 325 TABLET, FILM COATED ORAL at 21:11

## 2021-01-08 RX ADMIN — Medication 25 MCG: at 13:35

## 2021-01-08 RX ADMIN — LIDOCAINE HYDROCHLORIDE 1 ML: 10 INJECTION, SOLUTION EPIDURAL; INFILTRATION; INTRACAUDAL; PERINEURAL at 10:48

## 2021-01-08 RX ADMIN — Medication 25 MCG: at 12:43

## 2021-01-08 RX ADMIN — EPHEDRINE SULFATE 10 MG: 50 INJECTION, SOLUTION INTRAVENOUS at 10:01

## 2021-01-08 RX ADMIN — PHENYLEPHRINE HYDROCHLORIDE 100 MCG: 10 INJECTION INTRAVENOUS at 11:02

## 2021-01-08 RX ADMIN — ROCURONIUM BROMIDE 20 MG: 50 INJECTION, SOLUTION INTRAVENOUS at 10:01

## 2021-01-08 RX ADMIN — Medication 3 ML: at 10:46

## 2021-01-08 RX ADMIN — FENTANYL CITRATE 100 MCG: 50 INJECTION INTRAMUSCULAR; INTRAVENOUS at 09:49

## 2021-01-08 RX ADMIN — PROPOFOL 50 MG: 10 INJECTION, EMULSION INTRAVENOUS at 10:08

## 2021-01-08 RX ADMIN — Medication 60 MG: at 10:00

## 2021-01-08 RX ADMIN — ROCURONIUM BROMIDE 20 MG: 50 INJECTION, SOLUTION INTRAVENOUS at 10:24

## 2021-01-08 RX ADMIN — IODIXANOL 80 ML: 320 INJECTION, SOLUTION INTRAVASCULAR at 12:24

## 2021-01-08 RX ADMIN — SODIUM CHLORIDE 75 ML/HR: 0.9 INJECTION, SOLUTION INTRAVENOUS at 12:40

## 2021-01-08 RX ADMIN — PHENYLEPHRINE HYDROCHLORIDE 100 MCG: 10 INJECTION INTRAVENOUS at 11:10

## 2021-01-08 RX ADMIN — HYDROMORPHONE HYDROCHLORIDE 0.2 MG: 1 INJECTION, SOLUTION INTRAMUSCULAR; INTRAVENOUS; SUBCUTANEOUS at 14:15

## 2021-01-08 RX ADMIN — VERAPAMIL HYDROCHLORIDE 10 MG: 2.5 INJECTION, SOLUTION INTRAVENOUS at 11:06

## 2021-01-08 RX ADMIN — EPHEDRINE SULFATE 10 MG: 50 INJECTION, SOLUTION INTRAVENOUS at 11:19

## 2021-01-08 RX ADMIN — CLOPIDOGREL BISULFATE 75 MG: 75 TABLET ORAL at 09:37

## 2021-01-08 RX ADMIN — DOCUSATE SODIUM 100 MG: 100 CAPSULE, LIQUID FILLED ORAL at 17:52

## 2021-01-08 RX ADMIN — PROPOFOL 100 MG: 10 INJECTION, EMULSION INTRAVENOUS at 11:41

## 2021-01-08 RX ADMIN — CHLORHEXIDINE GLUCONATE 0.12% ORAL RINSE 15 ML: 1.2 LIQUID ORAL at 20:54

## 2021-01-08 NOTE — PERIOPERATIVE NURSING NOTE
Pt C/O of lt sided chest pressure, moderate, denies radiation VSS stable no change  Dr David Montes here to PACU, no changes on 12 lead EKG   ASA and Plavix given in IR prior to transfer to pacu Prn fentanyl given, pt resting comfortably

## 2021-01-08 NOTE — OP NOTE
OPERATIVE REPORT  PATIENT NAME: Radha Chapman   : 1955   MRN: [id number]   Pt Location: Interventional radiology     SURGERY DATE:2021      Preop Diagnosis:  1  Right ICA aneurysm     Postop Diagnosis   1  Right ICA aneurysm     Procedure:  Use of ultrasound for right radial access  Right  Internal Carotid Arteriogram  3D rotational arteriogram with post processed images reviewed at a separate workstation  Slow injection of spasmolytic agent, verapamil, over 10 minutes  Pipeline embolization right internal carotid artery aneurysm  Post procedural 3D rotational arteriogram with post processing at a separate workstation and virtual dilution of right internal carotid artery  Limited Right radial Arteriogram     Surgeon:   Bonifacio Brar MD     Specimen(s):  None     Estimated Blood Loss:   None     Drains:  None     Anesthesia Type:   General Anesthesia     Complications:  None     Operative Indications  Radha Chapman  is a very pleasant 60 y o  female who was found to have a right ICA aneurysm  After discussing the risks and benefits of a of pipeline embolization including bleeding, stroke, hematoma, and death the patient elected to proceed  Procedure Details:  After obtaining written informed consent, the patient was brought into the operating room and moved to the OR table in supine fashion  The groin was prepped and draped in the usual sterile fashion  General anesthesia was induced  There was difficulty with intubation Using ultrasound, percutaneous access was obtained into the right radial artery  A 6-Eritrean short sheath was placed  A baseline ACT was obtained and the patient given 5000 units of heparin, 5mg verapamil, and 400mcg of nitroglycerin through the radial sheath  A goal ACT of 1 5 to 2 above her baseline  This was monitored throughout the duration of the case and redosed as necessary    A 5-Eritrean selector catheter was then advanced into the aortaover a Glidewire and through an [address]  The catheter was then advanced and the right internal carotid artery was then catheterized  AP lateral and magnified oblique images of the right intracranial carotid circulation were obtained  A 3D rotational angiogram of the right ICA was then done  Post-processed images were reviewed at a separate work station  10 mg of intra-arterial verapamil were then slowly infused into the right  ICA over 10 minutes for the prophylaxis of vasospasm  Next, a Phenom 27 was advanced into the right middle cerebral artery  A [id number] pipeline embolization device was advanced through this catheter  Using live fluoroscopic imaging the pipeline embolization device was deployed proximal and distal to the aneurysm covering the neck completely  Postprocedural arteriogram was then performed  A final postprocedural 3D rotational arteriogram was then performed with post processed imaging and 3D virtual dilution  The catheters were then withdrawn from the body  TR band was placed and there was appropriate hemostasis  The patient was then awoken from anesthesia and found to be in baseline neurologic condition  All sponge and needle counts were correct  INTERPRETATION OF ANGIOGRAPHIC FINDINGS:   1  The right internal carotid artery circulation reveals antegrade flow into the middle cerebral and anterior cerebral arteries  There is a 6 5x4mm opthalmic aneurysm  There is some stasis within the aneurysm  The capillary and venous phases are unremarkable  2  3D rotational arteriogram better reveals the size and location of aneurysm  Working images were obtained  Pre deployment arteriogram reveals size and nature of aneurysm with the pipeline embolization device advanced into the MCA prior to deployment  3  Post deployment arteriogram reveals appropriate stent placement and wall apposition with stasis within the aneurysm    The capillary and venous phases are otherwise unremarkable  4  Postprocedural 3D rotational arteriogram with virtual dilution demonstrates good wall apposition of the pipeline embolization device  There is stasis within the aneurysm  Impression:  Successful pipeline embolization of a right ICA aneurysm       Patient Disposition:  PACU       SIGNATURE: Chad Rolle MD  DATE: 01/08/21   FOVW:55:81 PM

## 2021-01-08 NOTE — PLAN OF CARE
Problem: Potential for Falls  Goal: Patient will remain free of falls  Description: INTERVENTIONS:  - Assess patient frequently for physical needs  -  Identify cognitive and physical deficits and behaviors that affect risk of falls    -  [place] fall precautions as indicated by assessment   - Educate patient/family on patient safety including physical limitations  - Instruct patient to call for assistance with activity based on assessment  - Modify environment to reduce risk of injury  - Consider OT/PT consult to assist with strengthening/mobility  Outcome: Progressing     Problem: Prexisting or High Potential for Compromised Skin Integrity  Goal: Skin integrity is maintained or improved  Description: INTERVENTIONS:  - Identify patients at risk for skin breakdown  - Assess and monitor skin integrity  - Assess and monitor nutrition and hydration status  - Monitor labs   - Assess for incontinence   - Turn and reposition patient  - Assist with mobility/ambulation  - Relieve pressure over bony prominences  - Avoid friction and shearing  - Provide appropriate hygiene as needed including keeping skin clean and dry  - Evaluate need for skin moisturizer/barrier cream  - Collaborate with interdisciplinary team   - Patient/family teaching  - Consider wound care consult   Outcome: Progressing

## 2021-01-08 NOTE — H&P
Patient seen and examined independently  Clinic note from 11/16/20 remains current  Patient is not on any new medications and has not had any new medical problems  Patient remains on ASA/PLavix, p2y12 122  /77 (BP Location: Left arm)   Pulse 80   Temp 98 1 °F (36 7 °C) (Oral)   Resp 17   Ht 5' 4" (1 626 m)   Wt 131 kg (289 lb)   LMP  (LMP Unknown)   SpO2 94%   BMI 49 61 kg/m²  On exam, patient is neurologically intact  Heart rate is regular  Breath sounds are clear  Plan to proceed with treatment of right ICA aneurysm

## 2021-01-08 NOTE — H&P
H&P Exam - [address] 60 y o  female MRN: [id number]  Unit/Bed#: [facility] Encounter: [id number]      -------------------------------------------------------------------------------------------------------------  Chief Complaint: Post op pipe embolization of right ICA aneurysm  History of Present Illness   HX and PE limited by: n/a  Kelsi Muñoz is a 60 y o  female  With past medical history of Morbid obesity, chronic headaches,HTN, HLD ,  Diet controlled Type 2 diabetes mellitus, hypothyroidism ,GERD, Degenerative cervical disc disease , AARON non-complaint with CPAP who presents with for evaluation for pipe embolization of right ICA aneurysm  She was admitted in /2020 with chronic headache and dizziness and CTA  Showed 4-5 mm R ICA aneurysm and a smaler L ICA aneurysm  She followed up with neurosurgery - had an arteriography which showed an  Irregular R  7x6x5 ophthalmic artery aneurysm, 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 Intubation, no assoc diaphoresis,palpitations, fever, cough, orthopnea  She reports history of exertional chest pain, PND and Shortness of breath on exertion which has been progressively worsening  Also some mild 3/10 frontal headache , generalized weakness   Some mild LUE weakness which has seem to resolved/subsided during my evaluation  Vitals -stable except for BP of 152/66  EKG no acute changes  CXR ordered and pending  BMP- hyperglycemia otherwise unremarkable  Troponin neg      History obtained from chart review and the patient   -------------------------------------------------------------------------------------------------------------  Assessment and Plan:    Neuro:    Diagnosis: Post op R ICA aneurysm embolization  o Plan: Neurochecks q1hr  o Continue DAPT with  Aspirin,plavix per neurosurg  Anticoagulation with heparin gtt   o STAT CT head for change in neuro exam      Diagnosis: ICU   o Plan: CAM-ICU daily  o Delirium precautions  o Regulate sleep wake cycle  CV:    Diagnosis: Chest pain r/o ACS Vs  GERD   Chest pain on L sided post procedure, difficult intubation   EKG no acute  ST changes, Troponin x 1 neg   o Plan: Stat CXR  Consider echocardiogram, check lipid profile   o Trend troponin  Serial EKG  o STAT EKG for new chest pain  o Patient already on DAPT, received Aspirin 325 in PACU,Continue DAPT, Dilaudid prn   o Continue protonix for GERD  o Telemetry monitoring   Diagnosis: Hypertension  o Plan: to start home lisinopril 10mg daily  Hyperlipidemia   -Continue lipitor    -Check lipid profile  Pulm:  History of AARON on CPAP  Non compliant with cpap  Does not remember setting  Check records to get setting  GI:    Diagnosis: GERD  o Plan: Continue protonix 40mg daily  o On prn zofran and reglan for n/v       :   -No acute issues  F/E/N:    Plan: Diabetic diet   Monitor and replete electrolytes  Goal K > 4, Mg > 1 8  Heme/Onc:    Diagnosis: No acute issues   DVT prophylaxis - with heparin gtt  Endo:    Diagnosis: Type 2 diabetes  o Plan: Diabetic diet   o Sliding scale insulin   o Goal glc 140-180  ID:   o No acute issues  o Trend cbc and temp curve  MSK/Skin:   Frequent turning  PT/OT      Disposition: Admit to Critical Care   Code Status: Level 1 - Full Code  --------------------------------------------------------------------------------------------------------------  Review of Systems    A 12-point, complete review of systems was reviewed and negative except as stated above     Physical Exam  Constitutional:       General: She is not in acute distress  Appearance: She is not toxic-appearing or diaphoretic  HENT:      Head: Normocephalic and atraumatic  Nose: Nose normal       Mouth/Throat:      Mouth: Mucous membranes are moist    Eyes:      Extraocular Movements: Extraocular movements intact  Pupils: Pupils are equal, round, and reactive to light  Neck:      Musculoskeletal: Normal range of motion and neck supple  Cardiovascular:      Rate and Rhythm: Normal rate  Pulses: Normal pulses  Heart sounds: No murmur  No gallop  Pulmonary:      Effort: No respiratory distress  Breath sounds: Rales (LLZ ) present  No wheezing  Abdominal:      General: Abdomen is flat  There is no distension  Tenderness: There is no abdominal tenderness  There is no guarding  Musculoskeletal: Normal range of motion  Skin:     Capillary Refill: Capillary refill takes less than 2 seconds  Coloration: Skin is not jaundiced  Findings: No bruising  Neurological:      Mental Status: She is alert and oriented to person, place, and time  Cranial Nerves: No cranial nerve deficit  Sensory: No sensory deficit  Motor: No weakness (However, some mild weakness in L hand )  Coordination: Coordination normal        --------------------------------------------------------------------------------------------------------------  Vitals:   Vitals:    01/08/21 1500 01/08/21 1515 01/08/21 1530 01/08/21 1600   BP: 133/58  136/60 125/70   BP Location:       Pulse: 76 70 68 74   Resp: (!) 51 22 21 22   Temp:    97 8 °F (36 6 °C)   TempSrc:       SpO2: 97% 94% 96% 97%   Weight:       Height:         Temp  Min: 97 5 °F (36 4 °C)  Max: 98 3 °F (36 8 °C)  IBW: 54 7 kg  Height: 5' 4" (162 6 cm)  Body mass index is 49 61 kg/m²    N/A    Laboratory and Diagnostics:        Invalid input(s):  EOSPCT  Results from last 7 days   Lab Units 21  1506   SODIUM mmol/L 140   POTASSIUM mmol/L 3 7   CHLORIDE mmol/L 110*   CO2 mmol/L 24   ANION GAP mmol/L 6   BUN mg/dL 12   CREATININE mg/dL 0 65   CALCIUM mg/dL 8 8   GLUCOSE RANDOM mg/dL 144*               Results from last 7 days   Lab Units 21  1507   TROPONIN I ng/mL <0 02         ABG:    VBG:          Micro:        EKG: no acute ST changes  Imaging: I have personally reviewed pertinent reports  Historical Information   Past Medical History:   Diagnosis Date    Arthritis     Depression     Disease of thyroid gland     hypo    Edema     LAST ASSESSED: [id number]    GERD (gastroesophageal reflux disease)     Hypercholesterolemia     Hyperlipidemia     Hypertension     WELL CONTROLLED  CURRENTLY ON LISINOPRIL   LAST ASSESSED: [id number]    Other headache syndrome     Other muscle spasm     LAST ASSESSED: [id number]    Ovarian cyst 2006    Renal calculi      (spontaneous vaginal delivery) 1975    FEMALE     Past Surgical History:   Procedure Laterality Date    BACK SURGERY      HERNIATED DISC (L4/L5)    CHOLECYSTECTOMY      IR CEREBRAL ANGIOGRAPHY  2020    IR CEREBRAL ANGIOGRAPHY / INTERVENTION  2021    TONSILLECTOMY       Social History   Social History     Substance and Sexual Activity   Alcohol Use Never    Frequency: Never     Social History     Substance and Sexual Activity   Drug Use No     Social History     Tobacco Use   Smoking Status Former Smoker   Smokeless Tobacco Never Used   Tobacco Comment    pt "quit about 16 years ago"     Exercise History: 0  Family History:   Family History   Problem Relation Age of Onset    Lung cancer Mother     Cancer Mother         MALIGNANT NEOPLASM    Hypertension Father     Seizures Father     Stroke Father     Diabetes Brother     Hypertension Son     Lung disease Son     Hyperthyroidism Maternal Aunt     Hypothyroidism Maternal Aunt     Heart disease Other         CARDIAC DISORDER    Neuropathy Family      I have reviewed this patient's family history and commented on sigificant items within the HPI      Medications:  Current Facility-Administered Medications   Medication Dose Route Frequency    albuterol inhalation solution 2 5 mg  2 5 mg Nebulization Once PRN    [START ON 1/9/2021] aspirin (ECOTRIN) EC tablet 325 mg  325 mg Oral Daily    atorvastatin (LIPITOR) tablet 10 mg  10 mg Oral Daily With Dinner    chlorhexidine (PERIDEX) 0 12 % oral rinse 15 mL  15 mL Swish & Spit Q12H [address]    [START ON 1/9/2021] clopidogrel (PLAVIX) tablet 75 mg  75 mg Oral Daily    [START ON 1/9/2021] cyproheptadine (PERIACTIN) tablet 4 mg  4 mg Oral Daily    fentaNYL (SUBLIMAZE) injection 25 mcg  25 mcg Intravenous Q5 Min PRN    heparin (porcine) 25,000 units in 0 45% NaCl 250 mL infusion (premix)  5 Units/kg/hr (Order-Specific) Intravenous Continuous    hydrALAZINE (APRESOLINE) injection 5 mg  5 mg Intravenous Q20 Min PRN    HYDROmorphone (DILAUDID) injection 0 2 mg  0 2 mg Intravenous Q5 Min PRN    HYDROmorphone (DILAUDID) injection 0 5 mg  0 5 mg Intravenous Q5 Min PRN    ipratropium (ATROVENT) 0 02 % inhalation solution 0 5 mg  0 5 mg Nebulization Once PRN    Labetalol HCl (NORMODYNE) injection 10 mg  10 mg Intravenous Q10 Min PRN    lactated ringers infusion  125 mL/hr Intravenous Continuous    [START ON 1/11/2021] levothyroxine tablet 100 mcg  100 mcg Oral Once per day on Mon Thu    LORazepam (ATIVAN) injection 1 mg  1 mg Intravenous Q5 Min PRN    metoclopramide (REGLAN) injection 10 mg  10 mg Intravenous Once PRN    ondansetron (ZOFRAN) injection 4 mg  4 mg Intravenous Once PRN    phenol (CHLORASEPTIC) 1 4 % mucosal liquid 1 spray  1 spray Mouth/Throat Q2H PRN    promethazine (PHENERGAN) injection 12 5 mg  12 5 mg Intravenous Once PRN    sodium chloride 0 9 % infusion  75 mL/hr Intravenous Continuous    sodium chloride 0 9 % infusion  75 mL/hr Intravenous Continuous     Home medications:  Prior to Admission Medications   Prescriptions Last Dose Informant Patient Reported? Taking? Cholecalciferol (VITAMIN D HIGH POTENCY PO) 2021 at Unknown time Self Yes Yes   Sig: Take by mouth  Takes 1x/week   Levothyroxine Sodium 88 MCG CAPS  Self No No   Sig: Take 1 capsule (88 mcg total) by mouth daily   acetaminophen (Tylenol 8 Hour Arthritis Pain) 650 mg CR tablet  Self No No   Sig: Take 1 tablet (650 mg total) by mouth every 8 (eight) hours as needed for mild pain   aspirin (ECOTRIN) 325 mg EC tablet 2021 at Unknown time  No Yes   Sig: Take 1 tablet (325 mg total) by mouth daily Start 7 days prior to procedure   atorvastatin (LIPITOR) 10 mg tablet 2021 at Unknown time Self No Yes   Sig: Take 1 tablet (10 mg total) by mouth daily   calcium carbonate (Tums) 500 mg chewable tablet 2021 at Unknown time Self Yes Yes   Sig: Chew as needed   clopidogrel (PLAVIX) 75 mg tablet 2021 at Unknown time  No Yes   Sig: Take 1 tablet (75 mg total) by mouth daily Start taking 7 days prior to procedure   cyproheptadine (PERIACTIN) 4 mg tablet   No No   Sig: Take 1 tablet (4 mg total) by mouth daily   fluticasone (FLONASE) 50 mcg/act nasal spray   No No   Si spray into each nostril daily   levothyroxine 100 mcg tablet  Self No No   Sig: Take 1 tablet (100 mcg total) by mouth 2 (two) times a week   lisinopril (ZESTRIL) 10 mg tablet 2021 at Unknown time  No Yes   Sig: Take 1 tablet (10 mg total) by mouth daily   loratadine (CLARITIN) 10 mg tablet  Self No No   Sig: Take 1 tablet (10 mg total) by mouth daily as needed for allergies   omeprazole (PriLOSEC) 20 mg delayed release capsule  Self No No   Sig: Take 1 capsule (20 mg total) by mouth daily   protriptyline (VIVACTIL) 10 mg tablet   No No   Sig: Take 1 tablet (10 mg total) by mouth daily at bedtime      Facility-Administered Medications: None     Allergies:   Allergies   Allergen Reactions    Tagamet [Cimetidine] Hives     ------------------------------------------------------------------------------------------------------------  Advance Directive and Living Will:      Power of :    POLST:    ------------------------------------------------------------------------------------------------------------  Anticipated Length of Stay is > 2 midnights    Care Time Delivered:   Upon my evaluation, this patient had a high probability of imminent or life-threatening deterioration due to Post op pipe embolization , which required my direct attention, intervention, and personal management  I have personally provided 20 minutes (500 to 520) of critical care time, exclusive of procedures, teaching, family meetings, and any prior time recorded by providers other than myself  Madison Stephenson MD        Portions of the record may have been created with voice recognition software  Occasional wrong word or "sound a like" substitutions may have occurred due to the inherent limitations of voice recognition software    Read the chart carefully and recognize, using context, where substitutions have occurred

## 2021-01-08 NOTE — PERIOPERATIVE NURSING NOTE
1500Dr Boy and [place] Damon here , aware of continued lt chest pressure 6/10  No change with VSS, stable  BMP/TROPIN sent, to repeat troponin in 3 hrs  Neurologically unchanged, sl wekness lt arm, improved from earlier   BLE'S humza weak

## 2021-01-08 NOTE — ANESTHESIA PREPROCEDURE EVALUATION
Procedure:  IR CEREBRAL ANGIOGRAPHY / INTERVENTION    Relevant Problems   ANESTHESIA (within normal limits)      CARDIO   (+) Cerebral aneurysm   (+) Hypertension      ENDO   (+) Hypothyroidism   (+) Type 2 diabetes mellitus without complication (HCC)      GI/HEPATIC   (+) GERD (gastroesophageal reflux disease)      MUSCULOSKELETAL   (+) Cervical spondylosis without myelopathy   (+) Degenerative cervical disc      NEURO/PSYCH   (+) Chronic daily headache   (+) Other headache syndrome      PULMONARY   (+) Obstructive sleep apnea      Other   (+) Morbid obesity (HCC)        Physical Exam    Airway    Mallampati score: III  TM Distance: >3 FB  Neck ROM: full     Dental   No notable dental hx     Cardiovascular  Rhythm: regular, Rate: normal, Cardiovascular exam normal    Pulmonary  Pulmonary exam normal Breath sounds clear to auscultation,     Other Findings        Anesthesia Plan  ASA Score- 3     Anesthesia Type- general with ASA Monitors  Additional Monitors: arterial line  Airway Plan: ETT  Plan Factors-Exercise tolerance (METS): >4 METS  Chart reviewed  EKG reviewed  Existing labs reviewed  Patient summary reviewed  Patient is not a current smoker  Induction- intravenous  Postoperative Plan-     Informed Consent- Anesthetic plan and risks discussed with patient  I personally reviewed this patient with the CRNA  Discussed and agreed on the Anesthesia Plan with the CRNA  Matthew Carter

## 2021-01-08 NOTE — ANESTHESIA POSTPROCEDURE EVALUATION
Post-Op Assessment Note    CV Status:  Stable    Pain management: inadequate     Mental Status:  Alert and awake   Hydration Status:  Euvolemic   PONV Controlled:  Controlled   Airway Patency:  Patent      Post Op Vitals Reviewed: Yes      Staff: Anesthesiologist, with CRNAs         No complications documented      BP   1109/65   Temp      Pulse 83   Resp   22   SpO2 94

## 2021-01-08 NOTE — SEDATION DOCUMENTATION
Cerebral with intervention completed by Dr Javed Van with Anesthesia present for entire case  Pt was a difficult intubation - See Anesthesia's note for further detail  Bedside report given to Sadaf Osullivan RN  2cc of air taken out in PACU at 1215

## 2021-01-08 NOTE — ANESTHESIA PROCEDURE NOTES
Arterial Line Insertion  Performed by: Vipin Hanks CRNA  Authorized by: Nilda Patrick MD   Consent: Verbal consent obtained    Consent given by: patient  Patient identity confirmed: arm band    Sedation:  Patient sedated: yes (GA)    Post-procedure:  Post-procedure: dressing applied

## 2021-01-09 LAB
ANION GAP SERPL CALCULATED.3IONS-SCNC: 4 MMOL/L (ref 4–13)
ANION GAP SERPL CALCULATED.3IONS-SCNC: 6 MMOL/L (ref 4–13)
APTT PPP: 35 SECONDS (ref 23–37)
BUN SERPL-MCNC: 10 MG/DL (ref 5–25)
BUN SERPL-MCNC: 11 MG/DL (ref 5–25)
CALCIUM SERPL-MCNC: 8.1 MG/DL (ref 8.3–10.1)
CALCIUM SERPL-MCNC: 8.4 MG/DL (ref 8.3–10.1)
CHLORIDE SERPL-SCNC: 109 MMOL/L (ref 100–108)
CHLORIDE SERPL-SCNC: 109 MMOL/L (ref 100–108)
CHOLEST SERPL-MCNC: 160 MG/DL (ref 50–200)
CO2 SERPL-SCNC: 25 MMOL/L (ref 21–32)
CO2 SERPL-SCNC: 26 MMOL/L (ref 21–32)
CREAT SERPL-MCNC: 0.51 MG/DL (ref 0.6–1.3)
CREAT SERPL-MCNC: 0.56 MG/DL (ref 0.6–1.3)
ERYTHROCYTE [DISTWIDTH] IN BLOOD BY AUTOMATED COUNT: 13.9 % (ref 11.6–15.1)
GFR SERPL CREATININE-BSD FRML MDRD: 102 ML/MIN/1.73SQ M
GFR SERPL CREATININE-BSD FRML MDRD: 99 ML/MIN/1.73SQ M
GLUCOSE SERPL-MCNC: 102 MG/DL (ref 65–140)
GLUCOSE SERPL-MCNC: 115 MG/DL (ref 65–140)
GLUCOSE SERPL-MCNC: 117 MG/DL (ref 65–140)
GLUCOSE SERPL-MCNC: 121 MG/DL (ref 65–140)
GLUCOSE SERPL-MCNC: 124 MG/DL (ref 65–140)
GLUCOSE SERPL-MCNC: 129 MG/DL (ref 65–140)
GLUCOSE SERPL-MCNC: 138 MG/DL (ref 65–140)
HCT VFR BLD AUTO: 37.6 % (ref 34.8–46.1)
HDLC SERPL-MCNC: 50 MG/DL
HGB BLD-MCNC: 11.9 G/DL (ref 11.5–15.4)
INR PPP: 1.05 (ref 0.84–1.19)
LDLC SERPL CALC-MCNC: 88 MG/DL (ref 0–100)
MAGNESIUM SERPL-MCNC: 1.9 MG/DL (ref 1.6–2.6)
MCH RBC QN AUTO: 27.5 PG (ref 26.8–34.3)
MCHC RBC AUTO-ENTMCNC: 31.6 G/DL (ref 31.4–37.4)
MCV RBC AUTO: 87 FL (ref 82–98)
NONHDLC SERPL-MCNC: 110 MG/DL
NT-PROBNP SERPL-MCNC: 82 PG/ML
PHOSPHATE SERPL-MCNC: 3.1 MG/DL (ref 2.3–4.1)
PLATELET # BLD AUTO: 298 THOUSANDS/UL (ref 149–390)
PLATELET # BLD AUTO: 301 THOUSANDS/UL (ref 149–390)
PMV BLD AUTO: 10.8 FL (ref 8.9–12.7)
PMV BLD AUTO: 10.8 FL (ref 8.9–12.7)
POTASSIUM SERPL-SCNC: 3.7 MMOL/L (ref 3.5–5.3)
POTASSIUM SERPL-SCNC: 3.8 MMOL/L (ref 3.5–5.3)
PROTHROMBIN TIME: 13.7 SECONDS (ref 11.6–14.5)
RBC # BLD AUTO: 4.32 MILLION/UL (ref 3.81–5.12)
SODIUM SERPL-SCNC: 139 MMOL/L (ref 136–145)
SODIUM SERPL-SCNC: 140 MMOL/L (ref 136–145)
TRIGL SERPL-MCNC: 109 MG/DL
WBC # BLD AUTO: 13.29 THOUSAND/UL (ref 4.31–10.16)

## 2021-01-09 PROCEDURE — 85049 AUTOMATED PLATELET COUNT: CPT | Performed by: INTERNAL MEDICINE

## 2021-01-09 PROCEDURE — 99233 SBSQ HOSP IP/OBS HIGH 50: CPT | Performed by: INTERNAL MEDICINE

## 2021-01-09 PROCEDURE — 83735 ASSAY OF MAGNESIUM: CPT | Performed by: REGISTERED NURSE

## 2021-01-09 PROCEDURE — 80061 LIPID PANEL: CPT | Performed by: INTERNAL MEDICINE

## 2021-01-09 PROCEDURE — 99232 SBSQ HOSP IP/OBS MODERATE 35: CPT | Performed by: NEUROLOGICAL SURGERY

## 2021-01-09 PROCEDURE — NC001 PR NO CHARGE: Performed by: INTERNAL MEDICINE

## 2021-01-09 PROCEDURE — 80048 BASIC METABOLIC PNL TOTAL CA: CPT | Performed by: INTERNAL MEDICINE

## 2021-01-09 PROCEDURE — 85027 COMPLETE CBC AUTOMATED: CPT | Performed by: INTERNAL MEDICINE

## 2021-01-09 PROCEDURE — 80048 BASIC METABOLIC PNL TOTAL CA: CPT | Performed by: REGISTERED NURSE

## 2021-01-09 PROCEDURE — 93005 ELECTROCARDIOGRAM TRACING: CPT

## 2021-01-09 PROCEDURE — 84100 ASSAY OF PHOSPHORUS: CPT | Performed by: REGISTERED NURSE

## 2021-01-09 PROCEDURE — 85730 THROMBOPLASTIN TIME PARTIAL: CPT | Performed by: INTERNAL MEDICINE

## 2021-01-09 PROCEDURE — 85610 PROTHROMBIN TIME: CPT | Performed by: INTERNAL MEDICINE

## 2021-01-09 PROCEDURE — 83880 ASSAY OF NATRIURETIC PEPTIDE: CPT | Performed by: INTERNAL MEDICINE

## 2021-01-09 PROCEDURE — 82948 REAGENT STRIP/BLOOD GLUCOSE: CPT

## 2021-01-09 RX ORDER — CALCIUM CARBONATE 200(500)MG
1000 TABLET,CHEWABLE ORAL ONCE
Status: COMPLETED | OUTPATIENT
Start: 2021-01-09 | End: 2021-01-09

## 2021-01-09 RX ORDER — OMEPRAZOLE 20 MG/1
20 CAPSULE, DELAYED RELEASE ORAL ONCE
Status: DISCONTINUED | OUTPATIENT
Start: 2021-01-09 | End: 2021-01-09

## 2021-01-09 RX ORDER — OXYCODONE HYDROCHLORIDE 5 MG/1
2.5 TABLET ORAL EVERY 6 HOURS PRN
Status: DISCONTINUED | OUTPATIENT
Start: 2021-01-09 | End: 2021-01-12 | Stop reason: HOSPADM

## 2021-01-09 RX ORDER — ASPIRIN 81 MG/1
81 TABLET, CHEWABLE ORAL DAILY
Status: DISCONTINUED | OUTPATIENT
Start: 2021-01-09 | End: 2021-01-09

## 2021-01-09 RX ADMIN — ENOXAPARIN SODIUM 40 MG: 40 INJECTION SUBCUTANEOUS at 16:27

## 2021-01-09 RX ADMIN — LISINOPRIL 10 MG: 10 TABLET ORAL at 09:33

## 2021-01-09 RX ADMIN — ACETAMINOPHEN 975 MG: 325 TABLET, FILM COATED ORAL at 05:46

## 2021-01-09 RX ADMIN — OXYCODONE HYDROCHLORIDE 2.5 MG: 5 TABLET ORAL at 00:45

## 2021-01-09 RX ADMIN — ATORVASTATIN CALCIUM 10 MG: 10 TABLET, FILM COATED ORAL at 16:27

## 2021-01-09 RX ADMIN — ACETAMINOPHEN 975 MG: 325 TABLET, FILM COATED ORAL at 21:06

## 2021-01-09 RX ADMIN — OXYCODONE HYDROCHLORIDE 2.5 MG: 5 TABLET ORAL at 09:34

## 2021-01-09 RX ADMIN — CYPROHEPTADINE HYDROCHLORIDE 4 MG: 4 TABLET ORAL at 09:28

## 2021-01-09 RX ADMIN — DOCUSATE SODIUM 100 MG: 100 CAPSULE, LIQUID FILLED ORAL at 09:33

## 2021-01-09 RX ADMIN — CALCIUM CARBONATE (ANTACID) CHEW TAB 500 MG 1000 MG: 500 CHEW TAB at 21:04

## 2021-01-09 RX ADMIN — PANTOPRAZOLE SODIUM 40 MG: 40 TABLET, DELAYED RELEASE ORAL at 06:48

## 2021-01-09 RX ADMIN — ASPIRIN 325 MG: 325 TABLET, COATED ORAL at 06:48

## 2021-01-09 RX ADMIN — SODIUM CHLORIDE 75 ML/HR: 0.9 INJECTION, SOLUTION INTRAVENOUS at 00:49

## 2021-01-09 RX ADMIN — CHLORHEXIDINE GLUCONATE 0.12% ORAL RINSE 15 ML: 1.2 LIQUID ORAL at 09:33

## 2021-01-09 RX ADMIN — CLOPIDOGREL BISULFATE 75 MG: 75 TABLET ORAL at 06:48

## 2021-01-09 RX ADMIN — CHLORHEXIDINE GLUCONATE 0.12% ORAL RINSE 15 ML: 1.2 LIQUID ORAL at 21:04

## 2021-01-09 NOTE — ASSESSMENT & PLAN NOTE
Right ICA aneurysm status post pipeline embolization POD 1-, procedure complicated by difficult airway, no successfully extubated and on room air  Neurosurgery following  Recommended to DC Borrego, A-line and heparin GTT  Continue aspirin and Plavix  PT OT eval  Possible DC tomorrow

## 2021-01-09 NOTE — PROGRESS NOTES
Progress Note - Finn Sincere 1955, 60 y o  female MRN: [id number]    Unit/Bed#: ICU 01 Encounter: [id number]    Primary Care Provider: Harshil Hernandez MD   Date and time admitted to hospital: 2021  6:54 AM        * Aneurysm of internal carotid artery  Assessment & Plan  Right ICA aneurysm status post pipeline embolization POD 1-, procedure complicated by difficult airway, no successfully extubated and on room air  Neurosurgery following  Recommended to DC Shepherd, A-line and heparin GTT  Continue aspirin and Plavix  PT OT eval  Possible DC tomorrow  Difficult intubation  Assessment & Plan  Currently on room air, hemodynamically stable  Type 2 diabetes mellitus without complication [facility])  Assessment & Plan  Lab Results   Component Value Date    HGBA1C 6 5 (H) 2021   Blood sugars adequately controlled, goal of 140-180 while in hospital  Continue Accu-Cheks and sliding scale  Recent Labs     21  1331 21  1758 21  2124 21  0748   POCGLU 147* 155* 138 124       Blood Sugar Average: Last 72 hrs:  (P) 141    Hypothyroidism  Assessment & Plan  Stable continue home dose of levothyroxine  Hypertension  Assessment & Plan  Blood pressures on the higher side, likely postprocedure  On home medications of lisinopril  Continue to monitor,        VTE Pharmacologic Prophylaxis:   Pharmacologic: Enoxaparin (Lovenox)  Mechanical VTE Prophylaxis in Place: Yes    Discussions with Specialists or Other Care Team Provider: ICU team    Education and Discussions with Family / Patient:     Current Length of Stay: 1 day(s)    Current Patient Status: Inpatient     Discharge Plan / Estimated Discharge Date:  Continues to require IP management     Code Status: Level 1 - Full Code      Subjective:   Pt is complaining of minimal headache, no c/o chest pains   Has not urinated since shepherd removal  Objective:     Vitals:   Temp (24hrs), Av 3 °F (36 8 °C), Min:97 8 °F (36 6 °C), Max:98 6 °F (37 °C)    Temp:  [97 8 °F (36 6 °C)-98 6 °F (37 °C)] 98 2 °F (36 8 °C)  HR:  [66-96] 84  Resp:  [17-51] 20  BP: (109-136)/(50-70) 126/65  SpO2:  [92 %-97 %] 95 %  Body mass index is 49 61 kg/m²  Input and Output Summary (last 24 hours): Intake/Output Summary (Last 24 hours) at 1/9/2021 1316  Last data filed at 1/9/2021 0600  Gross per 24 hour   Intake 1326 25 ml   Output 2275 ml   Net -948 75 ml       Physical Exam:     Physical Exam  Vitals signs reviewed  Constitutional:       General: She is not in acute distress  Appearance: Normal appearance  She is obese  She is not ill-appearing, toxic-appearing or diaphoretic  HENT:      Mouth/Throat:      Mouth: Mucous membranes are moist    Cardiovascular:      Rate and Rhythm: Normal rate and regular rhythm  Pulses: Normal pulses  Heart sounds: Normal heart sounds  No murmur  No friction rub  No gallop  Pulmonary:      Effort: Pulmonary effort is normal  No respiratory distress  Breath sounds: Normal breath sounds  No stridor  No wheezing, rhonchi or rales  Chest:      Chest wall: No tenderness  Abdominal:      General: Bowel sounds are normal  There is no distension  Palpations: Abdomen is soft  There is no mass  Tenderness: There is no abdominal tenderness  There is no rebound  Musculoskeletal:         General: No swelling or tenderness  Right lower leg: No edema  Left lower leg: No edema  Skin:     General: Skin is warm and dry  Findings: No lesion or rash  Neurological:      General: No focal deficit present  Mental Status: She is alert           (  Be Sure to Include Physical Exam: Delete this entire line when you have entered your exam)    Additional Data:     Labs:    Results from last 7 days   Lab Units 01/09/21  0535   WBC Thousand/uL 13 29*   HEMOGLOBIN g/dL 11 9   HEMATOCRIT % 37 6   PLATELETS Thousands/uL 301     Results from last 7 days   Lab Units 01/09/21  0535   POTASSIUM mmol/L 3  7   CHLORIDE mmol/L 109*   CO2 mmol/L 26   BUN mg/dL 10   CREATININE mg/dL 0 56*   CALCIUM mg/dL 8 1*     Results from last 7 days   Lab Units 01/09/21  0535   INR  1 05       * I Have Reviewed All Lab Data Listed Above  * Additional Pertinent Lab Tests Reviewed: Charity [facility] Admission Reviewed    Imaging:    Imaging Reports Reviewed Today Include: Cerebaral A gram, EKG  Imaging Personally Reviewed by Myself Includes:      Recent Cultures (last 7 days):           Last 24 Hours Medication List:   Current Facility-Administered Medications   Medication Dose Route Frequency Provider Last Rate    acetaminophen  975 mg Oral Q8H PRN BENEDICT Martinez      aspirin  325 mg Oral Daily Vishal Charlton MD      atorvastatin  10 mg Oral Daily With Emely Mittal MD      chlorhexidine  15 mL Swish & Spit Q12H [address] Vishal Charlton MD      clopidogrel  75 mg Oral Daily Vishal Charlton MD      cyproheptadine  4 mg Oral Daily Vishal Charlton MD      docusate sodium  100 mg Oral BID Vishal Charlton MD      enoxaparin  40 mg Subcutaneous Q24H Eugenia Quigley MD      insulin lispro  1-6 Units Subcutaneous TID AC Vishal Charlton MD      [START ON 1/11/2021] levothyroxine  100 mcg Oral Once per day on Mon Thu Vishal Charlton MD      lisinopril  10 mg Oral Daily Vishal Charlton MD      oxyCODONE  2 5 mg Oral Q6H PRN BENEDICT Martinez      pantoprazole  40 mg Oral Daily Before Breakfast Vishal Charlton MD      phenol  1 spray Mouth/Throat Q2H PRN Vishal Charlton MD          Today, Patient Was Seen By: Coleen Saucedo MD    ** Please Note: This note has been constructed using a voice recognition system   **

## 2021-01-09 NOTE — ASSESSMENT & PLAN NOTE
Blood pressures on the higher side, likely postprocedure  On home medications of lisinopril    Continue to monitor,

## 2021-01-09 NOTE — PROGRESS NOTES
Dr Juanita Ramos with sod notified pt does not have an active tele order  SOD reports neurosx is patients attending  SOD notified that Dr Binnie Soulier is still listed as attending  No new orders at this time  Pt resting in chair with fall precautions in place  Pt resting comfortably and has no complaints at this time   VSS    Will cont to monitor

## 2021-01-09 NOTE — ASSESSMENT & PLAN NOTE
Lab Results   Component Value Date    HGBA1C 6 5 (H) 01/08/2021   Blood sugars adequately controlled, goal of 140-180 while in hospital  Continue Accu-Cheks and sliding scale      Recent Labs     01/08/21  1331 01/08/21  1758 01/08/21  2124 01/09/21  0748   POCGLU 147* 155* 138 124       Blood Sugar Average: Last 72 hrs:  (P) 141

## 2021-01-09 NOTE — UTILIZATION REVIEW
Initial Clinical Review    Elective IP surgical procedure  Age/Sex: 60 y o  female  Surgery Date: 1/8/2021  Procedure:   Use of ultrasound for right radial access  Right  Internal Carotid Arteriogram  3D rotational arteriogram with post processed images reviewed at a separate workstation  Slow injection of spasmolytic agent, verapamil, over 10 minutes  Pipeline embolization right internal carotid artery aneurysm  Post procedural 3D rotational arteriogram with post processing at a separate workstation and virtual dilution of right internal carotid artery  Limited Right radial Arteriogram  Anesthesia: General  Operative Findings:   Impression:  Successful pipeline embolization of a right ICA aneurysm  POD#1 Progress Note: admit ICU post-op for close monitoring  C/o mild frontal headache, chest pain is subsiding and is reproducible to touch  Neuro checks q1h  Continue DAPT with asa, plavix  Hep sq/SCD's for DVT ppx  Continue protonix gtt  Telem monitoring  Continue lipitor  Zofran prn  Cons carb diet  I/O's  Fingerstick glucose checks w/ ssi    Monitor and replete electrolytes    Admission Orders: Date/Time/Statement:   Admission Orders (From admission, onward)     Ordered        01/08/21 0936  Inpatient Admission  Once                   Orders Placed This Encounter   Procedures    Inpatient Admission     Standing Status:   Standing     Number of Occurrences:   1     Order Specific Question:   Admitting Physician     Answer:   Clinton Gibbs [[id number]]     Order Specific Question:   Level of Care     Answer:   Critical Care [15]     Order Specific Question:   Estimated length of stay     Answer:   Inpatient Only Surgery     Vital Signs:   Date/Time  Temp  Pulse  Resp  BP  MAP (mmHg)  Arterial Line BP  MAP  SpO2  O2 Flow Rate (L/min)  O2 Device   01/09/21 16:10:07  98 6 °F (37 °C)  74  18  135/73  94  --  --  92 %  --  --   01/09/21 1200  98 6 °F (37 °C)  80  16  116/61  75  --  --  93 %  --  --   01/09/21 1100  -- 82  18  131/63  86  --  --  92 %  --  --   01/09/21 1000  --  86  26Abnormal   --  --  154/78  106 mmHg  92 %  --  None (Room air)   01/09/21 0900  --  90  20  --  --  148/76  100 mmHg  96 %  --  --   01/09/21 0800  98 6 °F (37 °C)  86  40Abnormal   --  --  160/78  106 mmHg  96 %  --  None (Room air)   01/09/21 0600  --  82  33Abnormal   --  --  164/80  108 mmHg  94 %  --  --   01/09/21 0500  98 2 °F (36 8 °C)  84  34Abnormal   --  --  150/68  94 mmHg  96 %  --  --   01/09/21 0400  98 6 °F (37 °C)  88  28Abnormal   --  --  148/70  96 mmHg  94 %  --  --   01/09/21 0000  98 2 °F (36 8 °C)  94  22  --  --  166/72  104 mmHg  95 %  --  --        Scheduled Medications:  aspirin, 325 mg, Oral, Daily  atorvastatin, 10 mg, Oral, Daily With Dinner  chlorhexidine, 15 mL, Swish & Spit, Q12H [facility]  clopidogrel, 75 mg, Oral, Daily  cyproheptadine, 4 mg, Oral, Daily  docusate sodium, 100 mg, Oral, BID  enoxaparin, 40 mg, Subcutaneous, Q24H VERONIKA  insulin lispro, 1-6 Units, Subcutaneous, TID AC  [START ON 1/11/2021] levothyroxine, 100 mcg, Oral, Once per day on Mon Thu  lisinopril, 10 mg, Oral, Daily  pantoprazole, 40 mg, Oral, Daily Before Breakfast       PRN Meds:  acetaminophen, 975 mg, Oral, Q8H PRN 1/8 x1, 1/9 x1  oxyCODONE, 2 5 mg, Oral, Q6H PRN 1/9 x2  phenol, 1 spray, Mouth/Throat, Q2H PRN        Network Utilization Review Department  ATTENTION: Please call with any questions or concerns to [phone] and carefully listen to the prompts so that you are directed to the right person  All voicemails are confidential   Smith [place] all requests for admission clinical reviews, approved or denied determinations and any other requests to dedicated fax number below belonging to the campus where the patient is receiving treatment   List of dedicated fax numbers for the Facilities:  [address] DENIALS (Administrative/Medical Necessity) [phone]   [address]  (Maternity/NICU/Pediatrics) [phone]     [address]  [facility] [address] Stefan Verma [address] [address] [place] [address] Yonathan Salazar [facility] [phone]   [place] [address] [phone]

## 2021-01-09 NOTE — CONSULTS
Consult Note - [address] 60 y o  female MRN: [id number]  Unit/Bed#: ICU 01 Encounter: [id number]        ----------------------------------------------------------------------------------------  HPI/24hr events: Post op pipe embolization of right ICA aneurysm  ---------------------------------------------------------------------------------------  SUBJECTIVE  Chief Complaint: Post op pipe embolization of right ICA aneurysm  History of Present Illness   HX and PE limited by: n/a  Sabina Fournier is a 60 y o  female  With past medical history of Morbid obesity, chronic headaches,HTN, HLD ,  Diet controlled Type 2 diabetes mellitus, hypothyroidism ,GERD, Degenerative cervical disc disease , AARON non-complaint with CPAP who presents with for evaluation for pipe embolization of right ICA aneurysm  She was admitted in /2020 with chronic headache and dizziness and CTA  Showed 4-5 mm R ICA aneurysm and a smaler L ICA aneurysm  She followed up with neurosurgery - had an arteriography which showed an  Irregular R  7x6x5 ophthalmic artery aneurysm, 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 Intubation, no assoc diaphoresis,palpitations, fever, cough, orthopnea  She reports history of exertional chest pain, PND and Shortness of breath on exertion which has been progressively worsening  Also some mild 3/10 frontal headache , generalized weakness    Some mild LUE weakness which has seem to resolved/subsided during my evaluation  Vitals -stable except for BP of 152/66  EKG no acute changes  CXR ordered and pending  BMP- hyperglycemia otherwise unremarkable  Troponin neg        Review of Systems  Review of systems was reviewed and negative unless stated above in HPI/24-hour events   ---------------------------------------------------------------------------------------  Assessment and Plan:    Neuro:   · Diagnosis: Post op R ICA aneurysm embolization  ? Plan: Neurochecks q1hr  ? Continue DAPT with  Aspirin,plavix per neurosurg  Anticoagulation with heparin gtt  ? STAT CT head for change in neuro exam      · Diagnosis: ICU   ? Plan: CAM-ICU daily  ? Delirium precautions  ? Regulate sleep wake cycle         CV:   · Diagnosis: Chest pain r/o ACS Vs  GERD  · Chest pain on L sided post procedure, difficult intubation  · EKG no acute  ST changes, Troponin x 1 neg   ? Plan: Stat CXR  Consider echocardiogram, check lipid profile  ? Trend troponin  Serial EKG  ? STAT EKG for new chest pain  ? Patient already on DAPT, received Aspirin 325 in PACU,Continue  mg daily and Plavix 75 daily DAPT, Dilaudid prn   ? Continue protonix for GERD  ? Telemetry monitoring      · Diagnosis: Hypertension  ? Plan: to start home lisinopril 10mg daily      Hyperlipidemia   -Continue lipitor    -Check lipid profile         Pulm:  History of AARON on CPAP  Non compliant with cpap  Does not remember setting  Check records to get setting      GI:   · Diagnosis: GERD  ? Plan: Continue protonix 40mg daily  ? On prn zofran and reglan for n/v         :   -No acute issues      F/E/N:   · Plan: Diabetic diet  · Monitor and replete electrolytes  Goal K > 4, Mg > 1 8         Heme/Onc:   · Diagnosis: No acute issues  · DVT prophylaxis - with heparin gtt         Endo:   · Diagnosis: Type 2 diabetes  ? Plan: Diabetic diet  ? Sliding scale insulin  ? Goal glc 140-180            ID:   ? No acute issues    ? Trend cbc and temp curve         MSK/Skin:   Frequent turning  PT/OT  Disposition: Admit to Critical Care   Code Status: Level 1 - Full Code  ---------------------------------------------------------------------------------------  ICU CORE MEASURES    Prophylaxis   VTE Pharmacologic Prophylaxis: Heparin Drip  VTE Mechanical Prophylaxis: sequential compression device  Stress Ulcer Prophylaxis: Pantoprazole PO    ABCDE Protocol (if indicated)  Plan to perform spontaneous awakening trial today? No  Plan to perform spontaneous breathing trial today? No Not applicable  Obvious barriers to extubation? Not applicable  CAM-ICU: Negative    Invasive Devices Review  Invasive Devices     Peripheral Intravenous Line            Peripheral IV 21 Left Forearm 1 day    Peripheral IV 21 Left Hand 1 day          Arterial Line            Arterial Line 21 less than 1 day          Airway            ETT  Cuffed;Oral 7 mm 1 day              Can any invasive devices be discontinued today?  Not applicable  ---------------------------------------------------------------------------------------  OBJECTIVE    Vitals   Vitals:    21 0400 21 0500 21 0600 21 0700   BP:       BP Location:       Pulse: 88 84 82 84   Resp: (!) 28 (!) 34 (!) 33 20   Temp: 98 6 °F (37 °C) 98 2 °F (36 8 °C)     TempSrc:       SpO2: 94% 96% 94% 95%   Weight:       Height:         Temp (24hrs), Av 3 °F (36 8 °C), Min:97 8 °F (36 6 °C), Max:98 6 °F (37 °C)  Current: Temperature: 98 2 °F (36 8 °C)  HR:   BP: /  RR:   SpO2:     Respiratory:  SpO2: SpO2: 95 %  O2 Flow Rate (L/min): 2 L/min    Invasive/non-invasive ventilation settings   Respiratory    Lab Data (Last 4 hours)    None         O2/Vent Data (Last 4 hours)    None                Physical Exam      Laboratory and Diagnostics:  Results from last 7 days   Lab Units 21  0535 21  0019   WBC Thousand/uL 13 29*  --    HEMOGLOBIN g/dL 11 9  --    HEMATOCRIT % 37 6  --    PLATELETS Thousands/uL 301 298     Results from last 7 days   Lab Units 01/09/21  0535 01/09/21  0019 01/08/21  1506   SODIUM mmol/L 139 140 140   POTASSIUM mmol/L 3 7 3 8 3 7   CHLORIDE mmol/L 109* 109* 110*   CO2 mmol/L 26 25 24   ANION GAP mmol/L 4 6 6   BUN mg/dL 10 11 12   CREATININE mg/dL 0 56* 0 51* 0 65   CALCIUM mg/dL 8 1* 8 4 8 8   GLUCOSE RANDOM mg/dL 115 121 144*     Results from last 7 days   Lab Units 01/09/21  0535 01/08/21  1506   MAGNESIUM mg/dL 1 9 1 8   PHOSPHORUS mg/dL 3 1  --       Results from last 7 days   Lab Units 01/09/21  0535 01/08/21  1759   INR  1 05  --    PTT seconds 35 38*      Results from last 7 days   Lab Units 01/08/21  2111 01/08/21  1759 01/08/21  1507   TROPONIN I ng/mL <0 02 <0 02 <0 02         ABG:    VBG:          Micro        EKG:   Imaging:  I have personally reviewed pertinent reports  Intake and Output  I/O       01/07 0701 - 01/08 0700 01/08 0701 - 01/09 0700 01/09 0701 - 01/10 0700    I V  (mL/kg)  2326 3 (17 8)     Total Intake(mL/kg)  2326 3 (17 8)     Urine (mL/kg/hr)  2430 (0 8)     Total Output  2430     Net  -103 8                UOP: see ml/hr     Height and Weights   Height: 5' 4" (162 6 cm)  IBW: 54 7 kg  Body mass index is 49 61 kg/m²  Weight (last 2 days)     Date/Time   Weight    01/08/21 0802   131 (289)                Nutrition       Diet Orders   (From admission, onward)             Start     Ordered    01/08/21 1757  Diet Joel/CHO Controlled; Consistent Carbohydrate Diet Level 3 (6 carb servings/90 grams CHO/meal)  Diet effective now     Question Answer Comment   Diet Type Joel/CHO Controlled    Joel/CHO Controlled Consistent Carbohydrate Diet Level 3 (6 carb servings/90 grams CHO/meal)    RD to adjust diet per protocol? Yes        01/08/21 1756              TF currently running at 0 ml/hr with a goal of 0 ml/hr   Formula: 0      Active Medications  Scheduled Meds:  Current Facility-Administered Medications   Medication Dose Route Frequency Provider Last Rate  acetaminophen  975 mg Oral Q8H PRN BENEDICT Martinez      aspirin  325 mg Oral Daily [facility], MD      atorvastatin  10 mg Oral Daily With Staci Russo, MD      chlorhexidine  15 mL Swish & Spit Q12H [facility], MD      clopidogrel  75 mg Oral Daily [facility], MD      cyproheptadine  4 mg Oral Daily [facility], MD      docusate sodium  100 mg Oral BID [facility], MD      enoxaparin  40 mg Subcutaneous Q24H [facility] Pj Ferguson MD      insulin lispro  1-6 Units Subcutaneous TID AC [facility], MD      [START ON 1/11/2021] levothyroxine  100 mcg Oral Once per day on Mon Thu [facility], MD      lisinopril  10 mg Oral Daily [facility], MD      oxyCODONE  2 5 mg Oral Q6H PRN BEENDICT Martinez      pantoprazole  40 mg Oral Daily Before Breakfast [facility], MD      phenol  1 spray Mouth/Throat Q2H PRN [facility], MD       Continuous Infusions:     PRN Meds:   acetaminophen, 975 mg, Q8H PRN  oxyCODONE, 2 5 mg, Q6H PRN  phenol, 1 spray, Q2H PRN        Allergies   Allergies   Allergen Reactions    Tagamet [Cimetidine] Hives     ---------------------------------------------------------------------------------------  Advance Directive and Living Will:      Power of :    POLST:    ---------------------------------------------------------------------------------------  Care Time Delivered:   Upon my evaluation, this patient had a high probability of imminent or life-threatening deterioration due to Post op ICA embolization, which required my direct attention, intervention, and personal management  I have personally provided 30 minutes (400 to 430) of critical care time, exclusive of procedures, teaching, family meetings, and any prior time recorded by providers other than myself  Rita Mariscal MD      Portions of the record may have been created with voice recognition software    Occasional wrong word or "sound a like" substitutions may have occurred due to the inherent limitations of voice recognition software    Read the chart carefully and recognize, using context, where substitutions have occurred

## 2021-01-09 NOTE — PROGRESS NOTES
Per dr Yoana Richardson , heparin drip to be dced when am plavix and asa given   Both completed am ptt 35, patient resting comfortably

## 2021-01-09 NOTE — ASSESSMENT & PLAN NOTE
Patient had  Complained of  non exertional chest pain on admission   Troponin  x2 negative, EKG unremarkable  Currently patient denies any angina    Plan for outpatient cardiology follow-up versus exercise stress test

## 2021-01-09 NOTE — PLAN OF CARE
Problem: Potential for Falls  Goal: Patient will remain free of falls  Description: INTERVENTIONS:  - Assess patient frequently for physical needs  -  Identify cognitive and physical deficits and behaviors that affect risk of falls    -  [place] fall precautions as indicated by assessment   - Educate patient/family on patient safety including physical limitations  - Instruct patient to call for assistance with activity based on assessment  - Modify environment to reduce risk of injury  - Consider OT/PT consult to assist with strengthening/mobility  Outcome: Progressing     Problem: Prexisting or High Potential for Compromised Skin Integrity  Goal: Skin integrity is maintained or improved  Description: INTERVENTIONS:  - Identify patients at risk for skin breakdown  - Assess and monitor skin integrity  - Assess and monitor nutrition and hydration status  - Monitor labs   - Assess for incontinence   - Turn and reposition patient  - Assist with mobility/ambulation  - Relieve pressure over bony prominences  - Avoid friction and shearing  - Provide appropriate hygiene as needed including keeping skin clean and dry  - Evaluate need for skin moisturizer/barrier cream  - Collaborate with interdisciplinary team   - Patient/family teaching  - Consider wound care consult   Outcome: Progressing     Problem: PAIN - ADULT  Goal: Verbalizes/displays adequate comfort level or baseline comfort level  Description: Interventions:  - Encourage patient to monitor pain and request assistance  - Assess pain using appropriate pain scale  - Administer analgesics based on type and severity of pain and evaluate response  - Implement non-pharmacological measures as appropriate and evaluate response  - Consider cultural and social influences on pain and pain management  - Notify physician/advanced practitioner if interventions unsuccessful or patient reports new pain  Outcome: Progressing     Problem: INFECTION - ADULT  Goal: Absence or prevention of progression during hospitalization  Description: INTERVENTIONS:  - Assess and monitor for signs and symptoms of infection  - Monitor lab/diagnostic results  - Monitor all insertion sites, i e  indwelling lines, tubes, and drains  - Monitor endotracheal if appropriate and nasal secretions for changes in amount and color  - [place] appropriate cooling/warming therapies per order  - Administer medications as ordered  - Instruct and encourage patient and family to use good hand hygiene technique  - Identify and instruct in appropriate isolation precautions for identified infection/condition  Outcome: Progressing  Goal: Absence of fever/infection during neutropenic period  Description: INTERVENTIONS:  - Monitor WBC    Outcome: Progressing     Problem: SAFETY ADULT  Goal: Patient will remain free of falls  Description: INTERVENTIONS:  - Assess patient frequently for physical needs  -  Identify cognitive and physical deficits and behaviors that affect risk of falls    -  [place] fall precautions as indicated by assessment   - Educate patient/family on patient safety including physical limitations  - Instruct patient to call for assistance with activity based on assessment  - Modify environment to reduce risk of injury  - Consider OT/PT consult to assist with strengthening/mobility  Outcome: Progressing  Goal: Maintain or return to baseline ADL function  Description: INTERVENTIONS:  -  Assess patient's ability to carry out ADLs; assess patient's baseline for ADL function and identify physical deficits which impact ability to perform ADLs (bathing, care of mouth/teeth, toileting, grooming, dressing, etc )  - Assess/evaluate cause of self-care deficits   - Assess range of motion  - Assess patient's mobility; develop plan if impaired  - Assess patient's need for assistive devices and provide as appropriate  - Encourage maximum independence but intervene and supervise when necessary  - Involve family in performance of ADLs  - Assess for home care needs following discharge   - Consider OT consult to assist with ADL evaluation and planning for discharge  - Provide patient education as appropriate  Outcome: Progressing  Goal: Maintain or return mobility status to optimal level  Description: INTERVENTIONS:  - Assess patient's baseline mobility status (ambulation, transfers, stairs, etc )    - Identify cognitive and physical deficits and behaviors that affect mobility  - Identify mobility aids required to assist with transfers and/or ambulation (gait belt, sit-to-stand, lift, walker, cane, etc )  - [place] fall precautions as indicated by assessment  - Record patient progress and toleration of activity level on Mobility SBAR; progress patient to next Phase/Stage  - Instruct patient to call for assistance with activity based on assessment  - Consider rehabilitation consult to assist with strengthening/weightbearing, etc   Outcome: Progressing     Problem: DISCHARGE PLANNING  Goal: Discharge to home or other facility with appropriate resources  Description: INTERVENTIONS:  - Identify barriers to discharge w/patient and caregiver  - Arrange for needed discharge resources and transportation as appropriate  - Identify discharge learning needs (meds, wound care, etc )  - Arrange for interpretive services to assist at discharge as needed  - Refer to Case Management Department for coordinating discharge planning if the patient needs post-hospital services based on physician/advanced practitioner order or complex needs related to functional status, cognitive ability, or social support system  Outcome: Progressing     Problem: Knowledge Deficit  Goal: Patient/family/caregiver demonstrates understanding of disease process, treatment plan, medications, and discharge instructions  Description: Complete learning assessment and assess knowledge base    Interventions:  - Provide teaching at level of understanding  - Provide teaching via preferred learning methods  Outcome: Progressing     Problem: NEUROSENSORY - ADULT  Goal: Achieves stable or improved neurological status  Description: INTERVENTIONS  - Monitor and report changes in neurological status  - Monitor vital signs such as temperature, blood pressure, glucose, and any other labs ordered   - Initiate measures to prevent increased intracranial pressure  - Monitor for seizure activity and implement precautions if appropriate      Outcome: Progressing  Goal: Remains free of injury related to seizures activity  Description: INTERVENTIONS  - Maintain airway, patient safety  and administer oxygen as ordered  - Monitor patient for seizure activity, document and report duration and description of seizure to physician/advanced practitioner  - If seizure occurs,  ensure patient safety during seizure  - Reorient patient post seizure  - Seizure pads on all 4 side rails  - Instruct patient/family to notify RN of any seizure activity including if an aura is experienced  - Instruct patient/family to call for assistance with activity based on nursing assessment  - Administer anti-seizure medications if ordered    Outcome: Progressing  Goal: Achieves maximal functionality and self care  Description: INTERVENTIONS  - Monitor swallowing and airway patency with patient fatigue and changes in neurological status  - Encourage and assist patient to increase activity and self care     - Encourage visually impaired, hearing impaired and aphasic patients to use assistive/communication devices  Outcome: Progressing

## 2021-01-09 NOTE — MALNUTRITION/BMI
This medical record reflects one or more clinical indicators suggestive of morbid obesity  BMI Findings:  Adult BMI Classifications: Morbid Obesity 45-49 9     Body mass index is 49 61 kg/m²  See Nutrition note dated 1/9/21 for additional details  Completed nutrition assessment is viewable in the nutrition documentation

## 2021-01-09 NOTE — PROGRESS NOTES
Daily Progress Note - Critical Care   Mateusz Erickson 60 y o  female MRN: [id number]  Unit/Bed#: ICU 01 Encounter: [id number]        ----------------------------------------------------------------------------------------  HPI/24hr events: No acute overnight events  ---------------------------------------------------------------------------------------  SUBJECTIVE  Patient seen and examined at bedside by me today  Complained of mild frontal headache, chest pain is subsiding and is reproducible to touch  Denied n/v, dizziness or lightheadedness, cough , current Shortness of breath, abdominal pain, changes in  Bowel movement  Review of Systems  Review of systems was reviewed and negative unless stated above in HPI/24-hour events   ---------------------------------------------------------------------------------------  Assessment and Plan:    Neuro:   · Diagnosis: Post op R ICA aneurysm embolization  ? Plan: Neurochecks q1hr  ? Continue DAPT with  Aspirin,plavix per neurosurg  Anticoagulation with heparin gtt discontinued  Started on DVT prophylaxis  ? STAT CT head for change in neuro exam      · Diagnosis: ICU   ? Plan: CAM-ICU daily  ? Delirium precautions  ? Regulate sleep wake cycle         CV:   · Diagnosis: Chest pain r/o ACS Vs  GERD  · Chest pain on L sided post procedure, difficult intubation  · EKG no acute  ST changes, Troponin x 1 neg   ? Plan: Stat CXR  Consider echocardiogram lipid profile-, HDL 50,LDL 88   ? Trend troponin  Serial EKG  ? STAT EKG for new chest pain  ? Patient already on DAPT, received Aspirin 325 in PACU,Continue DAPT, Dilaudid prn   ? Continue protonix for GERD  ? Telemetry monitoring      · Diagnosis: Hypertension  ? Plan: to start home lisinopril 10mg daily      Hyperlipidemia   -Continue lipitor    -Check lipid profile         Pulm:  History of AARON on CPAP  Non compliant with cpap  Does not remember setting    Check records to get setting      GI: · Diagnosis: GERD  ? Plan: Continue protonix 40mg daily  ? On prn zofran and reglan for n/v         :   -No acute issues      F/E/N:   · Plan: Diabetic diet  · Monitor and replete electrolytes  Goal K > 4, Mg > 1 8         Heme/Onc:   · Diagnosis: No acute issues  · DVT prophylaxis - with heparin gtt         Endo:   · Diagnosis: Type 2 diabetes  ? Plan: Diabetic diet  ? Sliding scale insulin  ? Goal glc 140-180            ID:   ? No acute issues  ? Trend cbc and temp curve         MSK/Skin:   Frequent turning  PT/OT  Disposition: Transfer to Med Surg with Telemetry   Code Status: Level 1 - Full Code  ---------------------------------------------------------------------------------------  ICU CORE MEASURES    Prophylaxis   VTE Pharmacologic Prophylaxis: Enoxaparin (Lovenox)  VTE Mechanical Prophylaxis: sequential compression device  Stress Ulcer Prophylaxis: Pantoprazole PO    ABCDE Protocol (if indicated)  Plan to perform spontaneous awakening trial today? Not applicable  Plan to perform spontaneous breathing trial today? Not applicable  Obvious barriers to extubation? Not applicable  CAM-ICU: Negative    Invasive Devices Review  Invasive Devices     Peripheral Intravenous Line            Peripheral IV 01/08/21 Left Forearm less than 1 day    Peripheral IV 01/08/21 Left Hand less than 1 day          Arterial Line            Arterial Line 01/08/21 less than 1 day          Drain            Urethral Catheter Temperature probe 16 Fr  less than 1 day          Airway            ETT  Cuffed;Oral 7 mm less than 1 day              Can any invasive devices be discontinued today?  Not applicable  ---------------------------------------------------------------------------------------  OBJECTIVE    Vitals   Vitals:    01/09/21 0000 01/09/21 0100 01/09/21 0200 01/09/21 0300   BP:       BP Location:       Pulse: 94 92 96 90   Resp: 22 20 17 18   Temp: 98 2 °F (36 8 °C) 98 6 °F (37 °C) 98 6 °F (37 °C) 98 6 °F (37 °C) TempSrc:       SpO2: 95% 94% 95% 93%   Weight:       Height:         Temp (24hrs), Av 2 °F (36 8 °C), Min:97 5 °F (36 4 °C), Max:98 6 °F (37 °C)  Current: Temperature: 98 6 °F (37 °C)  HR:   BP: /  RR:   SpO2:     Respiratory:  SpO2: SpO2: 95 %  O2 Flow Rate (L/min): 2 L/min    Invasive/non-invasive ventilation settings   Respiratory    Lab Data (Last 4 hours)    None         O2/Vent Data (Last 4 hours)    None                Physical Exam  Constitutional:       General: She is not in acute distress  Appearance: She is not toxic-appearing or diaphoretic  HENT:      Head: Normocephalic and atraumatic  Nose: Nose normal       Mouth/Throat:      Mouth: Mucous membranes are moist    Eyes:      Extraocular Movements: Extraocular movements intact  Pupils: Pupils are equal, round, and reactive to light  Neck:      Musculoskeletal: Normal range of motion and neck supple  Cardiovascular:      Rate and Rhythm: Normal rate  Pulses: Normal pulses  Heart sounds: No murmur  No gallop  Comments: Chest pain reproducible to palpation  Pulmonary:      Effort: No respiratory distress  Breath sounds: No wheezing or rales (LLZ )  Abdominal:      General: Abdomen is flat  There is no distension  Tenderness: There is no abdominal tenderness  There is no guarding  Musculoskeletal: Normal range of motion  Skin:     Capillary Refill: Capillary refill takes less than 2 seconds  Coloration: Skin is not jaundiced  Findings: No bruising  Neurological:      Mental Status: She is alert and oriented to person, place, and time  Cranial Nerves: No cranial nerve deficit  Sensory: No sensory deficit  Motor: No weakness (However, some mild weakness in L hand )        Coordination: Coordination normal            Laboratory and Diagnostics:  Results from last 7 days   Lab Units 21  0535 21  0019   WBC Thousand/uL 13 29*  --    HEMOGLOBIN g/dL 11 9  -- HEMATOCRIT % 37 6  --    PLATELETS Thousands/uL 301 298     Results from last 7 days   Lab Units 01/09/21  0019 01/08/21  1506   SODIUM mmol/L 140 140   POTASSIUM mmol/L 3 8 3 7   CHLORIDE mmol/L 109* 110*   CO2 mmol/L 25 24   ANION GAP mmol/L 6 6   BUN mg/dL 11 12   CREATININE mg/dL 0 51* 0 65   CALCIUM mg/dL 8 4 8 8   GLUCOSE RANDOM mg/dL 121 144*     Results from last 7 days   Lab Units 01/08/21  1506   MAGNESIUM mg/dL 1 8      Results from last 7 days   Lab Units 01/08/21  1759   PTT seconds 38*      Results from last 7 days   Lab Units 01/08/21  2111 01/08/21  1759 01/08/21  1507   TROPONIN I ng/mL <0 02 <0 02 <0 02         ABG:    VBG:          Micro        EKG:   Imaging:  I have personally reviewed pertinent reports  Intake and Output  I/O       01/07 0701 - 01/08 0700 01/08 0701 - 01/09 0700    I V  (mL/kg)  2000 3 (15 3)    Total Intake(mL/kg)  2000 3 (15 3)    Urine (mL/kg/hr)  1630 (0 5)    Total Output  1630    Net  +370 3              UOP: 155 ml/hr     Height and Weights   Height: 5' 4" (162 6 cm)  IBW: 54 7 kg  Body mass index is 49 61 kg/m²  Weight (last 2 days)     Date/Time   Weight    01/08/21 0802   131 (289)                Nutrition       Diet Orders   (From admission, onward)             Start     Ordered    01/08/21 1757  Diet Joel/CHO Controlled; Consistent Carbohydrate Diet Level 3 (6 carb servings/90 grams CHO/meal)  Diet effective now     Question Answer Comment   Diet Type Joel/CHO Controlled    Joel/CHO Controlled Consistent Carbohydrate Diet Level 3 (6 carb servings/90 grams CHO/meal)    RD to adjust diet per protocol? Yes        01/08/21 1756              TF currently running at 0 ml/hr with a goal of 0 ml/hr   Formula: 0      Active Medications  Scheduled Meds:  Current Facility-Administered Medications   Medication Dose Route Frequency Provider Last Rate    acetaminophen  975 mg Oral Q8H PRN BENEDICT Martinez      aspirin  325 mg Oral Daily MD Kp [address] Rand atorvastatin  10 mg Oral Daily With Michael Mcbride MD      chlorhexidine  15 mL Swish [employer] Spit Q12H [address] Vipin Bejarano MD      clopidogrel  75 mg Oral Daily Vipin Bejarano MD      cyproheptadine  4 mg Oral Daily Vipin Darci, MD      docusate sodium  100 mg Oral BID Vipin Bejarano MD      heparin (porcine)  5 Units/kg/hr (Order-Specific) Intravenous Continuous Vipin Bejarano MD 5 Units/kg/hr (01/08/21 1245)    insulin lispro  1-6 Units Subcutaneous TID AC Vipin Bejarano MD      [START ON 1/11/2021] levothyroxine  100 mcg Oral Once per day on Mon Thu Vipin Bejarano MD      lisinopril  10 mg Oral Daily Vipin Bejarano MD      oxyCODONE  2 5 mg Oral Q6H PRN BENEDICT Martinez      pantoprazole  40 mg Oral Daily Before Breakfast Vipin Bejarano MD      phenol  1 spray Mouth/Throat Q2H PRN Vipin Bejarano MD      sodium chloride  75 mL/hr Intravenous Continuous Vipin Bejarano MD 75 mL/hr (01/09/21 0049)     Continuous Infusions:  heparin (porcine), 5 Units/kg/hr (Order-Specific), Last Rate: 5 Units/kg/hr (01/08/21 1245)  sodium chloride, 75 mL/hr, Last Rate: 75 mL/hr (01/09/21 0049)      PRN Meds:   acetaminophen, 975 mg, Q8H PRN  oxyCODONE, 2 5 mg, Q6H PRN  phenol, 1 spray, Q2H PRN        Allergies   Allergies   Allergen Reactions    Tagamet [Cimetidine] Hives     ---------------------------------------------------------------------------------------  Advance Directive and Living Will:      Power of :    POLST:    ---------------------------------------------------------------------------------------  Care Time Delivered:   No Critical Care time spent     Vipin Bejarano MD      Portions of the record may have been created with voice recognition software  Occasional wrong word or "sound a like" substitutions may have occurred due to the inherent limitations of voice recognition software    Read the chart carefully and recognize, using context, where substitutions have occurred

## 2021-01-09 NOTE — PROGRESS NOTES
Code Status: Level 1 - Full Code  POA:    POLST:      Reason for ICU admission:   pipe embolization of right ICA aneurysm  Active problems:   Principal Problem:    Aneurysm of internal carotid artery  Active Problems:    Hypertension    Hypothyroidism    Obstructive sleep apnea    Type 2 diabetes mellitus without complication (HCC)    Chest pain    Difficult intubation  Resolved Problems:    * No resolved hospital problems  *      Consultants:   Critical care medicine  History of Present Illness:   Alonzo Schmitz is a 60 y o  female  With past medical history of Morbid obesity, chronic headaches,HTN, HLD ,  Diet controlled Type 2 diabetes mellitus, hypothyroidism ,GERD, Degenerative cervical disc disease , AARON non-complaint with CPAP who presents with for evaluation for pipe embolization of right ICA aneurysm  She was admitted in /2020 with chronic headache and dizziness and CTA  Showed 4-5 mm R ICA aneurysm and a smaler L ICA aneurysm  She followed up with neurosurgery - had an arteriography which showed an  Irregular R  7x6x5 ophthalmic artery aneurysm, 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 Intubation, no assoc diaphoresis,palpitations, fever, cough, orthopnea  She reports history of exertional chest pain, PND and Shortness of breath on exertion which has been progressively worsening  Also some mild 3/10 frontal headache , generalized weakness    Some mild LUE weakness which has seem to resolved/subsided during my evaluation  Vitals -stable except for BP of 152/66  EKG no acute changes  CXR showed no acute cardiopulmonary process, BMP- hyperglycemia otherwise unremarkable  Troponin trended and was negative  Summary of clinical course:   Patient had no acute overnight events and chest pain subsided  Chest pain was also noted to reproducible  Vitals stayed within normal limits  She is clinically stable for transfer to [address] [place] [address],  [address] with Neurosurgery  On examination-    Constitutional:       General: She is not in acute distress  Appearance: She is not toxic-appearing or diaphoretic  HENT:      Head: Normocephalic and atraumatic  Nose: Nose normal       Mouth/Throat:      Mouth: Mucous membranes are moist    Eyes:      Extraocular Movements: Extraocular movements intact  Pupils: Pupils are equal, round, and reactive to light  Neck:      Musculoskeletal: Normal range of motion and neck supple  Cardiovascular:      Rate and Rhythm: Normal rate  Pulses: Normal pulses  Heart sounds: No murmur  No gallop  Pulmonary:      Effort: No respiratory distress  Breath sounds: Rales (LLZ ) present  No wheezing  Abdominal:      General: Abdomen is flat  There is no distension  Tenderness: There is no abdominal tenderness  There is no guarding  Musculoskeletal: Normal range of motion  Skin:     Capillary Refill: Capillary refill takes less than 2 seconds  Coloration: Skin is not jaundiced  Findings: No bruising  Neurological:      Mental Status: She is alert and oriented to person, place, and time  Cranial Nerves: No cranial nerve deficit  Sensory: No sensory deficit  Motor: No weakness (However, some mild weakness in L hand )  Coordination: Coordination normal        Recent or scheduled procedures:   pipe embolization of right ICA aneurysm      Outstanding/pending diagnostics:       Cultures:          Mobilization Plan:   See plan    Nutrition Plan: Diabetic diet  Invasive Devices Review  Invasive Devices     Peripheral Intravenous Line            Peripheral IV 01/08/21 Left Forearm 1 day    Peripheral IV 01/08/21 Left Hand 1 day          Arterial Line            Arterial Line 01/08/21 less than 1 day                Rationale for remaining devices: None  VTE Pharmacologic Prophylaxis: Enoxaparin (Lovenox)  VTE Mechanical Prophylaxis: sequential compression device    Discharge Plan:   Patient should be ready for discharge to  on  after     Initial Physical Therapy Recommendations: see plan  Initial Occupational Therapy Recommendations: see  Initial /Plan: see    Home medications that are not reordered and reason why:         Spoke with Dr [facility]  regarding transfer  Please call [phone] with any questions or concerns  Portions of the record may have been created with voice recognition software  Occasional wrong word or "sound a like" substitutions may have occurred due to the inherent limitations of voice recognition software  Read the chart carefully and recognize, using context, where substitutions have occurred

## 2021-01-09 NOTE — PROGRESS NOTES
POD1 R ICA PED complicated by difficult airway    Doing well this morning, continues to have some neck and left chest discomfort  CXR, Trop, EKG all normal    Temp:  [97 5 °F (36 4 °C)-98 6 °F (37 °C)] 98 2 °F (36 8 °C)  HR:  [] 84  Resp:  [17-51] 20  BP: (109-136)/(50-70) 126/65  Arterial Line BP: (138-166)/(62-80) 164/80  aao3 fluent perrl eomi tml fs  Maew, some subtle left ue and hand  weakness  No drift  Right access site soft, bandaged  No hematoma       Lab Results   Component Value Date/Time    SODIUM 139 01/09/2021 05:35 AM    CREATININE 0 56 (L) 01/09/2021 05:35 AM    WBC 13 29 (H) 01/09/2021 05:35 AM    HGB 11 9 01/09/2021 05:35 AM     01/09/2021 05:35 AM    PTT 35 01/09/2021 05:35 AM    INR 1 05 01/09/2021 05:35 AM     Plan:  - Transfer to floor today  - pt ot  - dc joao ivf joanna heparin gtt  - dvt ppx  - likely dispo home tomorrow if mobilizes well  - Cont asa and plavix

## 2021-01-10 LAB
ANION GAP SERPL CALCULATED.3IONS-SCNC: 5 MMOL/L (ref 4–13)
BUN SERPL-MCNC: 12 MG/DL (ref 5–25)
CALCIUM SERPL-MCNC: 8.7 MG/DL (ref 8.3–10.1)
CHLORIDE SERPL-SCNC: 112 MMOL/L (ref 100–108)
CO2 SERPL-SCNC: 26 MMOL/L (ref 21–32)
CREAT SERPL-MCNC: 0.64 MG/DL (ref 0.6–1.3)
GFR SERPL CREATININE-BSD FRML MDRD: 95 ML/MIN/1.73SQ M
GLUCOSE SERPL-MCNC: 103 MG/DL (ref 65–140)
GLUCOSE SERPL-MCNC: 107 MG/DL (ref 65–140)
GLUCOSE SERPL-MCNC: 80 MG/DL (ref 65–140)
GLUCOSE SERPL-MCNC: 86 MG/DL (ref 65–140)
GLUCOSE SERPL-MCNC: 96 MG/DL (ref 65–140)
POTASSIUM SERPL-SCNC: 3.9 MMOL/L (ref 3.5–5.3)
SODIUM SERPL-SCNC: 143 MMOL/L (ref 136–145)

## 2021-01-10 PROCEDURE — 80048 BASIC METABOLIC PNL TOTAL CA: CPT | Performed by: INTERNAL MEDICINE

## 2021-01-10 PROCEDURE — 97167 OT EVAL HIGH COMPLEX 60 MIN: CPT

## 2021-01-10 PROCEDURE — 99232 SBSQ HOSP IP/OBS MODERATE 35: CPT | Performed by: PHYSICIAN ASSISTANT

## 2021-01-10 PROCEDURE — 82948 REAGENT STRIP/BLOOD GLUCOSE: CPT

## 2021-01-10 PROCEDURE — 97163 PT EVAL HIGH COMPLEX 45 MIN: CPT

## 2021-01-10 RX ORDER — ACETAMINOPHEN 325 MG/1
975 TABLET ORAL EVERY 8 HOURS SCHEDULED
Status: DISCONTINUED | OUTPATIENT
Start: 2021-01-10 | End: 2021-01-12 | Stop reason: HOSPADM

## 2021-01-10 RX ADMIN — ATORVASTATIN CALCIUM 10 MG: 10 TABLET, FILM COATED ORAL at 16:54

## 2021-01-10 RX ADMIN — CYPROHEPTADINE HYDROCHLORIDE 4 MG: 4 TABLET ORAL at 08:43

## 2021-01-10 RX ADMIN — ACETAMINOPHEN 975 MG: 325 TABLET, FILM COATED ORAL at 16:54

## 2021-01-10 RX ADMIN — CLOPIDOGREL BISULFATE 75 MG: 75 TABLET ORAL at 08:43

## 2021-01-10 RX ADMIN — CHLORHEXIDINE GLUCONATE 0.12% ORAL RINSE 15 ML: 1.2 LIQUID ORAL at 08:43

## 2021-01-10 RX ADMIN — ENOXAPARIN SODIUM 40 MG: 40 INJECTION SUBCUTANEOUS at 08:43

## 2021-01-10 RX ADMIN — Medication 1 SPRAY: at 22:16

## 2021-01-10 RX ADMIN — ACETAMINOPHEN 975 MG: 325 TABLET, FILM COATED ORAL at 22:09

## 2021-01-10 RX ADMIN — PANTOPRAZOLE SODIUM 40 MG: 40 TABLET, DELAYED RELEASE ORAL at 06:04

## 2021-01-10 RX ADMIN — CHLORHEXIDINE GLUCONATE 0.12% ORAL RINSE 15 ML: 1.2 LIQUID ORAL at 22:11

## 2021-01-10 RX ADMIN — ASPIRIN 325 MG: 325 TABLET, COATED ORAL at 08:43

## 2021-01-10 RX ADMIN — LISINOPRIL 10 MG: 10 TABLET ORAL at 08:43

## 2021-01-10 NOTE — OCCUPATIONAL THERAPY NOTE
Occupational Therapy Evaluation      Alonzo Ainsley    1/10/2021    Principal Problem:    Aneurysm of internal carotid artery  Active Problems:    Hypertension    Hypothyroidism    Morbid obesity ([facility] [address] )    Obstructive sleep apnea    Type 2 diabetes mellitus without complication ([facility] [address] )    Chest pain    Difficult intubation      Past Medical History:   Diagnosis Date    Arthritis     Depression     Disease of thyroid gland     hypo    Edema     LAST ASSESSED: [id number]    GERD (gastroesophageal reflux disease)     Hypercholesterolemia     Hyperlipidemia     Hypertension     WELL CONTROLLED  CURRENTLY ON LISINOPRIL  LAST ASSESSED: [id number]    Other headache syndrome     Other muscle spasm     LAST ASSESSED: [id number]    Ovarian cyst 2006    Renal calculi      (spontaneous vaginal delivery) 1975    FEMALE       Past Surgical History:   Procedure Laterality Date    BACK SURGERY      HERNIATED DISC (L4/L5)    CHOLECYSTECTOMY      IR CEREBRAL ANGIOGRAPHY  2020    IR CEREBRAL ANGIOGRAPHY / INTERVENTION  2021    TONSILLECTOMY          01/10/21 1021   OT Last Visit   OT Visit Date 01/10/21   Note Type   Note type Evaluation   Restrictions/Precautions   Weight Bearing Precautions Per Order No   Other Precautions Multiple lines;Telemetry   Pain Assessment   Pain Assessment Tool Pain Assessment not indicated - pt denies pain   Pain Score No Pain   Home Living   Type of Home Apartment   Home Layout One level;Stairs to enter with rails; Performs ADLs on one level  (1st flr; 3-4STE)   Bathroom Shower/Tub Tub/shower unit   Bathroom Toilet Standard   Bathroom Equipment Grab bars in shower; Shower chair   Bathroom Accessibility Accessible   Home Equipment Walker;Cane  ( belongs to her father; [facility] belongs to her brother)   Additional Comments Pt reports use of of either RW or [facility] "when my kneeds and feet hurt me" PTA   Prior Function   Level of [place] Independent with ADLs and functional mobility; Needs assistance with IADLs   Lives With Family   Receives Help From Family   ADL Assistance Independent   IADLs Needs assistance   Falls in the last 6 months 0   Vocational Retired   Lifestyle   Autonomy Pt reports being IND w/ all ADLS and most IADLS; Pt's brother that lives local A w/ all laundry; Pt (-) drives and her local brother A w/ transportation; occasional use of RW or SPC PTA   Reciprocal Relationships Pt lives w/ her father and brother  Pt's father had a stroke and Pt is the primary caregiver for her father, who requires A w/ all ADLS, IADLS, and transfers  Pt's brother that lives in the house "has intellectual disability and diabetes, but he does okay " Pt reports having another brother that lives local that visits daily and can provide A as needed  Service to Others Pt is a primary caregiver for her father   Intrinsic Gratification Pt reports enjoying staying IND   Psychosocial   Psychosocial (WDL) WDL   ADL   Where Assessed Chair   Eating Assistance 7  Independent   Grooming Assistance 5  Supervision/Setup   UB Bathing Assistance 5  Supervision/Setup   LB Bathing Assistance 5  Supervision/Setup   UB Dressing Assistance 5  Supervision/Setup   LB Dressing Assistance 5  Supervision/Setup   Toileting Assistance  5  Supervision/Setup   Bed Mobility   Supine to Sit Unable to assess   Sit to Supine Unable to assess   Additional Comments Pt seated OOB in chair upon OT arrival  Pt seated OOB in chair w/ all needs in reach s/p OT Session  Transfers   Sit to Stand 5  Supervision   Additional items Assist x 1; Increased time required;Verbal cues;Armrests   Stand to Sit 5  Supervision   Additional items Assist x 1; Increased time required;Verbal cues;Armrests   Additional Comments Transfers w/o AD  Functional Mobility   Functional Mobility 5  Supervision   Additional Comments Pt demonstrated long distance household mobility in hallway w/o AD at S level  Pt at times holding onto HR in hallway   Pt stated "this happens every day  Sometimes I get dizzy like this " Pt refusing to attempt to utilize AD for further mobility  Additional items   (none)   Balance   Static Sitting Fair +   Dynamic Sitting Fair   Static Standing Fair -   Dynamic Standing Fair -   Ambulatory Fair -   Activity Tolerance   Activity Tolerance Patient limited by fatigue;Treatment limited secondary to medical complications (Comment)  (dizziness)   Medical Staff Made Aware PT [address] Maciel RN cleared Pt for OT session  RUE Assessment   RUE Assessment WFL   LUE Assessment   LUE Assessment WFL   Hand Function   Gross Motor Coordination Functional   Fine Motor Coordination Functional   Sensation   Light Touch No apparent deficits   Vision-Basic Assessment   Current Vision Wears glasses all the time   Vision - Complex Assessment   Ocular Range of Motion WFL   Cognition   Overall Cognitive Status WFL   Arousal/Participation Alert; Cooperative   Attention Within functional limits   Orientation Level Oriented X4   Memory Within functional limits   Following Commands Follows one step commands without difficulty   Comments Pt is pleasant and cooperative to participate in therapy this day  Assessment   Limitation Decreased endurance   Prognosis Good   Assessment Pt is a 59 yo female seen for OT eval s/p adm to [facility] on 2021 dx'd w/ aneurysm of internal carotid artery  Pt now s/p R ICA PED complicated by difficult airway  Pt  has a past medical history of Arthritis, Depression, Disease of thyroid gland, Edema, GERD (gastroesophageal reflux disease), Hypercholesterolemia, Hyperlipidemia, Hypertension, Other headache syndrome, Other muscle spasm, Ovarian cyst (), Renal calculi, and  (spontaneous vaginal delivery) ()  Pt with active OT orders and up and OOB as tolerated orders  Pt is retired and resides w/ father and brother in 4st flr apt w/ 3-4STE  Pt is the primary caregiver for her father who had had a stroke   Pt has another local brother that can provide A as needed  Pt was I w/ ADLS and most IADLS; local brother A w/ laundry and transportation, does not drive, & required use of DME PTA, including occasional use of RW or SPC for mobility if Pt is experiencing pain in her keens and feet  Pt is currently demonstrating the following occupational deficits: S all transfers, mobility, and self care tasks at S level w/o AD support  These deficits that are impacting pt's baseline areas of occupation are a result of the following impairments: endurance and activity tolerance  Based on the aforementioned OT evaluation, functional performance deficits, and assessments, pt has been identified as a high complexity evaluation  Recommend home with family support upon D/C  Pt appears to be functioning at/close to baseline levels  No further acute care OT needs at this time  Will D/C OT  Please re-consult if appropriate      Goals   Patient Goals To return home today   Recommendation   OT Discharge Recommendation Return to previous environment with social support  (increased social support)   OT - OK to Discharge Yes  (when medically cleared)   Modified [place] Scale   Modified [place] Scale 3         Latia Gloria MS, OTR/L

## 2021-01-10 NOTE — PHYSICAL THERAPY NOTE
Physical Therapy Evaluation     Patient's Name: Alanis Irwin    Admitting Diagnosis  Aneurysm of internal carotid artery [I67 1]  Cerebral aneurysm [I67 1]    Problem List  Patient Active Problem List   Diagnosis    Carpal tunnel syndrome, bilateral    Cervical spondylosis without myelopathy    Degenerative cervical disc    GERD (gastroesophageal reflux disease)    Hypertension    Hypothyroidism    Morbid obesity ([address] )    Obstructive sleep apnea    Type 2 diabetes mellitus without complication (HCC)    Cervical cancer screening    Lung nodule < 6cm on CT    Chest pain    Thickened endometrium    Healthcare maintenance    Sinusitis    Aneurysm of internal carotid artery    Orthostatic hypotension    Gait instability    Other headache syndrome    Chronic daily headache    Cerebral aneurysm    Difficult intubation       Past Medical History  Past Medical History:   Diagnosis Date    Arthritis     Depression     Disease of thyroid gland     hypo    Edema     LAST ASSESSED: [id number]    GERD (gastroesophageal reflux disease)     Hypercholesterolemia     Hyperlipidemia     Hypertension     WELL CONTROLLED  CURRENTLY ON LISINOPRIL   LAST ASSESSED: [id number]    Other headache syndrome     Other muscle spasm     LAST ASSESSED: [id number]    Ovarian cyst 2006    Renal calculi      (spontaneous vaginal delivery) 1975    FEMALE       Past Surgical History  Past Surgical History:   Procedure Laterality Date    BACK SURGERY      HERNIATED DISC (L4/L5)    CHOLECYSTECTOMY      IR CEREBRAL ANGIOGRAPHY  2020    IR CEREBRAL ANGIOGRAPHY / INTERVENTION  2021    TONSILLECTOMY          01/10/21 1020   PT Last Visit   PT Visit Date 01/10/21   Note Type   Note type Evaluation   Pain Assessment   Pain Assessment Tool 0-10   Pain Score No Pain   Home Living   Type of [address] [facility] One level;Stairs to enter with rails   [address] [place] chair   Home Equipment Walker;Cane  (RW- father's; SPC-brother's)   Additional Comments Pt resides in [facility] apt w/ 4 PRESTON  Pt reports intermittent use of RW vs SPC 2' pain in knees and ankles  Pt's reports RW is her father's and SPC is her brother's   Prior Function   Level of [place] Independent with ADLs and functional mobility   Lives With Family  (father and brother)   [place] Help From Family   ADL Assistance Independent   IADLs Independent   Falls in the last 6 months 0   Comments Pt reports her father had previous stroke and requires assistance for all mobility, ADLs, and IADLs  Pt reports her brother has "diabetes and mental disability" but is mostly IND  Pt reports another brother who does not live with pt but also provides assistance w/ pt's father/brother as well as IADLs such as laundry   Restrictions/Precautions   Weight Bearing Precautions Per Order No   Other Precautions Multiple lines;Telemetry; Fall Risk   General   Family/Caregiver Present No   Cognition   Overall Cognitive Status WFL   Arousal/Participation Alert   Orientation Level Oriented X4   Memory Within functional limits   Following Commands Follows all commands and directions without difficulty   Comments Pt pleasant and cooperative to work w/ therapy   RLE Assessment   RLE Assessment WFL   LLE Assessment   LLE Assessment WFL   Coordination   Movements are Fluid and Coordinated 1   Bed Mobility   Supine to Sit Unable to assess   Sit to Supine Unable to assess   Additional Comments Pt seated OOB upon PT arrival  Pt returned seated OOB at end of session w/ all needs within reach   Transfers   Sit to Stand 5  Supervision   Additional items Verbal cues   Stand to Sit 5  Supervision   Additional items Verbal cues   Additional Comments Transfers w/ no AD; however, intermittently reaching for HR in hallway during ambulation   Pt reports mild dizziness- reports this is baseline and she "gets dizzy everyday"   Ambulation/Elevation   Gait pattern Excessively slow;Short stride; Inconsistent winter   Gait Assistance 5  Supervision   Additional items Verbal cues   Assistive Device None  (intermittent use of HR in hallway)   Distance 60ft x2   Stair Management Assistance 4  Minimal assist  (CGA)   Additional items Assist x 1;Verbal cues   Stair Management Technique One rail R;Step to pattern; Foreward   Number of Stairs 4   Balance   Static Sitting Fair +   Dynamic Sitting Fair   Static Standing Fair   Dynamic Standing Fair -   Ambulatory Fair -   Endurance Deficit   Endurance Deficit Yes   Endurance Deficit Description weakness, fatigue, dizziness   Activity Tolerance   Activity Tolerance Patient limited by fatigue   Medical Staff Made Aware OT Christy   Nurse Made Aware yes   Assessment   Prognosis Good   Problem List Decreased strength;Decreased endurance; Impaired balance;Decreased mobility   Assessment Pt is 60 y o  female seen for PT evaluation s/p admit to [facility] on 1/8/2021 w/ Aneurysm of internal carotid artery  Pt is POD #2 R ICA PED  PT consulted to assess pt's functional mobility and d/c needs  Order placed for PT eval and tx, w/ up w/ A order  Comorbidities affecting pt's physical performance at time of assessment include: HTN, Type 2 DM, aneurysm of ICA, b/l carpal tunnel syndrome, cervical degenerative disc, gait instability  PTA, pt was independent w/ all functional mobility w/ no AD and lives w/ father and brother in one level apt w/ 4 PRESTON  Pt is IND w/ ADLs  Pt has to help her father w/ mobility, ADLs, and IADLs  Pt has other local brother who can provide increased assist  Personal factors affecting pt at time of IE include: inaccessible home environment, stairs to enter home, inability to navigate community distances, limited home support and inability to perform IADLs   Please find objective findings from PT assessment regarding body systems outlined above with impairments and limitations including weakness, impaired balance, decreased endurance, gait deviations, decreased activity tolerance, decreased functional mobility tolerance and fall risk  Pt performed STS at supervision  Pt ambulated 60ft x2 w/out AD, however intermittent use of HR in hallway, at supervision  Pt performed 4 stairs w/ R HR at CGA  The following objective measures performed on IE also reveal limitations: Modified Betty: 3 (moderate disability)  Pt's clinical presentation is currently unstable/unpredictable seen in pt's presentation of recent admission for aneurysm of ICA requiring medical attention, recent decline in function as compared to baseline, multiple lines, fall risk, pain  Pt to benefit from continued PT tx to address deficits as defined above and maximize level of functional independent mobility and consistency  From PT/mobility standpoint, recommendation at time of d/c would be home w/ increased support pending progress in order to facilitate return to PLOF  Barriers to Discharge Decreased caregiver support   Goals   Patient Goals to return home   STG Expiration Date 01/24/21   Short Term Goal #1 1  Pt will demonstrate the ability to perform all aspects of bed mobility at Mod I in onder to maximize functional independence and decrease burden on caregivers  2 Pt will demonstrate the ability to perform all functional transfers at Mod I in order to maximize functional independence and decrease burden on caregivers  3 Pt will demonstrate the ability to ambulate at least 150ft with least restrictive device at Mod I in order to maximize functional independence  4 Pt will demonstrate the ability to negotiate 4 steps at Mod I in order to return to household/community mobility  5 Pt will demonstrate improved balance by one grade in order to decrease risk of falls   6 Pt will increase b/l LE strength by 1 grade in order to increase ease of functional mobility and transfers   PT Treatment Day 0   Plan   Treatment/Interventions Functional transfer training;LE strengthening/ROM; Therapeutic exercise; Endurance training;Patient/family training;Equipment eval/education; Bed mobility;Gait training;Spoke to nursing;Elevations   PT Frequency   (3-5x/week)   Recommendation   PT Discharge Recommendation Return to previous environment with social support   PT - OK to Discharge Yes   Modified [place] Scale   Modified Betty Scale 3     Mae Black, PT, DPT

## 2021-01-10 NOTE — PLAN OF CARE
Problem: PHYSICAL THERAPY ADULT  Goal: Performs mobility at highest level of function for planned discharge setting  See evaluation for individualized goals  Description: Treatment/Interventions: Functional transfer training, LE strengthening/ROM, Therapeutic exercise, Endurance training, Patient/family training, Equipment eval/education, Bed mobility, Gait training, Spoke to nursing, Elevations          See flowsheet documentation for full assessment, interventions and recommendations  Note: Prognosis: Good  Problem List: Decreased strength, Decreased endurance, Impaired balance, Decreased mobility  Assessment: Pt is 60 y o  female seen for PT evaluation s/p admit to [facility] on 1/8/2021 w/ Aneurysm of internal carotid artery  Pt is POD #2 R ICA PED  PT consulted to assess pt's functional mobility and d/c needs  Order placed for PT eval and tx, w/ up w/ A order  Comorbidities affecting pt's physical performance at time of assessment include: HTN, Type 2 DM, aneurysm of ICA, b/l carpal tunnel syndrome, cervical degenerative disc, gait instability  PTA, pt was independent w/ all functional mobility w/ no AD and lives w/ father and brother in one level apt w/ 4 [address]  Pt is IND w/ ADLs  Pt has to help her father w/ mobility, ADLs, and IADLs  Pt has other local brother who can provide increased assist  Personal factors affecting pt at time of IE include: inaccessible home environment, stairs to enter home, inability to navigate community distances, limited home support and inability to perform IADLs  Please find objective findings from PT assessment regarding body systems outlined above with impairments and limitations including weakness, impaired balance, decreased endurance, gait deviations, decreased activity tolerance, decreased functional mobility tolerance and fall risk  Pt performed STS at supervision  Pt ambulated 60ft x2 w/out AD, however intermittent use of HR in hallway, at supervision   Pt performed 4 stairs w/ R HR at CGA  The following objective measures performed on IE also reveal limitations: Modified [place]: 3 (moderate disability)  Pt's clinical presentation is currently unstable/unpredictable seen in pt's presentation of recent admission for aneurysm of ICA requiring medical attention, recent decline in function as compared to baseline, multiple lines, fall risk, pain  Pt to benefit from continued PT tx to address deficits as defined above and maximize level of functional independent mobility and consistency  From PT/mobility standpoint, recommendation at time of d/c would be home w/ increased support pending progress in order to facilitate return to PLOF  Barriers to Discharge: Decreased caregiver support     PT Discharge Recommendation: Return to previous environment with social support     PT - OK to Discharge: Yes    See flowsheet documentation for full assessment

## 2021-01-10 NOTE — PLAN OF CARE
Problem: Potential for Falls  Goal: Patient will remain free of falls  Description: INTERVENTIONS:  - Assess patient frequently for physical needs  -  Identify cognitive and physical deficits and behaviors that affect risk of falls    -  [place] fall precautions as indicated by assessment   - Educate patient/family on patient safety including physical limitations  - Instruct patient to call for assistance with activity based on assessment  - Modify environment to reduce risk of injury  - Consider OT/PT consult to assist with strengthening/mobility  1/10/2021 0740 by Trent Ventura RN  Outcome: Progressing  1/9/2021 1849 by Trent Ventura RN  Outcome: Progressing     Problem: Prexisting or High Potential for Compromised Skin Integrity  Goal: Skin integrity is maintained or improved  Description: INTERVENTIONS:  - Identify patients at risk for skin breakdown  - Assess and monitor skin integrity  - Assess and monitor nutrition and hydration status  - Monitor labs   - Assess for incontinence   - Turn and reposition patient  - Assist with mobility/ambulation  - Relieve pressure over bony prominences  - Avoid friction and shearing  - Provide appropriate hygiene as needed including keeping skin clean and dry  - Evaluate need for skin moisturizer/barrier cream  - Collaborate with interdisciplinary team   - Patient/family teaching  - Consider wound care consult   1/10/2021 0740 by Trent Ventura RN  Outcome: Progressing  1/9/2021 1849 by Ternt Ventura RN  Outcome: Progressing     Problem: PAIN - ADULT  Goal: Verbalizes/displays adequate comfort level or baseline comfort level  Description: Interventions:  - Encourage patient to monitor pain and request assistance  - Assess pain using appropriate pain scale  - Administer analgesics based on type and severity of pain and evaluate response  - Implement non-pharmacological measures as appropriate and evaluate response  - Consider cultural and social influences on pain and pain management  - Notify physician/advanced practitioner if interventions unsuccessful or patient reports new pain  1/10/2021 0740 by Fide Ontiveros RN  Outcome: Progressing  1/9/2021 1849 by Fide Ontiveros RN  Outcome: Progressing     Problem: INFECTION - ADULT  Goal: Absence or prevention of progression during hospitalization  Description: INTERVENTIONS:  - Assess and monitor for signs and symptoms of infection  - Monitor lab/diagnostic results  - Monitor all insertion sites, i e  indwelling lines, tubes, and drains  - Monitor endotracheal if appropriate and nasal secretions for changes in amount and color  - [place] appropriate cooling/warming therapies per order  - Administer medications as ordered  - Instruct and encourage patient and family to use good hand hygiene technique  - Identify and instruct in appropriate isolation precautions for identified infection/condition  1/10/2021 0740 by Fide Ontiveros RN  Outcome: Progressing  1/9/2021 1849 by Fide Ontiveros RN  Outcome: Progressing  Goal: Absence of fever/infection during neutropenic period  Description: INTERVENTIONS:  - Monitor WBC    1/10/2021 0740 by Fide Ontiveros RN  Outcome: Progressing  1/9/2021 1849 by Fide Ontiveros RN  Outcome: Progressing     Problem: SAFETY ADULT  Goal: Patient will remain free of falls  Description: INTERVENTIONS:  - Assess patient frequently for physical needs  -  Identify cognitive and physical deficits and behaviors that affect risk of falls    -  [place] fall precautions as indicated by assessment   - Educate patient/family on patient safety including physical limitations  - Instruct patient to call for assistance with activity based on assessment  - Modify environment to reduce risk of injury  - Consider OT/PT consult to assist with strengthening/mobility  1/10/2021 0740 by Fide Ontiveros RN  Outcome: Progressing  1/9/2021 1849 by Fide Ontiveros RN  Outcome: Progressing  Goal: Maintain or return to baseline ADL function  Description: INTERVENTIONS:  -  Assess patient's ability to carry out ADLs; assess patient's baseline for ADL function and identify physical deficits which impact ability to perform ADLs (bathing, care of mouth/teeth, toileting, grooming, dressing, etc )  - Assess/evaluate cause of self-care deficits   - Assess range of motion  - Assess patient's mobility; develop plan if impaired  - Assess patient's need for assistive devices and provide as appropriate  - Encourage maximum independence but intervene and supervise when necessary  - Involve family in performance of ADLs  - Assess for home care needs following discharge   - Consider OT consult to assist with ADL evaluation and planning for discharge  - Provide patient education as appropriate  1/10/2021 0740 by Carolyn Mcgraw RN  Outcome: Progressing  1/9/2021 1849 by Carolyn Mcgraw RN  Outcome: Progressing  Goal: Maintain or return mobility status to optimal level  Description: INTERVENTIONS:  - Assess patient's baseline mobility status (ambulation, transfers, stairs, etc )    - Identify cognitive and physical deficits and behaviors that affect mobility  - Identify mobility aids required to assist with transfers and/or ambulation (gait belt, sit-to-stand, lift, walker, cane, etc )  - [place] fall precautions as indicated by assessment  - Record patient progress and toleration of activity level on Mobility SBAR; progress patient to next Phase/Stage  - Instruct patient to call for assistance with activity based on assessment  - Consider rehabilitation consult to assist with strengthening/weightbearing, etc   1/10/2021 0740 by Carolyn Mcgraw RN  Outcome: Progressing  1/9/2021 1849 by Carolyn Mcgraw RN  Outcome: Progressing     Problem: DISCHARGE PLANNING  Goal: Discharge to home or other facility with appropriate resources  Description: INTERVENTIONS:  - Identify barriers to discharge w/patient and caregiver  - Arrange for needed discharge resources and transportation as appropriate  - Identify discharge learning needs (meds, wound care, etc )  - Arrange for interpretive services to assist at discharge as needed  - Refer to Case Management Department for coordinating discharge planning if the patient needs post-hospital services based on physician/advanced practitioner order or complex needs related to functional status, cognitive ability, or social support system  1/10/2021 0740 by Holly Sanchez RN  Outcome: Progressing  1/9/2021 1849 by Holly Sanchez RN  Outcome: Progressing     Problem: Knowledge Deficit  Goal: Patient/family/caregiver demonstrates understanding of disease process, treatment plan, medications, and discharge instructions  Description: Complete learning assessment and assess knowledge base    Interventions:  - Provide teaching at level of understanding  - Provide teaching via preferred learning methods  1/10/2021 0740 by Holly Sanchez RN  Outcome: Progressing  1/9/2021 1849 by Holly Sanchez RN  Outcome: Progressing     Problem: NEUROSENSORY - ADULT  Goal: Achieves stable or improved neurological status  Description: INTERVENTIONS  - Monitor and report changes in neurological status  - Monitor vital signs such as temperature, blood pressure, glucose, and any other labs ordered   - Initiate measures to prevent increased intracranial pressure  - Monitor for seizure activity and implement precautions if appropriate      1/10/2021 0740 by Holly Sanchez RN  Outcome: Progressing  1/9/2021 1849 by Holly Sanchez RN  Outcome: Progressing  Goal: Remains free of injury related to seizures activity  Description: INTERVENTIONS  - Maintain airway, patient safety  and administer oxygen as ordered  - Monitor patient for seizure activity, document and report duration and description of seizure to physician/advanced practitioner  - If seizure occurs,  ensure patient safety during seizure  - Reorient patient post seizure  - Seizure pads on all 4 side rails  - Instruct patient/family to notify RN of any seizure activity including if an aura is experienced  - Instruct patient/family to call for assistance with activity based on nursing assessment  - Administer anti-seizure medications if ordered    1/10/2021 0740 by Glenn Rico RN  Outcome: Progressing  1/9/2021 1849 by Glenn Rico RN  Outcome: Progressing  Goal: Achieves maximal functionality and self care  Description: INTERVENTIONS  - Monitor swallowing and airway patency with patient fatigue and changes in neurological status  - Encourage and assist patient to increase activity and self care     - Encourage visually impaired, hearing impaired and aphasic patients to use assistive/communication devices  1/10/2021 0740 by Glenn Rico RN  Outcome: Progressing  1/9/2021 1849 by Glenn Rico RN  Outcome: Progressing

## 2021-01-10 NOTE — PROGRESS NOTES
Progress Note - Neurosurgery   Dwayne Mendenhall 60 y o  female MRN: [id number]  Unit/Bed#: [facility] 729-01 Encounter: [id number]            Assessment:  1  POD #2 from right ICA PED complicated by difficulty airway  2  Hx of Morbid Obesity  3  Hypertension  4  Depression      Plan:   · Exam: A&ox3, PERRL, EOMI, 3mm conjugate bilaterally Communicates well  Moves all extremities  Finger-to-nose test normal and without drift bilaterally  Strength is 5/5 bilaterally  Sensation to light touch intact throughout  DTR 2+ without clonus and Babinski negative bilaterally  · Imagings reviewed personally and by attending is as follows:  ·   · Pain control:   1  Tylenol 975 mg oral Q 8 H p r n  Mild pain   2  Oxycodone 2 5 mg oral q 6h p r n  Severe pain  · DVT ppx: SCDs bilateral legs, on Lovenox,   · Patient also on aspirin 325 and Plavix 75  · Activity:  As tolerated  · PT/OT evaluation & treatment  · Medical Mx:  · Acid reflex on pantoprazole  · Hypothyroidism on levothyroxine  · Hypertension on lisinopril  · Neurocheck:  Routine  · HOB>30-45 degrees  · sBP<160  · Patient complains of headache, 5/10 otherwise,  exam nonfocal   She is out of bed and walking around doing physical therapy using walker for ambulation  Slight dizziness when she get out of chair or bed, no fall tendency  PT recommends home with social support  Continue monitoring for today  Consider discharging tomorrow  Subjective/Objective   Chief Complaint: " Headache"/postop day 2  progress note    Subjective:  Patient reports moderate headache, 5/10 otherwise denies any nausea, or vomiting  Reports slight blurry vision otherwise denies any diplopia  She also noticed slight dizziness when she get out of bed and start walking  Was able to walk around and doing physical therapy no tendency to fall  Denies numbness, weakness or paresthesia and extremities  Denies fever, chills, rigors, cough or chest pain      Objective:  Patient lying supine in bed, appears in pain due to headache    I/O       01/08 0701 - 01/09 0700 01/09 0701 - 01/10 0700 01/10 0701 - 01/11 0700    P  O   340 700    I V  (mL/kg) 2331 6 (17 8) 678 8 (5 1)     Total Intake(mL/kg) 2331 6 (17 8) 1018 8 (7 7) 700 (5 3)    Urine (mL/kg/hr) 2430 (0 8) 2175 (0 7) 200 (0 3)    Total Output 2430 2175 200    Net -98 4 -1156 3 +500           Unmeasured Urine Occurrence  1 x           Invasive Devices     Peripheral Intravenous Line            Peripheral IV 01/08/21 Left Forearm 2 days    Peripheral IV 01/08/21 Left Hand 2 days                Physical Exam:  Vitals: Blood pressure 131/80, pulse 74, temperature 98 4 °F (36 9 °C), resp  rate 16, height 5' 4" (1 626 m), weight 133 kg (294 lb 1 5 oz), SpO2 92 %  ,Body mass index is 50 48 kg/m²  General appearance: alert, appears stated age, cooperative and no distress  Head: Normocephalic, without obvious abnormality, atraumatic  Eyes: EOMI, PERRL, 3 mm conjugate bilaterally  Neck: supple, symmetrical, trachea midline and NT  Back: no kyphosis present, no tenderness to percussion or palpation  Lungs: non labored breathing  Heart: regular heart rate  Neurologic:   Mental status: Alert, oriented x3, thought content appropriate  Cranial nerves: grossly intact (Cranial nerves II-XII)  Sensory: normal to to light touch throughout  Motor: moving all extremities without focal weakness, strength is 5/5 bilaterally  Reflexes: 2+ and symmetric    No clonus and Babinski negative bilaterally  Coordination: finger to nose normal bilaterally, no drift bilaterally      Lab Results:  Results from last 7 days   Lab Units 01/09/21  0535 01/09/21  0019   WBC Thousand/uL 13 29*  --    HEMOGLOBIN g/dL 11 9  --    HEMATOCRIT % 37 6  --    PLATELETS Thousands/uL 301 298     Results from last 7 days   Lab Units 01/10/21  0528 01/09/21  0535 01/09/21  0019   POTASSIUM mmol/L 3 9 3 7 3 8   CHLORIDE mmol/L 112* 109* 109*   CO2 mmol/L 26 26 25   BUN mg/dL 12 10 11   CREATININE mg/dL 0 64 0 56* 0 51*   CALCIUM mg/dL 8 7 8 1* 8 4     Results from last 7 days   Lab Units 01/09/21  0535 01/08/21  1506   MAGNESIUM mg/dL 1 9 1 8     Results from last 7 days   Lab Units 01/09/21  0535   PHOSPHORUS mg/dL 3 1     Results from last 7 days   Lab Units 01/09/21  0535 01/08/21  1759   INR  1 05  --    PTT seconds 35 38*     No results found for: TROPONINT  ABG:No results found for: PHART, XTT9KSR, PO2ART, XDP1EQA, J8IGZCIW, BEART, SOURCE    Imaging Studies: I have personally reviewed pertinent reports  and I have personally reviewed pertinent films in PACS    EKG, Pathology, and Other Studies: I have personally reviewed pertinent reports        VTE Pharmacologic Prophylaxis: Enoxaparin (Lovenox)    VTE Mechanical Prophylaxis: sequential compression device

## 2021-01-11 ENCOUNTER — TELEPHONE (OUTPATIENT)
Dept: NEUROSURGERY | Facility: CLINIC | Age: 66
End: 2021-01-11

## 2021-01-11 LAB
ATRIAL RATE: 67 BPM
ATRIAL RATE: 91 BPM
GLUCOSE SERPL-MCNC: 102 MG/DL (ref 65–140)
GLUCOSE SERPL-MCNC: 104 MG/DL (ref 65–140)
GLUCOSE SERPL-MCNC: 112 MG/DL (ref 65–140)
GLUCOSE SERPL-MCNC: 184 MG/DL (ref 65–140)
P AXIS: 61 DEGREES
P AXIS: 62 DEGREES
PR INTERVAL: 158 MS
PR INTERVAL: 171 MS
QRS AXIS: -10 DEGREES
QRS AXIS: 1 DEGREES
QRSD INTERVAL: 83 MS
QRSD INTERVAL: 88 MS
QT INTERVAL: 367 MS
QT INTERVAL: 408 MS
QTC INTERVAL: 431 MS
QTC INTERVAL: 452 MS
T WAVE AXIS: 18 DEGREES
T WAVE AXIS: 41 DEGREES
VENTRICULAR RATE: 67 BPM
VENTRICULAR RATE: 91 BPM

## 2021-01-11 PROCEDURE — 93010 ELECTROCARDIOGRAM REPORT: CPT | Performed by: INTERNAL MEDICINE

## 2021-01-11 PROCEDURE — 82948 REAGENT STRIP/BLOOD GLUCOSE: CPT

## 2021-01-11 PROCEDURE — 99232 SBSQ HOSP IP/OBS MODERATE 35: CPT | Performed by: NURSE PRACTITIONER

## 2021-01-11 RX ORDER — DEXAMETHASONE 4 MG/1
4 TABLET ORAL ONCE
Status: COMPLETED | OUTPATIENT
Start: 2021-01-11 | End: 2021-01-11

## 2021-01-11 RX ADMIN — Medication 1 SPRAY: at 22:15

## 2021-01-11 RX ADMIN — ASPIRIN 325 MG: 325 TABLET, COATED ORAL at 08:51

## 2021-01-11 RX ADMIN — Medication 1 SPRAY: at 02:15

## 2021-01-11 RX ADMIN — ATORVASTATIN CALCIUM 10 MG: 10 TABLET, FILM COATED ORAL at 17:13

## 2021-01-11 RX ADMIN — ACETAMINOPHEN 975 MG: 325 TABLET, FILM COATED ORAL at 06:05

## 2021-01-11 RX ADMIN — ACETAMINOPHEN 975 MG: 325 TABLET, FILM COATED ORAL at 13:22

## 2021-01-11 RX ADMIN — CHLORHEXIDINE GLUCONATE 0.12% ORAL RINSE 15 ML: 1.2 LIQUID ORAL at 22:14

## 2021-01-11 RX ADMIN — OXYCODONE HYDROCHLORIDE 2.5 MG: 5 TABLET ORAL at 06:17

## 2021-01-11 RX ADMIN — Medication 1 SPRAY: at 06:06

## 2021-01-11 RX ADMIN — ACETAMINOPHEN 975 MG: 325 TABLET, FILM COATED ORAL at 22:14

## 2021-01-11 RX ADMIN — CYPROHEPTADINE HYDROCHLORIDE 4 MG: 4 TABLET ORAL at 08:52

## 2021-01-11 RX ADMIN — DOCUSATE SODIUM 100 MG: 100 CAPSULE, LIQUID FILLED ORAL at 08:51

## 2021-01-11 RX ADMIN — PANTOPRAZOLE SODIUM 40 MG: 40 TABLET, DELAYED RELEASE ORAL at 06:06

## 2021-01-11 RX ADMIN — LEVOTHYROXINE SODIUM 100 MCG: 100 TABLET ORAL at 06:06

## 2021-01-11 RX ADMIN — CHLORHEXIDINE GLUCONATE 0.12% ORAL RINSE 15 ML: 1.2 LIQUID ORAL at 08:51

## 2021-01-11 RX ADMIN — DEXAMETHASONE 4 MG: 4 TABLET ORAL at 13:22

## 2021-01-11 RX ADMIN — CLOPIDOGREL BISULFATE 75 MG: 75 TABLET ORAL at 08:51

## 2021-01-11 RX ADMIN — LISINOPRIL 10 MG: 10 TABLET ORAL at 08:51

## 2021-01-11 RX ADMIN — ENOXAPARIN SODIUM 40 MG: 40 INJECTION SUBCUTANEOUS at 08:51

## 2021-01-11 NOTE — TELEPHONE ENCOUNTER
01/13/2021-PT DISCHARGED TO HOME  PT PRIMARY LANGUAGE IS Maltese  [facility] WILL CONTACT PT IN Maltese TO CONFIRM APT         01/12/2021-PT STILL IN HOSPITAL    01/11/2021-PT STILL IN HOSPITAL  01/25/2021 APT       01/08/2021-(NEGRO)PLAN DISPO TOMORROW ASSUMING PAIN CONTROLLED AND DOING WELL

## 2021-01-11 NOTE — CASE MANAGEMENT
LOS 3  Unplanned readmission risk score: 11  Not a bundle  Met with pt to discuss the role of CM and to discuss any help pt may need prior to dc  Pt lives with her brother Jan Daugherty and father in a [address]st floor [address]  Pt performed ADL's indptly pta, no use of DME  No hx of HHC  Hx of rehab at [facility] [place]  No hx of mental health or D&A treatment  Pt's preferred pharmacy is [facility]--they deliver to her home  Contact: Omar Zarco (brother) [phone]  No POA or living will  Omar Zarco will transport home at [facility]  CM reviewed d/c planning process including the following: identifying help at home, patient preference for d/c planning needs, Discharge Lounge, [facility] Meds to Bed program, availability of treatment team to discuss questions or concerns patient and/or family may have regarding understanding medications and recognizing signs and symptoms once discharged  CM also encouraged patient to follow up with all recommended appointments after discharge  Patient advised of importance for patient and family to participate in managing patients medical well being  Patient/caregiver received discharge checklist  Content reviewed  Patient/caregiver encouraged to participate in discharge plan of care prior to discharge home

## 2021-01-11 NOTE — RESTORATIVE TECHNICIAN NOTE
Restorative Specialist Mobility Note       Activity: Ambulate in room, Bathroom privileges, Chair, Dangle, Stand at bedside(Educated/encouraged pt to ambulate with assistance 3-4 x's/day  Bed alarm on   Pt callbell, phone/tray within reach )     Assistive Device: None       Elian LR, Restorative Technician, [employer] [facility]

## 2021-01-11 NOTE — PLAN OF CARE
Problem: Potential for Falls  Goal: Patient will remain free of falls  Description: INTERVENTIONS:  - Assess patient frequently for physical needs  -  Identify cognitive and physical deficits and behaviors that affect risk of falls    -  [place] fall precautions as indicated by assessment   - Educate patient/family on patient safety including physical limitations  - Instruct patient to call for assistance with activity based on assessment  - Modify environment to reduce risk of injury  - Consider OT/PT consult to assist with strengthening/mobility  Outcome: Progressing     Problem: Prexisting or High Potential for Compromised Skin Integrity  Goal: Skin integrity is maintained or improved  Description: INTERVENTIONS:  - Identify patients at risk for skin breakdown  - Assess and monitor skin integrity  - Assess and monitor nutrition and hydration status  - Monitor labs   - Assess for incontinence   - Turn and reposition patient  - Assist with mobility/ambulation  - Relieve pressure over bony prominences  - Avoid friction and shearing  - Provide appropriate hygiene as needed including keeping skin clean and dry  - Evaluate need for skin moisturizer/barrier cream  - Collaborate with interdisciplinary team   - Patient/family teaching  - Consider wound care consult   Outcome: Progressing     Problem: PAIN - ADULT  Goal: Verbalizes/displays adequate comfort level or baseline comfort level  Description: Interventions:  - Encourage patient to monitor pain and request assistance  - Assess pain using appropriate pain scale  - Administer analgesics based on type and severity of pain and evaluate response  - Implement non-pharmacological measures as appropriate and evaluate response  - Consider cultural and social influences on pain and pain management  - Notify physician/advanced practitioner if interventions unsuccessful or patient reports new pain  Outcome: Progressing     Problem: INFECTION - ADULT  Goal: Absence or prevention of progression during hospitalization  Description: INTERVENTIONS:  - Assess and monitor for signs and symptoms of infection  - Monitor lab/diagnostic results  - Monitor all insertion sites, i e  indwelling lines, tubes, and drains  - [place] appropriate cooling/warming therapies per order  - Administer medications as ordered  - Instruct and encourage patient and family to use good hand hygiene technique  - Identify and instruct in appropriate isolation precautions for identified infection/condition  Outcome: Progressing  Goal: Absence of fever/infection during neutropenic period  Description: INTERVENTIONS:  - Monitor WBC    Outcome: Progressing     Problem: SAFETY ADULT  Goal: Patient will remain free of falls  Description: INTERVENTIONS:  - Assess patient frequently for physical needs  -  Identify cognitive and physical deficits and behaviors that affect risk of falls    -  [place] fall precautions as indicated by assessment   - Educate patient/family on patient safety including physical limitations  - Instruct patient to call for assistance with activity based on assessment  - Modify environment to reduce risk of injury  - Consider OT/PT consult to assist with strengthening/mobility  Outcome: Progressing  Goal: Maintain or return to baseline ADL function  Description: INTERVENTIONS:  -  Assess patient's ability to carry out ADLs; assess patient's baseline for ADL function and identify physical deficits which impact ability to perform ADLs (bathing, care of mouth/teeth, toileting, grooming, dressing, etc )  - Assess/evaluate cause of self-care deficits   - Assess range of motion  - Assess patient's mobility; develop plan if impaired  - Assess patient's need for assistive devices and provide as appropriate  - Encourage maximum independence but intervene and supervise when necessary  - Involve family in performance of ADLs  - Assess for home care needs following discharge   - Consider OT consult to assist with ADL evaluation and planning for discharge  - Provide patient education as appropriate  Outcome: Progressing  Goal: Maintain or return mobility status to optimal level  Description: INTERVENTIONS:  - Assess patient's baseline mobility status (ambulation, transfers, stairs, etc )    - Identify cognitive and physical deficits and behaviors that affect mobility  - Identify mobility aids required to assist with transfers and/or ambulation (gait belt, sit-to-stand, lift, walker, cane, etc )  - [place] fall precautions as indicated by assessment  - Record patient progress and toleration of activity level on Mobility SBAR; progress patient to next Phase/Stage  - Instruct patient to call for assistance with activity based on assessment  - Consider rehabilitation consult to assist with strengthening/weightbearing, etc   Outcome: Progressing     Problem: DISCHARGE PLANNING  Goal: Discharge to home or other facility with appropriate resources  Description: INTERVENTIONS:  - Identify barriers to discharge w/patient and caregiver  - Arrange for needed discharge resources and transportation as appropriate  - Identify discharge learning needs (meds, wound care, etc )  - Arrange for interpretive services to assist at discharge as needed  - Refer to Case Management Department for coordinating discharge planning if the patient needs post-hospital services based on physician/advanced practitioner order or complex needs related to functional status, cognitive ability, or social support system  Outcome: Progressing     Problem: Knowledge Deficit  Goal: Patient/family/caregiver demonstrates understanding of disease process, treatment plan, medications, and discharge instructions  Description: Complete learning assessment and assess knowledge base    Interventions:  - Provide teaching at level of understanding  - Provide teaching via preferred learning methods  Outcome: Progressing     Problem: NEUROSENSORY - ADULT  Goal: Achieves stable or improved neurological status  Description: INTERVENTIONS  - Monitor and report changes in neurological status  - Monitor vital signs such as temperature, blood pressure, glucose, and any other labs ordered   - Initiate measures to prevent increased intracranial pressure  - Monitor for seizure activity and implement precautions if appropriate      Outcome: Progressing  Goal: Remains free of injury related to seizures activity  Description: INTERVENTIONS  - Maintain airway, patient safety  and administer oxygen as ordered  - Monitor patient for seizure activity, document and report duration and description of seizure to physician/advanced practitioner  - If seizure occurs,  ensure patient safety during seizure  - Reorient patient post seizure  - Seizure pads on all 4 side rails  - Instruct patient/family to notify RN of any seizure activity including if an aura is experienced  - Instruct patient/family to call for assistance with activity based on nursing assessment  - Administer anti-seizure medications if ordered    Outcome: Progressing  Goal: Achieves maximal functionality and self care  Description: INTERVENTIONS  - Monitor swallowing and airway patency with patient fatigue and changes in neurological status  - Encourage and assist patient to increase activity and self care     - Encourage visually impaired, hearing impaired and aphasic patients to use assistive/communication devices  Outcome: Progressing

## 2021-01-11 NOTE — RESTORATIVE TECHNICIAN NOTE
Restorative Specialist Mobility Note       Activity: Ambulate in room, Bathroom privileges, Chair, Dangle, Stand at bedside(Educated/encouraged pt to ambulate with assistance 3-4 x's/day  Bed alarm on   Pt callbell, phone/tray within reach )     Assistive Device: None    Amberly LR, Restorative Technician, [employer] [facility]

## 2021-01-11 NOTE — PROGRESS NOTES
Progress Note - Sanford Berkowitz 1955, 60 y o  female MRN: [id number]    Unit/Bed#: [facility] 729-01 Encounter: [id number]    Primary Care Provider: Justin Farah MD   Date and time admitted to hospital: 1/8/2021  6:54 AM        * Aneurysm of internal carotid artery  Assessment & Plan  PPD3 pipeline embolization right ICA aneurysm  · Complicated by difficult airway  · Small left ICA aneurysm  · Continues to endorse right side headache with tragus pain and facial numbness  Non-focal exam  Dizziness with ambulation  Plan:  · Frequent neuro checks  · STAT CT head with decline in GCS > 2 pts in 1 hour  · Continue DAPT -  mg daily and Plavix 75 mg daily  · One time dose decadron 4 mg PO for headache  · Continue Tylenol 975 mg q8h for headache  · Mobilize with PT/OT recommending home with social support  · DVT ppx: SCDs, Lovenox  Neurosurgery will continue to follow as primary  Please call with any questions or concerns  Subjective/Objective   Chief Complaint: "I'm ok  My head and ear hurt "    Subjective: Continues to endorse right side headache with tragus/ear pain  Denies otorrhea  Also c/o right facial numbness  States she ambulated this am and still feels dizzy like the room is spinning with ambulation  Denies N/V  Good appetite  Sleeping ok  Objective: Exam non-focal  NAD  I/O       01/09 0701 - 01/10 0700 01/10 0701 - 01/11 0700 01/11 0701 - 01/12 0700    P  O  340 940 480    I V  (mL/kg) 678 8 (5 1)      Total Intake(mL/kg) 1018 8 (7 7) 940 (7 3) 480 (3 7)    Urine (mL/kg/hr) 2175 (0 7) 1750 (0 6)     Total Output 2175 1750     Net -1156 3 -810 +480           Unmeasured Urine Occurrence 1 x 1 x     Unmeasured Stool Occurrence  1 x           Invasive Devices     Peripheral Intravenous Line            Peripheral IV 01/08/21 Left Forearm 3 days    Peripheral IV 01/08/21 Left Hand 3 days                Physical Exam:  Vitals: Blood pressure 135/73, pulse 71, temperature 97 7 °F (36 5 °C), resp  rate 16, height 5' 4" (1 626 m), weight 129 kg (284 lb 2 8 oz), SpO2 92 %  ,Body mass index is 48 78 kg/m²  General appearance: alert, appears stated age, cooperative and no distress  Head: Normocephalic, without obvious abnormality, atraumatic  Eyes: EOMI, PERRL  Neck: supple, symmetrical, trachea midline and NT  Back: no kyphosis present, no tenderness to percussion or palpation  Lungs: non labored breathing  Heart: regular heart rate  Neurologic:   Mental status: Alert, oriented x3, thought content appropriate  Cranial nerves: grossly intact (Cranial nerves II-XII)  Sensory: normal to LT  Motor: moving all extremities without focal weakness, strength 5/5  Coordination: finger to nose normal bilaterally, no drift bilaterally      Lab Results:  Results from last 7 days   Lab Units 01/09/21  0535 01/09/21  0019   WBC Thousand/uL 13 29*  --    HEMOGLOBIN g/dL 11 9  --    HEMATOCRIT % 37 6  --    PLATELETS Thousands/uL 301 298     Results from last 7 days   Lab Units 01/10/21  0528 01/09/21  0535 01/09/21  0019   POTASSIUM mmol/L 3 9 3 7 3 8   CHLORIDE mmol/L 112* 109* 109*   CO2 mmol/L 26 26 25   BUN mg/dL 12 10 11   CREATININE mg/dL 0 64 0 56* 0 51*   CALCIUM mg/dL 8 7 8 1* 8 4     Results from last 7 days   Lab Units 01/09/21  0535 01/08/21  1506   MAGNESIUM mg/dL 1 9 1 8     Results from last 7 days   Lab Units 01/09/21  0535   PHOSPHORUS mg/dL 3 1     Results from last 7 days   Lab Units 01/09/21  0535 01/08/21  1759   INR  1 05  --    PTT seconds 35 38*     No results found for: TROPONINT  ABG:No results found for: PHART, BHJ3FQT, PO2ART, ZTS9LEO, Y7DEAOGP, BEART, SOURCE    Imaging Studies: I have personally reviewed pertinent reports  and I have personally reviewed pertinent films in PACS    Xr Chest Portable Icu    Result Date: 1/8/2021  Impression: No acute cardiopulmonary disease   Workstation performed: [id number]       EKG, Pathology, and Other Studies: I have personally reviewed pertinent reports        VTE Pharmacologic Prophylaxis: Sequential compression device (Venodyne)  and Enoxaparin (Lovenox)    VTE Mechanical Prophylaxis: sequential compression device

## 2021-01-11 NOTE — ASSESSMENT & PLAN NOTE
PPD3 pipeline embolization right ICA aneurysm  · Complicated by difficult airway  · Small left ICA aneurysm  · Continues to endorse right side headache with tragus pain and facial numbness  Non-focal exam  Dizziness with ambulation  Plan:  · Frequent neuro checks  · STAT CT head with decline in GCS > 2 pts in 1 hour  · Continue DAPT -  mg daily and Plavix 75 mg daily  · One time dose decadron 4 mg PO for headache  · Continue Tylenol 975 mg q8h for headache  · Mobilize with PT/OT recommending home with social support  · DVT ppx: SCDs, Lovenox  Neurosurgery will continue to follow as primary  Please call with any questions or concerns

## 2021-01-12 VITALS
DIASTOLIC BLOOD PRESSURE: 71 MMHG | BODY MASS INDEX: 48.74 KG/M2 | RESPIRATION RATE: 18 BRPM | HEART RATE: 70 BPM | HEIGHT: 64 IN | SYSTOLIC BLOOD PRESSURE: 138 MMHG | OXYGEN SATURATION: 95 % | TEMPERATURE: 97.8 F | WEIGHT: 285.5 LBS

## 2021-01-12 LAB
GLUCOSE SERPL-MCNC: 119 MG/DL (ref 65–140)
GLUCOSE SERPL-MCNC: 123 MG/DL (ref 65–140)

## 2021-01-12 PROCEDURE — 99238 HOSP IP/OBS DSCHRG MGMT 30/<: CPT | Performed by: NURSE PRACTITIONER

## 2021-01-12 PROCEDURE — 82948 REAGENT STRIP/BLOOD GLUCOSE: CPT

## 2021-01-12 PROCEDURE — 97110 THERAPEUTIC EXERCISES: CPT

## 2021-01-12 PROCEDURE — 97116 GAIT TRAINING THERAPY: CPT

## 2021-01-12 RX ADMIN — CHLORHEXIDINE GLUCONATE 0.12% ORAL RINSE 15 ML: 1.2 LIQUID ORAL at 09:09

## 2021-01-12 RX ADMIN — LISINOPRIL 10 MG: 10 TABLET ORAL at 09:09

## 2021-01-12 RX ADMIN — ACETAMINOPHEN 975 MG: 325 TABLET, FILM COATED ORAL at 06:12

## 2021-01-12 RX ADMIN — CYPROHEPTADINE HYDROCHLORIDE 4 MG: 4 TABLET ORAL at 09:09

## 2021-01-12 RX ADMIN — DOCUSATE SODIUM 100 MG: 100 CAPSULE, LIQUID FILLED ORAL at 09:09

## 2021-01-12 RX ADMIN — PANTOPRAZOLE SODIUM 40 MG: 40 TABLET, DELAYED RELEASE ORAL at 06:12

## 2021-01-12 RX ADMIN — CLOPIDOGREL BISULFATE 75 MG: 75 TABLET ORAL at 09:09

## 2021-01-12 RX ADMIN — ASPIRIN 325 MG: 325 TABLET, COATED ORAL at 09:09

## 2021-01-12 RX ADMIN — ENOXAPARIN SODIUM 40 MG: 40 INJECTION SUBCUTANEOUS at 09:09

## 2021-01-12 NOTE — PHYSICAL THERAPY NOTE
01/12/21 1100   PT Last Visit   PT Visit Date 01/12/21   Note Type   Note Type Treatment   Pain Assessment   Pain Assessment Tool Pain Assessment not indicated - pt denies pain   Pain Score No Pain   Pain Location/Orientation   (c/o headache around left ear area)   Restrictions/Precautions   Weight Bearing Precautions Per Order No   Other Precautions Fall Risk;Telemetry   General   Family/Caregiver Present No   Cognition   Overall Cognitive Status WFL   Orientation Level Oriented X4   Subjective   Subjective pt agreeable to particpate in PT tx today  Transfers   Sit to Stand 5  Supervision   Additional items Assist x 1   Stand to Sit 5  Supervision   Additional items Assist x 1   Ambulation/Elevation   Gait pattern Wide WAYNE; Excessively slow   Gait Assistance 5  Supervision   Additional items Assist x 1;Verbal cues   Assistive Device None   Distance 75'x2   Stair Management Assistance 5  Supervision   Additional items Assist x 1;Verbal cues   Stair Management Technique One rail R;Step to pattern; Foreward;Nonreciprocal   Number of Stairs 7   Balance   Static Sitting Fair +   Dynamic Sitting Fair   Static Standing Fair -   Dynamic Standing Fair -   Ambulatory Fair -   Higher level balance Tandem   Higher level balance rep range 10 reps;to the L and to the R  (also forward tandem walking)   Endurance Deficit   Endurance Deficit Yes   Endurance Deficit Description fatigue   Activity Tolerance   Activity Tolerance Patient tolerated treatment well;Patient limited by fatigue   Nurse Made Aware RN Cecilia Curl   Exercises   TKR Sitting;20 reps;AROM; Bilateral   Balance training  standing dynamic balance exercises : side stepping, tandem walking, high marching    Assessment   Prognosis Good   Problem List Decreased strength;Decreased endurance; Impaired balance;Decreased mobility   Assessment Pt was able to progress her endurance with gait training to 75'x2 this session  She completed 7 steps with stair training    Pt performed exercises while seated , and also performed dyanamic standing balance exercises with min assist of 1 to maintain balance  Plan is for pt to return home to previous setting with support from her family  Continue with current POC  Barriers to Discharge Decreased caregiver support   Goals   Patient Goals to go home   STG Expiration Date 01/24/21   PT Treatment Day 1   Plan   Treatment/Interventions Functional transfer training;LE strengthening/ROM; Elevations; Therapeutic exercise; Endurance training;Patient/family training;Equipment eval/education; Bed mobility;Gait training;Spoke to nursing   Progress Progressing toward goals   PT Frequency   (3-5x/week)   Recommendation   PT Discharge Recommendation Return to previous environment with social support   PT - OK to Discharge Yes   Kadeem Bhatia, PTA

## 2021-01-12 NOTE — PLAN OF CARE
Problem: Potential for Falls  Goal: Patient will remain free of falls  Description: INTERVENTIONS:  - Assess patient frequently for physical needs  -  Identify cognitive and physical deficits and behaviors that affect risk of falls    -  [place] fall precautions as indicated by assessment   - Educate patient/family on patient safety including physical limitations  - Instruct patient to call for assistance with activity based on assessment  - Modify environment to reduce risk of injury  - Consider OT/PT consult to assist with strengthening/mobility  1/12/2021 1139 by Syd Hernadez RN  Outcome: Adequate for Discharge  1/12/2021 0720 by Syd Hernadez RN  Outcome: Progressing     Problem: Prexisting or High Potential for Compromised Skin Integrity  Goal: Skin integrity is maintained or improved  Description: INTERVENTIONS:  - Identify patients at risk for skin breakdown  - Assess and monitor skin integrity  - Assess and monitor nutrition and hydration status  - Monitor labs   - Assess for incontinence   - Turn and reposition patient  - Assist with mobility/ambulation  - Relieve pressure over bony prominences  - Avoid friction and shearing  - Provide appropriate hygiene as needed including keeping skin clean and dry  - Evaluate need for skin moisturizer/barrier cream  - Collaborate with interdisciplinary team   - Patient/family teaching  - Consider wound care consult   1/12/2021 1139 by Syd Hernadez RN  Outcome: Adequate for Discharge  1/12/2021 0720 by Syd Hernadez RN  Outcome: Progressing     Problem: PAIN - ADULT  Goal: Verbalizes/displays adequate comfort level or baseline comfort level  Description: Interventions:  - Encourage patient to monitor pain and request assistance  - Assess pain using appropriate pain scale  - Administer analgesics based on type and severity of pain and evaluate response  - Implement non-pharmacological measures as appropriate and evaluate response  - Consider cultural and social influences on pain and pain management  - Notify physician/advanced practitioner if interventions unsuccessful or patient reports new pain  1/12/2021 1139 by Suzanne Cat RN  Outcome: Adequate for Discharge  1/12/2021 0720 by Suzanne Cat RN  Outcome: Progressing     Problem: INFECTION - ADULT  Goal: Absence or prevention of progression during hospitalization  Description: INTERVENTIONS:  - Assess and monitor for signs and symptoms of infection  - Monitor lab/diagnostic results  - Monitor all insertion sites, i e  indwelling lines, tubes, and drains  - [place] appropriate cooling/warming therapies per order  - Administer medications as ordered  - Instruct and encourage patient and family to use good hand hygiene technique  - Identify and instruct in appropriate isolation precautions for identified infection/condition  1/12/2021 1139 by Suzanne Cat RN  Outcome: Adequate for Discharge  1/12/2021 0720 by Suzanne Cat RN  Outcome: Progressing  Goal: Absence of fever/infection during neutropenic period  Description: INTERVENTIONS:  - Monitor WBC    1/12/2021 1139 by Suzanne Cat RN  Outcome: Adequate for Discharge  1/12/2021 0720 by Suzanne Cat RN  Outcome: Progressing     Problem: SAFETY ADULT  Goal: Patient will remain free of falls  Description: INTERVENTIONS:  - Assess patient frequently for physical needs  -  Identify cognitive and physical deficits and behaviors that affect risk of falls    -  [place] fall precautions as indicated by assessment   - Educate patient/family on patient safety including physical limitations  - Instruct patient to call for assistance with activity based on assessment  - Modify environment to reduce risk of injury  - Consider OT/PT consult to assist with strengthening/mobility  1/12/2021 1139 by Suzanne Cat RN  Outcome: Adequate for Discharge  1/12/2021 0720 by Suzanne Cat RN  Outcome: Progressing  Goal: Maintain or return to baseline ADL function  Description: INTERVENTIONS:  -  Assess patient's ability to carry out ADLs; assess patient's baseline for ADL function and identify physical deficits which impact ability to perform ADLs (bathing, care of mouth/teeth, toileting, grooming, dressing, etc )  - Assess/evaluate cause of self-care deficits   - Assess range of motion  - Assess patient's mobility; develop plan if impaired  - Assess patient's need for assistive devices and provide as appropriate  - Encourage maximum independence but intervene and supervise when necessary  - Involve family in performance of ADLs  - Assess for home care needs following discharge   - Consider OT consult to assist with ADL evaluation and planning for discharge  - Provide patient education as appropriate  1/12/2021 1139 by Jimbo Payton RN  Outcome: Adequate for Discharge  1/12/2021 0720 by Jimbo Payton RN  Outcome: Progressing  Goal: Maintain or return mobility status to optimal level  Description: INTERVENTIONS:  - Assess patient's baseline mobility status (ambulation, transfers, stairs, etc )    - Identify cognitive and physical deficits and behaviors that affect mobility  - Identify mobility aids required to assist with transfers and/or ambulation (gait belt, sit-to-stand, lift, walker, cane, etc )  - [place] fall precautions as indicated by assessment  - Record patient progress and toleration of activity level on Mobility SBAR; progress patient to next Phase/Stage  - Instruct patient to call for assistance with activity based on assessment  - Consider rehabilitation consult to assist with strengthening/weightbearing, etc   1/12/2021 1139 by Jimbo Payton RN  Outcome: Adequate for Discharge  1/12/2021 0720 by Jimbo Payton RN  Outcome: Progressing     Problem: DISCHARGE PLANNING  Goal: Discharge to home or other facility with appropriate resources  Description: INTERVENTIONS:  - Identify barriers to discharge w/patient and caregiver  - Arrange for needed discharge resources and transportation as appropriate  - Identify discharge learning needs (meds, wound care, etc )  - Arrange for interpretive services to assist at discharge as needed  - Refer to Case Management Department for coordinating discharge planning if the patient needs post-hospital services based on physician/advanced practitioner order or complex needs related to functional status, cognitive ability, or social support system  1/12/2021 1139 by Jun Patel RN  Outcome: Adequate for Discharge  1/12/2021 0720 by Jun Patel RN  Outcome: Progressing     Problem: Knowledge Deficit  Goal: Patient/family/caregiver demonstrates understanding of disease process, treatment plan, medications, and discharge instructions  Description: Complete learning assessment and assess knowledge base    Interventions:  - Provide teaching at level of understanding  - Provide teaching via preferred learning methods  1/12/2021 1139 by Jun Patel RN  Outcome: Adequate for Discharge  1/12/2021 0720 by Jun Patel RN  Outcome: Progressing     Problem: NEUROSENSORY - ADULT  Goal: Achieves stable or improved neurological status  Description: INTERVENTIONS  - Monitor and report changes in neurological status  - Monitor vital signs such as temperature, blood pressure, glucose, and any other labs ordered   - Initiate measures to prevent increased intracranial pressure  - Monitor for seizure activity and implement precautions if appropriate      1/12/2021 1139 by Jun Patel RN  Outcome: Adequate for Discharge  1/12/2021 0720 by Jun Patel RN  Outcome: Progressing  Goal: Remains free of injury related to seizures activity  Description: INTERVENTIONS  - Maintain airway, patient safety  and administer oxygen as ordered  - Monitor patient for seizure activity, document and report duration and description of seizure to physician/advanced practitioner  - If seizure occurs,  ensure patient safety during seizure  - Reorient patient post seizure  - Seizure pads on all 4 side rails  - Instruct patient/family to notify RN of any seizure activity including if an aura is experienced  - Instruct patient/family to call for assistance with activity based on nursing assessment  - Administer anti-seizure medications if ordered    1/12/2021 1139 by Jeanne Post RN  Outcome: Adequate for Discharge  1/12/2021 0720 by Jeanne Post RN  Outcome: Progressing  Goal: Achieves maximal functionality and self care  Description: INTERVENTIONS  - Monitor swallowing and airway patency with patient fatigue and changes in neurological status  - Encourage and assist patient to increase activity and self care     - Encourage visually impaired, hearing impaired and aphasic patients to use assistive/communication devices  1/12/2021 1139 by Jeanne Post RN  Outcome: Adequate for Discharge  1/12/2021 0720 by Jeanne Post RN  Outcome: Progressing

## 2021-01-12 NOTE — PLAN OF CARE
Problem: PHYSICAL THERAPY ADULT  Goal: Performs mobility at highest level of function for planned discharge setting  See evaluation for individualized goals  Description: Treatment/Interventions: Functional transfer training, LE strengthening/ROM, Therapeutic exercise, Endurance training, Patient/family training, Equipment eval/education, Bed mobility, Gait training, Spoke to nursing, Elevations          See flowsheet documentation for full assessment, interventions and recommendations  Outcome: Progressing  Note: Prognosis: Good  Problem List: Decreased strength, Decreased endurance, Impaired balance, Decreased mobility  Assessment: Pt was able to progress her endurance with gait training to 75'x2 this session  She completed 7 steps with stair training   Pt performed exercises while seated , and also performed dyanamic standing balance exercises with min assist of 1 to maintain balance  Plan is for pt to return home to previous setting with support from her family  Continue with current POC  Barriers to Discharge: Decreased caregiver support     PT Discharge Recommendation: Return to previous environment with social support     PT - OK to Discharge: Yes    See flowsheet documentation for full assessment

## 2021-01-12 NOTE — NURSING NOTE
Pt discharged home with personal belongings cell phone and  and home meds from pharmacy given back to pt  Bag# [id number] meds included are: tylenol, omeprazole, ASA, levothyroxine, clopidogrel, lisinopril

## 2021-01-12 NOTE — DISCHARGE INSTRUCTIONS
On 1/8/2021, you underwent a diagnostic cerebral angiogram with intervention under the care of Dr Jorge Friend for R sided aneurysm  ? The following instructions will help you care for yourself, or be cared for upon your return home today  These are guidelines for your care right after your surgery only  ? Notify Your Doctor or Nurse if you have any of the following:  ? SYMPTOMS OF WOUND INFECTION--   Increased pain in or around the incision   Swelling around the incision  Any drainage from the incision  Incision separates or opens up  Warmth in the tissues around the incision  Redness or tenderness on the skin near the incision   Fever (temperature greater than 101 degrees F)   ? NEUROLOGICAL CHANGES--  Change in alertness  Increased sleepiness   Nausea and vomiting   New onset of numbness or weakness in arms or legs   New problems with your bowels or bladder  New or worse problems with balance or walking  Seizures, new or worsening  ? UNRELIEVED HEADACHE PAIN--  New or increased pain unrelieved with pain medications   Pain associated with nausea and vomiting   Pain associated with other symptoms  ? QUESTIONS OR PROBLEMS--  Any questions or problems that you are unsure about  Wound Care:  Keep Incision Clean and Dry   You may shower daily, but do not soak incision  Pat dry after showering  No tub baths, soaking, swimming for 1 week after angiogram    You do not need to cover the incision  Mild to moderate bruising and tenderness to the site is expected and may last up to 1-2 weeks after your procedure  ?  A closure device was placed at the catheter insertion site  This is MRI compatible  Remove the dressing 24 hours after your procedure  If your groin site is bleeding, apply firm pressure for 10 minutes  Reinforce dressing rather than removing and checking frequently  If continues to bleed through the dressing after 1 hour, contact your neurosurgeon's office  Anticipatory Education:  ?   PAIN MED W/ Acetaminophen (Tylenol)  --IF a prescription for pain medicine has been sent home with you:  --Narcotic pain medication may cause constipation  Be sure to take stool softeners or laxatives while you are on narcotic pain medication  --Do not drive after taking prescription pain medicine  ?  If this medicine is too strong, or no longer necessary, or we did NOT recommend/prescribe oral narcotics, you may take:   - Tylenol Extra-strength/Acetaminophen, 2 tablets every 4-6 hours as needed for mild pain  DO NOT TAKE MORE THAN 4000MG PER DAY from combined sources  NOTE: Remember to eat when taking pain medicines in order to avoid nausea  Watch for constipation  Eat plenty of fruits, vegetables, juices, and drink 6-8 glasses of water each day  Constipation: Stay active and drink at least 6-8 cups of fluid each day to prevent constipation  If you need a laxative or stool softener follow the package directions or consult with your local pharmacists if you have questions  ? After anesthesia, rest for 24 hours  Do not drive, drink alcohol beverages or make any important decisions during this time  General anesthesia may cause sore throat, jaw discomfort or muscle aches  These symptoms can last for one or two days  Activity: Please follow these instructions:  Advance your activity as you can tolerate  You may do light house work; nothing strenuous   You may walk all you want  You may go up and down the steps  Use the railing for support  Do not do excessive bending, straining or heavy lifting for 48 hours after your procedure  Do not drive or return to work until you are instructed   It is normal for your energy level and sleep patterns to change after surgery  Get extra sleep at night and take naps during the day to help you feel less tired  Take rest periods during the day  Complete recovery may take several weeks  ?  You may resume driving after 38-51 hours recovery     You may return to work after 48 hours of recovery  ?  Diet:  Your doctor has recommended that you follow these diet instructions at home  Refer to the patient education materials you received during your hospital stay  If you would like more nutrition counseling, ask your doctor about making an appointment with an outpatient dietitian  Resume your home diet  ? Medications:  Please resume your home medications as instructed  ? Home Supplies and Equipment:  none  Additional Contacts:  ? CONTACTS FOR NEUROSURGERY: You may call your neurosurgeons office if you have questions between 8:30 am and 4:30 pm  You may request to speak to the nurse practitioner who is available Monday through Friday  ?  For off hours or the weekend you may call your neurosurgeon's office to leave a message

## 2021-01-12 NOTE — ASSESSMENT & PLAN NOTE
PPD4 pipeline embolization right ICA aneurysm  · Complicated by difficult airway  · Small left ICA aneurysm  · Continues to endorse right side headache with tragus pain and facial numbness - improved since yesterday  Non-focal exam  Dizziness with ambulation  Plan:  · Frequent neuro checks  · STAT CT head with decline in GCS > 2 pts in 1 hour  · Continue DAPT -  mg daily and Plavix 75 mg daily  · One time dose decadron 4 mg PO yesterday for headache with improved symptoms today  · Continue Tylenol 975 mg q8h for headache  · Mobilize with PT/OT recommending home with social support  · DVT ppx: SCDs, Lovenox  Plan of care discussed with MICHAEL Lomax  Neurosurgery will discharge patient  Please call with any questions or concerns

## 2021-01-12 NOTE — PLAN OF CARE
Problem: Potential for Falls  Goal: Patient will remain free of falls  Description: INTERVENTIONS:  - Assess patient frequently for physical needs  -  Identify cognitive and physical deficits and behaviors that affect risk of falls    -  [place] fall precautions as indicated by assessment   - Educate patient/family on patient safety including physical limitations  - Instruct patient to call for assistance with activity based on assessment  - Modify environment to reduce risk of injury  - Consider OT/PT consult to assist with strengthening/mobility  Outcome: Progressing     Problem: Prexisting or High Potential for Compromised Skin Integrity  Goal: Skin integrity is maintained or improved  Description: INTERVENTIONS:  - Identify patients at risk for skin breakdown  - Assess and monitor skin integrity  - Assess and monitor nutrition and hydration status  - Monitor labs   - Assess for incontinence   - Turn and reposition patient  - Assist with mobility/ambulation  - Relieve pressure over bony prominences  - Avoid friction and shearing  - Provide appropriate hygiene as needed including keeping skin clean and dry  - Evaluate need for skin moisturizer/barrier cream  - Collaborate with interdisciplinary team   - Patient/family teaching  - Consider wound care consult   Outcome: Progressing     Problem: PAIN - ADULT  Goal: Verbalizes/displays adequate comfort level or baseline comfort level  Description: Interventions:  - Encourage patient to monitor pain and request assistance  - Assess pain using appropriate pain scale  - Administer analgesics based on type and severity of pain and evaluate response  - Implement non-pharmacological measures as appropriate and evaluate response  - Consider cultural and social influences on pain and pain management  - Notify physician/advanced practitioner if interventions unsuccessful or patient reports new pain  Outcome: Progressing     Problem: INFECTION - ADULT  Goal: Absence or prevention of progression during hospitalization  Description: INTERVENTIONS:  - Assess and monitor for signs and symptoms of infection  - Monitor lab/diagnostic results  - Monitor all insertion sites, i e  indwelling lines, tubes, and drains  - [place] appropriate cooling/warming therapies per order  - Administer medications as ordered  - Instruct and encourage patient and family to use good hand hygiene technique  - Identify and instruct in appropriate isolation precautions for identified infection/condition  Outcome: Progressing  Goal: Absence of fever/infection during neutropenic period  Description: INTERVENTIONS:  - Monitor WBC    Outcome: Progressing     Problem: SAFETY ADULT  Goal: Patient will remain free of falls  Description: INTERVENTIONS:  - Assess patient frequently for physical needs  -  Identify cognitive and physical deficits and behaviors that affect risk of falls    -  [place] fall precautions as indicated by assessment   - Educate patient/family on patient safety including physical limitations  - Instruct patient to call for assistance with activity based on assessment  - Modify environment to reduce risk of injury  - Consider OT/PT consult to assist with strengthening/mobility  Outcome: Progressing  Goal: Maintain or return to baseline ADL function  Description: INTERVENTIONS:  -  Assess patient's ability to carry out ADLs; assess patient's baseline for ADL function and identify physical deficits which impact ability to perform ADLs (bathing, care of mouth/teeth, toileting, grooming, dressing, etc )  - Assess/evaluate cause of self-care deficits   - Assess range of motion  - Assess patient's mobility; develop plan if impaired  - Assess patient's need for assistive devices and provide as appropriate  - Encourage maximum independence but intervene and supervise when necessary  - Involve family in performance of ADLs  - Assess for home care needs following discharge   - Consider OT consult to assist with ADL evaluation and planning for discharge  - Provide patient education as appropriate  Outcome: Progressing  Goal: Maintain or return mobility status to optimal level  Description: INTERVENTIONS:  - Assess patient's baseline mobility status (ambulation, transfers, stairs, etc )    - Identify cognitive and physical deficits and behaviors that affect mobility  - Identify mobility aids required to assist with transfers and/or ambulation (gait belt, sit-to-stand, lift, walker, cane, etc )  - [place] fall precautions as indicated by assessment  - Record patient progress and toleration of activity level on Mobility SBAR; progress patient to next Phase/Stage  - Instruct patient to call for assistance with activity based on assessment  - Consider rehabilitation consult to assist with strengthening/weightbearing, etc   Outcome: Progressing     Problem: DISCHARGE PLANNING  Goal: Discharge to home or other facility with appropriate resources  Description: INTERVENTIONS:  - Identify barriers to discharge w/patient and caregiver  - Arrange for needed discharge resources and transportation as appropriate  - Identify discharge learning needs (meds, wound care, etc )  - Arrange for interpretive services to assist at discharge as needed  - Refer to Case Management Department for coordinating discharge planning if the patient needs post-hospital services based on physician/advanced practitioner order or complex needs related to functional status, cognitive ability, or social support system  Outcome: Progressing     Problem: Knowledge Deficit  Goal: Patient/family/caregiver demonstrates understanding of disease process, treatment plan, medications, and discharge instructions  Description: Complete learning assessment and assess knowledge base    Interventions:  - Provide teaching at level of understanding  - Provide teaching via preferred learning methods  Outcome: Progressing     Problem: NEUROSENSORY - ADULT  Goal: Achieves stable or improved neurological status  Description: INTERVENTIONS  - Monitor and report changes in neurological status  - Monitor vital signs such as temperature, blood pressure, glucose, and any other labs ordered   - Initiate measures to prevent increased intracranial pressure  - Monitor for seizure activity and implement precautions if appropriate      Outcome: Progressing  Goal: Remains free of injury related to seizures activity  Description: INTERVENTIONS  - Maintain airway, patient safety  and administer oxygen as ordered  - Monitor patient for seizure activity, document and report duration and description of seizure to physician/advanced practitioner  - If seizure occurs,  ensure patient safety during seizure  - Reorient patient post seizure  - Seizure pads on all 4 side rails  - Instruct patient/family to notify RN of any seizure activity including if an aura is experienced  - Instruct patient/family to call for assistance with activity based on nursing assessment  - Administer anti-seizure medications if ordered    Outcome: Progressing  Goal: Achieves maximal functionality and self care  Description: INTERVENTIONS  - Monitor swallowing and airway patency with patient fatigue and changes in neurological status  - Encourage and assist patient to increase activity and self care     - Encourage visually impaired, hearing impaired and aphasic patients to use assistive/communication devices  Outcome: Progressing

## 2021-01-12 NOTE — RESTORATIVE TECHNICIAN NOTE
Restorative Specialist Mobility Note       Activity: Ambulate in [place], Ambulate in room, Bathroom privileges, Chair, Dangle, Stand at bedside(Educated/encouraged pt to ambulate with assistance 3-4 x's/day  Bed alarm on   Pt callbell, phone/tray within reach )     Assistive Device: None          [employer] BS, Restorative Technician, [employer]

## 2021-01-12 NOTE — DISCHARGE SUMMARY
Discharge- Lisa Quiver 1955, 60 y o  female MRN: [id number]    Unit/Bed#: [facility] 729-01 Encounter: [id number]    Primary Care Provider: Annalee Kaur MD   Date and time admitted to hospital: 1/8/2021  6:54 AM        * Aneurysm of internal carotid artery  Assessment & Plan  PPD4 pipeline embolization right ICA aneurysm  · Complicated by difficult airway  · Small left ICA aneurysm  · Continues to endorse right side headache with tragus pain and facial numbness - improved since yesterday  Non-focal exam  Dizziness with ambulation  Plan:  · Frequent neuro checks  · STAT CT head with decline in GCS > 2 pts in 1 hour  · Continue DAPT -  mg daily and Plavix 75 mg daily  · One time dose decadron 4 mg PO yesterday for headache with improved symptoms today  · Continue Tylenol 975 mg q8h for headache  · Mobilize with PT/OT recommending home with social support  · DVT ppx: SCDs, Lovenox  Plan of care discussed with MICHAEL Willoughby Ego  Neurosurgery will discharge patient  Please call with any questions or concerns  Subjective/Objective   Chief Complaint: "My headache is better today "    Subjective: Endorses improved headache, earache and facial numbness today although still somewhat persistent  Denies N/V  Slept well overnight  Eraindra Vielka to go home  Objective: Exam non-focal  NAD sitting in recliner  I/O       01/10 0701 - 01/11 0700 01/11 0701 - 01/12 0700 01/12 0701 - 01/13 0700    P  O  940 840     I V  (mL/kg)       Total Intake(mL/kg) 940 (7 3) 840 (6 5)     Urine (mL/kg/hr) 1750 (0 6)      Total Output 1750      Net -810 +840            Unmeasured Urine Occurrence 1 x      Unmeasured Stool Occurrence 1 x            Invasive Devices     Peripheral Intravenous Line            Peripheral IV 01/08/21 Left Forearm 4 days    Peripheral IV 01/08/21 Left Hand 3 days                Physical Exam:  Vitals: Blood pressure 138/71, pulse 70, temperature 97 8 °F (36 6 °C), resp   rate 18, height 5' 4" (1 626 m), weight 129 kg (285 lb 7 9 oz), SpO2 95 %  ,Body mass index is 49 01 kg/m²  General appearance: alert, appears stated age, cooperative and no distress  Head: Normocephalic, without obvious abnormality, atraumatic  Eyes: EOMI, PERRL  Neck: supple, symmetrical, trachea midline and NT  Back: no kyphosis present, no tenderness to percussion or palpation  Lungs: non labored breathing  Heart: regular heart rate  Neurologic:   Mental status: Alert, oriented x3, thought content appropriate  Cranial nerves: grossly intact (Cranial nerves II-XII)  Sensory: normal to LT  Motor: moving all extremities without focal weakness, strength grossly 5/5  Coordination: finger to nose normal bilaterally, no drift bilaterally      Lab Results:  Results from last 7 days   Lab Units 01/09/21  0535 01/09/21  0019   WBC Thousand/uL 13 29*  --    HEMOGLOBIN g/dL 11 9  --    HEMATOCRIT % 37 6  --    PLATELETS Thousands/uL 301 298     Results from last 7 days   Lab Units 01/10/21  0528 01/09/21  0535 01/09/21  0019   POTASSIUM mmol/L 3 9 3 7 3 8   CHLORIDE mmol/L 112* 109* 109*   CO2 mmol/L 26 26 25   BUN mg/dL 12 10 11   CREATININE mg/dL 0 64 0 56* 0 51*   CALCIUM mg/dL 8 7 8 1* 8 4     Results from last 7 days   Lab Units 01/09/21  0535 01/08/21  1506   MAGNESIUM mg/dL 1 9 1 8     Results from last 7 days   Lab Units 01/09/21  0535   PHOSPHORUS mg/dL 3 1     Results from last 7 days   Lab Units 01/09/21  0535 01/08/21  1759   INR  1 05  --    PTT seconds 35 38*     No results found for: TROPONINT  ABG:No results found for: PHART, INS2VTR, PO2ART, XAM8VNU, O4KLKJJO, BEART, SOURCE    Imaging Studies: I have personally reviewed pertinent reports  and I have personally reviewed pertinent films in PACS    Xr Chest Portable Icu    Result Date: 1/8/2021  Impression: No acute cardiopulmonary disease  Workstation performed: [id number]       EKG, Pathology, and Other Studies: I have personally reviewed pertinent reports  VTE Pharmacologic Prophylaxis: Sequential compression device (Venodyne)  and Enoxaparin (Lovenox)    VTE Mechanical Prophylaxis: sequential compression device      Discharge Summary- Neurosurgery   Charis Neumann 60 y o  female MRN: [id number]  Unit/Bed#: [facility] 729-01 Encounter: [id number]      Admission Date:   Admission Orders (From admission, onward)     Ordered        01/08/21 0936  Inpatient Admission  Once                     Discharge Date: 1/12/2021    Admitting Diagnosis: Aneurysm of internal carotid artery [I67 1]  Cerebral aneurysm [I67 1]    Discharge Diagnosis: aneurysm of internal carotid artery  Resolved Problems  Date Reviewed: 11/6/2020    None          Attending Physician: Ted Diaz MD    Consulting Physician: Meena Tabares ([facility])    Procedures Performed: Pipeline embolization of internal carotid artery aneurysm  Pathology: N/A    Hospital Course: On 1/8/2021 the patient presented for elective arteriogram and pipeline embolization of right internal carotid artery aneurysm  The procedure was complicated by difficulty intubation requiring multiple times  The procedure was without complication and she was transferred to the PACU and then to the ICU in stable condition  Postoperatively she did experience some headaches and dizziness which improved with steroid therapy  She continued to progress and work with physical therapy and occupational therapy  She was discharged on postprocedure day 4 to her home  Condition at Discharge: good     Discharge Instructions/Information to Patient and Family:   See after visit summary for information provided to patient and family  Provisions for Follow-Up Care:  See after visit summary for information related to follow-up care and any pertinent home health orders  Disposition: Home    Planned Readmission: No    Discharge Statement   I spent 20 minutes discharging the patient  This time was spent on the day of discharge   I had direct contact with the patient on the day of discharge  Additional documentation is required if more than 30 minutes were spent on discharge  Discharge Medications:  See after visit summary for reconciled discharge medications provided to patient and family

## 2021-01-13 ENCOUNTER — TRANSITIONAL CARE MANAGEMENT (OUTPATIENT)
Dept: INTERNAL MEDICINE CLINIC | Facility: CLINIC | Age: 66
End: 2021-01-13

## 2021-01-14 NOTE — UTILIZATION REVIEW
Initial Clinical Review     Elective IP surgical procedure  Age/Sex: 60 y o  female  Surgery Date: 1/8/2021  Procedure:   Use of ultrasound for right radial access  Right  Internal Carotid Arteriogram  3D rotational arteriogram with post processed images reviewed at a separate workstation  Slow injection of spasmolytic agent, verapamil, over 10 minutes  Pipeline embolization right internal carotid artery aneurysm  Post procedural 3D rotational arteriogram with post processing at a separate workstation and virtual dilution of right internal carotid artery  Limited Right radial Arteriogram  Anesthesia: General  Operative Findings:   Impression:  Successful pipeline embolization of a right ICA aneurysm      POD#1 Progress Note: admit ICU post-op for close monitoring  C/o mild frontal headache, chest pain is subsiding and is reproducible to touch  Neuro checks q1h  Continue DAPT with asa, plavix  Hep sq/SCD's for DVT ppx  Continue protonix gtt  Telem monitoring  Continue lipitor  Zofran prn  Cons carb diet  I/O's  Fingerstick glucose checks w/ ssi    Monitor and replete electrolytes     Admission Orders: Date/Time/Statement:       Admission Orders (From admission, onward)              Ordered          01/08/21 0936   Inpatient Admission  Once                             Orders Placed This Encounter   Procedures    Inpatient Admission       Standing Status:   Standing       Number of Occurrences:   1       Order Specific Question:   Admitting Physician       Answer:   Hal Snow [[id number]]       Order Specific Question:   Level of Care       Answer:   Critical Care [15]       Order Specific Question:   Estimated length of stay       Answer:   Inpatient Only Surgery      Vital Signs:   Date/Time   Temp   Pulse   Resp   BP   MAP (mmHg)   Arterial Line BP   MAP   SpO2   O2 Flow Rate (L/min)   O2 Device   01/09/21 16:10:07   98 6 °F (37 °C)   74   18   135/73   94   --   --   92 %   --   --   01/09/21 1200   98 6 °F (37 °C)   80   16   116/61   75   --   --   93 %   --   --   01/09/21 1100   --   82   18   131/63   86   --   --   92 %   --   --   01/09/21 1000   --   86   26Abnormal    --   --   154/78   106 mmHg   92 %   --   None (Room air)   01/09/21 0900   --   90   20   --   --   148/76   100 mmHg   96 %   --   --   01/09/21 0800   98 6 °F (37 °C)   86   40Abnormal    --   --   160/78   106 mmHg   96 %   --   None (Room air)   01/09/21 0600   --   82   33Abnormal    --   --   164/80   108 mmHg   94 %   --   --   01/09/21 0500   98 2 °F (36 8 °C)   84   34Abnormal    --   --   150/68   94 mmHg   96 %   --   --   01/09/21 0400   98 6 °F (37 °C)   88   28Abnormal    --   --   148/70   96 mmHg   94 %   --   --   01/09/21 0000   98 2 °F (36 8 °C)   94   22   --   --   166/72   104 mmHg   95 %   --   --         Scheduled Medications:  aspirin, 325 mg, Oral, Daily  atorvastatin, 10 mg, Oral, Daily With Dinner  chlorhexidine, 15 mL, Swish & Spit, Q12H [facility]  clopidogrel, 75 mg, Oral, Daily  cyproheptadine, 4 mg, Oral, Daily  docusate sodium, 100 mg, Oral, BID  enoxaparin, 40 mg, Subcutaneous, Q24H [facility]  insulin lispro, 1-6 Units, Subcutaneous, TID AC  [START ON 1/11/2021] levothyroxine, 100 mcg, Oral, Once per day on Mon Thu  lisinopril, 10 mg, Oral, Daily  pantoprazole, 40 mg, Oral, Daily Before Breakfast     PRN Meds:  acetaminophen, 975 mg, Oral, Q8H PRN 1/8 x1, 1/9 x1  oxyCODONE, 2 5 mg, Oral, Q6H PRN 1/9 x2  phenol, 1 spray, Mouth/Throat, Q2H PRN           Network Utilization Review Department  ATTENTION: Please call with any questions or concerns to [phone] and carefully listen to the prompts so that you are directed to the right person  All voicemails are confidential   Me[facility] [place] all requests for admission clinical reviews, approved or denied determinations and any other requests to dedicated fax number below belonging to the campus where the patient is receiving treatment   List of dedicated fax numbers for the Facilities:  FACILITY NAME UR FAX NUMBER   ADMISSION DENIALS (Administrative/Medical Necessity) [phone]   [address] (Maternity/NICU/Pediatrics) [facility]  [phone]   Emilia Verma 8007 (  [address] [place] "Traci" 103) [address] [address] [facility] [phone]   [address] 951 [phone]

## 2021-01-15 ENCOUNTER — TELEPHONE (OUTPATIENT)
Dept: NEUROSURGERY | Facility: CLINIC | Age: 66
End: 2021-01-15

## 2021-01-15 NOTE — TELEPHONE ENCOUNTER
1st attempt - called Briana Heard on primary number s/p angio performed 1/8/2021  There was no answer left message to call back direct line  Will make second attempt if no callback is received

## 2021-01-21 NOTE — TELEPHONE ENCOUNTER
1/21/21 2nd attempt L/m on mobile phone in [address] [facility] confirming appointment on 1/25 at 2:30pm with Dr Brittany Coburn

## 2021-01-22 ENCOUNTER — TELEPHONE (OUTPATIENT)
Dept: NEUROSURGERY | Facility: CLINIC | Age: 66
End: 2021-01-22

## 2021-01-25 ENCOUNTER — OFFICE VISIT (OUTPATIENT)
Dept: NEUROSURGERY | Facility: CLINIC | Age: 66
End: 2021-01-25
Payer: COMMERCIAL

## 2021-01-25 VITALS
TEMPERATURE: 97 F | SYSTOLIC BLOOD PRESSURE: 128 MMHG | WEIGHT: 278 LBS | BODY MASS INDEX: 47.46 KG/M2 | HEIGHT: 64 IN | RESPIRATION RATE: 16 BRPM | DIASTOLIC BLOOD PRESSURE: 70 MMHG

## 2021-01-25 DIAGNOSIS — I67.1 CEREBRAL ANEURYSM: ICD-10-CM

## 2021-01-25 DIAGNOSIS — I67.1 ANEURYSM OF INTERNAL CAROTID ARTERY: Primary | ICD-10-CM

## 2021-01-25 PROCEDURE — 3008F BODY MASS INDEX DOCD: CPT | Performed by: INTERNAL MEDICINE

## 2021-01-25 PROCEDURE — 99214 OFFICE O/P EST MOD 30 MIN: CPT | Performed by: NEUROLOGICAL SURGERY

## 2021-01-25 PROCEDURE — 1036F TOBACCO NON-USER: CPT | Performed by: NEUROLOGICAL SURGERY

## 2021-01-25 PROCEDURE — 3008F BODY MASS INDEX DOCD: CPT | Performed by: NEUROLOGICAL SURGERY

## 2021-01-25 NOTE — PROGRESS NOTES
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  Approximately 2 weeks status post pipeline embolization procedure  She has been doing well  She has no new neurologic complaints  She continues on her dual anti-platelet therapy  We will continue this until 04/08/2021  I will see her back in 3 month time period with an MRA with and without contrast   After that she will need a formal six-month arteriogram      2   difficult airway  She required multiple intubation at times  We will be judicious about the use of general anesthetic in the future  3  Left posterior communicating artery infundibulum  We discussed the diagnosis of an infundibulum and how this is normal anatomy  We discussed that it was not aneurysmal in nature  There is no intervention required for this  Chief Complaint: Follow-up      HPI:   This is a very pleasant 61-year-old female who was admitted to the hospital after having progressive headaches over the course of months to weeks that became the worst headache she has ever had   This was also accompanied by neck pain   She was admitted to the hospital and evaluation revealed 4-5 mm right ICA aneurysm as well as a small left ICA aneurysm  Lori Coffman was referred to Neurosurgery for further evaluation  She is now 2 weeks status post pipeline embolization of her right ICA aneurysm  Her procedure was complicated by difficult intubation  She recovered well after this procedure went home on postprocedural day 2  She continues on her dual anti-platelet therapy, aspirin Plavix    We will continue this for a total of 3 months and then transition her to aspirin monotherapy  She require a 6 month arteriogram to elucidate treatment response  Plan for 3 month MRI as well      Her past medical history is significant for borderline diabetes, hypertension, sleep apnea, hyperlipidemia  Francoise Caldwell has had an L5 diskectomy in the past and cholecystectomy      She is allergic to Tagamet      She is currently   Francoise Caldwell has 3child, 28-year-old male   She quit smoking in 2003  She denies any alcohol use   She was previously in       There is no family history of subarachnoid hemorrhage or aneurysm  Bg David is no family history of sudden death  Bg David is family history of stroke and MI      Review of systems obtained by the MA reviewed and updated below  Review of Systems   Constitutional: Negative  HENT: Positive for tinnitus  Eyes: Positive for visual disturbance (blurry)  Respiratory: Negative  Cardiovascular: Negative  Gastrointestinal: Negative  Endocrine: Negative  Genitourinary: Negative  Musculoskeletal: Positive for arthralgias (ankle and knee issues)  Negative for back pain, gait problem, myalgias and neck pain  Skin: Negative  Allergic/Immunologic: Negative  Neurological: Positive for weakness (feels a little weak since procedure), numbness (legs and arms) and headaches (light headaches, almost everyday)  Negative for dizziness and light-headedness  Hematological: Bruises/bleeds easily (medication)  Psychiatric/Behavioral: Negative  Physical Exam  Vitals:    01/25/21 1431   BP: 128/70   Resp: 16   Temp: (!) 97 °F (36 1 °C)   She is well appearing  Affect is appropriate  Body mass index is 47 72 kg/m²  [facility] She is awake alert and oriented  Hearing and vision are grossly intact  Her pupils are equal round reactive to light  Her extraocular movements are intact  Her face is symmetric  Tongue is midline  Facial sensation is intact and symmetric throughout  Shoulder shrug is 5/5    There is no drift or dysmetria  She has full strength in her bilateral upper and lower extremities  She has normal muscle tone muscle bulk  Her biceps reflexes and patellar reflexes are 2+ and symmetric  Fiorella sign negative bilaterally  Sensation intact to light touch and pinprick throughout  Her gait is normal      Her heart rate is regular  Normal resp effort  2+ radial pulses no carotid bruit        The following portions of the patient's history were reviewed and updated as appropriate: allergies, current medications, past family history, past medical history, past social history, past surgical history and problem list       Active Ambulatory Problems     Diagnosis Date Noted    Carpal tunnel syndrome, bilateral 08/15/2017    Cervical spondylosis without myelopathy 04/26/2016    Degenerative cervical disc 05/04/2016    GERD (gastroesophageal reflux disease) 06/22/2017    Hypertension 10/19/2012    Hypothyroidism 06/14/2013    Morbid obesity ([facility] [address] ) 05/07/2014    Obstructive sleep apnea 06/14/2013    Type 2 diabetes mellitus without complication ([facility] ) 82/26/8847    Cervical cancer screening 02/26/2018    Lung nodule < 6cm on CT 02/26/2018    Chest pain 05/17/2018    Thickened endometrium 06/15/2018    Healthcare maintenance 11/11/2018    Sinusitis 11/12/2018    Aneurysm of internal carotid artery 02/22/2020    Orthostatic hypotension 02/25/2020    Gait instability 02/27/2020    Other headache syndrome 01/07/2021    Chronic daily headache 01/07/2021    Cerebral aneurysm 01/07/2021    Difficult intubation 01/08/2021     Resolved Ambulatory Problems     Diagnosis Date Noted    Dizziness 08/03/2018    Encounter for annual routine gynecological examination 08/21/2018    RUQ pain 11/12/2018     Past Medical History:   Diagnosis Date    Arthritis     Depression     Disease of thyroid gland     Edema     Hypercholesterolemia     Hyperlipidemia     Other muscle spasm     Ovarian cyst 2006    Renal calculi      (spontaneous vaginal delivery)        Past Surgical History:   Procedure Laterality Date    BACK SURGERY      HERNIATED DISC (L4/L5)    CHOLECYSTECTOMY      IR CEREBRAL ANGIOGRAPHY  2020    IR CEREBRAL ANGIOGRAPHY / INTERVENTION  2021    TONSILLECTOMY           Current Outpatient Medications:     acetaminophen (Tylenol 8 Hour Arthritis Pain) 650 mg CR tablet, Take 1 tablet (650 mg total) by mouth every 8 (eight) hours as needed for mild pain, Disp: 90 tablet, Rfl: 3    aspirin (ECOTRIN) 325 mg EC tablet, Take 1 tablet (325 mg total) by mouth daily Start 7 days prior to procedure, Disp: 30 tablet, Rfl: 0    atorvastatin (LIPITOR) 10 mg tablet, Take 1 tablet (10 mg total) by mouth daily, Disp: 90 tablet, Rfl: 1    calcium carbonate (Tums) 500 mg chewable tablet, Chew as needed, Disp: , Rfl:     Cholecalciferol (VITAMIN D HIGH POTENCY PO), Take by mouth   Takes 1x/week, Disp: , Rfl:     clopidogrel (PLAVIX) 75 mg tablet, Take 1 tablet (75 mg total) by mouth daily Start taking 7 days prior to procedure, Disp: 90 tablet, Rfl: 0    cyproheptadine (PERIACTIN) 4 mg tablet, Take 1 tablet (4 mg total) by mouth daily, Disp: 30 tablet, Rfl: 3    fluticasone (FLONASE) 50 mcg/act nasal spray, 1 spray into each nostril daily, Disp: 16 g, Rfl: 5    levothyroxine 100 mcg tablet, Take 1 tablet (100 mcg total) by mouth 2 (two) times a week (Patient taking differently: Take 100 mcg by mouth 2 (two) times a week Sat&sun), Disp: 24 tablet, Rfl: 3    Levothyroxine Sodium 88 MCG CAPS, Take 1 capsule (88 mcg total) by mouth daily (Patient taking differently: Take 88 mcg by mouth daily mon-fri), Disp: 90 capsule, Rfl: 3    lisinopril (ZESTRIL) 10 mg tablet, Take 1 tablet (10 mg total) by mouth daily, Disp: 90 tablet, Rfl: 0    loratadine (CLARITIN) 10 mg tablet, Take 1 tablet (10 mg total) by mouth daily as needed for allergies, Disp: 90 tablet, Rfl: 1    omeprazole (PriLOSEC) 20 mg delayed release capsule, Take 1 capsule (20 mg total) by mouth daily, Disp: 90 capsule, Rfl: 1    protriptyline (VIVACTIL) 10 mg tablet, Take 1 tablet (10 mg total) by mouth daily at bedtime, Disp: 30 tablet, Rfl: 3    Results/Data: We reviewed arteriogram in details of report

## 2021-02-04 DIAGNOSIS — I67.1 ANEURYSM OF INTERNAL CAROTID ARTERY: ICD-10-CM

## 2021-02-05 RX ORDER — CLOPIDOGREL BISULFATE 75 MG/1
75 TABLET ORAL DAILY
Qty: 90 TABLET | Refills: 0 | Status: SHIPPED | OUTPATIENT
Start: 2021-02-05 | End: 2021-05-07

## 2021-02-05 RX ORDER — ASPIRIN 325 MG
325 TABLET, DELAYED RELEASE (ENTERIC COATED) ORAL DAILY
Qty: 30 TABLET | Refills: 0 | Status: SHIPPED | OUTPATIENT
Start: 2021-02-05 | End: 2021-03-10

## 2021-02-26 ENCOUNTER — TELEPHONE (OUTPATIENT)
Dept: NEUROSURGERY | Facility: CLINIC | Age: 66
End: 2021-02-26

## 2021-02-26 NOTE — TELEPHONE ENCOUNTER
Received a call from [facility] requesting premedication prior to her MRA scheduled for 3/26/2021  Attempted to return her call  Unable to leave vm due to mailbox being full  Option to send SMS was selected   Will await a callback discuss her request

## 2021-03-10 DIAGNOSIS — I67.1 ANEURYSM OF INTERNAL CAROTID ARTERY: ICD-10-CM

## 2021-03-10 RX ORDER — ASPIRIN 325 MG
325 TABLET, DELAYED RELEASE (ENTERIC COATED) ORAL DAILY
Qty: 30 TABLET | Refills: 0 | OUTPATIENT
Start: 2021-03-10 | End: 2021-04-09 | Stop reason: SDUPTHER

## 2021-03-10 RX ORDER — ASPIRIN 325 MG
TABLET, DELAYED RELEASE (ENTERIC COATED) ORAL
Qty: 30 TABLET | Refills: 0 | Status: SHIPPED | OUTPATIENT
Start: 2021-03-10 | End: 2021-03-10 | Stop reason: SDUPTHER

## 2021-03-10 NOTE — TELEPHONE ENCOUNTER
Patient called requesting a refill of her asa 325mg which she takes daily  She said that she ran out after today's dose  Per Dr Gonzalez [place] last office, patient should continue this until 4/8/21 when he sees her back in the office    Script phoned into patient's [facility] - [address] [place] [address] - [facility] pharmacy per her request

## 2021-03-18 ENCOUNTER — TELEPHONE (OUTPATIENT)
Dept: NEUROSURGERY | Facility: CLINIC | Age: 66
End: 2021-03-18

## 2021-03-18 NOTE — TELEPHONE ENCOUNTER
Patient called nurseline  Patient speaks Adam Mora MA returned call to patient to get more information  States she had a nose bleed, nauseous and a severe headache  Headache resolved and patient layed down  When she woke up nose bleed had stopped  Spoke with EMMIE MCMULLEN who recommended patient put vasaline in her nose TID, get a humidifier and go to the ED if nose bleeds continue  KRISTINE FRANCISCO will return call to patient to update her of these recommendations

## 2021-03-18 NOTE — TELEPHONE ENCOUNTER
Called patient back to check on the reason for calling  She states that she called because she was scared due to sudden onset of nosebleed and bloody taste in her mouth with associated nausea  She states that she noticed something dripping from her nose and when she checked it was blood  She has constant mild headaches every day but in that moment she felt a severe headache that did not last long and that's when she called the office  Since she did not get a call back right away she went to lay down on her side for a while and she fell asleep  When she woke up she tried calling the office again without an answer  When she was going to lay down again she received our call  During our call she also stated that she feels like her head and face is hot constantly, described as when you are outside under the sun  I advised her I would review this with the nurses and would call her back before the end of the day  I relayed information to [employer] who reviewed with Wendi (BENEDICT)  Wendi recommended the followin  Vaseline in nose 3x/day  2  Can get humidifier  3  Go to ER if nose bleeds continue  4  Check temp  to see if she has a fever because of feeling hot  I called patient back and gave her our recommendations  I asked if she had taken her temp  today and she stated that she did and it was 100 4  Elva was right next to me and advised me to ask the patient to keep an eye out on temp  and take it at least 2-3x/day  I advised the patient if she did not feel better after a few days to call us or contact her PCP if her fever goes above 104  Patient verbalized understanding and appreciated the call

## 2021-03-22 DIAGNOSIS — F40.240 CLAUSTROPHOBIA: Primary | ICD-10-CM

## 2021-03-22 RX ORDER — ALPRAZOLAM 0.5 MG/1
TABLET ORAL
Qty: 2 TABLET | Refills: 0 | Status: SHIPPED | OUTPATIENT
Start: 2021-03-22 | End: 2022-07-25 | Stop reason: ALTCHOICE

## 2021-03-22 NOTE — TELEPHONE ENCOUNTER
Contacted patient to confirm pharmacy to send her MRI pre-medication  Advised will send this to her confirmed pharmacy  Encouraged her to call with questions  She was appreciative

## 2021-03-25 ENCOUNTER — TELEMEDICINE (OUTPATIENT)
Dept: NEUROLOGY | Facility: CLINIC | Age: 66
End: 2021-03-25
Payer: COMMERCIAL

## 2021-03-25 ENCOUNTER — TELEPHONE (OUTPATIENT)
Dept: NEUROLOGY | Facility: CLINIC | Age: 66
End: 2021-03-25

## 2021-03-25 DIAGNOSIS — G43.709 CHRONIC MIGRAINE WITHOUT AURA WITHOUT STATUS MIGRAINOSUS, NOT INTRACTABLE: Primary | ICD-10-CM

## 2021-03-25 PROCEDURE — 99443 PR PHYS/QHP TELEPHONE EVALUATION 21-30 MIN: CPT | Performed by: PHYSICIAN ASSISTANT

## 2021-03-25 RX ORDER — DIVALPROEX SODIUM 250 MG/1
250 TABLET, DELAYED RELEASE ORAL
Qty: 30 TABLET | Refills: 4 | Status: SHIPPED | OUTPATIENT
Start: 2021-03-25 | End: 2021-09-14

## 2021-03-25 NOTE — ASSESSMENT & PLAN NOTE
· Pt continues to experience daily migraines despite cyproheptadine and protriptyline  · She is willing to try valproic acid  She will take 250mg at bedtime  It may help her sleep as well  · Side effects were reviewed  I have spent 25 minutes with Patient  today in which greater than 50% of this time was spent in counseling/coordination of care regarding Diagnostic results, Prognosis, Risks and benefits of tx options, Intructions for management, Patient and family education, Importance of tx compliance, Risk factor reductions and Impressions  She will follow-up in 4 months

## 2021-03-25 NOTE — TELEPHONE ENCOUNTER
Called PT to review chart prior to apt with Hoang Oliveira  No answer, left message asking PT to call back to review Chart  2nd attempt 9:52am calling PT to review chart  No answer, left message asking pt to call back to go over chart  3rd attempt was able to reach pt and update chart

## 2021-03-25 NOTE — PATIENT INSTRUCTIONS
Chronic migraine without aura without status migrainosus, not intractable  · Pt continues to experience daily migraines despite cyproheptadine and protriptyline  · She is willing to try valproic acid  She will take 250mg at bedtime  It may help her sleep as well  · Side effects were reviewed  I have spent 25 minutes with Patient  today in which greater than 50% of this time was spent in counseling/coordination of care regarding Diagnostic results, Prognosis, Risks and benefits of tx options, Intructions for management, Patient and family education, Importance of tx compliance, Risk factor reductions and Impressions  She will follow-up in 4 months

## 2021-03-25 NOTE — PROGRESS NOTES
Virtual Brief Visit    Assessment/Plan:    Problem List Items Addressed This Visit        Cardiovascular and Mediastinum    Chronic migraine without aura without status migrainosus, not intractable - Primary     · Pt continues to experience daily migraines despite cyproheptadine and protriptyline  · She is willing to try valproic acid  She will take 250mg at bedtime  It may help her sleep as well  · Side effects were reviewed  I have spent 25 minutes with Patient  today in which greater than 50% of this time was spent in counseling/coordination of care regarding Diagnostic results, Prognosis, Risks and benefits of tx options, Intructions for management, Patient and family education, Importance of tx compliance, Risk factor reductions and Impressions  She will follow-up in 4 months  Relevant Medications    divalproex sodium (DEPAKOTE) 250 mg EC tablet              Chronic migraine without aura without status migrainosus, not intractable  · Pt continues to experience daily migraines despite cyproheptadine and protriptyline  · She is willing to try valproic acid  She will take 250mg at bedtime  It may help her sleep as well  · Side effects were reviewed  I have spent 25 minutes with Patient  today in which greater than 50% of this time was spent in counseling/coordination of care regarding Diagnostic results, Prognosis, Risks and benefits of tx options, Intructions for management, Patient and family education, Importance of tx compliance, Risk factor reductions and Impressions  She will follow-up in 4 months  Reason for visit is   Chief Complaint   Patient presents with    Virtual Brief Visit        Encounter provider Bryan To PA-C    Provider located at [address] [id number] [address] [facility]    Recent Visits  No visits were found meeting these conditions     Showing recent visits within past 7 days and meeting all other requirements     Today's Visits  Date Type Provider Dept   03/25/21 Telephone Alesha Conner MA [facility]   03/25/21 Telemedicine Carolynn Alba PA-C [facility]   Showing today's visits and meeting all other requirements     Future Appointments  No visits were found meeting these conditions  Showing future appointments within next 150 days and meeting all other requirements        After connecting through telephone, the patient was identified by name and date of birth  Charis Neumann was informed that this is a telemedicine visit and that the visit is being conducted through telephone  My office door was closed  No one else was in the room  She acknowledged consent and understanding of privacy and security of the platform  The patient has agreed to participate and understands she can discontinue the visit at any time  It was my intent to perform this visit via video technology but the patient was not able to do a video connection so the visit was completed via audio telephone only  Patient is aware this is a billable service  Subjective    Blane Plascencia is a 73 y o  female, with Rt ophthalmic artery aneurysm s/p repair, hypothyroidism, allergic rhinitis, anxiety, hyperlipdiemia and HTN, who follows in the office due to headaches  HPI     She was last seen by Dr Johann Epstien and started on cyproheptadine and protriptyline  She reports that the cyproheptadine helps her get to sleep at night but she has not noticed any improvement in headaches  She has tried baclofen, nortriptyline and gabapentin in the past  She reports that she has had daily headaches for the past 30 years  The headaches ar occipital and temporal in location  She describes the headache as pressure, shooting, nausea, difficulty sleeping, tinnitus and distal paresthesias  Headaches can cause her to miss social or family activities  She cannot identify and triggers for the headaches      Past Medical History:   Diagnosis Date    Arthritis     Depression     Disease of thyroid gland     hypo    Edema     LAST ASSESSED:     GERD (gastroesophageal reflux disease)     Hypercholesterolemia     Hyperlipidemia     Hypertension     WELL CONTROLLED  CURRENTLY ON LISINOPRIL  LAST ASSESSED:     Other headache syndrome     Other muscle spasm     LAST ASSESSED:     Ovarian cyst 2006    Renal calculi      (spontaneous vaginal delivery) 1975    FEMALE       Past Surgical History:   Procedure Laterality Date    BACK SURGERY      HERNIATED DISC (L4/L5)    CHOLECYSTECTOMY      IR CEREBRAL ANGIOGRAPHY  2020    IR CEREBRAL ANGIOGRAPHY / INTERVENTION  2021    TONSILLECTOMY         Current Outpatient Medications   Medication Sig Dispense Refill    acetaminophen (Tylenol 8 Hour Arthritis Pain) 650 mg CR tablet Take 1 tablet (650 mg total) by mouth every 8 (eight) hours as needed for mild pain 90 tablet 3    ALPRAZolam (XANAX) 0 5 mg tablet Take 1 tab 2 hours prior to MRI  May take another tab 30 minutes prior if needed   2 tablet 0    aspirin (ECOTRIN) 325 mg EC tablet Take 1 tablet (325 mg total) by mouth daily 30 tablet 0    atorvastatin (LIPITOR) 10 mg tablet Take 1 tablet (10 mg total) by mouth daily 90 tablet 1    calcium carbonate (Tums) 500 mg chewable tablet Chew as needed      clopidogrel (PLAVIX) 75 mg tablet Take 1 tablet (75 mg total) by mouth daily Start taking 7 days prior to procedure 90 tablet 0    cyproheptadine (PERIACTIN) 4 mg tablet Take 1 tablet (4 mg total) by mouth daily 30 tablet 3    Levothyroxine Sodium 88 MCG CAPS Take 1 capsule (88 mcg total) by mouth daily (Patient taking differently: Take 88 mcg by mouth daily mon-fri) 90 capsule 3    lisinopril (ZESTRIL) 10 mg tablet Take 1 tablet (10 mg total) by mouth daily 90 tablet 0    loratadine (CLARITIN) 10 mg tablet Take 1 tablet (10 mg total) by mouth daily as needed for allergies 90 tablet 1    protriptyline (VIVACTIL) 10 mg tablet Take 1 tablet (10 mg total) by mouth daily at bedtime 30 tablet 3    Cholecalciferol (VITAMIN D HIGH POTENCY PO) Take by mouth  Takes 1x/week      fluticasone (FLONASE) 50 mcg/act nasal spray 1 spray into each nostril daily 16 g 5    levothyroxine 100 mcg tablet Take 1 tablet (100 mcg total) by mouth 2 (two) times a week (Patient taking differently: Take 100 mcg by mouth 2 (two) times a week Sat&sun) 24 tablet 3    omeprazole (PriLOSEC) 20 mg delayed release capsule Take 1 capsule (20 mg total) by mouth daily 90 capsule 1     No current facility-administered medications for this visit  Allergies   Allergen Reactions    Tagamet [Cimetidine] Hives       Review of Systems   Constitutional: Negative  Negative for appetite change and fever  HENT: Negative  Negative for hearing loss, tinnitus, trouble swallowing and voice change  Eyes: Positive for visual disturbance  Negative for photophobia and pain  Blurry vision   Respiratory: Negative  Negative for shortness of breath  Cardiovascular: Negative  Negative for palpitations  Gastrointestinal: Negative  Negative for nausea and vomiting  Endocrine: Negative  Negative for cold intolerance  Genitourinary: Negative  Negative for dysuria, frequency and urgency  Musculoskeletal: Negative  Negative for myalgias and neck pain  Skin: Negative  Negative for rash  Neurological: Positive for headaches  Negative for dizziness, tremors, seizures, syncope, facial asymmetry, speech difficulty, weakness, light-headedness and numbness  Hematological: Negative  Does not bruise/bleed easily  Psychiatric/Behavioral: Negative  Negative for confusion, hallucinations and sleep disturbance  All other systems reviewed and are negative  There were no vitals filed for this visit        VIRTUAL VISIT DISCLAIMER    Aureliano Perkins acknowledges that she has consented to an online visit or consultation  She understands that the online visit is based solely on information provided by her, and that, in the absence of a face-to-face physical evaluation by the physician, the diagnosis she receives is both limited and provisional in terms of accuracy and completeness  This is not intended to replace a full medical face-to-face evaluation by the physician  Charis Neumann understands and accepts these terms

## 2021-03-26 ENCOUNTER — HOSPITAL ENCOUNTER (OUTPATIENT)
Dept: RADIOLOGY | Facility: HOSPITAL | Age: 66
Discharge: HOME | End: 2021-03-26
Attending: NEUROLOGICAL SURGERY

## 2021-03-26 DIAGNOSIS — I67.1 CEREBRAL ANEURYSM: ICD-10-CM

## 2021-03-26 DIAGNOSIS — I67.1 ANEURYSM OF INTERNAL CAROTID ARTERY: ICD-10-CM

## 2021-04-09 DIAGNOSIS — I67.1 ANEURYSM OF INTERNAL CAROTID ARTERY: ICD-10-CM

## 2021-04-09 RX ORDER — ASPIRIN 325 MG
TABLET, DELAYED RELEASE (ENTERIC COATED) ORAL
Qty: 30 TABLET | Refills: 0 | Status: SHIPPED | OUTPATIENT
Start: 2021-04-09 | End: 2021-09-13

## 2021-04-09 RX ORDER — ASPIRIN 325 MG
325 TABLET, DELAYED RELEASE (ENTERIC COATED) ORAL DAILY
Qty: 90 TABLET | Refills: 0 | Status: SHIPPED | OUTPATIENT
Start: 2021-04-09 | End: 2021-08-17

## 2021-04-09 NOTE — TELEPHONE ENCOUNTER
Patient called service requesting refill of ASA  Placed order for 325mg ASA to be eRx to preferred pharmacy

## 2021-04-27 ENCOUNTER — TELEPHONE (OUTPATIENT)
Dept: NEUROSURGERY | Facility: CLINIC | Age: 66
End: 2021-04-27

## 2021-04-27 NOTE — TELEPHONE ENCOUNTER
Spoke to patient's brother Chikis Lovett and informed him that we are cancelling Blane's appointment for tomorrow with Dr Fabrice Contreras ordered MRA head to be done prior to her appointment and study was cancelled by patient  I told him she could call to reschedule once MRA is Scheduled to be done  Without MRA there would be no point in coming to appointment  I gave him our office number and my Ext  For call back

## 2021-05-01 ENCOUNTER — HOSPITAL ENCOUNTER (EMERGENCY)
Facility: HOSPITAL | Age: 66
Discharge: HOME | End: 2021-05-01
Attending: EMERGENCY MEDICINE | Admitting: EMERGENCY MEDICINE
Payer: COMMERCIAL

## 2021-05-01 VITALS
HEART RATE: 81 BPM | DIASTOLIC BLOOD PRESSURE: 103 MMHG | TEMPERATURE: 97.9 F | OXYGEN SATURATION: 96 % | RESPIRATION RATE: 16 BRPM | SYSTOLIC BLOOD PRESSURE: 164 MMHG

## 2021-05-01 DIAGNOSIS — S61.219A FINGER LACERATION: Primary | ICD-10-CM

## 2021-05-01 PROCEDURE — 12001 RPR S/N/AX/GEN/TRNK 2.5CM/<: CPT | Performed by: EMERGENCY MEDICINE

## 2021-05-01 PROCEDURE — 99282 EMERGENCY DEPT VISIT SF MDM: CPT | Performed by: EMERGENCY MEDICINE

## 2021-05-01 PROCEDURE — 99282 EMERGENCY DEPT VISIT SF MDM: CPT

## 2021-05-01 PROCEDURE — 90471 IMMUNIZATION ADMIN: CPT

## 2021-05-01 PROCEDURE — 90715 TDAP VACCINE 7 YRS/> IM: CPT | Performed by: EMERGENCY MEDICINE

## 2021-05-01 RX ORDER — LIDOCAINE HYDROCHLORIDE AND EPINEPHRINE 10; 10 MG/ML; UG/ML
10 INJECTION, SOLUTION INFILTRATION; PERINEURAL ONCE
Status: COMPLETED | OUTPATIENT
Start: 2021-05-01 | End: 2021-05-01

## 2021-05-01 RX ADMIN — TETANUS TOXOID, REDUCED DIPHTHERIA TOXOID AND ACELLULAR PERTUSSIS VACCINE, ADSORBED 0.5 ML: 5; 2.5; 8; 8; 2.5 SUSPENSION INTRAMUSCULAR at 19:54

## 2021-05-01 RX ADMIN — LIDOCAINE HYDROCHLORIDE,EPINEPHRINE BITARTRATE 10 ML: 10; .01 INJECTION, SOLUTION INFILTRATION; PERINEURAL at 19:10

## 2021-05-01 NOTE — DISCHARGE INSTRUCTIONS
Have your sutures removed in 7 days  Recommend Tylenol and ibuprofen for pain    Return to the emergency department if you experience worsening symptoms including worsening pain, if bleeding is uncontrolled, if you have any numbness or tingling, if you develop an area of redness around the wound that is warm to the touch, or if you develop fevers

## 2021-05-01 NOTE — ED ATTENDING ATTESTATION
5/1/2021  Elena Myrick DO, saw and evaluated the patient  I have discussed the patient with the resident/non-physician practitioner and agree with the resident's/non-physician practitioner's findings, Plan of Care, and MDM as documented in the resident's/non-physician practitioner's note, except where noted  All available labs and Radiology studies were reviewed  I was present for key portions of any procedure(s) performed by the resident/non-physician practitioner and I was immediately available to provide assistance  At this point I agree with the current assessment done in the Emergency Department  I have conducted an independent evaluation of this patient a history and physical is as follows:    72 yo RHD female presents for laceration to palmar aspect proximal phalanx L index finger  Continues to ooze  Pt takes ASA, plavix  Denies focal weakness/numbness/tingling  Wound continues to ooze despite holding pressure over digital arteries  It is quite proximal on the index finger  Recommend digital block, primary closure using sutures        ED Course         Critical Care Time  Procedures

## 2021-05-01 NOTE — ED PROVIDER NOTES
History  Chief Complaint   Patient presents with    Finger Laceration     L index finger lac from kitchen knife  tetanus not utd     58-year-old right-hand-dominant female presents for evaluation of left index finger laceration  Patient states she accidentally cut her left index finger with a kitchen knife  She states she wrapped it in a Band-Aid and with gauze however it continued to ooze which prompted her evaluation  She endorses pain at the laceration site  She denies any decreased range of motion, numbness, or tingling  She states her tetanus is not up-to-date  Prior to Admission Medications   Prescriptions Last Dose Informant Patient Reported? Taking? ALPRAZolam (XANAX) 0 5 mg tablet   No No   Sig: Take 1 tab 2 hours prior to MRI  May take another tab 30 minutes prior if needed  Cholecalciferol (VITAMIN D HIGH POTENCY PO)  Self Yes No   Sig: Take by mouth   Takes 1x/week   Levothyroxine Sodium 88 MCG CAPS  Self No No   Sig: Take 1 capsule (88 mcg total) by mouth daily   Patient taking differently: Take 88 mcg by mouth daily mon-fri   acetaminophen (Tylenol 8 Hour Arthritis Pain) 650 mg CR tablet  Self No No   Sig: Take 1 tablet (650 mg total) by mouth every 8 (eight) hours as needed for mild pain   aspirin (ECOTRIN) 325 mg EC tablet   No No   Sig: TAKE 1 TABLET BY MOUTH DAILY START SEVEN DAYS PRIOR TO PROCEDURE   aspirin (ECOTRIN) 325 mg EC tablet   No No   Sig: Take 1 tablet (325 mg total) by mouth daily   atorvastatin (LIPITOR) 10 mg tablet  Self No No   Sig: Take 1 tablet (10 mg total) by mouth daily   calcium carbonate (Tums) 500 mg chewable tablet  Self Yes No   Sig: Chew as needed   clopidogrel (PLAVIX) 75 mg tablet   No No   Sig: Take 1 tablet (75 mg total) by mouth daily Start taking 7 days prior to procedure   divalproex sodium (DEPAKOTE) 250 mg EC tablet   No No   Sig: Take 1 tablet (250 mg total) by mouth daily at bedtime   fluticasone (FLONASE) 50 mcg/act nasal spray   No No Si spray into each nostril daily   levothyroxine 100 mcg tablet  Self No No   Sig: Take 1 tablet (100 mcg total) by mouth 2 (two) times a week   Patient taking differently: Take 100 mcg by mouth 2 (two) times a week Sat&sun   lisinopril (ZESTRIL) 10 mg tablet   No No   Sig: Take 1 tablet (10 mg total) by mouth daily   loratadine (CLARITIN) 10 mg tablet  Self No No   Sig: Take 1 tablet (10 mg total) by mouth daily as needed for allergies   omeprazole (PriLOSEC) 20 mg delayed release capsule  Self No No   Sig: Take 1 capsule (20 mg total) by mouth daily      Facility-Administered Medications: None       Past Medical History:   Diagnosis Date    Arthritis     Depression     Disease of thyroid gland     hypo    Edema     LAST ASSESSED: [id number]    GERD (gastroesophageal reflux disease)     Hypercholesterolemia     Hyperlipidemia     Hypertension     WELL CONTROLLED  CURRENTLY ON LISINOPRIL  LAST ASSESSED: [id number]    Other headache syndrome     Other muscle spasm     LAST ASSESSED: [id number]    Ovarian cyst 2006    Renal calculi      (spontaneous vaginal delivery) 1975    FEMALE       Past Surgical History:   Procedure Laterality Date    BACK SURGERY      HERNIATED DISC (L4/L5)    CHOLECYSTECTOMY      IR CEREBRAL ANGIOGRAPHY  2020    IR CEREBRAL ANGIOGRAPHY / INTERVENTION  2021    TONSILLECTOMY         Family History   Problem Relation Age of Onset    Lung cancer Mother     Cancer Mother         MALIGNANT NEOPLASM    Hypertension Father     Seizures Father     Stroke Father     Diabetes Brother     Hypertension Son     Lung disease Son     Hyperthyroidism Maternal Aunt     Hypothyroidism Maternal Aunt     Heart disease Other         CARDIAC DISORDER    Neuropathy Family      I have reviewed and agree with the history as documented      E-Cigarette/Vaping    E-Cigarette Use Never User      E-Cigarette/Vaping Substances    Nicotine No     THC No     CBD No     Flavoring No     Other No     Unknown No      Social History     Tobacco Use    Smoking status: Former Smoker    Smokeless tobacco: Never Used    Tobacco comment: pt "quit about 16 years ago"   Substance Use Topics    Alcohol use: Never     Frequency: Never    Drug use: No        Review of Systems   Constitutional: Negative for chills and fever  Skin: Positive for wound  Neurological: Negative for weakness and numbness  All other systems reviewed and are negative  Physical Exam  ED Triage Vitals [05/01/21 1833]   Temperature Pulse Respirations Blood Pressure SpO2   97 9 °F (36 6 °C) 81 16 (!) 164/103 96 %      Temp src Heart Rate Source Patient Position - Orthostatic VS BP Location FiO2 (%)   -- -- -- -- --      Pain Score       8             Orthostatic Vital Signs  Vitals:    05/01/21 1833   BP: (!) 164/103   Pulse: 81       Physical Exam  Vitals signs and nursing note reviewed  Constitutional:       General: She is not in acute distress  Appearance: Normal appearance  HENT:      Head: Normocephalic and atraumatic  Right Ear: External ear normal       Left Ear: External ear normal       Nose: Nose normal    Eyes:      Extraocular Movements: Extraocular movements intact  Conjunctiva/sclera: Conjunctivae normal       Pupils: Pupils are equal, round, and reactive to light  Neck:      Musculoskeletal: Normal range of motion  Cardiovascular:      Rate and Rhythm: Normal rate and regular rhythm  Pulses: Normal pulses  Pulmonary:      Effort: Pulmonary effort is normal  No respiratory distress  Breath sounds: No stridor  Musculoskeletal: Normal range of motion  Hands:       Comments: 2 cm laceration to the volar aspect of the left index finger, no obvious tendon involvement  She has full range of motion of her left hand  Good  strength  Two point discrimination intact   Skin:     General: Skin is warm and dry        Capillary Refill: Capillary refill takes less than 2 seconds  Neurological:      General: No focal deficit present  Mental Status: She is alert and oriented to person, place, and time  Psychiatric:         Mood and Affect: Mood normal          Behavior: Behavior normal          ED Medications  Medications   lidocaine-epinephrine (XYLOCAINE/EPINEPHRINE) 1 %-1:100,000 injection 10 mL (10 mL Infiltration Given 5/1/21 1910)   tetanus-diphtheria-acellular pertussis (BOOSTRIX) IM injection 0 5 mL (0 5 mL Intramuscular Given 5/1/21 1954)       Diagnostic Studies  Results Reviewed     None                 No orders to display         Procedures  Laceration repair    Date/Time: 5/1/2021 7:00 PM  Performed by: Linwood Clinton MD  Authorized by: Linwood Clinton MD   Consent: Verbal consent obtained  Consent given by: patient  Patient understanding: patient states understanding of the procedure being performed  Patient consent: the patient's understanding of the procedure matches consent given  Patient identity confirmed: verbally with patient  Time out: Immediately prior to procedure a "time out" was called to verify the correct patient, procedure, equipment, support staff and site/side marked as required  Body area: upper extremity  Location details: left hand  Laceration length: 2 cm  Foreign bodies: no foreign bodies  Tendon involvement: none  Nerve involvement: none  Vascular damage: no  Anesthesia: local infiltration    Anesthesia:  Local Anesthetic: lidocaine 1% with epinephrine  Anesthetic total: 2 mL    Sedation:  Patient sedated: no      Wound Dehiscence:  Superficial Wound Dehiscence: simple closure      Procedure Details:  Irrigation solution: saline  Irrigation method: syringe  Amount of cleaning: standard  Debridement: none  Degree of undermining: none  Wound skin closure material used: 5-0 Ethilon    Number of sutures: 2  Approximation: close  Approximation difficulty: simple  Dressing: 4x4 sterile gauze  Patient tolerance: patient tolerated the procedure well with no immediate complications            ED Course  ED Course as of May 01 2207   Sat May 01, 2021   1837 Blood Pressure(!): 164/103   1837 Temperature: 97 9 °F (36 6 °C)   1837 Pulse: 81   1837 Respirations: 16   1837 SpO2: 96 %               Identification of Seniors at Risk      Most Recent Value   (ISAR) Identification of Seniors at Risk   Before the illness or injury that brought you to the Emergency, did you need someone to help you on a regular basis? 0 Filed at: 05/01/2021 1832   In the last 24 hours, have you needed more help than usual?  0 Filed at: 05/01/2021 1832   Have you been hospitalized for one or more nights during the past 6 months? 0 Filed at: 05/01/2021 1832   In general, do you see well?  0 Filed at: 05/01/2021 1832   In general, do you have serious problems with your memory? 0 Filed at: 05/01/2021 1832   Do you take more than three different medications every day? 1 Filed at: 05/01/2021 1832   ISAR Score  1 Filed at: 05/01/2021 1832                    SBIRT 20yo+      Most Recent Value   SBIRT (23 yo +)   In order to provide better care to our patients, we are screening all of our patients for alcohol and drug use  Would it be okay to ask you these screening questions? No Filed at: 05/01/2021 1842                MDM  Number of Diagnoses or Management Options  Finger laceration:   Diagnosis management comments: 52-year-old female presents for evaluation of left index finger laceration  On exam patient is overall well appearing in no acute distress  Small laceration noted along the volar aspect of the left index finger  No obvious tendon involvement  Patient has full range of motion of her hand  Laceration was repaired with 2 sutures  Patient tolerated the procedure without any complications  She will be discharged home  She was advised to have sutures removed in 7 days  She was given strict return precautions        Disposition  Final diagnoses: Finger laceration     Time reflects when diagnosis was documented in both MDM as applicable and the Disposition within this note     Time User Action Codes Description Comment    5/1/2021  7:38 PM Check, Bennie Napoleon [[id number]] Finger laceration       ED Disposition     ED Disposition Condition Date/Time Comment    Discharge Stable Sat May 1, 2021  7:39 PM Christiano Granados discharge to home/self care  Follow-up Information     Follow up With Specialties Details Why Contact Info Additional [address] [facility] Emergency Medicine  If symptoms worsen [address]  [address] [facility] [address] [facility] Emergency Department, [address]          Discharge Medication List as of 5/1/2021  7:39 PM      CONTINUE these medications which have NOT CHANGED    Details   acetaminophen (Tylenol 8 Hour Arthritis Pain) 650 mg CR tablet Take 1 tablet (650 mg total) by mouth every 8 (eight) hours as needed for mild pain, Starting Fri 4/24/2020, Normal      ALPRAZolam (XANAX) 0 5 mg tablet Take 1 tab 2 hours prior to MRI  May take another tab 30 minutes prior if needed , Normal      !! aspirin (ECOTRIN) 325 mg EC tablet TAKE 1 TABLET BY MOUTH DAILY START SEVEN DAYS PRIOR TO PROCEDURE, Normal      !! aspirin (ECOTRIN) 325 mg EC tablet Take 1 tablet (325 mg total) by mouth daily, Starting Fri 4/9/2021, Normal      atorvastatin (LIPITOR) 10 mg tablet Take 1 tablet (10 mg total) by mouth daily, Starting Fri 4/24/2020, Normal      calcium carbonate (Tums) 500 mg chewable tablet Chew as needed, Historical Med      Cholecalciferol (VITAMIN D HIGH POTENCY PO) Take by mouth   Takes 1x/week, Historical Med      clopidogrel (PLAVIX) 75 mg tablet Take 1 tablet (75 mg total) by mouth daily Start taking 7 days prior to procedure, Starting Fri 2/5/2021, Normal      divalproex sodium (DEPAKOTE) 250 mg EC tablet Take 1 tablet (250 mg total) by mouth daily at bedtime, Starting Thu 3/25/2021, Normal      fluticasone (FLONASE) 50 mcg/act nasal spray 1 spray into each nostril daily, Starting Mon 1/4/2021, Normal      levothyroxine 100 mcg tablet Take 1 tablet (100 mcg total) by mouth 2 (two) times a week, Starting Thu 10/8/2020, Until Mon 1/25/2021, Normal      Levothyroxine Sodium 88 MCG CAPS Take 1 capsule (88 mcg total) by mouth daily, Starting Thu 10/8/2020, Until Thu 3/25/2021, Normal      lisinopril (ZESTRIL) 10 mg tablet Take 1 tablet (10 mg total) by mouth daily, Starting Mon 12/14/2020, Normal      loratadine (CLARITIN) 10 mg tablet Take 1 tablet (10 mg total) by mouth daily as needed for allergies, Starting Fri 9/25/2020, Until Thu 3/25/2021, Normal      omeprazole (PriLOSEC) 20 mg delayed release capsule Take 1 capsule (20 mg total) by mouth daily, Starting Fri 9/25/2020, Until Mon 1/25/2021, Normal       !! - Potential duplicate medications found  Please discuss with provider  No discharge procedures on file  PDMP Review     None           ED Provider  Attending physically available and evaluated Chasity Dawn  MARTIN managed the patient along with the ED Attending      Electronically Signed by         Ifrah Clarke MD  05/01/21 9006

## 2021-05-07 DIAGNOSIS — J32.9 SINUSITIS: ICD-10-CM

## 2021-05-07 DIAGNOSIS — I67.1 ANEURYSM OF INTERNAL CAROTID ARTERY: ICD-10-CM

## 2021-05-07 RX ORDER — CLOPIDOGREL BISULFATE 75 MG/1
TABLET ORAL
Qty: 90 TABLET | Refills: 0 | Status: SHIPPED | OUTPATIENT
Start: 2021-05-07 | End: 2021-08-06 | Stop reason: ALTCHOICE

## 2021-05-14 RX ORDER — LORATADINE 10 MG/1
TABLET ORAL
Qty: 90 TABLET | Refills: 4 | OUTPATIENT
Start: 2021-05-14

## 2021-05-24 ENCOUNTER — TELEPHONE (OUTPATIENT)
Dept: NEUROSURGERY | Facility: CLINIC | Age: 66
End: 2021-05-24

## 2021-05-24 ENCOUNTER — TELEPHONE (OUTPATIENT)
Dept: INTERNAL MEDICINE CLINIC | Facility: CLINIC | Age: 66
End: 2021-05-24

## 2021-05-24 NOTE — TELEPHONE ENCOUNTER
Patient called here stating she was unable to get her MRA done  She states the medicine didn't work this time  She said that the Neurosurgeon said we had to call in different medication for this  I called and left a message with the Neurosurgeon to clarify what it is exactly we need to assist with  Elva from Neurosurgery called back  They only call in Xanax or Ativan for patients prior to any testing  Since that didn't work they do advise patient to contact their PCP to determine what other medications could be called for her  Please advise and we can let patient know

## 2021-06-01 NOTE — TELEPHONE ENCOUNTER
I looked into her history and she has had MRI done inpatient and at that time she was getting Valium 5mg and also Ativan for her MRI  I unfortunately however cannot order this for her as an outpatient  Please discuss this with an attending so that it can be ordered  Thank you

## 2021-06-09 ENCOUNTER — TELEPHONE (OUTPATIENT)
Dept: OTHER | Facility: HOSPITAL | Age: 66
End: 2021-06-09

## 2021-06-09 NOTE — TELEPHONE ENCOUNTER
I have made multiple attempts to speak with patient within the last week and unable to speak with her  I left I VM to contact us if necessary  Thank you  Thuy Mathews

## 2021-06-10 NOTE — TELEPHONE ENCOUNTER
Per tiger text with Dr Tariq Cook today he will be attempting to reach out to patient again this evening

## 2021-06-14 DIAGNOSIS — I67.1 ANEURYSM OF INTERNAL CAROTID ARTERY: Primary | ICD-10-CM

## 2021-06-14 DIAGNOSIS — M54.2 NECK PAIN: ICD-10-CM

## 2021-06-14 RX ORDER — DIAZEPAM 5 MG/1
5 TABLET ORAL ONCE AS NEEDED
Qty: 2 TABLET | Refills: 0 | Status: SHIPPED | OUTPATIENT
Start: 2021-06-14 | End: 2021-09-16

## 2021-06-14 NOTE — TELEPHONE ENCOUNTER
Spoke with patient and we have agreed that she will reschedule the MRA and she will call the office to notify us of a date  I will arrange with an attending to prescribe her Valium 5mg PO which she will take 30 mins before the MRA while in the waiting area accompanied by someone else

## 2021-06-14 NOTE — TELEPHONE ENCOUNTER
I sent another tiger text to Dr Christi Montgomery and he advised he will reach out to patient today

## 2021-06-17 ENCOUNTER — TRANSCRIBE ORDERS (OUTPATIENT)
Dept: NEUROSURGERY | Facility: CLINIC | Age: 66
End: 2021-06-17

## 2021-06-17 ENCOUNTER — TELEPHONE (OUTPATIENT)
Dept: NEUROSURGERY | Facility: CLINIC | Age: 66
End: 2021-06-17

## 2021-06-17 DIAGNOSIS — I10 ESSENTIAL HYPERTENSION: ICD-10-CM

## 2021-06-17 DIAGNOSIS — I67.1 ANEURYSM OF INTERNAL CAROTID ARTERY: Primary | ICD-10-CM

## 2021-06-17 NOTE — TELEPHONE ENCOUNTER
Name of medication, dose, quantity and frequency    Requested Prescriptions     Pending Prescriptions Disp Refills    lisinopril (ZESTRIL) 10 mg tablet 90 tablet 0     Sig: Take 1 tablet (10 mg total) by mouth daily     Number of refills left:  0    Amount of medication left: 0    Pharmacy verified and updated  yes    Additional information:

## 2021-06-17 NOTE — TELEPHONE ENCOUNTER
21  PT CALLED AND STATED SHE TRIED TO SCHEDULE MRA HEAD ORDERED BY DR TORRES 2021 WITH [facility]  SHE WAS TOLD HER SCRIPT IS   ADVISED PT SCRIPTS (SINCE COVID) DO NOT     PT WOULD STILL LIKE A NEW SCRIPT WRITTEN SO SHE CAN [address] [facility] [address] WITH [facility] [place]

## 2021-06-21 RX ORDER — LISINOPRIL 10 MG/1
10 TABLET ORAL DAILY
Qty: 90 TABLET | Refills: 1 | Status: SHIPPED | OUTPATIENT
Start: 2021-06-21 | End: 2021-09-16

## 2021-07-12 ENCOUNTER — HOSPITAL ENCOUNTER (OUTPATIENT)
Dept: RADIOLOGY | Facility: HOSPITAL | Age: 66
Discharge: HOME | End: 2021-07-12
Attending: NEUROLOGICAL SURGERY

## 2021-07-12 DIAGNOSIS — I67.1 ANEURYSM OF INTERNAL CAROTID ARTERY: ICD-10-CM

## 2021-07-14 ENCOUNTER — TELEPHONE (OUTPATIENT)
Dept: NEUROSURGERY | Facility: CLINIC | Age: 66
End: 2021-07-14

## 2021-07-14 DIAGNOSIS — I67.1 CEREBRAL ANEURYSM: Primary | ICD-10-CM

## 2021-07-14 DIAGNOSIS — I67.1 ANEURYSM OF INTERNAL CAROTID ARTERY: ICD-10-CM

## 2021-07-14 NOTE — TELEPHONE ENCOUNTER
Received message to the nurse line requesting a call back from [facility] [place] speaking office staff  Review of chart shows a request made earlier today regarding an order for anesthesia MRA  Placed order for MRA with anesthesia  Patient will have to call procedure scheduling to arrange this

## 2021-08-06 ENCOUNTER — OFFICE VISIT (OUTPATIENT)
Dept: NEUROSURGERY | Facility: CLINIC | Age: 66
End: 2021-08-06

## 2021-08-06 VITALS
TEMPERATURE: 98.1 F | BODY MASS INDEX: 46.61 KG/M2 | RESPIRATION RATE: 16 BRPM | WEIGHT: 273 LBS | HEART RATE: 80 BPM | SYSTOLIC BLOOD PRESSURE: 137 MMHG | DIASTOLIC BLOOD PRESSURE: 75 MMHG | HEIGHT: 64 IN

## 2021-08-06 DIAGNOSIS — I67.1 ANEURYSM OF INTERNAL CAROTID ARTERY: Primary | ICD-10-CM

## 2021-08-06 PROCEDURE — PREOP: Performed by: NURSE PRACTITIONER

## 2021-08-06 NOTE — PROGRESS NOTES
Assessment/Plan:    Aneurysm of internal carotid artery  · As addressed in hPI  · Presents for pre procedure MRA scheduled form 8/17/2021 w/ anesthesia  · Continues w/ DAPT ASA and Plavix daily , did not take medications today  · MRA due 3 months after surgery on 1/8/21 rescheduled 3 times now due 8/17/2021,  · Reports she continues with headaches all over not as bad as previously   · Denies dizziness  · History of Morbid obesity  BMI 46 86  · Endocrine -- HO HTN DM2 controlled HgA1C  2/22/20 6 6 no current medicinal treatment, hyperlipidemia, hypothyroidism,   · Respiratory --MALACHI treating with Flonase nasal and periactin, Bronchitis yearly requiring ABX treatment , none the past year since COVID 19 is not vaccinated, AARON --has CPAP  cannot tolerate , does not use  Lunhg nodule on cT chest < 6 mm  Admits to dyspnea when climbing 2 sets of stairs or walking 2 city blocks , denies chest pain  · ENT- daily blurry vision wears corrective lenses  · GI -GERD treating with omeprazole   · SKIN-multiple areas of bruising on arms and back  · Hematology --reports intermittent nose bleeds,called office and reported 3/18/2021 , occasional gum bleeds  · Reports post anesthesia nausea  · Mallampati --Assessed as class 3 , HO recent difficult intubation , multiple attempts during recent procedure 1/8/2021   Reports when she awakened from procedure cuts on lips and very sore throat  PLAN  As per Dr Nahum Mcclain request/direction   · Stop plavix now  · Cancel MRA due at 3 months post surgery  · Ask my surgery scheduler, Gustavo Hernández, to schedule ASAP Diagnostic Angiogram for post procedure PIPELINE evaluation  · He will secure informed and written consent on day of procedure  · Inform patient she will have procedure same procedure as 11/6/20 , I will call her on Monday and discuss  Subjective: I have to see you before my MRI      Patient ID: Jh Cortez is a 73 y o  female     HPI   Admitted to hospital in November 2020 with reported progressive headaches over the course of months to weeks , progressed to the worst headache she ever had, accompanied by neck pain  She was admitted to the hospital , imagining workup revealed and incidental finding of 4-5 mm right ICA aneurysm as well as a small left ICA aneurysm  She underwent angiogram 11/06/2020 to evaluate her aneurysm  recommended endovascular therapy with pipeline embolization  11/16/2020 IR cerebral angiography Right  Internal Carotid Arteriogram,     Inpatient admit 1/8/21 - 1/12/21 for elective arteriogram and pipeline embolization of right ICA  Aneurysm of internal carotid artery, small left ICA aneurysm  1/8/21 procedure performed Pipeline embolization right internal carotid artery aneurysm,   procedure complicated by difficult intubation requiring multiple attempts     Noted in discharge summary of  1/12/2021 continues with right sided headache with tragus and facial numbness, dizziness on ambulation  Headache treated with decadron on day prior discharge, improved on discharge day but persist   Discharged on DAPT   mg po daily and Plavix  75 mg po daily       1/25/2021 office visit 2 week post op Pipeline procedure note excerpt  DAPT continues (ASA, PLAVIX)  will continue thru 4/8/2021, transition to ASA therapy only  RTO in 3 months with and MRA w/and w/out contrast (due 4/2021) after that will need a formal six month arteriogram (6 months post pipeline procedure 1/8/2021)  DUE July 8 2021 ) to elucidate treatment response  Also discussed at appointment  Left posterior communicating artery infundibulum is normal finding,  not aneurysmal in nature, no intervention needed  Next scheduled office visit with dr Mikey Oscar    MRI cerebral aneurysm follow up ordered 1/25/21 scheduled for 3/26/2021unable to tolerate pre exam xanax did not sedate  Rescheduled MRA  for 7/12/21 -- unable to tolerate valium did not sedate 2/2 claustrophobia     Reordered and scheduled for 8/17/21 with anesthesia presents today for pre MRI H & P     I have spent 50 minutes with patient today in which greater than 50% of this time was spent in assessment, examination, impressions, reviewing recommendations for care  All questions were answered to patients satisfaction and understanding, contact information provided in the event additional questions arise  Patient acknowledged an understanding and agreement with plan  REVIEW OF SYSTEMS  Review of Systems   Constitutional: Negative  HENT: Positive for tinnitus (sometimes)  Eyes: Positive for visual disturbance (blurry  )  Respiratory: Positive for shortness of breath (sometimes)  Cardiovascular: Positive for leg swelling  Gastrointestinal: Negative  Endocrine: Negative  Genitourinary: Negative  Musculoskeletal: Positive for arthralgias (legs, ankles, knees) and joint swelling  Skin:        bruises   Allergic/Immunologic: Negative  Neurological: Positive for dizziness, weakness (drops things), numbness (legs and fingers) and headaches (chronic)  Negative for light-headedness  Hematological: Bruises/bleeds easily (bruises)  Psychiatric/Behavioral: Positive for sleep disturbance           Meds/Allergies     Current Outpatient Medications   Medication Sig Dispense Refill    acetaminophen (Tylenol 8 Hour Arthritis Pain) 650 mg CR tablet Take 1 tablet (650 mg total) by mouth every 8 (eight) hours as needed for mild pain 90 tablet 3    aspirin (ECOTRIN) 325 mg EC tablet TAKE 1 TABLET BY MOUTH DAILY START SEVEN DAYS PRIOR TO PROCEDURE 30 tablet 0    aspirin (ECOTRIN) 325 mg EC tablet Take 1 tablet (325 mg total) by mouth daily 90 tablet 0    atorvastatin (LIPITOR) 10 mg tablet Take 1 tablet (10 mg total) by mouth daily 90 tablet 1    calcium carbonate (Tums) 500 mg chewable tablet Chew as needed      fluticasone (FLONASE) 50 mcg/act nasal spray 1 spray into each nostril daily (Patient taking differently: 1 spray into each nostril as needed ) 16 g 5    levothyroxine 100 mcg tablet Take 1 tablet (100 mcg total) by mouth 2 (two) times a week (Patient taking differently: Take 100 mcg by mouth 2 (two) times a week Sat&sun) 24 tablet 3    Levothyroxine Sodium 88 MCG CAPS Take 1 capsule (88 mcg total) by mouth daily (Patient taking differently: Take 88 mcg by mouth daily mon-fri) 90 capsule 3    lisinopril (ZESTRIL) 10 mg tablet Take 1 tablet (10 mg total) by mouth daily 90 tablet 1    loratadine (CLARITIN) 10 mg tablet Take 1 tablet (10 mg total) by mouth daily as needed for allergies 90 tablet 1    omeprazole (PriLOSEC) 20 mg delayed release capsule Take 1 capsule (20 mg total) by mouth daily 90 capsule 1    ALPRAZolam (XANAX) 0 5 mg tablet Take 1 tab 2 hours prior to MRI  May take another tab 30 minutes prior if needed  (Patient not taking: Reported on 8/6/2021) 2 tablet 0    Cholecalciferol (VITAMIN D HIGH POTENCY PO) Take by mouth  Takes 1x/week (Patient not taking: Reported on 8/6/2021)      diazepam (VALIUM) 5 mg tablet Take 1 tablet (5 mg total) by mouth once as needed (1 hour prior to procedure  May repeat once as needed ) for up to 1 dose (Patient not taking: Reported on 8/6/2021) 2 tablet 0    divalproex sodium (DEPAKOTE) 250 mg EC tablet Take 1 tablet (250 mg total) by mouth daily at bedtime (Patient not taking: Reported on 8/6/2021) 30 tablet 4     No current facility-administered medications for this visit  Allergies   Allergen Reactions    Tagamet [Cimetidine] Hives       PAST HISTORY    Past Medical History:   Diagnosis Date    Arthritis     Depression     Disease of thyroid gland     hypo    Edema     LAST ASSESSED: [id number]    GERD (gastroesophageal reflux disease)     Hypercholesterolemia     Hyperlipidemia     Hypertension     WELL CONTROLLED  CURRENTLY ON LISINOPRIL   LAST ASSESSED: [id number]    Other headache syndrome     Other muscle spasm     LAST ASSESSED: [id number]    Ovarian cyst 2006    Renal calculi      (spontaneous vaginal delivery) 1975    FEMALE       Past Surgical History:   Procedure Laterality Date    BACK SURGERY      HERNIATED DISC (L4/L5)    CHOLECYSTECTOMY      IR CEREBRAL ANGIOGRAPHY  2020    IR CEREBRAL ANGIOGRAPHY / INTERVENTION  2021    TONSILLECTOMY         Social History     Tobacco Use    Smoking status: Former Smoker    Smokeless tobacco: Never Used    Tobacco comment: pt "quit about 20 years ago"   Vaping Use    Vaping Use: Never used   Substance Use Topics    Alcohol use: Never    Drug use: No       Family History   Problem Relation Age of Onset    Lung cancer Mother     Cancer Mother         MALIGNANT NEOPLASM    Hypertension Father     Seizures Father     Stroke Father     Heart attack Father     Diabetes Brother     Hypertension Son     Lung disease Son     Hyperthyroidism Maternal Aunt     Hypothyroidism Maternal Aunt     Heart disease Other         CARDIAC DISORDER    Neuropathy Family        The following portions of the patient's history were reviewed and updated as appropriate: allergies, current medications, past family history, past medical history, past social history, past surgical history and problem list       EXAM    Vitals:Blood pressure 137/75, pulse 80, temperature 98 1 °F (36 7 °C), temperature source Tympanic, resp  rate 16, height 5' 4" (1 626 m), weight 124 kg (273 lb)  ,Body mass index is 46 86 kg/m²  Physical Exam  Vitals and nursing note reviewed  Constitutional:       Appearance: Normal appearance  Comments: Morbid Obesity BMI  46 86  Speaks and understands english very well   HENT:      Head: Normocephalic and atraumatic  Comments: Healed sx scar right frontal parietal cranium      Mouth/Throat:      Mouth: Mucous membranes are dry  Comments: Mallampati - class 3 ,  Eyes:      General: No scleral icterus  Right eye: No discharge  Left eye: No discharge  Extraocular Movements: Extraocular movements intact  Cardiovascular:      Rate and Rhythm: Normal rate and regular rhythm  Heart sounds: Normal heart sounds  Pulmonary:      Effort: Pulmonary effort is normal  No respiratory distress  Breath sounds: Normal breath sounds  Abdominal:      General: Bowel sounds are normal    Musculoskeletal:         General: Normal range of motion  Cervical back: Normal range of motion  Right lower leg: Edema (non pitting ankles pedal ) present  Left lower leg: Edema (non pitting ankles pedal ) present  Skin:     General: Skin is warm and dry  Comments: Multiple areas bruising arms and back    Neurological:      Mental Status: She is alert and oriented to person, place, and time  Mental status is at baseline  Gait: Gait is intact  Gait normal    Psychiatric:         Mood and Affect: Mood normal          Behavior: Behavior normal          Neurologic Exam     Mental Status   Oriented to person, place, and time  Level of consciousness: alert    Gait, Coordination, and Reflexes     Gait  Gait: normal      Imaging Studies  No results found

## 2021-08-06 NOTE — PATIENT INSTRUCTIONS
Cancel MRI not needed > 3 months after pipeline embolization right ICA aneurysm   1/8/2021   STOP PLAVIX NOW   Schedule Diagnostic angio gram --DR Parminder Pham will call you Monday to discuss/explain procedure   Continue Asprin 325 mg daily

## 2021-08-07 NOTE — ASSESSMENT & PLAN NOTE
· As addressed in hPI  · Presents for pre procedure MRA scheduled form 8/17/2021 w/ anesthesia  · Continues w/ DAPT ASA and Plavix daily , did not take medications today  · MRA due 3 months after surgery on 1/8/21 rescheduled 3 times now due 8/17/2021,  · Reports she continues with headaches all over not as bad as previously   · Denies dizziness  · History of Morbid obesity  BMI 46 86  · Endocrine -- HO HTN DM2 controlled HgA1C  2/22/20 6 6 no current medicinal treatment, hyperlipidemia, hypothyroidism,   · Respiratory --MALACHI treating with Flonase nasal and periactin, Bronchitis yearly requiring ABX treatment , none the past year since COVID 19 is not vaccinated, AARON --has CPAP  cannot tolerate , does not use  Lunhg nodule on cT chest < 6 mm  Admits to dyspnea when climbing 2 sets of stairs or walking 2 city blocks , denies chest pain  · ENT- daily blurry vision wears corrective lenses  · GI -GERD treating with omeprazole   · SKIN-multiple areas of bruising on arms and back  · Hematology --reports intermittent nose bleeds,called office and reported 3/18/2021 , occasional gum bleeds  · Reports post anesthesia nausea  · Mallampati --Assessed as class 3 , HO recent difficult intubation , multiple attempts during recent procedure 1/8/2021   Reports when she awakened from procedure cuts on lips and very sore throat  PLAN  As per Dr Evy Garcia request/direction   · Stop plavix now  · Cancel MRA due at 3 months post surgery  · Ask my surgery scheduler, Bee Mac, to schedule ASAP Diagnostic Angiogram for post procedure PIPELINE evaluation  · He will secure informed and written consent on day of procedure  · Inform patient she will have procedure same procedure as 11/6/20 , I will call her on Monday and discuss

## 2021-08-13 ENCOUNTER — PREP FOR PROCEDURE (OUTPATIENT)
Dept: NEUROSURGERY | Facility: CLINIC | Age: 66
End: 2021-08-13

## 2021-08-13 ENCOUNTER — TELEPHONE (OUTPATIENT)
Dept: NEUROSURGERY | Facility: CLINIC | Age: 66
End: 2021-08-13

## 2021-08-13 DIAGNOSIS — I67.1 CEREBRAL ANEURYSM: Primary | ICD-10-CM

## 2021-08-13 RX ORDER — SODIUM CHLORIDE 9 MG/ML
75 INJECTION, SOLUTION INTRAVENOUS CONTINUOUS
Status: CANCELLED | OUTPATIENT
Start: 2021-08-13

## 2021-08-13 NOTE — TELEPHONE ENCOUNTER
Spoke to patient about arteriogram as post MRA given that she is more than 6 months after her procedure  She is in understanding of this  Will have the office call and schedule it in the near future  She understands risks and benefits of this procedure including bleeding, vascular injury and stroke  She has elected proceed

## 2021-08-17 ENCOUNTER — HOSPITAL ENCOUNTER (OUTPATIENT)
Dept: MRI IMAGING | Facility: HOSPITAL | Age: 66
Discharge: HOME | End: 2021-08-17
Attending: NEUROLOGICAL SURGERY

## 2021-08-17 DIAGNOSIS — I67.1 ANEURYSM OF INTERNAL CAROTID ARTERY: ICD-10-CM

## 2021-08-17 RX ORDER — ASPIRIN 325 MG
325 TABLET, DELAYED RELEASE (ENTERIC COATED) ORAL DAILY
Qty: 90 TABLET | Refills: 1 | Status: SHIPPED | OUTPATIENT
Start: 2021-08-17 | End: 2021-09-13

## 2021-08-18 DIAGNOSIS — I67.1 CEREBRAL ANEURYSM: Primary | ICD-10-CM

## 2021-08-19 ENCOUNTER — TELEPHONE (OUTPATIENT)
Dept: RADIOLOGY | Facility: HOSPITAL | Age: 66
End: 2021-08-19

## 2021-08-19 NOTE — PRE-PROCEDURE INSTRUCTIONS
Pre-procedure Instructions for Interventional Radiology  Kiko Holley  [address] [facility] [address] [phone]    You are scheduled for a/an Cerebral Arteriogram     On Friday 8/27/21  Your tentative arrival time is 0745  Short stay will notify you the day before your procedure with the exact arrival time and the location to arrive  To prepare for your procedure:  1  Please arrange for someone to drive you home after the procedure and stay with you until the next morning if you are instructed to do so  This is typically for patients receiving some type of sedative or anesthetic for the procedure  2  DO NOT EAT OR DRINK ANYTHING after midnight on the evening before your procedure including candy & gum   3  ONLY SIPS OF WATER with your medications are allowed on the morning of your procedure  4  TAKE ALL OF YOUR REGULAR MEDICATIONS THE MORNING OF YOUR PROCEDURE with sips of water! We may call you to stop some of your blood sugar, blood pressure and blood thinning medications depending on the procedure  Please take all of these medications unless we instruct you to stop them  5  If you have an allergy to x-ray dye, please contact Interventional Radiology for an x-ray dye preparation which usually consists of an oral steroid and Benadryl  The day of your procedure:  1  Bring a list of the medications you take at home  2  Bring medications you take for breathing problems (such as inhalers), medications for chest pain, or both  3  Bring a case for your glasses or contacts  4  Bring your insurance card and a form of photo ID   5  Please leave all valuables such as credit cards and jewelry at home  6  Report to the registration desk in the main lobby at the [facility]  Ask to be directed to [facility]  7  While your procedure is being performed, your family may wait in the Radiology Waiting Room on the 1st floor in Radiology  if they need to leave, they may provide a number to be called following the procedure  8  Be prepared to stay overnight just in case  Sometimes procedures will indicate the need for further observation or treatment  9  If you are scheduled for a follow-up visit with the Interventional Radiologist after your procedure, you will be called with a date and time     10  Covid screening completed no vaccine, patient was encouraged to get vaccine    Special Instructions (Medications to stop taking before your procedure etc ):

## 2021-08-24 ENCOUNTER — APPOINTMENT (OUTPATIENT)
Dept: LAB | Facility: HOSPITAL | Age: 66
End: 2021-08-24
Attending: NEUROLOGICAL SURGERY
Payer: COMMERCIAL

## 2021-08-24 DIAGNOSIS — I67.1 CEREBRAL ANEURYSM: ICD-10-CM

## 2021-08-24 LAB
ANION GAP SERPL CALCULATED.3IONS-SCNC: 4 MMOL/L (ref 4–13)
APTT PPP: 31 SECONDS (ref 23–37)
BASOPHILS # BLD AUTO: 0.04 THOUSANDS/ΜL (ref 0–0.1)
BASOPHILS NFR BLD AUTO: 0 % (ref 0–1)
BUN SERPL-MCNC: 13 MG/DL (ref 5–25)
CALCIUM SERPL-MCNC: 9 MG/DL (ref 8.3–10.1)
CHLORIDE SERPL-SCNC: 107 MMOL/L (ref 100–108)
CO2 SERPL-SCNC: 27 MMOL/L (ref 21–32)
CREAT SERPL-MCNC: 0.72 MG/DL (ref 0.6–1.3)
CREAT UR-MCNC: 173 MG/DL
EOSINOPHIL # BLD AUTO: 0.14 THOUSAND/ΜL (ref 0–0.61)
EOSINOPHIL NFR BLD AUTO: 1 % (ref 0–6)
ERYTHROCYTE [DISTWIDTH] IN BLOOD BY AUTOMATED COUNT: 13.4 % (ref 11.6–15.1)
EST. AVERAGE GLUCOSE BLD GHB EST-MCNC: 140 MG/DL
GFR SERPL CREATININE-BSD FRML MDRD: 88 ML/MIN/1.73SQ M
GLUCOSE P FAST SERPL-MCNC: 108 MG/DL (ref 65–99)
HBA1C MFR BLD: 6.5 %
HCT VFR BLD AUTO: 42.9 % (ref 34.8–46.1)
HGB BLD-MCNC: 13.5 G/DL (ref 11.5–15.4)
IMM GRANULOCYTES # BLD AUTO: 0.02 THOUSAND/UL (ref 0–0.2)
IMM GRANULOCYTES NFR BLD AUTO: 0 % (ref 0–2)
INR PPP: 1 (ref 0.84–1.19)
LYMPHOCYTES # BLD AUTO: 3.24 THOUSANDS/ΜL (ref 0.6–4.47)
LYMPHOCYTES NFR BLD AUTO: 33 % (ref 14–44)
MCH RBC QN AUTO: 27.8 PG (ref 26.8–34.3)
MCHC RBC AUTO-ENTMCNC: 31.5 G/DL (ref 31.4–37.4)
MCV RBC AUTO: 88 FL (ref 82–98)
MICROALBUMIN UR-MCNC: 23.4 MG/L (ref 0–20)
MICROALBUMIN/CREAT 24H UR: 14 MG/G CREATININE (ref 0–30)
MONOCYTES # BLD AUTO: 0.86 THOUSAND/ΜL (ref 0.17–1.22)
MONOCYTES NFR BLD AUTO: 9 % (ref 4–12)
NEUTROPHILS # BLD AUTO: 5.48 THOUSANDS/ΜL (ref 1.85–7.62)
NEUTS SEG NFR BLD AUTO: 57 % (ref 43–75)
NRBC BLD AUTO-RTO: 0 /100 WBCS
PLATELET # BLD AUTO: 306 THOUSANDS/UL (ref 149–390)
PMV BLD AUTO: 11.2 FL (ref 8.9–12.7)
POTASSIUM SERPL-SCNC: 4.1 MMOL/L (ref 3.5–5.3)
PROTHROMBIN TIME: 13.2 SECONDS (ref 11.6–14.5)
RBC # BLD AUTO: 4.86 MILLION/UL (ref 3.81–5.12)
SODIUM SERPL-SCNC: 138 MMOL/L (ref 136–145)
WBC # BLD AUTO: 9.78 THOUSAND/UL (ref 4.31–10.16)

## 2021-08-24 PROCEDURE — 82043 UR ALBUMIN QUANTITATIVE: CPT | Performed by: STUDENT IN AN ORGANIZED HEALTH CARE EDUCATION/TRAINING PROGRAM

## 2021-08-24 PROCEDURE — 82570 ASSAY OF URINE CREATININE: CPT | Performed by: STUDENT IN AN ORGANIZED HEALTH CARE EDUCATION/TRAINING PROGRAM

## 2021-08-24 PROCEDURE — 36415 COLL VENOUS BLD VENIPUNCTURE: CPT

## 2021-08-24 PROCEDURE — 83036 HEMOGLOBIN GLYCOSYLATED A1C: CPT

## 2021-08-24 PROCEDURE — 85610 PROTHROMBIN TIME: CPT

## 2021-08-24 PROCEDURE — 85025 COMPLETE CBC W/AUTO DIFF WBC: CPT

## 2021-08-24 PROCEDURE — 85730 THROMBOPLASTIN TIME PARTIAL: CPT

## 2021-08-24 PROCEDURE — 80048 BASIC METABOLIC PNL TOTAL CA: CPT

## 2021-08-27 ENCOUNTER — HOSPITAL ENCOUNTER (OUTPATIENT)
Dept: RADIOLOGY | Facility: HOSPITAL | Age: 66
Discharge: HOME | End: 2021-08-27
Attending: NEUROLOGICAL SURGERY | Admitting: NEUROLOGICAL SURGERY
Payer: COMMERCIAL

## 2021-08-27 ENCOUNTER — ANESTHESIA (OUTPATIENT)
Dept: RADIOLOGY | Facility: HOSPITAL | Age: 66
End: 2021-08-27
Payer: COMMERCIAL

## 2021-08-27 ENCOUNTER — ANESTHESIA EVENT (OUTPATIENT)
Dept: RADIOLOGY | Facility: HOSPITAL | Age: 66
End: 2021-08-27
Payer: COMMERCIAL

## 2021-08-27 VITALS
RESPIRATION RATE: 16 BRPM | BODY MASS INDEX: 46.48 KG/M2 | WEIGHT: 279 LBS | DIASTOLIC BLOOD PRESSURE: 61 MMHG | TEMPERATURE: 98.1 F | SYSTOLIC BLOOD PRESSURE: 111 MMHG | OXYGEN SATURATION: 95 % | HEIGHT: 65 IN | HEART RATE: 56 BPM

## 2021-08-27 DIAGNOSIS — I67.1 CEREBRAL ANEURYSM: ICD-10-CM

## 2021-08-27 PROCEDURE — 76937 US GUIDE VASCULAR ACCESS: CPT | Performed by: NEUROLOGICAL SURGERY

## 2021-08-27 PROCEDURE — 36224 PLACE CATH CAROTD ART: CPT

## 2021-08-27 PROCEDURE — 36224 PLACE CATH CAROTD ART: CPT | Performed by: NEUROLOGICAL SURGERY

## 2021-08-27 PROCEDURE — 36226 PLACE CATH VERTEBRAL ART: CPT

## 2021-08-27 PROCEDURE — NC001 PR NO CHARGE: Performed by: NEUROLOGICAL SURGERY

## 2021-08-27 PROCEDURE — 82948 REAGENT STRIP/BLOOD GLUCOSE: CPT

## 2021-08-27 PROCEDURE — 36226 PLACE CATH VERTEBRAL ART: CPT | Performed by: NEUROLOGICAL SURGERY

## 2021-08-27 PROCEDURE — C1887 CATHETER, GUIDING: HCPCS

## 2021-08-27 PROCEDURE — 76937 US GUIDE VASCULAR ACCESS: CPT

## 2021-08-27 PROCEDURE — C1769 GUIDE WIRE: HCPCS

## 2021-08-27 RX ORDER — LIDOCAINE HYDROCHLORIDE 10 MG/ML
INJECTION, SOLUTION EPIDURAL; INFILTRATION; INTRACAUDAL; PERINEURAL CODE/TRAUMA/SEDATION MEDICATION
Status: COMPLETED | OUTPATIENT
Start: 2021-08-27 | End: 2021-08-27

## 2021-08-27 RX ORDER — VERAPAMIL HYDROCHLORIDE 2.5 MG/ML
INJECTION, SOLUTION INTRAVENOUS CODE/TRAUMA/SEDATION MEDICATION
Status: COMPLETED | OUTPATIENT
Start: 2021-08-27 | End: 2021-08-27

## 2021-08-27 RX ORDER — DEXMEDETOMIDINE HYDROCHLORIDE 100 UG/ML
INJECTION, SOLUTION INTRAVENOUS AS NEEDED
Status: DISCONTINUED | OUTPATIENT
Start: 2021-08-27 | End: 2021-08-27

## 2021-08-27 RX ORDER — FENTANYL CITRATE 50 UG/ML
INJECTION, SOLUTION INTRAMUSCULAR; INTRAVENOUS AS NEEDED
Status: DISCONTINUED | OUTPATIENT
Start: 2021-08-27 | End: 2021-08-27

## 2021-08-27 RX ORDER — EPHEDRINE SULFATE 50 MG/ML
INJECTION INTRAVENOUS AS NEEDED
Status: DISCONTINUED | OUTPATIENT
Start: 2021-08-27 | End: 2021-08-27

## 2021-08-27 RX ORDER — SODIUM CHLORIDE 9 MG/ML
75 INJECTION, SOLUTION INTRAVENOUS CONTINUOUS
Status: CANCELLED | OUTPATIENT
Start: 2021-08-27

## 2021-08-27 RX ORDER — NITROGLYCERIN 20 MG/100ML
INJECTION INTRAVENOUS CODE/TRAUMA/SEDATION MEDICATION
Status: COMPLETED | OUTPATIENT
Start: 2021-08-27 | End: 2021-08-27

## 2021-08-27 RX ORDER — SODIUM CHLORIDE 9 MG/ML
75 INJECTION, SOLUTION INTRAVENOUS CONTINUOUS
Status: DISCONTINUED | OUTPATIENT
Start: 2021-08-27 | End: 2021-08-27 | Stop reason: HOSPADM

## 2021-08-27 RX ORDER — HEPARIN SODIUM 1000 [USP'U]/ML
INJECTION, SOLUTION INTRAVENOUS; SUBCUTANEOUS CODE/TRAUMA/SEDATION MEDICATION
Status: COMPLETED | OUTPATIENT
Start: 2021-08-27 | End: 2021-08-27

## 2021-08-27 RX ADMIN — Medication 400 MCG: at 09:50

## 2021-08-27 RX ADMIN — FENTANYL CITRATE 25 MCG: 50 INJECTION INTRAMUSCULAR; INTRAVENOUS at 09:49

## 2021-08-27 RX ADMIN — FENTANYL CITRATE 25 MCG: 50 INJECTION INTRAMUSCULAR; INTRAVENOUS at 09:47

## 2021-08-27 RX ADMIN — SODIUM CHLORIDE 75 ML/HR: 0.9 INJECTION, SOLUTION INTRAVENOUS at 07:49

## 2021-08-27 RX ADMIN — FENTANYL CITRATE 25 MCG: 50 INJECTION INTRAMUSCULAR; INTRAVENOUS at 09:42

## 2021-08-27 RX ADMIN — HEPARIN SODIUM 4000 UNITS: 1000 INJECTION INTRAVENOUS; SUBCUTANEOUS at 09:50

## 2021-08-27 RX ADMIN — LIDOCAINE HYDROCHLORIDE 3 ML: 10 INJECTION, SOLUTION EPIDURAL; INFILTRATION; INTRACAUDAL; PERINEURAL at 09:49

## 2021-08-27 RX ADMIN — DEXMEDETOMIDINE 12 MCG: 100 INJECTION, SOLUTION, CONCENTRATE INTRAVENOUS at 09:44

## 2021-08-27 RX ADMIN — Medication 600 MCG: at 09:48

## 2021-08-27 RX ADMIN — IODIXANOL 75 ML: 320 INJECTION, SOLUTION INTRAVASCULAR at 10:30

## 2021-08-27 RX ADMIN — EPHEDRINE SULFATE 5 MG: 50 INJECTION, SOLUTION INTRAVENOUS at 09:49

## 2021-08-27 RX ADMIN — DEXMEDETOMIDINE 4 MCG: 100 INJECTION, SOLUTION, CONCENTRATE INTRAVENOUS at 09:47

## 2021-08-27 RX ADMIN — FENTANYL CITRATE 25 MCG: 50 INJECTION INTRAMUSCULAR; INTRAVENOUS at 09:44

## 2021-08-27 RX ADMIN — LIDOCAINE HYDROCHLORIDE 1 ML: 10 INJECTION, SOLUTION EPIDURAL; INFILTRATION; INTRACAUDAL; PERINEURAL at 09:50

## 2021-08-27 RX ADMIN — VERAPAMIL HYDROCHLORIDE 5 MG: 2.5 INJECTION, SOLUTION INTRAVENOUS at 09:50

## 2021-08-27 NOTE — ANESTHESIA POSTPROCEDURE EVALUATION
Post-Op Assessment Note    CV Status:  Stable  Pain Score: 0    Pain management: adequate     Mental Status:  Arousable   Hydration Status:  Stable   PONV Controlled:  None   Airway Patency:  Patent      Post Op Vitals Reviewed: Yes      Staff: Anesthesiologist, CRNA         No complications documented      BP   110/57   Temp      Pulse  59   Resp      SpO2   99 FACE MASK

## 2021-08-27 NOTE — ANESTHESIA PREPROCEDURE EVALUATION
Procedure:  IR CEREBRAL ANGIOGRAPHY    Relevant Problems   ANESTHESIA   (+) Difficult intubation   (-) History of anesthesia complications   (-) PONV (postoperative nausea and vomiting)      CARDIO   (+) Chest pain   (+) Hypertension   (-) MOORE (dyspnea on exertion)   (-) Murmur      ENDO   (+) Hypothyroidism   (+) Type 2 diabetes mellitus without complication (HCC)      GI/HEPATIC   (+) GERD (gastroesophageal reflux disease)      MUSCULOSKELETAL   (+) Cervical spondylosis without myelopathy   (+) Degenerative cervical disc      NEURO/PSYCH   (+) Chronic daily headache   (+) Other headache syndrome      PULMONARY   (+) Obstructive sleep apnea   (-) Chronic obstructive pulmonary disease (HCC)   (-) Oxygen dependent   (-) Shortness of breath   (-) URI (upper respiratory infection)        Physical Exam    Airway    Mallampati score: II  TM Distance: >3 FB  Neck ROM: full     Dental   No notable dental hx     Cardiovascular  Rhythm: regular, Rate: normal,     Pulmonary  Pulmonary exam normal     Other Findings  Obese, circumference of neck is large  Anesthesia Plan  ASA Score- 3     Anesthesia Type- IV sedation with anesthesia with ASA Monitors  Additional Monitors:   Airway Plan:           Plan Factors-Exercise tolerance (METS): >4 METS  Chart reviewed  EKG reviewed  Existing labs reviewed  Patient summary reviewed  Patient is not a current smoker  Patient did not smoke on day of surgery  Induction- intravenous  Postoperative Plan-     Informed Consent- Anesthetic plan and risks discussed with patient  I personally reviewed this patient with the CRNA  Discussed and agreed on the Anesthesia Plan with the CRNA           Labs 8 24 21  Potassium 4 1    Scr0 72    1 9 21  Normal sinus rhythm  Nonspecific T wave abnormality  Abnormal ECG  When compared with ECG of 08-JAN-2021 13:35, (unconfirmed)  Borderline criteria for Anterior infarct are no longer Present  qtc [id number]    NPO appropriate, standard monitors  MAC

## 2021-08-27 NOTE — DISCHARGE INSTRUCTIONS
Today, you underwent a diagnostic cerebral angiogram under the care of Dr Rafa Gilliland for evaluation of aneurysm  ? The following instructions will help you care for yourself, or be cared for upon your return home today  These are guidelines for your care right after your surgery only  ? Notify Your Doctor or Nurse if you have any of the following:  ? SYMPTOMS OF WOUND INFECTION--   Increased pain in or around the incision   Swelling around the incision  Any drainage from the incision  Incision separates or opens up  Warmth in the tissues around the incision  Redness or tenderness on the skin near the incision   Fever (temperature greater than 101 degrees F)   ? NEUROLOGICAL CHANGES--  Change in alertness  Increased sleepiness   Nausea and vomiting   New onset of numbness or weakness in arms or legs   New problems with your bowels or bladder  New or worse problems with balance or walking  Seizures, new or worsening  ? UNRELIEVED HEADACHE PAIN--  New or increased pain unrelieved with pain medications   Pain associated with nausea and vomiting   Pain associated with other symptoms  ? QUESTIONS OR PROBLEMS--  Any questions or problems that you are unsure about  Wound Care:  Keep Incision Clean and Dry   You may shower daily, but do not soak incision  Pat dry after showering  No tub baths, soaking, swimming for 1 week after angiogram    You do not need to cover the incision  Mild to moderate bruising and tenderness to the site is expected and may last up to 1-2 weeks after your procedure  ?  A closure device was placed at the catheter insertion site  This is MRI compatible  Remove the dressing 24 hours after your procedure  If your groin site is bleeding, apply firm pressure for 10 minutes  Reinforce dressing rather than removing and checking frequently  If continues to bleed through the dressing after 1 hour, contact your neurosurgeon's office  Anticipatory Education:  ?   PAIN MED W/ Acetaminophen (Tylenol)  --IF a prescription for pain medicine has been sent home with you:  --Narcotic pain medication may cause constipation  Be sure to take stool softeners or laxatives while you are on narcotic pain medication  --Do not drive after taking prescription pain medicine  ?  If this medicine is too strong, or no longer necessary, or we did NOT recommend/prescribe oral narcotics, you may take:   - Tylenol Extra-strength/Acetaminophen, 2 tablets every 4-6 hours as needed for mild pain  DO NOT TAKE MORE THAN 4000MG PER DAY from combined sources  NOTE: Remember to eat when taking pain medicines in order to avoid nausea  Watch for constipation  Eat plenty of fruits, vegetables, juices, and drink 6-8 glasses of water each day  Constipation: Stay active and drink at least 6-8 cups of fluid each day to prevent constipation  If you need a laxative or stool softener follow the package directions or consult with your local pharmacists if you have questions  ? After anesthesia, rest for 24 hours  Do not drive, drink alcohol beverages or make any important decisions during this time  General anesthesia may cause sore throat, jaw discomfort or muscle aches  These symptoms can last for one or two days  Activity: Please follow these instructions:  Advance your activity as you can tolerate  You may do light house work; nothing strenuous   You may walk all you want  You may go up and down the steps  Use the railing for support  Do not do excessive bending, straining or heavy lifting for 48 hours after your procedure  Do not drive or return to work until you are instructed   It is normal for your energy level and sleep patterns to change after surgery  Get extra sleep at night and take naps during the day to help you feel less tired  Take rest periods during the day  Complete recovery may take several weeks  ?  You may resume driving after 83-36 hours recovery  You may return to work after 48 hours of recovery  ?  Diet:  Your doctor has recommended that you follow these diet instructions at home  Refer to the patient education materials you received during your hospital stay  If you would like more nutrition counseling, ask your doctor about making an appointment with an outpatient dietitian  Resume your home diet  ? Medications:  Please resume your home medications as instructed  ? Home Supplies and Equipment:  none  Additional Contacts:  ? CONTACTS FOR NEUROSURGERY: You may call your neurosurgeons office if you have questions between 8:30 am and 4:30 pm  You may request to speak to the nurse practitioner who is available Monday through Friday  ?  For off hours or the weekend you may call your neurosurgeon's office to leave a message  Deep Sedation   AMBULATORY CARE:   Deep sedation is medicine given during procedures or treatments to keep you asleep and comfortable  It will also prevent you from remembering the procedure or treatment  Deep sedation can be given as an IV injection, a shot, a pill, or through an inhaled solution  You cannot be easily woken up during deep sedation, and you may need help to breathe  Why deep sedation is given: Deep sedation may be used to help your body heal after an injury or illness  It may be used to relax a person who is on a ventilator  It may also be used during painful procedures such as bandage changes, repair of a laceration, or drainage of an abscess  Deep sedation may be given to prevent you from moving during a test such as a lumbar puncture or bone biopsy  Deep sedation can be used for cardiac catheterization, craniotomy, or fracture repair  How to prepare for deep sedation: Your healthcare provider will talk to you about how to prepare for deep sedation  He may tell you not to eat or drink anything for 8 hours before deep sedation  You may be able to drink clear liquids up until 2 hours before deep sedation   Tell healthcare providers if you have any allergies, heart problems, or breathing problems  Arrange for someone to drive you home and stay with you for 24 hours after deep sedation  You may feel sleepy and need help doing things at home  Another person may need to call 911 if you cannot be woken  What will happen during deep sedation:   · Your healthcare provider will give you enough medicine to keep you asleep and comfortable  Your healthcare provider will monitor your blood pressure, heart rate, and breathing  You will be on a heart monitor and a pulse oximeter  A heart monitor is a safety device that stays on continuously to record your heart's electrical activity  A pulse oximeter is a device that measures the amount of oxygen in your blood  · You may get oxygen through a mask placed over your nose and mouth or through small tubes placed in your nostrils  If you cannot breathe well on your own during deep sedation, you may need an endotracheal tube  An endotracheal tube is a thin, plastic tube that is inserted through the nose or mouth and into the lungs  It is attached to a ventilator  A ventilator is a machine that gives you oxygen and breathes for you when you cannot breathe well on your own  What will happen after deep sedation: Healthcare providers will monitor you until you are awake  You may need extra oxygen if your blood oxygen level is lower than it should be  Ask your healthcare provider before you take off the mask or oxygen tubing  You may be able to go home when you are alert and can stand up  This may take 1 to 2 hours after you have received deep sedation  You may feel tired, weak, or unsteady on your feet after you get sedation  You may also have trouble concentrating or short-term memory loss  These symptoms should go away in 24 hours or less  Risks of deep sedation:   · You may get a headache or nausea from the medicine  You may have problems with your short-term memory  Your skin may itch or your eyes may water   You may not get enough sedation, or it may wear off quickly  You may feel restless during the procedure or as you wake up  · Too much medicine can cause you to be unconscious  Your healthcare provider may have trouble waking you, and you may need medicine to help you wake up  Your breathing may not be regular, or it may stop  You may need a ventilator to help you breathe  Your risk for problems with sedation is higher if you have heart or lung disease, a head injury, or drink alcohol  Call 911 or have someone else call for any of the following:   · You have sudden trouble breathing  · You cannot be woken  Seek care immediately if:   · You have a severe headache or dizziness  · Your heart is beating faster than usual      Contact your healthcare provider if:   · You have a fever  · You have nausea or are vomiting for more than 8 hours after the procedure  · Your skin is itchy, swollen, or you have a rash  · You have questions or concerns about your condition or care  Self-care:   · Have someone stay with you for 24 hours  This person can drive you to errands and help you do things around the house  This person can also watch for problems  · Rest and do quiet activities for 24 hours  Do not exercise, ride a bike, or play sports  Stand up slowly to prevent dizziness and falls  Take short walks around the house with another person  Slowly return to your usual activities the next day  · Do not drive or use dangerous machines or tools for 24 hours  You may injure yourself or others  Examples include a lawnmower, saw, or drill  Do not return to work for 24 hours if you use dangerous machines or tools for work  · Do not make important decisions for 24 hours  For example, do not sign important papers or invest money  · Drink liquids as directed  Liquids help flush the sedation medicine out of your body  Ask how much liquid to drink each day and which liquids are best for you  · Eat small, frequent meals to prevent nausea and vomiting  Start with clear liquids such as juice or broth  If you do not vomit after clear liquids, you can eat your usual foods  · Do not drink alcohol or take medicines that make you drowsy  This includes medicines that help you sleep and anxiety medicines  Ask your healthcare provider if it is safe for you to take pain medicine  Follow up with your healthcare provider as directed: Write down your questions so you remember to ask them during your visits  © Copyright [employer] 2021 Information is for End User's use only and may not be sold, redistributed or otherwise used for commercial purposes  All illustrations and images included in CareNotes® are the copyrighted property of A D A M [employer] Inc  or [facility] Whit Jacobson   The above information is an  only  It is not intended as medical advice for individual conditions or treatments  Talk to your doctor, nurse or pharmacist before following any medical regimen to see if it is safe and effective for you

## 2021-08-27 NOTE — OP NOTE
OPERATIVE REPORT  PATIENT NAME: Mildred Huff     :  1955  MRN: [id number]   Pt Location: Interventional radiology    SURGERY DATE: 21     Preop Diagnosis:  1  Previously treated right ICA aneurysm    Postop Diagnosis  1  Previously treated right ICA aneurysm    Procedure:  Use of ultrasound  Right radial arteriogram   Right Internal Carotid Arteriogram  Right Vertebral Artery Arteriogram    Surgeon:   Demetria Nolasco MD    Specimen(s):  None    Estimated Blood Loss:   None    Drains:  None    Anesthesia Type:   Monitored Anesthesia Care     Complications:  None    Operative Indications:  Mildred Huff  is a very pleasant 73 y o  female who previously underwent right carotid pipeline embolization of an ophthalmic artery aneurysm     After discussing the risks and benefits of a diagnostic cerebral arteriogram including bleeding, stroke, groin hematoma, and death the patient elected to proceed  Procedure Details:  After obtaining written informed consent, the patient was brought into the operating room and moved to the OR table in supine fashion  The right radial artery was prepped and draped in the usual sterile fashion  Monitored anesthesia care was induced  A surgical time-out was performed  3 cc mixture of lidocaine and 400 mcg of nitroglycerin was injected immediately above the right radial artery  Using ultrasound guidance percutaneous access to the right radial artery was obtained  A 5 Nepali tapered sheath was then placed  Next and infusion of 300 mcg of nitroglycerin, 3000 units heparin, and 5 mg of verapamil were slowly injected intra-arterially through the right radial sheath  Radial artery arteriogram then performed  Next a 5 [employer] Carissa John glide catheter was advanced and reshaped  The catheter was then advanced and the left internal carotid artery was then catheterized  AP lateral and magnified oblique images of the left intracranial carotid circulation were obtained  The right common carotid artery was then catheterized  AP lateral and oblique images of the right cervical carotid were obtained  The catheter was then advanced and the right internal carotid artery was then catheterized  AP lateral and magnified oblique images of the right intracranial carotid circulation were obtained  Right vertebral artery was catheterized  Trans facial and lateral and oblique images of the right vertebral artery circulation were then obtained  The catheter was then withdrawn from the body and a TR compression band was placed  There was appropriate hemostasis  The patient was then awoken from monitored anesthesia care and found to be in baseline neurologic condition  All sponge and needle counts were correct  INTERPRETATION OF ANGIOGRAPHIC FINDINGS:   1  The Right common carotid circulation reveals antegrade flow into the internal and external carotid arteries  There is no hemodynamically significant carotid stenosis as defined by NASCET criteria  2  The Right internal carotid artery circulation reveals antegrade flow into the middle cerebral and anterior cerebral arteries  The right A1 is diminutive in size  There is no residual aneurysm filling of the ophthalmic artery aneurysm  The ophthalmic artery fills via collateral flow  The capillary and venous phases are unremarkable  3  The Right vertebral intracranial circulation reveals retrograde reflux down the contralateral vertebral artery  Both PICAs are well visualized  Antegrade flow is present intoall the posterior circulation branches  There are no AVMs or aneurysms  The capillary and venous phases are unremarkable       Impression:  No residual aneurysm   Patient Disposition:  APU    SIGNATURE: Mika Ovalle MD  DATE: 08/27/21   TIME: 10:12 AM

## 2021-08-27 NOTE — SEDATION DOCUMENTATION
Cerebral angiogram completed by Dr Carly Haq without complications, pt care managed by anesthesia, snuff band at [address] at [phone]  Report called to SS

## 2021-09-01 LAB — GLUCOSE SERPL-MCNC: 121 MG/DL (ref 65–140)

## 2021-09-02 ENCOUNTER — TELEPHONE (OUTPATIENT)
Dept: NEUROSURGERY | Facility: CLINIC | Age: 66
End: 2021-09-02

## 2021-09-02 NOTE — TELEPHONE ENCOUNTER
Completed post angio callback to Tiffanie Zavaleta at primary contact number  The patient denies any pain, swelling, drainage, fevers at the puncture site  Is not currently experiencing any numbness, tingling, or weakness in her hand  Reports mild headaches at this time  Advised that it is normal to experience fatigue and mild headaches for a few days after the procedure  Advised that tylenol should provide adequate relief  Explained that she should contact the office if she experiences any pain, swelling, or drainage from the site, and report to the ER or call 911 if she experiences [facility], visual disturbance, of confusion/disorientation, slurred speech, ambulatory dysfunction  Reminded of post procedure follow-up scheduled on 9/13/2021  Patient appreciative of call

## 2021-09-13 ENCOUNTER — OFFICE VISIT (OUTPATIENT)
Dept: NEUROSURGERY | Facility: CLINIC | Age: 66
End: 2021-09-13
Payer: COMMERCIAL

## 2021-09-13 VITALS
BODY MASS INDEX: 46.65 KG/M2 | HEART RATE: 82 BPM | RESPIRATION RATE: 16 BRPM | DIASTOLIC BLOOD PRESSURE: 82 MMHG | HEIGHT: 65 IN | TEMPERATURE: 97 F | WEIGHT: 280 LBS | SYSTOLIC BLOOD PRESSURE: 140 MMHG

## 2021-09-13 DIAGNOSIS — I67.1 ANEURYSM OF INTERNAL CAROTID ARTERY: Primary | ICD-10-CM

## 2021-09-13 PROCEDURE — 99215 OFFICE O/P EST HI 40 MIN: CPT | Performed by: NEUROLOGICAL SURGERY

## 2021-09-13 PROCEDURE — 3008F BODY MASS INDEX DOCD: CPT | Performed by: INTERNAL MEDICINE

## 2021-09-13 PROCEDURE — 1036F TOBACCO NON-USER: CPT | Performed by: NEUROLOGICAL SURGERY

## 2021-09-13 PROCEDURE — 3008F BODY MASS INDEX DOCD: CPT | Performed by: NEUROLOGICAL SURGERY

## 2021-09-13 RX ORDER — ASPIRIN 81 MG/1
81 TABLET ORAL DAILY
Qty: 90 TABLET | Refills: 3 | Status: SHIPPED | OUTPATIENT
Start: 2021-09-13 | End: 2021-09-16 | Stop reason: SDUPTHER

## 2021-09-13 NOTE — PROGRESS NOTES
Patient Id: Arthur Melton is a 73 y o  female        Handedness: Right      Assessment/Plan:    Diagnoses and all orders for this visit:    Aneurysm of internal carotid artery  -     CTA head w wo contrast; Future        Discussion Summary:   Discussion Summary:   1  Right paraophthalmic artery aneurysm, 6 x 5 x 5 mm, status post pipeline 01/08/2021  Now 2 weeks status post follow-up angiogram   No residual aneurysm present  At this juncture she can transition to baby aspirin  I will see her back in 1 year with a CTA  She does complain of some right thumb and hand pain since angiogram   She had some swelling which is improved  I advised her to start taking some Advil in monitor closely  She should call back should this not resolve over the next 2 weeks  2  Left posterior communicating artery infundibulum  We discussed the diagnosis of an infundibulum and how this is normal anatomy   We discussed that it was not aneurysmal in nature   There is no intervention required for this          Chief Complaint: Follow-up        HPI:   This is a very pleasant 62-year-old female who was admitted to the hospital after having progressive headaches over the course of months to weeks that became the worst headache she has ever had   This was also accompanied by neck pain   She was admitted to the hospital and evaluation revealed 4-5 mm right ICA aneurysm as well as a small left ICA aneurysm   She was referred to Neurosurgery for further evaluation       She ultimately underwent treatment of her right ICA aneurysm and was lost to follow-up for short period time due to COVID  She returned for noninvasive imaging however she was unable to tolerate MRI  As such a repeat arteriogram was performed due to her lack of previous follow-up and appropriateness of timing  She underwent this without difficulty    She has had some right hand pain since discharge      Her past medical history is significant for borderline diabetes, hypertension, sleep apnea, hyperlipidemia  Shawn Fields has had an L5 diskectomy in the past and cholecystectomy      She is allergic to Tagamet      She is currently   Shawn Fields has 3child, 77-year-old male   She quit smoking in 2003  She denies any alcohol use   She was previously in       There is no family history of subarachnoid hemorrhage or aneurysm  Gelene Sit is no family history of sudden death  Gelene Sit is family history of stroke and MI  Review of systems obtained by the MA reviewed and updated below  Review of Systems   HENT: Positive for tinnitus  Eyes: Positive for visual disturbance (blurry and a white shadow in her vision)  Respiratory: Positive for shortness of breath  Cardiovascular: Negative  Gastrointestinal: Negative  Endocrine: Negative  Genitourinary: Negative  Musculoskeletal: Negative  Skin: Negative  Allergic/Immunologic: Negative  Neurological: Positive for dizziness, tremors (she has a feeling that she is shaking but on the inside of her body, a new feeling), weakness (right wrist), numbness (bilateral legs) and headaches (everyday)  Negative for seizures  Hematological: Bruises/bleeds easily (medication)  Psychiatric/Behavioral: Negative  Physical Exam  Vitals:    09/13/21 1258   BP: 140/82   Pulse: 82   Resp: 16   Temp: (!) 97 °F (36 1 °C)   She is well appearing  Affect is appropriate  Body mass index is 46 59 kg/m²  [facility] She is awake alert and oriented  Hearing and vision are grossly intact  Her pupils are equal round reactive to light  Her extraocular movements are intact  Her face is symmetric  Tongue is midline  Facial sensation is intact and symmetric throughout  Shoulder shrug is 5/5  There is no drift or dysmetria  She has full strength in her bilateral upper and lower extremities  She has some pain limitation with right hand   She has normal muscle tone muscle bulk    Her biceps reflexes and patellar reflexes are 2+ and symmetric  Fiorella sign negative bilaterally  Sensation intact to light touch and pinprick throughout  Her gait is normal      Her heart rate is regular  normal respiratory effort  2+ radial pulse  Some pain with palpation to her right stuff box          The following portions of the patient's history were reviewed and updated as appropriate: allergies, current medications, past family history, past medical history, past social history, past surgical history and problem list     Active Ambulatory Problems     Diagnosis Date Noted    Carpal tunnel syndrome, bilateral 08/15/2017    Cervical spondylosis without myelopathy 2016    Degenerative cervical disc 2016    GERD (gastroesophageal reflux disease) 2017    Hypertension 10/19/2012    Hypothyroidism 2013    Morbid obesity ([facility] ) 2014    Obstructive sleep apnea 2013    Type 2 diabetes mellitus without complication ([facility] ) 9685    Cervical cancer screening 2018    Lung nodule < 6cm on CT 2018    Chest pain 2018    Thickened endometrium 06/15/2018    Healthcare maintenance 2018    Sinusitis 2018    Aneurysm of internal carotid artery 2020    Orthostatic hypotension 2020    Gait instability 2020    Other headache syndrome 2021    Chronic daily headache 2021    Cerebral aneurysm 2021    Difficult intubation 2021    Chronic migraine without aura without status migrainosus, not intractable 2021     Resolved Ambulatory Problems     Diagnosis Date Noted    Dizziness 2018    Encounter for annual routine gynecological examination 2018    RUQ pain 2018     Past Medical History:   Diagnosis Date    Arthritis     Depression     Disease of thyroid gland     Edema     Hypercholesterolemia     Hyperlipidemia     Other muscle spasm     Ovarian cyst 2006    Renal calculi      (spontaneous vaginal delivery) 1975       Past Surgical History:   Procedure Laterality Date    BACK SURGERY  1996    HERNIATED DISC (L4/L5)    CHOLECYSTECTOMY      IR CEREBRAL ANGIOGRAPHY  11/6/2020    IR CEREBRAL ANGIOGRAPHY  8/27/2021    IR CEREBRAL ANGIOGRAPHY / INTERVENTION  1/8/2021    TONSILLECTOMY           Current Outpatient Medications:     acetaminophen (Tylenol 8 Hour Arthritis Pain) 650 mg CR tablet, Take 1 tablet (650 mg total) by mouth every 8 (eight) hours as needed for mild pain, Disp: 90 tablet, Rfl: 3    aspirin (ECOTRIN) 325 mg EC tablet, TAKE 1 TABLET BY MOUTH DAILY START SEVEN DAYS PRIOR TO PROCEDURE, Disp: 30 tablet, Rfl: 0    aspirin (ECOTRIN) 325 mg EC tablet, TAKE 1 TABLET (325 MG TOTAL) BY MOUTH DAILY, Disp: 90 tablet, Rfl: 1    atorvastatin (LIPITOR) 10 mg tablet, Take 1 tablet (10 mg total) by mouth daily, Disp: 90 tablet, Rfl: 1    calcium carbonate (Tums) 500 mg chewable tablet, Chew as needed, Disp: , Rfl:     fluticasone (FLONASE) 50 mcg/act nasal spray, 1 spray into each nostril daily (Patient taking differently: 1 spray into each nostril as needed ), Disp: 16 g, Rfl: 5    levothyroxine 100 mcg tablet, Take 1 tablet (100 mcg total) by mouth 2 (two) times a week (Patient taking differently: Take 100 mcg by mouth 2 (two) times a week Sat&sun), Disp: 24 tablet, Rfl: 3    Levothyroxine Sodium 88 MCG CAPS, Take 1 capsule (88 mcg total) by mouth daily (Patient taking differently: Take 88 mcg by mouth daily mon-fri), Disp: 90 capsule, Rfl: 3    lisinopril (ZESTRIL) 10 mg tablet, Take 1 tablet (10 mg total) by mouth daily, Disp: 90 tablet, Rfl: 1    loratadine (CLARITIN) 10 mg tablet, Take 1 tablet (10 mg total) by mouth daily as needed for allergies, Disp: 90 tablet, Rfl: 1    omeprazole (PriLOSEC) 20 mg delayed release capsule, Take 1 capsule (20 mg total) by mouth daily, Disp: 90 capsule, Rfl: 1    ALPRAZolam (XANAX) 0 5 mg tablet, Take 1 tab 2 hours prior to MRI   May take another tab 30 minutes prior if needed  (Patient not taking: Reported on 8/6/2021), Disp: 2 tablet, Rfl: 0    Cholecalciferol (VITAMIN D HIGH POTENCY PO), Take by mouth Takes 1x/week  (Patient not taking: Reported on 9/13/2021), Disp: , Rfl:     diazepam (VALIUM) 5 mg tablet, Take 1 tablet (5 mg total) by mouth once as needed (1 hour prior to procedure  May repeat once as needed ) for up to 1 dose (Patient not taking: Reported on 8/6/2021), Disp: 2 tablet, Rfl: 0    divalproex sodium (DEPAKOTE) 250 mg EC tablet, Take 1 tablet (250 mg total) by mouth daily at bedtime (Patient not taking: Reported on 8/6/2021), Disp: 30 tablet, Rfl: 4    Results/Data:   Images reviewed in detail as well as report

## 2021-09-14 DIAGNOSIS — G43.709 CHRONIC MIGRAINE WITHOUT AURA WITHOUT STATUS MIGRAINOSUS, NOT INTRACTABLE: ICD-10-CM

## 2021-09-14 RX ORDER — DIVALPROEX SODIUM 250 MG/1
250 TABLET, DELAYED RELEASE ORAL
Qty: 30 TABLET | Refills: 3 | Status: SHIPPED | OUTPATIENT
Start: 2021-09-14 | End: 2022-01-26 | Stop reason: CLARIF

## 2021-09-16 ENCOUNTER — OFFICE VISIT (OUTPATIENT)
Dept: INTERNAL MEDICINE CLINIC | Facility: CLINIC | Age: 66
End: 2021-09-16

## 2021-09-16 VITALS
SYSTOLIC BLOOD PRESSURE: 135 MMHG | BODY MASS INDEX: 46.79 KG/M2 | OXYGEN SATURATION: 96 % | WEIGHT: 281.2 LBS | TEMPERATURE: 97.7 F | DIASTOLIC BLOOD PRESSURE: 76 MMHG | HEART RATE: 66 BPM

## 2021-09-16 DIAGNOSIS — E11.9 TYPE 2 DIABETES MELLITUS WITHOUT COMPLICATION, WITHOUT LONG-TERM CURRENT USE OF INSULIN (HCC): ICD-10-CM

## 2021-09-16 DIAGNOSIS — I67.1 ANEURYSM OF INTERNAL CAROTID ARTERY: ICD-10-CM

## 2021-09-16 DIAGNOSIS — E55.9 VITAMIN D DEFICIENCY: ICD-10-CM

## 2021-09-16 DIAGNOSIS — E03.8 OTHER SPECIFIED HYPOTHYROIDISM: ICD-10-CM

## 2021-09-16 DIAGNOSIS — I10 ESSENTIAL HYPERTENSION: ICD-10-CM

## 2021-09-16 DIAGNOSIS — K21.9 GASTROESOPHAGEAL REFLUX DISEASE: ICD-10-CM

## 2021-09-16 DIAGNOSIS — Z12.11 COLON CANCER SCREENING: Primary | ICD-10-CM

## 2021-09-16 DIAGNOSIS — Z12.31 ENCOUNTER FOR SCREENING MAMMOGRAM FOR MALIGNANT NEOPLASM OF BREAST: ICD-10-CM

## 2021-09-16 PROCEDURE — 4010F ACE/ARB THERAPY RXD/TAKEN: CPT | Performed by: INTERNAL MEDICINE

## 2021-09-16 PROCEDURE — 1036F TOBACCO NON-USER: CPT | Performed by: INTERNAL MEDICINE

## 2021-09-16 PROCEDURE — 99213 OFFICE O/P EST LOW 20 MIN: CPT | Performed by: INTERNAL MEDICINE

## 2021-09-16 PROCEDURE — 1101F PT FALLS ASSESS-DOCD LE1/YR: CPT | Performed by: INTERNAL MEDICINE

## 2021-09-16 PROCEDURE — 3288F FALL RISK ASSESSMENT DOCD: CPT | Performed by: INTERNAL MEDICINE

## 2021-09-16 PROCEDURE — 4010F ACE/ARB THERAPY RXD/TAKEN: CPT | Performed by: NEUROLOGICAL SURGERY

## 2021-09-16 RX ORDER — LISINOPRIL 20 MG/1
20 TABLET ORAL DAILY
Qty: 90 TABLET | Refills: 1 | Status: SHIPPED | OUTPATIENT
Start: 2021-09-16 | End: 2022-03-11

## 2021-09-16 RX ORDER — ASPIRIN 81 MG/1
81 TABLET ORAL DAILY
Qty: 90 TABLET | Refills: 3 | Status: SHIPPED | OUTPATIENT
Start: 2021-09-16

## 2021-09-16 RX ORDER — CHOLECALCIFEROL (VITAMIN D3) 25 MCG
1000 CAPSULE ORAL DAILY
Qty: 90 CAPSULE | Refills: 1 | Status: SHIPPED | OUTPATIENT
Start: 2021-09-16 | End: 2022-07-25

## 2021-09-16 RX ORDER — OMEPRAZOLE 20 MG/1
20 CAPSULE, DELAYED RELEASE ORAL DAILY
Qty: 90 CAPSULE | Refills: 1 | Status: SHIPPED | OUTPATIENT
Start: 2021-09-16 | End: 2022-03-11

## 2021-09-16 RX ORDER — ATORVASTATIN CALCIUM 10 MG/1
10 TABLET, FILM COATED ORAL DAILY
Qty: 90 TABLET | Refills: 3 | Status: SHIPPED | OUTPATIENT
Start: 2021-09-16

## 2021-09-16 RX ORDER — CHOLECALCIFEROL (VITAMIN D3) 25 MCG
1000 CAPSULE ORAL DAILY
Qty: 90 CAPSULE | Refills: 3 | Status: SHIPPED | OUTPATIENT
Start: 2021-09-16 | End: 2021-09-16

## 2021-09-16 NOTE — PROGRESS NOTES
Patient's shoes and socks removed  Right Foot/Ankle   Right Foot Inspection  Skin Exam: skin normal and skin intact no dry skin, no warmth, no callus, no erythema, no maceration, no abnormal color, no pre-ulcer, no ulcer and no callus                            Sensory   Vibration: intact  Proprioception: intact   Monofilament testing: diminished  Vascular    The right DP pulse is 2+  The right PT pulse is 2+  Left Foot/Ankle  Left Foot Inspection  Skin Exam: skin normal and skin intactno dry skin, no warmth, no erythema, no maceration, normal color, no pre-ulcer, no ulcer and no callus                                         Sensory   Vibration: intact  Proprioception: intact  Monofilament: intact  Vascular    The left DP pulse is 2+  The left PT pulse is 2+  Assign Risk Category:  ; ;        Risk: 1      ASSESSMENT/PLAN:    Case and plan discussed with Dr Kenn Poe      Diagnoses and all orders for this visit:    Type 2 diabetes mellitus without complication, without long-term current use of insulin ([facility] [address] )  Lab Results   Component Value Date    HGBA1C 6 5 (H) 08/24/2021    HGBA1C 6 1 02/23/2016     Micro/Cr:14   Eye exam: Referral given today  Foot exam: Performed today  See above  Meds: No meds   Statin: Continue Atovastatin 10mg HS  -     atorvastatin (LIPITOR) 10 mg tablet; Take 1 tablet (10 mg total) by mouth daily  -     Ambulatory referral to Ophthalmology; Future    Colon cancer screening  -     Ambulatory referral to Gastroenterology; Future    Essential hypertension  Will increase lisinopril  -     lisinopril (ZESTRIL) 20 mg tablet; Take 1 tablet (20 mg total) by mouth daily    Aneurysm of internal carotid artery  -     aspirin (ECOTRIN LOW STRENGTH) 81 mg EC tablet; Take 1 tablet (81 mg total) by mouth daily      Gastroesophageal reflux disease  -     omeprazole (PriLOSEC) 20 mg delayed release capsule;  Take 1 capsule (20 mg total) by mouth daily    Encounter for screening mammogram for malignant neoplasm of breast  -     Mammo screening bilateral w 3d & cad; Future    Vitamin D deficiency  -     Cholecalciferol (Vitamin D High Potency) 25 MCG (1000 UT) capsule; Take 1 capsule (1,000 Units total) by mouth daily Takes 1x/week  -     Vitamin D 25 hydroxy; Future    Other specified hypothyroidism  -     TSH, 3rd generation with Free T4 reflex; Future        Immunization History   Administered Date(s) Administered    INFLUENZA 11/12/2018    Influenza Quadrivalent, 6-35 Months IM 09/29/2017    Influenza, recombinant, quadrivalent,injectable, preservative free 11/12/2018, 10/21/2019, 10/30/2020    Influenza, seasonal, injectable 10/10/2013, 12/03/2014, 10/22/2015    Pneumococcal Polysaccharide PPV23 11/12/2018    Tdap 10/30/2020, 05/01/2021         HISTORY OF PRESENT ILLNESS:  Presents for f/u on chronic conditions  PMH type 2 diabetes A1c 6 4 diet-controlled, hypothyroidism, migraine, AARON not on CPAP, hypertension, morbid obesity, hyperlipidemia, 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 by Dr García Jaimes who recommended ASA  h/o of 0 5 cm right middle lobe pulmonary nodule is unchanged since 11/19/2015 with no further imaging recommended  Reports she has been feeling "gittry"  She has been compliant with her levothyroxine  Has not been to her endocrinologist since 2020  She was evaluated by neurology in 3/2021 for migraine and a trial of Valproic aid was recommended but she not been taking it  She seems ambivalent to start this  I advised her to try it or call her neurologist to be revaluated  Review of Systems   Constitutional: Negative for activity change, chills, diaphoresis, fatigue and fever  HENT: Negative  Negative for rhinorrhea, sinus pain, sneezing and sore throat  Eyes: Negative  Negative for visual disturbance  Respiratory: Negative for cough, choking, chest tightness, shortness of breath and wheezing      Cardiovascular: Negative for chest pain, palpitations and leg swelling  Gastrointestinal: Negative for abdominal distention, abdominal pain, constipation, diarrhea, nausea and vomiting  Endocrine: Negative  Genitourinary: Negative  Negative for difficulty urinating, dysuria and urgency  Musculoskeletal: Negative  Skin: Negative  Negative for rash and wound  Neurological: Positive for headaches (intermittent )  Negative for dizziness, tremors, weakness, light-headedness and numbness  Psychiatric/Behavioral: Negative for confusion and sleep disturbance  The patient is nervous/anxious  OBJECTIVE:  Vitals:    09/16/21 1023   BP: 135/76   BP Location: Right arm   Patient Position: Sitting   Cuff Size: Large   Pulse: 66   Temp: 97 7 °F (36 5 °C)   TempSrc: Temporal   SpO2: 96%   Weight: 128 kg (281 lb 3 2 oz)       Physical Exam:   GENERAL: NAD, Normal appearance  Non diaphoretic, non-toxic, not ill-appearing, well-developed, well-nourished  NEUROLOGIC:  Alert/oriented x3  No motor or sensory deficits  HEENT:  NC/AT, PERRL, EOMI, MMM, no scleral icterus  CARDIAC:  RRR, +S1/S2, no S3/S4 heard, no m/g/r  PULMONARY:  non-labored breathing, CTA B/L, no wheezing/rales/rhonci appreciated at time of encounter  ABDOMEN:  Soft, NT/ND, +BS, no rebound/guarding/rigidity  Extremities:  2+ Pulses in DP/PT   No edema, cyanosis, or clubbing  SKIN:  No rashes or erythema        Current Outpatient Medications:     acetaminophen (Tylenol 8 Hour Arthritis Pain) 650 mg CR tablet, Take 1 tablet (650 mg total) by mouth every 8 (eight) hours as needed for mild pain, Disp: 90 tablet, Rfl: 3    aspirin (ECOTRIN LOW STRENGTH) 81 mg EC tablet, Take 1 tablet (81 mg total) by mouth daily, Disp: 90 tablet, Rfl: 3    atorvastatin (LIPITOR) 10 mg tablet, Take 1 tablet (10 mg total) by mouth daily, Disp: 90 tablet, Rfl: 1    calcium carbonate (Tums) 500 mg chewable tablet, Chew as needed, Disp: , Rfl:     fluticasone (FLONASE) 50 mcg/act nasal spray, 1 spray into each nostril daily (Patient taking differently: 1 spray into each nostril as needed ), Disp: 16 g, Rfl: 5    levothyroxine 100 mcg tablet, Take 1 tablet (100 mcg total) by mouth 2 (two) times a week (Patient taking differently: Take 100 mcg by mouth 2 (two) times a week Sat&sun), Disp: 24 tablet, Rfl: 3    Levothyroxine Sodium 88 MCG CAPS, Take 1 capsule (88 mcg total) by mouth daily (Patient taking differently: Take 88 mcg by mouth daily mon-fri), Disp: 90 capsule, Rfl: 3    lisinopril (ZESTRIL) 10 mg tablet, Take 1 tablet (10 mg total) by mouth daily, Disp: 90 tablet, Rfl: 1    loratadine (CLARITIN) 10 mg tablet, Take 1 tablet (10 mg total) by mouth daily as needed for allergies, Disp: 90 tablet, Rfl: 1    omeprazole (PriLOSEC) 20 mg delayed release capsule, Take 1 capsule (20 mg total) by mouth daily, Disp: 90 capsule, Rfl: 1    ALPRAZolam (XANAX) 0 5 mg tablet, Take 1 tab 2 hours prior to MRI  May take another tab 30 minutes prior if needed  (Patient not taking: Reported on 8/6/2021), Disp: 2 tablet, Rfl: 0    Cholecalciferol (VITAMIN D HIGH POTENCY PO), Take by mouth Takes 1x/week  (Patient not taking: Reported on 9/13/2021), Disp: , Rfl:     diazepam (VALIUM) 5 mg tablet, Take 1 tablet (5 mg total) by mouth once as needed (1 hour prior to procedure  May repeat once as needed ) for up to 1 dose (Patient not taking: Reported on 8/6/2021), Disp: 2 tablet, Rfl: 0    divalproex sodium (DEPAKOTE) 250 mg EC tablet, TAKE 1 TABLET (250 MG TOTAL) BY MOUTH DAILY AT BEDTIME (Patient not taking: Reported on 9/16/2021), Disp: 30 tablet, Rfl: 3    Past Medical History:   Diagnosis Date    Arthritis     Depression     Disease of thyroid gland     hypo    Edema     LAST ASSESSED: [id number]    GERD (gastroesophageal reflux disease)     Hypercholesterolemia     Hyperlipidemia     Hypertension     WELL CONTROLLED  CURRENTLY ON LISINOPRIL   LAST ASSESSED: [id number]    Other headache syndrome     Other muscle spasm     LAST ASSESSED: [id number]    Ovarian cyst 2006    Renal calculi      (spontaneous vaginal delivery) 1975    FEMALE     Past Surgical History:   Procedure Laterality Date    BACK SURGERY      HERNIATED DISC (L4/L5)    CHOLECYSTECTOMY      IR CEREBRAL ANGIOGRAPHY  2020    IR CEREBRAL ANGIOGRAPHY  2021    IR CEREBRAL ANGIOGRAPHY / INTERVENTION  2021    TONSILLECTOMY       Family History   Problem Relation Age of Onset    Lung cancer Mother     Cancer Mother         MALIGNANT NEOPLASM    Hypertension Father     Seizures Father     Stroke Father     Heart attack Father     Diabetes Brother     Hypertension Son     Lung disease Son     Hyperthyroidism Maternal Aunt     Hypothyroidism Maternal Aunt     Heart disease Other         CARDIAC DISORDER    Neuropathy Family      Social History     Socioeconomic History    Marital status:      Spouse name: Not on file    Number of children: Not on file    Years of education: Not on file    Highest education level: Not on file   Occupational History    Occupation: RETIRED   Tobacco Use    Smoking status: Former Smoker    Smokeless tobacco: Never Used    Tobacco comment: pt "quit about 20 years ago"   Vaping Use    Vaping Use: Never used   Substance and Sexual Activity    Alcohol use: Never    Drug use: No    Sexual activity: Never   Other Topics Concern    Not on file   Social History Narrative    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         AS PER ANTONY    EXERCISES REGULARLY    LIVES WITH FAMILY    NO CAFFEINE USE    NO LIVING WILL    NO TOBACCO/SMOKE EXPOSURE    UNEMPLOYED     Social Determinants of Health     Financial Resource Strain: Low Risk     Difficulty of Paying Living Expenses: Not hard at all   Food Insecurity: No Food Insecurity    Worried About Running Out of Food in the Last Year: Never true    Olegario of Food in the Last Year: Never true   Transportation Needs: No Transportation Needs    Lack of Transportation (Medical): No    Lack of Transportation (Non-Medical): No   Physical Activity: Inactive    Days of Exercise per Week: 0 days    Minutes of Exercise per Session: 0 min   Stress: No Stress Concern Present    Feeling of Stress : Not at all   Social Connections: Socially Isolated    Frequency of Communication with Friends and Family: More than three times a week    Frequency of Social Gatherings with Friends and Family:  Three times a week    Attends [employer] Services: Never    Active Member of Clubs or Organizations: No    Attends Club or Organization Meetings: Never    Marital Status:    Intimate Partner Violence: Not At Risk    Fear of Current or Ex-Partner: No    Emotionally Abused: No    Physically Abused: No    Sexually Abused: No     Social History     Tobacco Use   Smoking Status Former Smoker   Smokeless Tobacco Never Used   Tobacco Comment    pt "quit about 20 years ago"     Social History     Substance and Sexual Activity   Alcohol Use Never     Social History     Substance and Sexual Activity   Drug Use No         Duarte Juares MD  Internal Medicine Residency, PGY-3  [facility]

## 2021-10-22 DIAGNOSIS — E03.8 OTHER SPECIFIED HYPOTHYROIDISM: ICD-10-CM

## 2021-10-22 RX ORDER — LEVOTHYROXINE SODIUM 88 UG/1
TABLET ORAL
Qty: 90 TABLET | Refills: 2 | Status: SHIPPED | OUTPATIENT
Start: 2021-10-22 | End: 2022-07-25 | Stop reason: ALTCHOICE

## 2021-10-22 RX ORDER — LEVOTHYROXINE SODIUM 0.1 MG/1
TABLET ORAL
Qty: 24 TABLET | Refills: 2 | Status: SHIPPED | OUTPATIENT
Start: 2021-10-22 | End: 2022-07-29

## 2021-10-27 DIAGNOSIS — J32.9 SINUSITIS: ICD-10-CM

## 2021-10-27 RX ORDER — LORATADINE 10 MG/1
TABLET ORAL
Qty: 90 TABLET | Refills: 4 | Status: SHIPPED | OUTPATIENT
Start: 2021-10-27

## 2021-11-15 ENCOUNTER — CLINICAL SUPPORT (OUTPATIENT)
Dept: INTERNAL MEDICINE CLINIC | Facility: CLINIC | Age: 66
End: 2021-11-15

## 2021-11-15 DIAGNOSIS — Z23 NEED FOR INFLUENZA VACCINATION: Primary | ICD-10-CM

## 2021-11-15 PROCEDURE — G0008 ADMIN INFLUENZA VIRUS VAC: HCPCS | Performed by: INTERNAL MEDICINE

## 2021-11-15 PROCEDURE — 90662 IIV NO PRSV INCREASED AG IM: CPT | Performed by: INTERNAL MEDICINE

## 2022-01-14 DIAGNOSIS — G43.709 CHRONIC MIGRAINE WITHOUT AURA WITHOUT STATUS MIGRAINOSUS, NOT INTRACTABLE: ICD-10-CM

## 2022-01-14 RX ORDER — DIVALPROEX SODIUM 250 MG/1
TABLET, DELAYED RELEASE ORAL
Qty: 30 TABLET | Refills: 2 | OUTPATIENT
Start: 2022-01-14

## 2022-01-25 ENCOUNTER — TELEPHONE (OUTPATIENT)
Dept: INTERNAL MEDICINE CLINIC | Facility: CLINIC | Age: 67
End: 2022-01-25

## 2022-01-25 NOTE — TELEPHONE ENCOUNTER
----- Message from Trell Shankar sent at 1/25/2022  2:00 PM EST -----  Regarding: PHYS ORDER REQUIRED  Please place an Amb  Ref  To [facility] in [facility] for pt       The appt  Date is : 1/26/2022    Dx   Code is : I56 [id number]    Thank you

## 2022-01-26 ENCOUNTER — TELEMEDICINE (OUTPATIENT)
Dept: NEUROLOGY | Facility: CLINIC | Age: 67
End: 2022-01-26
Payer: COMMERCIAL

## 2022-01-26 DIAGNOSIS — G43.709 CHRONIC MIGRAINE WITHOUT AURA WITHOUT STATUS MIGRAINOSUS, NOT INTRACTABLE: Primary | ICD-10-CM

## 2022-01-26 PROCEDURE — 99213 OFFICE O/P EST LOW 20 MIN: CPT | Performed by: PHYSICIAN ASSISTANT

## 2022-01-26 RX ORDER — TOPIRAMATE 25 MG/1
TABLET ORAL
Qty: 120 TABLET | Refills: 3 | Status: SHIPPED | OUTPATIENT
Start: 2022-01-26 | End: 2022-07-25 | Stop reason: ALTCHOICE

## 2022-01-26 NOTE — PATIENT INSTRUCTIONS
Chronic migraine without aura without status migrainosus, not intractable  · Alternative medications, Botox and CGRP inhibitors were discussed  · She is agreeable to trying topiramate  She will take 25mg daily for 1 week, then 50mg daily for 1 week, then 75mg daily for 1 week, then 100mg daily  Side effects were reviewed  · She can continue tylenol for treatment of acute migraines  She should not take it more than 3 times per week  I have spent 30 minutes with Patient  today in which greater than 50% of this time was spent in counseling/coordination of care regarding Diagnostic results, Prognosis, Risks and benefits of tx options, Intructions for management, Patient and family education, Importance of tx compliance, Risk factor reductions and Impressions  She will follow-up in 4 months

## 2022-01-26 NOTE — ASSESSMENT & PLAN NOTE
· Alternative medications, Botox and CGRP inhibitors were discussed  · She is agreeable to trying topiramate  She will take 25mg daily for 1 week, then 50mg daily for 1 week, then 75mg daily for 1 week, then 100mg daily  Side effects were reviewed  · She can continue tylenol for treatment of acute migraines  She should not take it more than 3 times per week  I have spent 30 minutes with Patient  today in which greater than 50% of this time was spent in counseling/coordination of care regarding Diagnostic results, Prognosis, Risks and benefits of tx options, Intructions for management, Patient and family education, Importance of tx compliance, Risk factor reductions and Impressions  She will follow-up in 4 months

## 2022-01-26 NOTE — PROGRESS NOTES
Virtual Regular Visit    Verification of patient location:    Patient is located in the following state in which I hold an active license [place]      Assessment/Plan:    Problem List Items Addressed This Visit        Cardiovascular and Mediastinum    Chronic migraine without aura without status migrainosus, not intractable - Primary     · Alternative medications, Botox and CGRP inhibitors were discussed  · She is agreeable to trying topiramate  She will take 25mg daily for 1 week, then 50mg daily for 1 week, then 75mg daily for 1 week, then 100mg daily  Side effects were reviewed  · She can continue tylenol for treatment of acute migraines  She should not take it more than 3 times per week  I have spent 30 minutes with Patient  today in which greater than 50% of this time was spent in counseling/coordination of care regarding Diagnostic results, Prognosis, Risks and benefits of tx options, Intructions for management, Patient and family education, Importance of tx compliance, Risk factor reductions and Impressions  She will follow-up in 4 months  Relevant Medications    topiramate (Topamax) 25 mg tablet               Reason for visit is   Chief Complaint   Patient presents with    Virtual Regular Visit        Encounter provider Niko Nichols PA-C    Provider located at [place] [phone]      Recent Visits  Date Type Provider Dept   01/25/22 Telephone Chioma Marquez MD  [address] [facility] recent visits within past 7 days and meeting all other requirements  Future Appointments  No visits were found meeting these conditions  Showing future appointments within next 150 days and meeting all other requirements       The patient was identified by name and date of birth  Vasquez Dickinson was informed that this is a telemedicine visit and that the visit is being conducted through Telephone    My office door was closed  No one else was in the room  She acknowledged consent and understanding of privacy and security of the video platform  The patient has agreed to participate and understands they can discontinue the visit at any time  It was my intent to perform this visit via video technology but the patient was not able to do a video connection so the visit was completed via audio telephone only  Patient is aware this is a billable service  Subjective  Blane Alejandra is a 73 y o  female, with Rt ophthalmic artery aneurysm s/p repair, hypothyroidism, allergic rhinitis, anxiety, hyperlipdiemia and HTN, who follows in the office due to headaches  At her last appt depakote was started  She states that it was not helpful for headaches and she stopped the medication  She has tried baclofen, nortriptyline, depakote, cyproheptadine, protriptyline and gabapentin in the past  She reports that she has had daily headaches for the past 30 years  The headaches ar occipital and temporal in location  She describes the headache as pressure, shooting, nausea, difficulty sleeping, tinnitus and distal paresthesias  Headaches can cause her to miss social or family activities  She cannot identify and triggers for the headaches  She does take tylenol as needed for the most severe migraines and it is effective  She is willing to try another medication for migraine prevention  Past Medical History:   Diagnosis Date    Arthritis     Depression     Disease of thyroid gland     hypo    Edema     LAST ASSESSED: [id number]    GERD (gastroesophageal reflux disease)     Hypercholesterolemia     Hyperlipidemia     Hypertension     WELL CONTROLLED  CURRENTLY ON LISINOPRIL   LAST ASSESSED: [id number]    Other headache syndrome     Other muscle spasm     LAST ASSESSED: [id number]    Ovarian cyst 2006    Renal calculi      (spontaneous vaginal delivery)     FEMALE       Past Surgical History:   Procedure Laterality Date    BACK SURGERY  1996    HERNIATED DISC (L4/L5)    CHOLECYSTECTOMY      IR CEREBRAL ANGIOGRAPHY  11/6/2020    IR CEREBRAL ANGIOGRAPHY  8/27/2021    IR CEREBRAL ANGIOGRAPHY / INTERVENTION  1/8/2021    TONSILLECTOMY         Current Outpatient Medications   Medication Sig Dispense Refill    acetaminophen (Tylenol 8 Hour Arthritis Pain) 650 mg CR tablet Take 1 tablet (650 mg total) by mouth every 8 (eight) hours as needed for mild pain 90 tablet 3    ALPRAZolam (XANAX) 0 5 mg tablet Take 1 tab 2 hours prior to MRI  May take another tab 30 minutes prior if needed  (Patient not taking: Reported on 8/6/2021) 2 tablet 0    aspirin (ECOTRIN LOW STRENGTH) 81 mg EC tablet Take 1 tablet (81 mg total) by mouth daily 90 tablet 3    atorvastatin (LIPITOR) 10 mg tablet Take 1 tablet (10 mg total) by mouth daily 90 tablet 3    calcium carbonate (Tums) 500 mg chewable tablet Chew as needed      Cholecalciferol (Vitamin D High Potency) 25 MCG (1000 UT) capsule Take 1 capsule (1,000 Units total) by mouth daily 90 capsule 1    divalproex sodium (DEPAKOTE) 250 mg EC tablet TAKE 1 TABLET (250 MG TOTAL) BY MOUTH DAILY AT BEDTIME (Patient not taking: Reported on 9/16/2021) 30 tablet 3    levothyroxine 100 mcg tablet TAKE 1 TABLET (100 MCG TOTAL) BY MOUTH TWO (TWO) TIMES A WEEK 24 tablet 2    levothyroxine 88 mcg tablet TAKE ONE TABLET BY MOUTH DAILY 90 tablet 2    lisinopril (ZESTRIL) 20 mg tablet Take 1 tablet (20 mg total) by mouth daily 90 tablet 1    loratadine (CLARITIN) 10 mg tablet TAKE 1 TABLET BY MOUTH DAILY AS NEEDED FOR ALLERGIES 90 tablet 4    omeprazole (PriLOSEC) 20 mg delayed release capsule Take 1 capsule (20 mg total) by mouth daily 90 capsule 1     No current facility-administered medications for this visit  Allergies   Allergen Reactions    Tagamet [Cimetidine] Hives       Review of Systems   Constitutional: Negative  Negative for appetite change and fever  HENT: Negative    Negative for hearing loss, tinnitus, trouble swallowing and voice change  Eyes: Positive for visual disturbance  Negative for photophobia and pain  Respiratory: Negative  Negative for shortness of breath  Cardiovascular: Negative  Negative for palpitations  Gastrointestinal: Positive for nausea and vomiting  Endocrine: Negative  Negative for cold intolerance  Genitourinary: Negative  Negative for dysuria, frequency and urgency  Musculoskeletal: Negative  Negative for myalgias and neck pain  Skin: Negative  Negative for rash  Neurological: Positive for weakness and headaches  Negative for dizziness, tremors, seizures, syncope, facial asymmetry, speech difficulty, light-headedness and numbness  Hematological: Negative  Does not bruise/bleed easily  Psychiatric/Behavioral: Negative  Negative for confusion, hallucinations and sleep disturbance  Review of systems personally reviewed  VIRTUAL VISIT DISCLAIMER      Charlotte Diamond verbally agrees to participate in [employer]  Pt is aware that [employer] could be limited without vital signs or the ability to perform a full hands-on physical Valaria Fore understands she or the provider may request at any time to terminate the video visit and request the patient to seek care or treatment in person

## 2022-03-03 ENCOUNTER — OFFICE VISIT (OUTPATIENT)
Dept: INTERNAL MEDICINE CLINIC | Facility: CLINIC | Age: 67
End: 2022-03-03

## 2022-03-03 VITALS
HEART RATE: 76 BPM | OXYGEN SATURATION: 97 % | WEIGHT: 288.4 LBS | DIASTOLIC BLOOD PRESSURE: 82 MMHG | SYSTOLIC BLOOD PRESSURE: 141 MMHG | TEMPERATURE: 97.6 F | BODY MASS INDEX: 47.99 KG/M2

## 2022-03-03 DIAGNOSIS — I10 PRIMARY HYPERTENSION: ICD-10-CM

## 2022-03-03 DIAGNOSIS — E55.9 VITAMIN D DEFICIENCY: ICD-10-CM

## 2022-03-03 DIAGNOSIS — E03.8 OTHER SPECIFIED HYPOTHYROIDISM: ICD-10-CM

## 2022-03-03 DIAGNOSIS — M62.830 SPASM OF THORACOLUMBAR MUSCLE: ICD-10-CM

## 2022-03-03 DIAGNOSIS — E11.9 TYPE 2 DIABETES MELLITUS WITHOUT COMPLICATION, WITHOUT LONG-TERM CURRENT USE OF INSULIN (HCC): Primary | ICD-10-CM

## 2022-03-03 LAB — SL AMB POCT HEMOGLOBIN AIC: 6.7 (ref ?–6.5)

## 2022-03-03 PROCEDURE — 99213 OFFICE O/P EST LOW 20 MIN: CPT | Performed by: HOSPITALIST

## 2022-03-03 PROCEDURE — 83036 HEMOGLOBIN GLYCOSYLATED A1C: CPT | Performed by: HOSPITALIST

## 2022-03-03 RX ORDER — METHOCARBAMOL 500 MG/1
500 TABLET, FILM COATED ORAL 2 TIMES DAILY PRN
Qty: 21 TABLET | Refills: 0 | Status: SHIPPED | OUTPATIENT
Start: 2022-03-03 | End: 2022-07-25 | Stop reason: ALTCHOICE

## 2022-03-03 NOTE — PROGRESS NOTES
ASSESSMENT/PLAN:    Case and plan discussed with Dr Jose Alfredo Delgado    Diagnoses and all orders for this visit:    Type 2 diabetes mellitus without complication, without long-term current use of insulin ([facility] [address] )  Lab Results   Component Value Date    HGBA1C 6 7 (A) 03/03/2022    HGBA1C 6 5 (H) 08/24/2021    HGBA1C 6 1 02/23/2016     Lab Results   Component Value Date    MICROALBCRE 14 08/24/2021    MICROALBCRE <5 09/30/2017    LDLCALC 88 01/09/2021    LDLCALC 117 (H) 02/22/2020      Diet controlled  Eye exam: Referral given on last visit  Encouraged to schedule  -     POCT hemoglobin A1c    Spasm of thoracolumbar muscle  Will start trial of muscle relaxant  NSAIDS not advised due to GERD h/o  If pain persist, consider PT  -     methocarbamol (ROBAXIN) 500 mg tablet; Take 1 tablet (500 mg total) by mouth 2 (two) times a day as needed for muscle spasms for up to 21 doses    Other specified hypothyroidism  TSH ordered on last visit  Encourage today to complete  Continue current regime of levothyroxine for now     Primary hypertension  Sub-optimal control  Will increase lisinopril to 20mg daily    Vitamin D deficiency  Level ordered on last visit  Encouraged today to complete    Continue supplementation for now      Immunization History   Administered Date(s) Administered    INFLUENZA 11/12/2018    Influenza Quadrivalent, 6-35 Months IM 09/29/2017    Influenza, high dose seasonal 0 7 mL 11/15/2021    Influenza, recombinant, quadrivalent,injectable, preservative free 11/12/2018, 10/21/2019, 10/30/2020    Influenza, seasonal, injectable 10/10/2013, 12/03/2014, 10/22/2015    Pneumococcal Polysaccharide PPV23 11/12/2018    Tdap 10/30/2020, 05/01/2021         HISTORY OF PRESENT ILLNESS:  type 2 diabetes diet-controlled, hypothyroidism, migraine, AARON not on CPAP, hypertension, morbid obesity, hyperlipidemia, 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 by Dr Jacob Forrester who recommended ASA  h/o of 0 5 cm right middle lobe pulmonary nodule is unchanged since 11/19/2015 with no further imaging recommended  CC f/u on chronic conditions  Reports Right sided back and flank pain that started 1 month ago and has been intermittent and variable intensity  Has not identified an exacerbating factor  No relief with tylenol  No previous episodes  She does have history of L5 herniation with surgical intervention in 1996  She feels this pain does not feel the same  BP noted to be in 130-140  Currently on lisinopril 5mg  Noted imaging and labwork previously ordered has not been completed  Advised to peformed these  She is amenable to the plan  ROS as below    Review of Systems   Constitutional: Negative for activity change, chills, diaphoresis, fatigue and fever  HENT: Negative  Negative for rhinorrhea, sinus pain, sneezing and sore throat  Eyes: Negative  Negative for visual disturbance  Respiratory: Negative for cough, choking, chest tightness, shortness of breath and wheezing  Cardiovascular: Negative for chest pain, palpitations and leg swelling  Gastrointestinal: Negative for abdominal distention, abdominal pain, constipation, diarrhea, nausea and vomiting  Endocrine: Negative  Genitourinary: Negative  Negative for difficulty urinating, dysuria and urgency  Musculoskeletal: Positive for back pain  Skin: Negative  Negative for rash and wound  Neurological: Negative for dizziness, tremors, weakness, light-headedness, numbness and headaches  Psychiatric/Behavioral: Negative for confusion and sleep disturbance  OBJECTIVE:  Vitals:    03/03/22 1501   BP: 141/82   BP Location: Right arm   Patient Position: Sitting   Cuff Size: Large   Pulse: 76   Temp: 97 6 °F (36 4 °C)   TempSrc: Temporal   SpO2: 97%   Weight: 131 kg (288 lb 6 4 oz)       Physical Exam:   GENERAL: NAD, Normal appearance  Non diaphoretic, non-toxic, not ill-appearing, well-developed, well-nourished  NEUROLOGIC:  Alert/oriented x3  No motor or sensory deficits  HEENT:  NC/AT, PERRL, EOMI, MMM, no scleral icterus  CARDIAC:  RRR, +S1/S2, no S3/S4 heard, no m/g/r  PULMONARY:  non-labored breathing, CTA B/L, no wheezing/rales/rhonci appreciated at time of encounter  ABDOMEN:  Soft, NT/ND, +BS, no rebound/guarding/rigidity  Extremities:  2+ Pulses in DP/PT  No edema, cyanosis, or clubbing  SKIN:  No rashes or erythema        Current Outpatient Medications:     acetaminophen (Tylenol 8 Hour Arthritis Pain) 650 mg CR tablet, Take 1 tablet (650 mg total) by mouth every 8 (eight) hours as needed for mild pain, Disp: 90 tablet, Rfl: 3    aspirin (ECOTRIN LOW STRENGTH) 81 mg EC tablet, Take 1 tablet (81 mg total) by mouth daily, Disp: 90 tablet, Rfl: 3    atorvastatin (LIPITOR) 10 mg tablet, Take 1 tablet (10 mg total) by mouth daily, Disp: 90 tablet, Rfl: 3    calcium carbonate (Tums) 500 mg chewable tablet, Chew as needed, Disp: , Rfl:     Cholecalciferol (Vitamin D High Potency) 25 MCG (1000 UT) capsule, Take 1 capsule (1,000 Units total) by mouth daily, Disp: 90 capsule, Rfl: 1    levothyroxine 100 mcg tablet, TAKE 1 TABLET (100 MCG TOTAL) BY MOUTH TWO (TWO) TIMES A WEEK, Disp: 24 tablet, Rfl: 2    levothyroxine 88 mcg tablet, TAKE ONE TABLET BY MOUTH DAILY (Patient taking differently: TAKE 88 MCG TABLETS MONDAY TO FRIDAY  MCG SATURDAYS AND SUNDAYS ), Disp: 90 tablet, Rfl: 2    lisinopril (ZESTRIL) 20 mg tablet, Take 1 tablet (20 mg total) by mouth daily, Disp: 90 tablet, Rfl: 1    loratadine (CLARITIN) 10 mg tablet, TAKE 1 TABLET BY MOUTH DAILY AS NEEDED FOR ALLERGIES, Disp: 90 tablet, Rfl: 4    omeprazole (PriLOSEC) 20 mg delayed release capsule, Take 1 capsule (20 mg total) by mouth daily, Disp: 90 capsule, Rfl: 1    ALPRAZolam (XANAX) 0 5 mg tablet, Take 1 tab 2 hours prior to MRI  May take another tab 30 minutes prior if needed   (Patient not taking: Reported on 8/6/2021), Disp: 2 tablet, Rfl: 0    topiramate (Topamax) 25 mg tablet, Take 25mg daily for 1 week, then 50mg daily for 1 week, then 75mg daily for 1 week, then 100mg daily  (Patient not taking: Reported on 3/3/2022 ), Disp: 120 tablet, Rfl: 3    Past Medical History:   Diagnosis Date    Arthritis     Depression     Disease of thyroid gland     hypo    Edema     LAST ASSESSED: [id number]    GERD (gastroesophageal reflux disease)     Hypercholesterolemia     Hyperlipidemia     Hypertension     WELL CONTROLLED  CURRENTLY ON LISINOPRIL   LAST ASSESSED: [id number]    Other headache syndrome     Other muscle spasm     LAST ASSESSED: [id number]    Ovarian cyst 2006    Renal calculi      (spontaneous vaginal delivery) 1975    FEMALE     Past Surgical History:   Procedure Laterality Date    BACK SURGERY      HERNIATED DISC (L4/L5)    CHOLECYSTECTOMY      IR CEREBRAL ANGIOGRAPHY  2020    IR CEREBRAL ANGIOGRAPHY  2021    IR CEREBRAL ANGIOGRAPHY / INTERVENTION  2021    TONSILLECTOMY       Family History   Problem Relation Age of Onset    Lung cancer Mother     Cancer Mother         MALIGNANT NEOPLASM    Hypertension Father     Seizures Father     Stroke Father     Heart attack Father     Diabetes Brother     Hypertension Son     Lung disease Son     Hyperthyroidism Maternal Aunt     Hypothyroidism Maternal Aunt     Heart disease Other         CARDIAC DISORDER    Neuropathy Family     Lung cancer Cousin      Social History     Socioeconomic History    Marital status:      Spouse name: Not on file    Number of children: Not on file    Years of education: Not on file    Highest education level: Not on file   Occupational History    Occupation: RETIRED   Tobacco Use    Smoking status: Former Smoker    Smokeless tobacco: Never Used    Tobacco comment: pt "quit about 20 years ago"   Vaping Use    Vaping Use: Never used   Substance and Sexual Activity    Alcohol use: Never    Drug use: No    Sexual activity: Never   Other Topics Concern    Not on file   Social History Narrative    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         AS PER [employer]    EXERCISES REGULARLY    LIVES WITH FAMILY    NO CAFFEINE USE    NO LIVING WILL    NO TOBACCO/SMOKE EXPOSURE    UNEMPLOYED     Social Determinants of Health     Financial Resource Strain: Low Risk     Difficulty of Paying Living Expenses: Not hard at all   Food Insecurity: No Food Insecurity    Worried About Running Out of Food in the Last Year: Never true    Olegario of Food in the Last Year: Never true   Transportation Needs: No Transportation Needs    Lack of Transportation (Medical): No    Lack of Transportation (Non-Medical): No   Physical Activity: Inactive    Days of Exercise per Week: 0 days    Minutes of Exercise per Session: 0 min   Stress: No Stress Concern Present    Feeling of Stress : Not at all   Social Connections: Socially Isolated    Frequency of Communication with Friends and Family: More than three times a week    Frequency of Social Gatherings with Friends and Family:  Three times a week    Attends Jain Services: Never    Active Member of Clubs or Organizations: No    Attends Club or Organization Meetings: Never    Marital Status:    Intimate Partner Violence: Not At Risk    Fear of Current or Ex-Partner: No    Emotionally Abused: No    Physically Abused: No    Sexually Abused: No   Housing Stability: Low Risk     Unable to Pay for Housing in the Last Year: No    Number of [place] in the Last Year: 1    Unstable Housing in the Last Year: No     Social History     Tobacco Use   Smoking Status Former Smoker   Smokeless Tobacco Never Used   Tobacco Comment    pt "quit about 20 years ago"     Social History     Substance and Sexual Activity   Alcohol Use Never     Social History     Substance and Sexual Activity   Drug Use No         Ana Gutierrez MD  Internal Medicine Residency, PGY-3  [address]

## 2022-03-05 ENCOUNTER — APPOINTMENT (OUTPATIENT)
Dept: LAB | Facility: HOSPITAL | Age: 67
End: 2022-03-05
Payer: MEDICARE

## 2022-03-05 DIAGNOSIS — E03.8 OTHER SPECIFIED HYPOTHYROIDISM: ICD-10-CM

## 2022-03-05 DIAGNOSIS — E55.9 VITAMIN D DEFICIENCY: ICD-10-CM

## 2022-03-05 LAB
25(OH)D3 SERPL-MCNC: 39.5 NG/ML (ref 30–100)
TSH SERPL DL<=0.05 MIU/L-ACNC: 1.96 UIU/ML (ref 0.36–3.74)

## 2022-03-05 PROCEDURE — 36415 COLL VENOUS BLD VENIPUNCTURE: CPT

## 2022-03-05 PROCEDURE — 82306 VITAMIN D 25 HYDROXY: CPT

## 2022-03-05 PROCEDURE — 84443 ASSAY THYROID STIM HORMONE: CPT

## 2022-03-10 ENCOUNTER — OFFICE VISIT (OUTPATIENT)
Dept: INTERNAL MEDICINE CLINIC | Facility: CLINIC | Age: 67
End: 2022-03-10

## 2022-03-10 VITALS
TEMPERATURE: 97.9 F | HEART RATE: 75 BPM | SYSTOLIC BLOOD PRESSURE: 135 MMHG | OXYGEN SATURATION: 95 % | DIASTOLIC BLOOD PRESSURE: 81 MMHG | BODY MASS INDEX: 48.76 KG/M2 | WEIGHT: 293 LBS

## 2022-03-10 DIAGNOSIS — R35.0 URINARY FREQUENCY: ICD-10-CM

## 2022-03-10 DIAGNOSIS — M62.830 SPASM OF THORACOLUMBAR MUSCLE: Primary | ICD-10-CM

## 2022-03-10 LAB
SL AMB  POCT GLUCOSE, UA: NEGATIVE
SL AMB LEUKOCYTE ESTERASE,UA: NEGATIVE
SL AMB POCT BILIRUBIN,UA: NEGATIVE
SL AMB POCT BLOOD,UA: NEGATIVE
SL AMB POCT CLARITY,UA: CLEAR
SL AMB POCT COLOR,UA: YELLOW
SL AMB POCT KETONES,UA: NEGATIVE
SL AMB POCT NITRITE,UA: NEGATIVE
SL AMB POCT PH,UA: 6
SL AMB POCT SPECIFIC GRAVITY,UA: 1.02
SL AMB POCT URINE PROTEIN: NEGATIVE
SL AMB POCT UROBILINOGEN: NORMAL

## 2022-03-10 PROCEDURE — 81002 URINALYSIS NONAUTO W/O SCOPE: CPT | Performed by: INTERNAL MEDICINE

## 2022-03-10 PROCEDURE — 99213 OFFICE O/P EST LOW 20 MIN: CPT | Performed by: INTERNAL MEDICINE

## 2022-03-10 RX ORDER — VITAMIN B COMPLEX
TABLET ORAL
COMMUNITY
Start: 2022-02-22 | End: 2022-04-29

## 2022-03-10 NOTE — PROGRESS NOTES
ASSESSMENT/PLAN:    Case and plan discussed with Dr Gilberto Borrego     Diagnoses and all orders for this visit:    Spasm of thoracolumbar muscle  · Improved  · Continue Robaxin 500mg BID and Tylenol 650mg PRN  Reassured patient that medications can be taken together  Urinary frequency  · Urine dip strip to rule out UTI and pyelonephritis, negative  · Reassured patient that UTI and pyelo unlikely  · Frequency most likely secondary to diabetes  Other orders  -     cholecalciferol (VITAMIN D3) 25 mcg (1,000 units) tablet    Keep follow up appointment with Dr Mallie Prader on April  Immunization History   Administered Date(s) Administered    INFLUENZA 11/12/2018    Influenza Quadrivalent, 6-35 Months IM 09/29/2017    Influenza, high dose seasonal 0 7 mL 11/15/2021    Influenza, recombinant, quadrivalent,injectable, preservative free 11/12/2018, 10/21/2019, 10/30/2020    Influenza, seasonal, injectable 10/10/2013, 12/03/2014, 10/22/2015    Pneumococcal Polysaccharide PPV23 11/12/2018    Tdap 10/30/2020, 05/01/2021         HISTORY OF PRESENT ILLNESS:    Female with history of hypertension, hyperlipidemia, hypothyroid, diabetes type 2,  Presenting for follow-up  Patient presented on 03/03/2022 with right thoracolumbar spasm  She was prescribed Robaxin 500 mg BID  Patient reports pain is slightly improved  Patient also have history of chronic migraines for which she takes Tylenol 650 mg as needed  Patient developed a headache 2 days ago and she was not sure if she could take Tylenol and Robaxin at the same time  Headache is resolved after taking Tylenol  Patient is also complaining urinary frequency, urgency, and dysuria  Patient reports urine looks clear  Patient denies fevers, chills, nausea, vomiting  Review of Systems   Constitutional: Negative for chills and fever  HENT: Negative for ear pain and sore throat  Eyes: Negative for pain and visual disturbance     Respiratory: Negative for cough and shortness of breath  Cardiovascular: Negative for chest pain and palpitations  Gastrointestinal: Negative for abdominal pain, nausea and vomiting  Genitourinary: Positive for dysuria, flank pain and frequency  Negative for hematuria  Musculoskeletal: Negative for arthralgias  Back pain: right lumbar region  Skin: Negative for color change and rash  Neurological: Negative for seizures and syncope  All other systems reviewed and are negative  OBJECTIVE:  Vitals:    03/10/22 1137   BP: 135/81   BP Location: Right arm   Patient Position: Sitting   Cuff Size: Large   Pulse: 75   Temp: 97 9 °F (36 6 °C)   TempSrc: Temporal   SpO2: 95%   Weight: 133 kg (293 lb)       Physical Exam  Vitals and nursing note reviewed  Constitutional:       General: She is not in acute distress  Appearance: She is well-developed  HENT:      Head: Normocephalic and atraumatic  Eyes:      Conjunctiva/sclera: Conjunctivae normal    Cardiovascular:      Rate and Rhythm: Normal rate and regular rhythm  Heart sounds: No murmur heard  Pulmonary:      Effort: Pulmonary effort is normal  No respiratory distress  Breath sounds: Normal breath sounds  Abdominal:      Palpations: Abdomen is soft  Tenderness: There is abdominal tenderness (right lumbar region)  There is no right CVA tenderness or left CVA tenderness  Comments: no suprapubic tenderness   Musculoskeletal:      Cervical back: Neck supple  Skin:     General: Skin is warm and dry  Neurological:      Mental Status: She is alert            Current Outpatient Medications:     acetaminophen (Tylenol 8 Hour Arthritis Pain) 650 mg CR tablet, Take 1 tablet (650 mg total) by mouth every 8 (eight) hours as needed for mild pain, Disp: 90 tablet, Rfl: 3    aspirin (ECOTRIN LOW STRENGTH) 81 mg EC tablet, Take 1 tablet (81 mg total) by mouth daily, Disp: 90 tablet, Rfl: 3    atorvastatin (LIPITOR) 10 mg tablet, Take 1 tablet (10 mg total) by mouth daily, Disp: 90 tablet, Rfl: 3    calcium carbonate (Tums) 500 mg chewable tablet, Chew as needed, Disp: , Rfl:     Cholecalciferol (Vitamin D High Potency) 25 MCG (1000 UT) capsule, Take 1 capsule (1,000 Units total) by mouth daily, Disp: 90 capsule, Rfl: 1    cholecalciferol (VITAMIN D3) 25 mcg (1,000 units) tablet, , Disp: , Rfl:     levothyroxine 100 mcg tablet, TAKE 1 TABLET (100 MCG TOTAL) BY MOUTH TWO (TWO) TIMES A WEEK, Disp: 24 tablet, Rfl: 2    levothyroxine 88 mcg tablet, TAKE ONE TABLET BY MOUTH DAILY (Patient taking differently: TAKE 88 MCG TABLETS MONDAY TO FRIDAY  MCG SATURDAYS AND SUNDAYS ), Disp: 90 tablet, Rfl: 2    lisinopril (ZESTRIL) 20 mg tablet, Take 1 tablet (20 mg total) by mouth daily, Disp: 90 tablet, Rfl: 1    loratadine (CLARITIN) 10 mg tablet, TAKE 1 TABLET BY MOUTH DAILY AS NEEDED FOR ALLERGIES, Disp: 90 tablet, Rfl: 4    methocarbamol (ROBAXIN) 500 mg tablet, Take 1 tablet (500 mg total) by mouth 2 (two) times a day as needed for muscle spasms for up to 21 doses, Disp: 21 tablet, Rfl: 0    omeprazole (PriLOSEC) 20 mg delayed release capsule, Take 1 capsule (20 mg total) by mouth daily, Disp: 90 capsule, Rfl: 1    ALPRAZolam (XANAX) 0 5 mg tablet, Take 1 tab 2 hours prior to MRI  May take another tab 30 minutes prior if needed  (Patient not taking: Reported on 8/6/2021), Disp: 2 tablet, Rfl: 0    topiramate (Topamax) 25 mg tablet, Take 25mg daily for 1 week, then 50mg daily for 1 week, then 75mg daily for 1 week, then 100mg daily  (Patient not taking: Reported on 3/3/2022 ), Disp: 120 tablet, Rfl: 3    Past Medical History:   Diagnosis Date    Arthritis     Depression     Disease of thyroid gland     hypo    Edema     LAST ASSESSED: [id number]    GERD (gastroesophageal reflux disease)     Hypercholesterolemia     Hyperlipidemia     Hypertension     WELL CONTROLLED  CURRENTLY ON LISINOPRIL   LAST ASSESSED: [id number]    Other headache syndrome  Other muscle spasm     LAST ASSESSED: [id number]    Ovarian cyst 2006    Renal calculi      (spontaneous vaginal delivery) 1975    FEMALE     Past Surgical History:   Procedure Laterality Date    BACK SURGERY      HERNIATED DISC (L4/L5)    CHOLECYSTECTOMY      IR CEREBRAL ANGIOGRAPHY  2020    IR CEREBRAL ANGIOGRAPHY  2021    IR CEREBRAL ANGIOGRAPHY / INTERVENTION  2021    TONSILLECTOMY       Family History   Problem Relation Age of Onset    Lung cancer Mother     Cancer Mother         MALIGNANT NEOPLASM    Hypertension Father     Seizures Father     Stroke Father     Heart attack Father     Diabetes Brother     Hypertension Son     Lung disease Son     Hyperthyroidism Maternal Aunt     Hypothyroidism Maternal Aunt     Heart disease Other         CARDIAC DISORDER    Neuropathy Family     Lung cancer Cousin      Social History     Socioeconomic History    Marital status:      Spouse name: Not on file    Number of children: Not on file    Years of education: Not on file    Highest education level: Not on file   Occupational History    Occupation: RETIRED   Tobacco Use    Smoking status: Former Smoker    Smokeless tobacco: Never Used    Tobacco comment: pt "quit about 20 years ago"   Vaping Use    Vaping Use: Never used   Substance and Sexual Activity    Alcohol use: Never    Drug use: No    Sexual activity: Never   Other Topics Concern    Not on file   Social History Narrative    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         [facility]    EXERCISES REGULARLY    LIVES WITH FAMILY    NO CAFFEINE USE    NO LIVING WILL    NO TOBACCO/SMOKE EXPOSURE    UNEMPLOYED     Social Determinants of Health     Financial Resource Strain: Low Risk     Difficulty of Paying Living Expenses: Not hard at all   Food Insecurity: No Food Insecurity    Worried About Running Out of Food in the Last Year: Never true  Ran Out of Food in the Last Year: Never true   Transportation Needs: No Transportation Needs    Lack of Transportation (Medical): No    Lack of Transportation (Non-Medical): No   Physical Activity: Inactive    Days of Exercise per Week: 0 days    Minutes of Exercise per Session: 0 min   Stress: No Stress Concern Present    Feeling of Stress : Not at all   Social Connections: Socially Isolated    Frequency of Communication with Friends and Family: More than three times a week    Frequency of Social Gatherings with Friends and Family: Three times a week    Attends Jewish Services: Never    Active Member of Clubs or Organizations: No    Attends Club or Organization Meetings: Never    Marital Status:    Intimate Partner Violence: Not At Risk    Fear of Current or Ex-Partner: No    Emotionally Abused: No    Physically Abused: No    Sexually Abused: No   Housing Stability: Low Risk     Unable to Pay for Housing in the Last Year: No    Number of [place] in the Last Year: 1    Unstable Housing in the Last Year: No     Social History     Tobacco Use   Smoking Status Former Smoker   Smokeless Tobacco Never Used   Tobacco Comment    pt "quit about 20 years ago"     Social History     Substance and Sexual Activity   Alcohol Use Never     Substance and Sexual Activity   Alcohol Use Never        Substance and Sexual Activity   Drug Use No         Ezequiel Matthew MD  Internal Medicine Residency, PGY-1  [address] [facility]  [address] [facility],[address]  [place] [facility]

## 2022-04-21 ENCOUNTER — RA CDI HCC (OUTPATIENT)
Dept: OTHER | Facility: HOSPITAL | Age: 67
End: 2022-04-21

## 2022-04-21 NOTE — PROGRESS NOTES
[address]  coding opportunities       Chart reviewed, no opportunity found: CHART REVIEWED, NO OPPORTUNITY FOUND        Patients Insurance     Medicare Insurance: Medicare

## 2022-04-28 DIAGNOSIS — M50.30 DEGENERATIVE CERVICAL DISC: Primary | ICD-10-CM

## 2022-04-29 RX ORDER — VITAMIN B COMPLEX
TABLET ORAL
Qty: 90 TABLET | Refills: 4 | Status: SHIPPED | OUTPATIENT
Start: 2022-04-29

## 2022-06-23 NOTE — TELEPHONE ENCOUNTER
Received a call from [facility] requesting refill of ASA 325mg looks as though she was transitioned off Plavix during her last visit 8/6  Noted digital refill request already received  Will route message for Dr Parminder Pham to sign  04-Apr-2022

## 2022-07-25 ENCOUNTER — OFFICE VISIT (OUTPATIENT)
Dept: INTERNAL MEDICINE CLINIC | Facility: CLINIC | Age: 67
End: 2022-07-25

## 2022-07-25 VITALS
TEMPERATURE: 97.2 F | SYSTOLIC BLOOD PRESSURE: 126 MMHG | BODY MASS INDEX: 48.29 KG/M2 | OXYGEN SATURATION: 96 % | HEART RATE: 73 BPM | WEIGHT: 290.2 LBS | DIASTOLIC BLOOD PRESSURE: 74 MMHG

## 2022-07-25 DIAGNOSIS — Z12.11 ENCOUNTER FOR SCREENING COLONOSCOPY: ICD-10-CM

## 2022-07-25 DIAGNOSIS — E11.9 TYPE 2 DIABETES MELLITUS WITHOUT COMPLICATION, WITHOUT LONG-TERM CURRENT USE OF INSULIN (HCC): Primary | ICD-10-CM

## 2022-07-25 DIAGNOSIS — E03.8 OTHER SPECIFIED HYPOTHYROIDISM: ICD-10-CM

## 2022-07-25 DIAGNOSIS — E66.01 MORBID OBESITY (HCC): ICD-10-CM

## 2022-07-25 DIAGNOSIS — Z78.0 POSTMENOPAUSAL ESTROGEN DEFICIENCY: ICD-10-CM

## 2022-07-25 DIAGNOSIS — G43.709 CHRONIC MIGRAINE WITHOUT AURA WITHOUT STATUS MIGRAINOSUS, NOT INTRACTABLE: ICD-10-CM

## 2022-07-25 DIAGNOSIS — I10 ESSENTIAL HYPERTENSION: ICD-10-CM

## 2022-07-25 DIAGNOSIS — Z13.820 ENCOUNTER FOR SCREENING FOR OSTEOPOROSIS: ICD-10-CM

## 2022-07-25 LAB — SL AMB POCT HEMOGLOBIN AIC: 6.4 (ref ?–6.5)

## 2022-07-25 PROCEDURE — 99214 OFFICE O/P EST MOD 30 MIN: CPT | Performed by: INTERNAL MEDICINE

## 2022-07-25 PROCEDURE — 83036 HEMOGLOBIN GLYCOSYLATED A1C: CPT | Performed by: INTERNAL MEDICINE

## 2022-07-25 NOTE — PROGRESS NOTES
ASSESSMENT/PLAN:    Case and plan discussed with Dr Savannah Garcia     Diagnoses and all orders for this visit:    Diabetes Mellitus type 2 without complication, without long-term current use of insulin ([facility] [address] )  3/3/22: A1c 6 7  Repeat A1c today 6 4  Controlled with diet  Advised to limit carbohydrates and sugary meals as discussed below  Chronic migraine without aura without status migrainosus, not intractable  Refractory to medications including Topamax  Patient following up with neurology  Offered Botox injections, patient considering  Hypothyroidism  3/5/22: TSH level 1 9  Continue levothyroxine 100mcg  Essential hypertension  Controlled  Continue lisinopril 20mg daily    Vitamin D deficiency  3/5/22: Vitamin D level 39 5  Continue Vitamin D supplementation  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   Following up with neurosurgery  Plan for repeat CTA 9/13/2022  Pulmonary nodule  History of 0 5cm right middle lobe pulmonary nodule unchanged since 2015  Recommended to stop imaging follow up  Health Maintenance:  Medicare Annual Wellness Visit - will schedule within 3 months  Colon cancer screening - referred for colonoscopy today  DEXA scan - order placed today  Pneumococcal vaccine - Will address during next appointment  Patient qualifies for Prevnar 20  BMI Counseling: Body mass index is 48 29 kg/m²  The BMI is above normal  Nutrition recommendations include decreasing portion sizes, encouraging healthy choices of fruits and vegetables, limiting drinks that contain sugar, moderation in carbohydrate intake and increasing intake of lean protein  Exercise recommendations include moderate physical activity 150 minutes/week  Rationale for BMI follow-up plan is due to patient being overweight or obese          Immunization History   Administered Date(s) Administered    INFLUENZA 11/12/2018    Influenza Quadrivalent, 6-35 Months IM 09/29/2017    Influenza, high dose seasonal 0 7 mL 11/15/2021    Influenza, recombinant, quadrivalent,injectable, preservative free 11/12/2018, 10/21/2019, 10/30/2020    Influenza, seasonal, injectable 10/10/2013, 12/03/2014, 10/22/2015    Pneumococcal Polysaccharide PPV23 11/12/2018    Tdap 10/30/2020, 05/01/2021         HISTORY OF PRESENT ILLNESS:    73-year-old female with past medical history of diabetes mellitus type 2, hypothyroidism, hyperlipidemia, 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 and 0 5cm right middle lobe pulmonary nodule unchanged since 2015 presenting to the clinic for a follow up of chronic conditions  Patient has no major complaints today  Reports that has chronic migraines for which is following up with neurology with plan for Botox injections  Reports that follows up with neurosurgery for history of aneurysm with plan for repeat CTA on September  Patient trying to loss weight for which she avoids carbohydrates some days but then start craving them and start eating carbs again  Reports no formal exercise  Review of Systems   Constitutional: Negative for chills and fever  HENT: Negative for ear pain and sore throat  Eyes: Negative for pain and visual disturbance  Respiratory: Negative for cough and shortness of breath  Cardiovascular: Negative for chest pain and palpitations  Gastrointestinal: Negative for abdominal pain and vomiting  Genitourinary: Negative for dysuria and hematuria  Musculoskeletal: Negative for arthralgias and back pain  Skin: Negative for color change and rash  Neurological: Negative for seizures and syncope  All other systems reviewed and are negative        OBJECTIVE:  Vitals:    07/25/22 1108   BP: 126/74   BP Location: Left arm   Patient Position: Sitting   Cuff Size: Large   Pulse: 73   Temp: (!) 97 2 °F (36 2 °C)   TempSrc: Temporal   SpO2: 96%   Weight: 132 kg (290 lb 3 2 oz)       Physical Exam  Vitals and nursing note reviewed  Constitutional:       General: She is not in acute distress  Appearance: She is well-developed  She is obese  HENT:      Head: Normocephalic and atraumatic  Eyes:      Conjunctiva/sclera: Conjunctivae normal    Cardiovascular:      Rate and Rhythm: Normal rate and regular rhythm  Heart sounds: No murmur heard  Pulmonary:      Effort: Pulmonary effort is normal  No respiratory distress  Breath sounds: Normal breath sounds  Abdominal:      Palpations: Abdomen is soft  Tenderness: There is no abdominal tenderness  Musculoskeletal:      Cervical back: Neck supple  Right lower leg: No edema  Left lower leg: No edema  Skin:     General: Skin is warm and dry  Neurological:      Mental Status: She is alert              Current Outpatient Medications:     acetaminophen (Tylenol 8 Hour Arthritis Pain) 650 mg CR tablet, Take 1 tablet (650 mg total) by mouth every 8 (eight) hours as needed for mild pain, Disp: 90 tablet, Rfl: 3    aspirin (ECOTRIN LOW STRENGTH) 81 mg EC tablet, Take 1 tablet (81 mg total) by mouth daily, Disp: 90 tablet, Rfl: 3    atorvastatin (LIPITOR) 10 mg tablet, Take 1 tablet (10 mg total) by mouth daily, Disp: 90 tablet, Rfl: 3    calcium carbonate (TUMS) 500 mg chewable tablet, Chew as needed, Disp: , Rfl:     Cholecalciferol (Vitamin D High Potency) 25 MCG (1000 UT) capsule, Take 1 capsule (1,000 Units total) by mouth daily, Disp: 90 capsule, Rfl: 1    cholecalciferol (VITAMIN D3) 25 mcg (1,000 units) tablet, TAKE 1 CAPSULE (1,000 UNITS TOTAL) BY MOUTH DAILY, Disp: 90 tablet, Rfl: 4    levothyroxine 100 mcg tablet, TAKE 1 TABLET (100 MCG TOTAL) BY MOUTH TWO (TWO) TIMES A WEEK, Disp: 24 tablet, Rfl: 2    lisinopril (ZESTRIL) 20 mg tablet, TAKE 1 TABLET (20 MG TOTAL) BY MOUTH DAILY, Disp: 90 tablet, Rfl: 3    loratadine (CLARITIN) 10 mg tablet, TAKE 1 TABLET BY MOUTH DAILY AS NEEDED FOR ALLERGIES, Disp: 90 tablet, Rfl: 4   omeprazole (PriLOSEC) 20 mg delayed release capsule, TAKE 1 CAPSULE (20 MG TOTAL) BY MOUTH DAILY, Disp: 90 capsule, Rfl: 3    Past Medical History:   Diagnosis Date    Arthritis     Depression     Disease of thyroid gland     hypo    Edema     LAST ASSESSED: [id number]    GERD (gastroesophageal reflux disease)     Hypercholesterolemia     Hyperlipidemia     Hypertension     WELL CONTROLLED  CURRENTLY ON LISINOPRIL   LAST ASSESSED: [id number]    Other headache syndrome     Other muscle spasm     LAST ASSESSED: [id number]    Ovarian cyst 2006    Renal calculi      (spontaneous vaginal delivery) 1975    FEMALE     Past Surgical History:   Procedure Laterality Date    BACK SURGERY      HERNIATED DISC (L4/L5)    CHOLECYSTECTOMY      IR CEREBRAL ANGIOGRAPHY  2020    IR CEREBRAL ANGIOGRAPHY  2021    IR CEREBRAL ANGIOGRAPHY / INTERVENTION  2021    TONSILLECTOMY       Family History   Problem Relation Age of Onset    Lung cancer Mother     Cancer Mother         MALIGNANT NEOPLASM    Hypertension Father     Seizures Father     Stroke Father     Heart attack Father     Diabetes Brother     Hypertension Son     Lung disease Son     Hyperthyroidism Maternal Aunt     Hypothyroidism Maternal Aunt     Heart disease Other         CARDIAC DISORDER    Neuropathy Family     Lung cancer Cousin      Social History     Socioeconomic History    Marital status:      Spouse name: Not on file    Number of children: Not on file    Years of education: Not on file    Highest education level: Not on file   Occupational History    Occupation: RETIRED   Tobacco Use    Smoking status: Former Smoker    Smokeless tobacco: Never Used    Tobacco comment: pt "quit about 20 years ago"   Vaping Use    Vaping Use: Never used   Substance and Sexual Activity    Alcohol use: Never    Drug use: No    Sexual activity: Never   Other Topics Concern    Not on file   Social History Narrative 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         AS PER [facility]    EXERCISES REGULARLY    LIVES WITH FAMILY    NO CAFFEINE USE    NO LIVING WILL    NO TOBACCO/SMOKE EXPOSURE    UNEMPLOYED     Social Determinants of Health     Financial Resource Strain: Low Risk     Difficulty of Paying Living Expenses: Not hard at all   Food Insecurity: No Food Insecurity    Worried About Running Out of Food in the Last Year: Never true    [address] in the Last Year: Never true   Transportation Needs: No Transportation Needs    Lack of Transportation (Medical): No    Lack of Transportation (Non-Medical): No   Physical Activity: Inactive    Days of Exercise per Week: 0 days    Minutes of Exercise per Session: 0 min   Stress: No Stress Concern Present    Feeling of Stress : Not at all   Social Connections: Socially Isolated    Frequency of Communication with Friends and Family: More than three times a week    Frequency of Social Gatherings with Friends and Family: Three times a week    Attends Jehov[employer] witness Services: Never    Active Member of Clubs or Organizations: No    Attends Club or Organization Meetings: Never    Marital Status:    Intimate Partner Violence: Not At Risk    Fear of Current or Ex-Partner: No    Emotionally Abused: No    Physically Abused: No    Sexually Abused: No   Housing Stability: Low Risk     Unable to Pay for Housing in the Last Year: No    Number of [place] in the Last Year: 1    Unstable Housing in the Last Year: No     Social History     Tobacco Use   Smoking Status Former Smoker   Smokeless Tobacco Never Used   Tobacco Comment    pt "quit about 20 years ago"     Social History     Substance and Sexual Activity   Alcohol Use Never     Substance and Sexual Activity   Alcohol Use Never        Substance and Sexual Activity   Drug Use No         Bonnielee Riedel Ramos-Feliciano, MD  Internal Medicine Residency, PGY-2  [facility] [address]  [address] [facility],[address]  [place], [address] [facility]

## 2022-07-29 DIAGNOSIS — E03.8 OTHER SPECIFIED HYPOTHYROIDISM: ICD-10-CM

## 2022-07-29 RX ORDER — LEVOTHYROXINE SODIUM 0.1 MG/1
TABLET ORAL
Qty: 24 TABLET | Refills: 1 | Status: SHIPPED | OUTPATIENT
Start: 2022-07-29

## 2022-08-08 ENCOUNTER — HOSPITAL ENCOUNTER (EMERGENCY)
Facility: HOSPITAL | Age: 67
Discharge: HOME | End: 2022-08-09
Attending: EMERGENCY MEDICINE
Payer: MEDICARE

## 2022-08-08 ENCOUNTER — APPOINTMENT (EMERGENCY)
Dept: RADIOLOGY | Facility: HOSPITAL | Age: 67
End: 2022-08-08
Payer: MEDICARE

## 2022-08-08 DIAGNOSIS — M72.2 PLANTAR FASCIITIS OF RIGHT FOOT: Primary | ICD-10-CM

## 2022-08-08 LAB — GLUCOSE SERPL-MCNC: 96 MG/DL (ref 65–140)

## 2022-08-08 PROCEDURE — 73610 X-RAY EXAM OF ANKLE: CPT

## 2022-08-08 PROCEDURE — 99284 EMERGENCY DEPT VISIT MOD MDM: CPT

## 2022-08-08 PROCEDURE — 96372 THER/PROPH/DIAG INJ SC/IM: CPT

## 2022-08-08 PROCEDURE — 99284 EMERGENCY DEPT VISIT MOD MDM: CPT | Performed by: EMERGENCY MEDICINE

## 2022-08-08 PROCEDURE — 73700 CT LOWER EXTREMITY W/O DYE: CPT

## 2022-08-08 PROCEDURE — 73560 X-RAY EXAM OF KNEE 1 OR 2: CPT

## 2022-08-08 PROCEDURE — 82948 REAGENT STRIP/BLOOD GLUCOSE: CPT

## 2022-08-08 PROCEDURE — G1004 CDSM NDSC: HCPCS

## 2022-08-08 RX ORDER — OXYCODONE HYDROCHLORIDE AND ACETAMINOPHEN 5; 325 MG/1; MG/1
1 TABLET ORAL ONCE
Status: COMPLETED | OUTPATIENT
Start: 2022-08-08 | End: 2022-08-08

## 2022-08-08 RX ORDER — KETOROLAC TROMETHAMINE 30 MG/ML
15 INJECTION, SOLUTION INTRAMUSCULAR; INTRAVENOUS ONCE
Status: COMPLETED | OUTPATIENT
Start: 2022-08-08 | End: 2022-08-08

## 2022-08-08 RX ORDER — ACETAMINOPHEN 325 MG/1
650 TABLET ORAL ONCE
Status: COMPLETED | OUTPATIENT
Start: 2022-08-08 | End: 2022-08-08

## 2022-08-08 RX ADMIN — OXYCODONE HYDROCHLORIDE AND ACETAMINOPHEN 1 TABLET: 5; 325 TABLET ORAL at 19:48

## 2022-08-08 RX ADMIN — ACETAMINOPHEN 650 MG: 325 TABLET ORAL at 17:58

## 2022-08-08 RX ADMIN — KETOROLAC TROMETHAMINE 15 MG: 30 INJECTION, SOLUTION INTRAMUSCULAR; INTRAVENOUS at 17:58

## 2022-08-08 NOTE — DISCHARGE INSTRUCTIONS
Get new shoes with arch support to wear around the house at all times  Stretch your foot as tolerated  Use a frozen water bottle to roll along the underside of your foot  Take tylenol and motrin every six hours as needed for the pain  Follow up with Podiatry to further evaluation and treatment

## 2022-08-08 NOTE — ED PROVIDER NOTES
History  Chief Complaint   Patient presents with    Foot Pain     Right ventral foot pain since Friday  No trauma reported or noted  Pt stated she is unable to bear weight due to the pain  Patient is a 32-year-old female with a past medical history carpal tunnel, spondylosis, hypothyroidism, obesity, type 2 diabetes presenting with pain on her right medial knee since Monday, which switched to pain along her seal the plantar surface of her foot on Friday  She also has intermittent cramps up and down back  Pain in her heel is 9/10, worse with movement and palpation, and precludes her from walking  She has no other symptoms, no pain in her contralateral limb  She had no trauma to and denies falls or other injuries  Prior to Admission Medications   Prescriptions Last Dose Informant Patient Reported? Taking?    Cholecalciferol (Vitamin D High Potency) 25 MCG (1000 UT) capsule   No No   Sig: Take 1 capsule (1,000 Units total) by mouth daily   acetaminophen (Tylenol 8 Hour Arthritis Pain) 650 mg CR tablet  Self No No   Sig: Take 1 tablet (650 mg total) by mouth every 8 (eight) hours as needed for mild pain   aspirin (ECOTRIN LOW STRENGTH) 81 mg EC tablet   No No   Sig: Take 1 tablet (81 mg total) by mouth daily   atorvastatin (LIPITOR) 10 mg tablet   No No   Sig: Take 1 tablet (10 mg total) by mouth daily   calcium carbonate (TUMS) 500 mg chewable tablet  Self Yes No   Sig: Chew as needed   cholecalciferol (VITAMIN D3) 25 mcg (1,000 units) tablet   No No   Sig: TAKE 1 CAPSULE (1,000 UNITS TOTAL) BY MOUTH DAILY   levothyroxine 100 mcg tablet   No No   Sig: TAKE 1 TABLET (100 MCG TOTAL) BY MOUTH TWO (TWO) TIMES A WEEK   lisinopril (ZESTRIL) 20 mg tablet   No No   Sig: TAKE 1 TABLET (20 MG TOTAL) BY MOUTH DAILY   loratadine (CLARITIN) 10 mg tablet   No No   Sig: TAKE 1 TABLET BY MOUTH DAILY AS NEEDED FOR ALLERGIES   omeprazole (PriLOSEC) 20 mg delayed release capsule   No No   Sig: TAKE 1 CAPSULE (20 MG TOTAL) BY MOUTH DAILY      Facility-Administered Medications: None       Past Medical History:   Diagnosis Date    Arthritis     Depression     Disease of thyroid gland     hypo    Edema     LAST ASSESSED: [id number]    GERD (gastroesophageal reflux disease)     Hypercholesterolemia     Hyperlipidemia     Hypertension     WELL CONTROLLED  CURRENTLY ON LISINOPRIL  LAST ASSESSED: [id number]    Other headache syndrome     Other muscle spasm     LAST ASSESSED: [id number]    Ovarian cyst 2006    Renal calculi      (spontaneous vaginal delivery) 1975    FEMALE       Past Surgical History:   Procedure Laterality Date    BACK SURGERY      HERNIATED DISC (L4/L5)    CHOLECYSTECTOMY      IR CEREBRAL ANGIOGRAPHY  2020    IR CEREBRAL ANGIOGRAPHY  2021    IR CEREBRAL ANGIOGRAPHY / INTERVENTION  2021    TONSILLECTOMY         Family History   Problem Relation Age of Onset    Lung cancer Mother     Cancer Mother         MALIGNANT NEOPLASM    Hypertension Father     Seizures Father     Stroke Father     Heart attack Father     Diabetes Brother     Hypertension Son     Lung disease Son     Hyperthyroidism Maternal Aunt     Hypothyroidism Maternal Aunt     Heart disease Other         CARDIAC DISORDER    Neuropathy Family     Lung cancer Cousin      I have reviewed and agree with the history as documented  E-Cigarette/Vaping    E-Cigarette Use Never User      E-Cigarette/Vaping Substances    Nicotine No     THC No     CBD No     Flavoring No     Other No     Unknown No      Social History     Tobacco Use    Smoking status: Former Smoker    Smokeless tobacco: Never Used    Tobacco comment: pt "quit about 20 years ago"   Vaping Use    Vaping Use: Never used   Substance Use Topics    Alcohol use: Never    Drug use: No        Review of Systems   Constitutional: Negative for chills and fever  HENT: Negative for ear pain and sore throat      Eyes: Negative for pain and visual disturbance  Respiratory: Negative for cough and shortness of breath  Cardiovascular: Negative for chest pain and palpitations  Gastrointestinal: Negative for abdominal pain and vomiting  Genitourinary: Negative for dysuria and hematuria  Musculoskeletal: Positive for arthralgias and myalgias  Negative for back pain  Skin: Negative for color change and rash  Neurological: Negative for seizures and syncope  All other systems reviewed and are negative  Physical Exam  ED Triage Vitals [08/08/22 1607]   Temperature Pulse Respirations Blood Pressure SpO2   97 6 °F (36 4 °C) 83 18 134/80 96 %      Temp Source Heart Rate Source Patient Position - Orthostatic VS BP Location FiO2 (%)   Tympanic Monitor -- -- --      Pain Score       10 - Worst Possible Pain             Orthostatic Vital Signs  Vitals:    08/08/22 1607   BP: 134/80   Pulse: 83       Physical Exam  Vitals and nursing note reviewed  Constitutional:       General: She is not in acute distress  Appearance: She is well-developed  HENT:      Head: Normocephalic and atraumatic  Eyes:      Conjunctiva/sclera: Conjunctivae normal    Cardiovascular:      Rate and Rhythm: Normal rate and regular rhythm  Pulses:           Dorsalis pedis pulses are 2+ on the right side and 2+ on the left side  Posterior tibial pulses are 2+ on the right side and 2+ on the left side  Heart sounds: No murmur heard  Pulmonary:      Effort: Pulmonary effort is normal  No respiratory distress  Breath sounds: Normal breath sounds  Abdominal:      Palpations: Abdomen is soft  Tenderness: There is no abdominal tenderness  Musculoskeletal:         General: No signs of injury  Cervical back: Neck supple  Right lower leg: No edema  Left lower leg: No edema  Right foot: Normal range of motion  No deformity  Left foot: Normal range of motion  No deformity          Legs:         Feet:    Skin:     General: Skin is warm and dry  Neurological:      Mental Status: She is alert  ED Medications  Medications   Diclofenac Sodium (VOLTAREN) 1 % topical gel 2 g (has no administration in time range)   ketorolac (TORADOL) injection 15 mg (15 mg Intramuscular Given 8/8/22 1758)   acetaminophen (TYLENOL) tablet 650 mg (650 mg Oral Given 8/8/22 1758)   oxyCODONE-acetaminophen (PERCOCET) 5-325 mg per tablet 1 tablet (1 tablet Oral Given 8/8/22 1948)       Diagnostic Studies  Results Reviewed     Procedure Component Value Units Date/Time    Fingerstick Glucose (POCT) [[id number]]  (Normal) Collected: 08/08/22 1721    Lab Status: Final result Updated: 08/08/22 1723     POC Glucose 96 mg/dl                  XR ankle 3+ views RIGHT    (Results Pending)   XR knee 1 or 2 vw right    (Results Pending)   CT lower extremity wo contrast right    (Results Pending)         Procedures  Procedures      ED Course                                       MDM  Number of Diagnoses or Management Options  Plantar fasciitis of right foot  Diagnosis management comments: Patient's presentation, absent any traumatic etiology, suspicious for diabetic neuropathy, tendinitis and fasciitis, and arthritis  Obtain x-rays to evaluate her joints for osteoarthritis, and pending that imaging, discharge her on NSAIDs and Tylenol for pain control and instructions to follow-up with Podiatry  We will additionally counseled the patient stretching her plantar fascia, and rolling with ice water bottle, and other methods to help her with her presumed plantar fasciitis, including wearing shoes around the house with plantar support at all times        Disposition  Final diagnoses:   Plantar fasciitis of right foot     Time reflects when diagnosis was documented in both MDM as applicable and the Disposition within this note     Time User Action Codes Description Comment    8/8/2022  6:24 PM Mariluz Rordiguez [M72 2] Plantar fasciitis of right foot       ED Disposition     None Follow-up Information     Follow up With Specialties Details Why Contact Info Additional [address] Emergency Department Emergency Medicine Go to  If symptoms worsen [address] Emergency Department, [address] [place], [place], [address] [phone]    [address] [address]  [address], [address] [place], [place], [address]          Patient's Medications   Discharge Prescriptions    No medications on file         PDMP Review     None           ED Provider  Attending physically available and evaluated Earline Spangler I managed the patient along with the ED Attending      Electronically Signed by         Lyssa Burks MD  08/09/22 8662

## 2022-08-08 NOTE — ED ATTENDING ATTESTATION
8/8/2022  IMaru DO, saw and evaluated the patient  I have discussed the patient with the resident/non-physician practitioner and agree with the resident's/non-physician practitioner's findings, Plan of Care, and MDM as documented in the resident's/non-physician practitioner's note, except where noted  All available labs and Radiology studies were reviewed  I was present for key portions of any procedure(s) performed by the resident/non-physician practitioner and I was immediately available to provide assistance  At this point I agree with the current assessment done in the Emergency Department  I have conducted an independent evaluation of this patient a history and physical is as follows:    74yo female presents with right foot pain  Pain worse when she puts weight on it and reports she is having difficulty walking due to pain  States pain started in right knee last week but that pain is gone now  Pain in right foot started Friday  Pain is on bottom of foot, no trauma, no fevers, no rash  Had similar pain but not as severe a few years ago and was told it was plantar fasciitis  On exam -nad, heart reg, no resp distress, no tenderness on right knee, tender on plantar aspect of right foot, pain with dorsiflexion, no tenderness distal foot, no tenderness along achiles  Plan - suspect possible plantar fasciitis however due to severity of pain will do imaging studies and nsaids    If imaging normal plan for plantar fasciitis instructions and f/u podiatry    ED Course         Critical Care Time  Procedures

## 2022-08-09 VITALS
SYSTOLIC BLOOD PRESSURE: 142 MMHG | TEMPERATURE: 97.6 F | HEIGHT: 66 IN | OXYGEN SATURATION: 97 % | DIASTOLIC BLOOD PRESSURE: 81 MMHG | RESPIRATION RATE: 18 BRPM | WEIGHT: 280 LBS | HEART RATE: 56 BPM | BODY MASS INDEX: 45 KG/M2

## 2022-08-09 RX ADMIN — DICLOFENAC SODIUM 2 G: 10 GEL TOPICAL at 10:39

## 2022-08-09 NOTE — ED NOTES
Called for update on pickup time   Per [facility] pickup not likely until after 0600     [facility], [place] IV, RN  08/09/22 4378

## 2022-08-16 ENCOUNTER — OFFICE VISIT (OUTPATIENT)
Dept: PODIATRY | Facility: CLINIC | Age: 67
End: 2022-08-16
Payer: MEDICARE

## 2022-08-16 VITALS
SYSTOLIC BLOOD PRESSURE: 128 MMHG | WEIGHT: 283 LBS | DIASTOLIC BLOOD PRESSURE: 79 MMHG | BODY MASS INDEX: 45.68 KG/M2 | HEART RATE: 70 BPM

## 2022-08-16 DIAGNOSIS — M72.2 PLANTAR FASCIITIS OF RIGHT FOOT: ICD-10-CM

## 2022-08-16 PROCEDURE — 99204 OFFICE O/P NEW MOD 45 MIN: CPT | Performed by: PODIATRIST

## 2022-08-16 NOTE — PATIENT INSTRUCTIONS
Plantar Fasciitis Exercises   WHAT YOU NEED TO KNOW:   Plantar fasciitis exercises help stretch your plantar fascia, calf muscles, and Achilles tendon  They also help strengthen the muscles that support your heel and foot  Exercises and stretching can help prevent plantar fasciitis from getting worse or coming back  DISCHARGE INSTRUCTIONS:   Contact your healthcare provider if:   Your pain and swelling increase  You develop new knee, hip, or back pain  You have questions or concerns about your condition or care  How to do plantar fasciitis exercises:  Ask your healthcare provider when to start these exercises and how often to do them  Slant board stretch:  Stand on a slanted board with your toes higher than your heel  Press your heel into the board  Keep your knee slightly bent  Hold this position for 1 minute  Repeat 5 times  Heel stretch:  Stand up straight with your hands on a wall  Place your injured leg slightly behind your other leg  Keep your heels flat on the floor, lean forward, and bend both knees  Hold for 30 seconds  Calf stretch:  Stand up straight with your hands on a wall  Step forward so that your uninjured foot is in front of your injured foot  Keep your front leg bent and your back leg straight  Gently lean forward until you feel your calf stretch  Hold for 30 seconds and then relax  Seated plantar fascia stretch:  Sit on a firm surface, such as the floor or a mat  Extend your legs out in front of you  Raise your injured foot a few inches off the ground  Keep your leg straight  Grab the toes of your injured foot and pull them toward you  With your other hand, feel your plantar fascia  You should feel it press outward  Hold for 30 seconds  If you cannot reach your toes, loop a towel or tie around your foot  Gently pull on the towel or tie and flex your toes toward you  Heel raises:  Stand on the injured leg  Raise your other leg off the ground   Hold onto a railing or wall for balance  Slowly rise up on the toes of your injured leg  Hold for 5 seconds  Slowly lower your heel to the ground  Toe curls:  Place a towel on the floor  Put your foot flat on the towel  Grab the towel with your toes by curling them around the towel  Lift the towel up with your toes  Toe taps:  Sit down and place your foot flat on the floor  Keep your heel on the floor  Point all your toes up toward the ceiling  While the 4 smaller toes are pointed up, bend your big toe down and tap it on the ground  Do 10 to 50 taps  Point all 5 toes up toward the ceiling again  This time keep your big toe pointed up and tap the 4 smaller toes on the ground  Do 10 to 50 taps each time  Follow up with your doctor as directed:  Write down your questions so you remember to ask them during your visits  © Copyright [employer] 2022 Information is for End User's use only and may not be sold, redistributed or otherwise used for commercial purposes  All illustrations and images included in CareNotes® are the copyrighted property of A D A M [employer] Inc  or Toma Jacobson   The above information is an  only  It is not intended as medical advice for individual conditions or treatments  Talk to your doctor, nurse or pharmacist before following any medical regimen to see if it is safe and effective for you

## 2022-08-16 NOTE — PROGRESS NOTES
This patient was seen on 8/16/22  My role is Foot , Ankle, and Wound Specialist    SUBJECTIVE    Chief Complaint:  Right heel pain     Patient ID: Kareem Pritchett is a 78 y o  female  Ana Paula Beach is here for the first time with a cc of pain in the Right heel  This has been going on for weeks  She notes pain particularly when getting up from rest  She does have a habit of wearing backless shoes, slippers, and non-supportive shoes  She did have prior xrays  The following portions of the patient's history were reviewed and updated as appropriate: allergies, current medications, past family history, past medical history, past social history, past surgical history and problem list     Review of Systems   Constitutional: Negative  Respiratory: Negative  Musculoskeletal: Positive for arthralgias and gait problem  OBJECTIVE      /79   Pulse 70   Wt 128 kg (283 lb)   LMP  (LMP Unknown)   BMI 45 68 kg/m²     Foot/Ankle Musculoskeletal Exam    General      Neurological: alert      General additional comments:  I note muscle function of the anterior, posterior, evertor and invertor muscle groups of the lower leg are intact and normal  I note ROM of the ankle joint, subtalar joint, Choparts and Lisfranc's joint complexes and metatarsophalangeal joints are WNL without crepitus nor restrictions bilaterally  I note pain on palpation of the Right calcaneous at the fascia origin  Physical Exam  Vitals and nursing note reviewed  Constitutional:       General: She is not in acute distress  Appearance: Normal appearance  She is not ill-appearing, toxic-appearing or diaphoretic  HENT:      Head: Normocephalic and atraumatic  Pulmonary:      Effort: Pulmonary effort is normal       Breath sounds: Normal breath sounds  Musculoskeletal:         General: Tenderness present        Comments:  I note muscle function of the anterior, posterior, evertor and invertor muscle groups of the lower leg are intact and normal  I note ROM of the ankle joint, subtalar joint, Choparts and Lisfranc's joint complexes and metatarsophalangeal joints are WNL without crepitus nor restrictions bilaterally  I note pain on palpation of the Right calcaneous at the fascia origin  Skin:     General: Skin is warm  Neurological:      Mental Status: She is alert  ASSESSMENT     Diagnoses and all orders for this visit:    Plantar fasciitis of right foot  -     Ambulatory Referral to Podiatry         Problem List Items Addressed This Visit        Musculoskeletal and Integument    Plantar fasciitis of right foot              PLAN    I personally reviewed the Right ankle xray lateral view noting a large infracalcaneal bone spur at the fascia origin  I recommended three times a day ice application to the heel and stretching exercises taught  I recommended to abstain from walking without a supportive shoe in the house  I discussed proper shoegear (a cross- type sneaker or workboot that is in good repair)  I will see her back in four weeks and if not improved I will offer her a corticosteroid injection

## 2022-08-27 DIAGNOSIS — E11.9 TYPE 2 DIABETES MELLITUS WITHOUT COMPLICATION, WITHOUT LONG-TERM CURRENT USE OF INSULIN (HCC): ICD-10-CM

## 2022-08-29 ENCOUNTER — APPOINTMENT (OUTPATIENT)
Dept: LAB | Facility: CLINIC | Age: 67
End: 2022-08-29
Payer: MEDICARE

## 2022-08-29 DIAGNOSIS — Z01.818 PRE-PROCEDURAL EXAMINATION: ICD-10-CM

## 2022-08-29 LAB
BUN SERPL-MCNC: 12 MG/DL (ref 5–25)
CREAT SERPL-MCNC: 0.76 MG/DL (ref 0.6–1.3)
GFR SERPL CREATININE-BSD FRML MDRD: 82 ML/MIN/1.73SQ M

## 2022-08-29 PROCEDURE — 36415 COLL VENOUS BLD VENIPUNCTURE: CPT

## 2022-08-29 PROCEDURE — 84520 ASSAY OF UREA NITROGEN: CPT

## 2022-08-29 PROCEDURE — 82565 ASSAY OF CREATININE: CPT

## 2022-08-29 RX ORDER — ATORVASTATIN CALCIUM 10 MG/1
10 TABLET, FILM COATED ORAL DAILY
Qty: 90 TABLET | Refills: 2 | Status: SHIPPED | OUTPATIENT
Start: 2022-08-29

## 2022-09-13 ENCOUNTER — TELEPHONE (OUTPATIENT)
Dept: NEUROSURGERY | Facility: CLINIC | Age: 67
End: 2022-09-13

## 2022-09-13 ENCOUNTER — HOSPITAL ENCOUNTER (OUTPATIENT)
Dept: RADIOLOGY | Facility: HOSPITAL | Age: 67
Discharge: HOME | End: 2022-09-13
Attending: NEUROLOGICAL SURGERY

## 2022-09-13 DIAGNOSIS — I67.1 ANEURYSM OF INTERNAL CAROTID ARTERY: ICD-10-CM

## 2022-09-13 NOTE — NURSING NOTE
Arrived to Ct upon request of RT Syed, she had attempted to insert IV 20 gauge x 2 for IV contrast of CTA Head with no success  Patient was lying on table, I attempted to place 20 gauge angiocath to left forearm but upon advancing the catheter the patient jerked her arm back and claimed it was painful  IV was removed and patient refused to have any more IV attempts   RT Syed informed patient she will call ordering MD and inform of inability to perform test

## 2022-09-13 NOTE — TELEPHONE ENCOUNTER
Radiology dept  Called informing us that they were unable to complete the CTA for patient today because she would not let them make another attempt to insert the IV catheter  Per radiology staff, patient jumped off of the table and stated that she was not coming back  I will attempt to reach the patient in a couple days and see if she would like to r/s for another attempt with anxiolytic

## 2022-09-20 ENCOUNTER — OFFICE VISIT (OUTPATIENT)
Dept: PODIATRY | Facility: CLINIC | Age: 67
End: 2022-09-20
Payer: MEDICARE

## 2022-09-20 VITALS
DIASTOLIC BLOOD PRESSURE: 69 MMHG | HEART RATE: 78 BPM | WEIGHT: 285.4 LBS | BODY MASS INDEX: 45.87 KG/M2 | HEIGHT: 66 IN | SYSTOLIC BLOOD PRESSURE: 136 MMHG

## 2022-09-20 DIAGNOSIS — M72.2 PLANTAR FASCIITIS OF RIGHT FOOT: Primary | ICD-10-CM

## 2022-09-20 PROCEDURE — 99213 OFFICE O/P EST LOW 20 MIN: CPT | Performed by: PODIATRIST

## 2022-09-20 NOTE — PROGRESS NOTES
This patient was seen on 9/20/22  My role is Foot , Ankle, and Wound Specialist    SUBJECTIVE    Chief Complaint:  Plantar fasciitis Right foot     Patient ID: Jose Viramontes is a 78 y o  female  Angy Walter is here for the cc of pain in the Right heel  She states she's been compliant with stretching, ice application and consistent use of supportive shoes and notes a 90% reduction in pain  The following portions of the patient's history were reviewed and updated as appropriate: allergies, current medications, past family history, past medical history, past social history, past surgical history and problem list     Review of Systems   Constitutional: Negative  Respiratory: Negative  Musculoskeletal: Negative for arthralgias and gait problem  OBJECTIVE      /69   Pulse 78   Ht 5' 6" (1 676 m) Comment: verbal  Wt 129 kg (285 lb 6 4 oz)   LMP  (LMP Unknown)   BMI 46 06 kg/m²     Foot/Ankle Musculoskeletal Exam    General      Neurological: alert      General additional comments:  I note muscle function of the anterior, posterior, evertor and invertor muscle groups of the lower leg are intact and normal  I note ROM of the ankle joint, subtalar joint, Choparts and Lisfranc's joint complexes and metatarsophalangeal joints are WNL without crepitus nor restrictions bilaterally  I note minimal pain on palpation of the Right calcaneous at the fascia origin  Physical Exam  Vitals and nursing note reviewed  Constitutional:       General: She is not in acute distress  Appearance: Normal appearance  She is not ill-appearing, toxic-appearing or diaphoretic  HENT:      Head: Normocephalic and atraumatic  Pulmonary:      Effort: Pulmonary effort is normal       Breath sounds: Normal breath sounds  Musculoskeletal:         General: Tenderness present        Comments:  I note muscle function of the anterior, posterior, evertor and invertor muscle groups of the lower leg are intact and normal  I note ROM of the ankle joint, subtalar joint, Choparts and Lisfranc's joint complexes and metatarsophalangeal joints are WNL without crepitus nor restrictions bilaterally  I note minimal pain on palpation of the Right calcaneous at the fascia origin  Skin:     General: Skin is warm  Neurological:      Mental Status: She is alert  ASSESSMENT     Diagnoses and all orders for this visit:    Plantar fasciitis of right foot         Problem List Items Addressed This Visit        Musculoskeletal and Integument    Plantar fasciitis of right foot - Primary              PLAN    I am going to defer injection of the heel today  I reiterated the need for consistent supportive shoes as the best way to prevent recurrence  She will return on a PRN basis

## 2022-09-20 NOTE — TELEPHONE ENCOUNTER
Would you be able to reach out to this patient at your convenience sometime this week to see if she would like to r/s her imaging and follow up with our office? She was unable to complete the CTA because she had a panic attack on the table  If she does want to r/s we will need to ask her if she thinks she  Will be able to do it with oral medication or if she thinks she will require sedation/anesthesia for the procedure    Thanks!!

## 2022-09-21 ENCOUNTER — TELEPHONE (OUTPATIENT)
Dept: INTERNAL MEDICINE CLINIC | Facility: CLINIC | Age: 67
End: 2022-09-21

## 2022-09-21 NOTE — TELEPHONE ENCOUNTER
Folder Color- Red    Name of Form- Physician Certification Form    Form to be filled out by- Dr Hussain Pacheco to be Faxed [phone]

## 2022-09-24 DIAGNOSIS — I67.1 ANEURYSM OF INTERNAL CAROTID ARTERY: ICD-10-CM

## 2022-09-27 NOTE — TELEPHONE ENCOUNTER
Jazmyn FORRESTER MA reached out to patient and left her a VM in [place] requesting a call back to reschedule her imaging and follow up appt  Awaiting call back  She will attempt to reach her in another day or two if no call back is received

## 2022-09-28 ENCOUNTER — TELEPHONE (OUTPATIENT)
Dept: NEUROSURGERY | Facility: CLINIC | Age: 67
End: 2022-09-28

## 2022-09-28 ENCOUNTER — TRANSCRIBE ORDERS (OUTPATIENT)
Dept: NEUROSURGERY | Facility: CLINIC | Age: 67
End: 2022-09-28

## 2022-09-28 DIAGNOSIS — I67.1 CEREBRAL ANEURYSM: Primary | ICD-10-CM

## 2022-09-28 DIAGNOSIS — I67.1 ANEURYSM OF INTERNAL CAROTID ARTERY: Primary | ICD-10-CM

## 2022-09-28 DIAGNOSIS — I67.1 ANEURYSM OF INTERNAL CAROTID ARTERY: ICD-10-CM

## 2022-09-28 NOTE — TELEPHONE ENCOUNTER
I spoke with patient regarding her CTA  She states that she did not have a panic attack as was stated  She had a very bad experience with the nursing staff/ Tech that were trying to administer the IV contrast dye  The first nurse was fishing/poking around and didn't find a vein  , which was very painful ( nurse was made aware)  The 2nd staff member looked/ palpated  at both arms and stated that she could not find a vein  The 3rd nurse/tech  which patient states was the worst  She was poking /proding and bleeding  Patient states that she did make her aware that it was very painful but still proceeded  Then nurse/tech wanted to try the other arm and patient refused and CTA was not done  Patient  was very upset because it was very painful, had bruised arms for 2wks and also the mess on blood left on her clothing  She wondered why didn't they use the u/s  machine to find her vein first instead of pocking her  I  explained that I could not give her a reason but that if she would proceed with obtaining  The CTA that we could add a note on the order form making the staff aware that she is a hard stick and they would need to us the u/s machine  Patient agreed      I scheduled CTA for Oct  10 th @ 2pm @ [facility]

## 2022-09-29 DIAGNOSIS — I67.1 ANEURYSM OF INTERNAL CAROTID ARTERY: ICD-10-CM

## 2022-09-29 RX ORDER — ASPIRIN 81 MG/1
81 TABLET ORAL DAILY
Qty: 90 TABLET | Refills: 3 | Status: SHIPPED | OUTPATIENT
Start: 2022-09-29

## 2022-09-30 NOTE — TELEPHONE ENCOUNTER
Arelia Goltz spoke with patient and we were able to schedule patient for CTA and follow up appt  Informed radiology dept about incident and requested that they use US/Vein finder to insert her IV this time

## 2022-10-09 ENCOUNTER — APPOINTMENT (OUTPATIENT)
Dept: LAB | Facility: CLINIC | Age: 67
End: 2022-10-09
Payer: MEDICARE

## 2022-10-09 DIAGNOSIS — I67.1 ANEURYSM OF INTERNAL CAROTID ARTERY: ICD-10-CM

## 2022-10-09 DIAGNOSIS — I67.1 CEREBRAL ANEURYSM: ICD-10-CM

## 2022-10-09 LAB
BUN SERPL-MCNC: 11 MG/DL (ref 5–25)
CREAT SERPL-MCNC: 0.72 MG/DL (ref 0.6–1.3)
GFR SERPL CREATININE-BSD FRML MDRD: 87 ML/MIN/1.73SQ M

## 2022-10-09 PROCEDURE — 36415 COLL VENOUS BLD VENIPUNCTURE: CPT

## 2022-10-09 PROCEDURE — 84520 ASSAY OF UREA NITROGEN: CPT

## 2022-10-09 PROCEDURE — 82565 ASSAY OF CREATININE: CPT

## 2022-10-10 ENCOUNTER — HOSPITAL ENCOUNTER (OUTPATIENT)
Dept: RADIOLOGY | Facility: HOSPITAL | Age: 67
Discharge: HOME | End: 2022-10-10
Attending: NEUROLOGICAL SURGERY
Payer: MEDICARE

## 2022-10-10 PROCEDURE — 70496 CT ANGIOGRAPHY HEAD: CPT

## 2022-10-10 PROCEDURE — G1004 CDSM NDSC: HCPCS

## 2022-10-10 RX ADMIN — IOHEXOL 85 ML: 350 INJECTION, SOLUTION INTRAVENOUS at 14:30

## 2022-10-14 RX ORDER — ASPIRIN 81 MG/1
TABLET, COATED ORAL
Qty: 90 TABLET | Refills: 9 | Status: SHIPPED | OUTPATIENT
Start: 2022-10-14

## 2022-10-26 ENCOUNTER — OFFICE VISIT (OUTPATIENT)
Dept: NEUROSURGERY | Facility: CLINIC | Age: 67
End: 2022-10-26

## 2022-10-26 VITALS
SYSTOLIC BLOOD PRESSURE: 130 MMHG | HEART RATE: 60 BPM | DIASTOLIC BLOOD PRESSURE: 90 MMHG | BODY MASS INDEX: 45.48 KG/M2 | WEIGHT: 273 LBS | HEIGHT: 65 IN | TEMPERATURE: 97.8 F | RESPIRATION RATE: 16 BRPM

## 2022-10-26 DIAGNOSIS — I67.1 ANEURYSM OF INTERNAL CAROTID ARTERY: Primary | ICD-10-CM

## 2022-10-26 NOTE — ASSESSMENT & PLAN NOTE
Patient presents for annual follow up after right ophthalmic artery pipeline embolization (1/8/21, EM)  · Patient with 6 5mm right ophthalmic ICA aneurysm, discovered incidentally in 2020 during HA work up  · Patient had 6 month post treatment IR angio with no residual aneurysm  · Currently complain of stable HA, blurry vision but no new neurological complaints    Imaging:  · CTA head w/wo, 10/10/22: No change status post pipeline embolization of right ophthalmic aneurysm  No residual/recurrent flow within the aneurysm  No significant intracranial stenosis or large vessel occlusion   No other aneurysm    Plan:  · CTA reviewed with the patient, showing no new or residual aneurysm  · Offered neurology referral for HA management but patient refused, saying anything other than tylenol is sedating for her and 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

## 2022-10-26 NOTE — PROGRESS NOTES
Neurosurgery Office Note  Joseph Rios 78 y o  female MRN: [id number]      Assessment/Plan     Aneurysm of internal carotid artery  Patient presents for annual follow up after right ophthalmic artery pipeline embolization (1/8/21, EM)  · Patient with 6 5mm right ophthalmic ICA aneurysm, discovered incidentally in 2020 during HA work up  · Patient had 6 month post treatment IR angio with no residual aneurysm  · Currently complain of stable HA, blurry vision but no new neurological complaints    Imaging:  · CTA head w/wo, 10/10/22: No change status post pipeline embolization of right ophthalmic aneurysm  No residual/recurrent flow within the aneurysm  No significant intracranial stenosis or large vessel occlusion  No other aneurysm    Plan:  · CTA reviewed with the patient, showing no new or residual aneurysm  · Offered neurology referral for HA management but patient refused, saying anything other than tylenol is sedating for her and 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         Diagnoses and all orders for this visit:    Aneurysm of internal carotid artery  -     CTA head and neck w wo contrast; Future  -     BUN/Creatinine Ratio; Future          I spent 25 minutes with the patient today in which >50% of the time was spent counseling/coordination of care regarding diagnosis, imaging review, symptoms and treatment plan  CHIEF COMPLAINT    Chief Complaint   Patient presents with   • Follow-up     With CTA       HISTORY    This is a 78-year-old with a past medical history significant for chronic headaches, hypothyroidism, depression, GERD, hyperlipidemia, hypertension, right ophthalmic artery ICA aneurysm status post pipeline embolization 1/8/21 by Dr Raven Wagoner who is here today to review annual CTA      Her aneurysm was discovered incidentally in 2020 during a headache workup  She was found to have a 6 5 mm right ophthalmic ICA aneurysm as well as a small left supraclinoid aneurysm  She underwent IR angiogram for further evaluation which confirmed the finding  On 01/08/2021 she underwent pipeline embolization via radial access with no complications  She did well postoperatively but did have continued hand pain from radial access  She underwent 6 month angiogram which showed no residual aneurysm  She presents today for annual CTA head with and without contrast   This shows no residual aneurysm  She continues to complain of continued daily headaches and blurry vision  She has tried headache medication in the past but those 2 dates her and 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  She takes Tylenol which helps take the edge off of her headaches  She is due for an eye exam for evaluation of her blurry vision  Otherwise, she has no new neurological complaints  She quit smoking in 2003  She denies any alcohol use  She was previously an       There is no family history of subarachnoid hemorrhage or aneurysm  There is no family history of sudden death  There is family history of stroke and MI  See Discussion    REVIEW OF SYSTEMS    Review of Systems   HENT: Positive for tinnitus (occasional)  Eyes: Positive for visual disturbance (blurry and occasional dot in vision)  Respiratory: Positive for shortness of breath (sometimes)  Cardiovascular: Negative  Gastrointestinal: Negative  Endocrine: Negative  Genitourinary: Negative  Musculoskeletal: Negative  Skin: Negative  Allergic/Immunologic: Negative  Neurological: Positive for dizziness, tremors, weakness (L>R hand, legs), light-headedness, numbness (bilateral legs ) and headaches (constant, currently 4/10)  Negative for seizures  Hematological: Bruises/bleeds easily (medication)     Psychiatric/Behavioral: Positive for sleep disturbance  ROS was personally reviewed and changes made as needed     Meds/Allergies     Current Outpatient Medications   Medication Sig Dispense Refill   • acetaminophen (Tylenol 8 Hour Arthritis Pain) 650 mg CR tablet Take 1 tablet (650 mg total) by mouth every 8 (eight) hours as needed for mild pain 90 tablet 3   • aspirin (ECOTRIN LOW STRENGTH) 81 mg EC tablet Take 1 tablet (81 mg total) by mouth daily 90 tablet 3   • atorvastatin (LIPITOR) 10 mg tablet TAKE 1 TABLET (10 MG TOTAL) BY MOUTH DAILY 90 tablet 2   • calcium carbonate (TUMS) 500 mg chewable tablet Chew as needed     • cholecalciferol (VITAMIN D3) 25 mcg (1,000 units) tablet TAKE 1 CAPSULE (1,000 UNITS TOTAL) BY MOUTH DAILY 90 tablet 4   • levothyroxine 100 mcg tablet TAKE 1 TABLET (100 MCG TOTAL) BY MOUTH TWO (TWO) TIMES A WEEK (Patient taking differently: 88 mcg 5 days, 100 mcg 2 days) 24 tablet 1   • lisinopril (ZESTRIL) 20 mg tablet TAKE 1 TABLET (20 MG TOTAL) BY MOUTH DAILY 90 tablet 3   • loratadine (CLARITIN) 10 mg tablet TAKE 1 TABLET BY MOUTH DAILY AS NEEDED FOR ALLERGIES 90 tablet 4   • omeprazole (PriLOSEC) 20 mg delayed release capsule TAKE 1 CAPSULE (20 MG TOTAL) BY MOUTH DAILY 90 capsule 3   • Aspirin Low Dose 81 MG EC tablet TAKE 1 TABLET (81 MG TOTAL) BY MOUTH DAILY 90 tablet 9   • Cholecalciferol (Vitamin D High Potency) 25 MCG (1000 UT) capsule Take 1 capsule (1,000 Units total) by mouth daily 90 capsule 1     No current facility-administered medications for this visit  Allergies   Allergen Reactions   • Tagamet [Cimetidine] Hives       PAST HISTORY    Past Medical History:   Diagnosis Date   • Arthritis    • Depression    • Disease of thyroid gland     hypo   • Edema     LAST ASSESSED: [id number]   • GERD (gastroesophageal reflux disease)    • Hypercholesterolemia    • Hyperlipidemia    • Hypertension     WELL CONTROLLED  CURRENTLY ON LISINOPRIL   LAST ASSESSED: [id number]   • Other headache syndrome    • Other muscle spasm     LAST ASSESSED: [id number]   • Ovarian cyst    • Renal calculi    •  (spontaneous vaginal delivery)     FEMALE       Past Surgical History:   Procedure Laterality Date   • BACK SURGERY      HERNIATED DISC (L4/L5)   • CHOLECYSTECTOMY     • IR CEREBRAL ANGIOGRAPHY  2020   • IR CEREBRAL ANGIOGRAPHY  2021   • IR CEREBRAL ANGIOGRAPHY / INTERVENTION  2021   • TONSILLECTOMY         Social History     Tobacco Use   • Smoking status: Former Smoker   • Smokeless tobacco: Never Used   • Tobacco comment: pt "quit about 20 years ago"   Vaping Use   • Vaping Use: Never used   Substance Use Topics   • Alcohol use: Never   • Drug use: No       Family History   Problem Relation Age of Onset   • Lung cancer Mother    • Cancer Mother         MALIGNANT NEOPLASM   • Hypertension Father    • Seizures Father    • Stroke Father    • Heart attack Father    • Diabetes Brother    • Hypertension Son    • Lung disease Son    • Hyperthyroidism Maternal Aunt    • Hypothyroidism Maternal Aunt    • Heart disease Other         CARDIAC DISORDER   • Neuropathy Family    • Lung cancer Cousin          Above history personally reviewed  EXAM    Vitals:Blood pressure 130/90, pulse 60, temperature 97 8 °F (36 6 °C), temperature source Tympanic, resp  rate 16, height 5' 5" (1 651 m), weight 124 kg (273 lb)  ,Body mass index is 45 43 kg/m²  Physical Exam  Vitals and nursing note reviewed  Constitutional:       Appearance: Normal appearance  She is well-developed and normal weight  HENT:      Head: Normocephalic and atraumatic  Eyes:      Extraocular Movements: Extraocular movements intact  Pupils: Pupils are equal, round, and reactive to light  Cardiovascular:      Rate and Rhythm: Normal rate  Pulmonary:      Effort: Pulmonary effort is normal  No respiratory distress  Abdominal:      Palpations: Abdomen is soft  Musculoskeletal:         General: Normal range of motion        Cervical back: Normal range of motion  Skin:     General: Skin is warm and dry  Neurological:      General: No focal deficit present  Mental Status: She is alert and oriented to person, place, and time  Coordination: Finger-Nose-Finger Test normal       Gait: Gait is intact  Deep Tendon Reflexes: Strength normal       Reflex Scores:       Brachioradialis reflexes are 2+ on the right side and 2+ on the left side  Patellar reflexes are 2+ on the right side and 2+ on the left side  Psychiatric:         Mood and Affect: Mood normal          Speech: Speech normal          Behavior: Behavior normal          Thought Content: Thought content normal          Judgment: Judgment normal          Neurologic Exam     Mental Status   Oriented to person, place, and time  Follows 2 step commands  Attention: normal  Concentration: normal    Speech: speech is normal   Level of consciousness: alert  Knowledge: good  Able to perform simple calculations  Able to repeat  Normal comprehension  Cranial Nerves   Cranial nerves II through XII intact  CN III, IV, VI   Pupils are equal, round, and reactive to light  Motor Exam   Muscle bulk: normal  Overall muscle tone: normal  Right arm pronator drift: absent  Left arm pronator drift: absent    Strength   Strength 5/5 throughout  Sensory Exam   Light touch normal      Gait, Coordination, and Reflexes     Gait  Gait: normal    Coordination   Finger to nose coordination: normal    Tremor   Resting tremor: absent    Reflexes   Right brachioradialis: 2+  Left brachioradialis: 2+  Right patellar: 2+  Left patellar: 2+  Right Benjamin: absent  Left Benjamin: absent        MEDICAL DECISION MAKING    Imaging Studies:     CTA head w wo contrast    Result Date: 10/14/2022  Narrative: CTA BRAIN WITH CONTRAST INDICATION: I67 1: Cerebral aneurysm, nonruptured COMPARISON: CTA dated 9/14/2020 and several cerebral angiograms most recently performed on 8/27/2021   TECHNIQUE:   Post contrast imaging was performed after administration of iodinated contrast through the neck and brain  Post contrast axial 0 625 mm images timed to opacify the arterial system  3D rendering was performed on an independent workstation  MIP reconstructions performed  Coronal reconstructions were performed of the noncontrast portion of the brain  Radiation dose length product (DLP) for this visit:  1485 9 mGy-cm   This examination, like all CT scans performed in the [facility], was performed utilizing techniques to minimize radiation dose exposure, including the use of iterative  reconstruction and automated exposure control  IV Contrast:  85 mL of iohexol (OMNIPAQUE)  IMAGE QUALITY:   Diagnostic FINDINGS: NONCONTRAST BRAIN:  PARENCHYMA:  No intracranial mass, mass effect or midline shift  No CT signs of acute infarction  No acute parenchymal hemorrhage  Stent is again seen in the right supraclinoid ICA  VENTRICLES AND EXTRA-AXIAL SPACES:  Normal for the patient's age  VISUALIZED ORBITS AND PARANASAL SINUSES:  Unremarkable  Inadvertently, the entire neck was also included  Incidentally imaged cervical vasculature: Aortic arch and proximal great vessels are unremarkable  No significant stenosis of the cervical vertebral arteries  Mild atherosclerotic plaque in the bilateral carotid bifurcations and proximal internal carotid arteries without significant stenosis  Stable mild asymmetry of the cervical internal carotid arteries, right smaller than left which is likely developmental, related to hypoplastic right A1 segment  INTRACRANIAL VASCULATURE INTERNAL CAROTID ARTERIES:  No change status post pipeline embolization of right ophthalmic aneurysm    No recurrent flow in the aneurysm  Stent is patent  No significant stenosis in the intracranial internal carotid arteries  Normal ophthalmic artery origins  Normal ICA terminus  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   Mild atherosclerotic disease at the insula image carotid bifurcations without significant stenosis  ANTERIOR CIRCULATION:  Right A1 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  Anterior cerebral arteries enhance normally     Normal anterior communicating artery  MIDDLE CEREBRAL ARTERY CIRCULATION:  M1 segment and middle cerebral artery branches demonstrate normal enhancement bilaterally  DISTAL VERTEBRAL ARTERIES:  Normal distal vertebral arteries  Posterior inferior cerebellar artery origins are normal  Normal vertebral basilar junction  BASILAR ARTERY:  Basilar artery is normal in caliber  Normal superior cerebellar arteries  POSTERIOR CEREBRAL ARTERIES: Both posterior cerebral arteries arises from the basilar tip  Both arteries demonstrate normal enhancement  Normal posterior communicating arteries  DURAL VENOUS SINUSES:  Normal  NON VASCULAR ANATOMY BONY STRUCTURES:  No acute osseous abnormality  Impression: No change status post pipeline embolization of right ophthalmic aneurysm  No residual/recurrent flow within the aneurysm  No significant intracranial stenosis or large vessel occlusion  No other aneurysm  Workstation performed: [id number]       I have personally reviewed pertinent reports     and I have personally reviewed pertinent films in PACS

## 2022-11-09 ENCOUNTER — TELEPHONE (OUTPATIENT)
Dept: INTERNAL MEDICINE CLINIC | Facility: CLINIC | Age: 67
End: 2022-11-09

## 2022-11-09 DIAGNOSIS — E11.9 TYPE 2 DIABETES MELLITUS WITHOUT COMPLICATION, WITHOUT LONG-TERM CURRENT USE OF INSULIN (HCC): Primary | ICD-10-CM

## 2022-11-09 NOTE — TELEPHONE ENCOUNTER
Patient called to request a new Ophthalmology Order be placed for Dr Margarita Bermudez office  The one she had  and they need a new one sent

## 2022-11-11 ENCOUNTER — TELEPHONE (OUTPATIENT)
Dept: INTERNAL MEDICINE CLINIC | Facility: CLINIC | Age: 67
End: 2022-11-11

## 2022-11-11 NOTE — TELEPHONE ENCOUNTER
Plantar fasciitis of right foot    Please prepare an ambulatory referral to podiatry   Was seen ED 8/2022    Once prepared, please advise so that we can schedule the podiatry appointment

## 2022-11-14 DIAGNOSIS — M72.2 PLANTAR FASCIITIS: Primary | ICD-10-CM

## 2022-11-21 DIAGNOSIS — J32.9 SINUSITIS: ICD-10-CM

## 2022-11-22 ENCOUNTER — CONSULT (OUTPATIENT)
Dept: MULTI SPECIALTY CLINIC | Facility: CLINIC | Age: 67
End: 2022-11-22

## 2022-11-22 ENCOUNTER — IMMUNIZATIONS (OUTPATIENT)
Dept: INTERNAL MEDICINE CLINIC | Facility: CLINIC | Age: 67
End: 2022-11-22

## 2022-11-22 VITALS
SYSTOLIC BLOOD PRESSURE: 151 MMHG | HEART RATE: 67 BPM | WEIGHT: 287.2 LBS | DIASTOLIC BLOOD PRESSURE: 91 MMHG | OXYGEN SATURATION: 98 % | BODY MASS INDEX: 47.79 KG/M2 | TEMPERATURE: 98 F

## 2022-11-22 DIAGNOSIS — Z23 NEED FOR INFLUENZA VACCINATION: Primary | ICD-10-CM

## 2022-11-22 DIAGNOSIS — M77.41 METATARSALGIA OF RIGHT FOOT: ICD-10-CM

## 2022-11-22 DIAGNOSIS — M72.2 PLANTAR FASCIITIS: Primary | ICD-10-CM

## 2022-11-22 DIAGNOSIS — E11.9 TYPE 2 DIABETES MELLITUS WITHOUT COMPLICATION, WITHOUT LONG-TERM CURRENT USE OF INSULIN (HCC): ICD-10-CM

## 2022-11-22 DIAGNOSIS — M21.6X2 EQUINUS DEFORMITY OF BOTH FEET: ICD-10-CM

## 2022-11-22 DIAGNOSIS — M21.6X1 EQUINUS DEFORMITY OF BOTH FEET: ICD-10-CM

## 2022-11-22 RX ORDER — LORATADINE 10 MG/1
TABLET ORAL
Qty: 90 TABLET | Refills: 11 | Status: SHIPPED | OUTPATIENT
Start: 2022-11-22

## 2022-11-22 NOTE — PROGRESS NOTES
Assessment/Plan:    No problem-specific Assessment & Plan notes found for this encounter  Diagnoses and all orders for this visit:    Plantar fasciitis  -     Ambulatory Referral to Podiatry  -     P F  Night Splint    Equinus deformity of both feet  -     P F  Night Splint    Metatarsalgia of right foot  -     Diclofenac Sodium (VOLTAREN) 1 %; Apply 2 g topically 4 (four) times a day    Type 2 diabetes mellitus without complication, without long-term current use of insulin ([facility])        Plan:  -Patient given [employer] information to get sized for larger shoes   -Prescription for night splints given  Patient to follow up at [facility] with her brother to have them dispensed  -Rx for Voltaren 1% for metatarsalgia   -Patient educated on strict glycemic control, wearing shoes at all times and checking feet daily   -Reviewed patients previous 2 Hba1c, which have both been under 7%  -Patient to follow up in 3 months   Subjective:      Patient ID: Mark Garza is a 78 y o  female  Patient presents with foot pain with right greater than left  She states she was treated by Dr Kira Hernández for plantar fasciitis who requested she buy sneakers and gave her an injection  She bought shoes online, but they are too small and she is getting toe pain now  She states that her PF pain is coming back again, but she is denying an injection  It is as painful as last time and she has been consistent with her stretching and icing therapy  She went to [facility] with her brother to get diabetic shoes and the  recommended night splints and getting shoes that fit  She denies lower extremity tingling  Reports her last two HbA1c have been under 7%  She denies any other pedal complaints at this time         The following portions of the patient's history were reviewed and updated as appropriate: allergies, current medications, past family history, past medical history, past social history, past surgical history and problem list     Review of Systems   Constitutional: Negative  HENT: Negative  Eyes: Negative  Respiratory: Negative  Cardiovascular: Negative  Gastrointestinal: Negative  Endocrine: Negative  Genitourinary: Negative  Musculoskeletal: Positive for arthralgias  Objective:      /91 (BP Location: Right arm, Patient Position: Sitting, Cuff Size: Large)   Pulse 67   Temp 98 °F (36 7 °C) (Temporal)   Wt 130 kg (287 lb 3 2 oz)   LMP  (LMP Unknown)   SpO2 98%   BMI 47 79 kg/m²          Physical Exam  Cardiovascular:      Pulses:           Dorsalis pedis pulses are 1+ on the right side and 1+ on the left side  Posterior tibial pulses are 1+ on the right side and 1+ on the left side  Musculoskeletal:      Right foot: Decreased range of motion  Prominent metatarsal heads present  Left foot: Decreased range of motion  Prominent metatarsal heads present  Feet:      Right foot:      Protective Sensation: 9 sites tested  9 sites sensed  Skin integrity: Dry skin present  No ulcer or callus  Toenail Condition: Right toenails are normal       Left foot:      Protective Sensation: 9 sites tested  9 sites sensed  Skin integrity: Dry skin present  No ulcer or callus  Toenail Condition: Left toenails are normal       Comments: B/L: equinus noted bilaterally  Pain on palpation noted of the plantar medial heel        Patient's shoes and socks removed  Right Foot/Ankle   Right Foot Inspection  Skin Exam: dry skin  No callus, no ulcer and no callus  Sensory   Vibration: intact  Proprioception: intact  Monofilament testing: intact    Vascular  Capillary refills: < 3 seconds  The right DP pulse is 1+  The right PT pulse is 1+  Left Foot/Ankle  Left Foot Inspection  Skin Exam: dry skin  No ulcer and no callus       Sensory   Vibration: intact  Proprioception: intact  Monofilament testing: intact    Vascular  Capillary refills: < 3 seconds  The left DP pulse is 1+  The left PT pulse is 1+       Assign Risk Category  Deformity present  Risk: 0

## 2022-11-22 NOTE — PROGRESS NOTES
Patient is here today for a nurse visit for her influenza vaccine  Flu vaccine administered into left deltoid  Patient tolerated well and had no questions at this time  Vaccine statement given to patient

## 2022-12-28 ENCOUNTER — OFFICE VISIT (OUTPATIENT)
Dept: INTERNAL MEDICINE CLINIC | Facility: CLINIC | Age: 67
End: 2022-12-28

## 2022-12-28 VITALS
OXYGEN SATURATION: 96 % | BODY MASS INDEX: 46.43 KG/M2 | HEART RATE: 72 BPM | SYSTOLIC BLOOD PRESSURE: 160 MMHG | TEMPERATURE: 97.8 F | WEIGHT: 279 LBS | DIASTOLIC BLOOD PRESSURE: 85 MMHG

## 2022-12-28 DIAGNOSIS — J06.9 UPPER RESPIRATORY TRACT INFECTION, UNSPECIFIED TYPE: Primary | ICD-10-CM

## 2022-12-28 DIAGNOSIS — R09.81 NASAL CONGESTION: ICD-10-CM

## 2022-12-28 DIAGNOSIS — J02.9 SORE THROAT: ICD-10-CM

## 2022-12-28 DIAGNOSIS — R05.9 COUGH, UNSPECIFIED TYPE: ICD-10-CM

## 2022-12-28 NOTE — PATIENT INSTRUCTIONS
Please continue using Tylenol, Mucinex and Robitussin as needed  Please continue taking a teaspoon of honey every 6 hours with tea and lemon  Will reach out with results of COVID/flu test and give instructions for further treatment  Please go to the emergency department if you notice any significant worsening of her respiratory status

## 2022-12-28 NOTE — PROGRESS NOTES
[facility] Visit Note  Shahid Colvin 78 y o  female   MRN: [id number]    Assessment and Plan      1  URI Symptoms        - The patient has been experiencing URI-like symptoms for the last 8 days including cough with productive green sputum, headache, sore throat, nasal congestion and arthralgias  She had similar symptoms in October that lasted for a month  She reports a fever on the first day but has otherwise remained afebrile  Lungs were clear to auscultation on physical exam bilaterally  She has been vaccinated for the flu but not for COVID  She has been taking Mucinex, Robitussin and Tylenol at home with moderate relief  She has also been taking honey at home and her tea  She did take an antihistamine and did not see any improvement in her symptoms  Given her current presentation the patient most likely has a viral URI versus bacterial URI  Will order COVID/flu test today  Pending results of COVID/flu test, will contact patient for further management  If COVID/flu test is negative will order doxycycline 100 mg twice daily for 5 days  If COVID/flu test is positive will continue current management  May consider adding Paxil ovate or Tamiflu  -     Covid/Flu- Office Collect      Follow up in our clinic in 6 month(s) for MAW  Subjective   HISTORY OF PRESENT ILLNESS:  Shahid Colvin is a 78 y o  female with a past medical history of type 2 diabetes and hypertension who presents to clinic today as a same-day visit complaining of URI symptoms  She reports that the symptoms began 8 days prior  Her symptoms include cough with productive green phlegm, headache, nasal congestion, chest pain, sore throat and arthralgias  She reports that she had a fever on the first day but has been afebrile since  She has been taking Mucinex, Robitussin and Tylenol at home with mild relief    She had also taken honey with tea and antihistamine without any relief  She also reports that she had similar symptoms in October that lasted for around 1 month  At that time her father and brother had the same symptoms  She reports that they have been in the [place] for a long time and have not traveled at all recently  She was vaccinated for the flu but was not vaccinated for COVID  Lungs were clear bilaterally on auscultation, however the patient had a noticeable cough throughout the encounter  Otherwise she had no other acute complaints  Review of Systems   Constitutional: Positive for fatigue  Negative for activity change, appetite change, chills, diaphoresis and fever  HENT: Positive for congestion, nosebleeds, postnasal drip, rhinorrhea and sore throat  Negative for ear discharge, ear pain, hearing loss, sinus pressure and sinus pain  Eyes: Negative for pain, discharge, redness and itching  Respiratory: Positive for cough  Negative for chest tightness, shortness of breath and wheezing  Cardiovascular: Positive for chest pain  Negative for palpitations and leg swelling  Gastrointestinal: Negative for abdominal distention, abdominal pain, blood in stool, constipation, diarrhea, nausea and vomiting  Genitourinary: Negative for difficulty urinating, dysuria, flank pain and frequency  Musculoskeletal: Positive for arthralgias  Negative for back pain and gait problem  Skin: Negative for color change, pallor and rash  Neurological: Negative for dizziness, weakness, light-headedness, numbness and headaches  Psychiatric/Behavioral: Negative for agitation, behavioral problems, confusion and decreased concentration  Objective     Vitals:    12/28/22 1558   BP: 160/85   BP Location: Right arm   Patient Position: Sitting   Cuff Size: Large   Pulse: 72   Temp: 97 8 °F (36 6 °C)   TempSrc: Temporal   SpO2: 96%   Weight: 127 kg (279 lb)     Physical Exam  Constitutional:       Appearance: She is well-developed     HENT:      Head: Normocephalic and atraumatic  Nose: Nose normal    Eyes:      Conjunctiva/sclera: Conjunctivae normal       Pupils: Pupils are equal, round, and reactive to light  Neck:      Vascular: No JVD  Cardiovascular:      Rate and Rhythm: Normal rate and regular rhythm  Heart sounds: Normal heart sounds  No murmur heard  No friction rub  No gallop  Pulmonary:      Effort: Pulmonary effort is normal  No respiratory distress  Breath sounds: Normal breath sounds  No stridor  No wheezing or rales  Comments: Cough  Chest:      Chest wall: No tenderness  Abdominal:      General: Bowel sounds are normal  There is no distension  Palpations: Abdomen is soft  There is no mass  Tenderness: There is no abdominal tenderness  There is no guarding or rebound  Hernia: No hernia is present  Musculoskeletal:         General: No tenderness or deformity  Right lower leg: No edema  Left lower leg: No edema  Skin:     General: Skin is warm and dry  Neurological:      Mental Status: She is alert and oriented to person, place, and time  Psychiatric:         Mood and Affect: Mood normal          Behavior: Behavior normal          Thought Content:  Thought content normal          Judgment: Judgment normal          History     Current Outpatient Medications:   •  acetaminophen (Tylenol 8 Hour Arthritis Pain) 650 mg CR tablet, Take 1 tablet (650 mg total) by mouth every 8 (eight) hours as needed for mild pain, Disp: 90 tablet, Rfl: 3  •  aspirin (ECOTRIN LOW STRENGTH) 81 mg EC tablet, Take 1 tablet (81 mg total) by mouth daily, Disp: 90 tablet, Rfl: 3  •  Aspirin Low Dose 81 MG EC tablet, TAKE 1 TABLET (81 MG TOTAL) BY MOUTH DAILY, Disp: 90 tablet, Rfl: 9  •  atorvastatin (LIPITOR) 10 mg tablet, TAKE 1 TABLET (10 MG TOTAL) BY MOUTH DAILY, Disp: 90 tablet, Rfl: 2  •  calcium carbonate (TUMS) 500 mg chewable tablet, Chew as needed, Disp: , Rfl:   •  Cholecalciferol (Vitamin D High Potency) 25 MCG (1000 UT) capsule, Take 1 capsule (1,000 Units total) by mouth daily, Disp: 90 capsule, Rfl: 1  •  cholecalciferol (VITAMIN D3) 25 mcg (1,000 units) tablet, TAKE 1 CAPSULE (1,000 UNITS TOTAL) BY MOUTH DAILY, Disp: 90 tablet, Rfl: 4  •  Diclofenac Sodium (VOLTAREN) 1 %, Apply 2 g topically 4 (four) times a day, Disp: 150 g, Rfl: 2  •  levothyroxine 100 mcg tablet, TAKE 1 TABLET (100 MCG TOTAL) BY MOUTH TWO (TWO) TIMES A WEEK (Patient taking differently: 88 mcg 5 days, 100 mcg 2 days), Disp: 24 tablet, Rfl: 1  •  lisinopril (ZESTRIL) 20 mg tablet, TAKE 1 TABLET (20 MG TOTAL) BY MOUTH DAILY, Disp: 90 tablet, Rfl: 3  •  loratadine (CLARITIN) 10 mg tablet, TAKE 1 TABLET BY MOUTH DAILY AS NEEDED FOR ALLERGIES, Disp: 90 tablet, Rfl: 11  •  omeprazole (PriLOSEC) 20 mg delayed release capsule, TAKE 1 CAPSULE (20 MG TOTAL) BY MOUTH DAILY, Disp: 90 capsule, Rfl: 3  Allergies   Allergen Reactions   • Tagamet [Cimetidine] Hives      Past Medical History:   Diagnosis Date   • Arthritis    • Depression    • Disease of thyroid gland     hypo   • Edema     LAST ASSESSED:    • GERD (gastroesophageal reflux disease)    • Hypercholesterolemia    • Hyperlipidemia    • Hypertension     WELL CONTROLLED  CURRENTLY ON LISINOPRIL   LAST ASSESSED: [id number]   • Other headache syndrome    • Other muscle spasm     LAST ASSESSED: [id number]   • Ovarian cyst    • Renal calculi    •  (spontaneous vaginal delivery)     FEMALE     Past Surgical History:   Procedure Laterality Date   • BACK SURGERY      HERNIATED DISC (L4/L5)   • CHOLECYSTECTOMY     • IR CEREBRAL ANGIOGRAPHY  2020   • IR CEREBRAL ANGIOGRAPHY  2021   • IR CEREBRAL ANGIOGRAPHY / INTERVENTION  2021   • TONSILLECTOMY       Social History     Socioeconomic History   • Marital status:      Spouse name: Not on file   • Number of children: Not on file   • Years of education: Not on file   • Highest education level: Not on file   Occupational History   • Occupation: RETIRED   Tobacco Use   • Smoking status: Former   • Smokeless tobacco: Never   • Tobacco comments:     pt "quit about 20 years ago"   Vaping Use   • Vaping Use: Never used   Substance and Sexual Activity   • Alcohol use: Never   • Drug use: No   • Sexual activity: Never   Other Topics Concern   • Not on file   Social History Narrative    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         AS PER [employer]    EXERCISES REGULARLY    LIVES WITH FAMILY    NO CAFFEINE USE    NO LIVING WILL    NO TOBACCO/SMOKE EXPOSURE    UNEMPLOYED     Social Determinants of Health     Financial Resource Strain: Low Risk    • Difficulty of Paying Living Expenses: Not hard at all   Food Insecurity: No Food Insecurity   • Worried About [employer] in the Last Year: Never true   • Ran Out of Food in the Last Year: Never true   Transportation Needs: No Transportation Needs   • Lack of Transportation (Medical): No   • Lack of Transportation (Non-Medical): No   Physical Activity: Not on file   Stress: Not on file   Social Connections: Not on file   Intimate Partner Violence: Not on file   Housing Stability: Unknown   • Unable to Pay for Housing in the Last Year: No   • Number of Places Lived in the Last Year: Not on file   • Unstable Housing in the Last Year: No     Family History   Problem Relation Age of Onset   • Lung cancer Mother    • Cancer Mother         MALIGNANT NEOPLASM   • Hypertension Father    • Seizures Father    • Stroke Father    • Heart attack Father    • Diabetes Brother    • Hypertension Son    • Lung disease Son    • Hyperthyroidism Maternal Aunt    • Hypothyroidism Maternal Aunt    • Heart disease Other         CARDIAC DISORDER   • Neuropathy Family    • Lung cancer Cousin        Judd Puentes MD  [facility] PGY-3  [address] [facility]  [address] [facility] , [address] [facility]    PLEASE NOTE:  This encounter was completed utilizing the M-Modal[employer] Direct Speech Voice Recognition Software  Grammatical errors, random word insertions, pronoun errors and incomplete sentences are occasional consequences of the system due to software limitations, ambient noise and hardware issues  These may be missed by proof reading prior to affixing electronic signature  Any questions or concerns about the content, text or information contained within the body of this dictation should be directly addressed to the physician for clarification  Please do not hesitate to call me directly if you have any any questions or concerns

## 2022-12-29 DIAGNOSIS — J06.9 UPPER RESPIRATORY TRACT INFECTION, UNSPECIFIED TYPE: Primary | ICD-10-CM

## 2022-12-29 LAB
FLUAV RNA RESP QL NAA+PROBE: NEGATIVE
FLUBV RNA RESP QL NAA+PROBE: NEGATIVE
SARS-COV-2 RNA RESP QL NAA+PROBE: NEGATIVE

## 2022-12-29 RX ORDER — DOXYCYCLINE 100 MG/1
100 CAPSULE ORAL 2 TIMES DAILY
Qty: 10 CAPSULE | Refills: 0 | Status: SHIPPED | OUTPATIENT
Start: 2022-12-29 | End: 2023-01-03

## 2023-01-05 DIAGNOSIS — E03.8 OTHER SPECIFIED HYPOTHYROIDISM: ICD-10-CM

## 2023-01-06 RX ORDER — LEVOTHYROXINE SODIUM 0.1 MG/1
TABLET ORAL
Qty: 24 TABLET | Refills: 1 | Status: SHIPPED | OUTPATIENT
Start: 2023-01-06

## 2023-01-20 ENCOUNTER — TELEPHONE (OUTPATIENT)
Dept: INTERNAL MEDICINE CLINIC | Facility: CLINIC | Age: 68
End: 2023-01-20

## 2023-01-20 NOTE — TELEPHONE ENCOUNTER
Folder Color- Red    Name of [id number]  [employer] Detailed Written Order    Form to be filled out by- Dr Avery Close     Form to be Faxed [phone]    Patient made aware of 10 business day policy

## 2023-01-23 ENCOUNTER — TELEPHONE (OUTPATIENT)
Dept: INTERNAL MEDICINE CLINIC | Facility: CLINIC | Age: 68
End: 2023-01-23

## 2023-01-23 NOTE — TELEPHONE ENCOUNTER
Folder Color- Yellow     Name of [address] [place] [address] Detailed Written Order     Form to be filled out by- Dr Radha Shetty     Form to be Faxed: [phone]    Patient made aware of 10 business day policy

## 2023-01-24 DIAGNOSIS — E11.9 TYPE 2 DIABETES MELLITUS WITHOUT COMPLICATION, WITHOUT LONG-TERM CURRENT USE OF INSULIN (HCC): Primary | ICD-10-CM

## 2023-03-03 DIAGNOSIS — M77.41 METATARSALGIA OF RIGHT FOOT: ICD-10-CM

## 2023-03-06 ENCOUNTER — OFFICE VISIT (OUTPATIENT)
Dept: INTERNAL MEDICINE CLINIC | Facility: CLINIC | Age: 68
End: 2023-03-06

## 2023-03-06 VITALS
HEART RATE: 67 BPM | SYSTOLIC BLOOD PRESSURE: 137 MMHG | HEIGHT: 65 IN | WEIGHT: 276 LBS | BODY MASS INDEX: 45.98 KG/M2 | OXYGEN SATURATION: 97 % | DIASTOLIC BLOOD PRESSURE: 81 MMHG | TEMPERATURE: 98 F

## 2023-03-06 DIAGNOSIS — Z12.11 ENCOUNTER FOR SCREENING COLONOSCOPY: ICD-10-CM

## 2023-03-06 DIAGNOSIS — E11.9 TYPE 2 DIABETES MELLITUS WITHOUT COMPLICATION, WITHOUT LONG-TERM CURRENT USE OF INSULIN (HCC): ICD-10-CM

## 2023-03-06 DIAGNOSIS — Z12.11 COLON CANCER SCREENING: ICD-10-CM

## 2023-03-06 DIAGNOSIS — Z12.31 BREAST CANCER SCREENING BY MAMMOGRAM: ICD-10-CM

## 2023-03-06 DIAGNOSIS — Z00.00 WELCOME TO MEDICARE PREVENTIVE VISIT: Primary | ICD-10-CM

## 2023-03-06 DIAGNOSIS — Z23 NEED FOR PNEUMOCOCCAL VACCINATION: ICD-10-CM

## 2023-03-06 LAB — SL AMB POCT HEMOGLOBIN AIC: 6.1 (ref ?–6.5)

## 2023-03-06 NOTE — PROGRESS NOTES
Assessment and Plan:     Problem List Items Addressed This Visit        Endocrine    Type 2 diabetes mellitus without complication (HCC)  U7N 6 1 today  Continue diet modification limiting carbs and sugary meals  Recent eye exam in February 2023  Microalbumin/creatinine ratio ordered today  Relevant Orders    POCT hemoglobin A1c (Completed)    Microalbumin / creatinine urine ratio   Other Visit Diagnoses     Welcome to Medicare preventive visit    -  Primary      Breast cancer screening by mammogram        Relevant Orders    Mammo screening bilateral w 3d & cad    Encounter for screening colonoscopy        Relevant Orders    Ambulatory referral for colonoscopy    Need for pneumococcal vaccination        Relevant Orders    Pneumococcal Conjugate Vaccine 20-valent (Pcv20)    Need for osteoporosis screening  DEXA scan order given again today  Colon cancer screening        Relevant Orders    Ambulatory referral to Gastroenterology        [facility] mainteninance  Colonoscopy - referred today  DEXA scan - order given today  Mammogram - referred today  DM eye exam - referred to opthalmology, [facility] patient with recent eye exam in [facility]  Pneumococcal vaccine -   Urinary incontinence screening - negative  Microalbumin/creatinine ratio - ordered today  Fall risk - negative  Depression screening - negative    BMI Counseling: Body mass index is 45 93 kg/m²  The BMI is above normal  Nutrition recommendations include decreasing portion sizes, encouraging healthy choices of fruits and vegetables, consuming healthier snacks and moderation in carbohydrate intake  Exercise recommendations include moderate physical activity 150 minutes/week  No pharmacotherapy was ordered  Rationale for BMI follow-up plan is due to patient being overweight or obese  Depression Screening and Follow-up Plan: Patient was screened for depression during today's encounter  They screened negative with a PHQ-2 score of 0        Preventive health issues were discussed with patient, and age appropriate screening tests were ordered as noted in patient's After Visit Summary  Personalized health advice and appropriate referrals for health education or preventive services given if needed, as noted in patient's After Visit Summary  Follow up in 6 months  History of Present Illness:     Patient presents for a Medicare Wellness Visit    60-year-old female with past medical history of diabetes mellitus type 2, hypothyroidism, hyperlipidemia, 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 and 0 5cm right middle lobe pulmonary nodule presenting for Medicare AW  Patient Care Team:  Ayo Matthew MD as PCP - General (Internal Medicine)  Naa Gunter MD as PCP - [address] [place], [place] [id number] [facility] (E)  Paulita Bernheim, PA-C Margherita Romano Diaz-Pechar, MD Robb Bologna, FELIX Whiteside DO     Review of Systems:     Review of Systems   Constitutional: Negative for chills and fever  HENT: Positive for dental problem  Negative for ear pain and sore throat  Eyes: Negative for pain and visual disturbance  Respiratory: Negative for cough and shortness of breath  Cardiovascular: Negative for chest pain and palpitations  Gastrointestinal: Negative for abdominal pain and vomiting  Genitourinary: Negative for dysuria and hematuria  Musculoskeletal: Negative for arthralgias and back pain  Chronic shoulder pain   Skin: Negative for color change and rash  Neurological: Negative for seizures and syncope  All other systems reviewed and are negative         Problem List:     Patient Active Problem List   Diagnosis   • Carpal tunnel syndrome, bilateral   • Cervical spondylosis without myelopathy   • Degenerative cervical disc   • GERD (gastroesophageal reflux disease)   • Hypertension   • Hypothyroidism   • Morbid obesity ([address] )   • Obstructive sleep apnea   • Type 2 diabetes mellitus without complication [facility])   • Cervical cancer screening   • Lung nodule < 6cm on CT   • Chest pain   • Thickened endometrium   • Healthcare maintenance   • Sinusitis   • Aneurysm of internal carotid artery   • Orthostatic hypotension   • Gait instability   • Other headache syndrome   • Chronic daily headache   • Cerebral aneurysm   • Difficult intubation   • Chronic migraine without aura without status migrainosus, not intractable   • Vitamin D deficiency   • Plantar fasciitis of right foot      Past Medical and Surgical History:     Past Medical History:   Diagnosis Date   • Arthritis    • Depression    • Disease of thyroid gland     hypo   • Edema     LAST ASSESSED: [id number]   • GERD (gastroesophageal reflux disease)    • Hypercholesterolemia    • Hyperlipidemia    • Hypertension     WELL CONTROLLED  CURRENTLY ON LISINOPRIL   LAST ASSESSED: [id number]   • Other headache syndrome    • Other muscle spasm     LAST ASSESSED: [id number]   • Ovarian cyst    • Renal calculi    •  (spontaneous vaginal delivery)     FEMALE     Past Surgical History:   Procedure Laterality Date   • BACK SURGERY      HERNIATED DISC (L4/L5)   • CHOLECYSTECTOMY     • IR CEREBRAL ANGIOGRAPHY  2020   • IR CEREBRAL ANGIOGRAPHY  2021   • IR CEREBRAL ANGIOGRAPHY / INTERVENTION  2021   • TONSILLECTOMY        Family History:     Family History   Problem Relation Age of Onset   • Lung cancer Mother    • Cancer Mother         MALIGNANT NEOPLASM   • Hypertension Father    • Seizures Father    • Stroke Father    • Heart attack Father    • Diabetes Brother    • Hypertension Son    • Lung disease Son    • Hyperthyroidism Maternal Aunt    • Hypothyroidism Maternal Aunt    • Heart disease Other         CARDIAC DISORDER   • Neuropathy Family    • Lung cancer Cousin       Social History:     Social History     Socioeconomic History   • Marital status:      Spouse name: None   • Number of children: None   • Years of education: None   • Highest education level: None   Occupational History   • Occupation: RETIRED   Tobacco Use   • Smoking status: Former   • Smokeless tobacco: Never   • Tobacco comments:     pt "quit about 20 years ago"   Vaping Use   • Vaping Use: Never used   Substance and Sexual Activity   • Alcohol use: Never   • Drug use: No   • Sexual activity: Never   Other Topics Concern   • None   Social History Narrative    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         AS PER [employer]    EXERCISES REGULARLY    LIVES WITH FAMILY    NO CAFFEINE USE    NO LIVING WILL    NO TOBACCO/SMOKE EXPOSURE    UNEMPLOYED     Social Determinants of Health     Financial Resource Strain: Low Risk    • Difficulty of Paying Living Expenses: Not hard at all   Food Insecurity: No Food Insecurity   • Worried About [address] in the Last Year: Never true   • Ran Out of Food in the Last Year: Never true   Transportation Needs: No Transportation Needs   • Lack of Transportation (Medical): No   • Lack of Transportation (Non-Medical):  No   Physical Activity: Not on file   Stress: Not on file   Social Connections: Not on file   Intimate Partner Violence: Not on file   Housing Stability: Unknown   • Unable to Pay for Housing in the Last Year: No   • Number of Places Lived in the Last Year: Not on file   • Unstable Housing in the Last Year: No      Medications and Allergies:     Current Outpatient Medications   Medication Sig Dispense Refill   • acetaminophen (Tylenol 8 Hour Arthritis Pain) 650 mg CR tablet Take 1 tablet (650 mg total) by mouth every 8 (eight) hours as needed for mild pain 90 tablet 3   • aspirin (ECOTRIN LOW STRENGTH) 81 mg EC tablet Take 1 tablet (81 mg total) by mouth daily 90 tablet 3   • atorvastatin (LIPITOR) 10 mg tablet TAKE 1 TABLET (10 MG TOTAL) BY MOUTH DAILY 90 tablet 2   • calcium carbonate (TUMS) 500 mg chewable tablet Chew as needed     • Cholecalciferol (Vitamin D High Potency) 25 MCG (1000 UT) capsule Take 1 capsule (1,000 Units total) by mouth daily 90 capsule 1   • Diclofenac Sodium (VOLTAREN) 1 % Apply 2 g topically 4 (four) times a day 150 g 2   • levothyroxine 100 mcg tablet 88 mcg 5 days, 100 mcg 2 days (Patient taking differently: 88 mcg 5 days, 100 mcg 2 days    Only takes this on  Saturdays and sundays) 24 tablet 1   • lisinopril (ZESTRIL) 20 mg tablet TAKE 1 TABLET (20 MG TOTAL) BY MOUTH DAILY 90 tablet 3   • loratadine (CLARITIN) 10 mg tablet TAKE 1 TABLET BY MOUTH DAILY AS NEEDED FOR ALLERGIES 90 tablet 11   • omeprazole (PriLOSEC) 20 mg delayed release capsule TAKE 1 CAPSULE (20 MG TOTAL) BY MOUTH DAILY 90 capsule 3   • Aspirin Low Dose 81 MG EC tablet TAKE 1 TABLET (81 MG TOTAL) BY MOUTH DAILY (Patient not taking: Reported on 3/6/2023) 90 tablet 9   • cholecalciferol (VITAMIN D3) 25 mcg (1,000 units) tablet TAKE 1 CAPSULE (1,000 UNITS TOTAL) BY MOUTH DAILY (Patient not taking: Reported on 3/6/2023) 90 tablet 4     No current facility-administered medications for this visit       Allergies   Allergen Reactions   • Tagamet [Cimetidine] Hives      Immunizations:     Immunization History   Administered Date(s) Administered   • INFLUENZA 11/12/2018   • Influenza Quadrivalent, 6-35 Months IM 09/29/2017   • Influenza, high dose seasonal 0 7 mL 11/15/2021, 11/22/2022   • Influenza, recombinant, quadrivalent,injectable, preservative free 11/12/2018, 10/21/2019, 10/30/2020   • Influenza, seasonal, injectable 10/10/2013, 12/03/2014, 10/22/2015   • Pneumococcal Polysaccharide PPV23 11/12/2018   • Tdap 10/30/2020, 05/01/2021      Health Maintenance:         Topic Date Due   • Colorectal Cancer Screening  Never done   • Breast Cancer Screening: Mammogram  06/13/2019   • Hepatitis C Screening  Completed         Topic Date Due   • COVID-19 Vaccine (1) Never done   • Pneumococcal Vaccine: 65+ Years (2 - PCV) 11/12/2019      Medicare Screening Tests and Risk Assessments:     Sera Tobias is here for her Welcome to Medicare visit  Health Risk Assessment:   Patient rates overall health as fair  Patient feels that their physical health rating is same  Patient is satisfied with their life  Eyesight was rated as slightly worse  Hearing was rated as slightly worse  Patient feels that their emotional and mental health rating is same  Patients states they are sometimes angry  Patient states they are always unusually tired/fatigued  Pain experienced in the last 7 days has been a lot  Patient's pain rating has been 10/10  Patient states that she has experienced weight loss or gain in last 6 months  Fall Risk Screening: In the past year, patient has experienced: no history of falling in past year      Urinary Incontinence Screening:   Patient has leaked urine accidently in the last six months  Home Safety:  Patient has trouble with stairs inside or outside of their home  Patient has working smoke alarms and has working carbon monoxide detector  Home safety hazards include: none  Nutrition:   Current diet is Regular  Medications:   Patient is currently taking over-the-counter supplements  OTC medications include: see medication list  Patient is able to manage medications  Activities of Daily Living (ADLs)/Instrumental Activities of Daily Living (IADLs):   Walk and transfer into and out of bed and chair?: Yes  Dress and groom yourself?: Yes    Bathe or shower yourself?: Yes    Feed yourself?  Yes  Do your laundry/housekeeping?: Yes  Manage your money, pay your bills and track your expenses?: Yes  Make your own meals?: Yes    Do your own shopping?: Yes    PREVENTIVE SCREENINGS      Cardiovascular Screening:    General: Screening Current      Diabetes Screening:     General: Screening Not Indicated and History Diabetes      Cervical Cancer Screening:    General: Screening Not Indicated      Lung Cancer Screening:     General: Screening Not Indicated      Hepatitis C Screening:    General: Screening Current    Screening, Brief Intervention, and Referral to Treatment (SBIRT)    Screening  Typical number of drinks in a day: 0    Single Item Drug Screening:  How often have you used an illegal drug (including marijuana) or a prescription medication for non-medical reasons in the past year? never    Single Item Drug Screen Score: 0  Interpretation: Negative screen for possible drug use disorder    No results found  Physical Exam:     /81 (BP Location: Right arm, Patient Position: Sitting, Cuff Size: Standard)   Pulse 67   Temp 98 °F (36 7 °C) (Temporal)   Ht 5' 5" (1 651 m)   Wt 125 kg (276 lb)   LMP  (LMP Unknown)   SpO2 97%   BMI 45 93 kg/m²     Physical Exam  Vitals and nursing note reviewed  Constitutional:       General: She is not in acute distress  Appearance: She is well-developed  She is obese  HENT:      Head: Normocephalic and atraumatic  Eyes:      Conjunctiva/sclera: Conjunctivae normal    Cardiovascular:      Rate and Rhythm: Normal rate and regular rhythm  Heart sounds: No murmur heard  Pulmonary:      Effort: Pulmonary effort is normal  No respiratory distress  Breath sounds: Normal breath sounds  Abdominal:      Palpations: Abdomen is soft  Tenderness: There is no abdominal tenderness  Musculoskeletal:         General: No swelling  Cervical back: Neck supple  Skin:     General: Skin is warm and dry  Capillary Refill: Capillary refill takes less than 2 seconds  Neurological:      Mental Status: She is alert     Psychiatric:         Mood and Affect: Mood normal           Ayo Matthew MD

## 2023-03-06 NOTE — PATIENT INSTRUCTIONS
Medicare Preventive Visit Patient Instructions  Thank you for completing your Welcome to Medicare Visit or Medicare Annual Wellness Visit today  Your next wellness visit will be due in one year (3/6/2024)  The screening/preventive services that you may require over the next 5-10 years are detailed below  Some tests may not apply to you based off risk factors and/or age  Screening tests ordered at today's visit but not completed yet may show as past due  Also, please note that scanned in results may not display below  Preventive Screenings:  Service Recommendations Previous Testing/Comments   Colorectal Cancer Screening  * Colonoscopy    * Fecal Occult Blood Test (FOBT)/Fecal Immunochemical Test (FIT)  * Fecal DNA/Cologuard Test  * Flexible Sigmoidoscopy Age: 39-70 years old   Colonoscopy: every 10 years (may be performed more frequently if at higher risk)  OR  FOBT/FIT: every 1 year  OR  Cologuard: every 3 years  OR  Sigmoidoscopy: every 5 years  Screening may be recommended earlier than age 39 if at higher risk for colorectal cancer  Also, an individualized decision between you and your healthcare provider will decide whether screening between the ages of 74-80 would be appropriate  Colonoscopy: Not on file  FOBT/FIT: Not on file  Cologuard: Not on file  Sigmoidoscopy: Not on file          Breast Cancer Screening Age: 36 years old  Frequency: every 1-2 years  Not required if history of left and right mastectomy Mammogram: 06/13/2018        Cervical Cancer Screening Between the ages of 21-29, pap smear recommended once every 3 years  Between the ages of 33-67, can perform pap smear with HPV co-testing every 5 years     Recommendations may differ for women with a history of total hysterectomy, cervical cancer, or abnormal pap smears in past  Pap Smear: 08/21/2018    Screening Not Indicated   Hepatitis C Screening Once for adults born between 1945 and 1965  More frequently in patients at high risk for Hepatitis C Hep C Antibody: Not on file    Screening Current   Diabetes Screening 1-2 times per year if you're at risk for diabetes or have pre-diabetes Fasting glucose: 108 mg/dL (8/24/2021)  A1C: 6 4 (7/25/2022)  Screening Not Indicated  History Diabetes   Cholesterol Screening Once every 5 years if you don't have a lipid disorder  May order more often based on risk factors  Lipid panel: 01/09/2021    Screening Current     Other Preventive Screenings Covered by Medicare:  1  Abdominal Aortic Aneurysm (AAA) Screening: covered once if your at risk  You're considered to be at risk if you have a family history of AAA  2  Lung Cancer Screening: covers low dose CT scan once per year if you meet all of the following conditions: (1) Age 50-69; (2) No signs or symptoms of lung cancer; (3) Current smoker or have quit smoking within the last 15 years; (4) You have a tobacco smoking history of at least 20 pack years (packs per day multiplied by number of years you smoked); (5) You get a written order from a healthcare provider  3  Glaucoma Screening: covered annually if you're considered high risk: (1) You have diabetes OR (2) Family history of glaucoma OR (3)  aged 48 and older OR (3)  American aged 72 and older  3  Osteoporosis Screening: covered every 2 years if you meet one of the following conditions: (1) You're estrogen deficient and at risk for osteoporosis based off medical history and other findings; (2) Have a vertebral abnormality; (3) On glucocorticoid therapy for more than 3 months; (4) Have primary hyperparathyroidism; (5) On osteoporosis medications and need to assess response to drug therapy  · Last bone density test (DXA Scan): Not on file  5  HIV Screening: covered annually if you're between the age of 12-76  Also covered annually if you are younger than 13 and older than 72 with risk factors for HIV infection   For pregnant patients, it is covered up to 3 times per pregnancy  Immunizations:  Immunization Recommendations   Influenza Vaccine Annual influenza vaccination during flu season is recommended for all persons aged >= 6 months who do not have contraindications   Pneumococcal Vaccine   * Pneumococcal conjugate vaccine = PCV13 (Prevnar 13), PCV15 (Vaxneuvance), PCV20 (Prevnar 20)  * Pneumococcal polysaccharide vaccine = PPSV23 (Pneumovax) Adults 25-60 years old: 1-3 doses may be recommended based on certain risk factors  Adults 72 years old: 1-2 doses may be recommended based off what pneumonia vaccine you previously received   Hepatitis B Vaccine 3 dose series if at intermediate or high risk (ex: diabetes, end stage renal disease, liver disease)   Tetanus (Td) Vaccine - COST NOT COVERED BY MEDICARE PART B Following completion of primary series, a booster dose should be given every 10 years to maintain immunity against tetanus  Td may also be given as tetanus wound prophylaxis  Tdap Vaccine - COST NOT COVERED BY MEDICARE PART B Recommended at least once for all adults  For pregnant patients, recommended with each pregnancy  Shingles Vaccine (Shingrix) - COST NOT COVERED BY MEDICARE PART B  2 shot series recommended in those aged 48 and above     Health Maintenance Due:      Topic Date Due   • Colorectal Cancer Screening  Never done   • Breast Cancer Screening: Mammogram  06/13/2019   • Hepatitis C Screening  Completed     Immunizations Due:      Topic Date Due   • COVID-19 Vaccine (1) Never done   • Pneumococcal Vaccine: 65+ Years (2 - PCV) 11/12/2019     Advance Directives   What are advance directives? Advance directives are legal documents that state your wishes and plans for medical care  These plans are made ahead of time in case you lose your ability to make decisions for yourself  Advance directives can apply to any medical decision, such as the treatments you want, and if you want to donate organs  What are the types of advance directives?   There are many types of advance directives, and each state has rules about how to use them  You may choose a combination of any of the following:  · Living will: This is a written record of the treatment you want  You can also choose which treatments you do not want, which to limit, and which to stop at a certain time  This includes surgery, medicine, IV fluid, and tube feedings  · Durable power of  for healthcare [place]): This is a written record that states who you want to make healthcare choices for you when you are unable to make them for yourself  This person, called a proxy, is usually a family member or a friend  You may choose more than 1 proxy  · Do not resuscitate (DNR) order:  A DNR order is used in case your heart stops beating or you stop breathing  It is a request not to have certain forms of treatment, such as CPR  A DNR order may be included in other types of advance directives  · Medical directive: This covers the care that you want if you are in a coma, near death, or unable to make decisions for yourself  You can list the treatments you want for each condition  Treatment may include pain medicine, surgery, blood transfusions, dialysis, IV or tube feedings, and a ventilator (breathing machine)  · Values history: This document has questions about your views, beliefs, and how you feel and think about life  This information can help others choose the care that you would choose  Why are advance directives important? An advance directive helps you control your care  Although spoken wishes may be used, it is better to have your wishes written down  Spoken wishes can be misunderstood, or not followed  Treatments may be given even if you do not want them  An advance directive may make it easier for your family to make difficult choices about your care  Urinary Incontinence   Urinary incontinence (UI)  is when you lose control of your bladder   UI develops because your bladder cannot store or empty urine properly  The 3 most common types of UI are stress incontinence, urge incontinence, or both  Medicines:   · May be given to help strengthen your bladder control  Report any side effects of medication to your healthcare provider  Do pelvic muscle exercises often:  Your pelvic muscles help you stop urinating  Squeeze these muscles tight for 5 seconds, then relax for 5 seconds  Gradually work up to squeezing for 10 seconds  Do 3 sets of 15 repetitions a day, or as directed  This will help strengthen your pelvic muscles and improve bladder control  Train your bladder:  Go to the bathroom at set times, such as every 2 hours, even if you do not feel the urge to go  You can also try to hold your urine when you feel the urge to go  For example, hold your urine for 5 minutes when you feel the urge to go  As that becomes easier, hold your urine for 10 minutes  Self-care:   · Keep a UI record  Write down how often you leak urine and how much you leak  Make a note of what you were doing when you leaked urine  · Drink liquids as directed  You may need to limit the amount of liquid you drink to help control your urine leakage  Do not drink any liquid right before you go to bed  Limit or do not have drinks that contain caffeine or alcohol  · Prevent constipation  Eat a variety of high-fiber foods  Good examples are high-fiber cereals, beans, vegetables, and whole-grain breads  Walking is the best way to trigger your intestines to have a bowel movement  · Exercise regularly and maintain a healthy weight  Weight loss and exercise will decrease pressure on your bladder and help you control your leakage  · Use a catheter as directed  to help empty your bladder  A catheter is a tiny, plastic tube that is put into your bladder to drain your urine  · Go to behavior therapy as directed  Behavior therapy may be used to help you learn to control your urge to urinate      Weight Management   Why it is important to manage your weight:  Being overweight increases your risk of health conditions such as heart disease, high blood pressure, type 2 diabetes, and certain types of cancer  It can also increase your risk for osteoarthritis, sleep apnea, and other respiratory problems  Aim for a slow, steady weight loss  Even a small amount of weight loss can lower your risk of health problems  How to lose weight safely:  A safe and healthy way to lose weight is to eat fewer calories and get regular exercise  You can lose up about 1 pound a week by decreasing the number of calories you eat by 500 calories each day  Healthy meal plan for weight management:  A healthy meal plan includes a variety of foods, contains fewer calories, and helps you stay healthy  A healthy meal plan includes the following:  · Eat whole-grain foods more often  A healthy meal plan should contain fiber  Fiber is the part of grains, fruits, and vegetables that is not broken down by your body  Whole-grain foods are healthy and provide extra fiber in your diet  Some examples of whole-grain foods are whole-wheat breads and pastas, oatmeal, brown rice, and bulgur  · Eat a variety of vegetables every day  Include dark, leafy greens such as spinach, kale, lashell greens, and mustard greens  Eat yellow and orange vegetables such as carrots, sweet potatoes, and winter squash  · Eat a variety of fruits every day  Choose fresh or canned fruit (canned in its own juice or light syrup) instead of juice  Fruit juice has very little or no fiber  · Eat low-fat dairy foods  Drink fat-free (skim) milk or 1% milk  Eat fat-free yogurt and low-fat cottage cheese  Try low-fat cheeses such as mozzarella and other reduced-fat cheeses  · Choose meat and other protein foods that are low in fat  Choose beans or other legumes such as split peas or lentils  Choose fish, skinless poultry (chicken or turkey), or lean cuts of red meat (beef or pork)   Before you cook meat or poultry, cut off any visible fat  · Use less fat and oil  Try baking foods instead of frying them  Add less fat, such as margarine, sour cream, regular salad dressing and mayonnaise to foods  Eat fewer high-fat foods  Some examples of high-fat foods include french fries, doughnuts, ice cream, and cakes  · Eat fewer sweets  Limit foods and drinks that are high in sugar  This includes candy, cookies, regular soda, and sweetened drinks  Exercise:  Exercise at least 30 minutes per day on most days of the week  Some examples of exercise include walking, biking, dancing, and swimming  You can also fit in more physical activity by taking the stairs instead of the elevator or parking farther away from stores  Ask your healthcare provider about the best exercise plan for you  © Copyright [employer] 2018 Information is for End User's use only and may not be sold, redistributed or otherwise used for commercial purposes   All illustrations and images included in CareNotes® are the copyrighted property of [employer]  or [address] [place] [address]

## 2023-03-07 DIAGNOSIS — Z71.89 COMPLEX CARE COORDINATION: Primary | ICD-10-CM

## 2023-03-13 ENCOUNTER — PATIENT OUTREACH (OUTPATIENT)
Dept: INTERNAL MEDICINE CLINIC | Facility: CLINIC | Age: 68
End: 2023-03-13

## 2023-03-13 NOTE — PROGRESS NOTES
Outpatient Care Management Note:    Patient referred to outpatient nurse care management for chronic care management program (CCM)  Chart reviewed and patient has history of diabetes type 2, HTN, hypothyroidism, GERD, chronic migraines, carpal tunnel, vitamin D deficiency, lung nodule, and cerebral aneurysm  Patient followed with podiatry and neurosurgery  Patient recently had Medicare Wellness visit with PCP office on 3/6/23  Patient referred to GI for colonoscopy, ophthalmology, needs mammogram and dexa scan and needs microalbumin/creatinine urine ratio completed  I called patient using [employer] # [id number]  No answer and message left explaining my role and reason for call and requested a call back at [phone]

## 2023-03-14 ENCOUNTER — PATIENT OUTREACH (OUTPATIENT)
Dept: INTERNAL MEDICINE CLINIC | Facility: CLINIC | Age: 68
End: 2023-03-14

## 2023-03-14 NOTE — PROGRESS NOTES
Outpatient Care Management Note:     Patient referred to outpatient nurse care management for chronic care management program (CCM)  Chart reviewed and patient has history of diabetes type 2, HTN, hypothyroidism, GERD, chronic migraines, carpal tunnel, vitamin D deficiency, lung nodule, and cerebral aneurysm  Patient followed with podiatry and neurosurgery       Patient recently had Medicare Wellness visit with PCP office on 3/6/23  Patient referred to GI for colonoscopy, ophthalmology, needs mammogram and dexa scan and needs microalbumin/creatinine urine ratio completed         I called patient using [employer] # [id number]  No answer and message left explaining my role and reason for call and requested a call back at [phone]  2nd attempt to reach and will mail unable to reach letter in [address]

## 2023-03-14 NOTE — LETTER
[address] [facility] [id number]      [facility]:    [facility] desea invitarlo a participar en nuestro programa de Administración de la atención a pacientes crónicos  Admnistración de la atención a pacientes crónicos es un equipo que incluye a kaplan proveedor, administrador de cuidados de enfermería, administrador de la atención de trabajadores sociales y otros miembros del consultorio de atención primaria  Aileen equipo shaq o establece recursos para ayudarle a manejar afecciones crónicas  Amistad contribuye a reducir las complicaciones y [address]  Algunos de los servicios que se ofrecen incluyen ayudarle a lidiar con la enfermedad y con cualquier factor de estrés que pueda estar afectando la capacidad para manejar las afecciones crónicas  Nuestro objetivo es asegurnos de que usted tenga las herramientas y la información necesarias para david decisiones relativas a la atención [place]  Lo acompañará un Administrador de [facility], quien se comunicará con usted por teléfono y lo ayudará a crear y a lograr objetivos en lo que a kaplan julio c respecta  Revise el folleto en el programa de Administración de la atención a pacientes crónicos  Si desea inscribirse en el programa o si desea obtener [place] [employer], comuníquese con el consultorio del Administrador de [facility] [employer] al [phone]  Los Administradores de atención médica están disponibles de lunes a viernes de 8 a  m  a 4:30 p  m    Nos complace ayudarle a convertirse en aziza persona más saludable  Atentamente,      Kaplan Equipo de atención  Programa de Administración de la atención a pacientes crónicos:  El programa de Administración de la atención a pacientes crónicos de [facility] es aziza forma de asegurarnos de que está recibiendo la atención médica más completa posible   Contará con un equipo dedicado que incluyen a kaplan Proveedor de [employer], personal de la oficina y personal de administración de la atención, quienes trabajarán junto con usted para desarrollar un plan de atención médica que se adecúe a compa objetivos en cuanto a kaplan julio c  La Administración de la atención a pacientes crónicos (CCM, por compa siglas en inglés) se define edwin los servicios a distancia que se brindan a los afiliados de [employer] que sufren múltiples (dos o más) afecciones crónicas significativas  Las afecciones crónicas son problemas médicos continuos que deben ser tratados de manera efectiva en colaboración entre [facility] y kaplan equipo de atención médica con el fin de mantener el mejor estado de julio c posible  Algunos ejemplos de afecciones crónicas incluyen, entre otros:  artritis; hipertensión arterial bekah;   asma; cardiopatías;   enfermedad pulmonar obstructiva crónica (EPOC); obesidad;   depresión; osteoporosis;   diabetes  ¿Qué es la Administración de la atención a pacientes crónicos? Al participar de [employer], kaplan médico y kaplan equipo de atención médica primaria supervisarán y brindarán atención completa para compa afecciones crónicas de forma analítica y personalizada  Dicha llamada será un complemento a la atención recibida maria teresa compa visitas regulares al [employer]  [place] federal actualmente le permiten a Medicare financiar la administración de la atención a pacientes crónicos  ¿Cuáles son los beneficios de la Administración de la atención a pacientes crónicos? • Mejor acceso a kaplan equipo de [place]  • Monitoreo riguroso de kaplan medicación  • Plan de atención personalizado e integral para todas compa necesidades médicas  • Atención coordinada por kaplan equipo de atención médica primaria y otros proveedores de la julio c, incluida la atención que pueda recibir en Thelma Sebastian, [employer], otros centros de julio c o kaplan hogar  ¿Qué debe saber antes de adherirse?   Conforme a kaplan plan de [place] actual, lulu tipo de atención personalizada puede requerir que abone $8 o $9 (kaplan monto de copago de [employer]) en kaplan práctica de atención primaria cada mes que reciba administración de la atención a pacientes crónicos  Aileen servicio también está sujeto a kaplan deducible de Medicare  Asimismo, debe firmar un acuerdo para recibir aileen tipo de administración de la atención a pacientes crónicos  Puede retirarse de [employer] en [employer] shaq [employer] integral, un servicio excelente, junto con la compasión y el cuidado personal que usted [place]  Tratar compa afecciones crónicas es aziza parte importante del cuidado de kaplan julio c, a cualquier edad

## 2023-03-20 ENCOUNTER — OFFICE VISIT (OUTPATIENT)
Dept: MULTI SPECIALTY CLINIC | Facility: CLINIC | Age: 68
End: 2023-03-20

## 2023-03-20 VITALS — TEMPERATURE: 97.9 F | DIASTOLIC BLOOD PRESSURE: 76 MMHG | HEART RATE: 73 BPM | SYSTOLIC BLOOD PRESSURE: 158 MMHG

## 2023-03-20 DIAGNOSIS — B35.3 TINEA PEDIS OF RIGHT FOOT: ICD-10-CM

## 2023-03-20 DIAGNOSIS — B35.1 ONYCHOMYCOSIS: ICD-10-CM

## 2023-03-20 DIAGNOSIS — M72.2 PLANTAR FASCIITIS: ICD-10-CM

## 2023-03-20 DIAGNOSIS — E11.9 TYPE 2 DIABETES MELLITUS WITHOUT COMPLICATION, WITHOUT LONG-TERM CURRENT USE OF INSULIN (HCC): Primary | ICD-10-CM

## 2023-03-20 RX ORDER — CLOTRIMAZOLE 1 %
CREAM (GRAM) TOPICAL 2 TIMES DAILY
Qty: 30 G | Refills: 0 | Status: SHIPPED | OUTPATIENT
Start: 2023-03-20

## 2023-03-20 NOTE — PROGRESS NOTES
Assessment/Plan:  -Patient reports plan of fasciitis symptoms are currently resolved with distraction, icing and anti-inflammatories  Discussed with patient to continue these exercises and conservative care for acute events  Discussed with patient that she can follow-up for other treatment options if symptoms get worse  -Onychomycosis noted of bilateral first big toenails and right fifth toenail  Prescribed ciclopirox topical solution    -Tinea pedis noted in right third and fourth interspaces  Rx for Lotrimin  -Follow-up in 9 weeks     Diagnoses and all orders for this visit:    Type 2 diabetes mellitus without complication, without long-term current use of insulin ([facility])    Plantar fasciitis    Onychomycosis  -     ciclopirox (PENLAC) 8 % solution; Apply topically daily at bedtime    Tinea pedis of right foot  -     clotrimazole (LOTRIMIN) 1 % cream; Apply topically 2 (two) times a day          Subjective:      Patient ID: Iraida Conklin is a 80 y o  female  Ms Marlo Gregory is a 50-year-old patient who presents the clinic complaining of several pedal issues  Firstly, patient reports that she has had bilateral plantar fasciitis for several years  She reports that is mostly well controlled at this time, however she had a flareup last week  She reports that she uses stretching, icing and pain medications for resolution of symptoms  She reports that in approximately 3 to 4 days the symptoms resolved on their own  She is wondering if this is normal and wants to make sure that she is not hurting herself  Patient also reports of yellowed, discolored bilateral first toenails and right fifth toenail  Patient reports that she is unsatisfied with the discoloration of the toenails and would like to discuss options in terms of treating them  Patient also reports an area of maceration and skin flaking in the right third and fourth interspaces    Patient reports that she noticed the areas get wet and she attempts to dry them out after showering  Patient would like to understand the etiology of this issue and would like to discuss treatment  Patient reports some limited numbness and tingling to the bilateral lower extremities, however denies any pain  Patient denies any other pain place at this time      The following portions of the patient's history were reviewed and updated as appropriate: allergies, current medications, past family history, past medical history, past social history, past surgical history and problem list     Review of Systems   Constitutional: Negative  HENT: Negative  Eyes: Negative  Respiratory: Negative  Endocrine: Negative  Genitourinary: Negative  Musculoskeletal: Positive for myalgias  Negative for joint swelling  Skin: Positive for rash  Neurological: Positive for numbness  Objective:      /76 (BP Location: Right arm, Patient Position: Sitting, Cuff Size: Large)   Pulse 73   Temp 97 9 °F (36 6 °C) (Temporal)   LMP  (LMP Unknown)          Physical Exam  Cardiovascular:      Pulses:           Dorsalis pedis pulses are 1+ on the right side and 1+ on the left side  Posterior tibial pulses are 1+ on the right side and 1+ on the left side  Musculoskeletal:      Right foot: Normal range of motion  Left foot: Normal range of motion  Feet:      Right foot:      Protective Sensation: 10 sites tested  9 sites sensed  Skin integrity: Skin breakdown and dry skin present  Toenail Condition: Right toenails are abnormally thick  Fungal disease present  Left foot:      Protective Sensation: 10 sites tested  9 sites sensed  Skin integrity: Dry skin present  Toenail Condition: Left toenails are abnormally thick  Fungal disease present  Patient's shoes and socks removed  Right Foot/Ankle   Right Foot Inspection  Skin Exam: dry skin and maceration       Sensory   Vibration: diminished  Proprioception: intact  Monofilament testing: intact    Vascular  Capillary refills: < 3 seconds  The right DP pulse is 1+  The right PT pulse is 1+  Left Foot/Ankle  Left Foot Inspection  Skin Exam: dry skin  Sensory   Vibration: diminished  Proprioception: intact  Monofilament testing: intact    Vascular  Capillary refills: < 3 seconds  The left DP pulse is 1+  The left PT pulse is 1+       Assign Risk Category  Risk: 0

## 2023-03-28 ENCOUNTER — PATIENT OUTREACH (OUTPATIENT)
Dept: INTERNAL MEDICINE CLINIC | Facility: CLINIC | Age: 68
End: 2023-03-28

## 2023-03-28 NOTE — PROGRESS NOTES
Outpatient Care Management Note:      No response from calls or letter mailed  Will close from outpatient nurse care management at this time  Please re-consult as needed  Patient was referred for chronic care management program (CCM)

## 2023-05-22 ENCOUNTER — TELEPHONE (OUTPATIENT)
Dept: INTERNAL MEDICINE CLINIC | Facility: CLINIC | Age: 68
End: 2023-05-22

## 2023-05-25 DIAGNOSIS — E03.8 OTHER SPECIFIED HYPOTHYROIDISM: ICD-10-CM

## 2023-05-26 RX ORDER — LEVOTHYROXINE SODIUM 0.1 MG/1
TABLET ORAL
Qty: 24 TABLET | Refills: 0 | Status: SHIPPED | OUTPATIENT
Start: 2023-05-26

## 2023-05-30 ENCOUNTER — TELEPHONE (OUTPATIENT)
Dept: INTERNAL MEDICINE CLINIC | Facility: CLINIC | Age: 68
End: 2023-05-30

## 2023-05-30 NOTE — TELEPHONE ENCOUNTER
Pharmacists called requesting a refill of Levothyroxine 88 mcg  I do not see that on her active med list  Please send Rx to pharmacy if patient should be on that  Per pharmacists, patient stated that she takes both Levothyroxine 88 and 100 mcg

## 2023-05-30 NOTE — TELEPHONE ENCOUNTER
Patient called back stating she needs the   levothyroxine 88 mcg   Patient states the levothyroxine 88 mcg is for Monday-Friday and the levothyroxine 100 mcg is for Saturday and Sunday   Per patient she needs the 88mcg

## 2023-05-31 DIAGNOSIS — E03.8 OTHER SPECIFIED HYPOTHYROIDISM: Primary | ICD-10-CM

## 2023-05-31 RX ORDER — LEVOTHYROXINE SODIUM 88 UG/1
88 TABLET ORAL DAILY
Qty: 90 TABLET | Refills: 3 | Status: SHIPPED | OUTPATIENT
Start: 2023-05-31

## 2023-05-31 NOTE — TELEPHONE ENCOUNTER
Message sent to provider to review as Levothyroxine 88 is not on active med list  Please see encounter from 5/30/23

## 2023-06-13 DIAGNOSIS — B35.1 ONYCHOMYCOSIS: ICD-10-CM

## 2023-06-14 DIAGNOSIS — E55.9 VITAMIN D DEFICIENCY: ICD-10-CM

## 2023-06-14 RX ORDER — CHOLECALCIFEROL (VITAMIN D3) 25 MCG
1000 CAPSULE ORAL DAILY
Qty: 90 CAPSULE | Refills: 1 | Status: SHIPPED | OUTPATIENT
Start: 2023-06-14 | End: 2023-09-12

## 2023-06-26 DIAGNOSIS — I10 ESSENTIAL HYPERTENSION: ICD-10-CM

## 2023-06-26 RX ORDER — LISINOPRIL 20 MG/1
20 TABLET ORAL DAILY
Qty: 90 TABLET | Refills: 2 | Status: SHIPPED | OUTPATIENT
Start: 2023-06-26 | End: 2023-09-24

## 2023-07-03 ENCOUNTER — TELEPHONE (OUTPATIENT)
Dept: INTERNAL MEDICINE CLINIC | Facility: CLINIC | Age: 68
End: 2023-07-03

## 2023-07-03 ENCOUNTER — CONSULT (OUTPATIENT)
Dept: GASTROENTEROLOGY | Facility: CLINIC | Age: 68
End: 2023-07-03
Payer: MEDICARE

## 2023-07-03 VITALS
DIASTOLIC BLOOD PRESSURE: 80 MMHG | WEIGHT: 277.2 LBS | HEIGHT: 65 IN | SYSTOLIC BLOOD PRESSURE: 118 MMHG | BODY MASS INDEX: 46.19 KG/M2 | TEMPERATURE: 98.4 F

## 2023-07-03 DIAGNOSIS — E66.01 MORBID OBESITY (HCC): ICD-10-CM

## 2023-07-03 DIAGNOSIS — Z12.11 COLON CANCER SCREENING: ICD-10-CM

## 2023-07-03 DIAGNOSIS — K21.9 GASTROESOPHAGEAL REFLUX DISEASE WITHOUT ESOPHAGITIS: Primary | ICD-10-CM

## 2023-07-03 PROCEDURE — 99204 OFFICE O/P NEW MOD 45 MIN: CPT | Performed by: PHYSICIAN ASSISTANT

## 2023-07-03 RX ORDER — AMOXICILLIN 500 MG/1
CAPSULE ORAL
COMMUNITY
Start: 2023-04-07

## 2023-07-03 NOTE — PROGRESS NOTES
[facility] - Outpatient Consultation  Meliza Doan 80 y.o. female MRN: [id number]  Encounter: [id number]          ASSESSMENT AND PLAN:      Amadeo Nixon is a 79 y/o female who is s/p cholecystectomy with GERD, DM2, HTN, hypothyroid, HLD who presents for colon cancer screening. 1. Colon cancer screening  Last colonoscopy was 10+ yrs ago; she is unsure of results.   -colonoscopy ordered; instructions given; risks and benefits reviewed     2. Gastroesophageal reflux disease without esophagitis  She says she gets heartburn a few times/week despite being on daily PPI, but does admit to taking her PPI after she eats breakfast.   -instructed pt to take omeprazole on an empty stomach: she says she would like to try to do this prior dinner then due to taking synthroid in the AM  -anti-gerd diet discussed   -we can discuss endoscopic evaluation in the future if her symptoms persist despite taking PPI correctly     ______________________________________________________________________    HPI:  Amadeo Nixon is a 79 y/o female who is s/p cholecystectomy with GERD, DM2, HTN, hypothyroid, HLD who presents for colon cancer screening. Pt says she underwent colonoscopy 10+ yrs ago and is unsure of the results, but believes it was normal. Pt denies family hx of colon cancer, unintentional weight loss, fevers, chills, night sweats, Constipation, diarrhea, bloody or black BMs. Pt says she takes omeprazole daily after eating, but still gets heartburn a few times/week. Pt denies dysphagia, abdominal pain, n/v, NSAID use. Pt denies tobacco or alcohol use. She says she underwent cholecystectomy in the past       REVIEW OF SYSTEMS:    CONSTITUTIONAL: Denies any fever, chills, rigors, and weight loss. HEENT: No earache or tinnitus. Denies hearing loss or visual disturbances. CARDIOVASCULAR: No chest pain or palpitations.    RESPIRATORY: Denies any cough, hemoptysis, shortness of breath or dyspnea on exertion. GASTROINTESTINAL: As noted in the History of Present Illness. GENITOURINARY: No problems with urination. Denies any hematuria or dysuria. NEUROLOGIC: No dizziness or vertigo, denies headaches. MUSCULOSKELETAL: Denies any muscle or joint pain. SKIN: Denies skin rashes or itching. ENDOCRINE: Denies excessive thirst. Denies intolerance to heat or cold. PSYCHOSOCIAL: Denies depression or anxiety. Denies any recent memory loss. Historical Information   Past Medical History:   Diagnosis Date   • Arthritis    • Depression    • Disease of thyroid gland     hypo   • Edema     LAST ASSESSED: [id number]   • GERD (gastroesophageal reflux disease)    • Hypercholesterolemia    • Hyperlipidemia    • Hypertension     WELL CONTROLLED. CURRENTLY ON LISINOPRIL.  LAST ASSESSED: [id number]   • Other headache syndrome    • Other muscle spasm     LAST ASSESSED: [id number]   • Ovarian cyst    • Renal calculi    •  (spontaneous vaginal delivery)     FEMALE     Past Surgical History:   Procedure Laterality Date   • BACK SURGERY      HERNIATED DISC (L4/L5)   • CHOLECYSTECTOMY     • IR CEREBRAL ANGIOGRAPHY  2020   • IR CEREBRAL ANGIOGRAPHY  2021   • IR CEREBRAL ANGIOGRAPHY / INTERVENTION  2021   • TONSILLECTOMY       Social History   Social History     Substance and Sexual Activity   Alcohol Use Never     Social History     Substance and Sexual Activity   Drug Use No     Social History     Tobacco Use   Smoking Status Former   Smokeless Tobacco Never   Tobacco Comments    pt "quit about 20 years ago"     Family History   Problem Relation Age of Onset   • Lung cancer Mother    • Cancer Mother         MALIGNANT NEOPLASM   • Hypertension Father    • Seizures Father    • Stroke Father    • Heart attack Father    • Diabetes Brother    • Hypertension Son    • Lung disease Son    • Hyperthyroidism Maternal Aunt    • Hypothyroidism Maternal Aunt    • Heart disease Other         CARDIAC DISORDER   • Neuropathy Family    • Lung cancer Cousin        Meds/Allergies       Current Outpatient Medications:   •  acetaminophen (Tylenol 8 Hour Arthritis Pain) 650 mg CR tablet  •  amoxicillin (AMOXIL) 500 mg capsule  •  aspirin (ECOTRIN LOW STRENGTH) 81 mg EC tablet  •  atorvastatin (LIPITOR) 10 mg tablet  •  calcium carbonate (TUMS) 500 mg chewable tablet  •  Cholecalciferol (Vitamin D High Potency) 25 MCG (1000 UT) capsule  •  ciclopirox (PENLAC) 8 % solution  •  clotrimazole (LOTRIMIN) 1 % cream  •  Diclofenac Sodium (VOLTAREN) 1 %  •  levothyroxine (Synthroid) 88 mcg tablet  •  levothyroxine 100 mcg tablet  •  lisinopril (ZESTRIL) 20 mg tablet  •  loratadine (CLARITIN) 10 mg tablet  •  omeprazole (PriLOSEC) 20 mg delayed release capsule  •  polyethylene glycol (GOLYTELY) 4000 mL solution  •  Aspirin Low Dose 81 MG EC tablet  •  cholecalciferol (VITAMIN D3) 25 mcg (1,000 units) tablet    Allergies   Allergen Reactions   • Tagamet [Cimetidine] Hives           Objective     Blood pressure 118/80, temperature 98.4 °F (36.9 °C), temperature source Tympanic, height 5' 5" (1.651 m), weight 126 kg (277 lb 3.2 oz), last menstrual period 01/01/2003. Body mass index is 46.13 kg/m². PHYSICAL EXAM:      General Appearance:   Alert, cooperative, no distress   HEENT:   Normocephalic, atraumatic, anicteric.     Neck:  Supple, symmetrical, trachea midline   Lungs:   Clear to auscultation bilaterally; no rales, rhonchi or wheezing; respirations unlabored    Heart[de-identified]   Regular rate and rhythm; no murmur, rub, or gallop. Abdomen:   Soft, non-tender, non-distended; normal bowel sounds; no masses, no organomegaly    Genitalia:   Deferred    Rectal:   Deferred    Extremities:  No cyanosis, clubbing or edema    Pulses:  2+ and symmetric    Skin:  No jaundice, rashes, or lesions    Lymph nodes:  No palpable cervical lymphadenopathy        Lab Results:   No visits with results within 1 Day(s) from this visit.    Latest known visit with results is:   Office Visit on 03/06/2023   Component Date Value   • Hemoglobin A1C 03/06/2023 6.1          Radiology Results:   No results found.

## 2023-07-03 NOTE — TELEPHONE ENCOUNTER
Patient called the office asking to speak to a . Then specifically asked which one of them was also a nurse and informed her that that would be Leia Wharton. She stated that she would like an appointment with you. I informed her you weren't in the office today and we are closed tomorrow but I would send a message. Patient stated she has spoken with you before regarding her brother and she wanted to talk to you about some other things, she did not disclose any details. Patient didn't seem to be in any distress, she just asked if you could give her a call Wednesday or Thursday when you have a chance.

## 2023-07-03 NOTE — PATIENT INSTRUCTIONS
Please rake your omeprazole every day, on an empty stomach: please make sure you have not eaten AT LEAST 2 hours prior to taking it and please wait at least half an hour to eat after taking it         Enfermedad por reflujo gastroesofágico (ERGE)   LO QUE NECESITA SABER:   La enfermedad por reflujo gastroesofágico (ERGE) es el reflujo que ocurre más de 2 veces a la semana maria teresa varias semanas. Reflujo significa que el ácido y los alimentos en el estómago regresan al esófago. La ERGE puede causar otros problemas de julio c con el tiempo si no es tratada. N[facility]a [facility]iles [facility]:   Llame al [facility] de emergencias local (911 en los [place]) si:  Usted siente dolor ryne en el pecho y dificultad repentina para respirar. Regrese a la pilar de emergencias si:  Tiene dificultad para respirar después de vomitar. Tiene dificultad para tragar o dolor al tragar. Compa evacuaciones intestinales son de color thompson, con [place], o de apariencia alquitranada. [place] vómito parece edwin café molido o contiene irais. Llame a kaplan médico o gastroenterólogo si:  [place] y no puede eructar o vomitar. Vomita grandes cantidades, o vomita con frecuencia. Usted pierde peso sin proponérselo. Compa síntomas empeoran o no mejoran con el tratamiento. Usted tiene preguntas o inquietudes acerca de kaplan condición o cuidado. Medicamentos:  Los medicamentos para disminuir el ácido [address] medicamentos también podrían usarse para ayudar a que kaplan esfínter esofágico inferior y kaplan estómago se contraigan (estrechen) más. [place] compa medicamentos edwin se le haya indicado. Consulte con kaplan médico si usted britta que kaplan medicamento no le está ayudando o si presenta efectos secundarios. Infórmele al médico si usted es alérgico a algún medicamento. Mantenga aziza lista actualizada de los [place], las vitaminas y los productos herbales que pieter.  Incluya los [employer] medicamentos: cantidad, frecuencia y motivo de administración. Traiga con usted la lista o los envases de las píldoras a compa citas de seguimiento. Lleve la lista de los medicamentos con usted en vero de aziza emergencia. Control de la ERGE:      No consuma alimentos o bebidas que puedan aumentar la [place]. Estos incluyen chocolate, menta, comidas fritas o grasosas, bebidas que contienen cafeína o bebidas gaseosas. Otros alimentos incluyen comidas picantes, cebollas, tomates y alimentos a base de tomate. No consuma alimentos y bebidas que puedan irritar kaplan esófago, edwin las frutas cítricas, los jugos y las bebidas alcohólicas. No ingiera comidas abundantes. Cuando usted come [employer] a la vez, kaplan estómago necesita más ácido para digerirla. Consuma 6 comidas pequeñas al día en vez de 3 comidas grandes y coma lentamente. No consuma alimentos entre 2 y 3 horas antes de [place]. Eleve la cabecera de kaplan cama. Coloque bloques de 6 pulgadas debajo de la cabecera de la estructura de kaplan cama. También podría usar más aziza almohada para apoyar kaplan bella y hombros mientras duerme. Mantenga un peso saludable. Si usted tiene sobrepeso, la pérdida de peso podría ayudar a aliviar los síntomas de la [place]. No fume. Fumar debilita el esfínter esofágico inferior y [place] el riesgo de [place]. Pida información a kaplan médico si usted actualmente fuma y necesita ayuda para dejar de fumar. Los cigarrillos electrónicos o el tabaco sin humo igualmente contienen nicotina. Consulte con kaplan médico antes de [employer]. No aplique presión sobre el abdomen. La presión empuja el ácido hacia el esófago. No use ropa que [place] alrededor de [employer]. No se agache. Doble las rodillas para recoger algo. Acuda a compa consultas de control con kaplan médico o gastroenterólogo según le indicaron: Anote compa preguntas para que se acuerde de hacerlas maria teresa compa visitas.   © Copyright [employer] 2022 Information is for End User's use only and may not be sold, redistributed or otherwise used for commercial purposes. Esta información es sólo para uso en educación. [place] intención no es darle un consejo médico sobre enfermedades o tratamientos. Colsulte con kaplan Muna Lali farmacéutico antes de seguir cualquier régimen médico para saber si es seguro y efectivo para usted. GERD (Gastroesophageal Reflux Disease)   WHAT YOU NEED TO KNOW:   Gastroesophageal reflux disease (GERD) is reflux that happens more than 2 times a week for a few weeks. Reflux means acid and food in your stomach back up into your esophagus. GERD can cause other health problems over time if it is not treated. DISCHARGE INSTRUCTIONS:   Call your local emergency number (911 in the [address]) if:   You have severe chest pain and sudden trouble breathing. Return to the emergency department if:   You have trouble breathing after you vomit. You have trouble swallowing, or pain with swallowing. Your bowel movements are black, bloody, or tarry-looking. Your vomit looks like coffee grounds or has blood in it. Call your doctor or gastroenterologist if:   You feel full and cannot burp or vomit. You vomit large amounts, or you vomit often. You are losing weight without trying. Your symptoms get worse or do not improve with treatment. You have questions or concerns about your condition or care. Medicines:   Medicines  are used to decrease stomach acid. Medicine may also be used to help your lower esophageal sphincter and stomach contract (tighten) more. Take your medicine as directed. Contact your healthcare provider if you think your medicine is not helping or if you have side effects. Tell your provider if you are allergic to any medicine. Keep a list of the medicines, vitamins, and herbs you take. Include the amounts, and when and why you take them. Bring the list or the pill bottles to follow-up visits.  Carry your medicine list with you in case of an emergency. Manage GERD:       Do not have foods or drinks that may increase heartburn. These include chocolate, peppermint, fried or fatty foods, drinks that contain caffeine, or carbonated drinks (soda). Other foods include spicy foods, onions, tomatoes, and tomato-based foods. Do not have foods or drinks that can irritate your esophagus, such as citrus fruits, juices, and alcohol. Do not eat large meals. When you eat a lot of food at one time, your stomach needs more acid to digest it. Eat 6 small meals each day instead of 3 large ones, and eat slowly. Do not eat meals 2 to 3 hours before bedtime. Elevate the head of your bed. Place 6-inch blocks under the head of your bed frame. You may also use more than one pillow under your head and shoulders while you sleep. Maintain a healthy weight. If you are overweight, weight loss may help relieve symptoms of GERD. Do not smoke. Smoking weakens the lower esophageal sphincter and increases the risk of GERD. Ask your healthcare provider for information if you currently smoke and need help to quit. E-cigarettes or smokeless tobacco still contain nicotine. Talk to your healthcare provider before you use these products. Do not put pressure on your abdomen. Pressure pushes acid up into your esophagus. Do not wear clothing that is tight around your waist. Do not bend over. Bend at the knees if you need to pick something up. Follow up with your doctor or gastroenterologist as directed:  Write down your questions so you remember to ask them during your visits. © Copyright Kassie Kaur 2022 Information is for End User's use only and may not be sold, redistributed or otherwise used for commercial purposes. The above information is an  only. It is not intended as medical advice for individual conditions or treatments.  Talk to your doctor, nurse or pharmacist before following any medical regimen to see if it is safe and effective for you.   Scheduled date of colonoscopy (as of today):09/05/2023  Physician performing colonoscopy: Ivan  Location of colonoscopy:[facility]  Bowel prep reviewed with patient: Golytely  Instructions reviewed with patient by:Elsi  Clearances:  NONE    Patient is DIABETIC

## 2023-07-05 ENCOUNTER — TELEPHONE (OUTPATIENT)
Age: 68
End: 2023-07-05

## 2023-07-05 DIAGNOSIS — Z12.11 COLON CANCER SCREENING: Primary | ICD-10-CM

## 2023-07-05 NOTE — TELEPHONE ENCOUNTER
[facility] pharmacy called.  Pharmacy is asking for the polyethylene glycol to be changed to the preferred medication gavilite c.

## 2023-07-07 NOTE — TELEPHONE ENCOUNTER
I called patient this morning using [employer]  # [phone]. No answer and message left requesting a call back if she still needs assistance. I left main office number.

## 2023-08-10 ENCOUNTER — RA CDI HCC (OUTPATIENT)
Dept: OTHER | Facility: HOSPITAL | Age: 68
End: 2023-08-10

## 2023-08-10 NOTE — PROGRESS NOTES
[address] [facility] coding opportunities       Chart reviewed, no opportunity found: CHART REVIEWED, NO OPPORTUNITY FOUND        Patients Insurance     Medicare Insurance: Medicare

## 2023-08-11 ENCOUNTER — HOSPITAL ENCOUNTER (EMERGENCY)
Facility: HOSPITAL | Age: 68
Discharge: HOME | End: 2023-08-12
Attending: EMERGENCY MEDICINE
Payer: COMMERCIAL

## 2023-08-11 ENCOUNTER — APPOINTMENT (EMERGENCY)
Dept: RADIOLOGY | Facility: HOSPITAL | Age: 68
End: 2023-08-11
Payer: COMMERCIAL

## 2023-08-11 VITALS
DIASTOLIC BLOOD PRESSURE: 59 MMHG | SYSTOLIC BLOOD PRESSURE: 117 MMHG | OXYGEN SATURATION: 95 % | RESPIRATION RATE: 16 BRPM | HEART RATE: 61 BPM | TEMPERATURE: 98.1 F

## 2023-08-11 DIAGNOSIS — M54.9 BACK PAIN: Primary | ICD-10-CM

## 2023-08-11 DIAGNOSIS — K76.0 FATTY (CHANGE OF) LIVER, NOT ELSEWHERE CLASSIFIED: ICD-10-CM

## 2023-08-11 PROCEDURE — 96375 TX/PRO/DX INJ NEW DRUG ADDON: CPT

## 2023-08-11 PROCEDURE — 99284 EMERGENCY DEPT VISIT MOD MDM: CPT

## 2023-08-11 PROCEDURE — 96374 THER/PROPH/DIAG INJ IV PUSH: CPT

## 2023-08-11 PROCEDURE — 99285 EMERGENCY DEPT VISIT HI MDM: CPT | Performed by: EMERGENCY MEDICINE

## 2023-08-11 PROCEDURE — G1004 CDSM NDSC: HCPCS

## 2023-08-11 PROCEDURE — 72131 CT LUMBAR SPINE W/O DYE: CPT

## 2023-08-11 RX ORDER — LIDOCAINE 50 MG/G
1 PATCH TOPICAL ONCE
Status: DISCONTINUED | OUTPATIENT
Start: 2023-08-11 | End: 2023-08-12 | Stop reason: HOSPADM

## 2023-08-11 RX ORDER — KETOROLAC TROMETHAMINE 30 MG/ML
15 INJECTION, SOLUTION INTRAMUSCULAR; INTRAVENOUS ONCE
Status: CANCELLED | OUTPATIENT
Start: 2023-08-11 | End: 2023-08-11

## 2023-08-11 RX ORDER — HYDROMORPHONE HCL/PF 1 MG/ML
0.5 SYRINGE (ML) INJECTION ONCE
Status: COMPLETED | OUTPATIENT
Start: 2023-08-11 | End: 2023-08-11

## 2023-08-11 RX ORDER — ONDANSETRON 2 MG/ML
4 INJECTION INTRAMUSCULAR; INTRAVENOUS ONCE
Status: COMPLETED | OUTPATIENT
Start: 2023-08-11 | End: 2023-08-11

## 2023-08-11 RX ORDER — METHOCARBAMOL 500 MG/1
500 TABLET, FILM COATED ORAL ONCE
Status: CANCELLED | OUTPATIENT
Start: 2023-08-11 | End: 2023-08-11

## 2023-08-11 RX ADMIN — LIDOCAINE 5% 1 PATCH: 700 PATCH TOPICAL at 21:49

## 2023-08-11 RX ADMIN — HYDROMORPHONE HYDROCHLORIDE 0.5 MG: 1 INJECTION, SOLUTION INTRAMUSCULAR; INTRAVENOUS; SUBCUTANEOUS at 21:49

## 2023-08-11 RX ADMIN — ONDANSETRON 4 MG: 2 INJECTION INTRAMUSCULAR; INTRAVENOUS at 22:09

## 2023-08-12 RX ORDER — METHOCARBAMOL 500 MG/1
500 TABLET, FILM COATED ORAL
Qty: 10 TABLET | Refills: 0 | Status: SHIPPED | OUTPATIENT
Start: 2023-08-12
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
## 2023-08-12 NOTE — DISCHARGE INSTRUCTIONS
Your CT showed: 1. Severe fatty liver infiltration. 2. No acute spine findings. Continue to monitor symptoms at home. Please change your diet as advised by your GI. Please follow up with your GI doctor and [facility]. Please return to the Emergency Department if you experience worsening of your current symptoms, weakness of the legs, urinary or bowel retention or incontinence, abdominal pain, or any other concerning symptoms.
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
## 2023-08-14 ENCOUNTER — TELEPHONE (OUTPATIENT)
Dept: PHYSICAL THERAPY | Facility: OTHER | Age: 68
End: 2023-08-14
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
## 2023-08-16 ENCOUNTER — OFFICE VISIT (OUTPATIENT)
Dept: INTERNAL MEDICINE CLINIC | Facility: CLINIC | Age: 68
End: 2023-08-16

## 2023-08-16 VITALS
BODY MASS INDEX: 45.26 KG/M2 | SYSTOLIC BLOOD PRESSURE: 120 MMHG | TEMPERATURE: 97.7 F | DIASTOLIC BLOOD PRESSURE: 74 MMHG | HEART RATE: 80 BPM | WEIGHT: 272 LBS

## 2023-08-16 DIAGNOSIS — Z13.820 ENCOUNTER FOR OSTEOPOROSIS SCREENING IN ASYMPTOMATIC POSTMENOPAUSAL PATIENT: ICD-10-CM

## 2023-08-16 DIAGNOSIS — E66.01 CLASS 3 SEVERE OBESITY DUE TO EXCESS CALORIES WITH SERIOUS COMORBIDITY AND BODY MASS INDEX (BMI) OF 45.0 TO 49.9 IN ADULT (HCC): ICD-10-CM

## 2023-08-16 DIAGNOSIS — E11.9 TYPE 2 DIABETES MELLITUS WITHOUT COMPLICATION, WITHOUT LONG-TERM CURRENT USE OF INSULIN (HCC): Primary | ICD-10-CM

## 2023-08-16 DIAGNOSIS — Z78.0 ENCOUNTER FOR OSTEOPOROSIS SCREENING IN ASYMPTOMATIC POSTMENOPAUSAL PATIENT: ICD-10-CM

## 2023-08-16 DIAGNOSIS — K76.0 FATTY LIVER: ICD-10-CM

## 2023-08-16 DIAGNOSIS — M54.42 ACUTE LEFT-SIDED LOW BACK PAIN WITH LEFT-SIDED SCIATICA: ICD-10-CM

## 2023-08-16 LAB — SL AMB POCT HEMOGLOBIN AIC: 6 (ref ?–6.5)

## 2023-08-16 PROCEDURE — 99214 OFFICE O/P EST MOD 30 MIN: CPT | Performed by: INTERNAL MEDICINE

## 2023-08-16 PROCEDURE — 83036 HEMOGLOBIN GLYCOSYLATED A1C: CPT | Performed by: INTERNAL MEDICINE

## 2023-08-16 RX ORDER — METHYLPREDNISOLONE 4 MG/1
TABLET ORAL
Qty: 21 EACH | Refills: 0 | Status: SHIPPED | OUTPATIENT
Start: 2023-08-16

## 2023-08-16 RX ORDER — GABAPENTIN 100 MG/1
100 CAPSULE ORAL 2 TIMES DAILY
Qty: 60 CAPSULE | Refills: 0 | Status: SHIPPED | OUTPATIENT
Start: 2023-08-16 | End: 2023-09-15
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
## 2023-08-17 ENCOUNTER — TELEPHONE (OUTPATIENT)
Dept: INTERNAL MEDICINE CLINIC | Facility: CLINIC | Age: 68
End: 2023-08-17

## 2023-08-17 ENCOUNTER — OFFICE VISIT (OUTPATIENT)
Dept: INTERNAL MEDICINE CLINIC | Facility: CLINIC | Age: 68
End: 2023-08-17

## 2023-08-17 VITALS
HEIGHT: 65 IN | BODY MASS INDEX: 45.32 KG/M2 | DIASTOLIC BLOOD PRESSURE: 79 MMHG | HEART RATE: 67 BPM | WEIGHT: 272 LBS | OXYGEN SATURATION: 99 % | SYSTOLIC BLOOD PRESSURE: 120 MMHG | TEMPERATURE: 98.2 F

## 2023-08-17 DIAGNOSIS — M54.42 ACUTE BACK PAIN WITH SCIATICA, LEFT: Primary | ICD-10-CM

## 2023-08-17 DIAGNOSIS — E11.9 TYPE 2 DIABETES MELLITUS WITHOUT COMPLICATION, WITHOUT LONG-TERM CURRENT USE OF INSULIN (HCC): ICD-10-CM

## 2023-08-17 PROCEDURE — 99213 OFFICE O/P EST LOW 20 MIN: CPT | Performed by: INTERNAL MEDICINE
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
## 2023-08-17 NOTE — TELEPHONE ENCOUNTER
Folder Color- PURPLE    Name of Form-  Diabetic Statement    Form to be filled out by- Dr. Bridget Lees to be Faxed to [phone]    Patient made aware of 10 business day policy.

## 2023-08-22 ENCOUNTER — APPOINTMENT (OUTPATIENT)
Dept: LAB | Facility: CLINIC | Age: 68
End: 2023-08-22
Payer: MEDICARE

## 2023-08-22 ENCOUNTER — OFFICE VISIT (OUTPATIENT)
Dept: GASTROENTEROLOGY | Facility: CLINIC | Age: 68
End: 2023-08-22
Payer: MEDICARE

## 2023-08-22 VITALS
TEMPERATURE: 96.9 F | DIASTOLIC BLOOD PRESSURE: 84 MMHG | BODY MASS INDEX: 43.78 KG/M2 | WEIGHT: 262.8 LBS | HEIGHT: 65 IN | SYSTOLIC BLOOD PRESSURE: 124 MMHG

## 2023-08-22 DIAGNOSIS — K76.0 FATTY LIVER: ICD-10-CM

## 2023-08-22 DIAGNOSIS — Z11.59 ENCOUNTER FOR SCREENING FOR OTHER VIRAL DISEASES: ICD-10-CM

## 2023-08-22 DIAGNOSIS — R79.89 ABNORMAL LIVER FUNCTION TESTS: ICD-10-CM

## 2023-08-22 DIAGNOSIS — K76.0 NAFLD (NONALCOHOLIC FATTY LIVER DISEASE): Primary | ICD-10-CM

## 2023-08-22 DIAGNOSIS — K76.0 NAFLD (NONALCOHOLIC FATTY LIVER DISEASE): ICD-10-CM

## 2023-08-22 DIAGNOSIS — E66.01 CLASS 3 SEVERE OBESITY DUE TO EXCESS CALORIES WITHOUT SERIOUS COMORBIDITY WITH BODY MASS INDEX (BMI) OF 40.0 TO 44.9 IN ADULT (HCC): ICD-10-CM

## 2023-08-22 LAB
ALBUMIN SERPL BCP-MCNC: 4.5 G/DL (ref 3.5–5)
ALP SERPL-CCNC: 67 U/L (ref 34–104)
ALT SERPL W P-5'-P-CCNC: 21 U/L (ref 7–52)
ANION GAP SERPL CALCULATED.3IONS-SCNC: 13 MMOL/L
AST SERPL W P-5'-P-CCNC: 24 U/L (ref 13–39)
BASOPHILS # BLD AUTO: 0.04 THOUSANDS/ÂΜL (ref 0–0.1)
BASOPHILS NFR BLD AUTO: 0 % (ref 0–1)
BILIRUB SERPL-MCNC: 0.34 MG/DL (ref 0.2–1)
BUN SERPL-MCNC: 21 MG/DL (ref 5–25)
CALCIUM SERPL-MCNC: 9.6 MG/DL (ref 8.4–10.2)
CHLORIDE SERPL-SCNC: 99 MMOL/L (ref 96–108)
CHOLEST SERPL-MCNC: 196 MG/DL
CO2 SERPL-SCNC: 26 MMOL/L (ref 21–32)
CREAT SERPL-MCNC: 0.87 MG/DL (ref 0.6–1.3)
CREAT UR-MCNC: 265 MG/DL
EOSINOPHIL # BLD AUTO: 0.01 THOUSAND/ÂΜL (ref 0–0.61)
EOSINOPHIL NFR BLD AUTO: 0 % (ref 0–6)
ERYTHROCYTE [DISTWIDTH] IN BLOOD BY AUTOMATED COUNT: 13.2 % (ref 11.6–15.1)
FERRITIN SERPL-MCNC: 34 NG/ML (ref 11–307)
GFR SERPL CREATININE-BSD FRML MDRD: 69 ML/MIN/1.73SQ M
GLUCOSE P FAST SERPL-MCNC: 97 MG/DL (ref 65–99)
HAV AB SER QL IA: REACTIVE
HBV SURFACE AB SER-ACNC: <3 MIU/ML
HCT VFR BLD AUTO: 47.3 % (ref 34.8–46.1)
HDLC SERPL-MCNC: 49 MG/DL
HGB BLD-MCNC: 15 G/DL (ref 11.5–15.4)
IMM GRANULOCYTES # BLD AUTO: 0.04 THOUSAND/UL (ref 0–0.2)
IMM GRANULOCYTES NFR BLD AUTO: 0 % (ref 0–2)
INR PPP: 1 (ref 0.84–1.19)
LDLC SERPL CALC-MCNC: 124 MG/DL (ref 0–100)
LYMPHOCYTES # BLD AUTO: 2.26 THOUSANDS/ÂΜL (ref 0.6–4.47)
LYMPHOCYTES NFR BLD AUTO: 20 % (ref 14–44)
MCH RBC QN AUTO: 27.8 PG (ref 26.8–34.3)
MCHC RBC AUTO-ENTMCNC: 31.7 G/DL (ref 31.4–37.4)
MCV RBC AUTO: 88 FL (ref 82–98)
MICROALBUMIN UR-MCNC: 24.8 MG/L (ref 0–20)
MICROALBUMIN/CREAT 24H UR: 9 MG/G CREATININE (ref 0–30)
MONOCYTES # BLD AUTO: 1.03 THOUSAND/ÂΜL (ref 0.17–1.22)
MONOCYTES NFR BLD AUTO: 9 % (ref 4–12)
NEUTROPHILS # BLD AUTO: 8.03 THOUSANDS/ÂΜL (ref 1.85–7.62)
NEUTS SEG NFR BLD AUTO: 71 % (ref 43–75)
NRBC BLD AUTO-RTO: 0 /100 WBCS
PLATELET # BLD AUTO: 292 THOUSANDS/UL (ref 149–390)
PMV BLD AUTO: 12.3 FL (ref 8.9–12.7)
POTASSIUM SERPL-SCNC: 4.6 MMOL/L (ref 3.5–5.3)
PROT SERPL-MCNC: 8.5 G/DL (ref 6.4–8.4)
PROTHROMBIN TIME: 13.4 SECONDS (ref 11.6–14.5)
RBC # BLD AUTO: 5.39 MILLION/UL (ref 3.81–5.12)
SODIUM SERPL-SCNC: 138 MMOL/L (ref 135–147)
TRIGL SERPL-MCNC: 113 MG/DL
WBC # BLD AUTO: 11.41 THOUSAND/UL (ref 4.31–10.16)

## 2023-08-22 PROCEDURE — 80053 COMPREHEN METABOLIC PANEL: CPT

## 2023-08-22 PROCEDURE — 83550 IRON BINDING TEST: CPT

## 2023-08-22 PROCEDURE — 85610 PROTHROMBIN TIME: CPT

## 2023-08-22 PROCEDURE — 82728 ASSAY OF FERRITIN: CPT

## 2023-08-22 PROCEDURE — 82104 ALPHA-1-ANTITRYPSIN PHENO: CPT

## 2023-08-22 PROCEDURE — 85025 COMPLETE CBC W/AUTO DIFF WBC: CPT

## 2023-08-22 PROCEDURE — 83540 ASSAY OF IRON: CPT

## 2023-08-22 PROCEDURE — 82103 ALPHA-1-ANTITRYPSIN TOTAL: CPT

## 2023-08-22 PROCEDURE — 86708 HEPATITIS A ANTIBODY: CPT

## 2023-08-22 PROCEDURE — 99204 OFFICE O/P NEW MOD 45 MIN: CPT | Performed by: STUDENT IN AN ORGANIZED HEALTH CARE EDUCATION/TRAINING PROGRAM

## 2023-08-22 PROCEDURE — 80061 LIPID PANEL: CPT

## 2023-08-22 PROCEDURE — 36415 COLL VENOUS BLD VENIPUNCTURE: CPT

## 2023-08-22 PROCEDURE — 86706 HEP B SURFACE ANTIBODY: CPT

## 2023-08-22 NOTE — PATIENT INSTRUCTIONS
For patients with fatty liver disease, weight loss through diet and exercise is recommended. To MAINTAIN weight you must use up at least as many calories as you take in. To LOSE weight you must use up more calories than you take in. Start by knowing how many calories you should be eating and drinking to maintain your weight. Nutrition and calorie information on food labels is typically based on a 2,000 calorie diet. You may need fewer or more calories depending on several factors including age, gender, and level of physical activity. Increase the amount and intensity of your physical activity to match the number of calories you take in. Aim for at least 150 minutes of moderate physical activity or 75 minutes of vigorous physical activity - or an equal combination of both - each week. Regular physical activity can help you maintain your weight, keep off weight that you lose and help you reach physical and cardiovascular fitness. If it's hard to schedule regular exercise sessions, try aiming for sessions of at least 10 minutes spread throughout the week. As you make daily food choices, base your eating pattern on these recommendations:   Eat a variety of fresh, frozen and canned vegetables and fruits without high-calorie sauces or added salt and sugars. Replace high-calorie foods with fruits and vegetables. Choose fiber-rich whole grains for most grain servings. Choose poultry and fish without skin and prepare them in healthy ways without added saturated and trans fat. If you choose to eat meat, look for the leanest cuts available and prepare them in healthy and delicious ways. Eat a variety of fish at least twice a week, especially fish containing omega-3 fatty acids (for example, salmon, trout and herring). Select fat-free (skim) and low-fat (1%) dairy products. Avoid foods containing partially hydrogenated vegetable oils to reduce trans fat in your diet.    Limit saturated fat and trans fat and replace them with the better fats, monounsaturated and polyunsaturated. If you need to lower your blood cholesterol, reduce saturated fat to no more than 5 to 6 percent of total calories. For someone eating 2,000 calories a day, that's about 13 grams of saturated fat. Cut back on beverages and foods with added sugars. Choose foods with less sodium and prepare foods with little or no salt. To prevent fluid problems in the legs and abdomen aim to eat no more than 2,000 milligrams of sodium per day. If you can't meet these goals right now, even reducing sodium intake by 1,000 mg per day can benefit blood pressure. If you drink alcohol, drink in moderation. That means no more than one drink per day if you're a woman and no more than two drinks per day if you're a man. Please note that the effect of alcohol on NAFLD is unclear. Some studies show that moderate alcohol use may be beneficial, but others show no benefit and even harm. Discuss specific recommendations with your hepatologist.   Studies have shown that drinking up to 4 cups of caffeinated coffee per day may reduce progression of liver disease and development of liver cancer. If you have cirrhosis of the liver avoid raw shellfish such as oysters and clams. These can carry a specific bacteria that can be very harmful in liver disease    For more information on Fatty Liver Disease and Prevention please see this video from the [address] [place]: [employer].cy     For more information on healthy eating, please visit the American Heart Association website: TicketTuesday.uy. jsp     For tips on physical activity, please visit the American Heart Association website:   ConstitutionJournal.co.uk     If you are looking for additional local support, education or are ready to take action to end liver disease, contact the [employer]. The detention is the nation's leading nonprofit focusing on providing high-quality education and support services for the prevention, treatment and cure of over 100 liver diseases. You can learn more by visiting https://jj.net/. org/midatlantic/

## 2023-08-22 NOTE — TELEPHONE ENCOUNTER
Form on Becki's desk.  Need to have Dr. Dylan Sauceda remove his attestation so Dr. Ravindra Shaver can add to her note
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
## 2023-08-23 ENCOUNTER — CLINICAL SUPPORT (OUTPATIENT)
Dept: NUTRITION | Facility: HOSPITAL | Age: 68
End: 2023-08-23
Payer: MEDICARE

## 2023-08-23 ENCOUNTER — TELEPHONE (OUTPATIENT)
Dept: INTERNAL MEDICINE CLINIC | Facility: CLINIC | Age: 68
End: 2023-08-23

## 2023-08-23 VITALS — BODY MASS INDEX: 43.43 KG/M2 | HEIGHT: 65 IN | WEIGHT: 260.7 LBS

## 2023-08-23 DIAGNOSIS — E11.9 TYPE 2 DIABETES MELLITUS WITHOUT COMPLICATION, WITHOUT LONG-TERM CURRENT USE OF INSULIN (HCC): Primary | ICD-10-CM

## 2023-08-23 DIAGNOSIS — E66.01 CLASS 3 SEVERE OBESITY DUE TO EXCESS CALORIES WITH SERIOUS COMORBIDITY AND BODY MASS INDEX (BMI) OF 45.0 TO 49.9 IN ADULT (HCC): ICD-10-CM

## 2023-08-23 LAB
A1AT SERPL-MCNC: 114 MG/DL (ref 101–187)
IRON SATN MFR SERPL: 12 % (ref 15–50)
IRON SERPL-MCNC: 46 UG/DL (ref 50–212)
TIBC SERPL-MCNC: 391 UG/DL (ref 250–450)
UIBC SERPL-MCNC: 345 UG/DL (ref 155–355)

## 2023-08-23 PROCEDURE — 97802 MEDICAL NUTRITION INDIV IN: CPT

## 2023-08-23 NOTE — TELEPHONE ENCOUNTER
Received call from Ruby Salazar ([address] requesting a nutrition referral for diabetes. Referral that was placed is for obesity. Patient appointment is today, 8/23.

## 2023-08-23 NOTE — PROGRESS NOTES
Diabetic foot exam performed during this appointment. Documented below. I am treating this patient under a comprehensive plan of care for her diabetes. This patient needs special shoes (depth or custom-molded shoes) because of her diabetes.

## 2023-08-23 NOTE — PROGRESS NOTES
Nutrition Assessment Form    Patient Name: Ricci Romero    YOB: 1955    Sex: Female     Assessment Date: 2023  Start Time: 8:45am Stop Time: 9:45am Total Minutes: 60 mins     Data:  Present at session: self   Parent/Patient Concerns/reason for visit: Stated "I would like to eat healthy to be healthy to take care of my family"   Medical Dx/Reason for Referral: E11.9 DM2, E66.01 obesity, Z68.42 BMI > 40   Past Medical History:   Diagnosis Date   • Arthritis    • Depression    • Disease of thyroid gland     hypo   • Edema     LAST ASSESSED: [id number]   • GERD (gastroesophageal reflux disease)    • Hypercholesterolemia    • Hyperlipidemia    • Hypertension     WELL CONTROLLED. CURRENTLY ON LISINOPRIL. LAST ASSESSED: [id number]   • Other headache syndrome    • Other muscle spasm     LAST ASSESSED: [id number]   • Ovarian cyst    • Renal calculi    •  (spontaneous vaginal delivery)     FEMALE       Current Outpatient Medications   Medication Sig Dispense Refill   • acetaminophen (Tylenol 8 Hour Arthritis Pain) 650 mg CR tablet Take 1 tablet (650 mg total) by mouth every 8 (eight) hours as needed for mild pain 90 tablet 3   • aspirin (ECOTRIN LOW STRENGTH) 81 mg EC tablet Take 1 tablet (81 mg total) by mouth daily 90 tablet 3   • atorvastatin (LIPITOR) 10 mg tablet TAKE 1 TABLET BY MOUTH DAILY 90 tablet 3   • calcium carbonate (TUMS) 500 mg chewable tablet Chew as needed     • Cholecalciferol (Vitamin D High Potency) 25 MCG (1000 UT) capsule Take 1 capsule (1,000 Units total) by mouth daily 90 capsule 1   • ciclopirox (PENLAC) 8 % solution APPLY TOPICALLY DAILY AT BEDTIME 6.6 mL 0   • clotrimazole (LOTRIMIN) 1 % cream Apply topically 2 (two) times a day 30 g 0   • gabapentin (Neurontin) 100 mg capsule Take 1 capsule (100 mg total) by mouth 2 (two) times a day 60 capsule 0   • levothyroxine (Synthroid) 88 mcg tablet Take 1 tablet (88 mcg total) by mouth daily Take 1 tablet Monday - Friday. 90 tablet 3   • levothyroxine 100 mcg tablet TAKE ONE TABLET BY MOUTH TWO TIMES A WEEK 24 tablet 0   • lisinopril (ZESTRIL) 20 mg tablet TAKE 1 TABLET (20 MG TOTAL) BY MOUTH DAILY 90 tablet 2   • loratadine (CLARITIN) 10 mg tablet TAKE 1 TABLET BY MOUTH DAILY AS NEEDED FOR ALLERGIES 90 tablet 11   • methocarbamol (ROBAXIN) 500 mg tablet Take 1 tablet (500 mg total) by mouth daily at bedtime as needed for muscle spasms 10 tablet 0   • methylPREDNISolone 4 MG tablet therapy pack Use as directed on package 21 each 0   • omeprazole (PriLOSEC) 20 mg delayed release capsule TAKE 1 CAPSULE (20 MG TOTAL) BY MOUTH DAILY 90 capsule 3   • polyethylene glycol (COLYTE) 4000 mL solution Take 4,000 mL by mouth once for 1 dose 4000 mL 0     No current facility-administered medications for this visit. Additional Meds/Supplements:    Special Learning Needs/barriers to learning/any new barriers    Height: HC Readings from Last 5 Encounters:   No data found for [facility].      Weight: Wt Readings from Last 10 Encounters:   08/23/23 118 kg (260 lb 11.2 oz)   08/22/23 119 kg (262 lb 12.8 oz)   08/17/23 123 kg (272 lb)   08/16/23 123 kg (272 lb)   07/03/23 126 kg (277 lb 3.2 oz)   03/06/23 125 kg (276 lb)   12/28/22 127 kg (279 lb)   11/22/22 130 kg (287 lb 3.2 oz)   10/26/22 124 kg (273 lb)   09/20/22 129 kg (285 lb 6.4 oz)     Estimated body mass index is 43.38 kg/m² as calculated from the following:    Height as of this encounter: 5' 5" (1.651 m). Weight as of this encounter: 118 kg (260 lb 11.2 oz). Recent Weight Change: [x]Yes     []No  Amount: 17lbs/6% wt.  Loss x 1 month with diet recent diet changes      Energy Needs: [place]- StSusanna Choudhary Equation:     Allergies   Allergen Reactions   • Tagamet [Cimetidine] Hives    or intolerances    Social History     Substance and Sexual Activity   Alcohol Use Never    _______x/wk or month  1 or 2 or 3 or 4 or____ drinks/session   Mixed drinks/ wine/ beer    No alcohol intake   Social History Tobacco Use   Smoking Status Former   Smokeless Tobacco Never   Tobacco Comments    pt "quit about 20 years ago"       Who shops? patient and spouse   Who cooks/cooking methods/Eating out/take out habits   patient  Cooking methods: bake/flood/air flood/grill/boil/other________    Take out: __1-2_ x/wk    Dining out ____ x/wk or month   Exercise: Just started walking 20-30 mins 5 x week, prior was sedentary   Other: ie: Sleep habits/ stress level/ work habits household-lives with ?/ food security Was having stress with son but he is now in rehab, had trouble sleeping prior but since he left she is sleeping better, goes to bed around 10pm and wakes 5-6am    Prior Nutritional Counseling? []Yes     [x]No  When:      Why:         Diet Hx:  Breakfast: Recently started to make changes past week now started eating boiled 1 egg, 1 slice of higher fiber bread, friut, coffee prior was eating ham and cheese sandwich or with crackers, juice or corn flakes with milk   Lunch:   Eats mainly vegetables and fruits, swiss chard, spinach, kale, lettuce, potatoes, plantains, pumpkin, rice, pasta, sometimes chicken but not often, doesn't like meats  Recently cut back on portions    Dinner:   Fish, tuna, nuts, variety of vegetables, but recently stopped frying foods, is baking or sauteing. Drinks creme soda   Snacks: AM - fruit  PM -   HS - Pizza, chips, cookies, cake, candy, but now stopped past week   Other Notes/ Initial Assessment:    Derrek Magana presents today for nutrition counseling. Derrek Magana would like to lose weight and learn to eat healthy. Discussed the plate method of portioning foods, including half a plate fruits and vegetables or a half plate all vegetables, 1/4 of the plate a lean protein source or meat, and a 1/4 of the plate being a whole grain carb- usually 1/2-1c. This should be followed for at least 2 meals of the day, but could also be followed for all 3. Reviewed Healthy portions book.  Reviewed CHO and  Non CHO foods, encouraged lean protein, healthy fats, high fiber. Reviewed CHO servings, how to read a food label, CHO counting. Updated assessment (Follow up note only):           Nutrition Diagnosis:   Nutrition knowledge deficit r/t no prior diet education as evidence by PT interview       Any change or new dx since previous visit:     Nutrition Diagnosis:         Medical Nutrition Therapy Intervention:  [x]Individualized Meal Plan-Consistent CHO level 1, no more than 4 CHO servings per meal, cardiac diet/low fat/low sodium [x]Understanding Lab Values-Reviewed HbA1c, lipid profile   []Basic Pathophysiology of Disease []Food/Medication Interactions   []Food Diary [x]Exercise- 30mins physical activity 5 x week   []Lifestyle/Behavior Modification Techniques []Medication, Mechanism of Action   []Label Reading: CHO/ Na/ Fat/ other_________ []Self Blood Glucose Monitoring   [x]Weight/BMI Goals: gain/lose/maintain -2-4lbs wt. Loss per month []Other -           Comprehension: []Excellent  []Very Good  [x]Good  []Fair   []Poor    Receptivity: []Excellent  []Very Good  [x]Good  []Fair   []Poor    Expected Compliance: []Excellent  []Very Good  [x]Good  []Fair   []Poor        Goals (initial)/ Progress made on previous goals/new goals:  1. Avoid concentrated sweets and sweetened beverages by f/u   2. Limit to 4 CHO servings per meal by f/u   3. Consume 3 balanced meals using MyPlate method, aiming within 1500 calories per day by f/u  4. Participate in 30 mins of physical activity and 1-2 days add weight bearing exercise for 10-15 mins by f/u  5. Limit saturated fats such as butter use, consume healthier fat options such olive oil/canola, or smart balance versus butter by f/u       No follow-ups on file.   Labs:  CMP  Lab Results   Component Value Date     12/28/2015    K 4.6 08/22/2023    CL 99 08/22/2023    CO2 26 08/22/2023    ANIONGAP 11 12/28/2015    BUN 21 08/22/2023    CREATININE 0.87 08/22/2023    GLUCOSE 115 12/28/2015 GLUF 97 08/22/2023    CALCIUM 9.6 08/22/2023    CORRECTEDCA  10/26/2017      Comment:      Collected on wrong patient    AST 24 08/22/2023    ALT 21 08/22/2023    ALKPHOS 67 08/22/2023    PROT 7.4 12/28/2015    BILITOT 0.42 12/28/2015    EGFR 69 08/22/2023       BMP  Lab Results   Component Value Date    GLUCOSE 115 12/28/2015    CALCIUM 9.6 08/22/2023     12/28/2015    K 4.6 08/22/2023    CO2 26 08/22/2023    CL 99 08/22/2023    BUN 21 08/22/2023    CREATININE 0.87 08/22/2023       Lipids  Lab Results   Component Value Date    CHOL 187 12/28/2015    CHOL 177 05/13/2015    CHOL 187 11/24/2014     Lab Results   Component Value Date    HDL 49 (L) 08/22/2023    HDL 50 01/09/2021    HDL 46 02/22/2020     Lab Results   Component Value Date    LDLCALC 124 (H) 08/22/2023    LDLCALC 88 01/09/2021    LDLCALC 117 (H) 02/22/2020     Lab Results   Component Value Date    TRIG 113 08/22/2023    TRIG 109 01/09/2021    TRIG 137 02/22/2020     No results found for: "CHOLHDL"    Hemoglobin A1C  Lab Results   Component Value Date    HGBA1C 6.0 08/16/2023       Fasting Glucose  Lab Results   Component Value Date    GLUF 97 08/22/2023       Insulin     Thyroid  Lab Results   Component Value Date    M1EJPJZ 9.9 09/29/2018       Hepatic Function Panel  Lab Results   Component Value Date    ALT 21 08/22/2023    AST 24 08/22/2023    ALKPHOS 67 08/22/2023    BILITOT 0.42 12/28/2015       Celiac Disease Antibody Panel  No results found for: "ENDOMYSIAL IGA", "GLIADIN IGA", "GLIADIN IGG", "IGA", "TISSUE TRANSGLUT AB", "TTG IGA"   Iron  Lab Results   Component Value Date    IRON 46 (L) 08/22/2023    TIBC 391 08/22/2023    FERRITIN 34 08/22/2023            Ofelia Mercado RD  [address]. [address] [place] [id number]

## 2023-08-24 NOTE — TELEPHONE ENCOUNTER
Dr. Russell Brenner reviewed form and patient does not have a diagnosis that will qualify patient to be approved. Form will be discarded. Please inform patient she does not qualify and this will not be completed.

## 2023-08-25 ENCOUNTER — HOSPITAL ENCOUNTER (OUTPATIENT)
Dept: RADIOLOGY | Facility: HOSPITAL | Age: 68
Discharge: HOME | End: 2023-08-25
Payer: MEDICARE

## 2023-08-25 DIAGNOSIS — K21.9 GASTROESOPHAGEAL REFLUX DISEASE WITHOUT ESOPHAGITIS: ICD-10-CM

## 2023-08-25 LAB
A1AT PHENOTYP SERPL IFE: NORMAL
A1AT SERPL-MCNC: 114 MG/DL (ref 101–187)

## 2023-08-25 PROCEDURE — 74240 X-RAY XM UPR GI TRC 1CNTRST: CPT

## 2023-08-25 NOTE — TELEPHONE ENCOUNTER
Called patient - read the task to the patient, understood and she is ok with the decision. No questions at this time.

## 2023-08-29 ENCOUNTER — HOSPITAL ENCOUNTER (OUTPATIENT)
Dept: RADIOLOGY | Age: 68
Discharge: HOME | End: 2023-08-29
Payer: MEDICARE

## 2023-08-29 DIAGNOSIS — Z12.31 BREAST CANCER SCREENING BY MAMMOGRAM: ICD-10-CM

## 2023-08-29 DIAGNOSIS — Z12.31 ENCOUNTER FOR SCREENING MAMMOGRAM FOR MALIGNANT NEOPLASM OF BREAST: ICD-10-CM

## 2023-08-29 PROCEDURE — 77067 SCR MAMMO BI INCL CAD: CPT

## 2023-08-29 PROCEDURE — 77063 BREAST TOMOSYNTHESIS BI: CPT

## 2023-08-31 ENCOUNTER — HOSPITAL ENCOUNTER (OUTPATIENT)
Dept: RADIOLOGY | Facility: HOSPITAL | Age: 68
Discharge: HOME | End: 2023-08-31
Attending: STUDENT IN AN ORGANIZED HEALTH CARE EDUCATION/TRAINING PROGRAM
Payer: MEDICARE

## 2023-08-31 DIAGNOSIS — R79.89 ABNORMAL LIVER FUNCTION TESTS: ICD-10-CM

## 2023-08-31 DIAGNOSIS — K76.0 NAFLD (NONALCOHOLIC FATTY LIVER DISEASE): ICD-10-CM

## 2023-08-31 DIAGNOSIS — Z12.11 COLON CANCER SCREENING: Primary | ICD-10-CM

## 2023-08-31 PROCEDURE — 76700 US EXAM ABDOM COMPLETE: CPT

## 2023-08-31 PROCEDURE — 76981 USE PARENCHYMA: CPT

## 2023-08-31 NOTE — TELEPHONE ENCOUNTER
Scheduled date of colonoscopy (as of today):10/26/2023  Physician performing colonoscopy:JEREMY  Location of colonoscopy:[facility]  Bowel prep reviewed with patient: Golytely / Dulcolax  Instructions reviewed with patient by: Marion and sent in the mail  Clearances: NONE    Patient originally gave up earlier appointment to her brother and now this is a reschedule for her . Ordered prep again and sent directions in the mail. Please follow directions sent to you by the office.   your prep ASAP

## 2023-09-06 DIAGNOSIS — I67.1 ANEURYSM OF INTERNAL CAROTID ARTERY: ICD-10-CM

## 2023-09-06 RX ORDER — ASPIRIN 81 MG/1
81 TABLET, COATED ORAL DAILY
Qty: 90 TABLET | Refills: 3 | Status: SHIPPED | OUTPATIENT
Start: 2023-09-06

## 2023-09-07 ENCOUNTER — TELEPHONE (OUTPATIENT)
Age: 68
End: 2023-09-07

## 2023-09-07 NOTE — TELEPHONE ENCOUNTER
Pt has an appt with Cherri on 11.20.23 but is confused because she said she was told by the Dr she had an appt for 11.22.23. She is asking to speak with someone from the office to confirm there was not anything schedule for her to do on 11.22.23.   Please call when possible [phone]

## 2023-09-08 ENCOUNTER — TELEPHONE (OUTPATIENT)
Dept: GASTROENTEROLOGY | Facility: CLINIC | Age: 68
End: 2023-09-08

## 2023-09-08 ENCOUNTER — RA CDI HCC (OUTPATIENT)
Dept: OTHER | Facility: HOSPITAL | Age: 68
End: 2023-09-08

## 2023-09-08 DIAGNOSIS — K76.0 NAFLD (NONALCOHOLIC FATTY LIVER DISEASE): Primary | ICD-10-CM

## 2023-09-08 DIAGNOSIS — K74.02 ADVANCED HEPATIC FIBROSIS: ICD-10-CM

## 2023-09-08 NOTE — PROGRESS NOTES
Reviewed ultrasound elastography results with patient which showed hepatic steatosis. Her elastography showed F3 fibrosis but there was significant variability in her measurements with 64.7%. I stated that this was not a valid test and reviewed options for next steps: repeating US elastography vs. MRE. She would like to repeat US elastography but would like to schedule an office visit to discuss in person with . Lamonte Vela, can you please contact the patient to schedule an appointment with me?

## 2023-09-08 NOTE — PROGRESS NOTES
[address] [facility] coding opportunities       Chart reviewed, no opportunity found: CHART REVIEWED, NO OPPORTUNITY FOUND        Patients Insurance     Medicare Insurance: Medicare

## 2023-09-14 ENCOUNTER — OFFICE VISIT (OUTPATIENT)
Dept: INTERNAL MEDICINE CLINIC | Facility: CLINIC | Age: 68
End: 2023-09-14

## 2023-09-14 VITALS
DIASTOLIC BLOOD PRESSURE: 66 MMHG | TEMPERATURE: 98.1 F | SYSTOLIC BLOOD PRESSURE: 97 MMHG | WEIGHT: 261 LBS | HEART RATE: 70 BPM | BODY MASS INDEX: 43.49 KG/M2 | HEIGHT: 65 IN

## 2023-09-14 DIAGNOSIS — M54.42 ACUTE BACK PAIN WITH SCIATICA, LEFT: Primary | ICD-10-CM

## 2023-09-14 PROBLEM — Z12.4 CERVICAL CANCER SCREENING: Status: RESOLVED | Noted: 2018-02-26 | Resolved: 2023-09-14

## 2023-09-14 PROBLEM — T88.4XXA DIFFICULT INTUBATION: Status: RESOLVED | Noted: 2021-01-08 | Resolved: 2023-09-14

## 2023-09-14 PROBLEM — Z00.00 HEALTHCARE MAINTENANCE: Status: RESOLVED | Noted: 2018-11-11 | Resolved: 2023-09-14

## 2023-09-14 PROBLEM — R07.9 CHEST PAIN: Status: RESOLVED | Noted: 2018-05-17 | Resolved: 2023-09-14

## 2023-09-14 PROBLEM — J32.9 SINUSITIS: Status: RESOLVED | Noted: 2018-11-12 | Resolved: 2023-09-14

## 2023-09-14 PROBLEM — I95.1 ORTHOSTATIC HYPOTENSION: Status: RESOLVED | Noted: 2020-02-25 | Resolved: 2023-09-14

## 2023-09-14 PROCEDURE — 99214 OFFICE O/P EST MOD 30 MIN: CPT | Performed by: PHYSICIAN ASSISTANT

## 2023-09-14 RX ORDER — NAPROXEN 500 MG/1
500 TABLET ORAL 2 TIMES DAILY WITH MEALS
Qty: 60 TABLET | Refills: 0 | Status: SHIPPED | OUTPATIENT
Start: 2023-09-14

## 2023-09-15 ENCOUNTER — PREP FOR PROCEDURE (OUTPATIENT)
Dept: GASTROENTEROLOGY | Facility: CLINIC | Age: 68
End: 2023-09-15

## 2023-09-15 ENCOUNTER — OFFICE VISIT (OUTPATIENT)
Dept: GASTROENTEROLOGY | Facility: CLINIC | Age: 68
End: 2023-09-15
Payer: MEDICARE

## 2023-09-15 VITALS
SYSTOLIC BLOOD PRESSURE: 102 MMHG | HEIGHT: 65 IN | BODY MASS INDEX: 43.45 KG/M2 | DIASTOLIC BLOOD PRESSURE: 68 MMHG | WEIGHT: 260.8 LBS | TEMPERATURE: 96.6 F

## 2023-09-15 DIAGNOSIS — E61.1 IRON DEFICIENCY: ICD-10-CM

## 2023-09-15 DIAGNOSIS — K76.0 NAFLD (NONALCOHOLIC FATTY LIVER DISEASE): Primary | ICD-10-CM

## 2023-09-15 DIAGNOSIS — K74.02 ADVANCED HEPATIC FIBROSIS: ICD-10-CM

## 2023-09-15 PROCEDURE — 99214 OFFICE O/P EST MOD 30 MIN: CPT | Performed by: STUDENT IN AN ORGANIZED HEALTH CARE EDUCATION/TRAINING PROGRAM

## 2023-09-15 NOTE — PATIENT INSTRUCTIONS
For patients with fatty liver disease, weight loss through diet and exercise is recommended. To MAINTAIN weight you must use up at least as many calories as you take in. To LOSE weight you must use up more calories than you take in. Start by knowing how many calories you should be eating and drinking to maintain your weight. Nutrition and calorie information on food labels is typically based on a 2,000 calorie diet. You may need fewer or more calories depending on several factors including age, gender, and level of physical activity. Increase the amount and intensity of your physical activity to match the number of calories you take in. Aim for at least 150 minutes of moderate physical activity or 75 minutes of vigorous physical activity - or an equal combination of both - each week. Regular physical activity can help you maintain your weight, keep off weight that you lose and help you reach physical and cardiovascular fitness. If it's hard to schedule regular exercise sessions, try aiming for sessions of at least 10 minutes spread throughout the week. As you make daily food choices, base your eating pattern on these recommendations:   Eat a variety of fresh, frozen and canned vegetables and fruits without high-calorie sauces or added salt and sugars. Replace high-calorie foods with fruits and vegetables. Choose fiber-rich whole grains for most grain servings. Choose poultry and fish without skin and prepare them in healthy ways without added saturated and trans fat. If you choose to eat meat, look for the leanest cuts available and prepare them in healthy and delicious ways. Eat a variety of fish at least twice a week, especially fish containing omega-3 fatty acids (for example, salmon, trout and herring). Select fat-free (skim) and low-fat (1%) dairy products. Avoid foods containing partially hydrogenated vegetable oils to reduce trans fat in your diet.    Limit saturated fat and trans fat and replace them with the better fats, monounsaturated and polyunsaturated. If you need to lower your blood cholesterol, reduce saturated fat to no more than 5 to 6 percent of total calories. For someone eating 2,000 calories a day, that's about 13 grams of saturated fat. Cut back on beverages and foods with added sugars. Choose foods with less sodium and prepare foods with little or no salt. To prevent fluid problems in the legs and abdomen aim to eat no more than 2,000 milligrams of sodium per day. If you can't meet these goals right now, even reducing sodium intake by 1,000 mg per day can benefit blood pressure. If you drink alcohol, drink in moderation. That means no more than one drink per day if you're a woman and no more than two drinks per day if you're a man. Please note that the effect of alcohol on NAFLD is unclear. Some studies show that moderate alcohol use may be beneficial, but others show no benefit and even harm. Discuss specific recommendations with your hepatologist.   Studies have shown that drinking up to 4 cups of caffeinated coffee per day may reduce progression of liver disease and development of liver cancer. If you have cirrhosis of the liver avoid raw shellfish such as oysters and clams. These can carry a specific bacteria that can be very harmful in liver disease    For more information on Fatty Liver Disease and Prevention please see this video from the [address]: RFinity.cy     For more information on healthy eating, please visit the American Heart Association website: TicketTuesday.uy. jsp     For tips on physical activity, please visit the American Heart Association website:   ConstitutionJournal.co.uk     If you are looking for additional local support, education or are ready to take action to end liver disease, contact the [employer]. The [employer] is the nation's leading nonprofit focusing on providing high-quality education and support services for the prevention, treatment and cure of over 100 liver diseases. You can learn more by visiting https://jj.net/. org/[place]/
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
## 2023-09-18 ENCOUNTER — HOSPITAL ENCOUNTER (OUTPATIENT)
Dept: RADIOLOGY | Facility: HOSPITAL | Age: 68
Discharge: HOME | End: 2023-09-18
Attending: STUDENT IN AN ORGANIZED HEALTH CARE EDUCATION/TRAINING PROGRAM
Payer: MEDICARE

## 2023-09-18 DIAGNOSIS — K76.0 NAFLD (NONALCOHOLIC FATTY LIVER DISEASE): ICD-10-CM

## 2023-09-18 DIAGNOSIS — K74.02 ADVANCED HEPATIC FIBROSIS: ICD-10-CM

## 2023-09-18 PROBLEM — M54.42 ACUTE BACK PAIN WITH SCIATICA, LEFT: Status: ACTIVE | Noted: 2023-09-18

## 2023-09-18 PROBLEM — J30.9 ALLERGIC RHINITIS: Status: ACTIVE | Noted: 2017-02-15

## 2023-09-18 PROBLEM — E78.5 DYSLIPIDEMIA: Status: ACTIVE | Noted: 2019-09-04

## 2023-09-18 PROCEDURE — 76981 USE PARENCHYMA: CPT
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
## 2023-09-21 ENCOUNTER — ANESTHESIA EVENT (OUTPATIENT)
Dept: GASTROENTEROLOGY | Facility: HOSPITAL | Age: 68
End: 2023-09-21

## 2023-09-21 ENCOUNTER — ANESTHESIA (OUTPATIENT)
Dept: GASTROENTEROLOGY | Facility: HOSPITAL | Age: 68
End: 2023-09-21

## 2023-09-21 ENCOUNTER — HOSPITAL ENCOUNTER (OUTPATIENT)
Dept: GASTROENTEROLOGY | Facility: HOSPITAL | Age: 68
Setting detail: OUTPATIENT SURGERY
End: 2023-09-21
Attending: INTERNAL MEDICINE
Payer: MEDICARE

## 2023-09-21 VITALS
WEIGHT: 253 LBS | DIASTOLIC BLOOD PRESSURE: 55 MMHG | RESPIRATION RATE: 18 BRPM | OXYGEN SATURATION: 93 % | HEIGHT: 65 IN | TEMPERATURE: 96.4 F | BODY MASS INDEX: 42.15 KG/M2 | HEART RATE: 69 BPM | SYSTOLIC BLOOD PRESSURE: 106 MMHG

## 2023-09-21 DIAGNOSIS — Z12.11 COLON CANCER SCREENING: ICD-10-CM

## 2023-09-21 DIAGNOSIS — E61.1 IRON DEFICIENCY: ICD-10-CM

## 2023-09-21 PROCEDURE — 88305 TISSUE EXAM BY PATHOLOGIST: CPT | Performed by: STUDENT IN AN ORGANIZED HEALTH CARE EDUCATION/TRAINING PROGRAM

## 2023-09-21 PROCEDURE — 88305 TISSUE EXAM BY PATHOLOGIST: CPT | Performed by: PATHOLOGY

## 2023-09-21 RX ORDER — PHENYLEPHRINE HCL IN 0.9% NACL 1 MG/10 ML
SYRINGE (ML) INTRAVENOUS AS NEEDED
Status: DISCONTINUED | OUTPATIENT
Start: 2023-09-21 | End: 2023-09-21

## 2023-09-21 RX ORDER — PROPOFOL 10 MG/ML
INJECTION, EMULSION INTRAVENOUS AS NEEDED
Status: DISCONTINUED | OUTPATIENT
Start: 2023-09-21 | End: 2023-09-21

## 2023-09-21 RX ORDER — LIDOCAINE HYDROCHLORIDE 20 MG/ML
INJECTION, SOLUTION EPIDURAL; INFILTRATION; INTRACAUDAL; PERINEURAL AS NEEDED
Status: DISCONTINUED | OUTPATIENT
Start: 2023-09-21 | End: 2023-09-21

## 2023-09-21 RX ORDER — SODIUM CHLORIDE 9 MG/ML
INJECTION, SOLUTION INTRAVENOUS CONTINUOUS PRN
Status: DISCONTINUED | OUTPATIENT
Start: 2023-09-21 | End: 2023-09-21

## 2023-09-21 RX ADMIN — PROPOFOL 30 MG: 10 INJECTION, EMULSION INTRAVENOUS at 09:45

## 2023-09-21 RX ADMIN — PROPOFOL 100 MCG/KG/MIN: 10 INJECTION, EMULSION INTRAVENOUS at 09:20

## 2023-09-21 RX ADMIN — PROPOFOL 80 MG: 10 INJECTION, EMULSION INTRAVENOUS at 09:19

## 2023-09-21 RX ADMIN — PROPOFOL 20 MG: 10 INJECTION, EMULSION INTRAVENOUS at 09:21

## 2023-09-21 RX ADMIN — SODIUM CHLORIDE: 0.9 INJECTION, SOLUTION INTRAVENOUS at 09:05

## 2023-09-21 RX ADMIN — PROPOFOL 20 MG: 10 INJECTION, EMULSION INTRAVENOUS at 09:48

## 2023-09-21 RX ADMIN — Medication 200 MCG: at 09:32

## 2023-09-21 RX ADMIN — LIDOCAINE HYDROCHLORIDE 100 MG: 20 INJECTION, SOLUTION EPIDURAL; INFILTRATION; INTRACAUDAL; PERINEURAL at 09:19

## 2023-09-21 RX ADMIN — PROPOFOL 30 MG: 10 INJECTION, EMULSION INTRAVENOUS at 09:24

## 2023-09-21 NOTE — ANESTHESIA PREPROCEDURE EVALUATION
Procedure:  COLONOSCOPY    Relevant Problems   CARDIO   (+) Cerebral aneurysm   (+) Chronic migraine without aura without status migrainosus, not intractable   (+) Hypertension      ENDO   (+) Hypothyroidism   (+) Type 2 diabetes mellitus without complication (HCC)      GI/HEPATIC   (+) GERD (gastroesophageal reflux disease)      MUSCULOSKELETAL   (+) Acute back pain with sciatica, left   (+) Cervical spondylosis without myelopathy   (+) Degenerative cervical disc      NEURO/PSYCH   (+) Chronic migraine without aura without status migrainosus, not intractable   (+) Other headache syndrome      PULMONARY   (+) Obstructive sleep apnea      Nervous and Auditory   (+) Mild cognitive impairment      Other   (+) Dyslipidemia        Physical Exam    Airway    Mallampati score: II  TM Distance: >3 FB  Neck ROM: full     Dental   Comment: None loose,     Cardiovascular      Pulmonary      Other Findings        Anesthesia Plan  ASA Score- 3     Anesthesia Type- IV sedation with anesthesia with ASA Monitors. Additional Monitors:   Airway Plan:     Comment: Last of PO bowel prep: 02:00    Patient educated on the possibility for awareness under sedation and of the possibility of airway intervention in the event of an airway or procedural emergency  Available labwork, imaging, and diagnostic studies relevant to the procedure reviewed including prior anesthetic records. Patient is acceptable for the intended procedure  . Plan Factors-Exercise tolerance (METS): <4 METS. Chart reviewed. Patient summary reviewed. Patient is not a current smoker. Induction- intravenous. Postoperative Plan-     Informed Consent- Anesthetic plan and risks discussed with patient. I personally reviewed this patient with the CRNA. Discussed and agreed on the Anesthesia Plan with the CRNA. Jaydon Dow

## 2023-09-21 NOTE — ANESTHESIA POSTPROCEDURE EVALUATION
Post-Op Assessment Note    CV Status:  Stable    Pain management: adequate     Mental Status:  Awake and sleepy   Hydration Status:  Euvolemic   PONV Controlled:  Controlled   Airway Patency:  Patent      Post Op Vitals Reviewed: Yes      Staff: CRNA         No notable events documented.     BP 94/53 (09/21/23 1010)    Temp     Pulse 66 (09/21/23 1010)   Resp 18 (09/21/23 1010)    SpO2 95 % (09/21/23 1010)

## 2023-09-21 NOTE — H&P
[facility]  History & Physical    Patient Info:  Name: Uvaldo Saleh   Age: 80 y.o. YOB: 1955   MRN: [id number]    HPI: Uvaldo Saleh is a 80y.o. year old female who presents for EGD for evaluation of iron deficiency without anemia and Colonoscopy for colon CA screening. REVIEW OF SYSTEMS: Per the HPI, and otherwise unremarkable. Historical Information   Past Medical History:   Diagnosis Date   • Arthritis    • Depression    • Difficult intubation 2021 1st attempt: Mac3, Grade 4 view. 2nd attempt: Mac 3, Grade 4 view, unable to pass bougie, 3rd attempt: Debbie Esposito:, Grade 4 view, unable to pass bougie, 4th attempt:(by attending): Mac3, Grade 4 view unable to pass bougie, 5th attempt(by attending): Glidescope 3, Grade 3 view, ETT placed successfully with stylet. LMA used successfully to ventilate in between attempts   • Disease of thyroid gland     hypo   • Edema     LAST ASSESSED: [id number]   • GERD (gastroesophageal reflux disease)    • Hypercholesterolemia    • Hyperlipidemia    • Hypertension     WELL CONTROLLED. CURRENTLY ON LISINOPRIL.  LAST ASSESSED: [id number]   • Orthostatic hypotension 2020   • Other headache syndrome    • Other muscle spasm     LAST ASSESSED: [id number]   • Ovarian cyst    • Renal calculi    •  (spontaneous vaginal delivery)     FEMALE     Past Surgical History:   Procedure Laterality Date   •  [address] (L4/L5)   • CHOLECYSTECTOMY     • IR CEREBRAL ANGIOGRAPHY  2020   • IR CEREBRAL ANGIOGRAPHY  2021   • IR CEREBRAL ANGIOGRAPHY / INTERVENTION  2021   • TONSILLECTOMY       Social History   Social History     Substance and Sexual Activity   Alcohol Use Never     Social History     Substance and Sexual Activity   Drug Use No     Social History     Tobacco Use   Smoking Status Former   Smokeless Tobacco Never   Tobacco Comments    pt "quit about 20 years ago" Family History   Problem Relation Age of Onset   • Lung cancer Mother    • Cancer Mother         MALIGNANT NEOPLASM   • Hypertension Father    • Seizures Father    • Stroke Father    • Heart attack Father    • Diabetes Brother    • Hypertension Son    • Lung disease Son    • Hyperthyroidism Maternal Aunt    • Hypothyroidism Maternal Aunt    • Lung cancer Cousin    • Heart disease Other         CARDIAC DISORDER   • Neuropathy Family        MEDICATIONS & ALLERGIES:    Current Outpatient Medications   Medication Instructions   • acetaminophen (TYLENOL 8 HOUR ARTHRITIS PAIN) 650 mg, Oral, Every 8 hours PRN   • Aspirin Low Dose 81 mg, Oral, Daily   • atorvastatin (LIPITOR) 10 mg tablet TAKE 1 TABLET BY MOUTH DAILY   • clotrimazole (LOTRIMIN) 1 % cream Topical, 2 times daily   • gabapentin (NEURONTIN) 100 mg, Oral, 2 times daily   • levothyroxine (SYNTHROID) 88 mcg, Oral, Daily, Take 1 tablet Monday - Friday. • levothyroxine 100 mcg tablet TAKE ONE TABLET BY MOUTH TWO TIMES A WEEK   • lisinopril (ZESTRIL) 20 mg, Oral, Daily   • loratadine (CLARITIN) 10 mg tablet TAKE 1 TABLET BY MOUTH DAILY AS NEEDED FOR ALLERGIES   • naproxen (NAPROSYN) 500 mg, Oral, 2 times daily with meals   • omeprazole (PRILOSEC) 20 mg, Oral, Daily   • Vitamin D High Potency 1,000 Units, Oral, Daily     Allergies   Allergen Reactions   • Tagamet [Cimetidine] Hives       PHYSICAL EXAM:    Objective   Last menstrual period 01/01/2003. There is no height or weight on file to calculate BMI. Gen: NAD  CV: RRR  CHEST: Clear  ABD: Soft, NT/ND  EXT: No edema    ASSESSMENT AND PLAN:  This is a 80y.o. year old female here for EGD and Colonoscopy, and she is stable and optimized for her procedure. Lindsey Medel D.O. Gastroenterology Fellow  [address] [facility]  Division of Gastroenterology & Hepatology  Available on [employer]    ** Please Note: This note is constructed using a voice recognition dictation system.  **

## 2023-09-25 PROCEDURE — 88305 TISSUE EXAM BY PATHOLOGIST: CPT | Performed by: PATHOLOGY

## 2023-09-25 PROCEDURE — 88305 TISSUE EXAM BY PATHOLOGIST: CPT | Performed by: STUDENT IN AN ORGANIZED HEALTH CARE EDUCATION/TRAINING PROGRAM

## 2023-09-28 DIAGNOSIS — K76.0 NAFLD (NONALCOHOLIC FATTY LIVER DISEASE): Primary | ICD-10-CM

## 2023-09-28 DIAGNOSIS — A04.8 H. PYLORI INFECTION: Primary | ICD-10-CM

## 2023-09-28 DIAGNOSIS — K74.02 ADVANCED HEPATIC FIBROSIS: ICD-10-CM

## 2023-09-28 RX ORDER — METRONIDAZOLE 250 MG/1
250 TABLET ORAL EVERY 6 HOURS
Qty: 56 TABLET | Refills: 0 | Status: SHIPPED | OUTPATIENT
Start: 2023-09-28 | End: 2023-10-12

## 2023-09-28 RX ORDER — OMEPRAZOLE 40 MG/1
40 CAPSULE, DELAYED RELEASE ORAL 2 TIMES DAILY
Qty: 28 CAPSULE | Refills: 0 | Status: SHIPPED | OUTPATIENT
Start: 2023-09-28 | End: 2023-10-12

## 2023-09-28 RX ORDER — TETRACYCLINE HYDROCHLORIDE 500 MG/1
500 CAPSULE ORAL EVERY 6 HOURS
Qty: 56 CAPSULE | Refills: 0 | Status: SHIPPED | OUTPATIENT
Start: 2023-09-28 | End: 2023-10-12

## 2023-09-28 RX ORDER — BISMUTH SUBSALICYLATE 262 MG/1
262 TABLET, CHEWABLE ORAL EVERY 6 HOURS
Qty: 56 TABLET | Refills: 0 | Status: SHIPPED | OUTPATIENT
Start: 2023-09-28 | End: 2023-10-12

## 2023-09-28 NOTE — PROGRESS NOTES
Discussed with patient repeat US elastography findings which demonstrated F3 disease. Pt intolerant of MRIs due to claustrophobia despite prior attempts for pre-sedation. Recommend IR referral for TLJB with [facility]. Also reviewed results from patient's recent EGD/COY. Gastric biopsies were positive for H. Pylori. Recommend initiating H. Pylori therapy per protocol.

## 2023-09-29 ENCOUNTER — TELEPHONE (OUTPATIENT)
Age: 68
End: 2023-09-29

## 2023-09-29 DIAGNOSIS — M54.42 ACUTE LEFT-SIDED LOW BACK PAIN WITH LEFT-SIDED SCIATICA: ICD-10-CM

## 2023-09-29 RX ORDER — GABAPENTIN 100 MG/1
CAPSULE ORAL
Qty: 180 CAPSULE | Refills: 3 | Status: SHIPPED | OUTPATIENT
Start: 2023-09-29

## 2023-09-29 NOTE — TELEPHONE ENCOUNTER
Patient reports she has difficulty swallowing pills and would like to know if her Tetracycline and Omeprazole capsules can be opened and taken with applesauce. If they cannot, she will need something else ordered. Patient would like a call back to advise.

## 2023-10-04 DIAGNOSIS — E11.9 TYPE 2 DIABETES MELLITUS WITHOUT COMPLICATION, WITHOUT LONG-TERM CURRENT USE OF INSULIN (HCC): ICD-10-CM

## 2023-10-04 RX ORDER — ATORVASTATIN CALCIUM 20 MG/1
20 TABLET, FILM COATED ORAL DAILY
Qty: 90 TABLET | Refills: 1 | Status: SHIPPED | OUTPATIENT
Start: 2023-10-04 | End: 2024-01-02

## 2023-10-05 ENCOUNTER — NURSE TRIAGE (OUTPATIENT)
Age: 68
End: 2023-10-05

## 2023-10-05 ENCOUNTER — EVALUATION (OUTPATIENT)
Dept: PHYSICAL THERAPY | Facility: REHABILITATION | Age: 68
End: 2023-10-05
Payer: MEDICARE

## 2023-10-05 VITALS — DIASTOLIC BLOOD PRESSURE: 75 MMHG | SYSTOLIC BLOOD PRESSURE: 125 MMHG

## 2023-10-05 DIAGNOSIS — M54.42 ACUTE LEFT-SIDED LOW BACK PAIN WITH LEFT-SIDED SCIATICA: Primary | ICD-10-CM

## 2023-10-05 DIAGNOSIS — M54.42 ACUTE BACK PAIN WITH SCIATICA, LEFT: ICD-10-CM

## 2023-10-05 DIAGNOSIS — R11.0 NAUSEA: Primary | ICD-10-CM

## 2023-10-05 PROCEDURE — 97110 THERAPEUTIC EXERCISES: CPT | Performed by: PHYSICAL THERAPIST

## 2023-10-05 PROCEDURE — 97162 PT EVAL MOD COMPLEX 30 MIN: CPT | Performed by: PHYSICAL THERAPIST

## 2023-10-05 RX ORDER — ONDANSETRON 4 MG/1
4 TABLET, FILM COATED ORAL EVERY 8 HOURS PRN
Qty: 20 TABLET | Refills: 0 | Status: SHIPPED | OUTPATIENT
Start: 2023-10-05

## 2023-10-05 NOTE — TELEPHONE ENCOUNTER
[facility]p #[id number] [facility])  Patient experiencing nausea with H Pylori treatment. She states she is 246 lbs today since loss of appetite on medications. She states she was not instructed on administration and just followed instructions on bottle. I instructed patient to take the omeprazole and Pepto Bismol prior to meals and take antibiotics afterwards, make sure she has something in her stomach. Patient is keeping hydrated. **Patient is requesting something for her nausea at this time. [facility] confirmed and they deliver to her home. [facility] Speaker

## 2023-10-05 NOTE — TELEPHONE ENCOUNTER
Agree with recommendations regarding the administration of H. pylori medication as per triage RN. Also prescribed patient a limited supply of Zofran to take q8 hrs PRN for nausea. Strongly recommend that she keep and attend her appointment with gastroenterology on 10/11 for further management of her symptoms.
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
## 2023-10-05 NOTE — TELEPHONE ENCOUNTER
----- Message from Harvinder Martins sent at 10/5/2023  9:52 AM EDT -----  Patient calling in stating she is losing a lot of weight, experiencing nausea and has not been able to eat. She is stating she feels very weak. Patient is requesting to speak with a nurse.

## 2023-10-06 NOTE — TELEPHONE ENCOUNTER
Zofran order did not go to pharmacy, marked as phone in. I called [address] and provided verbal order. Called patient, no answer, left detailed message that order called in and it should be ready today. Call back if needed.

## 2023-10-06 NOTE — TELEPHONE ENCOUNTER
----- Message from Hamida Stephen sent at 10/6/2023  1:47 PM EDT -----  Pt calling because she asked yesterday if we could send a script for her for stomach medication and Cherri Krishnamurthy sent ondansetron (ZOFRAN) 4 mg tablet to her pharmacy on file but when the Pt called the pharmacy they are telling her there is no script for her. She says she is in a lot of discomfort and needs these and is asking we resend the script for her. She would also like us to call her once we have the script sent so she can call and make sure the pharmacy is filling it for her and when she will be able to get it.

## 2023-10-06 NOTE — TELEPHONE ENCOUNTER
Patient tolerating pills at this time. However, having nausea with antibiotic therapy. Zofran has been ordered and patient counseled to take before meals/antibiotic dosing. She is agreeable to trying. If no better with antiemetic, she will call and let us know. Has follow up with Rafia Castro on 10/11 as patient wishes to be seen at [facility] office.

## 2023-10-09 ENCOUNTER — OFFICE VISIT (OUTPATIENT)
Dept: PHYSICAL THERAPY | Facility: REHABILITATION | Age: 68
End: 2023-10-09
Payer: MEDICARE

## 2023-10-09 DIAGNOSIS — M54.42 ACUTE BACK PAIN WITH SCIATICA, LEFT: Primary | ICD-10-CM

## 2023-10-09 DIAGNOSIS — M54.42 ACUTE LEFT-SIDED LOW BACK PAIN WITH LEFT-SIDED SCIATICA: ICD-10-CM

## 2023-10-09 PROCEDURE — 97110 THERAPEUTIC EXERCISES: CPT | Performed by: PHYSICAL THERAPIST

## 2023-10-09 NOTE — PROGRESS NOTES
Daily Note     Today's date: 10/9/2023  Patient name: Devon Ackerman  : 1955  MRN: [id number]  Referring provider: Ricardo Gilmore  Dx:   Encounter Diagnosis     ICD-10-CM    1. Acute back pain with sciatica, left  M54.42       2. Acute left-sided low back pain with left-sided sciatica  M54.42           Start Time: 1145  Stop Time: 1225  Total time in clinic (min): 40 minutes    Subjective: Patient reports that her L leg pain and back pain have been better with the exercises (BEATRIZ). Her CC is R medial knee pain felt mostly with walking and steps. She denies history of knee injury/trauma. Pain is intermittent in nature. Objective: See treatment diary below  Knee ROM WNL, pfree, no TTP over medial aspect of knee  (-) SLR b/  (-) saphenous nerve ANTT R  BEATRIZ: decreased R knee pain, better    Assessment: Tolerated treatment well. Patient with abolished pain in R knee after BEATRIZ. Likely pain of spinal source due to normal knee exam and response to knee pain with repeated spinal motions. Patient with improved step length, stride length post BEATRIZ. Plan: Continue per plan of care. Precautions: HTN, liver disease       Manuals 10/5 10/9                                                               Neuro Re-Ed 10/5 10/9                        Bridge 4x5 NV                                                                             Ther Ex 10/5 10/9           BEATRIZ  15x 30x  20x with pt.  OP (exhale)           VG NV            assessment             education  10'                                                               Ther Activity                                       Gait Training                                       Modalities

## 2023-10-11 ENCOUNTER — OFFICE VISIT (OUTPATIENT)
Dept: GASTROENTEROLOGY | Facility: CLINIC | Age: 68
End: 2023-10-11
Payer: MEDICARE

## 2023-10-11 VITALS
HEIGHT: 65 IN | SYSTOLIC BLOOD PRESSURE: 112 MMHG | BODY MASS INDEX: 41.42 KG/M2 | DIASTOLIC BLOOD PRESSURE: 60 MMHG | WEIGHT: 248.6 LBS | TEMPERATURE: 96.1 F

## 2023-10-11 DIAGNOSIS — R11.0 NAUSEA: ICD-10-CM

## 2023-10-11 DIAGNOSIS — R19.7 DIARRHEA, UNSPECIFIED TYPE: ICD-10-CM

## 2023-10-11 DIAGNOSIS — K21.9 GASTROESOPHAGEAL REFLUX DISEASE WITHOUT ESOPHAGITIS: Primary | ICD-10-CM

## 2023-10-11 PROCEDURE — 99214 OFFICE O/P EST MOD 30 MIN: CPT | Performed by: PHYSICIAN ASSISTANT

## 2023-10-11 RX ORDER — ONDANSETRON 4 MG/1
4 TABLET, FILM COATED ORAL EVERY 8 HOURS PRN
Qty: 30 TABLET | Refills: 1 | Status: SHIPPED | OUTPATIENT
Start: 2023-10-11

## 2023-10-11 RX ORDER — LOPERAMIDE HYDROCHLORIDE 2 MG/1
2 CAPSULE ORAL 4 TIMES DAILY PRN
Qty: 30 CAPSULE | Refills: 0 | Status: SHIPPED | OUTPATIENT
Start: 2023-10-11

## 2023-10-11 NOTE — PROGRESS NOTES
[facility] - Outpatient Follow-up Note  Kalee Appiah 80 y.o. female MRN: [id number]  Encounter: [id number]          ASSESSMENT AND PLAN:      Sera Mcrae is a 81 y/o female who is s/p cholecystectomy with GERD, DM2, HTN, hypothyroid, HLD who presents for colon cancer screening. H.pylori infection  2. Gastroesophageal reflux disease without esophagitis  EGD depicted H.pylori infection on gastric bx and quadruple therapy was given. Hgb is WNL. She says she is having side-effects from the Abx of nausea, cramping and diarrhea but she says this is slowing down now that she is on zofran PRN. -finish course of Abx this weekend, then please stay off of PPI for about 1 month, then do H.pylori stool test to confirm eradication  -maintain hydration and bland diet for now  -continue zofran 4 mg PRN nausea  -start imodium PRN diarrhea    -asked pt to keep me updated next week when she is off of the meds as the hope is she feels better at that time    3. NAFLD/MENA  Pt follows with hepatology. 4. Personal hx of colon polyps   Colonoscopy noted 6 polyps; TA on pathology. -repeat colonoscopy 3 yrs  ______________________________________________________________________    SUBJECTIVE:  Pt says the medications are "rough" on her system. She says that since starting the meds, she has been having some abdominal cramping with nausea and diarrhea that comes and goes. She says this is slowing down since starting zofran. Pt denies heartburn, vomiting, dysphagia, fevers, chills, constipation, bloody or black Bms. REVIEW OF SYSTEMS IS OTHERWISE NEGATIVE. Historical Information   Past Medical History:   Diagnosis Date    Arthritis     Depression     Difficult intubation 01/08/2021 1/8/2021 1st attempt: Mac3, Grade 4 view. 2nd attempt:  Mac 3, Grade 4 view, unable to pass bougie, 3rd attempt: Kajal Patrick:, Grade 4 view, unable to pass bougie, 4th attempt:(by attending): Mac3, Grade 4 view unable to pass bougie, 5th attempt(by attending): Glidescope 3, Grade 3 view, ETT placed successfully with stylet. LMA used successfully to ventilate in between attempts    Disease of thyroid gland     hypo    Edema     LAST ASSESSED: [id number]    GERD (gastroesophageal reflux disease)     Hypercholesterolemia     Hyperlipidemia     Hypertension     WELL CONTROLLED. CURRENTLY ON LISINOPRIL.  LAST ASSESSED:     Liver disease     Orthostatic hypotension 2020    Other headache syndrome     Other muscle spasm     LAST ASSESSED: [id number]    Ovarian cyst 2006    Renal calculi      (spontaneous vaginal delivery) 1975    FEMALE     Past Surgical History:   Procedure Laterality Date    BACK SURGERY      HERNIATED DISC (L4/L5)    CHOLECYSTECTOMY      IR CEREBRAL ANGIOGRAPHY  2020    IR CEREBRAL ANGIOGRAPHY  2021    IR CEREBRAL ANGIOGRAPHY / INTERVENTION  2021    TONSILLECTOMY       Social History   Social History     Substance and Sexual Activity   Alcohol Use Never     Social History     Substance and Sexual Activity   Drug Use No     Social History     Tobacco Use   Smoking Status Former   Smokeless Tobacco Never   Tobacco Comments    pt "quit about 20 years ago"     Family History   Problem Relation Age of Onset    Lung cancer Mother     Cancer Mother         MALIGNANT NEOPLASM    Hypertension Father     Seizures Father     Stroke Father     Heart attack Father     Diabetes Brother     Hypertension Son     Lung disease Son     Hyperthyroidism Maternal Aunt     Hypothyroidism Maternal Aunt     Lung cancer Cousin     Heart disease Other         CARDIAC DISORDER    Neuropathy Family        Meds/Allergies       Current Outpatient Medications:     acetaminophen (Tylenol 8 Hour Arthritis Pain) 650 mg CR tablet    Aspirin Low Dose 81 MG EC tablet    atorvastatin (LIPITOR) 20 mg tablet    bismuth subsalicylate (PEPTO BISMOL) 262 MG chewable tablet    Cholecalciferol (Vitamin D High Potency) 25 MCG (1000 UT) capsule    clotrimazole (LOTRIMIN) 1 % cream    gabapentin (NEURONTIN) 100 mg capsule    levothyroxine (Synthroid) 88 mcg tablet    levothyroxine 100 mcg tablet    lisinopril (ZESTRIL) 20 mg tablet    loratadine (CLARITIN) 10 mg tablet    metroNIDAZOLE (FLAGYL) 250 mg tablet    naproxen (Naprosyn) 500 mg tablet    omeprazole (PriLOSEC) 40 MG capsule    ondansetron (ZOFRAN) 4 mg tablet    tetracycline (ACHROMYCIN,SUMYCIN) 500 MG capsule    Allergies   Allergen Reactions    Tagamet [Cimetidine] Hives           Objective     Last menstrual period 01/01/2003. There is no height or weight on file to calculate BMI. PHYSICAL EXAM:      General Appearance:   Alert, cooperative, no distress   HEENT:   Normocephalic, atraumatic, anicteric. Neck:  Supple, symmetrical, trachea midline   Lungs:   Clear to auscultation bilaterally; no rales, rhonchi or wheezing; respirations unlabored    Heart[de-identified]   Regular rate and rhythm; no murmur, rub, or gallop. Abdomen:   Soft, non-tender, non-distended; normal bowel sounds; no masses, no organomegaly    Genitalia:   Deferred    Rectal:   Deferred    Extremities:  No cyanosis, clubbing or edema    Pulses:  2+ and symmetric    Skin:  No jaundice, rashes, or lesions    Lymph nodes:  No palpable cervical lymphadenopathy        Lab Results:   No visits with results within 1 Day(s) from this visit.    Latest known visit with results is:   Hospital Outpatient Visit on 09/21/2023   Component Date Value    Case Report 09/21/2023                      Value:Surgical Pathology Report                         Case: [id number]                                   Authorizing Provider:  Reyna Theodore MD           Collected:           09/21/2023 0946              Ordering Location:     [facility] ([facility]      Received:            09/21/2023 [address] [place] [address] Endoscopy                                                           Pathologist: Jamilah Cantrell DO                                                     Specimens:   A) - Polyp, Colorectal, transverse polyp, cold snare                                                B) - Polyp, Colorectal, ascending polyp x3, cold snare                                              C) - Polyp, Colorectal, sigmoid polyp, cold snare                                                   D) - Polyp, Colorectal, rectal polyp, cold snare                                           Final Diagnosis 09/21/2023                      Value: This result contains rich text formatting which cannot be displayed here. Additional Information 09/21/2023                      Value: This result contains rich text formatting which cannot be displayed here. Synoptic Checklist 09/21/2023                      Value:                            COLON/RECTUM POLYP FORM - GI - All Specimens                                                                                     :    Adenoma(s)                                                         : Other      Gross Description 09/21/2023                      Value: This result contains rich text formatting which cannot be displayed here.     Case Report 09/21/2023                      Value:Surgical Pathology Report                         Case: [id number]                                   Authorizing Provider:  Dania Alvarez DO       Collected:           09/21/2023 0999              Ordering Location:     [facility]      Received:            09/21/2023 [address] [place] [address] Endoscopy                                                           Pathologist:           Gerry David MD                                                            Specimens:   A) - Duodenum, r/o celiac                                                                           B) - Stomach, r/o h. pylori                                                                Final Diagnosis 09/21/2023                      Value: This result contains rich text formatting which cannot be displayed here. Additional Information 09/21/2023                      Value: This result contains rich text formatting which cannot be displayed here. Gross Description 09/21/2023                      Value: This result contains rich text formatting which cannot be displayed here. Clinical Information 09/21/2023                      Value:INDICATION:  Iron deficiency   FINDINGS:  · The upper third of the esophagus, middle third of the esophagus and lower third of the esophagus appeared normal. Z-line is 38 cm from the incisors. · The cardia, fundus of the stomach, body of the stomach, greater curve of the stomach, lesser curve of the stomach, incisura, antrum and pylorus appeared normal. Performed random biopsy using biopsy forceps to rule out H. pylori. · Mild edematous and erythematous mucosa in the duodenum; performed cold forceps biopsy to rule out celiac disease           Radiology Results:   US elastography/UGAP    Result Date: 9/22/2023  Narrative: ELASTOGRAPHY, LIVER ULTRASOUND INDICATION:   K76.0: Fatty (change of) liver, not elsewhere classified K74.02: Hepatic fibrosis, advanced fibrosis. COMPARISON: Ultrasound from 8/31/2023 ultrasound and elastography. TECHNIQUE: Targeted ultrasound of the liver was performed with a curvilinear transducer on a SpearFysh e10 utilizing 2D SWE. A total of 10 shear wave Elastography samples were obtained. FINDINGS: Shear Wave Elastography sampling was performed to evaluate stiffness changes within the liver associated with fibrosis. E1  10.56 kPa E2  12.73 kPa E3  11.18 kPa E4  15 kPa E5  16.17 kPa E6  11.02 kPa E7  11.4 kPa E8  11.52 kPa E9  12 kPa E10 14.46 kPa E median:  11.76 kPa. The corresponding Metavir score is F3 (severefibrosis), 9.40-11.87 kPa. 1.77-1.99 m/s. IQR/median:  23.7 %. (IQR of less than 30% is considered a satisfactory data set).  However there is much variability as displayed above with the individual values. The data set was limited due to poor ultrasound/elastography signal related to body habitus and liver texture also experienced on the prior examination. In order to get some data readings, some sampling boxes had to be within a centimeter of the liver capsule which also limits the reliability of the data set despite the IQR/median falling below 30%. According to the technologist, deeper interrogations into the liver parenchyma gave even more unreliable, heterogeneous data sets. Note: that the stage of liver fibrosis may be overestimated by clinical conditions unrelated to fibrosis, including but not limited to, nonfasting, hepatic inflammation, vascular congestion, biliary obstruction, and infiltrative liver disease. Furthermore, in some patients with NAFLD, the cut-off values for compensated advanced chronic liver disease may be lower (7-9 kPa). In causes other than viral hepatitis and nonalcoholic fatty liver disease, the cut-off values are not well established. When comparing measurements across time, a clinically significant change should be considered when the delta change is greater than 10%. In all these conditions, however, liver stiffness values within the normal range exclude significant liver fibrosis. Ultrasound-guided attenuation parameter (UGAP) Liver Steatosis Grading A1  0.77 dB/cm/MHz A2  0.79 dB/cm/MHz A3  0.77 dB/cm/MHz A4  0.82 dB/cm/MHz A5  0.84 dB/cm/MHz A6  0.8 dB/cm/MHz A7  0.8 dB/cm/MHz A8  0.84 dB/cm/MHz A9  0.8 dB/cm/MHz A10 0.79 dB/cm/MHz Attenuation coefficient:  0.8 dB/cm/MHz Liver steatosis grading:  S3 ( > 67% steatosis) >0.76 dB/cm/MHz IQR/Med:  3.2 % (Less than or equal to 30% is a satisfactory data set.) Reference White paper for GE ultrasound-guided attenuation parameter https://www.3point5.com/. au/-/jscedricedia/59069d9b5c3017c1042p780b1ve581d9. pdf?la=en-au     Impression: Metavir score:  The the calculated metavir score is equivalent to F3 based on today's data set. The IQR/median value has fallen below 30%; however, the data set is qualitatively unreliable as many of the readings were performed within a centimeter of the liver capsule which diminishes the accuracy of readings. Clinical correlation is advised as to the need for further characterization of the liver parenchyma including possible MRI elastography or biopsy as noted in the prior GI clinical note. https://pubs. rsna.org/doi/10.1148/radiol. 7201288154 Liver steatosis grading:  S3 ( > 67% steatosis) Workstation performed: [id number]     Colonoscopy    Result Date: 9/21/2023  Narrative: Table formatting from the original result was not included. [address] [place] [address] Endoscopy [facility] [address] DATE OF SERVICE: 9/21/23 PHYSICIAN(S): Attending: Kathleen Oliva MD Fellow: Tash Vazquez DO INDICATION: Colon cancer screening POST-OP DIAGNOSIS: See the impression below. HISTORY: Prior colonoscopy: More than 10 years ago. BOWEL PREPARATION: Miralax/Dulcolax PREPROCEDURE: Informed consent was obtained for the procedure, including sedation. Risks including but not limited to bleeding, infection, perforation, adverse drug reaction and aspiration were explained in detail. Also explained about less than 100% sensitivity with the exam and other alternatives. The patient was placed in the left lateral decubitus position. Procedure: Colonoscopy DETAILS OF PROCEDURE: Patient was taken to the procedure room where a time out was performed to confirm correct patient and correct procedure. The patient underwent monitored anesthesia care, which was administered by an anesthesia professional. The patient's blood pressure, heart rate, level of consciousness, oxygen, respirations, ECG and ETCO2 were monitored throughout the procedure. A digital rectal exam was performed. The scope was introduced through the anus and advanced to the cecum.  Retroflexion was performed in the rectum. The quality of bowel preparation was evaluated using the [facility] Bowel Preparation Scale with scores of: right colon = 2, transverse colon = 2, left colon = 2. The total BBPS score was 6. Bowel prep was adequate. The patient experienced no blood loss. The procedure was not difficult. The patient tolerated the procedure well. There were no apparent adverse events.  ANESTHESIA INFORMATION: ASA: III Anesthesia Type: IV Sedation with Anesthesia MEDICATIONS: No administrations occurring from 0914 to 1004 on 09/21/23 FINDINGS: Three sessile, adenomatous-appearing polyps measuring smaller than 5 mm in the ascending colon One sessile, adenomatous-appearing polyp measuring smaller than 5 mm in the transverse colon; completely removed en bloc by cold snare and retrieved specimen One sessile, adenomatous-appearing polyp measuring smaller than 5 mm in the sigmoid colon; completely removed en bloc by cold snare and retrieved specimen One sessile, adenomatous-appearing polyp measuring smaller than 5 mm in the rectum; completely removed en bloc by cold snare and retrieved specimen Multiple diverticula in the descending colon and sigmoid colon The ileocecal valve, cecum, ascending colon, hepatic flexure, transverse colon, splenic flexure, descending colon, sigmoid colon and rectum appeared normal. EVENTS: Procedure Events Event Event Time ENDO SCOPE OUT TIME 9/21/2023  9:33 AM ENDO CECUM REACHED 9/21/2023  9:48 AM SPECIMENS: ID Type Source Tests Collected by Time Destination 1 : r/o celiac Tissue Duodenum TISSUE EXAM Teressa Alejo, DO 9/21/2023  9:25 AM  2 : r/o h. pylori Tissue Stomach TISSUE EXAM Teressa Alejo DO 9/21/2023  9:29 AM  3 : transverse polyp, cold snare Tissue Polyp, Colorectal TISSUE EXAM Idania Brink MD 9/21/2023  9:46 AM  4 : transverse polyp, cold snare Tissue Polyp, Colorectal TISSUE EXAM Idania Brink MD 9/21/2023  9:56 AM  EQUIPMENT: Colonoscope -[id number] Impression: 6 subcentimeter polyps removed with cold snare Diverticulosis in the descending colon and sigmoid colon Otherwise normal colon RECOMMENDATION:  Repeat colonoscopy in 3 years  Personal history of colon polyps   Await pathology results   Velia Martinez MD     EGD    Result Date: 9/21/2023  Narrative: Table formatting from the original result was not included. [address] [place] [address] Endoscopy [address] DATE OF SERVICE: 9/21/23 PHYSICIAN(S): Attending: Velia Martinez MD Fellow: Candance Cruise, DO INDICATION: Iron deficiency POST-OP DIAGNOSIS: See the impression below. PREPROCEDURE: Informed consent was obtained for the procedure, including sedation. Risks of perforation, hemorrhage, adverse drug reaction and aspiration were discussed. The patient was placed in the left lateral decubitus position. Patient was explained about the risks and benefits of the procedure. Risks including but not limited to bleeding, infection, and perforation were explained in detail. Also explained about less than 100% sensitivity with the exam and other alternatives. PROCEDURE: EGD DETAILS OF PROCEDURE: Patient was taken to the procedure room where a time out was performed to confirm correct patient and correct procedure. The patient underwent monitored anesthesia care, which was administered by an anesthesia professional. The patient's blood pressure, heart rate, level of consciousness, respirations, oxygen, ETCO2 and ECG were monitored throughout the procedure. The scope was introduced through the mouth and advanced to the second part of the duodenum. Retroflexion was performed in the cardia, fundus and incisura. Prior to the procedure, the patient's H. Pylori status was unknown. The patient experienced no blood loss. The procedure was not difficult. The patient tolerated the procedure well. There were no apparent adverse events.  ANESTHESIA INFORMATION: ASA: III Anesthesia Type: IV Sedation with Anesthesia MEDICATIONS: No administrations occurring from 0914 to 1007 on 09/21/23 FINDINGS: The upper third of the esophagus, middle third of the esophagus and lower third of the esophagus appeared normal. Z-line is 38 cm from the incisors. The cardia, fundus of the stomach, body of the stomach, greater curve of the stomach, lesser curve of the stomach, incisura, antrum and pylorus appeared normal. Performed random biopsy using biopsy forceps to rule out H. pylori. Mild edematous and erythematous mucosa in the duodenum; performed cold forceps biopsy to rule out celiac disease SPECIMENS: ID Type Source Tests Collected by Time Destination 1 : r/o celiac Tissue Duodenum TISSUE EXAM Maryjane Velazquez,  9/21/2023  9:25 AM  2 : r/o h. pylori Tissue Stomach TISSUE EXAM Maryjane Velazquez,  9/21/2023  9:29 AM  3 : transverse polyp, cold snare Tissue Polyp, Colorectal TISSUE EXAM Minnie River MD 9/21/2023  9:46 AM  4 : ascending polyp x3, cold snare Tissue Polyp, Colorectal TISSUE EXAM Minnie River MD 9/21/2023  9:56 AM  5 : sigmoid polyp, cold snare Tissue Polyp, Colorectal TISSUE EXAM Minnie River MD 9/21/2023 10:04 AM  6 : rectal polyp, cold snare Tissue Polyp, Colorectal TISSUE EXAM Minnie River MD 9/21/2023 10:04 AM      Impression: Normal esophagus Normal stomach. Random biopsies taken to r/o HPylori Duodenitis. Random biopsies taken.  RECOMMENDATION:  Await pathology results  Proceed with colonoscopy    Minnie River MD

## 2023-10-11 NOTE — PATIENT INSTRUCTIONS
Keep using the zofran as needed for the nausea   Finish your antibiotic course, then please STOP using omeprazole and anything else for acid reflux for about 1 month.  Then, please do the stool study so we can see if your infection  Try to keep a bland diet; nothing with too much seasoning

## 2023-10-16 ENCOUNTER — APPOINTMENT (EMERGENCY)
Dept: RADIOLOGY | Facility: HOSPITAL | Age: 68
End: 2023-10-16
Payer: MEDICARE

## 2023-10-16 ENCOUNTER — HOSPITAL ENCOUNTER (EMERGENCY)
Facility: HOSPITAL | Age: 68
Discharge: HOME | End: 2023-10-16
Attending: EMERGENCY MEDICINE
Payer: MEDICARE

## 2023-10-16 ENCOUNTER — OFFICE VISIT (OUTPATIENT)
Dept: PHYSICAL THERAPY | Facility: REHABILITATION | Age: 68
End: 2023-10-16
Payer: MEDICARE

## 2023-10-16 VITALS
RESPIRATION RATE: 20 BRPM | DIASTOLIC BLOOD PRESSURE: 57 MMHG | SYSTOLIC BLOOD PRESSURE: 116 MMHG | OXYGEN SATURATION: 99 % | HEART RATE: 57 BPM

## 2023-10-16 DIAGNOSIS — M54.42 ACUTE LEFT-SIDED LOW BACK PAIN WITH LEFT-SIDED SCIATICA: ICD-10-CM

## 2023-10-16 DIAGNOSIS — F44.9 CONVERSION DISORDER: ICD-10-CM

## 2023-10-16 DIAGNOSIS — M54.42 ACUTE BACK PAIN WITH SCIATICA, LEFT: Primary | ICD-10-CM

## 2023-10-16 DIAGNOSIS — F41.0 PANIC ATTACK: Primary | ICD-10-CM

## 2023-10-16 LAB
2HR DELTA HS TROPONIN: 0 NG/L
ALBUMIN SERPL BCP-MCNC: 4.2 G/DL (ref 3.5–5)
ALP SERPL-CCNC: 56 U/L (ref 34–104)
ALT SERPL W P-5'-P-CCNC: 23 U/L (ref 7–52)
ANION GAP SERPL CALCULATED.3IONS-SCNC: 13 MMOL/L
APAP SERPL-MCNC: <10 UG/ML (ref 10–20)
AST SERPL W P-5'-P-CCNC: 40 U/L (ref 13–39)
BASE EX.OXY STD BLDV CALC-SCNC: 73.9 % (ref 60–80)
BASE EXCESS BLDV CALC-SCNC: 0.4 MMOL/L
BASOPHILS # BLD MANUAL: 0 THOUSAND/UL (ref 0–0.1)
BASOPHILS NFR MAR MANUAL: 0 % (ref 0–1)
BILIRUB SERPL-MCNC: 0.63 MG/DL (ref 0.2–1)
BUN SERPL-MCNC: 13 MG/DL (ref 5–25)
CALCIUM SERPL-MCNC: 9.8 MG/DL (ref 8.4–10.2)
CARDIAC TROPONIN I PNL SERPL HS: 4 NG/L
CARDIAC TROPONIN I PNL SERPL HS: 4 NG/L
CHLORIDE SERPL-SCNC: 102 MMOL/L (ref 96–108)
CO2 SERPL-SCNC: 25 MMOL/L (ref 21–32)
CREAT SERPL-MCNC: 1.04 MG/DL (ref 0.6–1.3)
EOSINOPHIL # BLD MANUAL: 0 THOUSAND/UL (ref 0–0.4)
EOSINOPHIL NFR BLD MANUAL: 0 % (ref 0–6)
ERYTHROCYTE [DISTWIDTH] IN BLOOD BY AUTOMATED COUNT: 14.2 % (ref 11.6–15.1)
ETHANOL SERPL-MCNC: <10 MG/DL
GFR SERPL CREATININE-BSD FRML MDRD: 55 ML/MIN/1.73SQ M
GLUCOSE SERPL-MCNC: 83 MG/DL (ref 65–140)
GLUCOSE SERPL-MCNC: 91 MG/DL (ref 65–140)
HCO3 BLDV-SCNC: 24.1 MMOL/L (ref 24–30)
HCT VFR BLD AUTO: 44.9 % (ref 34.8–46.1)
HGB BLD-MCNC: 14.2 G/DL (ref 11.5–15.4)
LACTATE SERPL-SCNC: 2.2 MMOL/L (ref 0.5–2)
LYMPHOCYTES # BLD AUTO: 30 % (ref 14–44)
LYMPHOCYTES # BLD AUTO: 4.05 THOUSAND/UL (ref 0.6–4.47)
MCH RBC QN AUTO: 27.6 PG (ref 26.8–34.3)
MCHC RBC AUTO-ENTMCNC: 31.6 G/DL (ref 31.4–37.4)
MCV RBC AUTO: 87 FL (ref 82–98)
MONOCYTES # BLD AUTO: 1.13 THOUSAND/UL (ref 0–1.22)
MONOCYTES NFR BLD: 10 % (ref 4–12)
NEUTROPHILS # BLD MANUAL: 6.08 THOUSAND/UL (ref 1.85–7.62)
NEUTS BAND NFR BLD MANUAL: 1 % (ref 0–8)
NEUTS SEG NFR BLD AUTO: 53 % (ref 43–75)
O2 CT BLDV-SCNC: 15.7 ML/DL
PCO2 BLDV: 36.4 MM HG (ref 42–50)
PH BLDV: 7.44 [PH] (ref 7.3–7.4)
PLATELET # BLD AUTO: 152 THOUSANDS/UL (ref 149–390)
PLATELET BLD QL SMEAR: ADEQUATE
PMV BLD AUTO: 14.4 FL (ref 8.9–12.7)
PO2 BLDV: 41 MM HG (ref 35–45)
POIKILOCYTOSIS BLD QL SMEAR: PRESENT
POLYCHROMASIA BLD QL SMEAR: PRESENT
POTASSIUM SERPL-SCNC: 5.2 MMOL/L (ref 3.5–5.3)
PROCALCITONIN SERPL-MCNC: <0.05 NG/ML
PROT SERPL-MCNC: 8.1 G/DL (ref 6.4–8.4)
RBC # BLD AUTO: 5.15 MILLION/UL (ref 3.81–5.12)
RBC MORPH BLD: PRESENT
SALICYLATES SERPL-MCNC: <5 MG/DL (ref 3–20)
SODIUM SERPL-SCNC: 140 MMOL/L (ref 135–147)
VARIANT LYMPHS # BLD AUTO: 6 %
WBC # BLD AUTO: 11.25 THOUSAND/UL (ref 4.31–10.16)

## 2023-10-16 PROCEDURE — 84484 ASSAY OF TROPONIN QUANT: CPT

## 2023-10-16 PROCEDURE — 93005 ELECTROCARDIOGRAM TRACING: CPT

## 2023-10-16 PROCEDURE — 80053 COMPREHEN METABOLIC PANEL: CPT

## 2023-10-16 PROCEDURE — 82948 REAGENT STRIP/BLOOD GLUCOSE: CPT

## 2023-10-16 PROCEDURE — 80143 DRUG ASSAY ACETAMINOPHEN: CPT

## 2023-10-16 PROCEDURE — 82077 ASSAY SPEC XCP UR&BREATH IA: CPT

## 2023-10-16 PROCEDURE — 36415 COLL VENOUS BLD VENIPUNCTURE: CPT

## 2023-10-16 PROCEDURE — 74174 CTA ABD&PLVS W/CONTRAST: CPT

## 2023-10-16 PROCEDURE — 80179 DRUG ASSAY SALICYLATE: CPT

## 2023-10-16 PROCEDURE — 87040 BLOOD CULTURE FOR BACTERIA: CPT

## 2023-10-16 PROCEDURE — 97110 THERAPEUTIC EXERCISES: CPT | Performed by: PHYSICAL THERAPIST

## 2023-10-16 PROCEDURE — 85027 COMPLETE CBC AUTOMATED: CPT

## 2023-10-16 PROCEDURE — 83605 ASSAY OF LACTIC ACID: CPT

## 2023-10-16 PROCEDURE — 70496 CT ANGIOGRAPHY HEAD: CPT

## 2023-10-16 PROCEDURE — 84145 PROCALCITONIN (PCT): CPT

## 2023-10-16 PROCEDURE — 85007 BL SMEAR W/DIFF WBC COUNT: CPT

## 2023-10-16 PROCEDURE — 71275 CT ANGIOGRAPHY CHEST: CPT

## 2023-10-16 PROCEDURE — G1004 CDSM NDSC: HCPCS

## 2023-10-16 PROCEDURE — 82805 BLOOD GASES W/O2 SATURATION: CPT

## 2023-10-16 PROCEDURE — 70498 CT ANGIOGRAPHY NECK: CPT

## 2023-10-16 RX ORDER — LORAZEPAM 2 MG/ML
4 INJECTION INTRAMUSCULAR ONCE
Status: COMPLETED | OUTPATIENT
Start: 2023-10-16 | End: 2023-10-16

## 2023-10-16 RX ORDER — HYDROMORPHONE HCL/PF 1 MG/ML
1 SYRINGE (ML) INJECTION ONCE
Status: COMPLETED | OUTPATIENT
Start: 2023-10-16 | End: 2023-10-16

## 2023-10-16 RX ORDER — ONDANSETRON 2 MG/ML
4 INJECTION INTRAMUSCULAR; INTRAVENOUS ONCE
Status: COMPLETED | OUTPATIENT
Start: 2023-10-16 | End: 2023-10-16

## 2023-10-16 RX ADMIN — IOHEXOL 100 ML: 350 INJECTION, SOLUTION INTRAVENOUS at 17:54

## 2023-10-16 RX ADMIN — IOHEXOL 80 ML: 350 INJECTION, SOLUTION INTRAVENOUS at 17:54

## 2023-10-16 RX ADMIN — ONDANSETRON 4 MG: 2 INJECTION INTRAMUSCULAR; INTRAVENOUS at 18:13

## 2023-10-16 RX ADMIN — HYDROMORPHONE HYDROCHLORIDE 1 MG: 1 INJECTION, SOLUTION INTRAMUSCULAR; INTRAVENOUS; SUBCUTANEOUS at 18:13

## 2023-10-16 RX ADMIN — LORAZEPAM 4 MG: 2 INJECTION INTRAMUSCULAR; INTRAVENOUS at 17:27

## 2023-10-16 NOTE — ED PROVIDER NOTES
Chief Complaint   Patient presents with    Chest Pain     Pt dropped off by son after pt began c/o CP while in the car suddenly. Pt's son reports moment of unresponsiveness shortly after. Pt unable to communicate or follow commands on arrival     History of Present Illness and Review of Systems   This is a 80 y.o. female with PMH significant for depression, GERD, HTN coming in today with complaint of chest pain. The pt was dropped off by her son and arrived clutching her chest, gasping, and hyperventilating. It appears this started while she was driving with her son, however unclear exactly what symptoms she's having or when they started. On arrival, she is moaning, not clearly communicating, appears to be attempting to speak but not successfully. Attempted to discuss with the son however he left after dropping her off and was unable to be contacted. Remainder of history is limited 2/2 to her acute distress    Past Medical, Past Surgical History:    has a past medical history of Arthritis, Depression, Difficult intubation (2021), Disease of thyroid gland, Edema, GERD (gastroesophageal reflux disease), Hypercholesterolemia, Hyperlipidemia, Hypertension, Liver disease, Orthostatic hypotension (2020), Other headache syndrome, Other muscle spasm, Ovarian cyst (), Renal calculi, and  (spontaneous vaginal delivery) (). has a past surgical history that includes Cholecystectomy; Back surgery (); Tonsillectomy; IR cerebral angiography (2020); IR cerebral angiography / intervention (2021); and IR cerebral angiography (2021). Allergies:      Allergies   Allergen Reactions    Tagamet [Cimetidine] Hives       Social and Family History:     Social History     Substance and Sexual Activity   Alcohol Use Never     Social History     Tobacco Use   Smoking Status Former   Smokeless Tobacco Never   Tobacco Comments    pt "quit about 20 years ago"     Social History     Substance and Sexual Activity   Drug Use No       Physical Examination     Vitals:    10/16/23 1755 10/16/23 1908   BP:  116/57   BP Location:  Right arm   Pulse: 64 57   Resp:  20   SpO2: 100% 99%       Physical Exam  Vitals and nursing note reviewed. Constitutional:       General: She is in acute distress. Appearance: She is well-developed. She is diaphoretic. HENT:      Head: Normocephalic and atraumatic. Eyes:      Extraocular Movements: Extraocular movements intact. Conjunctiva/sclera: Conjunctivae normal.      Pupils: Pupils are equal, round, and reactive to light. Cardiovascular:      Rate and Rhythm: Regular rhythm. Tachycardia present. Pulses:           Radial pulses are 2+ on the right side and 2+ on the left side. Dorsalis pedis pulses are 2+ on the right side and 2+ on the left side. Heart sounds: No murmur heard. Pulmonary:      Effort: Pulmonary effort is normal. No respiratory distress. Breath sounds: Normal breath sounds. No decreased breath sounds or wheezing. Abdominal:      Palpations: Abdomen is soft. Tenderness: There is no abdominal tenderness. Musculoskeletal:         General: No swelling. Normal range of motion. Cervical back: Neck supple. Right lower leg: No edema. Left lower leg: No edema. Skin:     General: Skin is warm. Capillary Refill: Capillary refill takes less than 2 seconds. Neurological:      Mental Status: She is alert. Comments: Negative clonus or rigidity    Pt does not exhibit purposeful movement in all four extremities.  No withdrawal to pain    No facial droop or dysrthria, appears to have aphasia with word finding difficulty   Psychiatric:         Mood and Affect: Mood normal.           Procedures   Procedures      MDM:   Medical Decision Making  Bertram Galeazzi is a 80 y.o. who presents with complaints of chest pain and distress    Vital signs are notable for hypertension, physical exam shows the patient is in acute distress, initially clutching her chest however does not exhibit purposeful movement in all 4 extremities, no clonus or rigidity, no abdominal tenderness, no evidence of rash or trauma or over toxidrome. Additionally on examination she had intermittent episodes of apnea, appeared to have decreased respiratory drive. All concerning and warranted emergent resuscitation. IV access was established quickly, initial EKG showed sinus tachycardia without evidence of acute ischemia with normal intervals and no sodium channel blockade evidence point-of-care sugar was in the 80s. Given her concerning status and concerns for respiratory impairment, advanced airway equipment was brought to the bedside. Ddx: Overall the differential is concerning and includes ACS vs CVA vs aortic dissection vs seizure vs acute intoxication versus metabolic encephalopathy versus otherwise. Trialed with Ativan with notable improvement of her mental status. However given her presentation proceeded with aggressive. Workup. Plan: Workup will include CTA Head and Neck, Dissection study, CBC, CMP, Coma panel, Lactate, Troponin. Will monitor closely and reassess. Reassessment/Disposition: Work-up unremarkable for acute abnormality. Additionally throughout her stay, the patient's mental status notably improved. She was mentating at her baseline. She reports she recalls the event prior to coming to the hospital.  Reports that she was in an argument with her son and reports that it was quite stressful and from that standpoint, she reports she was unable to cope and began complaining of chest pain. At time of dispo, she was pain free and at her baseline. Workup negative and vitals stable. Given this, felt safe to send home. Advised on return precautions and recommended follow up. Amount and/or Complexity of Data Reviewed  Labs: ordered. Decision-making details documented in ED Course.   Radiology: ordered. Risk  Prescription drug management. - Reviewed relevant past office visits/hospitalizations/procedures  -Obtained pertinent history that influenced decision making from the patient    ED Course as of 10/17/23 0016   Mon Oct 16, 2023   1736 pH, Cam(!): 7.439   1736 POC Glucose: 83   1804 hs TnI 0hr: 4   1953 Delta 2hr hsTnI: 0   1953 Pt well appearing at this time. Workup negative. Appears at her baseline. Will DC home. Likely consistent with anxiety/conversion disorder      Final Dispo   Final Diagnosis:  1. Panic attack    2. Conversion disorder      Time reflects when diagnosis was documented in both MDM as applicable and the Disposition within this note       Time User Action Codes Description Comment    10/16/2023  8:08 PM Mick Rodriguez [F41.0] Panic attack     10/16/2023  8:08 PM Mick Rodriguez [F44.9] Conversion disorder           ED Disposition       ED Disposition   Discharge    Condition   Stable    Date/Time   Mon Oct 16, 2023  8:08 PM    Comment   Carlos Luz discharge to home/self care.                    Follow-up Information    None       Medications   ondansetron (ZOFRAN) injection 4 mg (4 mg Intravenous Given 10/16/23 1813)   HYDROmorphone (DILAUDID) injection 1 mg (1 mg Intravenous Given 10/16/23 1813)   LORazepam (ATIVAN) injection 4 mg (4 mg Intravenous Given 10/16/23 1727)   iohexol (OMNIPAQUE) 350 MG/ML injection (SINGLE-DOSE) 100 mL (100 mL Intravenous Given 10/16/23 1754)   iohexol (OMNIPAQUE) 350 MG/ML injection (SINGLE-DOSE) 80 mL (80 mL Intravenous Given 10/16/23 1754)       Risk Stratification Tools                Orders Placed This Encounter   Procedures    Critical Care    Blood culture    CTA dissection protocol chest/abdomen/pelvis    CTA head and neck with and without contrast    CBC and differential    Comprehensive metabolic panel    HS Troponin 0hr (reflex protocol)    Lactic acid, plasma (w/reflex if result > 2.0)    Procalcitonin    Ethanol Salicylate level    Acetaminophen level-If concentration is detectable, please discuss with medical  on call. Blood gas, venous    HS Troponin I 2hr    RBC Morphology Reflex Test    Insert peripheral IV    ECG 12 lead    ECG 12 lead repeat Q30 minutes PRN persistent chest pain x 2    ECG 12 lead       Labs:     Labs Reviewed   CBC AND DIFFERENTIAL - Abnormal       Result Value Ref Range Status    WBC 11.25 (*) 4.31 - 10.16 Thousand/uL Final    RBC 5.15 (*) 3.81 - 5.12 Million/uL Final    Hemoglobin 14.2  11.5 - 15.4 g/dL Final    Hematocrit 44.9  34.8 - 46.1 % Final    MCV 87  82 - 98 fL Final    MCH 27.6  26.8 - 34.3 pg Final    MCHC 31.6  31.4 - 37.4 g/dL Final    RDW 14.2  11.6 - 15.1 % Final    MPV 14.4 (*) 8.9 - 12.7 fL Final    Comment: This is an appended report. These results have been appended to a previously preliminary verified report. Platelets 849  175 - 390 Thousands/uL Final   COMPREHENSIVE METABOLIC PANEL - Abnormal    Sodium 140  135 - 147 mmol/L Final    Potassium 5.2  3.5 - 5.3 mmol/L Final    Comment: Moderately Hemolyzed:Results may be affected. Chloride 102  96 - 108 mmol/L Final    CO2 25  21 - 32 mmol/L Final    ANION GAP 13  mmol/L Final    BUN 13  5 - 25 mg/dL Final    Creatinine 1.04  0.60 - 1.30 mg/dL Final    Comment: Standardized to IDMS reference method    Glucose 91  65 - 140 mg/dL Final    Comment: If the patient is fasting, the ADA then defines impaired fasting glucose as > 100 mg/dL and diabetes as > or equal to 123 mg/dL. Calcium 9.8  8.4 - 10.2 mg/dL Final    AST 40 (*) 13 - 39 U/L Final    Comment: Moderately Hemolyzed:Results may be affected. ALT 23  7 - 52 U/L Final    Comment: Specimen collection should occur prior to Sulfasalazine administration due to the potential for falsely depressed results. Alkaline Phosphatase 56  34 - 104 U/L Final    Total Protein 8.1  6.4 - 8.4 g/dL Final    Comment: Moderately Hemolyzed:Results may be affected. Albumin 4.2  3.5 - 5.0 g/dL Final    Comment: Moderately Hemolyzed:Results may be affected. Total Bilirubin 0.63  0.20 - 1.00 mg/dL Final    Comment: Use of this assay is not recommended for patients undergoing treatment with eltrombopag due to the potential for falsely elevated results. N-acetyl-p-benzoquinone imine (metabolite of Acetaminophen) will generate erroneously low results in samples for patients that have taken an overdose of Acetaminophen. eGFR 55  ml/min/1.73sq m Final    Narrative:     [place] guidelines for Chronic Kidney Disease (CKD):     Stage 1 with normal or high GFR (GFR > 90 mL/min/1.73 square meters)    Stage 2 Mild CKD (GFR = 60-89 mL/min/1.73 square meters)    Stage 3A Moderate CKD (GFR = 45-59 mL/min/1.73 square meters)    Stage 3B Moderate CKD (GFR = 30-44 mL/min/1.73 square meters)    Stage 4 Severe CKD (GFR = 15-29 mL/min/1.73 square meters)    Stage 5 End Stage CKD (GFR <15 mL/min/1.73 square meters)  Note: GFR calculation is accurate only with a steady state creatinine   LACTIC ACID, PLASMA (W/REFLEX IF RESULT > 2.0) - Abnormal    LACTIC ACID 2.2 (*) 0.5 - 2.0 mmol/L Final    Narrative:     Result may be elevated if tourniquet was used during collection.    ACETAMINOPHEN LEVEL - Abnormal    Acetaminophen Level <10 (*) 10 - 20 ug/mL Final   BLOOD GAS, VENOUS - Abnormal    pH, Cam 7.439 (*) 7.300 - 7.400 Final    pCO2, Cam 36.4 (*) 42.0 - 50.0 mm Hg Final    pO2, Cam 41.0  35.0 - 45.0 mm Hg Final    HCO3, Cam 24.1  24 - 30 mmol/L Final    Base Excess, Cam 0.4  mmol/L Final    O2 Content, Cam 15.7  ml/dL Final    O2 HGB, VENOUS 73.9  60.0 - 80.0 % Final   MANUAL DIFFERENTIAL(PHLEBS DO NOT ORDER) - Abnormal    Segmented % 53  43 - 75 % Final    Bands % 1  0 - 8 % Final    Lymphocytes % 30  14 - 44 % Final    Monocytes % 10  4 - 12 % Final    Eosinophils, % 0  0 - 6 % Final    Basophils % 0  0 - 1 % Final    Atypical Lymphocytes % 6 (*) <=0 % Final Absolute Neutrophils 6.08  1.85 - 7.62 Thousand/uL Final    Lymphocytes Absolute 4.05  0.60 - 4.47 Thousand/uL Final    Monocytes Absolute 1.13  0.00 - 1.22 Thousand/uL Final    Eosinophils Absolute 0.00  0.00 - 0.40 Thousand/uL Final    Basophils Absolute 0.00  0.00 - 0.10 Thousand/uL Final    Total Counted     Final    RBC Morphology Present   Final    Platelet Estimate Adequate  Adequate Final    Poikilocytes Present   Final    Polychromasia Present   Final   HS TROPONIN I 0HR - Normal    hs TnI 0hr 4  "Refer to ACS Flowchart"- see link ng/L Final    Comment:                                              Initial (time 0) result  If >=50 ng/L, Myocardial injury suggested ;  Type of myocardial injury and treatment strategy  to be determined. If 5-49 ng/L, a delta result at 2 hours and or 4 hours will be needed to further evaluate. If <4 ng/L, and chest pain has been >3 hours since onset, patient may qualify for discharge based on the HEART score in the ED. If <5 ng/L and <3hours since onset of chest pain, a delta result at 2 hours will be needed to further evaluate. HS Troponin 99th Percentile URL of a Health Population=12 ng/L with a 95% Confidence Interval of 8-18 ng/L. Second Troponin (time 2 hours)  If calculated delta >= 20 ng/L,  Myocardial injury suggested ; Type of myocardial injury and treatment strategy to be determined. If 5-49 ng/L and the calculated delta is 5-19 ng/L, consult medical service for evaluation. Continue evaluation for ischemia on ecg and other possible etiology and repeat hs troponin at 4 hours. If delta is <5 ng/L at 2 hours, consider discharge based on risk stratification via the HEART score (if in ED), or MALIK risk score in IP/Observation. HS Troponin 99th Percentile URL of a Health Population=12 ng/L with a 95% Confidence Interval of 8-18 ng/L.    PROCALCITONIN TEST - Normal    Procalcitonin <0.05  <=0.25 ng/ml Final    Comment: Suspected Lower Respiratory Tract Infection (LRTI):  - LESS than or EQUAL to 0.25 ng/mL:   low likelihood for bacterial LRTI; antibiotics DISCOURAGED.  - GREATER than 0.25 ng/mL:   increased likelihood for bacterial LRTI; antibiotics ENCOURAGED. Suspected Sepsis:  - Strongly consider initiating antibiotics in ALL UNSTABLE patients. - LESS than or EQUAL to 0.5 ng/mL:   low likelihood for bacterial sepsis; antibiotics DISCOURAGED.  - GREATER than 0.5 ng/mL:   increased likelihood for bacterial sepsis; antibiotics ENCOURAGED.  - GREATER than 2 ng/mL:   high risk for severe sepsis / septic shock; antibiotics strongly ENCOURAGED. Decisions on antibiotic use should not be based solely on Procalcitonin (PCT) levels. If PCT is low but uncertainty exists with stopping antibiotics, repeat PCT in 6-24 hours to confirm the low level. If antibiotics are administered (regardless if initial PCT was high or low), repeat PCT every 1-2 days to consider early antibiotic cessation (when GREATER than 80% decrease from the peak OR when PCT drops below designated cutoffs, whichever comes first), so long as the infection is NOT one that typically requires prolonged treatment durations (e.g., bone/joint infections, endocarditis, Staph. aureus bacteremia).     Situations of FALSE-POSITIVE Procalcitonin values:  1) Newborns < 67 hours old  2) Massive stress from severe trauma / burns, major surgery, acute pancreatitis, cardiogenic / hemorrhagic shock, sickle cell crisis, or other organ perfusion abnormalities  3) Malaria and some Candidal infections  4) Treatment with agents that stimulate cytokines (e.g., OKT3, anti-lymphocyte globulins, alemtuzumab, IL-2, granulocyte transfusion [NOT GCSFs])  5) Chronic renal disease causes elevated baseline levels (consider GREATER than 0.75 ng/mL as an abnormal cut-off); initiating HD/CRRT may cause transient decreases  6) Paraneoplastic syndromes from medullary thyroid or SCLC, some forms of vasculitis, and acute [id number]-hl-xizb disease    Situations of FALSE-NEGATIVE Procalcitonin values:  1) Too early in clinical course for PCT to have reached its peak (may repeat in 6-24 hours to confirm low level)  2) Localized infection WITHOUT systemic (SIRS / sepsis) response (e.g., an abscess, osteomyelitis, cystitis)  3) Mycobacteria (e.g., Tuberculosis, MAC)  4) Cystic fibrosis exacerbations     MEDICAL ALCOHOL - Normal    Ethanol Lvl <10  <10 mg/dL Final   SALICYLATE LEVEL - Normal    Salicylate Lvl <5  3 - 20 mg/dL Final   HS TROPONIN I 2HR - Normal    hs TnI 2hr 4  "Refer to ACS Flowchart"- see link ng/L Final    Comment:                                              Initial (time 0) result  If >=50 ng/L, Myocardial injury suggested ;  Type of myocardial injury and treatment strategy  to be determined. If 5-49 ng/L, a delta result at 2 hours and or 4 hours will be needed to further evaluate. If <4 ng/L, and chest pain has been >3 hours since onset, patient may qualify for discharge based on the HEART score in the ED. If <5 ng/L and <3hours since onset of chest pain, a delta result at 2 hours will be needed to further evaluate. HS Troponin 99th Percentile URL of a Health Population=12 ng/L with a 95% Confidence Interval of 8-18 ng/L. Second Troponin (time 2 hours)  If calculated delta >= 20 ng/L,  Myocardial injury suggested ; Type of myocardial injury and treatment strategy to be determined. If 5-49 ng/L and the calculated delta is 5-19 ng/L, consult medical service for evaluation. Continue evaluation for ischemia on ecg and other possible etiology and repeat hs troponin at 4 hours. If delta is <5 ng/L at 2 hours, consider discharge based on risk stratification via the HEART score (if in ED), or MALIK risk score in IP/Observation. HS Troponin 99th Percentile URL of a Health Population=12 ng/L with a 95% Confidence Interval of 8-18 ng/L.     Delta 2hr hsTnI 0  <20 ng/L Final   POCT GLUCOSE - Normal    POC Glucose 83  65 - 140 mg/dl Final   BLOOD CULTURE    Blood Culture Received in Microbiology Lab. Culture in Progress. Preliminary   RBC MORPHOLOGY REFLEX TEST   COMA PANEL    Narrative: The following orders were created for panel order Coma Panel. Procedure                               Abnormality         Status                     ---------                               -----------         ------                     [id number]]                      Normal              Final result               Salicylate [id number]]             Normal              Final result               Acetaminophen level-If c. Arnold Curia Arnold Cortes [[id number]]  Abnormal            Final result                 Please view results for these tests on the individual orders. Imaging:     CTA dissection protocol chest/abdomen/pelvis   Final Result by Gautam Calvo MD (10/16 0273)      1) No thoracic or abdominal aortic aneurysm, intramural hematoma or dissection. 2) No central or lobar pulmonary embolism. 3) Several faint, small, mostly peripheral groundglass opacities in the lungs, which are likely infectious. Based on appearance consider viral pneumonia. 4) Tubular, fluid-filled structure measuring 7 mm in diameter, inseparable from the left ovary, likely representing a mild hydrosalpinx of unknown chronicity. Recommend confirmation with pelvic ultrasound, which may be nonemergent if patient not    reporting left-sided pain. 5) No other acute abdominal or pelvic pathology. 6) Extravasated IV contrast material seen in the proximal left upper extremity. Please note this may place patient at risk for compartment syndrome. 7) Additional findings as above. Workstation performed: [id number]         CTA head and neck with and without contrast   Final Result by Rita Zavala MD (10/16 4807)      No evidence of acute intracranial hemorrhage. No evidence of hemodynamic significant stenosis, aneurysm or dissection. Workstation performed: [id number]            All details of the evaluation and treatment plan were made clear and additionally all questions and concerns were addressed while under my care. Portions of the record may have been created with voice recognition software. Occasional wrong word or "sound a like" substitutions may have occurred due to the inherent limitations of voice recognition software. Read the chart carefully and recognize, using context, where substitutions have occurred. The attending physician physically available and evaluated the above patient alongside myself.       Leonor Krishnamurthy MD  10/17/23 0650

## 2023-10-16 NOTE — PROGRESS NOTES
Daily Note     Today's date: 10/16/2023  Patient name: Vickey Edmond  : 1955  MRN: [id number]  Referring provider: Gen Hassan  Dx:   Encounter Diagnosis     ICD-10-CM    1. Acute back pain with sciatica, left  M54.42       2. Acute left-sided low back pain with left-sided sciatica  M54.42           Start Time: 1100  Stop Time: 1145  Total time in clinic (min): 45 minutes    Subjective: Pt reports that she feels better. Most of her pain has resolved. She continues to have issues w/ prolonged positions since her pain is mostly after waking up or sitting for long periods. Objective: See treatment diary below      Assessment: Tolerated treatment well. Pt reports a small pinching pain in her knee when standing and when walking. This was resolved following BEATRIZ. Pt tolerate exercises well but is deconditioned. She was fatigued following VG and STS. Her pinching pain returned during STS but resolved when she was rested. Continue progressing pt for core strength and improving function. Patient demonstrated fatigue post treatment, exhibited good technique with therapeutic exercises, and would benefit from continued PT      Plan: Continue per plan of care. Precautions: HTN, liver disease       Manuals 10/5 10/9 10/16                                                              Neuro Re-Ed 10/5 10/9 10/16                       Bridge 4x5 NV  2x10 3" hold                                                                           Ther Ex 10/5 10/9 10/16          BEATRIZ  15x 30x  20x with pt.  OP (exhale) BEATRIZ 20x against table w/ pt OP          VG NV  3'          assessment             education  10' 10'          STS   2x10                                                 Ther Activity                                       Gait Training                                       Modalities

## 2023-10-16 NOTE — ED NOTES
Patient transported to [address] [place] [address], [address] [place] [address]  10/16/23 0408

## 2023-10-17 LAB
ATRIAL RATE: 75 BPM
P AXIS: 73 DEGREES
PR INTERVAL: 152 MS
QRS AXIS: -5 DEGREES
QRSD INTERVAL: 84 MS
QT INTERVAL: 368 MS
QTC INTERVAL: 410 MS
T WAVE AXIS: 43 DEGREES
VENTRICULAR RATE: 75 BPM

## 2023-10-17 PROCEDURE — 93010 ELECTROCARDIOGRAM REPORT: CPT | Performed by: INTERNAL MEDICINE

## 2023-10-17 NOTE — DISCHARGE INSTRUCTIONS
Your workup here was not concerning for anything dangerous. Therefore there is no need for you to stay at the hospital for further testing. We feel safe to send you home. You can use Tylenol for management of your symptoms. You should follow up with your PCP to assess for resolution of your symptoms and to determine if there is any further evaluation that needs to be performed. Return to the emergency department if you have any symptoms of worsening chest pain or shortness of breath    Thank you for choosing [address] [place] [address] for your care!
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
## 2023-10-19 ENCOUNTER — TELEPHONE (OUTPATIENT)
Dept: NEUROSURGERY | Facility: CLINIC | Age: 68
End: 2023-10-19

## 2023-10-19 NOTE — PROGRESS NOTES
Daily Note     Today's date: 10/20/2023  Patient name: Denise Szymanski  : 1955  MRN: [id number]  Referring provider: Abril Michel  Dx:   Encounter Diagnosis     ICD-10-CM    1. Acute back pain with sciatica, left  M54.42       2. Acute left-sided low back pain with left-sided sciatica  M54.42           Start Time: 1135  Stop Time: 1215  Total time in clinic (min): 40 minutes    Subjective: Patient reports that she has been feeling good. States that she has not had R knee pain or left back or leg pain      Objective: See treatment diary below      Assessment: Tolerated treatment well. Patient demonstrated fatigue post treatment and exhibited good technique with therapeutic exercises. Performs STS/squats pfree along with pfree step ups of RLE. She has been progressing well and will follow up in 2 weeks, HEP educated. Plan: Continue per plan of care. Precautions: HTN, liver disease       Manuals 10/5 10/9 10/16 10/20                                                             Neuro Re-Ed 10/5 10/9 10/16 10/20                      Bridge 4x5 NV  2x10 3" hold 3x10  3" hold                                                                          Ther Ex 10/5 10/9 10/16 10/20         BEATRIZ  15x 30x  20x with pt.  OP (exhale) BEATRIZ 20x against table w/ pt OP 3x10         VG NV  3' 5'         assessment             education  10' 10' 10'         STS   2x10 10+5 5# KB         Step ups    6"  2x5                                   Ther Activity                                       Gait Training                                       Modalities

## 2023-10-19 NOTE — TELEPHONE ENCOUNTER
Received a call from patient stating her BUN/Cr labs are . Returned call to patient and advised she had these labs completed while she was in the ER so no further labs are needed prior to her CTA. She stated an understanding and was appreciative of the call back.

## 2023-10-20 ENCOUNTER — OFFICE VISIT (OUTPATIENT)
Dept: PHYSICAL THERAPY | Facility: REHABILITATION | Age: 68
End: 2023-10-20
Payer: MEDICARE

## 2023-10-20 DIAGNOSIS — M54.42 ACUTE LEFT-SIDED LOW BACK PAIN WITH LEFT-SIDED SCIATICA: ICD-10-CM

## 2023-10-20 DIAGNOSIS — M54.42 ACUTE BACK PAIN WITH SCIATICA, LEFT: Primary | ICD-10-CM

## 2023-10-20 PROCEDURE — 97110 THERAPEUTIC EXERCISES: CPT | Performed by: PHYSICAL THERAPIST

## 2023-10-21 LAB — BACTERIA BLD CULT: NORMAL

## 2023-10-23 ENCOUNTER — CLINICAL SUPPORT (OUTPATIENT)
Dept: INTERNAL MEDICINE CLINIC | Facility: CLINIC | Age: 68
End: 2023-10-23

## 2023-10-23 DIAGNOSIS — Z23 ENCOUNTER FOR IMMUNIZATION: Primary | ICD-10-CM

## 2023-10-23 PROCEDURE — G0008 ADMIN INFLUENZA VIRUS VAC: HCPCS

## 2023-10-23 PROCEDURE — 90662 IIV NO PRSV INCREASED AG IM: CPT

## 2023-10-23 NOTE — PROGRESS NOTES
Patient on nurse schedule for influenza vaccine. Administered in patients left deltoid, patient tolerated well.

## 2023-10-26 ENCOUNTER — HOSPITAL ENCOUNTER (OUTPATIENT)
Dept: RADIOLOGY | Facility: HOSPITAL | Age: 68
Discharge: HOME | End: 2023-10-26
Payer: MEDICARE

## 2023-10-26 DIAGNOSIS — I67.1 ANEURYSM OF INTERNAL CAROTID ARTERY: ICD-10-CM

## 2023-10-26 PROCEDURE — G1004 CDSM NDSC: HCPCS

## 2023-10-26 PROCEDURE — 70498 CT ANGIOGRAPHY NECK: CPT

## 2023-10-26 PROCEDURE — 70496 CT ANGIOGRAPHY HEAD: CPT

## 2023-10-26 RX ADMIN — IOHEXOL 85 ML: 350 INJECTION, SOLUTION INTRAVENOUS at 14:46

## 2023-10-30 ENCOUNTER — APPOINTMENT (OUTPATIENT)
Dept: PHYSICAL THERAPY | Facility: REHABILITATION | Age: 68
End: 2023-10-30
Payer: MEDICARE

## 2023-11-01 ENCOUNTER — OFFICE VISIT (OUTPATIENT)
Dept: NEUROSURGERY | Facility: CLINIC | Age: 68
End: 2023-11-01
Payer: MEDICARE

## 2023-11-01 VITALS
HEIGHT: 65 IN | OXYGEN SATURATION: 96 % | RESPIRATION RATE: 18 BRPM | TEMPERATURE: 97.3 F | SYSTOLIC BLOOD PRESSURE: 142 MMHG | DIASTOLIC BLOOD PRESSURE: 90 MMHG | HEART RATE: 57 BPM | BODY MASS INDEX: 40.32 KG/M2 | WEIGHT: 242 LBS

## 2023-11-01 DIAGNOSIS — Z01.818 PRE-PROCEDURAL EXAMINATION: Primary | ICD-10-CM

## 2023-11-01 DIAGNOSIS — I67.1 CEREBRAL ANEURYSM: Primary | ICD-10-CM

## 2023-11-01 PROCEDURE — 99213 OFFICE O/P EST LOW 20 MIN: CPT | Performed by: NURSE PRACTITIONER

## 2023-11-01 NOTE — PROGRESS NOTES
Neurosurgery Office Note  Macrina Leary 80 y.o. female MRN: [id number]      Assessment/Plan     Aneurysm of internal carotid artery  Presents for follow up for pipeline embolization of right MCA aneurysm with Dr. Serg Inman on 1/8/2021  Doing well. C/o occasional HA/pressure. BP controlled with lisinopril, elevated recently due to psychosocial stress. Exam non-focal.    Imaging:  CTA head and neck w/wo, 10/26/2023: No acute intracranial abnormality. No recurrent aneurysm in right ICA ophthalmic segment status post pipeline embolization device. Unchanged tiny focal outpouching in expected origin of left posterior communicating artery, likely infundibulum or aneurysm. Negative CTA head and neck for large vessel occlusion, dissection, or high-grade stenosis. Plan:  Reviewed imaging extensively with patient. Discussed stability of findings including what is likely left PCOM infundibulum. Recommend ongoing routine surveillance. Follow up in 1 year with repeat CTA head w/wo as joint appointment with Dr. Serg Inman. Diagnoses and all orders for this visit:    Cerebral aneurysm  -     CTA head w wo contrast; Future          I have spent a total time of 30 minutes on 11/01/23 in caring for this patient including Diagnostic results, Prognosis, Instructions for management, Patient and family education, Importance of tx compliance, Risk factor reductions, Impressions, Counseling / Coordination of care, Documenting in the medical record, Reviewing / ordering tests, medicine, procedures  , and Obtaining or reviewing history  . CHIEF COMPLAINT    Chief Complaint   Patient presents with    Follow-up       HISTORY    History of Present Illness     80y.o. year old female     With past medical history of hypothyroidism, hypertension, GERD, who presents for routine follow-up status post pipeline embolization of right MCA aneurysm with Dr. Serg Inman on 1/8/2021.   Aneurysm was discovered incidentally when she was hospitalized for headaches in March 2020. She relates that she has been very stressed lately secondary to illness and her father, her brother and her son. She has not had time to exercise and often has to be at the hospital with her father. She feels this is causing her blood pressure to be higher than usual.    She continues to endorse occasional pressure headaches. She is retired and lives at home with her son and her father. See Discussion    REVIEW OF SYSTEMS    Review of Systems   Constitutional: Negative. HENT:  Positive for tinnitus (rare). Eyes:  Positive for visual disturbance (blurry and occasional dot in vision). Respiratory:  Negative for shortness of breath (sometimes). Cardiovascular: Negative. Gastrointestinal:  Positive for constipation. Endocrine: Negative. Genitourinary: Negative. Musculoskeletal: Negative. Skin: Negative. Allergic/Immunologic: Negative. Neurological:  Positive for dizziness (almost every other day), light-headedness and numbness (bilateral legs down to toes). Negative for tremors, weakness (L>R hand, legs) and headaches (light). Hematological:  Bruises/bleeds easily (medication). Psychiatric/Behavioral:  Positive for confusion (forgetful), decreased concentration and sleep disturbance. ROS obtained by MA. Reviewed. See HPI.      Meds/Allergies     Current Outpatient Medications   Medication Sig Dispense Refill    acetaminophen (Tylenol 8 Hour Arthritis Pain) 650 mg CR tablet Take 1 tablet (650 mg total) by mouth every 8 (eight) hours as needed for mild pain 90 tablet 3    Aspirin Low Dose 81 MG EC tablet TAKE ONE TABLET BY MOUTH DAILY 90 tablet 3    atorvastatin (LIPITOR) 20 mg tablet Take 1 tablet (20 mg total) by mouth daily 90 tablet 1    Cholecalciferol (Vitamin D High Potency) 25 MCG (1000 UT) capsule Take 1 capsule (1,000 Units total) by mouth daily 90 capsule 1    clotrimazole (LOTRIMIN) 1 % cream Apply topically 2 (two) times a day 30 g 0    gabapentin (NEURONTIN) 100 mg capsule TAKE 1 CAPSULE BY MOUTH TWO TIMES A  capsule 3    levothyroxine (Synthroid) 88 mcg tablet Take 1 tablet (88 mcg total) by mouth daily Take 1 tablet Monday - Friday. 90 tablet 3    levothyroxine 100 mcg tablet TAKE ONE TABLET BY MOUTH TWO TIMES A WEEK 24 tablet 0    lisinopril (ZESTRIL) 20 mg tablet TAKE 1 TABLET (20 MG TOTAL) BY MOUTH DAILY 90 tablet 2    loratadine (CLARITIN) 10 mg tablet TAKE 1 TABLET BY MOUTH DAILY AS NEEDED FOR ALLERGIES 90 tablet 11    naproxen (Naprosyn) 500 mg tablet Take 1 tablet (500 mg total) by mouth 2 (two) times a day with meals 60 tablet 0    loperamide (IMODIUM) 2 mg capsule Take 1 capsule (2 mg total) by mouth 4 (four) times a day as needed for diarrhea (Patient not taking: Reported on 11/1/2023) 30 capsule 0    omeprazole (PriLOSEC) 40 MG capsule Take 1 capsule (40 mg total) by mouth 2 (two) times a day for 14 days (Patient not taking: Reported on 11/1/2023) 28 capsule 0    ondansetron (ZOFRAN) 4 mg tablet Take 1 tablet (4 mg total) by mouth every 8 (eight) hours as needed for nausea or vomiting (Patient not taking: Reported on 11/1/2023) 30 tablet 1     No current facility-administered medications for this visit. Allergies   Allergen Reactions    Tagamet [Cimetidine] Hives       PAST HISTORY    Past Medical History:   Diagnosis Date    Arthritis     Depression     Difficult intubation 01/08/2021 1/8/2021 1st attempt: Mac3, Grade 4 view. 2nd attempt: Mac 3, Grade 4 view, unable to pass bougie, 3rd attempt: Brendia Calkin:, Grade 4 view, unable to pass bougie, 4th attempt:(by attending): Mac3, Grade 4 view unable to pass bougie, 5th attempt(by attending): Glidescope 3, Grade 3 view, ETT placed successfully with stylet.  LMA used successfully to ventilate in between attempts    Disease of thyroid gland     hypo    Edema     LAST ASSESSED: [id number]    GERD (gastroesophageal reflux disease)     Hypercholesterolemia Hyperlipidemia     Hypertension     WELL CONTROLLED. CURRENTLY ON LISINOPRIL. LAST ASSESSED: [id number]    Liver disease     Orthostatic hypotension 2020    Other headache syndrome     Other muscle spasm     LAST ASSESSED: [id number]    Ovarian cyst 2006    Renal calculi      (spontaneous vaginal delivery) 1975    FEMALE       Past Surgical History:   Procedure Laterality Date    BACK SURGERY      HERNIATED DISC (L4/L5)    CHOLECYSTECTOMY      IR CEREBRAL ANGIOGRAPHY  2020    IR CEREBRAL ANGIOGRAPHY  2021    IR CEREBRAL ANGIOGRAPHY / INTERVENTION  2021    TONSILLECTOMY         Social History     Tobacco Use    Smoking status: Former    Smokeless tobacco: Never    Tobacco comments:     pt "quit about 20 years ago"   Vaping Use    Vaping Use: Never used   Substance Use Topics    Alcohol use: Never    Drug use: No       Family History   Problem Relation Age of Onset    Lung cancer Mother     Cancer Mother         MALIGNANT NEOPLASM    Hypertension Father     Seizures Father     Stroke Father     Heart attack Father     Diabetes Brother     Hypertension Son     Lung disease Son     Hyperthyroidism Maternal Aunt     Hypothyroidism Maternal Aunt     Lung cancer Cousin     Heart disease Other         CARDIAC DISORDER    Neuropathy Family          Above history personally reviewed. EXAM    Vitals:Blood pressure 142/90, pulse 57, temperature (!) 97.3 °F (36.3 °C), temperature source Temporal, resp. rate 18, height 5' 5" (1.651 m), weight 110 kg (242 lb), last menstrual period 2003, SpO2 96 %. ,Body mass index is 40.27 kg/m². Physical Exam  Constitutional:       Appearance: She is well-developed. HENT:      Head: Normocephalic and atraumatic. Eyes:      Extraocular Movements: EOM normal.      Pupils: Pupils are equal, round, and reactive to light. Pulmonary:      Effort: Pulmonary effort is normal.   Abdominal:      Palpations: Abdomen is soft.    Musculoskeletal: General: Normal range of motion. Cervical back: Normal range of motion and neck supple. Skin:     General: Skin is warm and dry. Neurological:      General: No focal deficit present. Mental Status: She is alert and oriented to person, place, and time. Mental status is at baseline. Cranial Nerves: Cranial nerves 2-12 are intact. No cranial nerve deficit. Sensory: No sensory deficit. Motor: No weakness. Coordination: Coordination normal. Finger-Nose-Finger Test normal.   Psychiatric:         Speech: Speech normal.         Neurologic Exam     Mental Status   Oriented to person, place, and time. Oriented to person. Oriented to place. Oriented to time. Oriented to year, month and date. Attention: normal. Concentration: normal.   Speech: speech is normal   Level of consciousness: alert    Cranial Nerves   Cranial nerves II through XII intact. CN III, IV, VI   Pupils are equal, round, and reactive to light. Extraocular motions are normal.   Right pupil: Size: 3 mm. Shape: regular. Reactivity: brisk. Consensual response: intact. Accommodation: intact. Left pupil: Size: 3 mm. Shape: regular. Reactivity: brisk. Consensual response: intact. Accommodation: intact. Nystagmus: none   Diplopia: none  Conjugate gaze: present    CN V   Right facial sensation deficit: none  Left facial sensation deficit: none    CN VII   Facial expression full, symmetric.      CN VIII   Hearing: intact    CN IX, X   Palate: symmetric    CN XI   Right sternocleidomastoid strength: normal  Left sternocleidomastoid strength: normal  Right trapezius strength: normal  Left trapezius strength: normal    CN XII   Tongue: not atrophic  Fasciculations: absent  Tongue deviation: none    Motor Exam   Muscle bulk: normal  Overall muscle tone: normal  Right arm pronator drift: absent  Left arm pronator drift: absent    Strength   Right deltoid: 5/5  Left deltoid: 5/5  Right biceps: 5/5  Left biceps: 5/5  Right triceps: 5/5  Left triceps: 5/5  Right quadriceps: 5/5  Left quadriceps: 5/5  Right hamstrin/5  Left hamstrin/5  Right anterior tibial: 5/5  Left anterior tibial: 5/5  Right posterior tibial: 5/5  Left posterior tibial: 5/5  Right peroneal: 5/5  Left peroneal: 5/5  Right gastroc: 5/5  Left gastroc: 5/5    Sensory Exam   Light touch normal.   Proprioception normal.     Gait, Coordination, and Reflexes     Coordination   Finger to nose coordination: normal    Tremor   Resting tremor: absent  Intention tremor: absent  Action tremor: absent        MEDICAL DECISION MAKING    Imaging Studies:     CTA head and neck w wo contrast    Result Date: 10/31/2023  Narrative: CTA NECK AND BRAIN WITH AND WITHOUT CONTRAST INDICATION: I67.1: Cerebral aneurysm, nonruptured  s/p right ophthalmic aneurysm pipeline embolization; Carotid artery aneurysm COMPARISON:   CTA head and neck with and without contrast + CTA dissectin protocol chest abdomen and pelvis with contrast 10/16/2023. IR cerebral angiography 2023, 2020. IR cerebral angio intervention 2021. CTA head with and without contrast 2020. MRA head without contrast 2022. TECHNIQUE:  Routine CT imaging of the Brain without contrast.  Post contrast imaging was performed after administration of iodinated contrast through the neck and brain. Post contrast axial 0.625 mm images timed to opacify the arterial system. 3D rendering was performed on an independent workstation. MIP reconstructions performed. Coronal reconstructions were performed of the noncontrast portion of the brain. Radiation dose length product (DLP) for this visit:  1392.39 mGy-cm . This examination, like all CT scans performed in the [facility], was performed utilizing techniques to minimize radiation dose exposure, including the use of iterative reconstruction and automated exposure control.  IV Contrast:  85 mL of iohexol (OMNIPAQUE) IMAGE QUALITY:   Diagnostic FINDINGS: NONCONTRAST BRAIN PARENCHYMA: Decreased attenuation is noted in periventricular and subcortical white matter demonstrating an appearance that is statistically most likely to represent mild microangiopathic change. No CT signs of acute infarction. No intracranial mass, mass effect or midline shift. No acute parenchymal hemorrhage. VENTRICLES AND EXTRA-AXIAL SPACES:  Normal for the patient's age. VISUALIZED ORBITS: Normal visualized orbits. PARANASAL SINUSES: Normal visualized paranasal sinuses. CERVICAL VASCULATURE AORTIC ARCH AND GREAT VESSELS:  Mild atherosclerotic disease of the arch, proximal great vessels and visualized subclavian vessels. No significant stenosis. RIGHT VERTEBRAL ARTERY CERVICAL SEGMENT:  Normal origin. The vessel is normal in caliber throughout the neck. LEFT VERTEBRAL ARTERY CERVICAL SEGMENT:  Normal origin. The vessel is normal in caliber throughout the neck. RIGHT EXTRACRANIAL CAROTID SEGMENT:  Normal caliber common carotid artery. Mild calcified atherosclerotic disease of carotid bifurcation and proximal cervical internal carotid artery. No stenosis or dissection. LEFT EXTRACRANIAL CAROTID SEGMENT:  Normal caliber common carotid artery. Mild calcified atherosclerotic disease of carotid bifurcation and proximal cervical internal carotid artery. No stenosis or dissection. NASCET criteria was used to determine the degree of internal carotid artery diameter stenosis. INTRACRANIAL VASCULATURE INTERNAL CAROTID ARTERIES: Sequelae of pipeline embolization device in right ICA paraopthlamic to supraclinoid segment. No residual or recurrent aneurysm in right ICA ophthalmic segment. Otherwise, normal enhancement of the intracranial portions of the internal carotid arteries. Minimal calcified atherosclerotic disease in left ICA cavernous segment. Normal ophthalmic artery origins. Normal ICA terminus. ANTERIOR CIRCULATION: Unchanged markedly hypoplastic right A1 segment, normal variant. Left A1 segment. Anterior cerebral arteries with normal enhancement. Normal anterior communicating artery. MIDDLE CEREBRAL ARTERY CIRCULATION:  M1 segment and middle cerebral artery branches demonstrate normal enhancement bilaterally. DISTAL VERTEBRAL ARTERIES:  Normal distal vertebral arteries. Posterior inferior cerebellar artery origins are normal. Normal vertebral basilar junction. BASILAR ARTERY:  Basilar artery is normal in caliber. Normal superior cerebellar arteries. POSTERIOR CEREBRAL ARTERIES: Both posterior cerebral arteries arises from the basilar tip. Both arteries demonstrate normal enhancement. Unchanged tiny focal outpouching in expected origin of left posterior communicating artery (6:154), likely infundibulum or aneurysm. Absent right posterior communicating artery. VENOUS STRUCTURES:  Normal. NON VASCULAR ANATOMY BONY STRUCTURES:  No acute osseous abnormality. Mild-to-moderate multilevel degenerative changes of cervical spine, worse at C5-C6. SOFT TISSUES OF THE NECK: 0.9 cm heterogeneously enhancing nodule in right thyroid lobe (5:203). Incidental discovery of one or more thyroid nodule(s) measuring less than 1.5 cm and without suspicious features is noted in this patient who is above 28years old; according to guidelines published in the February 2015 white paper on incidental thyroid nodules in the Journal of the [employer] of Radiology [facility]), no further evaluation is recommended. THORACIC INLET:  Normal.     Impression: No acute intracranial abnormality. No recurrent aneurysm in right ICA ophthalmic segment status post pipeline embolization device. Unchanged tiny focal outpouching in expected origin of left posterior communicating artery, likely infundibulum or aneurysm. Negative CTA head and neck for large vessel occlusion, dissection, or high-grade stenosis. Additional chronic/incidental findings as detailed above.  Workstation performed: [id number]     CTA head and neck with and without contrast    Result Date: 10/16/2023  Narrative: CTA NECK AND BRAIN WITH AND WITHOUT CONTRAST INDICATION: Neuro deficit, acute, stroke suspected ams, aphasic COMPARISON:   10/10/2022 TECHNIQUE:  Routine CT imaging of the Brain without contrast.  Post contrast imaging was performed after administration of iodinated contrast through the neck and brain. Post contrast axial 0.625 mm images timed to opacify the arterial system. 3D rendering was performed on an independent workstation. MIP reconstructions performed. Coronal reconstructions were performed of the noncontrast portion of the brain. Radiation dose length product (DLP) for this visit:  1498.61 mGy-cm . This examination, like all CT scans performed in the [facility], was performed utilizing techniques to minimize radiation dose exposure, including the use of iterative reconstruction and automated exposure control. IV Contrast:  80 mL of iohexol (OMNIPAQUE) 100 mL of iohexol (OMNIPAQUE) IMAGE QUALITY:   Diagnostic FINDINGS: NONCONTRAST BRAIN PARENCHYMA: Decreased attenuation is noted in periventricular and subcortical white matter demonstrating an appearance that is statistically most likely to represent mild microangiopathic change. No CT signs of acute infarction. No intracranial mass, mass effect or midline shift. No acute parenchymal hemorrhage. VENTRICLES AND EXTRA-AXIAL SPACES:  Normal for the patient's age. VISUALIZED ORBITS: Normal visualized orbits. PARANASAL SINUSES: Normal visualized paranasal sinuses. CERVICAL VASCULATURE AORTIC ARCH AND GREAT VESSELS:  Normal aortic arch and great vessel origins. Normal visualized subclavian vessels. RIGHT VERTEBRAL ARTERY CERVICAL SEGMENT:  Normal origin. The vessel is normal in caliber throughout the neck. LEFT VERTEBRAL ARTERY CERVICAL SEGMENT:  Normal origin. The vessel is normal in caliber throughout the neck. RIGHT EXTRACRANIAL CAROTID SEGMENT:  Normal caliber common carotid artery. Normal bifurcation and cervical internal carotid artery. No stenosis or dissection. LEFT EXTRACRANIAL CAROTID SEGMENT:  Normal caliber common carotid artery. Normal bifurcation and cervical internal carotid artery. No stenosis or dissection. NASCET criteria was used to determine the degree of internal carotid artery diameter stenosis. INTRACRANIAL VASCULATURE INTERNAL CAROTID ARTERIES: Patent right carotid siphon Pipeline stent. No evidence of recurrent aneurysm. Otherwise, normal enhancement of the intracranial portions of the internal carotid arteries. Normal ophthalmic artery origins. Normal ICA terminus. ANTERIOR CIRCULATION:  Symmetric A1 segments and anterior cerebral arteries with normal enhancement. Normal anterior communicating artery. MIDDLE CEREBRAL ARTERY CIRCULATION:  M1 segment and middle cerebral artery branches demonstrate normal enhancement bilaterally. DISTAL VERTEBRAL ARTERIES:  Normal distal vertebral arteries. Posterior inferior cerebellar artery origins are normal. Normal vertebral basilar junction. BASILAR ARTERY:  Basilar artery is normal in caliber. Normal superior cerebellar arteries. POSTERIOR CEREBRAL ARTERIES: Both posterior cerebral arteries arises from the basilar tip. Both arteries demonstrate normal enhancement. Normal posterior communicating arteries. VENOUS STRUCTURES:  Normal. NON VASCULAR ANATOMY BONY STRUCTURES:  No acute osseous abnormality. SOFT TISSUES OF THE NECK:  Unremarkable. THORACIC INLET:  Normal.     Impression: No evidence of acute intracranial hemorrhage. No evidence of hemodynamic significant stenosis, aneurysm or dissection. Workstation performed: [id number]     CTA dissection protocol chest/abdomen/pelvis    Result Date: 10/16/2023  Narrative: CTA - CHEST, ABDOMEN AND PELVIS - WITHOUT AND WITH IV CONTRAST INDICATION:   ams, chest pain. As per review of electronic medical record, patient with acute onset of chest pain and subsequent unresponsiveness. COMPARISON: CT of the chest from 6/26/2018 and CT of the abdomen pelvis from 10/26/2017. TECHNIQUE: CT examination of the chest, abdomen and pelvis was performed both prior to and after the administration of intravenous contrast.   The noncontrast portion of this examination was performed utilizing low radiation dose technique. Thin section angiographic arterial phase post contrast technique was used in order to evaluate for aortic dissection. 3D reformatted images and volume rendering were performed on an independent workstation. Multiplanar 2D reformatted images were created from the source data. Radiation dose length product (DLP) for this visit:  3691.05 mGy-cm . This examination, like all CT scans performed in the [facility], was performed utilizing techniques to minimize radiation dose exposure, including the use of iterative reconstruction and automated exposure control. IV Contrast:  180 mL of iohexol (OMNIPAQUE) Enteric Contrast:  Enteric contrast was not administered. FINDINGS: Evaluation is limited by streak artifact from patient's arms, which could not be placed overhead. There is hyperdense material compatible with extravasated IV contrast in the visualized left upper extremity proximally. VASCULAR:  The thoracic aorta is normal in caliber without evidence of dissection or intramural hematoma. The great vessels off the aortic arch are patent. The abdominal aorta is patent and normal in caliber. No aortic dissection or intramural hematoma. The celiac axis, superior mesenteric artery, inferior mesenteric artery and bilateral renal arteries are patent. The left gastric artery arises directly from the aorta, just above the celiac trunk. There is also a replaced right hepatic artery arising directly from the aorta just inferior to the celiac trunk. These vessels are also patent. The bilateral common iliac arteries are patent and normal in caliber.  The bilateral internal and external iliac arteries are also patent. Mild atherosclerotic calcifications are seen. No central or lobar pulmonary embolism. Evaluation of the rest of the pulmonary arteries is limited. CHEST BRONCHOPULMONARY:  Clear central airways. Several faint, mostly peripheral groundglass opacities are seen in the lungs. There is a 5 mm right lower lobe pulmonary nodule (series 4 image 117), a 3 mm right lower lobe nodule (series 4 image 128), a 4 mm right middle lobe nodule (series 4 image 118), and right upper lobe micronodules (series 4 image 66 and 77). These are unchanged from June 2018. Stability over this time period suggests benign etiology. PLEURA:  No pleural effusions. No pneumothorax. HEART: The heart is normal in size. No pericardial effusion. MEDIASTINUM/LYMPH NODES:  No axillary, mediastinal or hilar lymphadenopathy. Small hiatal hernia. CHEST WALL AND LOWER NECK:  Unremarkable. ABDOMEN LIVER/BILIARY TREE:  Normal liver morphology. The arterial phase appearance of the liver is within normal limits. No focal hepatic lesions. There is an area of decreased attenuation of the liver parenchyma adjacent to the falciform ligament, given the location, likely focal fat deposition. No intrahepatic biliary ductal dilation. The common bile duct is mildly dilated up to 8 mm in diameter, most likely related to postcholecystectomy state. No radiopaque choledocholithiasis. GALLBLADDER:  The patient is status post cholecystectomy. SPLEEN: Unremarkable. PANCREAS:  Unremarkable. Normal caliber main pancreatic duct. ADRENAL GLANDS:  Unremarkable. KIDNEYS/URETERS:  Symmetric renal enhancement. Excreted IV contrast from the earlier CT angiogram of the head and neck is seen in the bilateral collecting systems. No intrarenal or ureteral calculi. No hydronephrosis. No focal renal lesions.  STOMACH AND BOWEL:  Evaluation of the gastrointestinal tract is somewhat limited by underdistention and lack of oral contrast.  No bowel obstruction or convincing inflammation. Scattered colonic diverticuli, however no evidence of diverticulitis. APPENDIX:  Normal appendix. ABDOMINOPELVIC CAVITY:  No ascites or pneumoperitoneum. LYMPH NODES: No abdominal or pelvic lymphadenopathy. PELVIS REPRODUCTIVE ORGANS: The CT appearance of the uterus is within normal limits. There is a tubular, fluid-filled structure inseparable from the left ovary measuring 7 mm in diameter, most likely representing a mild hydrosalpinx. URINARY BLADDER: There is a small amount of hyperdense material within the urinary bladder, compatible with excreted IV contrast from the earlier CT angiogram of the head and neck. ABDOMINAL WALL/INGUINAL REGIONS: No ventral abdominal wall hernia. MUSCULOSKELETAL:  No focal aggressive osseous lesions. Degenerative changes of the spine. Impression: 1) No thoracic or abdominal aortic aneurysm, intramural hematoma or dissection. 2) No central or lobar pulmonary embolism. 3) Several faint, small, mostly peripheral groundglass opacities in the lungs, which are likely infectious. Based on appearance consider viral pneumonia. 4) Tubular, fluid-filled structure measuring 7 mm in diameter, inseparable from the left ovary, likely representing a mild hydrosalpinx of unknown chronicity. Recommend confirmation with pelvic ultrasound, which may be nonemergent if patient not reporting left-sided pain. 5) No other acute abdominal or pelvic pathology. 6) Extravasated IV contrast material seen in the proximal left upper extremity. Please note this may place patient at risk for compartment syndrome. 7) Additional findings as above. Workstation performed: [id number]       I have personally reviewed pertinent reports.    and I have personally reviewed pertinent films in PACS

## 2023-11-01 NOTE — ASSESSMENT & PLAN NOTE
Presents for follow up for pipeline embolization of right MCA aneurysm with Dr. Maude Bender on 1/8/2021  Doing well. C/o occasional HA/pressure. BP controlled with lisinopril, elevated recently due to psychosocial stress. Exam non-focal.    Imaging:  CTA head and neck w/wo, 10/26/2023: No acute intracranial abnormality. No recurrent aneurysm in right ICA ophthalmic segment status post pipeline embolization device. Unchanged tiny focal outpouching in expected origin of left posterior communicating artery, likely infundibulum or aneurysm. Negative CTA head and neck for large vessel occlusion, dissection, or high-grade stenosis. Plan:  Reviewed imaging extensively with patient. Discussed stability of findings including what is likely left PCOM infundibulum. Recommend ongoing routine surveillance. Follow up in 1 year with repeat CTA head w/wo as joint appointment with Dr. Maude Bender.

## 2023-11-06 ENCOUNTER — TELEPHONE (OUTPATIENT)
Dept: RADIOLOGY | Facility: HOSPITAL | Age: 68
End: 2023-11-06

## 2023-11-10 ENCOUNTER — HOSPITAL ENCOUNTER (OUTPATIENT)
Dept: RADIOLOGY | Facility: HOSPITAL | Age: 68
Discharge: HOME | End: 2023-11-10
Payer: MEDICARE

## 2023-11-10 VITALS
TEMPERATURE: 96.7 F | HEIGHT: 65 IN | OXYGEN SATURATION: 98 % | WEIGHT: 241.8 LBS | DIASTOLIC BLOOD PRESSURE: 63 MMHG | RESPIRATION RATE: 18 BRPM | BODY MASS INDEX: 40.29 KG/M2 | HEART RATE: 52 BPM | SYSTOLIC BLOOD PRESSURE: 127 MMHG

## 2023-11-10 DIAGNOSIS — K76.0 NAFLD (NONALCOHOLIC FATTY LIVER DISEASE): ICD-10-CM

## 2023-11-10 LAB
ERYTHROCYTE [DISTWIDTH] IN BLOOD BY AUTOMATED COUNT: 15.1 % (ref 11.6–15.1)
GLUCOSE SERPL-MCNC: 86 MG/DL (ref 65–140)
HCT VFR BLD AUTO: 37.7 % (ref 34.8–46.1)
HGB BLD-MCNC: 12.1 G/DL (ref 11.5–15.4)
INR PPP: 0.98 (ref 0.84–1.19)
MCH RBC QN AUTO: 29 PG (ref 26.8–34.3)
MCHC RBC AUTO-ENTMCNC: 32.1 G/DL (ref 31.4–37.4)
MCV RBC AUTO: 90 FL (ref 82–98)
PLATELET # BLD AUTO: 165 THOUSANDS/UL (ref 149–390)
PMV BLD AUTO: 13.5 FL (ref 8.9–12.7)
PROTHROMBIN TIME: 13.6 SECONDS (ref 11.6–14.5)
RBC # BLD AUTO: 4.17 MILLION/UL (ref 3.81–5.12)
WBC # BLD AUTO: 5.78 THOUSAND/UL (ref 4.31–10.16)

## 2023-11-10 PROCEDURE — 75970 VASCULAR BIOPSY: CPT | Performed by: STUDENT IN AN ORGANIZED HEALTH CARE EDUCATION/TRAINING PROGRAM

## 2023-11-10 PROCEDURE — C1894 INTRO/SHEATH, NON-LASER: HCPCS

## 2023-11-10 PROCEDURE — 47000 NEEDLE BIOPSY OF LIVER PERQ: CPT

## 2023-11-10 PROCEDURE — C1769 GUIDE WIRE: HCPCS

## 2023-11-10 PROCEDURE — C1757 CATH, THROMBECTOMY/EMBOLECT: HCPCS

## 2023-11-10 PROCEDURE — 85027 COMPLETE CBC AUTOMATED: CPT | Performed by: PHYSICIAN ASSISTANT

## 2023-11-10 PROCEDURE — 88307 TISSUE EXAM BY PATHOLOGIST: CPT | Performed by: PATHOLOGY

## 2023-11-10 PROCEDURE — 76942 ECHO GUIDE FOR BIOPSY: CPT

## 2023-11-10 PROCEDURE — 85610 PROTHROMBIN TIME: CPT | Performed by: PHYSICIAN ASSISTANT

## 2023-11-10 PROCEDURE — 99152 MOD SED SAME PHYS/QHP 5/>YRS: CPT | Performed by: STUDENT IN AN ORGANIZED HEALTH CARE EDUCATION/TRAINING PROGRAM

## 2023-11-10 PROCEDURE — 37200 TRANSCATHETER BIOPSY: CPT | Performed by: STUDENT IN AN ORGANIZED HEALTH CARE EDUCATION/TRAINING PROGRAM

## 2023-11-10 PROCEDURE — C1887 CATHETER, GUIDING: HCPCS

## 2023-11-10 PROCEDURE — 88313 SPECIAL STAINS GROUP 2: CPT | Performed by: PATHOLOGY

## 2023-11-10 PROCEDURE — 76937 US GUIDE VASCULAR ACCESS: CPT | Performed by: STUDENT IN AN ORGANIZED HEALTH CARE EDUCATION/TRAINING PROGRAM

## 2023-11-10 PROCEDURE — 82948 REAGENT STRIP/BLOOD GLUCOSE: CPT

## 2023-11-10 PROCEDURE — 75889 VEIN X-RAY LIVER W/HEMODYNAM: CPT | Performed by: STUDENT IN AN ORGANIZED HEALTH CARE EDUCATION/TRAINING PROGRAM

## 2023-11-10 PROCEDURE — 77002 NEEDLE LOCALIZATION BY XRAY: CPT

## 2023-11-10 PROCEDURE — 36012 PLACE CATHETER IN VEIN: CPT | Performed by: STUDENT IN AN ORGANIZED HEALTH CARE EDUCATION/TRAINING PROGRAM

## 2023-11-10 RX ORDER — FENTANYL CITRATE 50 UG/ML
INJECTION, SOLUTION INTRAMUSCULAR; INTRAVENOUS AS NEEDED
Status: COMPLETED | OUTPATIENT
Start: 2023-11-10 | End: 2023-11-10

## 2023-11-10 RX ORDER — MIDAZOLAM HYDROCHLORIDE 2 MG/2ML
INJECTION, SOLUTION INTRAMUSCULAR; INTRAVENOUS AS NEEDED
Status: COMPLETED | OUTPATIENT
Start: 2023-11-10 | End: 2023-11-10

## 2023-11-10 RX ADMIN — MIDAZOLAM 1 MG: 1 INJECTION INTRAMUSCULAR; INTRAVENOUS at 10:10

## 2023-11-10 RX ADMIN — MIDAZOLAM 1 MG: 1 INJECTION INTRAMUSCULAR; INTRAVENOUS at 10:39

## 2023-11-10 RX ADMIN — FENTANYL CITRATE 25 MCG: 50 INJECTION INTRAMUSCULAR; INTRAVENOUS at 09:54

## 2023-11-10 RX ADMIN — FENTANYL CITRATE 50 MCG: 50 INJECTION INTRAMUSCULAR; INTRAVENOUS at 10:39

## 2023-11-10 RX ADMIN — FENTANYL CITRATE 25 MCG: 50 INJECTION INTRAMUSCULAR; INTRAVENOUS at 10:00

## 2023-11-10 RX ADMIN — IOHEXOL 20 ML: 350 INJECTION, SOLUTION INTRAVENOUS at 11:27

## 2023-11-10 RX ADMIN — MIDAZOLAM 0.5 MG: 1 INJECTION INTRAMUSCULAR; INTRAVENOUS at 09:30

## 2023-11-10 RX ADMIN — FENTANYL CITRATE 25 MCG: 50 INJECTION INTRAMUSCULAR; INTRAVENOUS at 09:30

## 2023-11-10 RX ADMIN — MIDAZOLAM 1 MG: 1 INJECTION INTRAMUSCULAR; INTRAVENOUS at 09:49

## 2023-11-10 RX ADMIN — FENTANYL CITRATE 25 MCG: 50 INJECTION INTRAMUSCULAR; INTRAVENOUS at 10:33

## 2023-11-10 RX ADMIN — MIDAZOLAM 0.5 MG: 1 INJECTION INTRAMUSCULAR; INTRAVENOUS at 10:56

## 2023-11-10 NOTE — BRIEF OP NOTE (RAD/CATH)
INTERVENTIONAL RADIOLOGY PROCEDURE NOTE    Date: 11/10/2023    Procedure:   Procedure Summary       Date: 11/10/23 Room / Location: [address] [place] [address] Interventional Radiology    Anesthesia Start:  Anesthesia Stop:     Procedure: IR BIOPSY TRANSJUGULAR LIVER Diagnosis:       NAFLD (nonalcoholic fatty liver disease)      (NAFLD, please obtain HVPG)    Scheduled Providers:  Responsible Provider:     Anesthesia Type: Not recorded ASA Status: Not recorded            Preoperative diagnosis:   1. NAFLD (nonalcoholic fatty liver disease)         Postoperative diagnosis: Same. Surgeon: Maria Luisa Patrick MD     Assistant: None. No qualified resident was available. Blood loss: Minimal    Specimens: Three 18-gauge x 2 cm cores     Findings:   Successful transjugular liver biopsy with hepatic venogram and pressure measurements. Normal course and caliber of the right hepatic vein without flow-limiting stenosis. Wedged hepatic vein pressure 10 mmHg, free hepatic vein pressure 4 mmHg,  HVPG of 6 mmHg. Complications: None immediate.     Anesthesia: conscious sedation

## 2023-11-10 NOTE — DISCHARGE INSTRUCTIONS
Transjugular Liver Biopsy   WHAT YOU NEED TO KNOW:   A TJLB is a procedure to remove a sample of tissue from your liver. The sample can be sent to a lab and tested for liver disease, cancer, or infection. After the procedure your neck, abdomen, and right shoulder may be sore. You may also have mild swelling and bruising in your neck. These symptoms should get better in 48 to 72 hours. DISCHARGE INSTRUCTIONS:       [address], [place] and [place] patients  Contact Interventional Radiology at [phone] PATIENTS: Contact Interventional Radiology at [phone]   [facility] PATIENTS: Contact Interventional Radiology at [phone] if:  You cannot stop the bleeding from your wound even after you hold firm pressure for 10 minutes. Blood soaks through your bandage. You have severe pain in your abdomen. Your abdomen is larger than usual and feels hard. Your neck is more swollen and you have trouble swallowing. You feel weak or dizzy. Your heart is beating faster than usual.   You have a fever or chills. Your pain does not get better after you take pain medicine. Your wound is red, swollen, or draining pus. You have nausea or are vomiting. Your skin is itchy, swollen, or you have a rash. You have questions or concerns about your condition or care. Medicines: You may need any of the following:  Acetaminophen decreases pain and fever. It is available without a doctor's order. Acetaminophen can cause liver damage if not taken correctly. Take your home medicine as directed. Resume your normal diet. Self-care:   Rest as directed. Do not play sports, exercise, or lift anything heavier than 5 pounds for up to 1 week. Resume your normal diet. Small sips of flat soda will help with mild nausea. Drink liquids as directed. Liquids will help flush the contrast liquid out of your body. Ask how much liquid to drink each day and which liquids are best for you.     Apply firm, steady pressure if bleeding occurs. A small amount of bleeding from your wound is possible. Apply pressure with a clean gauze or towel for 5 to 10 minutes. Call 911 if bleeding becomes heavy or does not stop. Ask your healthcare provider when to take your blood thinner or antiplatelet medicine. You may need to wait 24 to 72 hours to take your medicine. This will prevent bleeding. Follow up with your healthcare provider as directed: Write down your questions so you remember to ask them during your visits. © 2017 [facility] is for End User's use only and may not be sold, redistributed or otherwise used for commercial purposes. All illustrations and images included in CareNotes® are the copyrighted property of [employer] or [employer]. The above information is an  only. It is not intended as medical advice for individual conditions or treatments. Talk to your doctor, nurse or pharmacist before following any medical regimen to see if it is safe and ef       Moderate Sedation   WHAT YOU NEED TO KNOW:   Moderate sedation, or conscious sedation, is medicine used during procedures to help you feel relaxed and calm. You will be awake and able to follow directions without anxiety or pain. You will remember little to none of the procedure. You may feel tired, weak, or unsteady on your feet after you get sedation. You may also have trouble concentrating or short-term memory loss. These symptoms should go away in 24 hours or less. DISCHARGE INSTRUCTIONS:   Call 911 or have someone else call for any of the following: You have sudden trouble breathing. You cannot be woken. Seek care immediately if:   You have a severe headache or dizziness. Your heart is beating faster than usual.  Contact your healthcare provider if:   You have a fever. You have nausea or are vomiting for more than 8 hours after the procedure.       Your skin is itchy, swollen, or you have a rash. You have questions or concerns about your condition or care. Self-care:   Have someone stay with you for 24 hours. This person can drive you to errands and help you do things around the house. This person can also watch for problems. Rest and do quiet activities for 24 hours. Do not exercise, ride a bike, or play sports. Stand up slowly to prevent dizziness and falls. Take short walks around the house with another person. Slowly return to your usual activities the next day. Do not drive or use dangerous machines or tools for 24 hours. You may injure yourself or others. Examples include a lawnmower, saw, or drill. Do not return to work for 24 hours if you use dangerous machines or tools for work. Do not make important decisions for 24 hours. For example, do not sign important papers or invest money. Drink liquids as directed. Liquids help flush the sedation medicine out of your body. Ask how much liquid to drink each day and which liquids are best for you. Eat small, frequent meals to prevent nausea and vomiting. Start with clear liquids such as juice or broth. If you do not vomit after clear liquids, you can eat your usual foods. Do not drink alcohol or take medicines that make you drowsy. This includes medicines that help you sleep and anxiety medicines. Ask your healthcare provider if it is safe for you to take pain medicine. Follow up with your healthcare provider as directed: Write down your questions so you remember to ask them during your visits. © 2017 [facility] Information is for End User's use only and may not be sold, redistributed or otherwise used for commercial purposes. All illustrations and images included in CareNotes® are the copyrighted property of [employer] or [employer]. The above information is an  only. It is not intended as medical advice for individual conditions or treatments.  Talk to your doctor, nurse or pharmacist before following any medical regimen to see if it is safe and effective for you.

## 2023-11-10 NOTE — H&P
Interventional Radiology  History and Physical 11/10/2023     Macrina Leary   1955   [id number]    Assessment/Plan:  70-year-old woman with elevated liver enzymes and elastography demonstrating F3 fibrosis, likely due to NAFLD. She has been referred for transjugular liver biopsy with pressure measurements for further evaluation. Problem List Items Addressed This Visit    None  Visit Diagnoses       NAFLD (nonalcoholic fatty liver disease)        Relevant Orders    IR biopsy transjugular liver               Subjective:     Patient ID: Macrina Leary is a 80 y.o. female. History of Present Illness  70-year-old woman with elevated liver enzymes and elastography demonstrating F3 fibrosis, likely due to NAFLD. She has been referred for transjugular liver biopsy with pressure measurements for further evaluation. Past Medical History:   Diagnosis Date    Arthritis     Depression     Difficult intubation 2021 1st attempt: Mac3, Grade 4 view. 2nd attempt: Mac 3, Grade 4 view, unable to pass bougie, 3rd attempt: Albertus Cons:, Grade 4 view, unable to pass bougie, 4th attempt:(by attending): Mac3, Grade 4 view unable to pass bougie, 5th attempt(by attending): Glidescope 3, Grade 3 view, ETT placed successfully with stylet. LMA used successfully to ventilate in between attempts    Disease of thyroid gland     hypo    Edema     LAST ASSESSED: [id number]    GERD (gastroesophageal reflux disease)     Hypercholesterolemia     Hyperlipidemia     Hypertension     WELL CONTROLLED. CURRENTLY ON LISINOPRIL.  LAST ASSESSED: [id number]    Liver disease     Orthostatic hypotension 2020    Other headache syndrome     Other muscle spasm     LAST ASSESSED: [id number]    Ovarian cyst 2006    Renal calculi      (spontaneous vaginal delivery)     FEMALE        Past Surgical History:   Procedure Laterality Date    BACK SURGERY      HERNIATED DISC (L4/L5)    CHOLECYSTECTOMY      IR CEREBRAL ANGIOGRAPHY  11/6/2020    IR CEREBRAL ANGIOGRAPHY  8/27/2021    IR CEREBRAL ANGIOGRAPHY / INTERVENTION  1/8/2021    TONSILLECTOMY          Social History     Tobacco Use   Smoking Status Former   Smokeless Tobacco Never   Tobacco Comments    pt "quit about 20 years ago"        Social History     Substance and Sexual Activity   Alcohol Use Never        Social History     Substance and Sexual Activity   Drug Use No        Allergies   Allergen Reactions    Tagamet [Cimetidine] Hives       Current Outpatient Medications   Medication Sig Dispense Refill    acetaminophen (Tylenol 8 Hour Arthritis Pain) 650 mg CR tablet Take 1 tablet (650 mg total) by mouth every 8 (eight) hours as needed for mild pain 90 tablet 3    Aspirin Low Dose 81 MG EC tablet TAKE ONE TABLET BY MOUTH DAILY 90 tablet 3    atorvastatin (LIPITOR) 20 mg tablet Take 1 tablet (20 mg total) by mouth daily 90 tablet 1    Cholecalciferol (Vitamin D High Potency) 25 MCG (1000 UT) capsule Take 1 capsule (1,000 Units total) by mouth daily 90 capsule 1    clotrimazole (LOTRIMIN) 1 % cream Apply topically 2 (two) times a day 30 g 0    gabapentin (NEURONTIN) 100 mg capsule TAKE 1 CAPSULE BY MOUTH TWO TIMES A  capsule 3    levothyroxine (Synthroid) 88 mcg tablet Take 1 tablet (88 mcg total) by mouth daily Take 1 tablet Monday - Friday.  90 tablet 3    levothyroxine 100 mcg tablet TAKE ONE TABLET BY MOUTH TWO TIMES A WEEK 24 tablet 0    lisinopril (ZESTRIL) 20 mg tablet TAKE 1 TABLET (20 MG TOTAL) BY MOUTH DAILY 90 tablet 2    loratadine (CLARITIN) 10 mg tablet TAKE 1 TABLET BY MOUTH DAILY AS NEEDED FOR ALLERGIES 90 tablet 11    naproxen (Naprosyn) 500 mg tablet Take 1 tablet (500 mg total) by mouth 2 (two) times a day with meals 60 tablet 0     Current Facility-Administered Medications   Medication Dose Route Frequency Provider Last Rate Last Admin    midazolam (VERSED) injection    PRN Chinmay Mckeon MD   0.5 mg at 11/10/23 0930 Objective:    Vitals:    11/10/23 0805 11/10/23 0930 11/10/23 0933   BP: 108/56 137/92 94/51   Pulse: 60 60 (!) 54   Resp: 18     Temp: 97.5 °F (36.4 °C)     TempSrc: Temporal     SpO2: 96% 100% 100%   Weight: 110 kg (241 lb 12.8 oz)     Height: 5' 5" (1.651 m)       Physical Exam:  General: Well appearing, no acute distress. HEENT: NC/AT. EOMI. CV: RRR  Chest: Normal respiratory effort. Speaking in full sentences. Abdomen: Soft, ND/NT. Skin: Warm and dry  Neuro: Alert and oriented x 3. No focal deficits. Lab Results   Component Value Date    BNP 6.61 05/26/2016      Lab Results   Component Value Date    WBC 5.78 11/10/2023    HGB 12.1 11/10/2023    HCT 37.7 11/10/2023    MCV 90 11/10/2023     11/10/2023     Lab Results   Component Value Date    INR 0.98 11/10/2023    INR 1.00 08/22/2023    INR 1.00 08/24/2021    PROTIME 13.6 11/10/2023    PROTIME 13.4 08/22/2023    PROTIME 13.2 08/24/2021     Lab Results   Component Value Date    PTT 31 08/24/2021         I have personally reviewed pertinent imaging and laboratory results. Code Status: Prior  Advance Directive and Living Will:      Power of :    POLST:      This text is generated with voice recognition software. There may be translation, syntax,  or grammatical errors. If you have any questions, please contact the dictating provider.

## 2023-11-13 ENCOUNTER — TELEPHONE (OUTPATIENT)
Age: 68
End: 2023-11-13

## 2023-11-13 PROCEDURE — 88307 TISSUE EXAM BY PATHOLOGIST: CPT | Performed by: PATHOLOGY

## 2023-11-13 PROCEDURE — 88313 SPECIAL STAINS GROUP 2: CPT | Performed by: PATHOLOGY

## 2023-11-13 NOTE — TELEPHONE ENCOUNTER
Patients GI provider:  katiuska    Number to return call: ( [phone]    Reason for call: Pt calling ... Was told at last appt on 10/16/23 Finish your antibiotic course, then please STOP using omeprazole and anything else for acid reflux for about 1 month. Then, please do the stool study so we can see if your infection. Pt needs stool tet ordered.     Scheduled procedure/appointment date if applicable: Apt/procedure

## 2023-11-15 ENCOUNTER — TELEPHONE (OUTPATIENT)
Dept: GASTROENTEROLOGY | Facility: CLINIC | Age: 68
End: 2023-11-15

## 2023-11-15 NOTE — TELEPHONE ENCOUNTER
Reviewed liver biopsy results with patient which showed minimal steatosis and no fibrosis. However, her HVPG did confirm portal hypertension and so would recommend annual US elastography.  I also congratulated her on her weight loss since August.

## 2023-11-20 ENCOUNTER — OFFICE VISIT (OUTPATIENT)
Dept: GASTROENTEROLOGY | Facility: CLINIC | Age: 68
End: 2023-11-20
Payer: MEDICARE

## 2023-11-20 VITALS
HEART RATE: 58 BPM | WEIGHT: 238.4 LBS | HEIGHT: 65 IN | SYSTOLIC BLOOD PRESSURE: 128 MMHG | BODY MASS INDEX: 39.72 KG/M2 | DIASTOLIC BLOOD PRESSURE: 88 MMHG | TEMPERATURE: 96.1 F

## 2023-11-20 DIAGNOSIS — K76.0 NAFLD (NONALCOHOLIC FATTY LIVER DISEASE): Primary | ICD-10-CM

## 2023-11-20 DIAGNOSIS — R74.8 ELEVATED LIVER ENZYMES: ICD-10-CM

## 2023-11-20 DIAGNOSIS — E61.1 IRON DEFICIENCY: ICD-10-CM

## 2023-11-20 DIAGNOSIS — Z12.11 SCREENING FOR COLON CANCER: ICD-10-CM

## 2023-11-20 PROCEDURE — 99214 OFFICE O/P EST MOD 30 MIN: CPT | Performed by: FAMILY MEDICINE
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
## 2023-12-04 ENCOUNTER — OFFICE VISIT (OUTPATIENT)
Dept: INTERNAL MEDICINE CLINIC | Facility: CLINIC | Age: 68
End: 2023-12-04

## 2023-12-04 VITALS
WEIGHT: 238 LBS | BODY MASS INDEX: 39.65 KG/M2 | SYSTOLIC BLOOD PRESSURE: 157 MMHG | DIASTOLIC BLOOD PRESSURE: 77 MMHG | HEIGHT: 65 IN | TEMPERATURE: 98.1 F | HEART RATE: 53 BPM

## 2023-12-04 DIAGNOSIS — J06.9 UPPER RESPIRATORY TRACT INFECTION, UNSPECIFIED TYPE: Primary | ICD-10-CM

## 2023-12-04 DIAGNOSIS — R60.0 BILATERAL LOWER EXTREMITY EDEMA: ICD-10-CM

## 2023-12-04 PROCEDURE — 87636 SARSCOV2 & INF A&B AMP PRB: CPT

## 2023-12-04 PROCEDURE — 99213 OFFICE O/P EST LOW 20 MIN: CPT | Performed by: INTERNAL MEDICINE

## 2023-12-04 RX ORDER — GUAIFENESIN 600 MG/1
1200 TABLET, EXTENDED RELEASE ORAL EVERY 12 HOURS SCHEDULED
Qty: 40 TABLET | Refills: 0 | Status: SHIPPED | OUTPATIENT
Start: 2023-12-04 | End: 2023-12-14

## 2023-12-04 RX ORDER — FLUTICASONE PROPIONATE 50 MCG
2 SPRAY, SUSPENSION (ML) NASAL DAILY
Qty: 11.1 ML | Refills: 0 | Status: SHIPPED | OUTPATIENT
Start: 2023-12-04

## 2023-12-04 NOTE — PROGRESS NOTES
[address] Visit Note  Cassie Davis 80 y.o. female   MRN: [id number]    Assessment and Plan      Cassie Davis is a 80 y.o. female with a past medical history of GERD, CKD 3, T2DM, AARON, hypothyroidism, and HTN who presents to clinic today for same day visit for 3-day history of fever, chills, and upper respiratory symptoms. 1. Upper respiratory tract infection, unspecified type  -Start guaiFENesin (MUCINEX) 600 mg 12 hr tablet; Take 2 tablets (1,200 mg total) by mouth every 12 (twelve) hours for 10 days  -Start fluticasone (FLONASE) 50 mcg/act nasal spray; 2 sprays into each nostril daily  -Covid/Flu- Office Collect  -Discussed symptom management    2. Bilateral lower extremity edema  -Suspect symptoms are secondary to chronic venous insufficiency  -Informed patient to reduce water intake as this could be exacerbating her condition -Discussed staying off her feet and elevating them when possible  -Patient has follow-up appointment in 10 days with PCP, can discuss possible addition of Lasix if deemed necessary  -No symptoms of heart failure at this time  -Can consider compression stockings if symptoms persist       Follow up in our clinic for next scheduled appt. Subjective     HISTORY OF PRESENT ILLNESS:  Cassie Davis is a 80 y.o. female with a past medical history of GERD, CKD 3, T2DM, AARON, hypothyroidism, and HTN who presents to clinic today for same day visit for 3-day history of fever, chills, and upper respiratory symptoms. Patient subjectively endorses a fever 3 days ago with associated chills, headache, ear pain, runny nose, sore throat, postnasal drip and overall feeling unwell. She denies any cough, SOB, chest pain. Patient states her father and  have recently been sick with similar symptoms and both tested negative for COVID. Her father was diagnosed with bronchitis.   Patient has recently gotten the flu vaccination this season but denies being COVID vaccinated. She has been managing her symptoms with Tylenol twice daily and tea. She denies using any other medications or remedies for her symptoms. She states the congestion is making it difficult for her to sleep. Additionally, patient states for the past week she has been having bilateral swelling in her ankle with associated pain. She has a history of venous insufficiency and states that her symptoms typically resolve after a few days. Patient states she has been drinking excessive water to try and improve the swelling. It improves in the morning and gets worse at night. Otherwise, no additional complaints or concerns. Review of Systems   Constitutional:  Positive for chills, fatigue and fever. Negative for activity change, appetite change and diaphoresis. HENT:  Positive for congestion, ear pain, postnasal drip, rhinorrhea, sneezing and sore throat. Eyes:  Negative for photophobia and visual disturbance. Respiratory:  Negative for cough, choking, chest tightness, shortness of breath and wheezing. Cardiovascular:  Negative for chest pain, palpitations and leg swelling. Gastrointestinal:  Negative for abdominal distention, abdominal pain, blood in stool, constipation, diarrhea, nausea and vomiting. Endocrine: Negative for polyphagia and polyuria. Genitourinary:  Negative for difficulty urinating and dysuria. Musculoskeletal:  Negative for arthralgias and back pain. Skin:  Negative for rash. Neurological:  Positive for headaches. Negative for dizziness, weakness, light-headedness and numbness. All other systems reviewed and are negative. Objective     Vitals:    12/04/23 1439   BP: 157/77   BP Location: Left arm   Patient Position: Sitting   Cuff Size: Large   Pulse: (!) 53   Temp: 98.1 °F (36.7 °C)   TempSrc: Temporal   Weight: 108 kg (238 lb)   Height: 5' 5" (1.651 m)     Physical Exam  Vitals and nursing note reviewed.    Constitutional: General: She is not in acute distress. Appearance: Normal appearance. She is normal weight. She is not ill-appearing or diaphoretic. HENT:      Head: Normocephalic and atraumatic. Right Ear: Tympanic membrane, ear canal and external ear normal. There is no impacted cerumen. Left Ear: Tympanic membrane, ear canal and external ear normal. There is no impacted cerumen. Nose: Congestion and rhinorrhea present. Mouth/Throat:      Mouth: Mucous membranes are moist.      Pharynx: Oropharynx is clear. No oropharyngeal exudate or posterior oropharyngeal erythema. Eyes:      General: No scleral icterus. Conjunctiva/sclera: Conjunctivae normal.      Pupils: Pupils are equal, round, and reactive to light. Cardiovascular:      Rate and Rhythm: Normal rate and regular rhythm. Pulses: Normal pulses. Heart sounds: Normal heart sounds. No murmur heard. Pulmonary:      Effort: Pulmonary effort is normal. No respiratory distress. Breath sounds: Normal breath sounds. No wheezing. Abdominal:      General: Abdomen is flat. Bowel sounds are normal. There is no distension. Palpations: Abdomen is soft. Tenderness: There is no abdominal tenderness. Musculoskeletal:         General: Swelling present. Normal range of motion. Cervical back: Normal range of motion. Right lower leg: No edema. Left lower leg: No edema. Skin:     General: Skin is warm. Capillary Refill: Capillary refill takes less than 2 seconds. Coloration: Skin is not jaundiced. Neurological:      General: No focal deficit present. Mental Status: She is alert and oriented to person, place, and time. Mental status is at baseline. Cranial Nerves: No cranial nerve deficit. Psychiatric:         Mood and Affect: Mood normal.         Behavior: Behavior normal.         Thought Content:  Thought content normal.         Judgment: Judgment normal.          The following portions of the patient's history were reviewed and updated as appropriate: allergies, current medications, past family history, past medical history, past social history, past surgical history and problem list.    History     Current Outpatient Medications:     fluticasone (FLONASE) 50 mcg/act nasal spray, 2 sprays into each nostril daily, Disp: 11.1 mL, Rfl: 0    guaiFENesin (MUCINEX) 600 mg 12 hr tablet, Take 2 tablets (1,200 mg total) by mouth every 12 (twelve) hours for 10 days, Disp: 40 tablet, Rfl: 0    acetaminophen (Tylenol 8 Hour Arthritis Pain) 650 mg CR tablet, Take 1 tablet (650 mg total) by mouth every 8 (eight) hours as needed for mild pain, Disp: 90 tablet, Rfl: 3    Aspirin Low Dose 81 MG EC tablet, TAKE ONE TABLET BY MOUTH DAILY, Disp: 90 tablet, Rfl: 3    atorvastatin (LIPITOR) 20 mg tablet, Take 1 tablet (20 mg total) by mouth daily, Disp: 90 tablet, Rfl: 1    Cholecalciferol (Vitamin D High Potency) 25 MCG (1000 UT) capsule, Take 1 capsule (1,000 Units total) by mouth daily, Disp: 90 capsule, Rfl: 1    clotrimazole (LOTRIMIN) 1 % cream, Apply topically 2 (two) times a day, Disp: 30 g, Rfl: 0    gabapentin (NEURONTIN) 100 mg capsule, TAKE 1 CAPSULE BY MOUTH TWO TIMES A DAY, Disp: 180 capsule, Rfl: 3    levothyroxine (Synthroid) 88 mcg tablet, Take 1 tablet (88 mcg total) by mouth daily Take 1 tablet Monday - Friday., Disp: 90 tablet, Rfl: 3    levothyroxine 100 mcg tablet, TAKE ONE TABLET BY MOUTH TWO TIMES A WEEK, Disp: 24 tablet, Rfl: 0    lisinopril (ZESTRIL) 20 mg tablet, TAKE 1 TABLET (20 MG TOTAL) BY MOUTH DAILY, Disp: 90 tablet, Rfl: 2    loratadine (CLARITIN) 10 mg tablet, TAKE 1 TABLET BY MOUTH DAILY AS NEEDED FOR ALLERGIES, Disp: 90 tablet, Rfl: 11    naproxen (Naprosyn) 500 mg tablet, Take 1 tablet (500 mg total) by mouth 2 (two) times a day with meals, Disp: 60 tablet, Rfl: 0  Allergies   Allergen Reactions    Tagamet [Cimetidine] Hives      Past Medical History:   Diagnosis Date    Arthritis Depression     Difficult intubation 2021 1st attempt: Mac3, Grade 4 view. 2nd attempt: Mac 3, Grade 4 view, unable to pass bougie, 3rd attempt: Danney Gone:, Grade 4 view, unable to pass bougie, 4th attempt:(by attending): Mac3, Grade 4 view unable to pass bougie, 5th attempt(by attending): Glidescope 3, Grade 3 view, ETT placed successfully with stylet. LMA used successfully to ventilate in between attempts    Disease of thyroid gland     hypo    Edema     LAST ASSESSED: [id number]    GERD (gastroesophageal reflux disease)     Hypercholesterolemia     Hyperlipidemia     Hypertension     WELL CONTROLLED. CURRENTLY ON LISINOPRIL.  LAST ASSESSED: [id number]    Liver disease     Orthostatic hypotension 2020    Other headache syndrome     Other muscle spasm     LAST ASSESSED: [id number]    Ovarian cyst 2006    Renal calculi      (spontaneous vaginal delivery) 1975    FEMALE     Past Surgical History:   Procedure Laterality Date     [address] (L4/L5)    CHOLECYSTECTOMY      IR BIOPSY TRANSJUGULAR LIVER  11/10/2023    IR CEREBRAL ANGIOGRAPHY  2020    IR CEREBRAL ANGIOGRAPHY  2021    IR CEREBRAL ANGIOGRAPHY / INTERVENTION  2021    TONSILLECTOMY       Social History     Socioeconomic History    Marital status:      Spouse name: Not on file    Number of children: Not on file    Years of education: Not on file    Highest education level: Not on file   Occupational History    Occupation: RETIRED   Tobacco Use    Smoking status: Former    Smokeless tobacco: Never    Tobacco comments:     pt "quit about 20 years ago"   Vaping Use    Vaping Use: Never used   Substance and Sexual Activity    Alcohol use: Never    Drug use: No    Sexual activity: Never   Other Topics Concern    Not on file   Social History Narrative    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         [facility]    EXERCISES REGULARLY LIVES WITH FAMILY    NO CAFFEINE USE    NO LIVING WILL    NO TOBACCO/SMOKE EXPOSURE    UNEMPLOYED     Social Determinants of Health     Financial Resource Strain: Low Risk  (3/6/2023)    Overall Financial Resource Strain (CARDIA)     Difficulty of Paying Living Expenses: Not hard at all   Food Insecurity: No Food Insecurity (3/6/2023)    Hunger Vital Sign     Worried About Running Out of Food in the Last Year: Never true     Ran Out of Food in the Last Year: Never true   Transportation Needs: No Transportation Needs (3/6/2023)    PRAPARE - Transportation     Lack of Transportation (Medical): No     Lack of Transportation (Non-Medical): No   Physical Activity: Inactive (9/16/2021)    Exercise Vital Sign     Days of Exercise per Week: 0 days     Minutes of Exercise per Session: 0 min   Stress: No Stress Concern Present (9/16/2021)    [address] [facility]     Feeling of Stress : Not at all   Social Connections: Socially Isolated (9/16/2021)    Social Connection and Isolation Panel [NHANES]     Frequency of Communication with Friends and Family: More than three times a week     Frequency of Social Gatherings with Friends and Family:  Three times a week     Attends [employer] Services: Never     Active Member of Clubs or Organizations: No     Attends Club or Organization Meetings: Never     Marital Status:    Intimate Partner Violence: Not At Risk (9/16/2021)    Humiliation, Afraid, Rape, and Kick questionnaire     Fear of Current or Ex-Partner: No     Emotionally Abused: No     Physically Abused: No     Sexually Abused: No   Housing Stability: Unknown (4/27/2022)    Housing Stability Vital Sign     Unable to Pay for Housing in the Last Year: No     Number of Places Lived in the Last Year: Not on file     Unstable Housing in the Last Year: No     Family History   Problem Relation Age of Onset    Lung cancer Mother     Cancer Mother         MALIGNANT NEOPLASM Hypertension Father     Seizures Father     Stroke Father     Heart attack Father     Diabetes Brother     Hypertension Son     Lung disease Son     Hyperthyroidism Maternal Aunt     Hypothyroidism Maternal Aunt     Lung cancer Cousin     Heart disease Other         CARDIAC DISORDER    Neuropathy Family        Skye Patel MD, MPH  [facility] [address] [facility] [address] [facility], [address] [facility]    PLEASE NOTE:  This encounter was completed utilizing the [employer] Direct Speech Voice Recognition Software. Grammatical errors, random word insertions, pronoun errors and incomplete sentences are occasional consequences of the system due to software limitations, ambient noise and hardware issues. These may be missed by proof reading prior to affixing electronic signature. Any questions or concerns about the content, text or information contained within the body of this dictation should be directly addressed to the physician for clarification. Please do not hesitate to call me directly if you have any any questions or concerns.

## 2023-12-11 ENCOUNTER — RA CDI HCC (OUTPATIENT)
Dept: OTHER | Facility: HOSPITAL | Age: 68
End: 2023-12-11

## 2023-12-11 NOTE — PROGRESS NOTES
[address] [facility] coding opportunities          Chart Reviewed number of suggestions sent to Provider: 1     Patients Insurance   E11.22  Medicare Insurance: [employer]

## 2023-12-15 PROBLEM — I83.893 VARICOSE VEINS OF LEG WITH SWELLING, BILATERAL: Status: ACTIVE | Noted: 2023-12-15

## 2023-12-28 DIAGNOSIS — E55.9 VITAMIN D DEFICIENCY: ICD-10-CM

## 2023-12-28 DIAGNOSIS — J32.9 SINUSITIS: ICD-10-CM

## 2023-12-28 RX ORDER — UBIDECARENONE 100 MG
1000 CAPSULE ORAL DAILY
Qty: 90 CAPSULE | Refills: 4 | Status: SHIPPED | OUTPATIENT
Start: 2023-12-28

## 2023-12-28 RX ORDER — LORATADINE 10 MG/1
TABLET ORAL
Qty: 90 TABLET | Refills: 11 | Status: SHIPPED | OUTPATIENT
Start: 2023-12-28

## 2024-01-02 DIAGNOSIS — E03.8 OTHER SPECIFIED HYPOTHYROIDISM: ICD-10-CM

## 2024-01-02 RX ORDER — LEVOTHYROXINE SODIUM 0.1 MG/1
TABLET ORAL
Qty: 24 TABLET | Refills: 0 | Status: SHIPPED | OUTPATIENT
Start: 2024-01-02

## 2024-01-08 ENCOUNTER — HOSPITAL ENCOUNTER (OUTPATIENT)
Dept: RADIOLOGY | Age: 69
Discharge: HOME | End: 2024-01-08
Payer: MEDICARE

## 2024-01-08 DIAGNOSIS — Z13.820 ENCOUNTER FOR OSTEOPOROSIS SCREENING IN ASYMPTOMATIC POSTMENOPAUSAL PATIENT: ICD-10-CM

## 2024-01-08 DIAGNOSIS — Z78.0 ENCOUNTER FOR OSTEOPOROSIS SCREENING IN ASYMPTOMATIC POSTMENOPAUSAL PATIENT: ICD-10-CM

## 2024-01-08 PROCEDURE — 77080 DXA BONE DENSITY AXIAL: CPT

## 2024-01-16 ENCOUNTER — TELEPHONE (OUTPATIENT)
Age: 69
End: 2024-01-16

## 2024-01-16 NOTE — TELEPHONE ENCOUNTER
Patients GI provider:  Dr. Fulton / Raghu / Tammy / Jordi    Number to return call: [phone]    Reason for call: Pt calling saying that her PCP stated there is no order for HEP B Vaccine. Advised her no order was needed and if they could not do it there she should go to her [facility] pharmacy.   Also asked when she should have hepatic function which should be in February according to notes.  She is way overdue for her H Pylori re test, which was due for 11/2823. Please call patient to advise if she still needs to have this done and let her know there is an order in her chart from 9/28/23 to have that done by 11/28/23.    Scheduled procedure/appointment date if applicable:5/17/24

## 2024-02-07 DIAGNOSIS — E11.9 TYPE 2 DIABETES MELLITUS WITHOUT COMPLICATION, WITHOUT LONG-TERM CURRENT USE OF INSULIN (HCC): ICD-10-CM

## 2024-02-07 DIAGNOSIS — E03.8 OTHER SPECIFIED HYPOTHYROIDISM: ICD-10-CM

## 2024-02-07 DIAGNOSIS — J06.9 UPPER RESPIRATORY TRACT INFECTION, UNSPECIFIED TYPE: ICD-10-CM

## 2024-02-07 DIAGNOSIS — I10 ESSENTIAL HYPERTENSION: ICD-10-CM

## 2024-02-08 RX ORDER — ATORVASTATIN CALCIUM 20 MG/1
20 TABLET, FILM COATED ORAL DAILY
Qty: 90 TABLET | Refills: 3 | Status: SHIPPED | OUTPATIENT
Start: 2024-02-08 | End: 2025-02-02

## 2024-02-08 RX ORDER — LEVOTHYROXINE SODIUM 0.1 MG/1
TABLET ORAL
Qty: 24 TABLET | Refills: 3 | Status: SHIPPED | OUTPATIENT
Start: 2024-02-08

## 2024-02-08 RX ORDER — FLUTICASONE PROPIONATE 50 MCG
SPRAY, SUSPENSION (ML) NASAL
Qty: 9.9 ML | Refills: 3 | Status: SHIPPED | OUTPATIENT
Start: 2024-02-08

## 2024-02-08 RX ORDER — LISINOPRIL 20 MG/1
20 TABLET ORAL DAILY
Qty: 90 TABLET | Refills: 3 | Status: SHIPPED | OUTPATIENT
Start: 2024-02-08

## 2024-02-14 NOTE — PROGRESS NOTES
OB/GYN ANNUAL H&P  02/15/24    Subjective    Blane Marti is a 68 y.o. female who presents for annual well woman exam. She is a postmenopausal female, LMP . Last Pap smear  (NILM, HPVneg).  Last mammogram 2023 (BIRADS 2). She denies any postmenstrual bleeding, spotting, or discharge. She currently lives with and is caretaker for her 88 yo father and 69 yo brother. 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 Today, she describes having to occasionally wear Pampers due to leakage of urine when coughing or laughing, as well as occasionally being unable to hold her urine when she feels the sudden urge to pee. She is otherwise without complaint. Other details below:     Current contraception: abstinence, menopausal   Patient is not sexually active.  Patient requests STI testing today: no  History of abnormal Pap smear: no  Regular self breast awareness: yes  History of abnormal mammogram: no  EMB 6/15/18 for endometrial lining of 5mm found incidentally on ultrasound was WNL, negative for atypia. There was no endometrial stromal tissue identified.   Family history of breast, endometrial, ovarian cancer: a few cousins with breast cancer; no immediate family history.   History of abnormal lipids: yes - elevated cholesterol and trigylcerides, controlled with diet  Colon cancer screenin with polyps; recommendation for f/u in 3 years  Gardasil vaccine: not received    Cigarette smokin pack-year history, quit 24 years ago  EtOH: denies  Recreational drug use: denies    Menstrual History:  OB History          4    Para   1    Term   1            AB   3    Living   1         SAB        IAB        Ectopic        Multiple        Live Births   1                Menarche age: 12  Patient's last menstrual period was 2003.       The following portions of the patient's history were reviewed and updated as appropriate: allergies, current medications, past family  history, past medical history, past social history, past surgical history, and problem list.  History of  x1; pregnancy was uncomplicated.    PHQ-2/9 Depression Screening    Little interest or pleasure in doing things: 1 - several days  Feeling down, depressed, or hopeless: 0 - not at all  Trouble falling or staying asleep, or sleeping too much: 1 - several days  Feeling tired or having little energy: 1 - several days  Poor appetite or overeatin - several days  Feeling bad about yourself - or that you are a failure or have let yourself or your family down: 0 - not at all  Trouble concentrating on things, such as reading the newspaper or watching television: 2 - more than half the days  Moving or speaking so slowly that other people could have noticed. Or the opposite - being so fidgety or restless that you have been moving around a lot more than usual: 0 - not at all  Thoughts that you would be better off dead, or of hurting yourself in some way: 0 - not at all  PHQ-2 Score: 1  PHQ-2 Interpretation: Negative depression screen  PHQ-9 Score: 6  PHQ-9 Interpretation: Mild depression             Past Medical History:   Diagnosis Date    Arthritis     Depression     Difficult intubation 2021 1st attempt: Mac3, Grade 4 view. 2nd attempt: Mac 3, Grade 4 view, unable to pass bougie, 3rd attempt: Mc Martina:, Grade 4 view, unable to pass bougie, 4th attempt:(by attending): Mac3, Grade 4 view unable to pass bougie, 5th attempt(by attending): Glidescope 3, Grade 3 view, ETT placed successfully with stylet. LMA used successfully to ventilate in between attempts    Disease of thyroid gland     hypo    Edema     LAST ASSESSED: 2014    GERD (gastroesophageal reflux disease)     Hypercholesterolemia     Hyperlipidemia     Hypertension     WELL CONTROLLED. CURRENTLY ON LISINOPRIL. LAST ASSESSED: 2017    Liver disease     Orthostatic hypotension 2020    Other headache syndrome     Other muscle  "spasm     LAST ASSESSED: 2014    Ovarian cyst 2006    Renal calculi      (spontaneous vaginal delivery) 1975    FEMALE         Review of Systems  Review of Systems   Constitutional:  Negative for chills and fever.   Respiratory:  Negative for cough and shortness of breath.    Gastrointestinal:  Negative for diarrhea, nausea and vomiting.   Genitourinary:  Negative for dysuria and hematuria.   Skin:  Negative for color change and rash.   Neurological:  Negative for dizziness, syncope and headaches.        Objective      /61 (BP Location: Right arm, Patient Position: Sitting, Cuff Size: Large)   Pulse 62   Ht 5' 5\" (1.651 m)   Wt 109 kg (240 lb)   LMP 2003   BMI 39.94 kg/m²     Physical Exam  Constitutional:       Appearance: Normal appearance.   Genitourinary:      Vulva and rectum normal.      Right Labia: No rash, tenderness, lesions, skin changes or Bartholin's cyst.     Left Labia: No tenderness, lesions, skin changes, Bartholin's cyst or rash.     No vaginal discharge, tenderness or ulceration.        Right Adnexa: not tender, not full, not palpable, no mass present and not absent.     Left Adnexa: not tender, not full, not palpable, no mass present and not absent.     Uterus is retroverted.   HENT:      Head: Normocephalic and atraumatic.   Cardiovascular:      Rate and Rhythm: Normal rate.   Pulmonary:      Effort: Pulmonary effort is normal.   Abdominal:      General: Abdomen is flat.      Palpations: Abdomen is soft.   Neurological:      General: No focal deficit present.      Mental Status: She is alert and oriented to person, place, and time.   Skin:     General: Skin is warm and dry.   Psychiatric:         Mood and Affect: Mood normal.         Behavior: Behavior normal.         Assessment/Plan:    1.  Routine well woman exam done today  2.  Current ASCCP Guidelines reviewed - Pap test collected today with recommendation that if results are normal, she will no longer require Pap " testing.   3. Yearly mammography recommended.   4.  Colonoscopy recommended per guidelines.   5.  DEXA scan is not due, order placed.   6. The following were reviewed in today's visit: breast self exam, menopause, osteoporosis, adequate intake of calcium and vitamin D, exercise, healthy diet, and patient declined STI testing.  7. Discussed urinary incontinence issue; she is interested in pelvic floor therapy but is not interested in seeing another doctor to discuss surgical options. Referral  for pelvic floor therapy sent and patient provided with list of foods known to be bladder irritants.  8. Patient to return to office in 12 months for routine exam, or earlier if otherwise indicated.     D/w Dr. Lin.    Giana Sparks MD  OB/GYN PGY-1  2/15/2024  4:19 PM

## 2024-02-15 ENCOUNTER — OFFICE VISIT (OUTPATIENT)
Dept: OBGYN CLINIC | Facility: CLINIC | Age: 69
End: 2024-02-15

## 2024-02-15 VITALS
DIASTOLIC BLOOD PRESSURE: 61 MMHG | HEIGHT: 65 IN | SYSTOLIC BLOOD PRESSURE: 134 MMHG | BODY MASS INDEX: 39.99 KG/M2 | HEART RATE: 62 BPM | WEIGHT: 240 LBS

## 2024-02-15 DIAGNOSIS — E11.22 TYPE 2 DIABETES MELLITUS WITH DIABETIC CHRONIC KIDNEY DISEASE, UNSPECIFIED CKD STAGE, UNSPECIFIED WHETHER LONG TERM INSULIN USE (HCC): Primary | ICD-10-CM

## 2024-02-15 DIAGNOSIS — N39.3 STRESS INCONTINENCE OF URINE: ICD-10-CM

## 2024-02-15 DIAGNOSIS — E11.9 TYPE 2 DIABETES MELLITUS WITHOUT COMPLICATION, WITHOUT LONG-TERM CURRENT USE OF INSULIN (HCC): ICD-10-CM

## 2024-02-15 DIAGNOSIS — Z01.419 WELL FEMALE EXAM WITH ROUTINE GYNECOLOGICAL EXAM: ICD-10-CM

## 2024-02-15 PROCEDURE — 88175 CYTOPATH C/V AUTO FLUID REDO: CPT

## 2024-02-15 PROCEDURE — 87624 HPV HI-RISK TYP POOLED RSLT: CPT

## 2024-02-15 PROCEDURE — G0101 CA SCREEN;PELVIC/BREAST EXAM: HCPCS | Performed by: OBSTETRICS & GYNECOLOGY

## 2024-02-16 LAB
HPV HR 12 DNA CVX QL NAA+PROBE: NEGATIVE
HPV16 DNA CVX QL NAA+PROBE: NEGATIVE
HPV18 DNA CVX QL NAA+PROBE: NEGATIVE

## 2024-02-21 LAB
LAB AP GYN PRIMARY INTERPRETATION: NORMAL
Lab: NORMAL

## 2024-03-05 DIAGNOSIS — A04.8 H. PYLORI INFECTION: Primary | ICD-10-CM

## 2024-03-08 ENCOUNTER — HOSPITAL ENCOUNTER (EMERGENCY)
Facility: HOSPITAL | Age: 69
Discharge: HOME | End: 2024-03-08
Attending: EMERGENCY MEDICINE
Payer: MEDICARE

## 2024-03-08 VITALS
SYSTOLIC BLOOD PRESSURE: 151 MMHG | RESPIRATION RATE: 18 BRPM | TEMPERATURE: 97.8 F | HEART RATE: 64 BPM | DIASTOLIC BLOOD PRESSURE: 103 MMHG | OXYGEN SATURATION: 97 %

## 2024-03-08 DIAGNOSIS — H00.019 STYE: Primary | ICD-10-CM

## 2024-03-08 PROCEDURE — 99283 EMERGENCY DEPT VISIT LOW MDM: CPT | Performed by: EMERGENCY MEDICINE

## 2024-03-08 PROCEDURE — 99283 EMERGENCY DEPT VISIT LOW MDM: CPT

## 2024-03-08 NOTE — ED ATTENDING ATTESTATION
3/8/2024  IEmerson DO, saw and evaluated the patient. I have discussed the patient with the resident/non-physician practitioner and agree with the resident's/non-physician practitioner's findings, Plan of Care, and MDM as documented in the resident's/non-physician practitioner's note, except where noted. All available labs and Radiology studies were reviewed.  I was present for key portions of any procedure(s) performed by the resident/non-physician practitioner and I was immediately available to provide assistance.       At this point I agree with the current assessment done in the Emergency Department.  I have conducted an independent evaluation of this patient a history and physical is as follows:    68-year-old female presents for complaint of pain in the lateral, lower eyelid causing her to have a headache.  Symptoms started approximately 5 days ago and have been worsening.  Pain is worse with extraocular motions and improved when she closes her eye.  She notes that it is somewhat itchy and she has been rubbing it, causing redness under the eye.  She denies change in vision or drainage.    No recent travel or similar sick contacts.    ROS: Denies f/c, CP, SOB, n/v/d.  Review of systems otherwise negative.    PE: NAD, appears mildly uncomfortable, alert; PERRL, EOMI, no conjunctival injection or drainage, small stye noted on inside of the lower eyelid, laterally; MMM, no posterior oropharyngeal exudate, edema or erythema; HRR, no murmur; lungs CTA w/o w/r/r, POx 97% on RA (nl); skin p/w/d.    MDM/DDx: Left lower eyelid stye without clinical evidence of corneal abrasion, FB or conjunctivitis.    A/P: Will tx sx, recommend supportive care at home and follow-up with ophthalmology as needed.    ED Course         Critical Care Time  Procedures

## 2024-03-08 NOTE — DISCHARGE INSTRUCTIONS
Use warm compresses over the left lower eyelid to help the stye drain.    You can use Tylenol for pain.    Follow-up with your primary care doctor within 1 week.    Return to the ER if symptoms worsen or if you have any other concerns.

## 2024-03-09 NOTE — ED PROVIDER NOTES
History  Chief Complaint   Patient presents with    Eye Pain     Pt started with L eye swelling and pain since Monday     HPI  Blane Marti is a 68 y.o. female who presents to the emergency department with pain in her left lower eyelid for the past 5 days.  She feels an area of round swelling and irritation inside the left lower eyelid.  She denies worsening with eye movements.  She denies fevers, abdominal pain, nausea, or vomiting.  She wears glasses without contacts.  She denies injuries or scratches to the eye, she denies foreign body sensation.  She denies drainage or vision change.    Prior to Admission Medications   Prescriptions Last Dose Informant Patient Reported? Taking?   Aspirin Low Dose 81 MG EC tablet  Self No No   Sig: TAKE ONE TABLET BY MOUTH DAILY   Cholecalciferol (D3-1000) 25 MCG (1000 UT) capsule   No No   Sig: TAKE ONE CAPSULE BY MOUTH DAILY   acetaminophen (Tylenol 8 Hour Arthritis Pain) 650 mg CR tablet  Self No No   Sig: Take 1 tablet (650 mg total) by mouth every 8 (eight) hours as needed for mild pain   atorvastatin (LIPITOR) 20 mg tablet   No No   Sig: TAKE 1 TABLET (20 MG TOTAL) BY MOUTH DAILY   clotrimazole (LOTRIMIN) 1 % cream  Self No No   Sig: Apply topically 2 (two) times a day   fluticasone (FLONASE) 50 mcg/act nasal spray   No No   Sig: USE TWO SPRAYS INTO EACH NOSTRIL DAILY   gabapentin (NEURONTIN) 100 mg capsule  Self No No   Sig: TAKE 1 CAPSULE BY MOUTH TWO TIMES A DAY   levothyroxine (Synthroid) 88 mcg tablet  Self No No   Sig: Take 1 tablet (88 mcg total) by mouth daily Take 1 tablet Monday - Friday.   levothyroxine 100 mcg tablet   No No   Sig: TAKE ONE TABLET BY MOUTH TWO TIMES A WEEK   lisinopril (ZESTRIL) 20 mg tablet   No No   Sig: TAKE ONE TABLET BY MOUTH DAILY   loratadine (CLARITIN) 10 mg tablet   No No   Sig: TAKE 1 TABLET BY MOUTH DAILY AS NEEDED FOR ALLERGIES      Facility-Administered Medications: None       Past Medical History:   Diagnosis Date     Arthritis     Depression     Difficult intubation 2021 1st attempt: Mac3, Grade 4 view. 2nd attempt: Mac 3, Grade 4 view, unable to pass bougie, 3rd attempt: Mc [place]:, Grade 4 view, unable to pass bougie, 4th attempt:(by attending): Mac3, Grade 4 view unable to pass bougie, 5th attempt(by attending): Glidescope 3, Grade 3 view, ETT placed successfully with stylet. LMA used successfully to ventilate in between attempts    Disease of thyroid gland     hypo    Edema     LAST ASSESSED: 2014    GERD (gastroesophageal reflux disease)     Hypercholesterolemia     Hyperlipidemia     Hypertension     WELL CONTROLLED. CURRENTLY ON LISINOPRIL. LAST ASSESSED: 2017    Liver disease     Orthostatic hypotension 2020    Other headache syndrome     Other muscle spasm     LAST ASSESSED: 2014    Ovarian cyst     Renal calculi      (spontaneous vaginal delivery) 1975    FEMALE       Past Surgical History:   Procedure Laterality Date    BACK SURGERY      HERNIATED DISC (L4/L5)    CHOLECYSTECTOMY      IR BIOPSY TRANSJUGULAR LIVER  11/10/2023    IR CEREBRAL ANGIOGRAPHY  2020    IR CEREBRAL ANGIOGRAPHY  2021    IR CEREBRAL ANGIOGRAPHY / INTERVENTION  2021    TONSILLECTOMY         Family History   Problem Relation Age of Onset    Lung cancer Mother     Cancer Mother         MALIGNANT NEOPLASM    Hypertension Father     Seizures Father     Stroke Father     Heart attack Father     Diabetes Brother     Hypertension Son     Lung disease Son     Hyperthyroidism Maternal Aunt     Hypothyroidism Maternal Aunt     Lung cancer Cousin     Heart disease Other         CARDIAC DISORDER    Neuropathy Family      I have reviewed and agree with the history as documented.    E-Cigarette/Vaping    E-Cigarette Use Never User      E-Cigarette/Vaping Substances    Nicotine No     THC No     CBD No     Flavoring No     Other No     Unknown No      Social History     Tobacco Use    Smoking status:  "Former    Smokeless tobacco: Never    Tobacco comments:     pt \"quit about 20 years ago\"   Vaping Use    Vaping status: Never Used   Substance Use Topics    Alcohol use: Never    Drug use: No       Home medications:  Prior to Admission Medications   Prescriptions Last Dose Informant Patient Reported? Taking?   Aspirin Low Dose 81 MG EC tablet  Self No No   Sig: TAKE ONE TABLET BY MOUTH DAILY   Cholecalciferol (D3-1000) 25 MCG (1000 UT) capsule   No No   Sig: TAKE ONE CAPSULE BY MOUTH DAILY   acetaminophen (Tylenol 8 Hour Arthritis Pain) 650 mg CR tablet  Self No No   Sig: Take 1 tablet (650 mg total) by mouth every 8 (eight) hours as needed for mild pain   atorvastatin (LIPITOR) 20 mg tablet   No No   Sig: TAKE 1 TABLET (20 MG TOTAL) BY MOUTH DAILY   clotrimazole (LOTRIMIN) 1 % cream  Self No No   Sig: Apply topically 2 (two) times a day   fluticasone (FLONASE) 50 mcg/act nasal spray   No No   Sig: USE TWO SPRAYS INTO EACH NOSTRIL DAILY   gabapentin (NEURONTIN) 100 mg capsule  Self No No   Sig: TAKE 1 CAPSULE BY MOUTH TWO TIMES A DAY   levothyroxine (Synthroid) 88 mcg tablet  Self No No   Sig: Take 1 tablet (88 mcg total) by mouth daily Take 1 tablet Monday - Friday.   levothyroxine 100 mcg tablet   No No   Sig: TAKE ONE TABLET BY MOUTH TWO TIMES A WEEK   lisinopril (ZESTRIL) 20 mg tablet   No No   Sig: TAKE ONE TABLET BY MOUTH DAILY   loratadine (CLARITIN) 10 mg tablet   No No   Sig: TAKE 1 TABLET BY MOUTH DAILY AS NEEDED FOR ALLERGIES      Facility-Administered Medications: None     Allergies:  Allergies   Allergen Reactions    Tagamet [Cimetidine] Hives        Review of Systems   Constitutional:  Negative for fever.   Eyes:  Negative for visual disturbance.   Respiratory:  Negative for shortness of breath.    Cardiovascular:  Negative for chest pain.   Gastrointestinal:  Negative for abdominal pain, nausea and vomiting.   All other systems reviewed and are negative.      Physical Exam  ED Triage Vitals [03/08/24 " 1109]   Temperature Pulse Respirations Blood Pressure SpO2   97.8 °F (36.6 °C) 64 18 (!) 151/103 97 %      Temp Source Heart Rate Source Patient Position - Orthostatic VS BP Location FiO2 (%)   Oral Monitor Sitting Right arm --      Pain Score       8             Orthostatic Vital Signs  Vitals:    03/08/24 1109   BP: (!) 151/103   Pulse: 64   Patient Position - Orthostatic VS: Sitting       Physical Exam  Vitals and nursing note reviewed.   Constitutional:       General: She is not in acute distress.     Appearance: She is not toxic-appearing.   HENT:      Mouth/Throat:      Mouth: Mucous membranes are moist.   Eyes:      General:         Right eye: No discharge.         Left eye: No discharge.      Extraocular Movements: Extraocular movements intact.      Conjunctiva/sclera: Conjunctivae normal.      Pupils: Pupils are equal, round, and reactive to light.      Comments: Small stye inside lateral left lower eyelid.  No active drainage.   Cardiovascular:      Rate and Rhythm: Normal rate and regular rhythm.   Pulmonary:      Effort: Pulmonary effort is normal. No respiratory distress.      Breath sounds: No wheezing, rhonchi or rales.   Abdominal:      General: Abdomen is flat. There is no distension.      Palpations: Abdomen is soft.      Tenderness: There is no abdominal tenderness. There is no guarding or rebound.   Skin:     General: Skin is warm and dry.   Neurological:      Mental Status: She is alert.         ED Medications  Medications - No data to display    Diagnostic Studies  Results Reviewed       None                   No orders to display         Procedures  Procedures      ED Course               Identification of Seniors at Risk      Flowsheet Row Most Recent Value   (ISAR) Identification of Seniors at Risk    Before the illness or injury that brought you to the Emergency, did you need someone to help you on a regular basis? 0 Filed at: 03/08/2024 1110   In the last 24 hours, have you needed more  help than usual? 0 Filed at: 03/08/2024 1110   Have you been hospitalized for one or more nights during the past 6 months? 0 Filed at: 03/08/2024 1110   In general, do you see well? 0 Filed at: 03/08/2024 1110   In general, do you have serious problems with your memory? 0 Filed at: 03/08/2024 1110   Do you take more than three different medications every day? 1 Filed at: 03/08/2024 1110   ISAR Score 1 Filed at: 03/08/2024 1110                      SBIRT 20yo+      Flowsheet Row Most Recent Value   Initial Alcohol Screen: US AUDIT-C     1. How often do you have a drink containing alcohol? 0 Filed at: 03/08/2024 1110   2. How many drinks containing alcohol do you have on a typical day you are drinking?  0 Filed at: 03/08/2024 1110   3a. Male UNDER 65: How often do you have five or more drinks on one occasion? 0 Filed at: 03/08/2024 1110   3b. FEMALE Any Age, or MALE 65+: How often do you have 4 or more drinks on one occassion? 0 Filed at: 03/08/2024 1110   Audit-C Score 0 Filed at: 03/08/2024 1110   MARCIA: How many times in the past year have you...    Used an illegal drug or used a prescription medication for non-medical reasons? Never Filed at: 03/08/2024 1110                  [facility]  Medical Decision Making    Blane Marti is a 68 y.o. female who presents to the emergency department with left eye irritation. Workup including vital signs, physical exam.  Exam consistent with stye.  Recommended warm compresses. Stable for discharge home with primary care follow up, discharge instructions and return precautions given.       Disposition  Final diagnoses:   Stye     Time reflects when diagnosis was documented in both MDM as applicable and the Disposition within this note       Time User Action Codes Description Comment    3/8/2024 11:57 AM Keegan Ramirez Add [H00.019] Stye           ED Disposition       ED Disposition   Discharge    Condition   Stable    Date/Time   Fri Mar 8, 2024 1158    Comment   Blane  "Dhaval Marti discharge to home/self care.                   Follow-up Information    None         Discharge Medication List as of 3/8/2024 11:58 AM        CONTINUE these medications which have NOT CHANGED    Details   acetaminophen (Tylenol 8 Hour Arthritis Pain) 650 mg CR tablet Take 1 tablet (650 mg total) by mouth every 8 (eight) hours as needed for mild pain, Starting Fri 4/24/2020, Normal      Aspirin Low Dose 81 MG EC tablet TAKE ONE TABLET BY MOUTH DAILY, Starting Wed 9/6/2023, Normal      atorvastatin (LIPITOR) 20 mg tablet TAKE 1 TABLET (20 MG TOTAL) BY MOUTH DAILY, Starting Thu 2/8/2024, Until Sun 2/2/2025, Normal      Cholecalciferol (D3-1000) 25 MCG (1000 UT) capsule TAKE ONE CAPSULE BY MOUTH DAILY, Starting Thu 12/28/2023, Normal      clotrimazole (LOTRIMIN) 1 % cream Apply topically 2 (two) times a day, Starting Mon 3/20/2023, Normal      fluticasone (FLONASE) 50 mcg/act nasal spray USE TWO SPRAYS INTO EACH NOSTRIL DAILY, Normal      gabapentin (NEURONTIN) 100 mg capsule TAKE 1 CAPSULE BY MOUTH TWO TIMES A DAY, Normal      !! levothyroxine (Synthroid) 88 mcg tablet Take 1 tablet (88 mcg total) by mouth daily Take 1 tablet Monday - Friday., Starting Wed 5/31/2023, Normal      !! levothyroxine 100 mcg tablet TAKE ONE TABLET BY MOUTH TWO TIMES A WEEK, Normal      lisinopril (ZESTRIL) 20 mg tablet TAKE ONE TABLET BY MOUTH DAILY, Starting Thu 2/8/2024, Normal      loratadine (CLARITIN) 10 mg tablet TAKE 1 TABLET BY MOUTH DAILY AS NEEDED FOR ALLERGIES, Normal       !! - Potential duplicate medications found. Please discuss with provider.          No discharge procedures on file.    PDMP Review       None             ED Provider  Attending physically available and evaluated Blane Marti. I managed the patient along with the ED Attending.    Electronically Signed by    Portions of the record may have been created with voice recognition software.  Occasional wrong word or \"sound a like\" substitutions may " have occurred due to the inherent limitations of voice recognition software.  Read the chart carefully and recognize, using context, where substitutions have occurred       Keegan Ramirez MD  03/09/24 [id number]

## 2024-03-20 ENCOUNTER — OFFICE VISIT (OUTPATIENT)
Dept: URGENT CARE | Age: 69
End: 2024-03-20
Payer: MEDICARE

## 2024-03-20 VITALS
SYSTOLIC BLOOD PRESSURE: 140 MMHG | HEART RATE: 80 BPM | OXYGEN SATURATION: 100 % | TEMPERATURE: 97 F | RESPIRATION RATE: 20 BRPM | DIASTOLIC BLOOD PRESSURE: 88 MMHG

## 2024-03-20 DIAGNOSIS — J06.9 UPPER RESPIRATORY TRACT INFECTION, UNSPECIFIED TYPE: Primary | ICD-10-CM

## 2024-03-20 PROCEDURE — G0463 HOSPITAL OUTPT CLINIC VISIT: HCPCS

## 2024-03-20 PROCEDURE — 99213 OFFICE O/P EST LOW 20 MIN: CPT

## 2024-03-20 RX ORDER — GUAIFENESIN 600 MG/1
1200 TABLET, EXTENDED RELEASE ORAL EVERY 12 HOURS SCHEDULED
Qty: 30 TABLET | Refills: 0 | Status: SHIPPED | OUTPATIENT
Start: 2024-03-20

## 2024-03-20 RX ORDER — AZITHROMYCIN 250 MG/1
TABLET, FILM COATED ORAL
Qty: 6 TABLET | Refills: 0 | Status: SHIPPED | OUTPATIENT
Start: 2024-03-20 | End: 2024-03-24

## 2024-03-20 NOTE — PATIENT INSTRUCTIONS
Take antibiotic- Zithromax as directed.  Recommend daily probiotic while on antibiotic or eat yogurt with live cultures daily while on antibiotic.       You may take Tylenol or Motrin for pain and fever.    Mucinex for cough during the day.      Flonase- one spray each nostril daily for nasal congestions.    Rest, increase fluids, good hand washing.  Please follow up with your family doctor if no improvement in 7-10 days.

## 2024-03-20 NOTE — PROGRESS NOTES
[facility]        NAME: Blane Marti is a 69 y.o. female  : 1955    MRN: [id number]  DATE: 2024  TIME: 2:47 PM    Assessment and Plan   Upper respiratory tract infection, unspecified type [J06.9]  1. Upper respiratory tract infection, unspecified type  azithromycin (ZITHROMAX) 250 mg tablet    guaiFENesin (MUCINEX) 600 mg 12 hr tablet      Take antibiotic- Zithromax as directed.  Recommend daily probiotic while on antibiotic or eat yogurt with live cultures daily while on antibiotic.       You may take Tylenol or Motrin for pain and fever.    Mucinex for cough during the day.      Flonase- one spray each nostril daily for nasal congestions.    Rest, increase fluids, good hand washing.  Please follow up with your family doctor if no improvement in 7-10 days.       Patient Instructions       Follow up with PCP in 3-5 days.  Proceed to  ER if symptoms worsen.    If tests have been performed at [facility], our office will contact you with results if changes need to be made to the care plan discussed with you at the visit.  [facility]    Chief Complaint     Chief Complaint   Patient presents with   • Cough   • Chills   • Generalized Body Aches     Chills. Cough, body ache ,pressure on left side of face and mucus color green since last Thursday.          History of Present Illness       7 days of cough, congestion, runny nose, headache and green mucous.  Subjective fever for first 2 days.  Pt has been taking tylenol for pain and fever with minimal relief.  Pt denies n/v or abd pain, 3x episodes of diarrhea.  Pt denies sick contacts.      Cough  Associated symptoms include ear pain and a fever.   Generalized Body Aches  Associated symptoms include ear pain, a fever and coughing.       Review of Systems   Review of Systems   Constitutional:  Positive for fever.   HENT:  Positive for ear pain, sinus pressure and sinus pain.    Respiratory:   Positive for cough.          Current Medications       Current Outpatient Medications:   •  acetaminophen (Tylenol 8 Hour Arthritis Pain) 650 mg CR tablet, Take 1 tablet (650 mg total) by mouth every 8 (eight) hours as needed for mild pain, Disp: 90 tablet, Rfl: 3  •  Aspirin Low Dose 81 MG EC tablet, TAKE ONE TABLET BY MOUTH DAILY, Disp: 90 tablet, Rfl: 3  •  atorvastatin (LIPITOR) 20 mg tablet, TAKE 1 TABLET (20 MG TOTAL) BY MOUTH DAILY, Disp: 90 tablet, Rfl: 3  •  azithromycin (ZITHROMAX) 250 mg tablet, Take 2 tablets today then 1 tablet daily x 4 days, Disp: 6 tablet, Rfl: 0  •  Cholecalciferol (D3-1000) 25 MCG (1000 UT) capsule, TAKE ONE CAPSULE BY MOUTH DAILY, Disp: 90 capsule, Rfl: 4  •  clotrimazole (LOTRIMIN) 1 % cream, Apply topically 2 (two) times a day, Disp: 30 g, Rfl: 0  •  fluticasone (FLONASE) 50 mcg/act nasal spray, USE TWO SPRAYS INTO EACH NOSTRIL DAILY, Disp: 9.9 mL, Rfl: 3  •  gabapentin (NEURONTIN) 100 mg capsule, TAKE 1 CAPSULE BY MOUTH TWO TIMES A DAY, Disp: 180 capsule, Rfl: 3  •  guaiFENesin (MUCINEX) 600 mg 12 hr tablet, Take 2 tablets (1,200 mg total) by mouth every 12 (twelve) hours, Disp: 30 tablet, Rfl: 0  •  levothyroxine (Synthroid) 88 mcg tablet, Take 1 tablet (88 mcg total) by mouth daily Take 1 tablet Monday - Friday., Disp: 90 tablet, Rfl: 3  •  levothyroxine 100 mcg tablet, TAKE ONE TABLET BY MOUTH TWO TIMES A WEEK, Disp: 24 tablet, Rfl: 3  •  lisinopril (ZESTRIL) 20 mg tablet, TAKE ONE TABLET BY MOUTH DAILY, Disp: 90 tablet, Rfl: 3  •  loratadine (CLARITIN) 10 mg tablet, TAKE 1 TABLET BY MOUTH DAILY AS NEEDED FOR ALLERGIES, Disp: 90 tablet, Rfl: 11    Current Allergies     Allergies as of 03/20/2024 - Reviewed 03/20/2024   Allergen Reaction Noted   • Tagamet [cimetidine] Hives 05/26/2016            The following portions of the patient's history were reviewed and updated as appropriate: allergies, current medications, past family history, past medical history, past social  history, past surgical history and problem list.     Past Medical History:   Diagnosis Date   • Arthritis    • Depression    • Difficult intubation 2021 1st attempt: Mac3, Grade 4 view. 2nd attempt: Mac 3, Grade 4 view, unable to pass bougie, 3rd attempt: [place]:, Grade 4 view, unable to pass bougie, 4th attempt:(by attending): Mac3, Grade 4 view unable to pass bougie, 5th attempt(by attending): Glidescope 3, Grade 3 view, ETT placed successfully with stylet. LMA used successfully to ventilate in between attempts   • Disease of thyroid gland     hypo   • Edema     LAST ASSESSED: 2014   • GERD (gastroesophageal reflux disease)    • Hypercholesterolemia    • Hyperlipidemia    • Hypertension     WELL CONTROLLED. CURRENTLY ON LISINOPRIL. LAST ASSESSED: 2017   • Liver disease    • Orthostatic hypotension 2020   • Other headache syndrome    • Other muscle spasm     LAST ASSESSED: 2014   • Ovarian cyst    • Renal calculi    •  (spontaneous vaginal delivery)     FEMALE       Past Surgical History:   Procedure Laterality Date   • BACK SURGERY      HERNIATED DISC (L4/L5)   • CHOLECYSTECTOMY     • IR BIOPSY TRANSJUGULAR LIVER  11/10/2023   • IR CEREBRAL ANGIOGRAPHY  2020   • IR CEREBRAL ANGIOGRAPHY  2021   • IR CEREBRAL ANGIOGRAPHY / INTERVENTION  2021   • TONSILLECTOMY         Family History   Problem Relation Age of Onset   • Lung cancer Mother    • Cancer Mother         MALIGNANT NEOPLASM   • Hypertension Father    • Seizures Father    • Stroke Father    • Heart attack Father    • Diabetes Brother    • Hypertension Son    • Lung disease Son    • Hyperthyroidism Maternal Aunt    • Hypothyroidism Maternal Aunt    • Lung cancer Cousin    • Heart disease Other         CARDIAC DISORDER   • Neuropathy Family          Medications have been verified.        Objective   /88 (BP Location: Left arm) Comment (BP Location): manual  Pulse 80   Temp (!) 97 °F  (36.1 °C) (Temporal)   Resp 20   LMP 01/01/2003   SpO2 100%   Patient's last menstrual period was 01/01/2003.       Physical Exam     Physical Exam  Vitals and nursing note reviewed.   Constitutional:       General: She is not in acute distress.     Appearance: Normal appearance. She is obese. She is ill-appearing.   HENT:      Head: Normocephalic.      Right Ear: Tympanic membrane normal.      Left Ear: Tympanic membrane normal.      Nose: Congestion present. No rhinorrhea.      Right Turbinates: Enlarged.      Left Turbinates: Enlarged.      Right Sinus: Maxillary sinus tenderness and frontal sinus tenderness present.      Left Sinus: Maxillary sinus tenderness and frontal sinus tenderness present.      Mouth/Throat:      Mouth: Mucous membranes are moist.      Pharynx: Oropharynx is clear.      Tonsils: 0 on the right. 0 on the left.   Eyes:      Extraocular Movements: Extraocular movements intact.      Conjunctiva/sclera: Conjunctivae normal.   Cardiovascular:      Rate and Rhythm: Normal rate and regular rhythm.      Pulses: Normal pulses.      Heart sounds: Normal heart sounds.   Pulmonary:      Effort: Pulmonary effort is normal. No respiratory distress.      Breath sounds: Normal breath sounds. No wheezing or rales.   Abdominal:      General: Abdomen is flat. Bowel sounds are normal. There is no distension.      Palpations: Abdomen is soft.   Musculoskeletal:      Cervical back: Normal range of motion and neck supple.   Skin:     General: Skin is warm and dry.      Capillary Refill: Capillary refill takes less than 2 seconds.   Neurological:      General: No focal deficit present.      Mental Status: She is alert.

## 2024-03-27 ENCOUNTER — RA CDI HCC (OUTPATIENT)
Dept: OTHER | Facility: HOSPITAL | Age: 69
End: 2024-03-27

## 2024-03-27 ENCOUNTER — TELEPHONE (OUTPATIENT)
Dept: GASTROENTEROLOGY | Facility: CLINIC | Age: 69
End: 2024-03-27

## 2024-03-30 ENCOUNTER — APPOINTMENT (EMERGENCY)
Dept: RADIOLOGY | Facility: HOSPITAL | Age: 69
End: 2024-03-30
Payer: MEDICARE

## 2024-03-30 ENCOUNTER — HOSPITAL ENCOUNTER (EMERGENCY)
Facility: HOSPITAL | Age: 69
Discharge: HOME | End: 2024-03-30
Attending: EMERGENCY MEDICINE
Payer: MEDICARE

## 2024-03-30 VITALS
DIASTOLIC BLOOD PRESSURE: 66 MMHG | HEART RATE: 55 BPM | SYSTOLIC BLOOD PRESSURE: 132 MMHG | OXYGEN SATURATION: 92 % | RESPIRATION RATE: 18 BRPM | TEMPERATURE: 97.9 F

## 2024-03-30 DIAGNOSIS — R10.9 RIGHT FLANK PAIN: Primary | ICD-10-CM

## 2024-03-30 LAB
ANION GAP SERPL CALCULATED.3IONS-SCNC: 7 MMOL/L (ref 4–13)
BACTERIA UR QL AUTO: ABNORMAL /HPF
BASOPHILS # BLD AUTO: 0.02 THOUSANDS/ÂΜL (ref 0–0.1)
BASOPHILS NFR BLD AUTO: 0 % (ref 0–1)
BILIRUB UR QL STRIP: NEGATIVE
BUN SERPL-MCNC: 16 MG/DL (ref 5–25)
CALCIUM SERPL-MCNC: 8.6 MG/DL (ref 8.4–10.2)
CHLORIDE SERPL-SCNC: 105 MMOL/L (ref 96–108)
CLARITY UR: CLEAR
CO2 SERPL-SCNC: 27 MMOL/L (ref 21–32)
COLOR UR: ABNORMAL
CREAT SERPL-MCNC: 0.61 MG/DL (ref 0.6–1.3)
EOSINOPHIL # BLD AUTO: 0.15 THOUSAND/ÂΜL (ref 0–0.61)
EOSINOPHIL NFR BLD AUTO: 2 % (ref 0–6)
ERYTHROCYTE [DISTWIDTH] IN BLOOD BY AUTOMATED COUNT: 13.8 % (ref 11.6–15.1)
GFR SERPL CREATININE-BSD FRML MDRD: 93 ML/MIN/1.73SQ M
GLUCOSE SERPL-MCNC: 79 MG/DL (ref 65–140)
GLUCOSE UR STRIP-MCNC: NEGATIVE MG/DL
HCT VFR BLD AUTO: 38.6 % (ref 34.8–46.1)
HGB BLD-MCNC: 12.3 G/DL (ref 11.5–15.4)
HGB UR QL STRIP.AUTO: NEGATIVE
IMM GRANULOCYTES # BLD AUTO: 0.03 THOUSAND/UL (ref 0–0.2)
IMM GRANULOCYTES NFR BLD AUTO: 0 % (ref 0–2)
KETONES UR STRIP-MCNC: NEGATIVE MG/DL
LEUKOCYTE ESTERASE UR QL STRIP: ABNORMAL
LYMPHOCYTES # BLD AUTO: 2.39 THOUSANDS/ÂΜL (ref 0.6–4.47)
LYMPHOCYTES NFR BLD AUTO: 26 % (ref 14–44)
MCH RBC QN AUTO: 27.7 PG (ref 26.8–34.3)
MCHC RBC AUTO-ENTMCNC: 31.9 G/DL (ref 31.4–37.4)
MCV RBC AUTO: 87 FL (ref 82–98)
MONOCYTES # BLD AUTO: 0.88 THOUSAND/ÂΜL (ref 0.17–1.22)
MONOCYTES NFR BLD AUTO: 10 % (ref 4–12)
NEUTROPHILS # BLD AUTO: 5.64 THOUSANDS/ÂΜL (ref 1.85–7.62)
NEUTS SEG NFR BLD AUTO: 62 % (ref 43–75)
NITRITE UR QL STRIP: NEGATIVE
NON-SQ EPI CELLS URNS QL MICRO: ABNORMAL /HPF
NRBC BLD AUTO-RTO: 0 /100 WBCS
PH UR STRIP.AUTO: 5.5 [PH]
PLATELET # BLD AUTO: 307 THOUSANDS/UL (ref 149–390)
PMV BLD AUTO: 10.8 FL (ref 8.9–12.7)
POTASSIUM SERPL-SCNC: 3.8 MMOL/L (ref 3.5–5.3)
PROT UR STRIP-MCNC: NEGATIVE MG/DL
RBC # BLD AUTO: 4.44 MILLION/UL (ref 3.81–5.12)
RBC #/AREA URNS AUTO: ABNORMAL /HPF
SODIUM SERPL-SCNC: 139 MMOL/L (ref 135–147)
SP GR UR STRIP.AUTO: 1.01 (ref 1–1.03)
UROBILINOGEN UR STRIP-ACNC: <2 MG/DL
WBC # BLD AUTO: 9.11 THOUSAND/UL (ref 4.31–10.16)
WBC #/AREA URNS AUTO: ABNORMAL /HPF

## 2024-03-30 PROCEDURE — 87086 URINE CULTURE/COLONY COUNT: CPT

## 2024-03-30 PROCEDURE — 99284 EMERGENCY DEPT VISIT MOD MDM: CPT

## 2024-03-30 PROCEDURE — 85025 COMPLETE CBC W/AUTO DIFF WBC: CPT

## 2024-03-30 PROCEDURE — 87077 CULTURE AEROBIC IDENTIFY: CPT

## 2024-03-30 PROCEDURE — 74176 CT ABD & PELVIS W/O CONTRAST: CPT

## 2024-03-30 PROCEDURE — 36415 COLL VENOUS BLD VENIPUNCTURE: CPT

## 2024-03-30 PROCEDURE — 99285 EMERGENCY DEPT VISIT HI MDM: CPT | Performed by: EMERGENCY MEDICINE

## 2024-03-30 PROCEDURE — 80048 BASIC METABOLIC PNL TOTAL CA: CPT

## 2024-03-30 PROCEDURE — 96375 TX/PRO/DX INJ NEW DRUG ADDON: CPT

## 2024-03-30 PROCEDURE — 96374 THER/PROPH/DIAG INJ IV PUSH: CPT

## 2024-03-30 PROCEDURE — 87186 SC STD MICRODIL/AGAR DIL: CPT

## 2024-03-30 PROCEDURE — 74177 CT ABD & PELVIS W/CONTRAST: CPT

## 2024-03-30 PROCEDURE — 81001 URINALYSIS AUTO W/SCOPE: CPT

## 2024-03-30 RX ORDER — CEPHALEXIN 500 MG/1
500 CAPSULE ORAL ONCE
Status: COMPLETED | OUTPATIENT
Start: 2024-03-30 | End: 2024-03-30

## 2024-03-30 RX ORDER — MORPHINE SULFATE 4 MG/ML
4 INJECTION, SOLUTION INTRAMUSCULAR; INTRAVENOUS ONCE
Status: COMPLETED | OUTPATIENT
Start: 2024-03-30 | End: 2024-03-30

## 2024-03-30 RX ORDER — NAPROXEN 375 MG/1
375 TABLET ORAL 2 TIMES DAILY WITH MEALS
Qty: 20 TABLET | Refills: 0 | Status: SHIPPED | OUTPATIENT
Start: 2024-03-30

## 2024-03-30 RX ORDER — ACETAMINOPHEN 500 MG
500 TABLET ORAL EVERY 6 HOURS PRN
Qty: 30 TABLET | Refills: 0 | Status: SHIPPED | OUTPATIENT
Start: 2024-03-30

## 2024-03-30 RX ORDER — ONDANSETRON 2 MG/ML
4 INJECTION INTRAMUSCULAR; INTRAVENOUS ONCE
Status: COMPLETED | OUTPATIENT
Start: 2024-03-30 | End: 2024-03-30

## 2024-03-30 RX ORDER — ONDANSETRON 4 MG/1
4 TABLET, ORALLY DISINTEGRATING ORAL EVERY 6 HOURS PRN
Qty: 15 TABLET | Refills: 0 | Status: SHIPPED | OUTPATIENT
Start: 2024-03-30

## 2024-03-30 RX ORDER — CEPHALEXIN 500 MG/1
500 CAPSULE ORAL EVERY 6 HOURS SCHEDULED
Qty: 20 CAPSULE | Refills: 0 | Status: SHIPPED | OUTPATIENT
Start: 2024-03-30 | End: 2024-04-04

## 2024-03-30 RX ORDER — LIDOCAINE 50 MG/G
1 PATCH TOPICAL DAILY
Qty: 15 PATCH | Refills: 0 | Status: SHIPPED | OUTPATIENT
Start: 2024-03-30

## 2024-03-30 RX ORDER — TAMSULOSIN HYDROCHLORIDE 0.4 MG/1
0.4 CAPSULE ORAL ONCE
Status: COMPLETED | OUTPATIENT
Start: 2024-03-30 | End: 2024-03-30

## 2024-03-30 RX ORDER — OXYCODONE HYDROCHLORIDE 5 MG/1
5 TABLET ORAL ONCE
Status: COMPLETED | OUTPATIENT
Start: 2024-03-30 | End: 2024-03-30

## 2024-03-30 RX ADMIN — ONDANSETRON 4 MG: 2 INJECTION INTRAMUSCULAR; INTRAVENOUS at 19:06

## 2024-03-30 RX ADMIN — TAMSULOSIN HYDROCHLORIDE 0.4 MG: 0.4 CAPSULE ORAL at 21:07

## 2024-03-30 RX ADMIN — MORPHINE SULFATE 4 MG: 4 INJECTION INTRAVENOUS at 19:06

## 2024-03-30 RX ADMIN — OXYCODONE HYDROCHLORIDE 5 MG: 5 TABLET ORAL at 21:07

## 2024-03-30 RX ADMIN — IOHEXOL 100 ML: 350 INJECTION, SOLUTION INTRAVENOUS at 22:02

## 2024-03-30 RX ADMIN — CEPHALEXIN 500 MG: 500 CAPSULE ORAL at 20:41

## 2024-03-30 NOTE — ED ATTENDING ATTESTATION
3/30/2024  I, Clemente Georges DO, saw and evaluated the patient. I have discussed the patient with the resident/non-physician practitioner and agree with the resident's/non-physician practitioner's findings, Plan of Care, and MDM as documented in the resident's/non-physician practitioner's note, except where noted. All available labs and Radiology studies were reviewed.  I was present for key portions of any procedure(s) performed by the resident/non-physician practitioner and I was immediately available to provide assistance.       At this point I agree with the current assessment done in the Emergency Department.  I have conducted an independent evaluation of this patient a history and physical is as follows:    Chief Complaint   Patient presents with    Flank Pain     Pt has c/o R flank pain that started today.  Pt denies any n/v/d     69 yof with right flank pain. Hx kidney stones. No systemic sx. No n.v.d  Looks well, no dysuria  Plan labs for NEL, infection, renal stone study    ED Course         Critical Care Time  Procedures

## 2024-03-30 NOTE — ED PROVIDER NOTES
History  Chief Complaint   Patient presents with    Flank Pain     Pt has c/o R flank pain that started today.  Pt denies any n/v/d     HPI    Patient is a 70 y/o F with pmh htn, hypothyroid presenting for evaluation of R flank pain. Pt states she was eating breakfast when she had sudden onset R flank pain radiating around to the front. Severe pain unable to get comfortable. No fever, chills, n/v/d, dysuria. Pt states similar happened many years ago when she was in [place].     Prior to Admission Medications   Prescriptions Last Dose Informant Patient Reported? Taking?   Aspirin Low Dose 81 MG EC tablet  Self No No   Sig: TAKE ONE TABLET BY MOUTH DAILY   Cholecalciferol (D3-1000) 25 MCG (1000 UT) capsule   No No   Sig: TAKE ONE CAPSULE BY MOUTH DAILY   acetaminophen (Tylenol 8 Hour Arthritis Pain) 650 mg CR tablet  Self No No   Sig: Take 1 tablet (650 mg total) by mouth every 8 (eight) hours as needed for mild pain   atorvastatin (LIPITOR) 20 mg tablet   No No   Sig: TAKE 1 TABLET (20 MG TOTAL) BY MOUTH DAILY   clotrimazole (LOTRIMIN) 1 % cream  Self No No   Sig: Apply topically 2 (two) times a day   fluticasone (FLONASE) 50 mcg/act nasal spray   No No   Sig: USE TWO SPRAYS INTO EACH NOSTRIL DAILY   gabapentin (NEURONTIN) 100 mg capsule  Self No No   Sig: TAKE 1 CAPSULE BY MOUTH TWO TIMES A DAY   guaiFENesin (MUCINEX) 600 mg 12 hr tablet   No No   Sig: Take 2 tablets (1,200 mg total) by mouth every 12 (twelve) hours   levothyroxine (Synthroid) 88 mcg tablet  Self No No   Sig: Take 1 tablet (88 mcg total) by mouth daily Take 1 tablet Monday - Friday.   levothyroxine 100 mcg tablet   No No   Sig: TAKE ONE TABLET BY MOUTH TWO TIMES A WEEK   lisinopril (ZESTRIL) 20 mg tablet   No No   Sig: TAKE ONE TABLET BY MOUTH DAILY   loratadine (CLARITIN) 10 mg tablet   No No   Sig: TAKE 1 TABLET BY MOUTH DAILY AS NEEDED FOR ALLERGIES      Facility-Administered Medications: None       Past Medical History:   Diagnosis Date     Arthritis     Depression     Difficult intubation 2021 1st attempt: Mac3, Grade 4 view. 2nd attempt: Mac 3, Grade 4 view, unable to pass bougie, 3rd attempt: [place]:, Grade 4 view, unable to pass bougie, 4th attempt:(by attending): Mac3, Grade 4 view unable to pass bougie, 5th attempt(by attending): Glidescope 3, Grade 3 view, ETT placed successfully with stylet. LMA used successfully to ventilate in between attempts    Disease of thyroid gland     hypo    Edema     LAST ASSESSED: 2014    GERD (gastroesophageal reflux disease)     Hypercholesterolemia     Hyperlipidemia     Hypertension     WELL CONTROLLED. CURRENTLY ON LISINOPRIL. LAST ASSESSED: 2017    Liver disease     Orthostatic hypotension 2020    Other headache syndrome     Other muscle spasm     LAST ASSESSED: 2014    Ovarian cyst     Renal calculi      (spontaneous vaginal delivery) 1975    FEMALE       Past Surgical History:   Procedure Laterality Date    BACK SURGERY      HERNIATED DISC (L4/L5)    CHOLECYSTECTOMY      IR BIOPSY TRANSJUGULAR LIVER  11/10/2023    IR CEREBRAL ANGIOGRAPHY  2020    IR CEREBRAL ANGIOGRAPHY  2021    IR CEREBRAL ANGIOGRAPHY / INTERVENTION  2021    TONSILLECTOMY         Family History   Problem Relation Age of Onset    Lung cancer Mother     Cancer Mother         MALIGNANT NEOPLASM    Hypertension Father     Seizures Father     Stroke Father     Heart attack Father     Diabetes Brother     Hypertension Son     Lung disease Son     Hyperthyroidism Maternal Aunt     Hypothyroidism Maternal Aunt     Lung cancer Cousin     Heart disease Other         CARDIAC DISORDER    Neuropathy Family      I have reviewed and agree with the history as documented.    E-Cigarette/Vaping    E-Cigarette Use Never User      E-Cigarette/Vaping Substances    Nicotine No     THC No     CBD No     Flavoring No     Other No     Unknown No      Social History     Tobacco Use    Smoking status:  "Former    Smokeless tobacco: Never    Tobacco comments:     pt \"quit about 20 years ago\"   Vaping Use    Vaping status: Never Used   Substance Use Topics    Alcohol use: Never    Drug use: No        Review of Systems   Constitutional:  Negative for chills and fever.   HENT:  Negative for ear pain and sore throat.    Eyes:  Negative for pain and visual disturbance.   Respiratory:  Negative for cough and shortness of breath.    Cardiovascular:  Negative for chest pain and palpitations.   Gastrointestinal:  Negative for abdominal pain and vomiting.   Genitourinary:  Positive for flank pain. Negative for dysuria and hematuria.   Musculoskeletal:  Negative for arthralgias and back pain.   Skin:  Negative for color change and rash.   Neurological:  Negative for seizures and syncope.   All other systems reviewed and are negative.      Physical Exam  ED Triage Vitals   Temperature Pulse Respirations Blood Pressure SpO2   03/30/24 1752 03/30/24 1752 03/30/24 1752 03/30/24 1752 03/30/24 1752   97.9 °F (36.6 °C) 68 18 (!) 174/77 97 %      Temp Source Heart Rate Source Patient Position - Orthostatic VS BP Location FiO2 (%)   03/30/24 1752 03/30/24 1752 03/30/24 2247 03/30/24 2247 --   Oral Monitor Lying Left arm       Pain Score       03/30/24 1752       10 - Worst Possible Pain             Orthostatic Vital Signs  Vitals:    03/30/24 1752 03/30/24 2247   BP: (!) 174/77 132/66   Pulse: 68 55   Patient Position - Orthostatic VS:  Lying       Physical Exam  Vitals and nursing note reviewed.   Constitutional:       General: She is not in acute distress.     Comments: Uncomfortable appearing   HENT:      Head: Normocephalic and atraumatic.      Right Ear: External ear normal.      Left Ear: External ear normal.      Nose: Nose normal.      Mouth/Throat:      Pharynx: Oropharynx is clear.   Eyes:      Extraocular Movements: Extraocular movements intact.      Pupils: Pupils are equal, round, and reactive to light.   Cardiovascular: "      Rate and Rhythm: Normal rate and regular rhythm.      Pulses: Normal pulses.      Heart sounds: Normal heart sounds. No murmur heard.     No friction rub. No gallop.   Pulmonary:      Effort: Pulmonary effort is normal. No respiratory distress.      Breath sounds: Normal breath sounds. No wheezing, rhonchi or rales.   Abdominal:      General: Abdomen is flat. There is no distension.      Palpations: Abdomen is soft.      Tenderness: There is no abdominal tenderness. There is right CVA tenderness. There is no guarding or rebound.   Musculoskeletal:         General: No deformity. Normal range of motion.      Cervical back: Normal range of motion.      Right lower leg: No edema.      Left lower leg: No edema.   Skin:     General: Skin is warm and dry.      Capillary Refill: Capillary refill takes less than 2 seconds.      Findings: No rash.   Neurological:      General: No focal deficit present.      Mental Status: She is alert and oriented to person, place, and time.      Gait: Gait normal.   Psychiatric:         Mood and Affect: Mood normal.         ED Medications  Medications   ondansetron (ZOFRAN) injection 4 mg (4 mg Intravenous Given 3/30/24 1906)   morphine injection 4 mg (4 mg Intravenous Given 3/30/24 1906)   cephalexin (KEFLEX) capsule 500 mg (500 mg Oral Given 3/30/24 2041)   oxyCODONE (ROXICODONE) IR tablet 5 mg (5 mg Oral Given 3/30/24 2107)   tamsulosin (FLOMAX) capsule 0.4 mg (0.4 mg Oral Given 3/30/24 2107)   iohexol (OMNIPAQUE) 350 MG/ML injection (MULTI-DOSE) 100 mL (100 mL Intravenous Given 3/30/24 2202)       Diagnostic Studies  Results Reviewed       Procedure Component Value Units Date/Time    Basic metabolic panel [[id number]] Collected: 03/30/24 1905    Lab Status: Final result Specimen: Blood from Arm, Right Updated: 03/30/24 1938     Sodium 139 mmol/L      Potassium 3.8 mmol/L      Chloride 105 mmol/L      CO2 27 mmol/L      ANION GAP 7 mmol/L      BUN 16 mg/dL      Creatinine 0.61 mg/dL       Glucose 79 mg/dL      Calcium 8.6 mg/dL      eGFR 93 ml/min/1.73sq m     Narrative:      National Kidney Disease Foundation guidelines for Chronic Kidney Disease (CKD):     Stage 1 with normal or high GFR (GFR > 90 mL/min/1.73 square meters)    Stage 2 Mild CKD (GFR = 60-89 mL/min/1.73 square meters)    Stage 3A Moderate CKD (GFR = 45-59 mL/min/1.73 square meters)    Stage 3B Moderate CKD (GFR = 30-44 mL/min/1.73 square meters)    Stage 4 Severe CKD (GFR = 15-29 mL/min/1.73 square meters)    Stage 5 End Stage CKD (GFR <15 mL/min/1.73 square meters)  Note: GFR calculation is accurate only with a steady state creatinine    Urine Microscopic [[id number]]  (Abnormal) Collected: 03/30/24 1905    Lab Status: Final result Specimen: Urine, Clean Catch Updated: 03/30/24 1922     RBC, UA None Seen /hpf      WBC, UA 10-20 /hpf      Epithelial Cells None Seen /hpf      Bacteria, UA Occasional /hpf     Urine culture [[id number]] Collected: 03/30/24 1905    Lab Status: In process Specimen: Urine, Clean Catch Updated: 03/30/24 1922    UA w Reflex to Microscopic w Reflex to Culture [[id number]]  (Abnormal) Collected: 03/30/24 1905    Lab Status: Final result Specimen: Urine, Clean Catch Updated: 03/30/24 1919     Color, UA Light Yellow     Clarity, UA Clear     Specific Gravity, UA 1.014     pH, UA 5.5     Leukocytes, UA Moderate     Nitrite, UA Negative     Protein, UA Negative mg/dl      Glucose, UA Negative mg/dl      Ketones, UA Negative mg/dl      Urobilinogen, UA <2.0 mg/dl      Bilirubin, UA Negative     Occult Blood, UA Negative    CBC and differential [[id number]] Collected: 03/30/24 1905    Lab Status: Final result Specimen: Blood from Arm, Right Updated: 03/30/24 1918     WBC 9.11 Thousand/uL      RBC 4.44 Million/uL      Hemoglobin 12.3 g/dL      Hematocrit 38.6 %      MCV 87 fL      MCH 27.7 pg      MCHC 31.9 g/dL      RDW 13.8 %      MPV 10.8 fL      Platelets 307 Thousands/uL      nRBC 0 /100 WBCs      Neutrophils  Relative 62 %      Immature Grans % 0 %      Lymphocytes Relative 26 %      Monocytes Relative 10 %      Eosinophils Relative 2 %      Basophils Relative 0 %      Neutrophils Absolute 5.64 Thousands/µL      Absolute Immature Grans 0.03 Thousand/uL      Absolute Lymphocytes 2.39 Thousands/µL      Absolute Monocytes 0.88 Thousand/µL      Eosinophils Absolute 0.15 Thousand/µL      Basophils Absolute 0.02 Thousands/µL                    CT abdomen pelvis with contrast   Final Result by Christo Davalos DO (03/30 2303)      No nephrolithiasis or hydronephrosis.      Partially distended bladder. Mild circumferential bladder wall thickening noted, possibly exaggerated by underdistention, superimposed cystitis considered in the appropriate clinical setting.      Colonic diverticulosis without evidence of acute diverticulitis, and other findings as above.         Workstation performed: [id number]         CT renal stone study abdomen pelvis wo contrast   ED Interpretation by Richard Nixon MD (03/30 1856)   6mm R distal ureter stone mild hydroureter associated per my independent interpretation       Final Result by David Zuniga MD (03/30 2102)      No evidence of acute obstructive uropathy or nephroureterolithiasis.      Colonic diverticulosis without evidence for focal acute diverticulitis.         Workstation performed: [id number]               Procedures  Procedures      ED Course  ED Course as of 03/31/24 0131   Sat Mar 30, 2024   1928 WBC, UA(!): 10-20   1941 Creatinine: 0.61   2135 Called rads, seeing phlebolith no stones               Identification of Seniors at Risk      Flowsheet Row Most Recent Value   (ISAR) Identification of Seniors at Risk    Before the illness or injury that brought you to the Emergency, did you need someone to help you on a regular basis? 0 Filed at: 03/30/2024 1754   In the last 24 hours, have you needed more help than usual? 0 Filed at: 03/30/2024 1754   Have you been hospitalized  for one or more nights during the past 6 months? 0 Filed at: 03/30/2024 1754   In general, do you see well? 0 Filed at: 03/30/2024 1754   In general, do you have serious problems with your memory? 0 Filed at: 03/30/2024 1754   Do you take more than three different medications every day? 1 Filed at: 03/30/2024 1754   ISAR Score 1 Filed at: 03/30/2024 1754                      SBIRT 20yo+      Flowsheet Row Most Recent Value   Initial Alcohol Screen: US AUDIT-C     1. How often do you have a drink containing alcohol? 0 Filed at: 03/30/2024 1754   2. How many drinks containing alcohol do you have on a typical day you are drinking?  0 Filed at: 03/30/2024 1754   3a. Male UNDER 65: How often do you have five or more drinks on one occasion? 0 Filed at: 03/30/2024 1754   3b. FEMALE Any Age, or MALE 65+: How often do you have 4 or more drinks on one occassion? 0 Filed at: 03/30/2024 1754   Audit-C Score 0 Filed at: 03/30/2024 1754   MARCIA: How many times in the past year have you...    Used an illegal drug or used a prescription medication for non-medical reasons? Never Filed at: 03/30/2024 1754                  Medical Decision Making  68 y/o F presenting with acute R flank pain. VSS. Concern for nephrolithiasis, doubt aaa, appendicitis, cholecystitis. Plan for labs, CT renal study. My interpretation of CT was with R ureteral stone however d/w radiologist who thinks it is phlebolith outside of ureter. Given unclear cause, d/w pt option for CT with contrast vs discharge and rted precautions. Pt prefers iv scan done to further eval pain. CT with contrast also unrevealing. Pt has 10-20 wbc in urine, will tx with culture pending. Recommend symptomatic control at home with strict RTED precautions, pt agreeable.     Amount and/or Complexity of Data Reviewed  Labs: ordered. Decision-making details documented in ED Course.  Radiology: ordered and independent interpretation performed.    Risk  OTC drugs.  Prescription drug  management.          Disposition  Final diagnoses:   Right flank pain     Time reflects when diagnosis was documented in both MDM as applicable and the Disposition within this note       Time User Action Codes Description Comment    3/30/2024 11:06 PM Richard Nixon [R10.9] Right flank pain           ED Disposition       ED Disposition   Discharge    Condition   Stable    Date/Time   Sat Mar 30, 2024 2300    Comment   Blane Marti discharge to home/self care.                   Follow-up Information       Follow up With Specialties Details Why Contact Info Additional Information    [facility] Emergency Department Emergency Medicine  If symptoms worsen [address] [address]  [phone] [facility], [address] [place], [place], [address]   [phone]            Discharge Medication List as of 3/30/2024 11:09 PM        START taking these medications    Details   acetaminophen (TYLENOL) 500 mg tablet Take 1 tablet (500 mg total) by mouth every 6 (six) hours as needed for mild pain, Starting Sat 3/30/2024, Normal      cephalexin (KEFLEX) 500 mg capsule Take 1 capsule (500 mg total) by mouth every 6 (six) hours for 5 days, Starting Sat 3/30/2024, Until Thu 4/4/2024, Normal      lidocaine (Lidoderm) 5 % Apply 1 patch topically over 12 hours daily Remove & Discard patch within 12 hours or as directed by MD, Starting Sat 3/30/2024, Normal      naproxen (NAPROSYN) 375 mg tablet Take 1 tablet (375 mg total) by mouth 2 (two) times a day with meals, Starting Sat 3/30/2024, Normal      ondansetron (ZOFRAN-ODT) 4 mg disintegrating tablet Take 1 tablet (4 mg total) by mouth every 6 (six) hours as needed for nausea, Starting Sat 3/30/2024, Normal           CONTINUE these medications which have NOT CHANGED    Details   acetaminophen (Tylenol 8 Hour Arthritis Pain) 650 mg CR tablet Take 1 tablet (650 mg total) by mouth every  8 (eight) hours as needed for mild pain, Starting Fri 4/24/2020, Normal      Aspirin Low Dose 81 MG EC tablet TAKE ONE TABLET BY MOUTH DAILY, Starting Wed 9/6/2023, Normal      atorvastatin (LIPITOR) 20 mg tablet TAKE 1 TABLET (20 MG TOTAL) BY MOUTH DAILY, Starting Thu 2/8/2024, Until Sun 2/2/2025, Normal      Cholecalciferol (D3-1000) 25 MCG (1000 UT) capsule TAKE ONE CAPSULE BY MOUTH DAILY, Starting Thu 12/28/2023, Normal      clotrimazole (LOTRIMIN) 1 % cream Apply topically 2 (two) times a day, Starting Mon 3/20/2023, Normal      fluticasone (FLONASE) 50 mcg/act nasal spray USE TWO SPRAYS INTO EACH NOSTRIL DAILY, Normal      gabapentin (NEURONTIN) 100 mg capsule TAKE 1 CAPSULE BY MOUTH TWO TIMES A DAY, Normal      guaiFENesin (MUCINEX) 600 mg 12 hr tablet Take 2 tablets (1,200 mg total) by mouth every 12 (twelve) hours, Starting Wed 3/20/2024, Normal      !! levothyroxine (Synthroid) 88 mcg tablet Take 1 tablet (88 mcg total) by mouth daily Take 1 tablet Monday - Friday., Starting Wed 5/31/2023, Normal      !! levothyroxine 100 mcg tablet TAKE ONE TABLET BY MOUTH TWO TIMES A WEEK, Normal      lisinopril (ZESTRIL) 20 mg tablet TAKE ONE TABLET BY MOUTH DAILY, Starting Thu 2/8/2024, Normal      loratadine (CLARITIN) 10 mg tablet TAKE 1 TABLET BY MOUTH DAILY AS NEEDED FOR ALLERGIES, Normal       !! - Potential duplicate medications found. Please discuss with provider.        No discharge procedures on file.    PDMP Review       None             ED Provider  Attending physically available and evaluated Blane Marti. I managed the patient along with the ED Attending.    Electronically Signed by           Richard Nixon MD  03/31/24 4423

## 2024-03-31 NOTE — DISCHARGE INSTRUCTIONS
Your CT scan does not show a cause for your pain. Try using tylenol and ibuprofen as needed at home for pain.     If you develop new or worsening symptoms, please return to the Emergency Department for further evaluation.

## 2024-04-02 ENCOUNTER — OFFICE VISIT (OUTPATIENT)
Dept: INTERNAL MEDICINE CLINIC | Facility: CLINIC | Age: 69
End: 2024-04-02

## 2024-04-02 VITALS
HEIGHT: 64 IN | BODY MASS INDEX: 43.43 KG/M2 | DIASTOLIC BLOOD PRESSURE: 75 MMHG | OXYGEN SATURATION: 98 % | RESPIRATION RATE: 18 BRPM | WEIGHT: 254.38 LBS | SYSTOLIC BLOOD PRESSURE: 125 MMHG | TEMPERATURE: 97.3 F | HEART RATE: 69 BPM

## 2024-04-02 DIAGNOSIS — Z23 ENCOUNTER FOR IMMUNIZATION: ICD-10-CM

## 2024-04-02 DIAGNOSIS — N30.00 ACUTE CYSTITIS WITHOUT HEMATURIA: ICD-10-CM

## 2024-04-02 DIAGNOSIS — Z00.00 MEDICARE ANNUAL WELLNESS VISIT, SUBSEQUENT: Primary | ICD-10-CM

## 2024-04-02 DIAGNOSIS — E11.9 TYPE 2 DIABETES MELLITUS WITHOUT COMPLICATION, WITHOUT LONG-TERM CURRENT USE OF INSULIN (HCC): ICD-10-CM

## 2024-04-02 LAB — BACTERIA UR CULT: ABNORMAL

## 2024-04-02 PROCEDURE — G0439 PPPS, SUBSEQ VISIT: HCPCS | Performed by: STUDENT IN AN ORGANIZED HEALTH CARE EDUCATION/TRAINING PROGRAM

## 2024-04-02 PROCEDURE — 90471 IMMUNIZATION ADMIN: CPT | Performed by: STUDENT IN AN ORGANIZED HEALTH CARE EDUCATION/TRAINING PROGRAM

## 2024-04-02 PROCEDURE — 90750 HZV VACC RECOMBINANT IM: CPT | Performed by: STUDENT IN AN ORGANIZED HEALTH CARE EDUCATION/TRAINING PROGRAM

## 2024-04-02 NOTE — PATIENT INSTRUCTIONS
Medicare Preventive Visit Patient Instructions  Thank you for completing your Welcome to Medicare Visit or Medicare Annual Wellness Visit today. Your next wellness visit will be due in one year (4/3/2025).  The screening/preventive services that you may require over the next 5-10 years are detailed below. Some tests may not apply to you based off risk factors and/or age. Screening tests ordered at today's visit but not completed yet may show as past due. Also, please note that scanned in results may not display below.  Preventive Screenings:  Service Recommendations Previous Testing/Comments   Colorectal Cancer Screening  * Colonoscopy    * Fecal Occult Blood Test (FOBT)/Fecal Immunochemical Test (FIT)  * Fecal DNA/Cologuard Test  * Flexible Sigmoidoscopy Age: 45-75 years old   Colonoscopy: every 10 years (may be performed more frequently if at higher risk)  OR  FOBT/FIT: every 1 year  OR  Cologuard: every 3 years  OR  Sigmoidoscopy: every 5 years  Screening may be recommended earlier than age 45 if at higher risk for colorectal cancer. Also, an individualized decision between you and your healthcare provider will decide whether screening between the ages of 76-85 would be appropriate. Colonoscopy: 09/21/2023  FOBT/FIT: Not on file  Cologuard: Not on file  Sigmoidoscopy: Not on file          Breast Cancer Screening Age: 40+ years old  Frequency: every 1-2 years  Not required if history of left and right mastectomy Mammogram: 08/29/2023        Cervical Cancer Screening Between the ages of 21-29, pap smear recommended once every 3 years.   Between the ages of 30-65, can perform pap smear with HPV co-testing every 5 years.   Recommendations may differ for women with a history of total hysterectomy, cervical cancer, or abnormal pap smears in past. Pap Smear: 02/15/2024        Hepatitis C Screening Once for adults born between 1945 and 1965  More frequently in patients at high risk for Hepatitis C Hep C Antibody: Not on  file        Diabetes Screening 1-2 times per year if you're at risk for diabetes or have pre-diabetes Fasting glucose: 97 mg/dL (8/22/2023)  A1C: 6.0 (8/16/2023)      Cholesterol Screening Once every 5 years if you don't have a lipid disorder. May order more often based on risk factors. Lipid panel: 08/22/2023          Other Preventive Screenings Covered by Medicare:  Abdominal Aortic Aneurysm (AAA) Screening: covered once if your at risk. You're considered to be at risk if you have a family history of AAA.  Lung Cancer Screening: covers low dose CT scan once per year if you meet all of the following conditions: (1) Age 55-77; (2) No signs or symptoms of lung cancer; (3) Current smoker or have quit smoking within the last 15 years; (4) You have a tobacco smoking history of at least 20 pack years (packs per day multiplied by number of years you smoked); (5) You get a written order from a healthcare provider.  Glaucoma Screening: covered annually if you're considered high risk: (1) You have diabetes OR (2) Family history of glaucoma OR (3)  aged 50 and older OR (4)  American aged 65 and older  Osteoporosis Screening: covered every 2 years if you meet one of the following conditions: (1) You're estrogen deficient and at risk for osteoporosis based off medical history and other findings; (2) Have a vertebral abnormality; (3) On glucocorticoid therapy for more than 3 months; (4) Have primary hyperparathyroidism; (5) On osteoporosis medications and need to assess response to drug therapy.   Last bone density test (DXA Scan): 01/08/2024.  HIV Screening: covered annually if you're between the age of 15-65. Also covered annually if you are younger than 15 and older than 65 with risk factors for HIV infection. For pregnant patients, it is covered up to 3 times per pregnancy.    Immunizations:  Immunization Recommendations   Influenza Vaccine Annual influenza vaccination during flu season is recommended  for all persons aged >= 6 months who do not have contraindications   Pneumococcal Vaccine   * Pneumococcal conjugate vaccine = PCV13 (Prevnar 13), PCV15 (Vaxneuvance), PCV20 (Prevnar 20)  * Pneumococcal polysaccharide vaccine = PPSV23 (Pneumovax) Adults 19-63 yo with certain risk factors or if 65+ yo  If never received any pneumonia vaccine: recommend Prevnar 20 (PCV20)  Give PCV20 if previously received 1 dose of PCV13 or PPSV23   Hepatitis B Vaccine 3 dose series if at intermediate or high risk (ex: diabetes, end stage renal disease, liver disease)   Respiratory syncytial virus (RSV) Vaccine - COVERED BY MEDICARE PART D  * RSVPreF3 (Arexvy) CDC recommends that adults 60 years of age and older may receive a single dose of RSV vaccine using shared clinical decision-making (SCDM)   Tetanus (Td) Vaccine - COST NOT COVERED BY MEDICARE PART B Following completion of primary series, a booster dose should be given every 10 years to maintain immunity against tetanus. Td may also be given as tetanus wound prophylaxis.   Tdap Vaccine - COST NOT COVERED BY MEDICARE PART B Recommended at least once for all adults. For pregnant patients, recommended with each pregnancy.   Shingles Vaccine (Shingrix) - COST NOT COVERED BY MEDICARE PART B  2 shot series recommended in those 19 years and older who have or will have weakened immune systems or those 50 years and older     Health Maintenance Due:      Topic Date Due   • Breast Cancer Screening: Mammogram  08/29/2024   • Colorectal Cancer Screening  09/20/2026   • Hepatitis C Screening  Completed     Immunizations Due:      Topic Date Due   • COVID-19 Vaccine (1 - 2023-24 season) Never done     Advance Directives   What are advance directives?  Advance directives are legal documents that state your wishes and plans for medical care. These plans are made ahead of time in case you lose your ability to make decisions for yourself. Advance directives can apply to any medical decision,  such as the treatments you want, and if you want to donate organs.   What are the types of advance directives?  There are many types of advance directives, and each state has rules about how to use them. You may choose a combination of any of the following:  Living will:  This is a written record of the treatment you want. You can also choose which treatments you do not want, which to limit, and which to stop at a certain time. This includes surgery, medicine, IV fluid, and tube feedings.   Durable power of  for healthcare (DPAHC):  This is a written record that states who you want to make healthcare choices for you when you are unable to make them for yourself. This person, called a proxy, is usually a family member or a friend. You may choose more than 1 proxy.  Do not resuscitate (DNR) order:  A DNR order is used in case your heart stops beating or you stop breathing. It is a request not to have certain forms of treatment, such as CPR. A DNR order may be included in other types of advance directives.  Medical directive:  This covers the care that you want if you are in a coma, near death, or unable to make decisions for yourself. You can list the treatments you want for each condition. Treatment may include pain medicine, surgery, blood transfusions, dialysis, IV or tube feedings, and a ventilator (breathing machine).  Values history:  This document has questions about your views, beliefs, and how you feel and think about life. This information can help others choose the care that you would choose.  Why are advance directives important?  An advance directive helps you control your care. Although spoken wishes may be used, it is better to have your wishes written down. Spoken wishes can be misunderstood, or not followed. Treatments may be given even if you do not want them. An advance directive may make it easier for your family to make difficult choices about your care.   Urinary Incontinence   Urinary  incontinence (UI)  is when you lose control of your bladder. UI develops because your bladder cannot store or empty urine properly. The 3 most common types of UI are stress incontinence, urge incontinence, or both.  Medicines:   May be given to help strengthen your bladder control. Report any side effects of medication to your healthcare provider.  Do pelvic muscle exercises often:  Your pelvic muscles help you stop urinating. Squeeze these muscles tight for 5 seconds, then relax for 5 seconds. Gradually work up to squeezing for 10 seconds. Do 3 sets of 15 repetitions a day, or as directed. This will help strengthen your pelvic muscles and improve bladder control.  Train your bladder:  Go to the bathroom at set times, such as every 2 hours, even if you do not feel the urge to go. You can also try to hold your urine when you feel the urge to go. For example, hold your urine for 5 minutes when you feel the urge to go. As that becomes easier, hold your urine for 10 minutes.   Self-care:   Keep a UI record.  Write down how often you leak urine and how much you leak. Make a note of what you were doing when you leaked urine.  Drink liquids as directed. You may need to limit the amount of liquid you drink to help control your urine leakage. Do not drink any liquid right before you go to bed. Limit or do not have drinks that contain caffeine or alcohol.   Prevent constipation.  Eat a variety of high-fiber foods. Good examples are high-fiber cereals, beans, vegetables, and whole-grain breads. Walking is the best way to trigger your intestines to have a bowel movement.  Exercise regularly and maintain a healthy weight.  Weight loss and exercise will decrease pressure on your bladder and help you control your leakage.   Use a catheter as directed  to help empty your bladder. A catheter is a tiny, plastic tube that is put into your bladder to drain your urine.   Go to behavior therapy as directed.  Behavior therapy may be used  to help you learn to control your urge to urinate.    Weight Management   Why it is important to manage your weight:  Being overweight increases your risk of health conditions such as heart disease, high blood pressure, type 2 diabetes, and certain types of cancer. It can also increase your risk for osteoarthritis, sleep apnea, and other respiratory problems. Aim for a slow, steady weight loss. Even a small amount of weight loss can lower your risk of health problems.  How to lose weight safely:  A safe and healthy way to lose weight is to eat fewer calories and get regular exercise. You can lose up about 1 pound a week by decreasing the number of calories you eat by 500 calories each day.   Healthy meal plan for weight management:  A healthy meal plan includes a variety of foods, contains fewer calories, and helps you stay healthy. A healthy meal plan includes the following:  Eat whole-grain foods more often.  A healthy meal plan should contain fiber. Fiber is the part of grains, fruits, and vegetables that is not broken down by your body. Whole-grain foods are healthy and provide extra fiber in your diet. Some examples of whole-grain foods are whole-wheat breads and pastas, oatmeal, brown rice, and bulgur.  Eat a variety of vegetables every day.  Include dark, leafy greens such as spinach, kale, lashell greens, and mustard greens. Eat yellow and orange vegetables such as carrots, sweet potatoes, and winter squash.   Eat a variety of fruits every day.  Choose fresh or canned fruit (canned in its own juice or light syrup) instead of juice. Fruit juice has very little or no fiber.  Eat low-fat dairy foods.  Drink fat-free (skim) milk or 1% milk. Eat fat-free yogurt and low-fat cottage cheese. Try low-fat cheeses such as mozzarella and other reduced-fat cheeses.  Choose meat and other protein foods that are low in fat.  Choose beans or other legumes such as split peas or lentils. Choose fish, skinless poultry (chicken  or turkey), or lean cuts of red meat (beef or pork). Before you cook meat or poultry, cut off any visible fat.   Use less fat and oil.  Try baking foods instead of frying them. Add less fat, such as margarine, sour cream, regular salad dressing and mayonnaise to foods. Eat fewer high-fat foods. Some examples of high-fat foods include french fries, doughnuts, ice cream, and cakes.  Eat fewer sweets.  Limit foods and drinks that are high in sugar. This includes candy, cookies, regular soda, and sweetened drinks.  Exercise:  Exercise at least 30 minutes per day on most days of the week. Some examples of exercise include walking, biking, dancing, and swimming. You can also fit in more physical activity by taking the stairs instead of the elevator or parking farther away from stores. Ask your healthcare provider about the best exercise plan for you.      © Copyright [employer] 2018 Information is for End User's use only and may not be sold, redistributed or otherwise used for commercial purposes. All illustrations and images included in CareNotes® are the copyrighted property of [employer] or [employer]

## 2024-04-02 NOTE — PROGRESS NOTES
Assessment and Plan:     Problem List Items Addressed This Visit       Type 2 diabetes mellitus without complication (HCC)  Diet controlled.  Most recent A1c 5.3%.  Foot exam done in 8/2023.  Eye exam done at [facility] last October.       Other Visit Diagnoses       Medicare annual wellness visit, subsequent    -  Primary  Scheduled for mammogram on 8/2024.  Normal DEXA scan on 1/2024.  Recent labs reviewed and discussed today.      Acute cystitis without hematuria      Recently diagnosed with kidney stone. Urine cx growing pansenstive E. Coli.  Continue Keflex 500mg q6hrs x 5 days.            BMI Counseling: Body mass index is 43.66 kg/m². The BMI is above normal. Nutrition recommendations include decreasing portion sizes, encouraging healthy choices of fruits and vegetables, decreasing fast food intake, limiting drinks that contain sugar and moderation in carbohydrate intake. Exercise recommendations include moderate physical activity 150 minutes/week. No pharmacotherapy was ordered. Rationale for BMI follow-up plan is due to patient being overweight or obese.     Depression Screening and Follow-up Plan: Patient was screened for depression during today's encounter. They screened negative with a PHQ-2 score of 0.      Preventive health issues were discussed with patient, and age appropriate screening tests were ordered as noted in patient's After Visit Summary.  Personalized health advice and appropriate referrals for health education or preventive services given if needed, as noted in patient's After Visit Summary.     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         Patient Care Team:  Katie Matthew MD as PCP - General (Internal  Medicine)  Anna Hernandez MD as PCP - PCP-[facility] ([facility])  MD Levy Parmar DPM Jason Erickson, DO     Review of Systems:     Review of Systems   Genitourinary:  Positive for dysuria.   All other systems reviewed and are negative.       Problem List:     Patient Active Problem List   Diagnosis    Carpal tunnel syndrome, bilateral    Cervical spondylosis without myelopathy    Degenerative cervical disc    GERD (gastroesophageal reflux disease)    Hypertension    Hypothyroidism    Morbid obesity (HCC)    Obstructive sleep apnea    Type 2 diabetes mellitus without complication (HCC)    Lung nodule < 6cm on CT    Thickened endometrium    Aneurysm of internal carotid artery    Gait instability    Other headache syndrome    Chronic daily headache    Cerebral aneurysm    Chronic migraine without aura without status migrainosus, not intractable    Vitamin D deficiency    Plantar fasciitis of right foot    Dyslipidemia    Mild cognitive impairment    Venous insufficiency    Cervical radicular pain    Allergic rhinitis    Acute back pain with sciatica, left    Varicose veins of leg with swelling, bilateral    Type 2 diabetes mellitus with diabetic chronic kidney disease, unspecified CKD stage, unspecified whether long term insulin use ([facility])      Past Medical and Surgical History:     Past Medical History:   Diagnosis Date    Arthritis     Depression     Difficult intubation 01/08/2021 1/8/2021 1st attempt: Mac3, Grade 4 view. 2nd attempt: Mac 3, Grade 4 view, unable to pass bougie, 3rd attempt: [place]:, Grade 4 view, unable to pass bougie, 4th attempt:(by attending): Mac3, Grade 4 view unable to pass bougie, 5th attempt(by attending): Glidescope 3, Grade 3 view, ETT placed successfully with stylet. LMA used successfully to ventilate in between attempts    Disease of thyroid gland     hypo    Edema     LAST ASSESSED: [id number]    GERD (gastroesophageal reflux disease)      "Hypercholesterolemia     Hyperlipidemia     Hypertension     WELL CONTROLLED. CURRENTLY ON LISINOPRIL. LAST ASSESSED: 2017    Liver disease     Orthostatic hypotension 2020    Other headache syndrome     Other muscle spasm     LAST ASSESSED: 2014    Ovarian cyst 2006    Renal calculi      (spontaneous vaginal delivery) 1975    FEMALE     Past Surgical History:   Procedure Laterality Date    BACK SURGERY      HERNIATED DISC (L4/L5)    CHOLECYSTECTOMY      IR BIOPSY TRANSJUGULAR LIVER  11/10/2023    IR CEREBRAL ANGIOGRAPHY  2020    IR CEREBRAL ANGIOGRAPHY  2021    IR CEREBRAL ANGIOGRAPHY / INTERVENTION  2021    TONSILLECTOMY        Family History:     Family History   Problem Relation Age of Onset    Lung cancer Mother     Cancer Mother         MALIGNANT NEOPLASM    Hypertension Father     Seizures Father     Stroke Father     Heart attack Father     Diabetes Brother     Hypertension Son     Lung disease Son     Hyperthyroidism Maternal Aunt     Hypothyroidism Maternal Aunt     Lung cancer Cousin     Heart disease Other         CARDIAC DISORDER    Neuropathy Family       Social History:     Social History     Socioeconomic History    Marital status:      Spouse name: None    Number of children: None    Years of education: None    Highest education level: None   Occupational History    Occupation: RETIRED   Tobacco Use    Smoking status: Former    Smokeless tobacco: Never    Tobacco comments:     pt \"quit about 20 years ago\"   Vaping Use    Vaping status: Never Used   Substance and Sexual Activity    Alcohol use: Never    Drug use: No    Sexual activity: Never   Other Topics Concern    None   Social History Narrative    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         AS PER [employer]    EXERCISES REGULARLY    LIVES WITH FAMILY    NO CAFFEINE USE    NO LIVING WILL    NO TOBACCO/SMOKE EXPOSURE    UNEMPLOYED     Social " Determinants of Health     Financial Resource Strain: Low Risk  (4/2/2024)    Overall Financial Resource Strain (CARDIA)     Difficulty of Paying Living Expenses: Not very hard   Food Insecurity: No Food Insecurity (4/2/2024)    Hunger Vital Sign     Worried About Running Out of Food in the Last Year: Never true     Ran Out of Food in the Last Year: Never true   Transportation Needs: Unmet Transportation Needs (4/2/2024)    PRAPARE - Transportation     Lack of Transportation (Medical): Yes     Lack of Transportation (Non-Medical): Yes   Physical Activity: Inactive (9/16/2021)    Exercise Vital Sign     Days of Exercise per Week: 0 days     Minutes of Exercise per Session: 0 min   Stress: No Stress Concern Present (9/16/2021)    [employer] [place] [employer] - Occupational Stress Questionnaire     Feeling of Stress : Not at all   Social Connections: Socially Isolated (9/16/2021)    Social Connection and Isolation Panel [NHANES]     Frequency of Communication with Friends and Family: More than three times a week     Frequency of Social Gatherings with Friends and Family: Three times a week     Attends Jew Services: Never     Active Member of Clubs or Organizations: No     Attends Club or Organization Meetings: Never     Marital Status:    Intimate Partner Violence: Not At Risk (9/16/2021)    Humiliation, Afraid, Rape, and Kick questionnaire     Fear of Current or Ex-Partner: No     Emotionally Abused: No     Physically Abused: No     Sexually Abused: No   Housing Stability: Low Risk  (4/2/2024)    Housing Stability Vital Sign     Unable to Pay for Housing in the Last Year: No     Number of Places Lived in the Last Year: 1     Unstable Housing in the Last Year: No      Medications and Allergies:     Current Outpatient Medications   Medication Sig Dispense Refill    acetaminophen (Tylenol 8 Hour Arthritis Pain) 650 mg CR tablet Take 1 tablet (650 mg total) by mouth every 8 (eight) hours as  needed for mild pain 90 tablet 3    acetaminophen (TYLENOL) 500 mg tablet Take 1 tablet (500 mg total) by mouth every 6 (six) hours as needed for mild pain 30 tablet 0    Aspirin Low Dose 81 MG EC tablet TAKE ONE TABLET BY MOUTH DAILY 90 tablet 3    atorvastatin (LIPITOR) 20 mg tablet TAKE 1 TABLET (20 MG TOTAL) BY MOUTH DAILY 90 tablet 3    cephalexin (KEFLEX) 500 mg capsule Take 1 capsule (500 mg total) by mouth every 6 (six) hours for 5 days 20 capsule 0    Cholecalciferol (D3-1000) 25 MCG (1000 UT) capsule TAKE ONE CAPSULE BY MOUTH DAILY 90 capsule 4    clotrimazole (LOTRIMIN) 1 % cream Apply topically 2 (two) times a day 30 g 0    fluticasone (FLONASE) 50 mcg/act nasal spray USE TWO SPRAYS INTO EACH NOSTRIL DAILY 9.9 mL 3    gabapentin (NEURONTIN) 100 mg capsule TAKE 1 CAPSULE BY MOUTH TWO TIMES A  capsule 3    guaiFENesin (MUCINEX) 600 mg 12 hr tablet Take 2 tablets (1,200 mg total) by mouth every 12 (twelve) hours 30 tablet 0    levothyroxine (Synthroid) 88 mcg tablet Take 1 tablet (88 mcg total) by mouth daily Take 1 tablet Monday - Friday. 90 tablet 3    levothyroxine 100 mcg tablet TAKE ONE TABLET BY MOUTH TWO TIMES A WEEK 24 tablet 3    lidocaine (Lidoderm) 5 % Apply 1 patch topically over 12 hours daily Remove & Discard patch within 12 hours or as directed by MD 15 patch 0    lisinopril (ZESTRIL) 20 mg tablet TAKE ONE TABLET BY MOUTH DAILY 90 tablet 3    loratadine (CLARITIN) 10 mg tablet TAKE 1 TABLET BY MOUTH DAILY AS NEEDED FOR ALLERGIES 90 tablet 11    naproxen (NAPROSYN) 375 mg tablet Take 1 tablet (375 mg total) by mouth 2 (two) times a day with meals 20 tablet 0    ondansetron (ZOFRAN-ODT) 4 mg disintegrating tablet Take 1 tablet (4 mg total) by mouth every 6 (six) hours as needed for nausea 15 tablet 0     No current facility-administered medications for this visit.     Allergies   Allergen Reactions    Tagamet [Cimetidine] Hives      Immunizations:     Immunization History   Administered  Date(s) Administered    INFLUENZA 11/12/2018    Influenza Quadrivalent, 6-35 Months IM 09/29/2017    Influenza, high dose seasonal 0.7 mL 11/15/2021, 11/22/2022, 10/23/2023    Influenza, recombinant, quadrivalent,injectable, preservative free 11/12/2018, 10/21/2019, 10/30/2020    Influenza, seasonal, injectable 10/10/2013, 12/03/2014, 10/22/2015    Pneumococcal Conjugate Vaccine 20-valent (Pcv20), Polysace 03/06/2023    Pneumococcal Polysaccharide PPV23 11/12/2018    Tdap 10/30/2020, 05/01/2021      Health Maintenance:         Topic Date Due    Breast Cancer Screening: Mammogram  08/29/2024    Colorectal Cancer Screening  09/20/2026    Hepatitis C Screening  Completed         Topic Date Due    COVID-19 Vaccine (1 - 2023-24 season) Never done      Medicare Screening Tests and Risk Assessments:         Health Risk Assessment:   Patient rates overall health as good. Patient feels that their physical health rating is same. Patient is very dissatisfied with their life. Eyesight was rated as slightly worse. Hearing was rated as same. Patient feels that their emotional and mental health rating is slightly better. Patients states they are sometimes angry. Patient states they are always unusually tired/fatigued. Pain experienced in the last 7 days has been some. Patient's pain rating has been 2/10. Patient states that she has experienced weight loss or gain in last 6 months.     Depression Screening:   PHQ-2 Score: 0      Fall Risk Screening:   In the past year, patient has experienced: no history of falling in past year      Urinary Incontinence Screening:   Patient has not leaked urine accidently in the last six months.     Home Safety:  Patient has trouble with stairs inside or outside of their home. Patient has working smoke alarms and has working carbon monoxide detector. Home safety hazards include: none.     Nutrition:   Current diet is Diabetic.     Medications:   Patient is not currently taking any over-the-counter  supplements. Patient is able to manage medications.     Activities of Daily Living (ADLs)/Instrumental Activities of Daily Living (IADLs):   Walk and transfer into and out of bed and chair?: Yes  Dress and groom yourself?: Yes    Bathe or shower yourself?: Yes    Feed yourself? Yes  Do your laundry/housekeeping?: Yes  Manage your money, pay your bills and track your expenses?: Yes  Make your own meals?: Yes    Do your own shopping?: Yes    Advance Care Planning:   Living will: No    Durable POA for healthcare: No    Advanced directive: No    Advanced directive counseling given: Yes    ACP document given: Yes    End of Life Decisions reviewed with patient: Yes      PREVENTIVE SCREENINGS      Cardiovascular Screening:    General: Screening Current      Diabetes Screening:     General: Screening Not Indicated and History Diabetes      Colorectal Cancer Screening:     General: Screening Current      Breast Cancer Screening:     General: Screening Current      Cervical Cancer Screening:    General: Screening Not Indicated      Lung Cancer Screening:     General: Screening Not Indicated      Hepatitis C Screening:    General: Screening Current    Screening, Brief Intervention, and Referral to Treatment (SBIRT)    Screening      Single Item Drug Screening:  How often have you used an illegal drug (including marijuana) or a prescription medication for non-medical reasons in the past year? never    Single Item Drug Screen Score: 0  Interpretation: Negative screen for possible drug use disorder    SDOH Risk Assessment  Social determinants of health (SDOH) risk assesment tool was completed. The tool at a minimum covered housing stability, food insecurity, transportation needs, and utility difficulty. Patient had at risk responses for the following SDOH domains: transportation needs.     No results found.     Physical Exam:     /75 (BP Location: Left arm)   Pulse 69   Temp (!) 97.3 °F (36.3 °C) (Temporal)   Resp 18    "Ht 5' 4\" (1.626 m)   Wt 115 kg (254 lb 6 oz)   LMP 01/01/2003   SpO2 98%   BMI 43.66 kg/m²     Physical Exam  Vitals and nursing note reviewed.   Constitutional:       General: She is not in acute distress.     Appearance: She is well-developed. She is obese.   HENT:      Head: Normocephalic and atraumatic.   Eyes:      Conjunctiva/sclera: Conjunctivae normal.   Cardiovascular:      Rate and Rhythm: Normal rate and regular rhythm.      Heart sounds: No murmur heard.  Pulmonary:      Effort: Pulmonary effort is normal. No respiratory distress.      Breath sounds: Normal breath sounds.   Abdominal:      Palpations: Abdomen is soft.      Tenderness: There is no abdominal tenderness.   Musculoskeletal:         General: No swelling.      Cervical back: Neck supple.   Skin:     General: Skin is warm and dry.      Capillary Refill: Capillary refill takes less than 2 seconds.   Neurological:      Mental Status: She is alert.   Psychiatric:         Mood and Affect: Mood normal.          Katie Matthew MD  "

## 2024-05-08 ENCOUNTER — PATIENT OUTREACH (OUTPATIENT)
Dept: INTERNAL MEDICINE CLINIC | Facility: CLINIC | Age: 69
End: 2024-05-08

## 2024-05-08 DIAGNOSIS — Z71.89 COMPLEX CARE COORDINATION: Primary | ICD-10-CM

## 2024-05-08 NOTE — PROGRESS NOTES
Outpatient Care Management Note:    Re:  in person follow up     Patient at PCP office today accompanying her brother for a nurse appointment. She reports she is applying for PA waiver program and was evaluated by [employer] last week and to be evaluated by Aging next week. Patient shares that her son Davonte has been staying with her to assist as she helps to care for her brother and 89 year old father. She also shares that they are looking to move. Will have CMOC explore housing options with patient and brother to see what waitlists they could get on and further assist with PA waiver process.     Patient has history of type 2 diabetes (last A1C was 5.3% on 12/15/23), hypothyroidism, hyperlipidemia, right upper ophthalmic artery aneurysm s/p pipeline embolization. Patient follows with neurosurgery. She feels she is managing well with her appointments and medication. She uses [facility] and I previously discussed with her about bubble packing of medication and process and she would contact pharmacy if further interested.     She declined need for further RN outreach at this time and will remain available to assist as needed. CMOC referral placed and goals. Jewell,  was verbally updated and will send this note to her too.

## 2024-05-15 ENCOUNTER — PATIENT OUTREACH (OUTPATIENT)
Dept: INTERNAL MEDICINE CLINIC | Facility: CLINIC | Age: 69
End: 2024-05-15

## 2024-05-15 NOTE — PROGRESS NOTES
Outpatient Care Management Note:    Re:  in person follow up    Patient came into PCP office with her brother to meet with me. She mentioned last week on Thursday or Friday she had a fall at home during the night when she got up to go to the bathroom. She reports feeling dizzy and lightheaded and fell and hit her chest on the floor. She denies hitting her head. Denies any bruising. She reports she still feels sore and more to the right side of her chest. She feels now she is breathing normally but reports did not feel at first she could take deep breaths. She reports taking Tylenol as needed. I offered her a PCP appointment to be further evaluated but she declined. Instructed her to call for appointment if pain does not improve and if she has any further difficulty with her breathing to go to the emergency room to be further evaluated. Patient reports understanding.     Patient reports Aging was out to evaluate her for PA waiver program and reports was found not medically eligible. She shares she is ok with this decision at this time and denies wanting to appeal decision at this time. Reviewed if her condition would change in the future she could ask to be re-evaluated.     Will update CMOC and .     Patient does not have any further questions, concerns, or other needs at this time. Patient has my contact number [phone] and PCP office number [phone] if needed. This is a one time outreach.

## 2024-05-20 ENCOUNTER — PATIENT OUTREACH (OUTPATIENT)
Dept: INTERNAL MEDICINE CLINIC | Facility: CLINIC | Age: 69
End: 2024-05-20

## 2024-05-20 NOTE — PROGRESS NOTES
Outgoing call  05/20/2024    CMOC spoke to representative from IE today. Per representative the [employer] assessment was completed on 05/02/2024.  A physician certification is needed for the case to move forward.     A physician certification form has been faxed today for our doctors to complete.     [facility] was told by patient [facility] has denied her for Waiver services.     Next outreach is schedule for 05/27/2024

## 2024-05-21 ENCOUNTER — TELEPHONE (OUTPATIENT)
Dept: INTERNAL MEDICINE CLINIC | Facility: CLINIC | Age: 69
End: 2024-05-21

## 2024-05-21 NOTE — TELEPHONE ENCOUNTER
Folder Color: Purple     Name of Form: Physician Certification Form     Form to be filled out by: Dr Augustus León     Form to be Faxed: [phone]     Patient made aware of 10 day business policy.

## 2024-05-22 NOTE — TELEPHONE ENCOUNTER
Per dr. Coffman, she reviewed form and patient does not qualify. Will not complete form. Form placed in scanning bin to be scanned into patient chart.

## 2024-05-28 ENCOUNTER — APPOINTMENT (OUTPATIENT)
Dept: LAB | Facility: CLINIC | Age: 69
End: 2024-05-28
Payer: MEDICARE

## 2024-05-28 DIAGNOSIS — K76.0 NAFLD (NONALCOHOLIC FATTY LIVER DISEASE): ICD-10-CM

## 2024-05-28 DIAGNOSIS — K76.0 FATTY METAMORPHOSIS OF LIVER: ICD-10-CM

## 2024-05-28 DIAGNOSIS — E03.8 OTHER SPECIFIED HYPOTHYROIDISM: ICD-10-CM

## 2024-05-28 LAB
ALBUMIN SERPL BCP-MCNC: 4.1 G/DL (ref 3.5–5)
ALP SERPL-CCNC: 61 U/L (ref 34–104)
ALT SERPL W P-5'-P-CCNC: 13 U/L (ref 7–52)
AST SERPL W P-5'-P-CCNC: 18 U/L (ref 13–39)
BILIRUB DIRECT SERPL-MCNC: 0.1 MG/DL (ref 0–0.2)
BILIRUB SERPL-MCNC: 0.43 MG/DL (ref 0.2–1)
PROT SERPL-MCNC: 7.4 G/DL (ref 6.4–8.4)
TSH SERPL DL<=0.05 MIU/L-ACNC: 0.83 UIU/ML (ref 0.45–4.5)

## 2024-05-28 PROCEDURE — 80076 HEPATIC FUNCTION PANEL: CPT

## 2024-05-28 PROCEDURE — 84443 ASSAY THYROID STIM HORMONE: CPT

## 2024-05-28 PROCEDURE — 36415 COLL VENOUS BLD VENIPUNCTURE: CPT

## 2024-05-29 ENCOUNTER — PATIENT OUTREACH (OUTPATIENT)
Dept: INTERNAL MEDICINE CLINIC | Facility: CLINIC | Age: 69
End: 2024-05-29

## 2024-05-29 DIAGNOSIS — Z78.9 NEEDS ASSISTANCE WITH COMMUNITY RESOURCES: Primary | ICD-10-CM

## 2024-05-29 NOTE — PROGRESS NOTES
In-person visit  05/29/2024    CMOC and HIRAM Noland met with pt today at her brothers doctors appt. Pt states she does not want to appeal for the Waiver services. Pt was upset and emotional today.     CMOC spoke to patient today. Pt was very upset and disappointed because she was denied Waiver services and patient feels that she needs this service.    CMOC will call patient back in a week to see if she would like to appeal the waiver services.     Please see HIRAM Noland notes.     Next outreach is schedule for 06/04/2024

## 2024-05-29 NOTE — PROGRESS NOTES
"SW has met with pt while she accompanied pt to her brother PCP appointment.  Pt resides with her disabled brother and father .  She too has significant health needs and she had started the process to be evaluated for the PA Waiver .    Her brother was also evaluated and our CMOC is assisting with his application.  She shares that she was turned down as medically not being eligible while her brother was approved medically.    Pt very \"Hurt \" about the decision and is tearful.  She feels they were discriminatory and she is now refusing PA Waiver for herself and her brother as she feels that they do not any involvement with  government agencies.  Sw attempted to provide support and reviewed the application process.  Pt indicate she is familiar as she was formally on the Program and her Father was evaluated and was not financially eligible.  She shares that she will have a cousin come over 3 X week to assist for a few hours.  Sw has asked for pt to think about the program and let SW know if she will reconsider.  Sw would attempt to APPEAL the decision if possible.  Sw also pointed out that if her brother is approved it will help them all out if at least he gets help.  Pt to consider same.    SW to f/u and assist as indicated.   "

## 2024-06-03 ENCOUNTER — PATIENT OUTREACH (OUTPATIENT)
Dept: INTERNAL MEDICINE CLINIC | Facility: CLINIC | Age: 69
End: 2024-06-03

## 2024-06-03 NOTE — PROGRESS NOTES
SW has reached out to Ermelinda Carney of [facility] Aging Office [phone] to inquire re if she was evaluated by   Aging for the PA Waiver .    Info and referral ( Russell) suggested that she may be able to get OPTIONS services.   SW had to leave a message for a return call.    HIRAM received a return message from Ms Carney which she shares that her Assessment was done on 5/13/24 and she was found to be Nursing Home ineligible.    She was found to be independent in her ADLs and cognition except for slight forgetfulness.  She wanted help with Home care needs, shopping and transportation which is not what the Waiver is set up to do.    It is to keep pt's out of the Nursing Home.  She may not be a candidate for OPTIONS because she can do her own self care but it is possible she could gt a Medical Alert System and Meals on Wheels.   She also shared there is a waiting list for OPTIONS Service anyway.    Pt has the right to APPEAL or to be re-evaluated if the is a change in her condition.    SW to f/u with pt and Team.

## 2024-06-07 ENCOUNTER — PATIENT OUTREACH (OUTPATIENT)
Dept: INTERNAL MEDICINE CLINIC | Facility: CLINIC | Age: 69
End: 2024-06-07

## 2024-06-07 NOTE — PROGRESS NOTES
Outgoing call   06/07/2024    CMOC spoke to patient Blane Marti today. Pt states she continues to be disappointed and does not want any services from the government. Pt does not want to appeal for Waiver Services.  Pt was approved for Options program.  Please see HIRAM Noland notes from 06/03/2024.      Pt is aware if she needs our services for Waiver in the future she can reach out to the team.     Goals were not completed. As patient has declined to appeal services.   [facility] will close the case at this time.

## 2024-06-07 NOTE — PROGRESS NOTES
"Outpatient Care Management Note:    Re:  patient requesting appointment    I spoke with patient and she reports she tried calling office for an appointment and was unable to schedule.     Patient states she started this week with worsening left knee pain. She denies any swelling and is able to bend her knee but reports a \"burning pain\" that is affecting her mobility. Patient reports taking Tylenol and using a pain cream on knee to help. Patient reports she has had problems with her left knee in the past. Patient denies any falls or injury to knee.     Patient complains of right low back pain and describes pain as \"burning.\" Patient denies any fevers, chills, radiating pain, rash to area, or any urinary symptoms at this time. Patient has history of L5 back surgery in [place] in 1996 per chart review. Patient aware can us pain cream to area and take Tylenol as needed.     Patient requesting an appointment with PCP office to further discuss her symptoms. No appointments today and patient unable to come into office on Monday due to caring for her father and father having an appointment. Patient scheduled for Tuesday 6/11 @ 4:30 pm with Dr. Silva. (Spoke with Becki, clinical coordinator who approved to use a same day slot).     Patient was made aware if she starts with any chest pain or shortness of breath to go to emergency room to be further evaluated.   "

## 2024-06-11 ENCOUNTER — OFFICE VISIT (OUTPATIENT)
Dept: INTERNAL MEDICINE CLINIC | Facility: CLINIC | Age: 69
End: 2024-06-11

## 2024-06-11 ENCOUNTER — PATIENT OUTREACH (OUTPATIENT)
Dept: INTERNAL MEDICINE CLINIC | Facility: CLINIC | Age: 69
End: 2024-06-11

## 2024-06-11 VITALS
TEMPERATURE: 98.1 F | HEART RATE: 78 BPM | WEIGHT: 246 LBS | DIASTOLIC BLOOD PRESSURE: 68 MMHG | SYSTOLIC BLOOD PRESSURE: 120 MMHG | BODY MASS INDEX: 42 KG/M2 | HEIGHT: 64 IN

## 2024-06-11 DIAGNOSIS — M25.562 CHRONIC PAIN OF LEFT KNEE: ICD-10-CM

## 2024-06-11 DIAGNOSIS — G89.29 CHRONIC PAIN OF LEFT KNEE: ICD-10-CM

## 2024-06-11 DIAGNOSIS — M54.9 MUSCULOSKELETAL BACK PAIN: Primary | ICD-10-CM

## 2024-06-11 PROCEDURE — G2211 COMPLEX E/M VISIT ADD ON: HCPCS | Performed by: INTERNAL MEDICINE

## 2024-06-11 PROCEDURE — 99213 OFFICE O/P EST LOW 20 MIN: CPT | Performed by: INTERNAL MEDICINE

## 2024-06-11 RX ORDER — NAPROXEN 500 MG/1
500 TABLET ORAL 2 TIMES DAILY WITH MEALS
Qty: 14 TABLET | Refills: 0 | Status: SHIPPED | OUTPATIENT
Start: 2024-06-11

## 2024-06-11 NOTE — PROGRESS NOTES
Ambulatory Visit  Name: Blane Marti      : 1955      MRN: [id number]  Encounter Provider: Oumar Silva DO  Encounter Date: 2024   Encounter department: [facility]    Assessment & Plan   1. Musculoskeletal back pain  -     naproxen (Naprosyn) 500 mg tablet; Take 1 tablet (500 mg total) by mouth 2 (two) times a day with meals  2. Chronic pain of left knee  -     Diclofenac Sodium (VOLTAREN) 1 %; Apply 2 g topically 4 (four) times a day    Patient's back pain is likely musculoskeletal in nature based on history and  inflammation of the right paraspinal muscles on physical exam. Shingles is unlikely as the pain does not follow a dermatomal pattern and there is no associated rash. No red flag symptoms or point tenderness to the spinal processes to indicate possible malignancy. We will prescribe a two week course of naproxen as well as Voltaren gel for the back and left knee. Patient advised to contact the office if symptoms persist despite treatment.        History of Present Illness     Patient is a 69 year old female with a PMHx of hypertension, hypothyroidism, diabetes, AARON, ICA aneurysm s/p embolization, chronic low back pain with sciatica, carpal tunnel syndrome, and degenerative disc disease of the cervical spine presenting as a same-day appointment for back and left knee pain.     Back Pain: Patient reports midthoracic back pain that began 2 months ago.  She denies any inciting event. She describes it as a constant ache with periodic episodes of burning. The pain is mostly on the right medially and radiates down to the lower back. It does not cross the spinous processes or travel laterally. She has not tried any alleviating measures. It does not change with any movement or positions. She denies any associated rashes.     Left knee pain: Patient has chronic left knee pain. She reports an episode a few weeks ago where she was sitting down and noticed worsening  pain in the knee. She denies trauma to the area. She remained seated for a few hours and felt that symptoms improved. She states that her knee pain is not back to its baseline but is almost at her normal. She denies any instability in the knee or muscle weakness.       Review of Systems   Constitutional:  Negative for chills and fever.   HENT:  Negative for ear pain and sore throat.    Eyes:  Negative for pain and visual disturbance.   Respiratory:  Negative for cough and shortness of breath.    Cardiovascular:  Negative for chest pain and palpitations.   Gastrointestinal:  Negative for vomiting.   Musculoskeletal:  Positive for arthralgias and back pain.   Skin:  Negative for color change and rash.   Neurological:  Negative for syncope.   All other systems reviewed and are negative.    Past Medical History:   Diagnosis Date    Arthritis     Depression     Difficult intubation 2021 1st attempt: Mac3, Grade 4 view. 2nd attempt: Mac 3, Grade 4 view, unable to pass bougie, 3rd attempt: [place]:, Grade 4 view, unable to pass bougie, 4th attempt:(by attending): Mac3, Grade 4 view unable to pass bougie, 5th attempt(by attending): Glidescope 3, Grade 3 view, ETT placed successfully with stylet. LMA used successfully to ventilate in between attempts    Disease of thyroid gland     hypo    Edema     LAST ASSESSED: 2014    GERD (gastroesophageal reflux disease)     Hypercholesterolemia     Hyperlipidemia     Hypertension     WELL CONTROLLED. CURRENTLY ON LISINOPRIL. LAST ASSESSED: 2017    Liver disease     Orthostatic hypotension 2020    Other headache syndrome     Other muscle spasm     LAST ASSESSED: 2014    Ovarian cyst 2006    Renal calculi      (spontaneous vaginal delivery) 1975    FEMALE     Past Surgical History:   Procedure Laterality Date    BACK SURGERY  1996    HERNIATED DISC (L4/L5)    CHOLECYSTECTOMY      IR BIOPSY TRANSJUGULAR LIVER  11/10/2023    IR CEREBRAL  "ANGIOGRAPHY  11/6/2020    IR CEREBRAL ANGIOGRAPHY  8/27/2021    IR CEREBRAL ANGIOGRAPHY / INTERVENTION  1/8/2021    TONSILLECTOMY       Family History   Problem Relation Age of Onset    Lung cancer Mother     Cancer Mother         MALIGNANT NEOPLASM    Hypertension Father     Seizures Father     Stroke Father     Heart attack Father     Diabetes Brother     Hypertension Son     Lung disease Son     Hyperthyroidism Maternal Aunt     Hypothyroidism Maternal Aunt     Lung cancer Cousin     Heart disease Other         CARDIAC DISORDER    Neuropathy Family      Social History     Tobacco Use    Smoking status: Former    Smokeless tobacco: Never    Tobacco comments:     pt \"quit about 20 years ago\"   Vaping Use    Vaping status: Never Used   Substance and Sexual Activity    Alcohol use: Never    Drug use: No    Sexual activity: Never     Current Outpatient Medications on File Prior to Visit   Medication Sig    acetaminophen (Tylenol 8 Hour Arthritis Pain) 650 mg CR tablet Take 1 tablet (650 mg total) by mouth every 8 (eight) hours as needed for mild pain    acetaminophen (TYLENOL) 500 mg tablet Take 1 tablet (500 mg total) by mouth every 6 (six) hours as needed for mild pain    Aspirin Low Dose 81 MG EC tablet TAKE ONE TABLET BY MOUTH DAILY    atorvastatin (LIPITOR) 20 mg tablet TAKE 1 TABLET (20 MG TOTAL) BY MOUTH DAILY    Cholecalciferol (D3-1000) 25 MCG (1000 UT) capsule TAKE ONE CAPSULE BY MOUTH DAILY    clotrimazole (LOTRIMIN) 1 % cream Apply topically 2 (two) times a day    fluticasone (FLONASE) 50 mcg/act nasal spray USE TWO SPRAYS INTO EACH NOSTRIL DAILY    gabapentin (NEURONTIN) 100 mg capsule TAKE 1 CAPSULE BY MOUTH TWO TIMES A DAY    guaiFENesin (MUCINEX) 600 mg 12 hr tablet Take 2 tablets (1,200 mg total) by mouth every 12 (twelve) hours    levothyroxine (Synthroid) 88 mcg tablet Take 1 tablet (88 mcg total) by mouth daily Take 1 tablet Monday - Friday.    levothyroxine 100 mcg tablet TAKE ONE TABLET BY MOUTH " "TWO TIMES A WEEK    lidocaine (Lidoderm) 5 % Apply 1 patch topically over 12 hours daily Remove & Discard patch within 12 hours or as directed by MD    lisinopril (ZESTRIL) 20 mg tablet TAKE ONE TABLET BY MOUTH DAILY    loratadine (CLARITIN) 10 mg tablet TAKE 1 TABLET BY MOUTH DAILY AS NEEDED FOR ALLERGIES    ondansetron (ZOFRAN-ODT) 4 mg disintegrating tablet Take 1 tablet (4 mg total) by mouth every 6 (six) hours as needed for nausea    [DISCONTINUED] naproxen (NAPROSYN) 375 mg tablet Take 1 tablet (375 mg total) by mouth 2 (two) times a day with meals     Allergies   Allergen Reactions    Tagamet [Cimetidine] Hives     Immunization History   Administered Date(s) Administered    INFLUENZA 11/12/2018    Influenza Quadrivalent, 6-35 Months IM 09/29/2017    Influenza, high dose seasonal 0.7 mL 11/15/2021, 11/22/2022, 10/23/2023    Influenza, recombinant, quadrivalent,injectable, preservative free 11/12/2018, 10/21/2019, 10/30/2020    Influenza, seasonal, injectable 10/10/2013, 12/03/2014, 10/22/2015    Pneumococcal Conjugate Vaccine 20-valent (Pcv20), Polysace 03/06/2023    Pneumococcal Polysaccharide PPV23 11/12/2018    Tdap 10/30/2020, 05/01/2021    Zoster Vaccine Recombinant 04/02/2024     Objective     /68 (BP Location: Right arm, Patient Position: Sitting, Cuff Size: Large)   Pulse 78   Temp 98.1 °F (36.7 °C) (Temporal)   Ht 5' 4\" (1.626 m)   Wt 112 kg (246 lb)   LMP 01/01/2003   BMI 42.23 kg/m²     Physical Exam  Vitals and nursing note reviewed.   Constitutional:       General: She is not in acute distress.     Appearance: She is well-developed. She is obese.   HENT:      Head: Normocephalic and atraumatic.   Eyes:      Conjunctiva/sclera: Conjunctivae normal.   Cardiovascular:      Rate and Rhythm: Normal rate and regular rhythm.      Pulses: Normal pulses.      Heart sounds: Normal heart sounds. No murmur heard.  Pulmonary:      Effort: Pulmonary effort is normal. No respiratory distress.      " Breath sounds: Normal breath sounds.   Abdominal:      General: Bowel sounds are normal.      Palpations: Abdomen is soft.      Tenderness: There is no abdominal tenderness.   Musculoskeletal:         General: No swelling.      Cervical back: Neck supple.        Back:       Comments: Tenderness and swelling of the right paraspinals in the area indicated above. No erythema or evidence of rash. Pain does not follow a dermatomal pattern.    Left knee nontender to palpation, not swollen or erythematous. Normal ROM and muscle strength.    Skin:     General: Skin is warm and dry.      Capillary Refill: Capillary refill takes less than 2 seconds.   Neurological:      Mental Status: She is alert.   Psychiatric:         Mood and Affect: Mood normal.       Administrative Statements

## 2024-06-11 NOTE — PROGRESS NOTES
SW has reached out to pt to ask her to bring the DPW paperwork she has questions about to her appointment and SW will attempt to review same.  SW has inquired about [place] Waiver Program for herself and her brother.  She said she got a call from someone who said she was approved.  This is confusing as Aging worker stated pt NOT approved.   SW offers to help looking to same but pt states she has declined services for herself and her brother.    SW met with pt and her brother this date to f/u to assist with paperwork she has received but did not understand from Human Services.    Pt has forgotten to bring them to this appointment but will stop tomorrow to have SW review same.  SW again encouraged pt to reconsider and accept any offered services. She is still declining at this time.    SW to f/u and assist as able.

## 2024-06-18 ENCOUNTER — OFFICE VISIT (OUTPATIENT)
Dept: GASTROENTEROLOGY | Facility: CLINIC | Age: 69
End: 2024-06-18
Payer: MEDICARE

## 2024-06-18 VITALS
WEIGHT: 254 LBS | TEMPERATURE: 97.1 F | SYSTOLIC BLOOD PRESSURE: 118 MMHG | BODY MASS INDEX: 43.36 KG/M2 | HEIGHT: 64 IN | HEART RATE: 67 BPM | DIASTOLIC BLOOD PRESSURE: 70 MMHG | OXYGEN SATURATION: 97 %

## 2024-06-18 DIAGNOSIS — E66.01 CLASS 3 SEVERE OBESITY DUE TO EXCESS CALORIES WITHOUT SERIOUS COMORBIDITY WITH BODY MASS INDEX (BMI) OF 40.0 TO 44.9 IN ADULT (HCC): ICD-10-CM

## 2024-06-18 DIAGNOSIS — K76.0 NAFLD (NONALCOHOLIC FATTY LIVER DISEASE): Primary | ICD-10-CM

## 2024-06-18 DIAGNOSIS — Z12.11 COLON CANCER SCREENING: ICD-10-CM

## 2024-06-18 DIAGNOSIS — A04.8 H. PYLORI INFECTION: ICD-10-CM

## 2024-06-18 PROCEDURE — 99214 OFFICE O/P EST MOD 30 MIN: CPT | Performed by: INTERNAL MEDICINE

## 2024-06-18 NOTE — PROGRESS NOTES
Outpatient Follow-Up -  [facility]  lBane Dhaval Marti 69 y.o. female MRN: [id number]  Encounter: [id number]    ASSESSMENT AND PLAN:    1. NAFLD (nonalcoholic fatty liver disease)  2. Class 3 severe obesity due to excess calories without serious comorbidity with body mass index (BMI) of 40.0 to 44.9 in adult (HCC)   - continued weight loss w/ diet and exercise. She is deferring bariatrics/dietician referral for now   - repeat MELD paraemeters   - US elastography for 11/01/24. If still F2-F3 fibrosis then, can consider rezdiffra   - needs HBV booster, which can be arranged for her PCP OV   - return to clinic in 6 months  - Protime-INR; Future  - CBC and differential; Future  - Comprehensive metabolic panel; Future    3. Colon cancer screening   - 09/2023 colon 3x TAs, recall in 2026    4. H. pylori infection   - s/p quad therapy. Pending eradication testing stool Ag     HPI:    69 y.o. female w/ a PMH of HTN, HLD, hypothyroidism, obesity who presents for 6mo f/u.    Last seen in 11/2023 for MASLD. 238lb. 254lb. Lots of dietary indiscretion w/ holidays. Will work on improving her diet. Has previously seen a dietician, so deferring additional referral today.    09/18/23 US elastography: F3, S3.  09/21/23 EGD/colon:   - normal. Celiac Bx neg. Hpy Bx positive, given quad therapy   - 6 polyps (3x TAs), L-sided tics. Recall 3y  11/10/23 TLJB: resolving active hepatitis, no significant fibrosis. HVPG 6mmHg  05/2024 LFTs wnl  HBV non-immune.  HAV immune        REVIEW OF SYSTEMS:  Otherwise pt denies any fevers, chills, lightheadedness, dizziness, CP, palpitations, SOB, N/V/D/C, abdom pain, urinary sxs, weakness/numbness/tingling, skin changes/rashes, weight loss.    Historical Information   Past Medical History:   Diagnosis Date    Arthritis     Depression     Difficult intubation 01/08/2021 1/8/2021 1st attempt: Mac3, Grade 4 view. 2nd attempt: Mac 3, Grade 4 view, unable to pass bougie, 3rd attempt:  "Marcio Mack:, Grade 4 view, unable to pass bougie, 4th attempt:(by attending): Mac3, Grade 4 view unable to pass bougie, 5th attempt(by attending): Glidescope 3, Grade 3 view, ETT placed successfully with stylet. LMA used successfully to ventilate in between attempts    Disease of thyroid gland     hypo    Edema     LAST ASSESSED: 2014    GERD (gastroesophageal reflux disease)     Hypercholesterolemia     Hyperlipidemia     Hypertension     WELL CONTROLLED. CURRENTLY ON LISINOPRIL. LAST ASSESSED: 2017    Liver disease     Orthostatic hypotension 2020    Other headache syndrome     Other muscle spasm     LAST ASSESSED: 2014    Ovarian cyst 2006    Renal calculi      (spontaneous vaginal delivery) 1975    FEMALE     Past Surgical History:   Procedure Laterality Date    BACK SURGERY      HERNIATED DISC (L4/L5)    CHOLECYSTECTOMY      IR BIOPSY TRANSJUGULAR LIVER  11/10/2023    IR CEREBRAL ANGIOGRAPHY  2020    IR CEREBRAL ANGIOGRAPHY  2021    IR CEREBRAL ANGIOGRAPHY / INTERVENTION  2021    TONSILLECTOMY       Family History   Problem Relation Age of Onset    Lung cancer Mother     Cancer Mother         MALIGNANT NEOPLASM    Hypertension Father     Seizures Father     Stroke Father     Heart attack Father     Diabetes Brother     Hypertension Son     Lung disease Son     Hyperthyroidism Maternal Aunt     Hypothyroidism Maternal Aunt     Lung cancer Cousin     Heart disease Other         CARDIAC DISORDER    Neuropathy Family      Social History     Tobacco Use    Smoking status: Former    Smokeless tobacco: Never    Tobacco comments:     pt \"quit about 20 years ago\"   Vaping Use    Vaping status: Never Used   Substance Use Topics    Alcohol use: Never    Drug use: No       Meds/Allergies     Current Outpatient Medications:     acetaminophen (Tylenol 8 Hour Arthritis Pain) 650 mg CR tablet    acetaminophen (TYLENOL) 500 mg tablet    Aspirin Low Dose 81 MG EC tablet    atorvastatin " "(LIPITOR) 20 mg tablet    Cholecalciferol (D3-1000) 25 MCG (1000 UT) capsule    clotrimazole (LOTRIMIN) 1 % cream    Diclofenac Sodium (VOLTAREN) 1 %    fluticasone (FLONASE) 50 mcg/act nasal spray    gabapentin (NEURONTIN) 100 mg capsule    guaiFENesin (MUCINEX) 600 mg 12 hr tablet    levothyroxine (Synthroid) 88 mcg tablet    levothyroxine 100 mcg tablet    lidocaine (Lidoderm) 5 %    lisinopril (ZESTRIL) 20 mg tablet    loratadine (CLARITIN) 10 mg tablet    naproxen (Naprosyn) 500 mg tablet    ondansetron (ZOFRAN-ODT) 4 mg disintegrating tablet  Allergies   Allergen Reactions    Tagamet [Cimetidine] Hives       Objective   /70 (BP Location: Left arm, Patient Position: Sitting, Cuff Size: Adult)   Pulse 67   Temp (!) 97.1 °F (36.2 °C) (Tympanic)   Ht 5' 4\" (1.626 m)   Wt 115 kg (254 lb)   LMP 01/01/2003   SpO2 97%   BMI 43.60 kg/m²  Body mass index is 43.6 kg/m².    PHYSICAL EXAM:    General Appearance:   Alert, cooperative, no distress   HEENT:   Normocephalic, atraumatic, anicteric.     Neck:  Supple, symmetrical, trachea midline   Lungs:   Clear to auscultation bilaterally; no rales, rhonchi or wheezing; respirations unlabored    Heart:   Regular rate and rhythm; no murmur, rub, or gallop.   Abdomen:   Soft, non-tender, non-distended; normal bowel sounds; no masses, no organomegaly    Genitalia:   Deferred    Rectal:   Deferred    Extremities:  No cyanosis, clubbing or edema    Pulses:  2+ and symmetric    Skin:  No jaundice, rashes, or lesions    Lymph nodes:  No palpable cervical lymphadenopathy      Lab Results:   No visits with results within 1 Day(s) from this visit.   Latest known visit with results is:   Transcribe Orders on 05/28/2024   Component Date Value    Free T4 05/28/2024 0.87        Radiology Results:   No results found.  "

## 2024-06-22 DIAGNOSIS — E03.8 OTHER SPECIFIED HYPOTHYROIDISM: ICD-10-CM

## 2024-06-22 DIAGNOSIS — E55.9 VITAMIN D DEFICIENCY: ICD-10-CM

## 2024-06-24 RX ORDER — LEVOTHYROXINE SODIUM 88 UG/1
TABLET ORAL
Qty: 90 TABLET | Refills: 2 | Status: SHIPPED | OUTPATIENT
Start: 2024-06-24

## 2024-06-24 RX ORDER — UBIDECARENONE 100 MG
1000 CAPSULE ORAL DAILY
Qty: 90 CAPSULE | Refills: 4 | Status: SHIPPED | OUTPATIENT
Start: 2024-06-24

## 2024-07-05 ENCOUNTER — PATIENT OUTREACH (OUTPATIENT)
Dept: INTERNAL MEDICINE CLINIC | Facility: CLINIC | Age: 69
End: 2024-07-05

## 2024-07-05 NOTE — PROGRESS NOTES
SW has reached out to pt to get an update re if she still needed assistance with DPW paperwork she had received or if she has re-considered and wanted PA Waiver Service for her brother who was approved or to Appeal the decision where she was not approved.  SW had to leave a message.  SW to f/u and assist as indicated.

## 2024-07-11 ENCOUNTER — RA CDI HCC (OUTPATIENT)
Dept: OTHER | Facility: HOSPITAL | Age: 69
End: 2024-07-11

## 2024-07-17 ENCOUNTER — OFFICE VISIT (OUTPATIENT)
Dept: INTERNAL MEDICINE CLINIC | Facility: CLINIC | Age: 69
End: 2024-07-17

## 2024-07-17 ENCOUNTER — PATIENT OUTREACH (OUTPATIENT)
Dept: INTERNAL MEDICINE CLINIC | Facility: CLINIC | Age: 69
End: 2024-07-17

## 2024-07-17 VITALS
OXYGEN SATURATION: 97 % | WEIGHT: 264 LBS | DIASTOLIC BLOOD PRESSURE: 76 MMHG | HEIGHT: 64 IN | HEART RATE: 63 BPM | TEMPERATURE: 97.9 F | BODY MASS INDEX: 45.07 KG/M2 | SYSTOLIC BLOOD PRESSURE: 148 MMHG

## 2024-07-17 DIAGNOSIS — G89.29 CHRONIC RIGHT-SIDED THORACIC BACK PAIN: ICD-10-CM

## 2024-07-17 DIAGNOSIS — I10 ESSENTIAL HYPERTENSION: ICD-10-CM

## 2024-07-17 DIAGNOSIS — E11.9 TYPE 2 DIABETES MELLITUS WITHOUT COMPLICATION, WITHOUT LONG-TERM CURRENT USE OF INSULIN (HCC): ICD-10-CM

## 2024-07-17 DIAGNOSIS — Z23 ENCOUNTER FOR IMMUNIZATION: ICD-10-CM

## 2024-07-17 DIAGNOSIS — Z00.00 ANNUAL PHYSICAL EXAM: Primary | ICD-10-CM

## 2024-07-17 DIAGNOSIS — R93.5 ABNORMAL CT OF THE ABDOMEN: ICD-10-CM

## 2024-07-17 DIAGNOSIS — G89.29 CHRONIC PAIN OF LEFT KNEE: ICD-10-CM

## 2024-07-17 DIAGNOSIS — K76.0 FATTY LIVER: ICD-10-CM

## 2024-07-17 DIAGNOSIS — M25.562 CHRONIC PAIN OF LEFT KNEE: ICD-10-CM

## 2024-07-17 DIAGNOSIS — M54.6 CHRONIC RIGHT-SIDED THORACIC BACK PAIN: ICD-10-CM

## 2024-07-17 DIAGNOSIS — A04.8 H. PYLORI INFECTION: ICD-10-CM

## 2024-07-17 RX ORDER — GABAPENTIN 100 MG/1
100 CAPSULE ORAL 3 TIMES DAILY
Qty: 180 CAPSULE | Refills: 3 | Status: SHIPPED | OUTPATIENT
Start: 2024-07-17

## 2024-07-17 RX ORDER — CHLORHEXIDINE GLUCONATE ORAL RINSE 1.2 MG/ML
SOLUTION DENTAL
COMMUNITY
Start: 2024-04-26 | End: 2024-07-17

## 2024-07-17 RX ORDER — IBUPROFEN 600 MG/1
TABLET ORAL
COMMUNITY
Start: 2024-04-26 | End: 2024-07-17

## 2024-07-17 RX ORDER — METHOCARBAMOL 500 MG/1
500 TABLET, FILM COATED ORAL 3 TIMES DAILY
Qty: 90 TABLET | Refills: 0 | Status: SHIPPED | OUTPATIENT
Start: 2024-07-17

## 2024-07-17 RX ORDER — ACETAMINOPHEN AND CODEINE PHOSPHATE 300; 30 MG/1; MG/1
TABLET ORAL
COMMUNITY
Start: 2024-04-26 | End: 2024-07-17

## 2024-07-17 RX ORDER — AMOXICILLIN 500 MG/1
CAPSULE ORAL
COMMUNITY
Start: 2024-04-26 | End: 2024-07-17

## 2024-07-17 NOTE — PROGRESS NOTES
SW has met with pt at her PCP appointment this date.    She relates she has gotten her MA approved and she denies any other needs at present.    She also denies need for her Brother who we were assisting with the PA Waiver Program.    Patient does not have any further questions, concerns, or other needs at this time.  Patient has my contact # and PCP office # if needed.  Social Work to remain available to assist as indicated.    Please re-consult Social Work if needed.

## 2024-07-17 NOTE — PROGRESS NOTES
"[facility]  INTERNAL MEDICINE OFFICE VISIT     PATIENT INFORMATION     Blane Marti   69 y.o. female   MRN: [id number]    ASSESSMENT/PLAN     Diagnoses and all orders for this visit:    Annual Physical  - mammo is scheduled  - dexa is up to date  - colonoscopy due 2026    Encounter for immunization  -     Zoster Vaccine Recombinant IM 2nd dose    Chronic right sided thoracic back pain  6 month history of right thoracic back pain/burning/cramping sensation mildly relieved with naproxen/tylenol. Physical exam w/ TTP of the paraspinal musculature. Likely muscular in origin, no red flag symptoms. She is not interested in PT at this time. Advised her to try voltaren at the site. Will give robaxin and increase gabapentin to tid.   -     methocarbamol (ROBAXIN) 500 mg tablet; Take 1 tablet (500 mg total) by mouth 3 (three) times a day  -     gabapentin (NEURONTIN) 100 mg capsule; Take 1 capsule (100 mg total) by mouth 3 (three) times a day    Type 2 diabetes mellitus without complication, without long-term current use of insulin ([facility])  -     Hemoglobin A1C; Future    Abnormal CT of the abdomen  On CTA A/P from 10/2023 \"tubular, fluid-filled structure inseparable from the left ovary measuring 7 mm in diameter, most likely representing a mild hydrosalpinx.\" This was not noted on most recent CT scan. Suspect resolution, will confirm with pelvic ultrasound. Patient is asymptomatic.   -     US pelvis transabdominal only; Future    Chronic pain of left knee  -     Diclofenac Sodium (VOLTAREN) 1 %; Apply 2 g topically 4 (four) times a day    Essential hypertension  BP is elevated today 148/76 but pt reports it is well controlled at home. Will continue with prior dosing of lisinopril 20 mg daily    Fatty liver  S/p biopsy. Follows with GI. Most recent HFTs normal. They would like hep B booster. Patient declines today. Elastography is ordered. She is making dietary and exercise " modifications.     H. Pylori  Eradication testing has been ordered per GI.       Schedule a follow-up appointment in 4 weeks.    HEALTH MAINTENANCE     Immunization History   Administered Date(s) Administered    INFLUENZA 11/12/2018    Influenza Quadrivalent, 6-35 Months IM 09/29/2017    Influenza, high dose seasonal 0.7 mL 11/15/2021, 11/22/2022, 10/23/2023    Influenza, recombinant, quadrivalent,injectable, preservative free 11/12/2018, 10/21/2019, 10/30/2020    Influenza, seasonal, injectable 10/10/2013, 12/03/2014, 10/22/2015    Pneumococcal Conjugate Vaccine 20-valent (Pcv20), Polysace 03/06/2023    Pneumococcal Polysaccharide PPV23 11/12/2018    Tdap 10/30/2020, 05/01/2021    Zoster Vaccine Recombinant 04/02/2024, 07/17/2024     CHIEF COMPLAINT     Chief Complaint   Patient presents with    Follow-up     Pt did not want to change into gown. Pt is experiencing stabbing pain in middle of back, comes and goes and burns/itches, is not experiencing it right now. Meds she was prescribed are not helping. Was told by another dr that she needs Hep B vaccine today, would like to discuss that.       HISTORY OF PRESENT ILLNESS     69F hx of HTN, AARON, hypothyroid, DM, migraines, lumbar/cervical radiculopathy, MENA who presents for annual physical exam.     For HTN, she is maintained on lisinopril 20 mg daily. BP today is 148/76. She is checking BP at home and it is usually normal range. She reports no side effects of the medication.     For hypothyroidism, she is taking levothyroxine 88 mcg M-F and 100 mcg Sat/Sun. TSH was normal in May. She has no symptoms of hypo/hyperthyroidism.     For DM, A1c is well controlled. Was 6.0 in August and has been at goal for many years. She is diet controlled.     For MENA, she is following with GI and working on diet/exercise modification. Last HFT was normal in May. Elastography has been ordered. They would like Hep b vaccine pt declines today.     She is hx of H. Pylori and is pending  "eradication testing.     She complains of ongoing right sided thoracic back pain. Pain is burning and crampy. It is present at rest and on exertion. Somewhat relieved with tylenol/naproxen. She has had no spinal injuries that she knows of. She is not interested in physical therapy. The pain does not limit her in her daily activities and she has not noticed any arm weakness with this. She has had no fevers, chills, loss of bladder/bowel functioning.     Screenings  - Dexa is up to date  - Mammogram is scheduled  - Colonoscopy Is due in 2026    REVIEW OF SYSTEMS     Review of Systems   Constitutional:  Negative for chills and fever.   HENT:  Negative for ear pain and sore throat.    Eyes:  Negative for pain and visual disturbance.   Respiratory:  Negative for cough and shortness of breath.    Cardiovascular:  Negative for chest pain and palpitations.   Gastrointestinal:  Negative for abdominal pain and vomiting.   Genitourinary:  Negative for dysuria and hematuria.   Musculoskeletal:  Positive for back pain. Negative for arthralgias.   Skin:  Negative for color change and rash.   Neurological:  Negative for seizures and syncope.   All other systems reviewed and are negative.    OBJECTIVE     Vitals:    07/17/24 1349   BP: 148/76   BP Location: Left arm   Patient Position: Sitting   Cuff Size: Large   Pulse: 63   Temp: 97.9 °F (36.6 °C)   TempSrc: Tympanic   SpO2: 97%   Weight: 120 kg (264 lb)   Height: 5' 4\" (1.626 m)     Physical Exam  Vitals and nursing note reviewed.   Constitutional:       General: She is not in acute distress.     Appearance: She is well-developed.   HENT:      Head: Normocephalic and atraumatic.   Eyes:      Conjunctiva/sclera: Conjunctivae normal.   Cardiovascular:      Rate and Rhythm: Normal rate and regular rhythm.      Heart sounds: No murmur heard.  Pulmonary:      Effort: Pulmonary effort is normal. No respiratory distress.      Breath sounds: Normal breath sounds.   Abdominal:      " Palpations: Abdomen is soft.      Tenderness: There is no abdominal tenderness.   Musculoskeletal:         General: No swelling.      Cervical back: Neck supple.      Right lower leg: No edema.      Left lower leg: No edema.      Comments: Right thoracic muscular tenderness to palpation, no spinal tenderness or deformity   Skin:     General: Skin is warm and dry.      Capillary Refill: Capillary refill takes less than 2 seconds.   Neurological:      Mental Status: She is alert.   Psychiatric:         Mood and Affect: Mood normal.       CURRENT MEDICATIONS     Current Outpatient Medications:     acetaminophen (Tylenol 8 Hour Arthritis Pain) 650 mg CR tablet, Take 1 tablet (650 mg total) by mouth every 8 (eight) hours as needed for mild pain, Disp: 90 tablet, Rfl: 3    Aspirin Low Dose 81 MG EC tablet, TAKE ONE TABLET BY MOUTH DAILY, Disp: 90 tablet, Rfl: 3    atorvastatin (LIPITOR) 20 mg tablet, TAKE 1 TABLET (20 MG TOTAL) BY MOUTH DAILY, Disp: 90 tablet, Rfl: 3    clotrimazole (LOTRIMIN) 1 % cream, Apply topically 2 (two) times a day, Disp: 30 g, Rfl: 0    Diclofenac Sodium (VOLTAREN) 1 %, Apply 2 g topically 4 (four) times a day, Disp: 100 g, Rfl: 2    fluticasone (FLONASE) 50 mcg/act nasal spray, USE TWO SPRAYS INTO EACH NOSTRIL DAILY, Disp: 9.9 mL, Rfl: 3    gabapentin (NEURONTIN) 100 mg capsule, Take 1 capsule (100 mg total) by mouth 3 (three) times a day, Disp: 180 capsule, Rfl: 3    guaiFENesin (MUCINEX) 600 mg 12 hr tablet, Take 2 tablets (1,200 mg total) by mouth every 12 (twelve) hours, Disp: 30 tablet, Rfl: 0    levothyroxine 100 mcg tablet, TAKE ONE TABLET BY MOUTH TWO TIMES A WEEK, Disp: 24 tablet, Rfl: 3    levothyroxine 88 mcg tablet, TAKE ONE TABLET BY MOUTH ONCE DAILY FROM MONDAY TO FRIDAY OF EACH WEEK, Disp: 90 tablet, Rfl: 2    lidocaine (Lidoderm) 5 %, Apply 1 patch topically over 12 hours daily Remove & Discard patch within 12 hours or as directed by MD, Disp: 15 patch, Rfl: 0    lisinopril  (ZESTRIL) 20 mg tablet, TAKE ONE TABLET BY MOUTH DAILY, Disp: 90 tablet, Rfl: 3    loratadine (CLARITIN) 10 mg tablet, TAKE 1 TABLET BY MOUTH DAILY AS NEEDED FOR ALLERGIES, Disp: 90 tablet, Rfl: 11    methocarbamol (ROBAXIN) 500 mg tablet, Take 1 tablet (500 mg total) by mouth 3 (three) times a day, Disp: 90 tablet, Rfl: 0    PAST MEDICAL & SURGICAL HISTORY     Past Medical History:   Diagnosis Date    Arthritis     Depression     Difficult intubation 2021 1st attempt: Mac3, Grade 4 view. 2nd attempt: Mac 3, Grade 4 view, unable to pass bougie, 3rd attempt: [place]:, Grade 4 view, unable to pass bougie, 4th attempt:(by attending): Mac3, Grade 4 view unable to pass bougie, 5th attempt(by attending): Glidescope 3, Grade 3 view, ETT placed successfully with stylet. LMA used successfully to ventilate in between attempts    Disease of thyroid gland     hypo    Edema     LAST ASSESSED: 2014    GERD (gastroesophageal reflux disease)     Hypercholesterolemia     Hyperlipidemia     Hypertension     WELL CONTROLLED. CURRENTLY ON LISINOPRIL. LAST ASSESSED: 2017    Liver disease     Orthostatic hypotension 2020    Other headache syndrome     Other muscle spasm     LAST ASSESSED: 2014    Ovarian cyst 2006    Renal calculi      (spontaneous vaginal delivery) 1975    FEMALE     Past Surgical History:   Procedure Laterality Date    BACK SURGERY      HERNIATED DISC (L4/L5)    CHOLECYSTECTOMY      IR BIOPSY TRANSJUGULAR LIVER  11/10/2023    IR CEREBRAL ANGIOGRAPHY  2020    IR CEREBRAL ANGIOGRAPHY  2021    IR CEREBRAL ANGIOGRAPHY / INTERVENTION  2021    TONSILLECTOMY       SOCIAL & FAMILY HISTORY     Social History     Socioeconomic History    Marital status:      Spouse name: Not on file    Number of children: Not on file    Years of education: Not on file    Highest education level: Not on file   Occupational History    Occupation: RETIRED   Tobacco Use    Smoking  "status: Former    Smokeless tobacco: Never    Tobacco comments:     pt \"quit about 20 years ago\"   Vaping Use    Vaping status: Never Used   Substance and Sexual Activity    Alcohol use: Never    Drug use: No    Sexual activity: Never   Other Topics Concern    Not on file   Social History Narrative    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         AS PER [employer]    EXERCISES REGULARLY    LIVES WITH FAMILY    NO CAFFEINE USE    NO LIVING WILL    NO TOBACCO/SMOKE EXPOSURE    UNEMPLOYED     Social Determinants of Health     Financial Resource Strain: Low Risk  (4/2/2024)    Overall Financial Resource Strain (CARDIA)     Difficulty of Paying Living Expenses: Not very hard   Food Insecurity: No Food Insecurity (4/2/2024)    Hunger Vital Sign     Worried About Running Out of Food in the Last Year: Never true     Ran Out of Food in the Last Year: Never true   Transportation Needs: Unmet Transportation Needs (4/2/2024)    PRAPARE - Transportation     Lack of Transportation (Medical): Yes     Lack of Transportation (Non-Medical): Yes   Physical Activity: Inactive (9/16/2021)    Exercise Vital Sign     Days of Exercise per Week: 0 days     Minutes of Exercise per Session: 0 min   Stress: No Stress Concern Present (9/16/2021)    [place] [employer] [place] [employer] - Occupational Stress Questionnaire     Feeling of Stress : Not at all   Social Connections: Socially Isolated (9/16/2021)    Social Connection and Isolation Panel [NHANES]     Frequency of Communication with Friends and Family: More than three times a week     Frequency of Social Gatherings with Friends and Family: Three times a week     Attends Yarsani Services: Never     Active Member of Clubs or Organizations: No     Attends Club or Organization Meetings: Never     Marital Status:    Intimate Partner Violence: Not At Risk (9/16/2021)    Humiliation, Afraid, Rape, and Kick questionnaire     " "Fear of Current or Ex-Partner: No     Emotionally Abused: No     Physically Abused: No     Sexually Abused: No   Housing Stability: Low Risk  (4/2/2024)    Housing Stability Vital Sign     Unable to Pay for Housing in the Last Year: No     Number of Times Moved in the Last Year: 1     Homeless in the Last Year: No     Social History     Substance and Sexual Activity   Alcohol Use Never       Social History     Substance and Sexual Activity   Drug Use No     Social History     Tobacco Use   Smoking Status Former   Smokeless Tobacco Never   Tobacco Comments    pt \"quit about 20 years ago\"     Family History   Problem Relation Age of Onset    Lung cancer Mother     Cancer Mother         MALIGNANT NEOPLASM    Hypertension Father     Seizures Father     Stroke Father     Heart attack Father     Diabetes Brother     Hypertension Son     Lung disease Son     Hyperthyroidism Maternal Aunt     Hypothyroidism Maternal Aunt     Lung cancer Cousin     Heart disease Other         CARDIAC DISORDER    Neuropathy Family              ==  Obey Rodriguez MD  . [facility]    [address], [address]  [place] [address]  Office: [phone]  Fax: [phone]    "

## 2024-07-31 ENCOUNTER — HOSPITAL ENCOUNTER (EMERGENCY)
Facility: HOSPITAL | Age: 69
Discharge: HOME | End: 2024-07-31
Attending: EMERGENCY MEDICINE
Payer: MEDICARE

## 2024-07-31 ENCOUNTER — HOSPITAL ENCOUNTER (OUTPATIENT)
Dept: RADIOLOGY | Facility: HOSPITAL | Age: 69
Discharge: HOME | End: 2024-07-31
Payer: MEDICARE

## 2024-07-31 ENCOUNTER — APPOINTMENT (EMERGENCY)
Dept: RADIOLOGY | Facility: HOSPITAL | Age: 69
End: 2024-07-31
Payer: MEDICARE

## 2024-07-31 VITALS
TEMPERATURE: 98 F | HEART RATE: 63 BPM | RESPIRATION RATE: 16 BRPM | OXYGEN SATURATION: 97 % | DIASTOLIC BLOOD PRESSURE: 96 MMHG | SYSTOLIC BLOOD PRESSURE: 156 MMHG

## 2024-07-31 DIAGNOSIS — R10.9 ABDOMINAL PAIN: Primary | ICD-10-CM

## 2024-07-31 DIAGNOSIS — R93.5 ABNORMAL CT OF THE ABDOMEN: ICD-10-CM

## 2024-07-31 LAB
ALBUMIN SERPL BCG-MCNC: 4 G/DL (ref 3.5–5)
ALP SERPL-CCNC: 61 U/L (ref 34–104)
ALT SERPL W P-5'-P-CCNC: 14 U/L (ref 7–52)
ANION GAP SERPL CALCULATED.3IONS-SCNC: 7 MMOL/L (ref 4–13)
AST SERPL W P-5'-P-CCNC: 19 U/L (ref 13–39)
BASOPHILS # BLD AUTO: 0.04 THOUSANDS/ÂΜL (ref 0–0.1)
BASOPHILS NFR BLD AUTO: 0 % (ref 0–1)
BILIRUB SERPL-MCNC: 0.63 MG/DL (ref 0.2–1)
BILIRUB UR QL STRIP: NEGATIVE
BUN SERPL-MCNC: 10 MG/DL (ref 5–25)
CALCIUM SERPL-MCNC: 8.9 MG/DL (ref 8.4–10.2)
CHLORIDE SERPL-SCNC: 105 MMOL/L (ref 96–108)
CLARITY UR: CLEAR
CLARITY, POC: CLEAR
CO2 SERPL-SCNC: 26 MMOL/L (ref 21–32)
COLOR UR: YELLOW
COLOR, POC: NORMAL
CREAT SERPL-MCNC: 0.66 MG/DL (ref 0.6–1.3)
EOSINOPHIL # BLD AUTO: 0.18 THOUSAND/ÂΜL (ref 0–0.61)
EOSINOPHIL NFR BLD AUTO: 2 % (ref 0–6)
ERYTHROCYTE [DISTWIDTH] IN BLOOD BY AUTOMATED COUNT: 13.1 % (ref 11.6–15.1)
GFR SERPL CREATININE-BSD FRML MDRD: 91 ML/MIN/1.73SQ M
GLUCOSE SERPL-MCNC: 83 MG/DL (ref 65–140)
GLUCOSE UR STRIP-MCNC: NEGATIVE MG/DL
HCT VFR BLD AUTO: 42.2 % (ref 34.8–46.1)
HGB BLD-MCNC: 13.4 G/DL (ref 11.5–15.4)
HGB UR QL STRIP.AUTO: NEGATIVE
IMM GRANULOCYTES # BLD AUTO: 0.02 THOUSAND/UL (ref 0–0.2)
IMM GRANULOCYTES NFR BLD AUTO: 0 % (ref 0–2)
KETONES UR STRIP-MCNC: NEGATIVE MG/DL
LEUKOCYTE ESTERASE UR QL STRIP: NEGATIVE
LYMPHOCYTES # BLD AUTO: 2.52 THOUSANDS/ÂΜL (ref 0.6–4.47)
LYMPHOCYTES NFR BLD AUTO: 28 % (ref 14–44)
MCH RBC QN AUTO: 28.1 PG (ref 26.8–34.3)
MCHC RBC AUTO-ENTMCNC: 31.8 G/DL (ref 31.4–37.4)
MCV RBC AUTO: 89 FL (ref 82–98)
MONOCYTES # BLD AUTO: 0.84 THOUSAND/ÂΜL (ref 0.17–1.22)
MONOCYTES NFR BLD AUTO: 9 % (ref 4–12)
NEUTROPHILS # BLD AUTO: 5.42 THOUSANDS/ÂΜL (ref 1.85–7.62)
NEUTS SEG NFR BLD AUTO: 61 % (ref 43–75)
NITRITE UR QL STRIP: NEGATIVE
NRBC BLD AUTO-RTO: 0 /100 WBCS
PH UR STRIP.AUTO: 5.5 [PH] (ref 4.5–8)
PLATELET # BLD AUTO: 272 THOUSANDS/UL (ref 149–390)
PMV BLD AUTO: 10.4 FL (ref 8.9–12.7)
POTASSIUM SERPL-SCNC: 3.8 MMOL/L (ref 3.5–5.3)
PROT SERPL-MCNC: 7.4 G/DL (ref 6.4–8.4)
PROT UR STRIP-MCNC: NEGATIVE MG/DL
RBC # BLD AUTO: 4.77 MILLION/UL (ref 3.81–5.12)
SODIUM SERPL-SCNC: 138 MMOL/L (ref 135–147)
SP GR UR STRIP.AUTO: 1.01 (ref 1–1.03)
UROBILINOGEN UR QL STRIP.AUTO: 0.2 E.U./DL
WBC # BLD AUTO: 9.02 THOUSAND/UL (ref 4.31–10.16)

## 2024-07-31 PROCEDURE — 76830 TRANSVAGINAL US NON-OB: CPT

## 2024-07-31 PROCEDURE — 81003 URINALYSIS AUTO W/O SCOPE: CPT

## 2024-07-31 PROCEDURE — 99284 EMERGENCY DEPT VISIT MOD MDM: CPT

## 2024-07-31 PROCEDURE — 36415 COLL VENOUS BLD VENIPUNCTURE: CPT

## 2024-07-31 PROCEDURE — 99285 EMERGENCY DEPT VISIT HI MDM: CPT | Performed by: EMERGENCY MEDICINE

## 2024-07-31 PROCEDURE — 76856 US EXAM PELVIC COMPLETE: CPT

## 2024-07-31 PROCEDURE — 74177 CT ABD & PELVIS W/CONTRAST: CPT

## 2024-07-31 PROCEDURE — 85025 COMPLETE CBC W/AUTO DIFF WBC: CPT

## 2024-07-31 PROCEDURE — 80053 COMPREHEN METABOLIC PANEL: CPT

## 2024-07-31 RX ADMIN — IOHEXOL 100 ML: 350 INJECTION, SOLUTION INTRAVENOUS at 20:10

## 2024-07-31 NOTE — ED ATTENDING ATTESTATION
7/31/2024  I, Wanda Durand DO, saw and evaluated the patient. I have discussed the patient with the resident/non-physician practitioner and agree with the resident's/non-physician practitioner's findings, Plan of Care, and MDM as documented in the resident's/non-physician practitioner's note, except where noted. All available labs and Radiology studies were reviewed.  I was present for key portions of any procedure(s) performed by the resident/non-physician practitioner and I was immediately available to provide assistance.       At this point I agree with the current assessment done in the Emergency Department.  I have conducted an independent evaluation of this patient a history and physical is as follows:    69-year-old female presents with right groin pain.  Patient had ultrasound today that showed what appears to be a 7 mm hydrosalpinx on the right side.  Patient has history of hydrosalpinx on the left side which has resolved.  Patient reports she is been having pain in the right groin/right lower quadrant for the past week or so.  Reports nausea but no vomiting, no fevers.  Pain is worse with movement.  On exam-no acute distress, heart regular, no respiratory distress, abdomen soft with tenderness in the right lower quadrant and right inguinal area.  Plan-reviewed, will check basic labs and CT to rule out other intra-abdominal pathology.    ED Course         Critical Care Time  Procedures

## 2024-07-31 NOTE — ED PROVIDER NOTES
"History  Chief Complaint   Patient presents with    Evaluation of Abnormal Diagnostic Test     Sent from US appt for abnormal transvaginal US. PT c/o R groin pain and increasing pain when walking. Denies vaginal bleeding     On CTA A/P from 10/2023 \"tubular, fluid-filled structure inseparable from the left ovary measuring 7 mm in diameter, most likely representing a mild hydrosalpinx.\" This was not noted on most recent CT scan. Suspect resolution, will confirm with pelvic ultrasound. Patient is asymptomatic.     US: Tubular structure adjacent to the right adnexa measuring 7 mm in diameter may suggest hydrosalpinx.. This cannot be appreciated on prior CT study.  Tubular structure seen adjacent to the left ovary on prior CT of 10/16/2023 is not appreciated and most likely represented hydrosalpinx.      Reviewed the following notes from above.  Patient states that she has had right lower quadrant abdominal pain with associated nausea for about a week now.  Denies any burning with urination.  Denies any diarrhea, fever, chest pain.  Patient states that the pain has been exacerbated with walking and bending over.  She describes the pain as going like that wraps around her lower abdomen and goes to about her hip bone.      Evaluation of Abnormal Diagnostic Test      Prior to Admission Medications   Prescriptions Last Dose Informant Patient Reported? Taking?   Aspirin Low Dose 81 MG EC tablet  Self No No   Sig: TAKE ONE TABLET BY MOUTH DAILY   Diclofenac Sodium (VOLTAREN) 1 %   No No   Sig: Apply 2 g topically 4 (four) times a day   acetaminophen (Tylenol 8 Hour Arthritis Pain) 650 mg CR tablet  Self No No   Sig: Take 1 tablet (650 mg total) by mouth every 8 (eight) hours as needed for mild pain   atorvastatin (LIPITOR) 20 mg tablet  Self No No   Sig: TAKE 1 TABLET (20 MG TOTAL) BY MOUTH DAILY   clotrimazole (LOTRIMIN) 1 % cream  Self No No   Sig: Apply topically 2 (two) times a day   fluticasone (FLONASE) 50 mcg/act nasal " spray  Self No No   Sig: USE TWO SPRAYS INTO EACH NOSTRIL DAILY   gabapentin (NEURONTIN) 100 mg capsule   No No   Sig: Take 1 capsule (100 mg total) by mouth 3 (three) times a day   guaiFENesin (MUCINEX) 600 mg 12 hr tablet  Self No No   Sig: Take 2 tablets (1,200 mg total) by mouth every 12 (twelve) hours   levothyroxine 100 mcg tablet  Self No No   Sig: TAKE ONE TABLET BY MOUTH TWO TIMES A WEEK   levothyroxine 88 mcg tablet   No No   Sig: TAKE ONE TABLET BY MOUTH ONCE DAILY FROM MONDAY TO FRIDAY OF EACH WEEK   lidocaine (Lidoderm) 5 %  Self No No   Sig: Apply 1 patch topically over 12 hours daily Remove & Discard patch within 12 hours or as directed by MD   lisinopril (ZESTRIL) 20 mg tablet  Self No No   Sig: TAKE ONE TABLET BY MOUTH DAILY   loratadine (CLARITIN) 10 mg tablet  Self No No   Sig: TAKE 1 TABLET BY MOUTH DAILY AS NEEDED FOR ALLERGIES   methocarbamol (ROBAXIN) 500 mg tablet   No No   Sig: Take 1 tablet (500 mg total) by mouth 3 (three) times a day      Facility-Administered Medications: None       Past Medical History:   Diagnosis Date    Arthritis     Depression     Difficult intubation 2021 1st attempt: Mac3, Grade 4 view. 2nd attempt: Mac 3, Grade 4 view, unable to pass bougie, 3rd attempt: Mc Grath:, Grade 4 view, unable to pass bougie, 4th attempt:(by attending): Mac3, Grade 4 view unable to pass bougie, 5th attempt(by attending): Glidescope 3, Grade 3 view, ETT placed successfully with stylet. LMA used successfully to ventilate in between attempts    Disease of thyroid gland     hypo    Edema     LAST ASSESSED: 2014    GERD (gastroesophageal reflux disease)     Hypercholesterolemia     Hyperlipidemia     Hypertension     WELL CONTROLLED. CURRENTLY ON LISINOPRIL. LAST ASSESSED: 2017    Liver disease     Orthostatic hypotension 2020    Other headache syndrome     Other muscle spasm     LAST ASSESSED: 2014    Ovarian cyst 2006    Renal calculi       "(spontaneous vaginal delivery) 1975    FEMALE       Past Surgical History:   Procedure Laterality Date    BACK SURGERY  1996    HERNIATED DISC (L4/L5)    CHOLECYSTECTOMY      IR BIOPSY TRANSJUGULAR LIVER  11/10/2023    IR CEREBRAL ANGIOGRAPHY  11/6/2020    IR CEREBRAL ANGIOGRAPHY  8/27/2021    IR CEREBRAL ANGIOGRAPHY / INTERVENTION  1/8/2021    TONSILLECTOMY         Family History   Problem Relation Age of Onset    Lung cancer Mother     Cancer Mother         MALIGNANT NEOPLASM    Hypertension Father     Seizures Father     Stroke Father     Heart attack Father     Diabetes Brother     Hypertension Son     Lung disease Son     Hyperthyroidism Maternal Aunt     Hypothyroidism Maternal Aunt     Lung cancer Cousin     Heart disease Other         CARDIAC DISORDER    Neuropathy Family      I have reviewed and agree with the history as documented.    E-Cigarette/Vaping    E-Cigarette Use Never User      E-Cigarette/Vaping Substances    Nicotine No     THC No     CBD No     Flavoring No     Other No     Unknown No      Social History     Tobacco Use    Smoking status: Former    Smokeless tobacco: Never    Tobacco comments:     pt \"quit about 20 years ago\"   Vaping Use    Vaping status: Never Used   Substance Use Topics    Alcohol use: Never    Drug use: No        Review of Systems   Constitutional:  Negative for fever.   Respiratory:  Negative for cough and shortness of breath.    Cardiovascular:  Negative for chest pain and palpitations.   Gastrointestinal:  Positive for abdominal pain. Negative for nausea and vomiting.   Genitourinary:  Negative for dysuria and hematuria.   Skin:  Negative for rash.   Neurological:  Negative for syncope.   All other systems reviewed and are negative.      Physical Exam  ED Triage Vitals [07/31/24 1528]   Temperature Pulse Respirations Blood Pressure SpO2   98 °F (36.7 °C) 63 16 156/96 97 %      Temp src Heart Rate Source Patient Position - Orthostatic VS BP Location FiO2 (%)   -- -- " Lying Right arm --      Pain Score       9             Orthostatic Vital Signs  Vitals:    07/31/24 1528   BP: 156/96   Pulse: 63   Patient Position - Orthostatic VS: Lying       Physical Exam  Vitals and nursing note reviewed.   Constitutional:       General: She is not in acute distress.     Appearance: She is well-developed.   HENT:      Head: Normocephalic and atraumatic.   Eyes:      Conjunctiva/sclera: Conjunctivae normal.   Cardiovascular:      Rate and Rhythm: Normal rate and regular rhythm.      Heart sounds: No murmur heard.  Pulmonary:      Effort: Pulmonary effort is normal. No respiratory distress.      Breath sounds: Normal breath sounds.   Abdominal:      Palpations: Abdomen is soft.      Tenderness: There is no abdominal tenderness.   Musculoskeletal:         General: No swelling.      Cervical back: Neck supple.   Skin:     General: Skin is warm and dry.      Capillary Refill: Capillary refill takes less than 2 seconds.   Neurological:      Mental Status: She is alert.   Psychiatric:         Mood and Affect: Mood normal.         ED Medications  Medications   iohexol (OMNIPAQUE) 350 MG/ML injection (MULTI-DOSE) 100 mL (100 mL Intravenous Given 7/31/24 2010)       Diagnostic Studies  Results Reviewed       Procedure Component Value Units Date/Time    Comprehensive metabolic panel [[id number]] Collected: 07/31/24 1929    Lab Status: Final result Specimen: Blood from Arm, Left Updated: 07/31/24 1957     Sodium 138 mmol/L      Potassium 3.8 mmol/L      Chloride 105 mmol/L      CO2 26 mmol/L      ANION GAP 7 mmol/L      BUN 10 mg/dL      Creatinine 0.66 mg/dL      Glucose 83 mg/dL      Calcium 8.9 mg/dL      AST 19 U/L      ALT 14 U/L      Alkaline Phosphatase 61 U/L      Total Protein 7.4 g/dL      Albumin 4.0 g/dL      Total Bilirubin 0.63 mg/dL      eGFR 91 ml/min/1.73sq m     Narrative:      National Kid[employer] guidelines for Chronic Kidney Disease (CKD):     Stage 1 with normal or high  GFR (GFR > 90 mL/min/1.73 square meters)    Stage 2 Mild CKD (GFR = 60-89 mL/min/1.73 square meters)    Stage 3A Moderate CKD (GFR = 45-59 mL/min/1.73 square meters)    Stage 3B Moderate CKD (GFR = 30-44 mL/min/1.73 square meters)    Stage 4 Severe CKD (GFR = 15-29 mL/min/1.73 square meters)    Stage 5 End Stage CKD (GFR <15 mL/min/1.73 square meters)  Note: GFR calculation is accurate only with a steady state creatinine    CBC and differential [[id number]] Collected: 07/31/24 1929    Lab Status: Final result Specimen: Blood from Arm, Left Updated: 07/31/24 1936     WBC 9.02 Thousand/uL      RBC 4.77 Million/uL      Hemoglobin 13.4 g/dL      Hematocrit 42.2 %      MCV 89 fL      MCH 28.1 pg      MCHC 31.8 g/dL      RDW 13.1 %      MPV 10.4 fL      Platelets 272 Thousands/uL      nRBC 0 /100 WBCs      Segmented % 61 %      Immature Grans % 0 %      Lymphocytes % 28 %      Monocytes % 9 %      Eosinophils Relative 2 %      Basophils Relative 0 %      Absolute Neutrophils 5.42 Thousands/µL      Absolute Immature Grans 0.02 Thousand/uL      Absolute Lymphocytes 2.52 Thousands/µL      Absolute Monocytes 0.84 Thousand/µL      Eosinophils Absolute 0.18 Thousand/µL      Basophils Absolute 0.04 Thousands/µL     POCT urinalysis dipstick [[id number]]  (Normal) Resulted: 07/31/24 1935    Lab Status: Final result Specimen: Urine Updated: 07/31/24 1936     Color, UA Light Yellow     Clarity, UA Clear     EXT Leukocytes, UA --     Nitrite, UA --     Protein, UA -- mg/dl      Glucose, UA --     Ketones, UA -- mg/dl      EXT Urobilinogen, UA --      Bilirubin, UA --     Blood, UA --    Urine Macroscopic, POC [[id number]] Collected: 07/31/24 1932    Lab Status: Final result Specimen: Urine Updated: 07/31/24 1934     Color, UA Yellow     Clarity, UA Clear     pH, UA 5.5     Leukocytes, UA Negative     Nitrite, UA Negative     Protein, UA Negative mg/dl      Glucose, UA Negative mg/dl      Ketones, UA Negative mg/dl      Urobilinogen, UA  0.2 E.U./dl      Bilirubin, UA Negative     Occult Blood, UA Negative     Specific Gravity, UA 1.015    Narrative:      [facility] RESULT                   CT abdomen pelvis with contrast   Final Result by Concha Richard MD (07/31 2128)      1) No acute abdominal or pelvic pathology.      1) No CT evidence of hydrosalpinx in either adnexa.      3) 3.1 x 2.9 cm partially imaged lesion in the outer right breast of unclear etiology. This portion of the breast was outside of the field-of-view on the prior studies. This may represent asymmetric breast tissue, however recommend confirmation with    breast exam and dedicated breast imaging. Patient due for screening mammogram in August 2024.      4) Additional findings as above.      The study was marked in EPIC for immediate notification.            Workstation performed: [id number]               Procedures  Procedures      ED Course                             SBIRT 22yo+      Flowsheet Row Most Recent Value   Initial Alcohol Screen: US AUDIT-C     1. How often do you have a drink containing alcohol? 0 Filed at: 07/31/2024 1529   2. How many drinks containing alcohol do you have on a typical day you are drinking?  0 Filed at: 07/31/2024 1529   3a. Male UNDER 65: How often do you have five or more drinks on one occasion? 0 Filed at: 07/31/2024 1529   3b. FEMALE Any Age, or MALE 65+: How often do you have 4 or more drinks on one occassion? 0 Filed at: 07/31/2024 1529   Audit-C Score 0 Filed at: 07/31/2024 1529   MARCIA: How many times in the past year have you...    Used an illegal drug or used a prescription medication for non-medical reasons? Never Filed at: 07/31/2024 1529                  Medical Decision Making  Patient likely has pain due to her right hydrosalpinx.  The left 1 has resolved.  However transvaginal ultrasound cannot rule out other causes of abdominal pain including diverticulitis, appendicitis, colitis, perforation, abscess.  I am doubtful that these other  causes due to patient presentation and history of left hydrosalpinx.    Patient has no acute abdominal findings.  Patient states that sometimes the pain is exacerbated with walking this could him widen the differential to include musculoskeletal causes of her groin/abdominal pain which could include spleen/strain.  Patient is stable to go home and I have informed her of her breast findings.  She states that she will follow-up with PCP.  Patient stable and agreeable to discharge.    Amount and/or Complexity of Data Reviewed  Labs: ordered.  Radiology: ordered.    Risk  Prescription drug management.          Disposition  Final diagnoses:   Abdominal pain     Time reflects when diagnosis was documented in both MDM as applicable and the Disposition within this note       Time User Action Codes Description Comment    7/31/2024 10:09 PM Freddy Alexander Add [R10.9] Abdominal pain           ED Disposition       ED Disposition   Discharge    Condition   Stable    Date/Time   Wed Jul 31, 2024 2209    Comment   Blane Marti discharge to home/self care.                   Follow-up Information    None         Patient's Medications   Discharge Prescriptions    No medications on file         PDMP Review       None             ED Provider  Attending physically available and evaluated Blane Marti. I managed the patient along with the ED Attending.    Electronically Signed by           Freddy Alexander DO  07/31/24 7135

## 2024-08-01 NOTE — DISCHARGE INSTRUCTIONS
3.1 x 2.9 cm partially imaged lesion in the outer right breast of unclear etiology. This portion of the breast was outside of the field-of-view on the prior studies. This may represent asymmetric breast tissue, however recommend confirmation with   breast exam and dedicated breast imaging. Patient due for screening mammogram in August 2024.

## 2024-08-01 NOTE — INCIDENTAL FINDINGS
The following findings require follow up:  Radiographic finding   Finding:  3.1 x 2.9 cm partially imaged lesion in the outer right breast of unclear etiology. This portion of the breast was outside of the field-of-view on the prior studies. This may represent asymmetric breast tissue, however recommend confirmation with   breast exam and dedicated breast imaging. Patient due for screening mammogram in August 2024.   Follow up required: August 2024   Follow up should be done within 1 month(s)    Please notify the following clinician to assist with the follow up:   Dr. HUFFMAN    Incidental finding results were discussed with the Patient by Freddy Alexander DO on 07/31/24.   They expressed understanding and all questions answered.

## 2024-08-21 ENCOUNTER — OFFICE VISIT (OUTPATIENT)
Dept: OBGYN CLINIC | Facility: CLINIC | Age: 69
End: 2024-08-21

## 2024-08-21 VITALS
HEIGHT: 64 IN | DIASTOLIC BLOOD PRESSURE: 81 MMHG | HEART RATE: 68 BPM | SYSTOLIC BLOOD PRESSURE: 167 MMHG | RESPIRATION RATE: 18 BRPM | BODY MASS INDEX: 44.86 KG/M2 | WEIGHT: 262.8 LBS

## 2024-08-21 DIAGNOSIS — R93.89 THICKENED ENDOMETRIUM: Primary | ICD-10-CM

## 2024-08-21 DIAGNOSIS — R10.9 ABDOMINAL PAIN: ICD-10-CM

## 2024-08-21 DIAGNOSIS — E11.9 TYPE 2 DIABETES MELLITUS WITHOUT COMPLICATION, WITHOUT LONG-TERM CURRENT USE OF INSULIN (HCC): ICD-10-CM

## 2024-08-21 DIAGNOSIS — E03.8 OTHER SPECIFIED HYPOTHYROIDISM: ICD-10-CM

## 2024-08-21 DIAGNOSIS — N70.11 HYDROSALPINX: ICD-10-CM

## 2024-08-21 DIAGNOSIS — I10 PRIMARY HYPERTENSION: ICD-10-CM

## 2024-08-21 DIAGNOSIS — R10.2 PELVIC PAIN: ICD-10-CM

## 2024-08-21 PROCEDURE — 99213 OFFICE O/P EST LOW 20 MIN: CPT | Performed by: OBSTETRICS & GYNECOLOGY

## 2024-08-21 NOTE — PROGRESS NOTES
"Problem Visit   24     SUBJECTIVE:  HPI: Blane Marti is a 69 y.o.  female who presents for ED follow up. See A/P for details.     Past Medical History:   Diagnosis Date    Arthritis     Depression     Difficult intubation 2021 1st attempt: Mac3, Grade 4 view. 2nd attempt: Mac 3, Grade 4 view, unable to pass bougie, 3rd attempt: Mc Grath:, Grade 4 view, unable to pass bougie, 4th attempt:(by attending): Mac3, Grade 4 view unable to pass bougie, 5th attempt(by attending): Glidescope 3, Grade 3 view, ETT placed successfully with stylet. LMA used successfully to ventilate in between attempts    Disease of thyroid gland     hypo    Edema     LAST ASSESSED: 2014    GERD (gastroesophageal reflux disease)     Hypercholesterolemia     Hyperlipidemia     Hypertension     WELL CONTROLLED. CURRENTLY ON LISINOPRIL. LAST ASSESSED: 2017    Liver disease     Orthostatic hypotension 2020    Other headache syndrome     Other muscle spasm     LAST ASSESSED: 2014    Ovarian cyst 2006    Renal calculi      (spontaneous vaginal delivery) 1975    FEMALE       Past Surgical History:   Procedure Laterality Date    BACK SURGERY      HERNIATED DISC (L4/L5)    CHOLECYSTECTOMY      IR BIOPSY TRANSJUGULAR LIVER  11/10/2023    IR CEREBRAL ANGIOGRAPHY  2020    IR CEREBRAL ANGIOGRAPHY  2021    IR CEREBRAL ANGIOGRAPHY / INTERVENTION  2021    TONSILLECTOMY         Social History     Tobacco Use    Smoking status: Former    Smokeless tobacco: Never    Tobacco comments:     pt \"quit about 20 years ago\"   Vaping Use    Vaping status: Never Used   Substance Use Topics    Alcohol use: Never    Drug use: No         Current Outpatient Medications:     acetaminophen (Tylenol 8 Hour Arthritis Pain) 650 mg CR tablet, Take 1 tablet (650 mg total) by mouth every 8 (eight) hours as needed for mild pain, Disp: 90 tablet, Rfl: 3    Aspirin Low Dose 81 MG EC tablet, TAKE ONE TABLET BY " "MOUTH DAILY, Disp: 90 tablet, Rfl: 3    atorvastatin (LIPITOR) 20 mg tablet, TAKE 1 TABLET (20 MG TOTAL) BY MOUTH DAILY, Disp: 90 tablet, Rfl: 3    clotrimazole (LOTRIMIN) 1 % cream, Apply topically 2 (two) times a day, Disp: 30 g, Rfl: 0    Diclofenac Sodium (VOLTAREN) 1 %, Apply 2 g topically 4 (four) times a day, Disp: 100 g, Rfl: 2    fluticasone (FLONASE) 50 mcg/act nasal spray, USE TWO SPRAYS INTO EACH NOSTRIL DAILY, Disp: 9.9 mL, Rfl: 3    gabapentin (NEURONTIN) 100 mg capsule, Take 1 capsule (100 mg total) by mouth 3 (three) times a day, Disp: 180 capsule, Rfl: 3    guaiFENesin (MUCINEX) 600 mg 12 hr tablet, Take 2 tablets (1,200 mg total) by mouth every 12 (twelve) hours, Disp: 30 tablet, Rfl: 0    levothyroxine 100 mcg tablet, TAKE ONE TABLET BY MOUTH TWO TIMES A WEEK, Disp: 24 tablet, Rfl: 3    levothyroxine 88 mcg tablet, TAKE ONE TABLET BY MOUTH ONCE DAILY FROM MONDAY TO FRIDAY OF EACH WEEK, Disp: 90 tablet, Rfl: 2    lisinopril (ZESTRIL) 20 mg tablet, TAKE ONE TABLET BY MOUTH DAILY, Disp: 90 tablet, Rfl: 3    loratadine (CLARITIN) 10 mg tablet, TAKE 1 TABLET BY MOUTH DAILY AS NEEDED FOR ALLERGIES, Disp: 90 tablet, Rfl: 11    lidocaine (Lidoderm) 5 %, Apply 1 patch topically over 12 hours daily Remove & Discard patch within 12 hours or as directed by MD (Patient not taking: Reported on 8/21/2024), Disp: 15 patch, Rfl: 0    methocarbamol (ROBAXIN) 500 mg tablet, Take 1 tablet (500 mg total) by mouth 3 (three) times a day (Patient not taking: Reported on 8/21/2024), Disp: 90 tablet, Rfl: 0      OBJECTIVE:  Vitals:    08/21/24 1353   BP: 167/81   BP Location: Right arm   Patient Position: Sitting   Cuff Size: Large   Pulse: 68   Resp: 18   Weight: 119 kg (262 lb 12.8 oz)   Height: 5' 4\" (1.626 m)       Physical Exam  GEN: The patient was alert and oriented x3, pleasant well-appearing female in no acute distress.   CV: Regular rate  PULM: Non-labored respirations  MSK: Normal gait  Skin: Warm, dry  Neuro: " No focal deficits  Psych: Normal affect and judgement, cooperative      ASSESSMENT/PLAN:  Problem List       Carpal tunnel syndrome, bilateral    Cervical spondylosis without myelopathy    Degenerative cervical disc    GERD (gastroesophageal reflux disease)    Hypertension    Overview     Not well controlled currently, has appt with PCP next month at which time her regimen may need to be adjusted   Will need preadmission testing prior to surgery         Hypothyroidism    Morbid obesity (HCC)    Obstructive sleep apnea    Type 2 diabetes mellitus without complication (HCC)    Overview     Will need Hb1AC preoperatively         Lung nodule < 6cm on CT    Thickened endometrium    Overview     EMS 7 mm, denies vaginal bleeding  Benign EMB in 2018 which was a very painful experience for her  Discussed recommendation for repeating a biopsy, explained the option for a paracervical block for pain control and advised her to take ibuprofen about 2 hours before the appt  Will return in about 1 month to see me         Aneurysm of internal carotid artery    Gait instability    Cerebral aneurysm    Chronic migraine without aura without status migrainosus, not intractable    Vitamin D deficiency    Plantar fasciitis of right foot    Dyslipidemia    Mild cognitive impairment    Venous insufficiency    Cervical radicular pain    Allergic rhinitis    Acute back pain with sciatica, left    Varicose veins of leg with swelling, bilateral    Hydrosalpinx    Current Assessment & Plan     Less commonly seen after menopause  Per patient, has a history of endometriosis so it is possible she has inflammatory-mediated adhesions that could have led to congestion of her tubes  Other possible etiologies are PID (but the patient has not been sexually active in many years) or a tubal malignancy  If she did have endo, she is also at an increased risk for epithelial ovarian cancers like clear cell carcinoma         Pelvic pain    Overview     Most  likely cause is her hydrosalpinx  Had a long discussion about the recommendation for surgical management via laparoscopic hyst with BSO since it would not make sense to put her through a similar recovery time for just a salpingectomy at her age  At first she was hesitant, but after counseling on the risks and benefits she is interested in moving towards definitive surgical treatment  Will return for EMB at which time we will sign consent forms and discuss next steps for medical optimization         Current Assessment & Plan     Recommended ibuprofen 600mg q6 hours PRN to be taken with food for pain control  Can continue with tylenol as well          Other Visit Diagnoses       Abdominal pain                  Barb Sanford MD   OBGYN PGY-4  08/22/24 4:46 PM

## 2024-08-22 PROBLEM — N70.11 HYDROSALPINX: Status: ACTIVE | Noted: 2024-08-22

## 2024-08-22 PROBLEM — R10.2 PELVIC PAIN: Status: ACTIVE | Noted: 2024-08-22

## 2024-08-22 NOTE — ASSESSMENT & PLAN NOTE
Recommended ibuprofen 600mg q6 hours PRN to be taken with food for pain control  Can continue with tylenol as well

## 2024-08-22 NOTE — ASSESSMENT & PLAN NOTE
Less commonly seen after menopause  Per patient, has a history of endometriosis so it is possible she has inflammatory-mediated adhesions that could have led to congestion of her tubes  Other possible etiologies are PID (but the patient has not been sexually active in many years) or a tubal malignancy  If she did have endo, she is also at an increased risk for epithelial ovarian cancers like clear cell carcinoma

## 2024-08-30 ENCOUNTER — HOSPITAL ENCOUNTER (OUTPATIENT)
Dept: RADIOLOGY | Age: 69
Discharge: HOME | End: 2024-08-30
Payer: MEDICARE

## 2024-08-30 VITALS — HEIGHT: 65 IN | BODY MASS INDEX: 43.32 KG/M2 | WEIGHT: 260 LBS

## 2024-08-30 DIAGNOSIS — Z12.31 VISIT FOR SCREENING MAMMOGRAM: ICD-10-CM

## 2024-08-30 PROCEDURE — 77067 SCR MAMMO BI INCL CAD: CPT

## 2024-08-30 PROCEDURE — 77063 BREAST TOMOSYNTHESIS BI: CPT

## 2024-09-14 DIAGNOSIS — I67.1 ANEURYSM OF INTERNAL CAROTID ARTERY: ICD-10-CM

## 2024-09-16 ENCOUNTER — OFFICE VISIT (OUTPATIENT)
Dept: INTERNAL MEDICINE CLINIC | Facility: CLINIC | Age: 69
End: 2024-09-16

## 2024-09-16 VITALS
BODY MASS INDEX: 44.34 KG/M2 | HEIGHT: 65 IN | WEIGHT: 266.13 LBS | SYSTOLIC BLOOD PRESSURE: 149 MMHG | DIASTOLIC BLOOD PRESSURE: 79 MMHG | HEART RATE: 66 BPM | TEMPERATURE: 97.3 F

## 2024-09-16 DIAGNOSIS — I10 ESSENTIAL HYPERTENSION: ICD-10-CM

## 2024-09-16 DIAGNOSIS — M54.6 CHRONIC RIGHT-SIDED THORACIC BACK PAIN: Primary | ICD-10-CM

## 2024-09-16 DIAGNOSIS — G89.29 CHRONIC RIGHT-SIDED THORACIC BACK PAIN: Primary | ICD-10-CM

## 2024-09-16 DIAGNOSIS — E11.9 TYPE 2 DIABETES MELLITUS WITHOUT COMPLICATION, WITHOUT LONG-TERM CURRENT USE OF INSULIN (HCC): ICD-10-CM

## 2024-09-16 LAB — SL AMB POCT HEMOGLOBIN AIC: 6 (ref ?–6.5)

## 2024-09-16 PROCEDURE — 99214 OFFICE O/P EST MOD 30 MIN: CPT | Performed by: HOSPITALIST

## 2024-09-16 PROCEDURE — 83036 HEMOGLOBIN GLYCOSYLATED A1C: CPT | Performed by: HOSPITALIST

## 2024-09-16 RX ORDER — LIDOCAINE 50 MG/G
1 PATCH TOPICAL DAILY
Qty: 30 PATCH | Refills: 1 | Status: SHIPPED | OUTPATIENT
Start: 2024-09-16

## 2024-09-16 RX ORDER — LISINOPRIL 40 MG/1
40 TABLET ORAL DAILY
Qty: 30 TABLET | Refills: 3 | Status: SHIPPED | OUTPATIENT
Start: 2024-09-16

## 2024-09-16 RX ORDER — ASPIRIN 81 MG/1
81 TABLET, COATED ORAL DAILY
Qty: 90 TABLET | Refills: 5 | Status: SHIPPED | OUTPATIENT
Start: 2024-09-16

## 2024-09-16 NOTE — PROGRESS NOTES
[facility]  INTERNAL MEDICINE OFFICE VISIT     PATIENT INFORMATION     Blane Marti   69 y.o. female   MRN: [id number]    ASSESSMENT/PLAN     Diagnoses and all orders for this visit:    Chronic right-sided thoracic back pain  1 year duration of right thoracic paraspinal pain/burning which radiates to posterior shoulder. No inciting injury. Hx of degenerative changes of the L spine with remote surgery. No images of T or C spine. Has TTP of the right thoracic paraspinal musculature without vertebral tenderness or deformity. Robaxin and gabapentin no help in the past. Suspect arthritic etiology. Will refer to PT and if no improvement consider T spine CT in the future.   -     Ambulatory Referral to Physical Therapy; Future  -     lidocaine (Lidoderm) 5 %; Apply 1 patch topically over 12 hours daily Remove & Discard patch within 12 hours or as directed by MD. Apply to back    Type 2 diabetes mellitus without complication, without long-term current use of insulin (HCC)  Prediabetic range for many years, A1c today is 6.0  -     POCT hemoglobin A1c  -     IRIS Diabetic eye exam - to complete at podiatry visit next week as well as foot exam    Essential hypertension  BP is 149/79 and recheck is consistent. BP elevated at prior visits on chart review. Not checking BP at home. Will increase lisinopril from 20 mg to 40 mg daily.  -     lisinopril (ZESTRIL) 40 mg tablet; Take 1 tablet (40 mg total) by mouth daily  -     Basic metabolic panel; Future      Schedule a follow-up appointment in 2 months.    HEALTH MAINTENANCE     Immunization History   Administered Date(s) Administered    INFLUENZA 11/12/2018    Influenza Quadrivalent, 6-35 Months IM 09/29/2017    Influenza, high dose seasonal 0.7 mL 11/15/2021, 11/22/2022, 10/23/2023    Influenza, recombinant, quadrivalent,injectable, preservative free 11/12/2018, 10/21/2019, 10/30/2020    Influenza, seasonal, injectable 10/10/2013,  12/03/2014, 10/22/2015    Pneumococcal Conjugate Vaccine 20-valent (Pcv20), Polysace 03/06/2023    Pneumococcal Polysaccharide PPV23 11/12/2018    Tdap 10/30/2020, 05/01/2021    Zoster Vaccine Recombinant 04/02/2024, 07/17/2024     CHIEF COMPLAINT     Chief Complaint   Patient presents with    Follow-up    Back Pain     Pain in back around scapula      HISTORY OF PRESENT ILLNESS     69 year old female with hx of HTN, hypothyroidism, DM, migraines, MENA, chronic back pain, who presents to  for follow up of back pain.    Previously seen for right sided thoracic/paraspinal back pain characterized as burning and sharp pain which radiates to the posterior shoulder and sometimes anterior chest. Pain is present all the time and no factors which worsen the pain. Takes tylenol at home which helps. Previously prescribed gabapentin and robaxin but provided no relief. Pain present for 1 year. No inciting injury. Has history of multi-level arthritic changes of the spine with remote L5 surgery. She is not interested in further surgeries at this time but would like something for the pain. Says it is difficult and stressful to deal with as she has to take care of her  everyday who had a stroke in the past.     Hx of diabetes, A1c has been prediabetic range for many years. A1c done today is 6.0.     Hx of HTN, she is taking lisinopril 20 mg daily. BP today is 149/79 and has been elevated at prior visits. She is not checking BP at home. No side effects of lisinopril noted.     REVIEW OF SYSTEMS     Review of Systems   Constitutional:  Negative for chills and fever.   HENT:  Negative for ear pain and sore throat.    Eyes:  Negative for pain and visual disturbance.   Respiratory:  Negative for cough and shortness of breath.    Cardiovascular:  Negative for chest pain and palpitations.   Gastrointestinal:  Negative for abdominal pain and vomiting.   Genitourinary:  Negative for dysuria and hematuria.   Musculoskeletal:   "Positive for back pain. Negative for arthralgias.   Skin:  Negative for color change and rash.   Neurological:  Negative for seizures and syncope.   All other systems reviewed and are negative.    OBJECTIVE     Vitals:    09/16/24 1513   BP: 149/79   BP Location: Right arm   Patient Position: Sitting   Cuff Size: Large   Pulse: 66   Temp: (!) 97.3 °F (36.3 °C)   TempSrc: Temporal   Weight: 121 kg (266 lb 2 oz)   Height: 5' 5\" (1.651 m)     Physical Exam  Vitals and nursing note reviewed.   Constitutional:       General: She is not in acute distress.     Appearance: She is well-developed.   HENT:      Head: Normocephalic and atraumatic.   Eyes:      Conjunctiva/sclera: Conjunctivae normal.   Cardiovascular:      Rate and Rhythm: Normal rate and regular rhythm.      Heart sounds: No murmur heard.  Pulmonary:      Effort: Pulmonary effort is normal. No respiratory distress.      Breath sounds: Normal breath sounds.   Abdominal:      Palpations: Abdomen is soft.      Tenderness: There is no abdominal tenderness.   Musculoskeletal:         General: No swelling.      Cervical back: Neck supple.      Right lower leg: No edema.      Left lower leg: No edema.      Comments: Right thoracic paraspinal tenderness is present  No TTP over the spinal processes  No spinal deformities on exam   Skin:     General: Skin is warm and dry.      Capillary Refill: Capillary refill takes less than 2 seconds.   Neurological:      Mental Status: She is alert and oriented to person, place, and time.   Psychiatric:         Mood and Affect: Mood normal.       CURRENT MEDICATIONS     Current Outpatient Medications:     lidocaine (Lidoderm) 5 %, Apply 1 patch topically over 12 hours daily Remove & Discard patch within 12 hours or as directed by MD. Apply to back, Disp: 30 patch, Rfl: 1    lisinopril (ZESTRIL) 40 mg tablet, Take 1 tablet (40 mg total) by mouth daily, Disp: 30 tablet, Rfl: 3    acetaminophen (Tylenol 8 Hour Arthritis Pain) 650 mg CR " tablet, Take 1 tablet (650 mg total) by mouth every 8 (eight) hours as needed for mild pain, Disp: 90 tablet, Rfl: 3    Aspirin Low Dose 81 MG EC tablet, TAKE ONE TABLET BY MOUTH DAILY, Disp: 90 tablet, Rfl: 5    atorvastatin (LIPITOR) 20 mg tablet, TAKE 1 TABLET (20 MG TOTAL) BY MOUTH DAILY, Disp: 90 tablet, Rfl: 3    clotrimazole (LOTRIMIN) 1 % cream, Apply topically 2 (two) times a day, Disp: 30 g, Rfl: 0    Diclofenac Sodium (VOLTAREN) 1 %, Apply 2 g topically 4 (four) times a day, Disp: 100 g, Rfl: 2    fluticasone (FLONASE) 50 mcg/act nasal spray, USE TWO SPRAYS INTO EACH NOSTRIL DAILY, Disp: 9.9 mL, Rfl: 3    guaiFENesin (MUCINEX) 600 mg 12 hr tablet, Take 2 tablets (1,200 mg total) by mouth every 12 (twelve) hours, Disp: 30 tablet, Rfl: 0    levothyroxine 100 mcg tablet, TAKE ONE TABLET BY MOUTH TWO TIMES A WEEK, Disp: 24 tablet, Rfl: 3    levothyroxine 88 mcg tablet, TAKE ONE TABLET BY MOUTH ONCE DAILY FROM MONDAY TO FRIDAY OF EACH WEEK, Disp: 90 tablet, Rfl: 2    lidocaine (Lidoderm) 5 %, Apply 1 patch topically over 12 hours daily Remove & Discard patch within 12 hours or as directed by MD (Patient not taking: Reported on 8/21/2024), Disp: 15 patch, Rfl: 0    loratadine (CLARITIN) 10 mg tablet, TAKE 1 TABLET BY MOUTH DAILY AS NEEDED FOR ALLERGIES, Disp: 90 tablet, Rfl: 11    PAST MEDICAL & SURGICAL HISTORY     Past Medical History:   Diagnosis Date    Arthritis     Depression     Difficult intubation 01/08/2021 1/8/2021 1st attempt: Mac3, Grade 4 view. 2nd attempt: Mac 3, Grade 4 view, unable to pass bougie, 3rd attempt: Mc Grath:, Grade 4 view, unable to pass bougie, 4th attempt:(by attending): Mac3, Grade 4 view unable to pass bougie, 5th attempt(by attending): Glidescope 3, Grade 3 view, ETT placed successfully with stylet. LMA used successfully to ventilate in between attempts    Disease of thyroid gland     hypo    Edema     LAST ASSESSED: [id number]    GERD (gastroesophageal reflux disease)      "Hypercholesterolemia     Hyperlipidemia     Hypertension     WELL CONTROLLED. CURRENTLY ON LISINOPRIL. LAST ASSESSED: [id number]    Liver disease     Orthostatic hypotension 2020    Other headache syndrome     Other muscle spasm     LAST ASSESSED: 2014    Ovarian cyst 2006    Renal calculi      (spontaneous vaginal delivery) 1975    FEMALE     Past Surgical History:   Procedure Laterality Date    BACK SURGERY      HERNIATED DISC (L4/L5)    CHOLECYSTECTOMY      IR BIOPSY TRANSJUGULAR LIVER  11/10/2023    IR CEREBRAL ANGIOGRAPHY  2020    IR CEREBRAL ANGIOGRAPHY  2021    IR CEREBRAL ANGIOGRAPHY / INTERVENTION  2021    TONSILLECTOMY       SOCIAL & FAMILY HISTORY     Social History     Socioeconomic History    Marital status:      Spouse name: Not on file    Number of children: Not on file    Years of education: Not on file    Highest education level: Not on file   Occupational History    Occupation: RETIRED   Tobacco Use    Smoking status: Former    Smokeless tobacco: Never    Tobacco comments:     pt \"quit about 20 years ago\"   Vaping Use    Vaping status: Never Used   Substance and Sexual Activity    Alcohol use: Never    Drug use: No    Sexual activity: Not Currently   Other Topics Concern    Not on file   Social History Narrative    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         AS PER [employer]    EXERCISES REGULARLY    LIVES WITH FAMILY    NO CAFFEINE USE    NO LIVING WILL    NO TOBACCO/SMOKE EXPOSURE    UNEMPLOYED     Social Determinants of Health     Financial Resource Strain: Low Risk  (2024)    Overall Financial Resource Strain (CARDIA)     Difficulty of Paying Living Expenses: Not very hard   Food Insecurity: No Food Insecurity (2024)    Hunger Vital Sign     Worried About Running Out of Food in the Last Year: Never true     Ran Out of Food in the Last Year: Never true   Transportation Needs: Unmet " "Transportation Needs (4/2/2024)    PRAPARE - Transportation     Lack of Transportation (Medical): Yes     Lack of Transportation (Non-Medical): Yes   Physical Activity: Inactive (9/16/2021)    Exercise Vital Sign     Days of Exercise per Week: 0 days     Minutes of Exercise per Session: 0 min   Stress: No Stress Concern Present (9/16/2021)    [employer] - Occupational Stress Questionnaire     Feeling of Stress : Not at all   Social Connections: Socially Isolated (9/16/2021)    Social Connection and Isolation Panel [NHANES]     Frequency of Communication with Friends and Family: More than three times a week     Frequency of Social Gatherings with Friends and Family: Three times a week     Attends Faith Services: Never     Active Member of Clubs or Organizations: No     Attends Club or Organization Meetings: Never     Marital Status:    Intimate Partner Violence: Not At Risk (9/16/2021)    Humiliation, Afraid, Rape, and Kick questionnaire     Fear of Current or Ex-Partner: No     Emotionally Abused: No     Physically Abused: No     Sexually Abused: No   Housing Stability: Low Risk  (4/2/2024)    Housing Stability Vital Sign     Unable to Pay for Housing in the Last Year: No     Number of Times Moved in the Last Year: 1     Homeless in the Last Year: No     Social History     Substance and Sexual Activity   Alcohol Use Never       Social History     Substance and Sexual Activity   Drug Use No     Social History     Tobacco Use   Smoking Status Former   Smokeless Tobacco Never   Tobacco Comments    pt \"quit about 20 years ago\"     Family History   Problem Relation Age of Onset    Lung cancer Mother     Cancer Mother         MALIGNANT NEOPLASM    Hypertension Father     Seizures Father     Stroke Father     Heart attack Father     Diabetes Brother     Hypertension Son     Lung disease Son     Hyperthyroidism Maternal Aunt     Hypothyroidism Maternal Aunt     Breast cancer Cousin  "    Lung cancer Cousin     Breast cancer Cousin     Heart disease Other         CARDIAC DISORDER    Neuropathy Family              ==  Obey Rodriguez MD  [facility]    [place] [address], [address]  [place] [address]  Office: [phone]  Fax: [phone]

## 2024-09-23 ENCOUNTER — OFFICE VISIT (OUTPATIENT)
Dept: MULTI SPECIALTY CLINIC | Facility: CLINIC | Age: 69
End: 2024-09-23

## 2024-09-23 ENCOUNTER — PATIENT OUTREACH (OUTPATIENT)
Dept: INTERNAL MEDICINE CLINIC | Facility: CLINIC | Age: 69
End: 2024-09-23

## 2024-09-23 ENCOUNTER — CLINICAL SUPPORT (OUTPATIENT)
Dept: INTERNAL MEDICINE CLINIC | Facility: CLINIC | Age: 69
End: 2024-09-23

## 2024-09-23 VITALS
WEIGHT: 266 LBS | BODY MASS INDEX: 44.26 KG/M2 | SYSTOLIC BLOOD PRESSURE: 119 MMHG | TEMPERATURE: 97.2 F | DIASTOLIC BLOOD PRESSURE: 72 MMHG | HEART RATE: 74 BPM

## 2024-09-23 DIAGNOSIS — Z78.9 NEEDS ASSISTANCE WITH COMMUNITY RESOURCES: Primary | ICD-10-CM

## 2024-09-23 DIAGNOSIS — E11.9 TYPE 2 DIABETES MELLITUS WITHOUT COMPLICATION, WITHOUT LONG-TERM CURRENT USE OF INSULIN (HCC): Primary | ICD-10-CM

## 2024-09-23 DIAGNOSIS — B35.1 ONYCHOMYCOSIS: Primary | ICD-10-CM

## 2024-09-23 LAB
LEFT EYE DIABETIC RETINOPATHY: NORMAL
LEFT EYE IMAGE QUALITY: NORMAL
LEFT EYE MACULAR EDEMA: NORMAL
LEFT EYE OTHER RETINOPATHY: NORMAL
RIGHT EYE DIABETIC RETINOPATHY: NORMAL
RIGHT EYE IMAGE QUALITY: NORMAL
RIGHT EYE MACULAR EDEMA: NORMAL
RIGHT EYE OTHER RETINOPATHY: NORMAL
SEVERITY (EYE EXAM): NORMAL

## 2024-09-24 NOTE — PROGRESS NOTES
SW has met with pt and her brother after their podiatry Appointment this date.  Pt shares she is concerned how she will manage if she does require surgery.   She shares she will need a biopsy and she is not sure how whe will manage as she cares for her elderly father and disabled brother.    SW has encouraged her to ask her Provider/ Surgeon what to expect for recovery if surgery needed.    There may or may not be need for rehab or HHC.  SW has suggested asking family/ friends to be available.  Pt shares she will clean up and get groceries in ahead of time.    Jake has also provided a list of agencies who provide private pay services.     JAKE has also asked if pt's brother wants to re-start his [place] Waiver application.  In thos way he would have help.  Pt's brother who ws present has agreed to same.  CM Team to f/u and assist brother with same.    Patient does not have any further questions, concerns, or other needs at this time.  Patient has my contact # and PCP office # if needed.  Social Work to remain available to assist as indicated.    Please re-consult Social Work if needed.

## 2024-09-30 ENCOUNTER — PROCEDURE VISIT (OUTPATIENT)
Dept: OBGYN CLINIC | Facility: CLINIC | Age: 69
End: 2024-09-30

## 2024-09-30 VITALS
DIASTOLIC BLOOD PRESSURE: 71 MMHG | HEART RATE: 75 BPM | WEIGHT: 262 LBS | BODY MASS INDEX: 43.65 KG/M2 | SYSTOLIC BLOOD PRESSURE: 125 MMHG | HEIGHT: 65 IN

## 2024-09-30 DIAGNOSIS — N70.11 HYDROSALPINX: ICD-10-CM

## 2024-09-30 DIAGNOSIS — I10 PRIMARY HYPERTENSION: ICD-10-CM

## 2024-09-30 DIAGNOSIS — R93.89 THICKENED ENDOMETRIUM: Primary | ICD-10-CM

## 2024-09-30 DIAGNOSIS — R10.2 PELVIC PAIN: ICD-10-CM

## 2024-09-30 PROCEDURE — 99214 OFFICE O/P EST MOD 30 MIN: CPT | Performed by: OBSTETRICS & GYNECOLOGY

## 2024-09-30 PROCEDURE — 88305 TISSUE EXAM BY PATHOLOGIST: CPT | Performed by: PATHOLOGY

## 2024-09-30 PROCEDURE — 58100 BIOPSY OF UTERUS LINING: CPT | Performed by: OBSTETRICS & GYNECOLOGY

## 2024-09-30 NOTE — ASSESSMENT & PLAN NOTE
EMB with intracervical block performed today  Very minimal tissue obtained but will follow up final pathology

## 2024-09-30 NOTE — PROGRESS NOTES
"Procedure Visit   24     SUBJECTIVE:  HPI: Blane Marti is a 69 y.o.  female who presents for EMB and surgical counseling. See A/P.    Past Medical History:   Diagnosis Date    Arthritis     Depression     Difficult intubation 2021 1st attempt: Mac3, Grade 4 view. 2nd attempt: Mac 3, Grade 4 view, unable to pass bougie, 3rd attempt: Mc Grath:, Grade 4 view, unable to pass bougie, 4th attempt:(by attending): Mac3, Grade 4 view unable to pass bougie, 5th attempt(by attending): Glidescope 3, Grade 3 view, ETT placed successfully with stylet. LMA used successfully to ventilate in between attempts    Disease of thyroid gland     hypo    Edema     LAST ASSESSED: 2014    GERD (gastroesophageal reflux disease)     Hypercholesterolemia     Hyperlipidemia     Hypertension     WELL CONTROLLED. CURRENTLY ON LISINOPRIL. LAST ASSESSED: 2017    Liver disease     Orthostatic hypotension 2020    Other headache syndrome     Other muscle spasm     LAST ASSESSED: 2014    Ovarian cyst 2006    Renal calculi      (spontaneous vaginal delivery) 1975    FEMALE       Past Surgical History:   Procedure Laterality Date    BACK SURGERY      HERNIATED DISC (L4/L5)    CHOLECYSTECTOMY      IR BIOPSY TRANSJUGULAR LIVER  11/10/2023    IR CEREBRAL ANGIOGRAPHY  2020    IR CEREBRAL ANGIOGRAPHY  2021    IR CEREBRAL ANGIOGRAPHY / INTERVENTION  2021    TONSILLECTOMY         Social History     Tobacco Use    Smoking status: Former    Smokeless tobacco: Never    Tobacco comments:     pt \"quit about 20 years ago\"   Vaping Use    Vaping status: Never Used   Substance Use Topics    Alcohol use: Never    Drug use: No         Current Outpatient Medications:     acetaminophen (Tylenol 8 Hour Arthritis Pain) 650 mg CR tablet, Take 1 tablet (650 mg total) by mouth every 8 (eight) hours as needed for mild pain, Disp: 90 tablet, Rfl: 3    Aspirin Low Dose 81 MG EC tablet, TAKE ONE TABLET " "BY MOUTH DAILY, Disp: 90 tablet, Rfl: 5    atorvastatin (LIPITOR) 20 mg tablet, TAKE 1 TABLET (20 MG TOTAL) BY MOUTH DAILY, Disp: 90 tablet, Rfl: 3    clotrimazole (LOTRIMIN) 1 % cream, Apply topically 2 (two) times a day, Disp: 30 g, Rfl: 0    Diclofenac Sodium (VOLTAREN) 1 %, Apply 2 g topically 4 (four) times a day, Disp: 100 g, Rfl: 2    fluticasone (FLONASE) 50 mcg/act nasal spray, USE TWO SPRAYS INTO EACH NOSTRIL DAILY, Disp: 9.9 mL, Rfl: 3    levothyroxine 100 mcg tablet, TAKE ONE TABLET BY MOUTH TWO TIMES A WEEK, Disp: 24 tablet, Rfl: 3    levothyroxine 88 mcg tablet, TAKE ONE TABLET BY MOUTH ONCE DAILY FROM MONDAY TO FRIDAY OF EACH WEEK, Disp: 90 tablet, Rfl: 2    lisinopril (ZESTRIL) 40 mg tablet, Take 1 tablet (40 mg total) by mouth daily, Disp: 30 tablet, Rfl: 3    loratadine (CLARITIN) 10 mg tablet, TAKE 1 TABLET BY MOUTH DAILY AS NEEDED FOR ALLERGIES, Disp: 90 tablet, Rfl: 11    guaiFENesin (MUCINEX) 600 mg 12 hr tablet, Take 2 tablets (1,200 mg total) by mouth every 12 (twelve) hours (Patient not taking: Reported on 9/30/2024), Disp: 30 tablet, Rfl: 0    lidocaine (Lidoderm) 5 %, Apply 1 patch topically over 12 hours daily Remove & Discard patch within 12 hours or as directed by MD (Patient not taking: Reported on 8/21/2024), Disp: 15 patch, Rfl: 0    lidocaine (Lidoderm) 5 %, Apply 1 patch topically over 12 hours daily Remove & Discard patch within 12 hours or as directed by MD. Apply to back (Patient not taking: Reported on 9/30/2024), Disp: 30 patch, Rfl: 1    tolnaftate (TINACTIN) 1 % external solution, Apply topically daily at bedtime (Patient not taking: Reported on 9/30/2024), Disp: 10 mL, Rfl: 3      OBJECTIVE:  Vitals:    09/30/24 0959   BP: 125/71   Pulse: 75   Weight: 119 kg (262 lb)   Height: 5' 5\" (1.651 m)       Physical Exam  GEN: The patient was alert and oriented x3, pleasant well-appearing female in no acute distress.   CV: Regular rate  PULM: Non-labored respirations  MSK: Normal " gait  Skin: Warm, dry  Neuro: No focal deficits  Psych: Normal affect and judgement, cooperative  : Normal appearing external genitalia, vaginal mucosa, and cervix. Normal physiologic discharge present. No evidence of vaginal, cervical, or uterine bleeding. Cervix stenotic without any gross abnormalities or lesions.    ASSESSMENT/PLAN:  Problem List       Carpal tunnel syndrome, bilateral    Cervical spondylosis without myelopathy    Degenerative cervical disc    GERD (gastroesophageal reflux disease)    Hypertension    Overview     Managed by PCP, improved BP today  Will need preadmission testing prior to surgery         Hypothyroidism    Morbid obesity (HCC)    Obstructive sleep apnea    Type 2 diabetes mellitus without complication (HCC)    Overview     Will need Hb1AC preoperatively         Lung nodule < 6cm on CT    Thickened endometrium    Overview     EMS 7 mm, denies vaginal bleeding  Benign EMB in 2018 which was a very painful experience for her         Current Assessment & Plan     EMB with intracervical block performed today  Very minimal tissue obtained but will follow up final pathology         Aneurysm of internal carotid artery    Gait instability    Cerebral aneurysm    Chronic migraine without aura without status migrainosus, not intractable    Vitamin D deficiency    Plantar fasciitis of right foot    Dyslipidemia    Mild cognitive impairment    Venous insufficiency    Cervical radicular pain    Allergic rhinitis    Acute back pain with sciatica, left    Varicose veins of leg with swelling, bilateral    Hydrosalpinx    Overview     Less commonly seen after menopause  Per patient, has a history of endometriosis so it is possible she has inflammatory-mediated adhesions that could have led to congestion of her tubes  Other possible etiologies are PID (but the patient has not been sexually active in many years) or a tubal malignancy  If she did have endo, she is also at an increased risk for  epithelial ovarian cancers like clear cell carcinoma         Pelvic pain    Overview     Most likely caused by her hydrosalpinx, has been persistent since last visit  EMB performed 9/30, will follow up final pathology  Consented for EUA, total laparoscopic hysterectomy, and bilateral salpingo-oophorectomy, possible robotic assisted, possible cystoscopy and all other indicated procedures  Previously discussed risks/benefits of the procedure, aware of possibility of conversion to open procedure         Current Assessment & Plan     Aware that the procedure will need to be reviewed and approved by the surgical committee and that she will receive a phone call about scheduling the procedure and a preoperative H&P appointment as well              Barb Sanford MD   OBGYN PGY-4  09/30/24 12:40 PM

## 2024-09-30 NOTE — ASSESSMENT & PLAN NOTE
Aware that the procedure will need to be reviewed and approved by the surgical committee and that she will receive a phone call about scheduling the procedure and a preoperative H&P appointment as well

## 2024-09-30 NOTE — PROGRESS NOTES
Endometrial biopsy    Date/Time: 9/30/2024 10:00 AM    Performed by: Barb Sanford MD  Authorized by: Barb Sanford MD  Universal Protocol:  Procedure performed by: (Dr. Best)  Consent: Verbal consent obtained. Written consent obtained.  Risks and benefits: risks, benefits and alternatives were discussed  Consent given by: patient  Patient understanding: patient states understanding of the procedure being performed  Patient consent: the patient's understanding of the procedure matches consent given  Procedure consent: procedure consent matches procedure scheduled  Relevant documents: relevant documents present and verified  Radiology Images displayed and confirmed. If images not available, report reviewed: imaging studies available  Required items: required blood products, implants, devices, and special equipment available  Patient identity confirmed: verbally with patient    Indication:     Indications comment:  Thickened EMS postmenopause  Procedure:     Procedure: endometrial biopsy with Pipelle      A bivalve speculum was placed in the vagina: yes      Cervix cleaned and prepped: yes      An intracervical block was performed: yes      Local anesthetic:  Lidocaine without epinephrine    The cervix was dilated: yes      Uterus sounded: no      Specimen collected: specimen collected and sent to pathology      Patient tolerated procedure well with no complications: yes    Findings:     Uterus size:  Non-gravid    Cervix: stenotic    Comments:     Procedure comments:  External cervical os incised with 11 blade scalpel and then dilated with os finder  Monsel's applied to cervix at the conclusion of the procedure

## 2024-10-01 ENCOUNTER — TELEPHONE (OUTPATIENT)
Dept: OBGYN CLINIC | Facility: CLINIC | Age: 69
End: 2024-10-01

## 2024-10-01 NOTE — TELEPHONE ENCOUNTER
----- Message from Barb Sanford MD sent at 2024 12:14 PM EDT -----  Regarding: hyst request  [facility] GYN Department  Surgery Scheduling Sheet    Patient Name: Blane Marti  : 1955    Provider: Barb Sanford MD     Needed: yes; Language: Japanese and N/A    Procedure: exam under anesthesia, total laparoscopic hysterectomy, and bilateral salpingo-oophorectomy, possible robotic assisted, possible cystoscopy and all other indicated    ##If possible, schedule after  to allow me the chance to cover the case - would prefer robotic if attending available##    Diagnosis: pelvic pain, hydrosalpinx    Special Needs or Equipment: none    Anesthesia: General anesthesia ##previously documented difficult intubation##    Length of stay: outpatient  Does patient have comorbid conditions that will require close perioperative monitoring prior to safe discharge: no    The patient has comorbid conditions that will require close perioperative monitoring prior to safe discharge, including N/A.   This may require acute care beyond the usual and routine recovery period. As such, inpatient admission post-operatively is expected and appropriate, and anticipated hospital length of stay will be >2 midnights.    Pre-Admission Testing Needed: yes   Labs that should be ordered: cbc, hgb A1C, type and screen, and EKG    Order PAT that is recommended in prep for procedure?: Yes    Medical Clearance Needed: yes; Provider: PCP    MA Form Signed (tubals/hysterectomy): Yes, Not Indicated    Surgical Drink Given: no     How many days out of work: 7 day(s)     How many days no drivin day(s)       Is pre op appt needed?  yes  Interval for post op appt: 2 week(s)     For Surgical Scheduler:     Surgery Scheduled On:  [place]:     Pre-op Appt:   Post op Appt:  Consult/Medical clearance appt:

## 2024-10-02 ENCOUNTER — TELEPHONE (OUTPATIENT)
Dept: INTERNAL MEDICINE CLINIC | Facility: CLINIC | Age: 69
End: 2024-10-02

## 2024-10-02 NOTE — TELEPHONE ENCOUNTER
Received call from patient requesting an authorization for her patches.     Contacted patient's [facility] pharmacy at . Spoke with pharmacist. Patient's Lidocaine patch requires a prior authorization.     Prior authorization initiated on Cover my Meds for Lidocaine 5% patches. Patient's prior authorization approved.     Updated patient's [facility] pharmacy with approval.    Spoke with patient on the phone. Introduced self and role. Patient updated prior authorization approved for her Lidocaine patches. Pharmacy updated and script ready for  at [facility] pharmacy. All questions/concerns answered at this time.

## 2024-10-03 ENCOUNTER — TELEPHONE (OUTPATIENT)
Dept: OBGYN CLINIC | Facility: CLINIC | Age: 69
End: 2024-10-03

## 2024-10-03 PROCEDURE — 88305 TISSUE EXAM BY PATHOLOGIST: CPT | Performed by: PATHOLOGY

## 2024-10-03 NOTE — PROGRESS NOTES
[facility] Surgical Case Review  Date Reviewed: 10/03/24  Reviewed by: Hayden John Sabouni  Case request: EUA, TLH, BSO, possible robotic assist & cysto  Indication: pelvic pain, R hydrosalpinx  Surgical Consent: signed 9/30/24  MA30/31: MA-30 needs to be signed if ultimately proceeding with surgery    Case request approved: no    Comments:  F/u EMB  F/u in office to discuss all possible etiologies of pain (e.g., bowel, bladder, MSK) as there isn't clear evidence that patient's pain is secondary to gyn etiology  Consider repeat US in 8-12w to see if R hydrosalpinx remains present / has interval change  If proceeding with surgery ultimately:  Consent needs risks added (none listed)  MA-30 needs to be signed  Needs medical clearance  Needs labs (recent CBC on file but no recent HbA1c completed though is ordered & pending completion)        Becki Melgar MD  PGY-4, OBGYN  10/03/24

## 2024-10-03 NOTE — TELEPHONE ENCOUNTER
Called pt and LVM w/ office number for callback.      ----- Message from Becki Melgar MD sent at 10/3/2024  1:23 PM EDT -----  Regarding: Follow up needed  Hi team,    This patient was being evaluated for possible surgery, but the gyn surgical committee has recommendations of steps to be taken prior to proceeding with surgery.  Can you please call her and help her schedule a follow up appointment on or after 10/14/24 to review these recommendations?  Ideally let's have her see me, otherwise she can see another senior resident.  At that appointment, we'll review her EMB which is still pending.    Thanks!    Becki Melgar MD  OBGYN Resident  10/03/24  1:24 PM

## 2024-10-11 ENCOUNTER — APPOINTMENT (OUTPATIENT)
Dept: LAB | Facility: CLINIC | Age: 69
End: 2024-10-11
Payer: MEDICARE

## 2024-10-11 DIAGNOSIS — E11.9 TYPE 2 DIABETES MELLITUS WITHOUT COMPLICATION, WITHOUT LONG-TERM CURRENT USE OF INSULIN (HCC): ICD-10-CM

## 2024-10-11 DIAGNOSIS — I10 ESSENTIAL HYPERTENSION: ICD-10-CM

## 2024-10-11 DIAGNOSIS — K76.0 NAFLD (NONALCOHOLIC FATTY LIVER DISEASE): ICD-10-CM

## 2024-10-11 LAB
ALBUMIN SERPL BCG-MCNC: 4.3 G/DL (ref 3.5–5)
ALP SERPL-CCNC: 66 U/L (ref 34–104)
ALT SERPL W P-5'-P-CCNC: 29 U/L (ref 7–52)
ANION GAP SERPL CALCULATED.3IONS-SCNC: 10 MMOL/L (ref 4–13)
AST SERPL W P-5'-P-CCNC: 36 U/L (ref 13–39)
BASOPHILS # BLD AUTO: 0.04 THOUSANDS/ΜL (ref 0–0.1)
BASOPHILS NFR BLD AUTO: 0 % (ref 0–1)
BILIRUB SERPL-MCNC: 0.69 MG/DL (ref 0.2–1)
BUN SERPL-MCNC: 8 MG/DL (ref 5–25)
CALCIUM SERPL-MCNC: 9.2 MG/DL (ref 8.4–10.2)
CHLORIDE SERPL-SCNC: 102 MMOL/L (ref 96–108)
CO2 SERPL-SCNC: 26 MMOL/L (ref 21–32)
CREAT SERPL-MCNC: 0.78 MG/DL (ref 0.6–1.3)
EOSINOPHIL # BLD AUTO: 0.1 THOUSAND/ΜL (ref 0–0.61)
EOSINOPHIL NFR BLD AUTO: 1 % (ref 0–6)
ERYTHROCYTE [DISTWIDTH] IN BLOOD BY AUTOMATED COUNT: 13 % (ref 11.6–15.1)
EST. AVERAGE GLUCOSE BLD GHB EST-MCNC: 134 MG/DL
GFR SERPL CREATININE-BSD FRML MDRD: 78 ML/MIN/1.73SQ M
GLUCOSE P FAST SERPL-MCNC: 83 MG/DL (ref 65–99)
HBA1C MFR BLD: 6.3 %
HCT VFR BLD AUTO: 41.1 % (ref 34.8–46.1)
HGB BLD-MCNC: 12.8 G/DL (ref 11.5–15.4)
IMM GRANULOCYTES # BLD AUTO: 0.05 THOUSAND/UL (ref 0–0.2)
IMM GRANULOCYTES NFR BLD AUTO: 1 % (ref 0–2)
INR PPP: 1.06 (ref 0.85–1.19)
LYMPHOCYTES # BLD AUTO: 2.83 THOUSANDS/ΜL (ref 0.6–4.47)
LYMPHOCYTES NFR BLD AUTO: 31 % (ref 14–44)
MCH RBC QN AUTO: 27.4 PG (ref 26.8–34.3)
MCHC RBC AUTO-ENTMCNC: 31.1 G/DL (ref 31.4–37.4)
MCV RBC AUTO: 88 FL (ref 82–98)
MONOCYTES # BLD AUTO: 0.9 THOUSAND/ΜL (ref 0.17–1.22)
MONOCYTES NFR BLD AUTO: 10 % (ref 4–12)
NEUTROPHILS # BLD AUTO: 5.2 THOUSANDS/ΜL (ref 1.85–7.62)
NEUTS SEG NFR BLD AUTO: 57 % (ref 43–75)
NRBC BLD AUTO-RTO: 0 /100 WBCS
PLATELET # BLD AUTO: 306 THOUSANDS/UL (ref 149–390)
PMV BLD AUTO: 11.5 FL (ref 8.9–12.7)
POTASSIUM SERPL-SCNC: 4.3 MMOL/L (ref 3.5–5.3)
PROT SERPL-MCNC: 7.5 G/DL (ref 6.4–8.4)
PROTHROMBIN TIME: 14.1 SECONDS (ref 12.3–15)
RBC # BLD AUTO: 4.67 MILLION/UL (ref 3.81–5.12)
SODIUM SERPL-SCNC: 138 MMOL/L (ref 135–147)
WBC # BLD AUTO: 9.12 THOUSAND/UL (ref 4.31–10.16)

## 2024-10-11 PROCEDURE — 85025 COMPLETE CBC W/AUTO DIFF WBC: CPT

## 2024-10-11 PROCEDURE — 36415 COLL VENOUS BLD VENIPUNCTURE: CPT

## 2024-10-11 PROCEDURE — 83036 HEMOGLOBIN GLYCOSYLATED A1C: CPT

## 2024-10-11 PROCEDURE — 85610 PROTHROMBIN TIME: CPT

## 2024-10-11 PROCEDURE — 80053 COMPREHEN METABOLIC PANEL: CPT

## 2024-10-15 ENCOUNTER — CLINICAL SUPPORT (OUTPATIENT)
Dept: INTERNAL MEDICINE CLINIC | Facility: CLINIC | Age: 69
End: 2024-10-15

## 2024-10-15 DIAGNOSIS — Z23 ENCOUNTER FOR IMMUNIZATION: Primary | ICD-10-CM

## 2024-10-15 NOTE — PROGRESS NOTES
Patient in the office for nurse visit for influenza and RSV. HIGH dose given in left deltoid and RSV given in right deltoid

## 2024-10-22 ENCOUNTER — OFFICE VISIT (OUTPATIENT)
Dept: OBGYN CLINIC | Facility: CLINIC | Age: 69
End: 2024-10-22

## 2024-10-22 VITALS
DIASTOLIC BLOOD PRESSURE: 69 MMHG | WEIGHT: 263 LBS | BODY MASS INDEX: 43.82 KG/M2 | HEIGHT: 65 IN | SYSTOLIC BLOOD PRESSURE: 137 MMHG | HEART RATE: 72 BPM

## 2024-10-22 DIAGNOSIS — N70.11 HYDROSALPINX: ICD-10-CM

## 2024-10-22 DIAGNOSIS — M79.604 RIGHT LEG PAIN: ICD-10-CM

## 2024-10-22 DIAGNOSIS — N39.46 MIXED STRESS AND URGE URINARY INCONTINENCE: ICD-10-CM

## 2024-10-22 DIAGNOSIS — R10.2 PELVIC PAIN: Primary | ICD-10-CM

## 2024-10-22 DIAGNOSIS — R93.89 THICKENED ENDOMETRIUM: ICD-10-CM

## 2024-10-22 PROCEDURE — 87491 CHLMYD TRACH DNA AMP PROBE: CPT

## 2024-10-22 PROCEDURE — 99213 OFFICE O/P EST LOW 20 MIN: CPT | Performed by: OBSTETRICS & GYNECOLOGY

## 2024-10-22 PROCEDURE — 87591 N.GONORRHOEAE DNA AMP PROB: CPT

## 2024-10-22 NOTE — PROGRESS NOTES
"OB/GYN VISIT  Blane Marti  10/21/2024  8:56 PM    ASSESSMENT / PLAN:    Blane Marti is a 69 y.o.  female presenting for follow up for pelvic pain and thickened endometrium.    Right hydrosalpinx:  Prior US showed 7 mm possible right hydrosalpinx which was not appreciated on repeat CT.  Patient asymptomatic.  No h/o permanent sterilization.    Follow up GC/CT  If GC/CT negative, can continue observation as long as she remains asymptomatic    Thickened endometrium:  US w/ 7 mm EMS  No PMB  EMB benign  Asymptomatic    Monitor for bleeding or development of symptoms, otherwise can continue observation as long as she remains asymptomatic    Pain:  Patient previously documented to have pelvic pain, but this was clarified to be MSK pain originating from her right inguinal ligament.  Hysterectomy is not recommended for this.    PT referral ordered    Mixed urge and stress urinary incontinence:  Worsening incontinence over past 5 years to the point that she needs Depends  No dysuria, hematuria, fever, chills, or flank pain    Follow up for problem visit for urinary incontinence    SUBJECTIVE:    Blane Marti is a 69 y.o.  female presenting for follow up for pelvic pain and thickened endometrium.  She underwent EMB recently which showed scan benign endometrial and endocervical tissue, and we reviewed this result today.  Prior US showed 7 mm possible right hydrosalpinx which was not appreciated on repeat CT.  These results were also reviewed today.  She denies PMB.  Today, she reports ongoing pain.  We reviewed that previously discussed hysterectomy would only help her pain if her pain was indeed gyn in origin, and so we reviewed that the goal of today's appointment was to elucidate if her pain does indeed seem to be gyn in origin or not.  She expressed strong desire to avoid surgery unless it is \"necessary.\"    She describes her pain as being in her \"right groin\" and points to her right " "inguinal crease.  She describes the pain as radiating down the front of her right leg.  Sometimes the pain goes as high as her mons, but primarily it is lower.  The pain is worse with any sort of physical activity.  She has daily bowel movements and denies diarrhea.  She does endorse worsening urinary incontinence over the past five years or so to the point that she wears Depends due to daily leakage which is worse with Valsalva but also occurs if she \"can't get to the bathroom in time.\"  We reviewed that this would be worth a separate appointment to address as there are management options including exercises, medicine, and even surgery.    We reviewed that on physical exam, while she has generalized discomfort with pelvic exam, she has no focal tenderness or other abnormal findings indicating her pain is gyn in origin.  She has tenderness with palpation of her inguinal ligament but no abdominal or pelvic tenderness.  We reviewed that this is consistent with MSK origin of pain and not gyn origin of pain.  We reviewed that hysterectomy is not recommended for this.  We also reviewed recommendation to complete GC/CT which was collected today just to help rule out infection as an etiology for her small right hydrosalpinx, though this was reviewed to most likely be a benign finding.  She is not sexually active and hasn't been in over 10 years, so likelihood of STI is low.    Past Medical History:   Diagnosis Date    Arthritis     Depression     Difficult intubation 01/08/2021 1/8/2021 1st attempt: Mac3, Grade 4 view. 2nd attempt: Mac 3, Grade 4 view, unable to pass bougie, 3rd attempt: Marcio Mack:, Grade 4 view, unable to pass bougie, 4th attempt:(by attending): Mac3, Grade 4 view unable to pass bougie, 5th attempt(by attending): Glidescope 3, Grade 3 view, ETT placed successfully with stylet. LMA used successfully to ventilate in between attempts    Disease of thyroid gland     hypo    Edema     LAST ASSESSED: " "2014    GERD (gastroesophageal reflux disease)     Hypercholesterolemia     Hyperlipidemia     Hypertension     WELL CONTROLLED. CURRENTLY ON LISINOPRIL. LAST ASSESSED: 2017    Liver disease     Orthostatic hypotension 2020    Other headache syndrome     Other muscle spasm     LAST ASSESSED: 2014    Ovarian cyst 2006    Renal calculi      (spontaneous vaginal delivery) 1975    FEMALE       Past Surgical History:   Procedure Laterality Date    BACK SURGERY      HERNIATED DISC (L4/L5)    CHOLECYSTECTOMY      IR BIOPSY TRANSJUGULAR LIVER  11/10/2023    IR CEREBRAL ANGIOGRAPHY  2020    IR CEREBRAL ANGIOGRAPHY  2021    IR CEREBRAL ANGIOGRAPHY / INTERVENTION  2021    TONSILLECTOMY         OBJECTIVE:    Vitals:  Blood pressure 137/69, pulse 72, height 5' 5\" (1.651 m), weight 119 kg (263 lb), last menstrual period 2003.There is no height or weight on file to calculate BMI.    Physical Exam:    Physical Exam  Constitutional:       General: She is not in acute distress.     Appearance: Normal appearance. She is not ill-appearing.   Genitourinary:      Urethral meatus normal.      No lesions in the vagina.      Genitourinary Comments: Moderate vulvovaginal atrophy      Right Labia: No rash, tenderness, lesions, skin changes or Bartholin's cyst.     Left Labia: No tenderness, lesions, skin changes, Bartholin's cyst or rash.     No labial fusion noted.      Pelvic Franky Score: 5/5.     No vaginal discharge, erythema, tenderness, bleeding, ulceration or granulation tissue.      No vaginal prolapse present.     Moderate vaginal atrophy present.       Right Adnexa: not tender, not full, not palpable, no mass present and not absent.     Left Adnexa: not tender, not full, not palpable, no mass present and not absent.     No cervical motion tenderness, discharge, friability, lesion, polyp, nabothian cyst, eversion or elongation.      Uterus is not enlarged, fixed, tender, irregular or " prolapsed.      No uterine mass detected.     Uterus is anteverted.   HENT:      Mouth/Throat:      Mouth: Mucous membranes are moist.   Eyes:      Extraocular Movements: Extraocular movements intact.   Cardiovascular:      Rate and Rhythm: Normal rate.   Pulmonary:      Effort: Pulmonary effort is normal.   Abdominal:      General: There is no distension.      Palpations: Abdomen is soft.      Tenderness: There is no abdominal tenderness. There is no guarding.   Musculoskeletal:         General: No swelling or tenderness.      Right lower leg: No edema.      Left lower leg: No edema.      Comments: Point tenderness of right inguinal ligament   Neurological:      General: No focal deficit present.      Mental Status: She is alert.   Skin:     General: Skin is warm and dry.      Coloration: Skin is not jaundiced or pale.   Psychiatric:         Mood and Affect: Mood normal.         Behavior: Behavior normal.         Thought Content: Thought content normal.         Judgment: Judgment normal.           Becki Melgar MD  10/21/2024  8:56 PM

## 2024-10-23 LAB
C TRACH DNA SPEC QL NAA+PROBE: NEGATIVE
N GONORRHOEA DNA SPEC QL NAA+PROBE: NEGATIVE

## 2024-11-05 ENCOUNTER — RA CDI HCC (OUTPATIENT)
Dept: OTHER | Facility: HOSPITAL | Age: 69
End: 2024-11-05

## 2024-12-16 DIAGNOSIS — E11.9 TYPE 2 DIABETES MELLITUS WITHOUT COMPLICATION, WITHOUT LONG-TERM CURRENT USE OF INSULIN (HCC): ICD-10-CM

## 2024-12-17 RX ORDER — ATORVASTATIN CALCIUM 20 MG/1
20 TABLET, FILM COATED ORAL DAILY
Qty: 90 TABLET | Refills: 2 | Status: SHIPPED | OUTPATIENT
Start: 2024-12-17

## 2025-01-09 ENCOUNTER — TELEPHONE (OUTPATIENT)
Age: 70
End: 2025-01-09

## 2025-01-09 DIAGNOSIS — K76.0 NAFLD (NONALCOHOLIC FATTY LIVER DISEASE): Primary | ICD-10-CM

## 2025-01-09 NOTE — TELEPHONE ENCOUNTER
Updated US elastography order entered. Attempted to contact patient to make aware -mailbox full / unable to leave message.

## 2025-01-09 NOTE — TELEPHONE ENCOUNTER
Patients GI provider:  Dr. Pollard    Number to return call: [phone]    Reason for call: Pt calling stating that she called to schedule her US elastography/UGAP that Cherri placed but they informed her that this order has been . Please resubmit and pt requesting someone reach out to her once done so that she may schedule. Thank you.    Scheduled procedure/appointment date if applicable: Apt 3/27/25

## 2025-01-13 ENCOUNTER — ANNUAL EXAM (OUTPATIENT)
Dept: OBGYN CLINIC | Facility: CLINIC | Age: 70
End: 2025-01-13

## 2025-01-13 VITALS
HEIGHT: 65 IN | BODY MASS INDEX: 42.38 KG/M2 | WEIGHT: 254.4 LBS | HEART RATE: 71 BPM | RESPIRATION RATE: 18 BRPM | DIASTOLIC BLOOD PRESSURE: 82 MMHG | SYSTOLIC BLOOD PRESSURE: 150 MMHG

## 2025-01-13 DIAGNOSIS — R10.2 PELVIC PAIN: ICD-10-CM

## 2025-01-13 DIAGNOSIS — Z01.419 ENCOUNTER FOR ANNUAL ROUTINE GYNECOLOGICAL EXAMINATION: Primary | ICD-10-CM

## 2025-01-13 DIAGNOSIS — M79.604 RIGHT LEG PAIN: ICD-10-CM

## 2025-01-13 DIAGNOSIS — N39.46 MIXED STRESS AND URGE URINARY INCONTINENCE: ICD-10-CM

## 2025-01-13 PROCEDURE — G0101 CA SCREEN;PELVIC/BREAST EXAM: HCPCS | Performed by: OBSTETRICS & GYNECOLOGY

## 2025-01-13 NOTE — PROGRESS NOTES
OB/GYN ANNUAL H&P    SUBJECTIVE:    Blane Marti is a 69 y.o.  female who presents for annual well woman H&P.  She was tearful at the beginning of today's visit because she feels very overwhelmed with many appointments.  She declined pelvic and breast exam today because she didn't feel up to it.    She has a thickened endometrium with no PMB and a normal EMB recently, and we reviewed recommendation to report any PMB as this may prompt further evaluation.  See visit note from 10/22/24 for full details.    She is also being observed for an asymptomatic hydrosalpinx with negative GC/CT testing, and we reviewed recommendation to report any pelvic pain.  See visit note from 10/22/24 for full details.    She reports ongoing RLE pain but hasn't been able to go to PT yet because her father was sick for 2 months and subsequently passed.  She stated she needed a new referral which was ordered today.    GYN:  Postmenopausal (since about )  Abnormal vaginal discharge: no  Genital lesions: no  Genital irritation or itching: no  Sexually active: no  Dyspareunia: N/A  Contraception: postmenopausal  STI history: no  Gynecologic surgical history: none    OB:   female ()    :  Dysuria: no  Hematuria: no  Urgency: yes  Frequency: no  Urinary incontinence:  Worsening incontinence over past 5 years to the point that she needs Depends  Not interested in surgery  Prefers to start with pelvic floor PT and is not interested in discussing any further today    Breast:  Mass: no  Skin changes: no  Reddening: no  Dimpling: no  Pain: no  Nipple discharge: no    General:  Diet: regular fruits & vegetables  Exercise: no, recommended 150 min of moderate exercise weekly per AHA  Work: retired  Alcohol use: no  Tobacco use: no  Recreational drug use: no    Family history:  Breast cancer: mother's cousin (diagnosed in 40s), perhaps more but isn't certain  Endometrial cancer: no  Ovarian cancer: no    Screening:  Cervical  "cancer  No h/o on file & patient denies h/o ZELALEM 2/3  Last 2 pap smear on 2/15/24 & 6/9/16 showed NILM, HPV negative (constitutes 10 years of sufficient screening, no further screening indicated)  Breast cancer:  Last mammogram on 8/30/24 showed BI-RADS 1 (negative) bilaterally  Next routine screening mammogram scheduled for 9/4/25  Colon cancer:  Last colonoscopy on 7/3/23 showed 6 sub-centimeter polyps (removed), diverticulosis, & otherwise normal colon  Due for next routine screening colonoscopy in 2026  STI screening: declines GC/CT, RPR, HIV, Hep B, Hep C    Immunizations:  COVID: due, counseled, declines  Flu: 10/15/24  Gardasil: beyond age range for vaccination, no vaccination status on file    Review of Systems:  Pertinent items are noted in HPI.    OBJECTIVE:    Vitals:   /82 (BP Location: Right arm, Patient Position: Sitting, Cuff Size: Large)   Pulse 71   Resp 18   Ht 5' 5\" (1.651 m)   Wt 115 kg (254 lb 6.4 oz)   LMP 01/01/2003   BMI 42.33 kg/m²     Physical Exam  Constitutional:       General: She is not in acute distress.     Appearance: Normal appearance. She is not ill-appearing.   HENT:      Mouth/Throat:      Mouth: Mucous membranes are moist.   Eyes:      Extraocular Movements: Extraocular movements intact.   Cardiovascular:      Rate and Rhythm: Normal rate and regular rhythm.      Heart sounds: Normal heart sounds.   Pulmonary:      Effort: Pulmonary effort is normal.      Breath sounds: Normal breath sounds.   Abdominal:      General: There is no distension.      Palpations: Abdomen is soft.      Tenderness: There is no abdominal tenderness. There is no guarding.   Musculoskeletal:         General: No swelling.      Right lower leg: No edema.      Left lower leg: No edema.   Neurological:      General: No focal deficit present.      Mental Status: She is alert.   Skin:     General: Skin is warm and dry.      Capillary Refill: Capillary refill takes less than 2 seconds.      " Coloration: Skin is not jaundiced or pale.   Psychiatric:         Mood and Affect: Mood normal.         Behavior: Behavior normal.         Thought Content: Thought content normal.         Judgment: Judgment normal.     Patient declined pelvic and breast exam    ASSESSMENT:    Blane Marti is a 69 y.o.  with normal annual visit.    PLAN:    1. Encounter for annual routine gynecological examination (Primary)  - Follow up for routine screening mammogram in September as scheduled  - Follow up in 1 year for annual gyn visit    2. Pelvic pain  - Ambulatory Referral to Physical Therapy; Future  - Prior normal gynecologic workup with symptoms concerning for MSK origin rather than gynecologic origin    3. Right leg pain  - Ambulatory Referral to Physical Therapy; Future    4. Mixed stress and urge urinary incontinence  - Ambulatory Referral to Physical Therapy; Future  - Patient declines further assessment at this time        Becki Melgar MD  25  2:09 PM

## 2025-01-14 ENCOUNTER — TELEPHONE (OUTPATIENT)
Dept: INTERNAL MEDICINE CLINIC | Facility: CLINIC | Age: 70
End: 2025-01-14

## 2025-01-15 DIAGNOSIS — M72.2 PLANTAR FASCIITIS OF RIGHT FOOT: Primary | ICD-10-CM

## 2025-01-15 DIAGNOSIS — E11.9 TYPE 2 DIABETES MELLITUS WITHOUT COMPLICATION, WITHOUT LONG-TERM CURRENT USE OF INSULIN (HCC): ICD-10-CM

## 2025-01-24 ENCOUNTER — HOSPITAL ENCOUNTER (OUTPATIENT)
Dept: RADIOLOGY | Facility: HOSPITAL | Age: 70
Discharge: HOME | End: 2025-01-24
Payer: MEDICARE

## 2025-01-24 DIAGNOSIS — K76.0 NAFLD (NONALCOHOLIC FATTY LIVER DISEASE): ICD-10-CM

## 2025-01-24 PROCEDURE — 76981 USE PARENCHYMA: CPT

## 2025-01-27 ENCOUNTER — OFFICE VISIT (OUTPATIENT)
Dept: MULTI SPECIALTY CLINIC | Facility: CLINIC | Age: 70
End: 2025-01-27

## 2025-01-27 VITALS
TEMPERATURE: 98.6 F | HEART RATE: 77 BPM | WEIGHT: 266 LBS | BODY MASS INDEX: 44.26 KG/M2 | SYSTOLIC BLOOD PRESSURE: 143 MMHG | DIASTOLIC BLOOD PRESSURE: 78 MMHG | OXYGEN SATURATION: 98 %

## 2025-01-27 DIAGNOSIS — E11.9 TYPE 2 DIABETES MELLITUS WITHOUT COMPLICATION, WITHOUT LONG-TERM CURRENT USE OF INSULIN (HCC): ICD-10-CM

## 2025-01-27 DIAGNOSIS — B35.1 ONYCHOMYCOSIS: Primary | ICD-10-CM

## 2025-01-27 DIAGNOSIS — M72.2 PLANTAR FASCIITIS OF RIGHT FOOT: ICD-10-CM

## 2025-01-27 PROCEDURE — 99213 OFFICE O/P EST LOW 20 MIN: CPT | Performed by: PODIATRIST

## 2025-01-27 RX ORDER — CICLOPIROX 80 MG/ML
SOLUTION TOPICAL
Qty: 6 ML | Refills: 2 | Status: SHIPPED | OUTPATIENT
Start: 2025-01-27

## 2025-01-27 NOTE — PROGRESS NOTES
Podiatry Clinic Visit  Blane Marti 69 y.o. female MRN: [id number]  Encounter: [id number]    Assessment & Plan        Diagnoses and all orders for this visit:    Onychomycosis  -     ciclopirox (PENLAC) 8 % solution; Apply topically daily at bedtime Apply topically daily.  On the seventh day of application remove with rubbing alcohol or nail polish remover.  Repeat 7-day cycle    Plantar fasciitis of right foot  Comments:  Resolved  Orders:  -     Ambulatory Referral to Podiatry    Type 2 diabetes mellitus without complication, without long-term current use of insulin ([facility])  -     Ambulatory Referral to Podiatry           Plan:  Patient was examined, evaluated, and treated with all questions and concerns addressed.  Diabetic foot exam performed.  See procedure note below  Prescription dispensed for ciclopirox.  Pharmacy information verified.  Patient thoroughly instructed on use  Reviewed last A1c value of 6.3% in October 2024  Encouraged tight glycemic control and proper diet  Continue use of supportive shoe gear with wide toebox  Encouraged daily at home foot checks  Patient was re-appointed for 3 months    - Dr. Tena was available/present for entirety of patient encounter and present for all procedures.    Patient's shoes and socks removed.    Right Foot/Ankle   Right Foot Inspection  Skin Exam: skin normal and skin intact. No dry skin, no warmth, no callus, no erythema, no maceration, no abnormal color, no pre-ulcer, no ulcer and no callus.     Toe Exam: ROM and strength within normal limits.     Sensory   Vibration: intact  Proprioception: intact  Monofilament testing: intact    Vascular  Capillary refills: < 3 seconds  The right DP pulse is 2+. The right PT pulse is 1+.     Left Foot/Ankle  Left Foot Inspection  Skin Exam: skin normal and skin intact. No dry skin, no warmth, no erythema, no maceration, normal color, no pre-ulcer, no ulcer and no callus.     Toe Exam: ROM and strength within  normal limits.     Sensory   Vibration: intact  Proprioception: intact  Monofilament testing: intact    Vascular  Capillary refills: < 3 seconds  The left DP pulse is 2+. The left PT pulse is 1+.     Assign Risk Category  No deformity present  No loss of protective sensation  No weak pulses  Risk: 0        History of Present Illness     HPI: Blane Marti is a 69 y.o. female who presents with complaint of mycotic nails.  She states they are discolored and she is not satisfied with their appearance.  She denies any pain.  She denies any heel pain.  She denies any open wounds or ulcerations.  The patient has no further podiatric complaints at this time.    Review of Systems   Constitutional: Negative.    HENT: Negative.    Eyes: Negative.    Respiratory: Negative.    Cardiovascular: Negative.    Gastrointestinal: Negative.    Musculoskeletal: Negative  Skin: Negative  Neurological: Negative.        Historical Information   Past Medical History:   Diagnosis Date    Arthritis     Depression     Difficult intubation 2021 1st attempt: Mac3, Grade 4 view. 2nd attempt: Mac 3, Grade 4 view, unable to pass bougie, 3rd attempt: Mc Gradarius:, Grade 4 view, unable to pass bougie, 4th attempt:(by attending): Mac3, Grade 4 view unable to pass bougie, 5th attempt(by attending): Glidescope 3, Grade 3 view, ETT placed successfully with stylet. LMA used successfully to ventilate in between attempts    Disease of thyroid gland     hypo    Edema     LAST ASSESSED: 2014    GERD (gastroesophageal reflux disease)     Hypercholesterolemia     Hyperlipidemia     Hypertension     WELL CONTROLLED. CURRENTLY ON LISINOPRIL. LAST ASSESSED: 2017    Liver disease     Orthostatic hypotension 2020    Other headache syndrome     Other muscle spasm     LAST ASSESSED: 2014    Ovarian cyst 2006    Renal calculi      (spontaneous vaginal delivery) 1975    FEMALE     Past Surgical History:   Procedure Laterality  "Date    BACK SURGERY  1996    HERNIATED DISC (L4/L5)    CHOLECYSTECTOMY      IR BIOPSY TRANSJUGULAR LIVER  11/10/2023    IR CEREBRAL ANGIOGRAPHY  11/6/2020    IR CEREBRAL ANGIOGRAPHY  8/27/2021    IR CEREBRAL ANGIOGRAPHY / INTERVENTION  1/8/2021    TONSILLECTOMY       Social History   Social History     Substance and Sexual Activity   Alcohol Use Never     Social History     Substance and Sexual Activity   Drug Use No     Social History     Tobacco Use   Smoking Status Former   Smokeless Tobacco Never   Tobacco Comments    pt \"quit about 20 years ago\"     Family History:   Family History   Problem Relation Age of Onset    Lung cancer Mother     Cancer Mother         MALIGNANT NEOPLASM    Hypertension Father     Seizures Father     Stroke Father     Heart attack Father     Diabetes Brother     Hypertension Son     Lung disease Son     Hyperthyroidism Maternal Aunt     Hypothyroidism Maternal Aunt     Breast cancer Cousin     Lung cancer Cousin     Breast cancer Cousin     Neuropathy Family     Heart disease Other         CARDIAC DISORDER       Meds/Allergies   Not in a hospital admission.  Allergies   Allergen Reactions    Tagamet [Cimetidine] Hives       Objective     Current Vitals:   Blood Pressure: 143/78 (01/27/25 1431)  Pulse: 77 (01/27/25 1431)  Temperature: 98.6 °F (37 °C) (01/27/25 1431)  Temp Source: Temporal (01/27/25 1431)  Weight - Scale: 121 kg (266 lb) (01/27/25 1431)  SpO2: 98 % (01/27/25 1431)        /78 (BP Location: Left arm, Patient Position: Sitting, Cuff Size: Large)   Pulse 77   Temp 98.6 °F (37 °C) (Temporal)   Wt 121 kg (266 lb)   LMP 01/01/2003   SpO2 98%   BMI 44.26 kg/m²       Lower Extremity Exam:    Foot Exam    Right Foot/Ankle     Inspection and Palpation  Skin Exam: skin intact; no callus, no dry skin, no maceration, no abnormal color, no ulcer and no erythema     Neurovascular  Dorsalis pedis: 2+  Posterior tibial: 1+      Left Foot/Ankle      Inspection and " Palpation  Skin Exam: skin intact; no callus, no dry skin, no maceration, no abnormal color, no ulcer and no erythema     Neurovascular  Dorsalis pedis: 2+  Posterior tibial: 1+        Mild thickening, discoloration, nail plate thickening, subungual debris present of nails on bilateral lower extremity

## 2025-02-07 ENCOUNTER — TELEPHONE (OUTPATIENT)
Age: 70
End: 2025-02-07

## 2025-02-07 DIAGNOSIS — I67.1 CEREBRAL ANEURYSM: Primary | ICD-10-CM

## 2025-02-07 NOTE — TELEPHONE ENCOUNTER
Pt called in needing a new order for CTA Head to be placed. Pt missed CTA appt on 11/4/24 and SNPX with Dr. Damon on 11/11/25 due to father passing away.     She would like to r/s her imaging and also the SNPX f/u appt.    Please let pt know when CTA order is placed so she can call [facility] and then call us back to make SNPX.     Thank you.

## 2025-02-07 NOTE — TELEPHONE ENCOUNTER
Pt calling in to find out what hepatitis vaccine she was supposed to have and where she can go.   I advised per OV note on 6/18/24 - needs HBV booster, which can be arranged for her PCP OV   Pt understood.       Pt also asking for Dr. Pollard to review her US results for her and she is concerned on the findings she seen on mychart.

## 2025-02-13 ENCOUNTER — TELEPHONE (OUTPATIENT)
Dept: NEUROSURGERY | Facility: CLINIC | Age: 70
End: 2025-02-13

## 2025-02-13 DIAGNOSIS — Z01.818 PRE-PROCEDURAL EXAMINATION: Primary | ICD-10-CM

## 2025-02-13 NOTE — TELEPHONE ENCOUNTER
Patient in need of bun/creat orders to be done prior to her upcoming scan. Advised patient this RN will place orders. She is aware to have them completed prior to her scan.     She was appreciative.

## 2025-02-17 ENCOUNTER — APPOINTMENT (OUTPATIENT)
Dept: LAB | Facility: CLINIC | Age: 70
End: 2025-02-17
Payer: MEDICARE

## 2025-02-17 DIAGNOSIS — Z01.818 PRE-PROCEDURAL EXAMINATION: ICD-10-CM

## 2025-02-17 LAB
BUN SERPL-MCNC: 19 MG/DL (ref 5–25)
CREAT SERPL-MCNC: 0.74 MG/DL (ref 0.6–1.3)
GFR SERPL CREATININE-BSD FRML MDRD: 83 ML/MIN/1.73SQ M

## 2025-02-17 PROCEDURE — 84520 ASSAY OF UREA NITROGEN: CPT

## 2025-02-17 PROCEDURE — 82565 ASSAY OF CREATININE: CPT

## 2025-02-17 PROCEDURE — 36415 COLL VENOUS BLD VENIPUNCTURE: CPT

## 2025-02-18 ENCOUNTER — TELEPHONE (OUTPATIENT)
Age: 70
End: 2025-02-18

## 2025-02-18 ENCOUNTER — NURSE TRIAGE (OUTPATIENT)
Age: 70
End: 2025-02-18

## 2025-02-18 NOTE — TELEPHONE ENCOUNTER
"Last OV:24  Last Colonoscopy: 23    Last EGD: 23    Imagin25 Elastography - NEEDS REVIEW    Next OV:3/27/25    Pt calling in with c/o RUQ pain 7-9/10 x 1 week. Also reports bloating, nausea, dark loose stool and dizziness. D/t symptoms, this RN recommended pt go to ER for evaluation. Pt currently with her brother at [facility] awaiting test results but pt wants to go to [facility] ER and states she will go there on her way home.   Still awaiting review of Elastography from 25.    Reason for Disposition   Black or tarry bowel movements  (Exception: Chronic-unchanged black-grey BMs AND is taking iron pills or Pepto-Bismol.)    Answer Assessment - Initial Assessment Questions  1. LOCATION: \"Where does it hurt?\"       RUQ  2. RADIATION: \"Does the pain shoot anywhere else?\" (e.g., chest, back)      denies  3. ONSET: \"When did the pain begin?\" (e.g., minutes, hours or days ago)       A week ago  4. SUDDEN: \"Gradual or sudden onset?\"      unsure  5. PATTERN \"Does the pain come and go, or is it constant?\"      constant  6. SEVERITY: \"How bad is the pain?\"  (e.g., Scale 1-10; mild, moderate, or severe)      7/10, 8-9/10 at worst  7. RECURRENT SYMPTOM: \"Have you ever had this type of stomach pain before?\" If Yes, ask: \"When was the last time?\" and \"What happened that time?\"         8. AGGRAVATING FACTORS: \"Does anything seem to cause this pain?\" (e.g., foods, stress, alcohol)      unsure  9. CARDIAC SYMPTOMS: \"Do you have any of the following symptoms: chest pain, difficulty breathing, sweating, nausea?\"      nausea  10. OTHER SYMPTOMS: \"Do you have any other symptoms?\" (e.g., back pain, diarrhea, fever, urination pain, vomiting)        Dark stool - loose, back pain, bloating    Protocols used: Abdominal Pain - Upper-Adult-OH    "

## 2025-02-18 NOTE — TELEPHONE ENCOUNTER
Patients GI provider:  Dr. Pollard    Number to return call: [phone]    Reason for call: Pt calling wanting sooner appt. Due to Abd pain, nausea. Transferred to triage nurse.    Scheduled procedure/appointment date if applicable: 3/27/25

## 2025-02-20 ENCOUNTER — RESULTS FOLLOW-UP (OUTPATIENT)
Age: 70
End: 2025-02-20

## 2025-02-21 ENCOUNTER — HOSPITAL ENCOUNTER (OUTPATIENT)
Dept: RADIOLOGY | Facility: HOSPITAL | Age: 70
Discharge: HOME | End: 2025-02-21
Attending: NEUROLOGICAL SURGERY
Payer: MEDICARE

## 2025-02-21 DIAGNOSIS — I67.1 CEREBRAL ANEURYSM: ICD-10-CM

## 2025-02-21 PROCEDURE — 70496 CT ANGIOGRAPHY HEAD: CPT

## 2025-02-21 RX ADMIN — IOHEXOL 85 ML: 350 INJECTION, SOLUTION INTRAVENOUS at 12:18

## 2025-02-24 ENCOUNTER — TELEPHONE (OUTPATIENT)
Age: 70
End: 2025-02-24

## 2025-02-24 DIAGNOSIS — J32.9 SINUSITIS: ICD-10-CM

## 2025-02-24 NOTE — TELEPHONE ENCOUNTER
Via translation line- left message on patient's voice mail to contact our office to review results and provider recommendations.     Cherri Krishnamurthy PA-C to Gastro Hepatology     2/20/25  9:46 PM  Please call patient with the results. Patient's US elastography showing F4 fibrosis or cirrhosis. However, she had a liver biopsy in November 2023 which showed no significant fibrosis and would be odd to have such rapid progression. IQR is also elevated (21.2%). I have not personally seen this patient since November 2023 but she has seen Dr. Pollard and is scheduled for follow-up with him on 3/27/2025. Strongly recommend that she keep and attend this appointment in order to discuss the validity of her US elastography results and possible need for additional testing to more accurately stage her fibrosis.

## 2025-02-25 ENCOUNTER — TELEPHONE (OUTPATIENT)
Dept: NEUROSURGERY | Facility: CLINIC | Age: 70
End: 2025-02-25

## 2025-02-25 ENCOUNTER — RA CDI HCC (OUTPATIENT)
Dept: OTHER | Facility: HOSPITAL | Age: 70
End: 2025-02-25

## 2025-02-25 RX ORDER — LORATADINE 10 MG/1
TABLET ORAL
Qty: 90 TABLET | Refills: 3 | Status: SHIPPED | OUTPATIENT
Start: 2025-02-25

## 2025-02-26 ENCOUNTER — TELEPHONE (OUTPATIENT)
Age: 70
End: 2025-02-26

## 2025-02-26 NOTE — TELEPHONE ENCOUNTER
Patient was offered an interpretor but declined.   Patient returning Marias call, She is unable to take call at this time.  Patient is requesting a call back to day. States that she has called several times and would like to know the results.       Attempted to transfer patient to Neurosurgery but she disconnected the call prior to them answering.

## 2025-02-26 NOTE — TELEPHONE ENCOUNTER
LVM for patient without using  since pt declined one earlier. Relayed results and request her to attend 3/27/25 appt with Dr Pollard. Provided with office number to call if has questions prior to appt

## 2025-02-26 NOTE — TELEPHONE ENCOUNTER
Pt returned our call to discuss symptoms, per pt no  is required. Can we call her back at   Thanks

## 2025-02-26 NOTE — TELEPHONE ENCOUNTER
Reached out to patient regarding her concerns. She reports she has a headache that is relieved with tylenol. She also reports memory issues. Patient these symptoms are not new and have been going on for a long time. Suggested she reach out to her PCP for further suggestions.     She stated an understanding and was appreciative of the call back.

## 2025-02-28 NOTE — TELEPHONE ENCOUNTER
Spoke with pt. Pt declined . Reviewed results and recommendations. Pt would like Mediterranean diet recommendations emailed to her at [email or web address]. she verbalized understanding of all information. All questions and concerns addressed to her satisfacton

## 2025-03-03 ENCOUNTER — OFFICE VISIT (OUTPATIENT)
Dept: INTERNAL MEDICINE CLINIC | Facility: CLINIC | Age: 70
End: 2025-03-03

## 2025-03-03 VITALS
OXYGEN SATURATION: 96 % | WEIGHT: 248.4 LBS | TEMPERATURE: 97.2 F | HEART RATE: 65 BPM | HEIGHT: 65 IN | RESPIRATION RATE: 14 BRPM | BODY MASS INDEX: 41.38 KG/M2 | SYSTOLIC BLOOD PRESSURE: 118 MMHG | DIASTOLIC BLOOD PRESSURE: 77 MMHG

## 2025-03-03 DIAGNOSIS — J30.1 SEASONAL ALLERGIC RHINITIS DUE TO POLLEN: ICD-10-CM

## 2025-03-03 DIAGNOSIS — E06.3 HYPOTHYROIDISM DUE TO HASHIMOTO THYROIDITIS: ICD-10-CM

## 2025-03-03 DIAGNOSIS — R10.11 RUQ PAIN: Primary | ICD-10-CM

## 2025-03-03 PROBLEM — N70.11 HYDROSALPINX: Status: RESOLVED | Noted: 2024-08-22 | Resolved: 2025-03-03

## 2025-03-03 PROBLEM — M54.42 ACUTE BACK PAIN WITH SCIATICA, LEFT: Status: RESOLVED | Noted: 2023-09-18 | Resolved: 2025-03-03

## 2025-03-03 PROCEDURE — G2211 COMPLEX E/M VISIT ADD ON: HCPCS

## 2025-03-03 PROCEDURE — 99213 OFFICE O/P EST LOW 20 MIN: CPT

## 2025-03-03 RX ORDER — FLUTICASONE PROPIONATE 50 MCG
1 SPRAY, SUSPENSION (ML) NASAL DAILY
Qty: 9.9 ML | Refills: 3 | Status: SHIPPED | OUTPATIENT
Start: 2025-03-03 | End: 2025-03-11 | Stop reason: ALTCHOICE

## 2025-03-03 NOTE — ASSESSMENT & PLAN NOTE
Refill  Orders:    fluticasone (FLONASE) 50 mcg/act nasal spray; 1 spray into each nostril daily

## 2025-03-03 NOTE — ASSESSMENT & PLAN NOTE
Due for recheck  Continue current levothyroxine dosing  Orders:    TSH, 3rd generation with Free T4 reflex; Future

## 2025-03-03 NOTE — PROGRESS NOTES
Name: Blane Marti      : 1955      MRN: [id number]  Encounter Provider: Obey Rodriguez MD  Encounter Date: 3/3/2025   Encounter department: [facility] [place]  :  Assessment & Plan  RUQ pain  Underlying MASH with recent elastography demonstrating disease progression form F3 to F4/cirrhosis, noted distant hx of cholecystectomy  RUQ pain since 2 weeks ago, not associated with food intake or BM, no travel, dysuria, fevers  Associated with occasional dark stools, decreased po intake, generalized pruritus  18 lbs weight loss since September (about 6 months)  On physical exam, she has moderate-severe TTP over the RUQ and flank area including positive CVA tenderness  Will perform complete abdominal ultrasound and re-evaluate in 1 week, advised to proceed to ED if symptoms worsen  Hep B/C negative in , had liver biopsy in  at which time steatosis was < 5%  Needs Hep B booster will defer until current episode of abdominal pain resolves  CT in July was negative for renal stones, and so this is unlikely to be acute nephrolithiasis  Suspect pain may be due to cirrhosis progression vs biliary sludge/stone  Orders:    Hepatic function panel; Future    CBC and differential; Future    US abdomen complete; Future    Seasonal allergic rhinitis due to pollen  Refill  Orders:    fluticasone (FLONASE) 50 mcg/act nasal spray; 1 spray into each nostril daily    Hypothyroidism due to Hashimoto thyroiditis  Due for recheck  Continue current levothyroxine dosing  Orders:    TSH, 3rd generation with Free T4 reflex; Future           History of Present Illness   69 year old female with hx of HTN, hypothyroidism, DM, migraines, MASH, chronic back pain, who presents to  for follow up.     Has hx of chronic radicular type pain in the right thoracic region which has been treated conservatively in the past with lidocaine, tylenol. Gabapentin was tried but it did not help her. Currently she states  "that this pain is not present but does come and go. She is using lidocaine even when the pain is not present so that the pain stays away.     She endorses acute onset RUQ pain beginning about 2 weeks ago which is constant and achy but varies in intensity. Last night was severe and she states she almost went to the ER. She is taking tylenol for the pain and it helps a little bit. She has had no recent travel. Pain is not associated with oral intake or BM. She has no associated n/v/d, fever, chills. She has associated dark stools. Reports cholecystectomy about 10 years ago. Recently had an elastography scan which demonstrated progression of hepatic steatosis to cirrhosis.      Hx of diabetes, A1c has been prediabetic range for many years. A1c done in October was 6.3. She is not on any medication for this.      Hx of HTN, she is taking lisinopril 40 mg daily. BP today is 118/77.       Review of Systems   Constitutional:  Negative for chills and fever.   HENT:  Negative for ear pain and sore throat.    Eyes:  Negative for pain and visual disturbance.   Respiratory:  Negative for cough and shortness of breath.    Cardiovascular:  Negative for chest pain and palpitations.   Gastrointestinal:  Positive for abdominal pain. Negative for vomiting.        Dark stools   Genitourinary:  Negative for dysuria and hematuria.   Musculoskeletal:  Negative for arthralgias and back pain.   Skin:  Negative for color change and rash.   Neurological:  Negative for seizures and syncope.   All other systems reviewed and are negative.      Objective   /77 (BP Location: Right arm, Patient Position: Sitting, Cuff Size: Large)   Pulse 65   Temp (!) 97.2 °F (36.2 °C) (Temporal)   Resp 14   Ht 5' 5\" (1.651 m)   Wt 113 kg (248 lb 6.4 oz)   LMP 01/01/2003   SpO2 96%   BMI 41.34 kg/m²      Physical Exam  Vitals and nursing note reviewed.   Constitutional:       General: She is not in acute distress.     Appearance: She is " well-developed.   HENT:      Head: Normocephalic and atraumatic.   Eyes:      General: No scleral icterus.     Conjunctiva/sclera: Conjunctivae normal.   Cardiovascular:      Rate and Rhythm: Normal rate and regular rhythm.      Heart sounds: No murmur heard.  Pulmonary:      Effort: Pulmonary effort is normal. No respiratory distress.      Breath sounds: Normal breath sounds.   Abdominal:      Palpations: Abdomen is soft.      Tenderness: There is abdominal tenderness. There is right CVA tenderness.      Comments: Severe RUQ TTP and axillary TTP with voluntary guarding.    Musculoskeletal:         General: No swelling.      Cervical back: Neck supple.      Right lower leg: No edema.      Left lower leg: No edema.   Skin:     General: Skin is warm and dry.      Capillary Refill: Capillary refill takes less than 2 seconds.      Comments: Non jaundiced  No spider angioma   Neurological:      Mental Status: She is alert and oriented to person, place, and time.   Psychiatric:         Mood and Affect: Mood normal.

## 2025-03-05 ENCOUNTER — APPOINTMENT (OUTPATIENT)
Dept: LAB | Facility: CLINIC | Age: 70
End: 2025-03-05
Payer: MEDICARE

## 2025-03-05 DIAGNOSIS — E06.3 HYPOTHYROIDISM DUE TO HASHIMOTO THYROIDITIS: ICD-10-CM

## 2025-03-05 DIAGNOSIS — R10.11 RUQ PAIN: ICD-10-CM

## 2025-03-05 LAB
ALBUMIN SERPL BCG-MCNC: 4.2 G/DL (ref 3.5–5)
ALP SERPL-CCNC: 65 U/L (ref 34–104)
ALT SERPL W P-5'-P-CCNC: 18 U/L (ref 7–52)
AST SERPL W P-5'-P-CCNC: 24 U/L (ref 13–39)
BASOPHILS # BLD AUTO: 0.03 THOUSANDS/ÂΜL (ref 0–0.1)
BASOPHILS NFR BLD AUTO: 0 % (ref 0–1)
BILIRUB DIRECT SERPL-MCNC: 0.09 MG/DL (ref 0–0.2)
BILIRUB SERPL-MCNC: 0.75 MG/DL (ref 0.2–1)
EOSINOPHIL # BLD AUTO: 0.05 THOUSAND/ÂΜL (ref 0–0.61)
EOSINOPHIL NFR BLD AUTO: 1 % (ref 0–6)
ERYTHROCYTE [DISTWIDTH] IN BLOOD BY AUTOMATED COUNT: 13.7 % (ref 11.6–15.1)
HCT VFR BLD AUTO: 42.3 % (ref 34.8–46.1)
HGB BLD-MCNC: 13.4 G/DL (ref 11.5–15.4)
IMM GRANULOCYTES # BLD AUTO: 0.03 THOUSAND/UL (ref 0–0.2)
IMM GRANULOCYTES NFR BLD AUTO: 0 % (ref 0–2)
LYMPHOCYTES # BLD AUTO: 2.33 THOUSANDS/ÂΜL (ref 0.6–4.47)
LYMPHOCYTES NFR BLD AUTO: 32 % (ref 14–44)
MCH RBC QN AUTO: 28 PG (ref 26.8–34.3)
MCHC RBC AUTO-ENTMCNC: 31.7 G/DL (ref 31.4–37.4)
MCV RBC AUTO: 89 FL (ref 82–98)
MONOCYTES # BLD AUTO: 0.69 THOUSAND/ÂΜL (ref 0.17–1.22)
MONOCYTES NFR BLD AUTO: 9 % (ref 4–12)
NEUTROPHILS # BLD AUTO: 4.26 THOUSANDS/ÂΜL (ref 1.85–7.62)
NEUTS SEG NFR BLD AUTO: 58 % (ref 43–75)
NRBC BLD AUTO-RTO: 0 /100 WBCS
PLATELET # BLD AUTO: 274 THOUSANDS/UL (ref 149–390)
PMV BLD AUTO: 11.8 FL (ref 8.9–12.7)
PROT SERPL-MCNC: 7.7 G/DL (ref 6.4–8.4)
RBC # BLD AUTO: 4.78 MILLION/UL (ref 3.81–5.12)
T4 FREE SERPL-MCNC: 1.03 NG/DL (ref 0.61–1.12)
TSH SERPL DL<=0.05 MIU/L-ACNC: 0.21 UIU/ML (ref 0.45–4.5)
WBC # BLD AUTO: 7.39 THOUSAND/UL (ref 4.31–10.16)

## 2025-03-05 PROCEDURE — 85025 COMPLETE CBC W/AUTO DIFF WBC: CPT

## 2025-03-05 PROCEDURE — 80076 HEPATIC FUNCTION PANEL: CPT

## 2025-03-05 PROCEDURE — 84439 ASSAY OF FREE THYROXINE: CPT

## 2025-03-05 PROCEDURE — 84443 ASSAY THYROID STIM HORMONE: CPT

## 2025-03-05 PROCEDURE — 36415 COLL VENOUS BLD VENIPUNCTURE: CPT

## 2025-03-06 ENCOUNTER — HOSPITAL ENCOUNTER (OUTPATIENT)
Dept: RADIOLOGY | Facility: HOSPITAL | Age: 70
Discharge: HOME | End: 2025-03-06
Payer: MEDICARE

## 2025-03-06 DIAGNOSIS — R10.11 RUQ PAIN: ICD-10-CM

## 2025-03-06 PROCEDURE — 76705 ECHO EXAM OF ABDOMEN: CPT

## 2025-03-10 DIAGNOSIS — E03.8 OTHER SPECIFIED HYPOTHYROIDISM: ICD-10-CM

## 2025-03-10 RX ORDER — LEVOTHYROXINE SODIUM 100 UG/1
TABLET ORAL
Qty: 24 TABLET | Refills: 2 | Status: SHIPPED | OUTPATIENT
Start: 2025-03-10

## 2025-03-11 ENCOUNTER — OFFICE VISIT (OUTPATIENT)
Dept: INTERNAL MEDICINE CLINIC | Facility: CLINIC | Age: 70
End: 2025-03-11

## 2025-03-11 VITALS
DIASTOLIC BLOOD PRESSURE: 67 MMHG | WEIGHT: 243.6 LBS | SYSTOLIC BLOOD PRESSURE: 97 MMHG | OXYGEN SATURATION: 98 % | TEMPERATURE: 98 F | HEART RATE: 69 BPM | BODY MASS INDEX: 40.54 KG/M2

## 2025-03-11 DIAGNOSIS — J30.1 SEASONAL ALLERGIC RHINITIS DUE TO POLLEN: ICD-10-CM

## 2025-03-11 DIAGNOSIS — G47.33 OBSTRUCTIVE SLEEP APNEA: ICD-10-CM

## 2025-03-11 DIAGNOSIS — R45.86 MOOD CHANGE: ICD-10-CM

## 2025-03-11 DIAGNOSIS — R10.11 RUQ ABDOMINAL PAIN: Primary | ICD-10-CM

## 2025-03-11 PROCEDURE — G2211 COMPLEX E/M VISIT ADD ON: HCPCS | Performed by: STUDENT IN AN ORGANIZED HEALTH CARE EDUCATION/TRAINING PROGRAM

## 2025-03-11 PROCEDURE — 99213 OFFICE O/P EST LOW 20 MIN: CPT | Performed by: STUDENT IN AN ORGANIZED HEALTH CARE EDUCATION/TRAINING PROGRAM

## 2025-03-11 RX ORDER — CETIRIZINE HYDROCHLORIDE 10 MG/1
10 TABLET ORAL DAILY
Qty: 30 TABLET | Refills: 2 | Status: SHIPPED | OUTPATIENT
Start: 2025-03-11 | End: 2025-06-09

## 2025-03-11 RX ORDER — DICYCLOMINE HYDROCHLORIDE 10 MG/1
10 CAPSULE ORAL 4 TIMES DAILY PRN
Qty: 120 CAPSULE | Refills: 2 | Status: SHIPPED | OUTPATIENT
Start: 2025-03-11 | End: 2025-06-09

## 2025-03-11 NOTE — PROGRESS NOTES
[facility]  INTERNAL MEDICINE OFFICE VISIT     PATIENT INFORMATION     Blane Marti   69 y.o. female   MRN: [id number]    ASSESSMENT/PLAN     Diagnoses and all orders for this visit:    RUQ abdominal pain  -     dicyclomine (BENTYL) 10 mg capsule; Take 1 capsule (10 mg total) by mouth 4 (four) times a day as needed (Abdominal pain)  -     Basic metabolic panel; Future    Seasonal allergic rhinitis due to pollen  -     cetirizine (ZyrTEC) 10 mg tablet; Take 1 tablet (10 mg total) by mouth daily  -     sodium chloride (OCEAN) 0.65 % nasal spray; 1 spray into each nostril as needed for congestion    Obstructive sleep apnea    Mood change      RUQ Abdominal Pain  -Patient presenting as a 1 week F/U for RUQ abdominal pain.  -In the background of F4 fibrosis or cirrhosis.   -Patient notes initially pain was very severe, now describes as dull, constant.  -RUQ completed, awaiting final read.  -Labwork from previous visit largely unremarkable aside from TSH of 0.2.  -Patient is using tylenol for pain with some improvement.    Plan:  -At this time, unclear etiology of pain, suspect relation to patient's underlying fibrosis/cirrhosis.  -Has used tylenol with some benefit, will trial Bentyl for symptomatic improvement.  -Await read of RUQ US.  -Patient has follow up scheduled with Hepatology on 3/27, encouraged patient to follow up for discussion of next steps.  -Will order BMP to be completed prior to next appointment.   -Follow up in 3 weeks.    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  -Not willing to answer depression screening.  -Resistant to the idea of taking medications for depression.    Plan:  -Would continue to revisit patient's mood at future appointments.  -Follow up in 3 weeks.    Allergic Rhinitis  -Patient notes history of chronic  congestion, sniffling on exam today.  -Notes she is using Flonase daily and Claritin without improvement.  -Notes seasonal allergies.    Plan:  -At this time suspect patient's current medications are providing minimal benefit.  -Will switch to Zyrtec 10 mg daily and Ocean nasal spray.  -Follow up in 3 weeks.    AARON  -Patient has a past history of AARON but not using CPAP.  -Per patient, she is scared of using the mask.  -Not interested in referral at this visit but would consider sleep medicine referral in the future.    Schedule a follow-up appointment in 3 weeks .    HEALTH MAINTENANCE     Immunization History   Administered Date(s) Administered    INFLUENZA 11/12/2018    Influenza Quadrivalent, 6-35 Months IM 09/29/2017    Influenza Split High Dose Preservative Free IM 10/15/2024    Influenza, high dose seasonal 0.7 mL 11/15/2021, 11/22/2022, 10/23/2023    Influenza, recombinant, quadrivalent,injectable, preservative free 11/12/2018, 10/21/2019, 10/30/2020    Influenza, seasonal, injectable 10/10/2013, 12/03/2014, 10/22/2015    Pneumococcal Conjugate Vaccine 20-valent (Pcv20), Polysace 03/06/2023    Pneumococcal Polysaccharide PPV23 11/12/2018    Respiratory Syncytial Virus Vaccine (Recombinant) 10/15/2024    Tdap 10/30/2020, 05/01/2021    Zoster Vaccine Recombinant 04/02/2024, 07/17/2024     CHIEF COMPLAINT     Chief Complaint   Patient presents with    Follow-up     RUQ pain has improved and test results        HISTORY OF PRESENT ILLNESS     Patient is a 70 y/o female with history HTN, migraines, GERD, AARON, T2DM who presents today for follow up for abdominal pain. She has numerous symptoms at this visit. She notes that the abdominal pain began 2.5 weeks ago. Notes the first 3 days were horrible - she had excruciating pain at that time. Since then the pain has improved, but it is still constant, varying in intensity throughout the day. Describes pain as dull. Localized to RUQ. She is using tylenol, which helps a  "little bit.     Patient also has numerous other symptoms. Notes chronic sinus congestion. Uses flonase daily without improvement. Notes episodes of gasping for air - has been diagnosed with AARON but does not use CPAP. Notes generalized fatigue. Notes an episode where she was \"confusing about doing math\" several months ago. She is eating healthy - avoiding red meats, lots of fish, vegetables. She gets a bit of exercise daily. Patient notes she takes care of her disabled brother, and her father past away in December. Tearing but not willing to answer depression screening. Notes complaints of itching \"all over.\" Notes she was started on Gabapentin but is unsure of dose.    REVIEW OF SYSTEMS     Review of Systems   Constitutional:  Positive for fatigue. Negative for chills and fever.   HENT:  Positive for postnasal drip and rhinorrhea. Negative for ear pain and sore throat.    Eyes:  Negative for pain and visual disturbance.   Respiratory:  Negative for cough and shortness of breath.    Cardiovascular:  Negative for chest pain and palpitations.   Gastrointestinal:  Positive for abdominal pain and nausea. Negative for constipation, diarrhea and vomiting.   Genitourinary:  Negative for dysuria and hematuria.   Musculoskeletal:  Negative for arthralgias and back pain.   Skin:  Negative for color change and rash.        Itching   Neurological:  Negative for seizures and syncope.   Psychiatric/Behavioral:  The patient is not nervous/anxious.         Memory difficulties   All other systems reviewed and are negative.    OBJECTIVE     Vitals:    03/11/25 1419   BP: 97/67   BP Location: Left arm   Patient Position: Sitting   Cuff Size: Large   Pulse: 69   Temp: 98 °F (36.7 °C)   TempSrc: Temporal   SpO2: 98%   Weight: 110 kg (243 lb 9.6 oz)     Physical Exam  Vitals and nursing note reviewed.   Constitutional:       General: She is not in acute distress.     Appearance: She is well-developed.   HENT:      Head: Normocephalic and " atraumatic.      Right Ear: External ear normal.      Left Ear: External ear normal.      Nose: Nose normal.      Mouth/Throat:      Mouth: Mucous membranes are moist.   Eyes:      Conjunctiva/sclera: Conjunctivae normal.   Cardiovascular:      Rate and Rhythm: Normal rate and regular rhythm.      Heart sounds: No murmur heard.  Pulmonary:      Effort: Pulmonary effort is normal. No respiratory distress.      Breath sounds: Normal breath sounds.   Abdominal:      Palpations: Abdomen is soft.      Tenderness: There is abdominal tenderness.      Comments: Mild RUQ tenderness.   Musculoskeletal:         General: No swelling.      Cervical back: Neck supple.   Skin:     General: Skin is warm and dry.      Capillary Refill: Capillary refill takes less than 2 seconds.   Neurological:      General: No focal deficit present.      Mental Status: She is alert and oriented to person, place, and time.   Psychiatric:         Mood and Affect: Mood normal.       CURRENT MEDICATIONS     Current Outpatient Medications:     cetirizine (ZyrTEC) 10 mg tablet, Take 1 tablet (10 mg total) by mouth daily, Disp: 30 tablet, Rfl: 2    dicyclomine (BENTYL) 10 mg capsule, Take 1 capsule (10 mg total) by mouth 4 (four) times a day as needed (Abdominal pain), Disp: 120 capsule, Rfl: 2    sodium chloride (OCEAN) 0.65 % nasal spray, 1 spray into each nostril as needed for congestion, Disp: 30 mL, Rfl: 1    acetaminophen (Tylenol 8 Hour Arthritis Pain) 650 mg CR tablet, Take 1 tablet (650 mg total) by mouth every 8 (eight) hours as needed for mild pain, Disp: 90 tablet, Rfl: 3    Aspirin Low Dose 81 MG EC tablet, TAKE ONE TABLET BY MOUTH DAILY, Disp: 90 tablet, Rfl: 5    atorvastatin (LIPITOR) 20 mg tablet, TAKE 1 TABLET BY MOUTH DAILY, Disp: 90 tablet, Rfl: 2    ciclopirox (PENLAC) 8 % solution, Apply topically daily at bedtime Apply topically daily.  On the seventh day of application remove with rubbing alcohol or nail polish remover.  Repeat  7-day cycle, Disp: 6 mL, Rfl: 2    clotrimazole (LOTRIMIN) 1 % cream, Apply topically 2 (two) times a day, Disp: 30 g, Rfl: 0    Diclofenac Sodium (VOLTAREN) 1 %, Apply 2 g topically 4 (four) times a day, Disp: 100 g, Rfl: 2    levothyroxine 100 mcg tablet, TAKE ONE TABLET BY MOUTH TWO TIMES A WEEK, Disp: 24 tablet, Rfl: 2    levothyroxine 88 mcg tablet, TAKE ONE TABLET BY MOUTH ONCE DAILY FROM MONDAY TO FRIDAY OF EACH WEEK, Disp: 90 tablet, Rfl: 2    lidocaine (Lidoderm) 5 %, Apply 1 patch topically over 12 hours daily Remove & Discard patch within 12 hours or as directed by MD (Patient not taking: Reported on 1/13/2025), Disp: 15 patch, Rfl: 0    lidocaine (Lidoderm) 5 %, Apply 1 patch topically over 12 hours daily Remove & Discard patch within 12 hours or as directed by MD. Apply to back, Disp: 30 patch, Rfl: 1    lisinopril (ZESTRIL) 40 mg tablet, Take 1 tablet (40 mg total) by mouth daily, Disp: 30 tablet, Rfl: 3    tolnaftate (TINACTIN) 1 % external solution, Apply topically daily at bedtime, Disp: 10 mL, Rfl: 3    PAST MEDICAL & SURGICAL HISTORY     Past Medical History:   Diagnosis Date    Arthritis     Depression     Difficult intubation 01/08/2021 1/8/2021 1st attempt: Mac3, Grade 4 view. 2nd attempt: Mac 3, Grade 4 view, unable to pass bougie, 3rd attempt: Marcio Mack:, Grade 4 view, unable to pass bougie, 4th attempt:(by attending): Mac3, Grade 4 view unable to pass bougie, 5th attempt(by attending): Glidescope 3, Grade 3 view, ETT placed successfully with stylet. LMA used successfully to ventilate in between attempts    Disease of thyroid gland     hypo    Edema     LAST ASSESSED: 10JAN2014    GERD (gastroesophageal reflux disease)     Hypercholesterolemia     Hyperlipidemia     Hypertension     WELL CONTROLLED. CURRENTLY ON LISINOPRIL. LAST ASSESSED: [id number]    Liver disease     Orthostatic hypotension 02/25/2020    Other headache syndrome     Other muscle spasm     LAST ASSESSED: 10JAN2014     "Ovarian cyst 2006    Renal calculi      (spontaneous vaginal delivery) 1975    FEMALE     Past Surgical History:   Procedure Laterality Date    BACK SURGERY      HERNIATED DISC (L4/L5)    CHOLECYSTECTOMY      IR BIOPSY TRANSJUGULAR LIVER  11/10/2023    IR CEREBRAL ANGIOGRAPHY  2020    IR CEREBRAL ANGIOGRAPHY  2021    IR CEREBRAL ANGIOGRAPHY / INTERVENTION  2021    TONSILLECTOMY       SOCIAL & FAMILY HISTORY     Social History     Socioeconomic History    Marital status:      Spouse name: Not on file    Number of children: Not on file    Years of education: Not on file    Highest education level: Not on file   Occupational History    Occupation: RETIRED   Tobacco Use    Smoking status: Former    Smokeless tobacco: Never    Tobacco comments:     pt \"quit about 20 years ago\"   Vaping Use    Vaping status: Never Used   Substance and Sexual Activity    Alcohol use: Never    Drug use: No    Sexual activity: Not Currently   Other Topics Concern    Not on file   Social History Narrative    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         AS PER [employer]    EXERCISES REGULARLY    LIVES WITH FAMILY    NO CAFFEINE USE    NO LIVING WILL    NO TOBACCO/SMOKE EXPOSURE    UNEMPLOYED     Social Drivers of Health     Financial Resource Strain: Low Risk  (2025)    Overall Financial Resource Strain (CARDIA)     Difficulty of Paying Living Expenses: Not hard at all   Food Insecurity: No Food Insecurity (2025)    Hunger Vital Sign     Worried About Running Out of Food in the Last Year: Never true     Ran Out of Food in the Last Year: Never true   Transportation Needs: No Transportation Needs (2025)    PRAPARE - Transportation     Lack of Transportation (Medical): No     Lack of Transportation (Non-Medical): No   Physical Activity: Inactive (2021)    Exercise Vital Sign     Days of Exercise per Week: 0 days     Minutes of Exercise per " "Session: 0 min   Stress: No Stress Concern Present (9/16/2021)    [employer] - Occupational Stress Questionnaire     Feeling of Stress : Not at all   Social Connections: Socially Isolated (9/16/2021)    Social Connection and Isolation Panel [NHANES]     Frequency of Communication with Friends and Family: More than three times a week     Frequency of Social Gatherings with Friends and Family: Three times a week     Attends [employer] Services: Never     Active Member of Clubs or Organizations: No     Attends Club or Organization Meetings: Never     Marital Status:    Intimate Partner Violence: Not At Risk (9/16/2021)    Humiliation, Afraid, Rape, and Kick questionnaire     Fear of Current or Ex-Partner: No     Emotionally Abused: No     Physically Abused: No     Sexually Abused: No   Housing Stability: Low Risk  (1/13/2025)    Housing Stability Vital Sign     Unable to Pay for Housing in the Last Year: No     Number of Times Moved in the Last Year: 1     Homeless in the Last Year: No     Social History     Substance and Sexual Activity   Alcohol Use Never       Social History     Substance and Sexual Activity   Drug Use No     Social History     Tobacco Use   Smoking Status Former   Smokeless Tobacco Never   Tobacco Comments    pt \"quit about 20 years ago\"     Family History   Problem Relation Age of Onset    Lung cancer Mother     Cancer Mother         MALIGNANT NEOPLASM    Hypertension Father     Seizures Father     Stroke Father     Heart attack Father     Diabetes Brother     Hypertension Son     Lung disease Son     Hyperthyroidism Maternal Aunt     Hypothyroidism Maternal Aunt     Breast cancer Cousin     Lung cancer Cousin     Breast cancer Cousin     Neuropathy Family     Heart disease Other         CARDIAC DISORDER         BMI Counseling: Body mass index is 40.54 kg/m². The BMI is above normal. Nutrition recommendations include encouraging healthy choices of fruits and " vegetables. Exercise recommendations include exercising 3-5 times per week. Rationale for BMI follow-up plan is due to patient being overweight or obese.        ==  Omar Garcia MD PGY3  . [facility]    [place] [address], [address]  [place] [address]  Office: [phone]  Fax: [phone]

## 2025-03-11 NOTE — PATIENT INSTRUCTIONS
I have ordered a medication, Bentyl. Please use 1 capsule up to 4 times daily for abdominal pain.  I have ordered a medication, Zyrtec. Please take 1 tablet (10 mg) daily.  I have ordered saline nasal spray. Please use as needed for congestion.  Please stop taking Flonase and Claritin.  I have ordered labwork - please have it done as able.  Follow up in 3 weeks.

## 2025-03-26 ENCOUNTER — RA CDI HCC (OUTPATIENT)
Dept: OTHER | Facility: HOSPITAL | Age: 70
End: 2025-03-26

## 2025-03-27 ENCOUNTER — OFFICE VISIT (OUTPATIENT)
Age: 70
End: 2025-03-27
Payer: MEDICARE

## 2025-03-27 VITALS
HEART RATE: 74 BPM | BODY MASS INDEX: 39.69 KG/M2 | SYSTOLIC BLOOD PRESSURE: 116 MMHG | DIASTOLIC BLOOD PRESSURE: 64 MMHG | WEIGHT: 238.2 LBS | HEIGHT: 65 IN

## 2025-03-27 DIAGNOSIS — K76.0 NAFLD (NONALCOHOLIC FATTY LIVER DISEASE): Primary | ICD-10-CM

## 2025-03-27 DIAGNOSIS — K74.02 ADVANCED HEPATIC FIBROSIS: ICD-10-CM

## 2025-03-27 DIAGNOSIS — A04.8 H. PYLORI INFECTION: ICD-10-CM

## 2025-03-27 DIAGNOSIS — Z86.0100 HISTORY OF COLON POLYPS: ICD-10-CM

## 2025-03-27 DIAGNOSIS — K76.0 METABOLIC DYSFUNCTION-ASSOCIATED STEATOTIC LIVER DISEASE (MASLD): ICD-10-CM

## 2025-03-27 PROCEDURE — G2211 COMPLEX E/M VISIT ADD ON: HCPCS | Performed by: STUDENT IN AN ORGANIZED HEALTH CARE EDUCATION/TRAINING PROGRAM

## 2025-03-27 PROCEDURE — 99214 OFFICE O/P EST MOD 30 MIN: CPT | Performed by: STUDENT IN AN ORGANIZED HEALTH CARE EDUCATION/TRAINING PROGRAM

## 2025-03-27 NOTE — PROGRESS NOTES
Name: Blane Marti      : 1955      MRN: [id number]  Encounter Provider: Jairo Pollard MD  Encounter Date: 3/27/2025   Encounter department: [facility] GA[facility]OLOG[facility] [address] [address]  :  Assessment & Plan  NAFLD (nonalcoholic fatty liver disease)  MELD 3.0: 8 at 10/11/2024  3:23 PM  MELD-Na: 7 at 10/11/2024  3:23 PM  Calculated from:  Serum Creatinine: 0.78 mg/dL (Using min of 1 mg/dL) at 10/11/2024  3:23 PM  Serum Sodium: 138 mmol/L (Using max of 137 mmol/L) at 10/11/2024  3:23 PM  Total Bilirubin: 0.69 mg/dL (Using min of 1 mg/dL) at 10/11/2024  3:23 PM  Serum Albumin: 4.3 g/dL (Using max of 3.5 g/dL) at 10/11/2024  3:23 PM  INR(ratio): 1.06 at 10/11/2024  3:23 PM  Age at listing (hypothetical): 69 years  Sex: Female at 10/11/2024  3:23 PM   - her recent US and USE are discordant, although the steatosis may be artificially elevating liver stiffness measurements. NFS indicating F3-F4, however FIB-4 suggests absence of advanced fibrosis. She is not clinically cirrhotic.   - further fibrosis assessment would be MRI-E vs TJLB vs ELF. She won't be able to tolerate MRI and at this point TJLB is not quite indicated. We will proceed with an ELF   - continued weight loss w/ diet and exercise. I discussed with her again about seeing weight management and dietician to help with her weight loss journey. She is amenable to seeing them now. She may also benefit from a GLP-1 RA in the future for sustained weight loss.   - repeat MELD parameters   - needs HBV booster, which can be obtained at her PCP's office or local pharmacy   - 6mo f/u    Orders:    Protime-INR; Future    CBC and differential; Future    Comprehensive metabolic panel; Future    Ambulatory Referral to Weight Management; Future    Ambulatory Referral to Nutrition Services; Future    Enhanced Liver Fibrosis (ELF) Score; Future    History of colon polyps   - 2023 colon 3x TAs, recall in 2026       H. pylori infection   - confirmed on EGD  "09/21/23. She is s/p quad therapy. Pending eradication testing stool Ag.  Orders:    H. pylori antigen, stool; Future      History of Present Illness   HPI  Blane Marti is a 70 y.o. female w/ a PMH of HTN, HLD, hypothyroidism, obesity who presents for 9mo f/u.     Last seen 06/18/24 regarding MASLD, CRC screening, Hpy treatment    9/18/23 US elastography: F3, S3.  09/21/23 EGD/colon:   - normal. Celiac Bx neg. Hpy Bx positive, given quad therapy   - 6 polyps (3x TAs), L-sided tics. Recall 3y  11/10/23 TJLB: resolving active hepatitis, no significant fibrosis. HVPG 6mmHg  05/2024 LFTs wnl  01/24/25 USE F4 S3  03/06/25 RUQ US: steatosis, no nodular contour    She did not get Hpy stool testing to confirm eradication. She is not on PPI currently.    She has occasional RUQ stabbing pain that comes and comes. No association with meals or BMs. She had a few days of melena 3 weeks ago with her pain. No NSAIDs.    She is tearful and distraught about her recently USE results. She is claustrophobic and unlikely will tolerate an MRI elastography.    She is 238lb today. 243lb 06/2024    Review of Systems   Constitutional:  Negative for appetite change, chills, fatigue and fever.   Eyes:  Negative for photophobia.   Respiratory:  Negative for cough and shortness of breath.    Cardiovascular:  Negative for chest pain.   Gastrointestinal:  Negative for abdominal pain, constipation, diarrhea, nausea and vomiting.   Endocrine: Negative.    Genitourinary:  Negative for dysuria and frequency.   Musculoskeletal:  Negative for myalgias.   Skin:  Negative for pallor.   Neurological:  Negative for weakness.   Hematological: Negative.    Psychiatric/Behavioral: Negative.            Objective   /64 (BP Location: Right arm, Patient Position: Sitting, Cuff Size: Extra-Large)   Pulse 74   Ht 5' 5\" (1.651 m)   Wt 108 kg (238 lb 3.2 oz)   LMP 01/01/2003   BMI 39.64 kg/m²      Physical Exam  Constitutional:       General: She is " not in acute distress.     Appearance: Normal appearance.   HENT:      Head: Normocephalic and atraumatic.      Nose: Nose normal.      Mouth/Throat:      Mouth: Mucous membranes are moist.   Eyes:      General: No scleral icterus.     Extraocular Movements: Extraocular movements intact.      Conjunctiva/sclera: Conjunctivae normal.      Pupils: Pupils are equal, round, and reactive to light.   Cardiovascular:      Rate and Rhythm: Normal rate and regular rhythm.   Pulmonary:      Effort: Pulmonary effort is normal.   Abdominal:      General: Abdomen is flat. There is no distension.      Palpations: There is no mass.      Tenderness: There is no abdominal tenderness.   Musculoskeletal:         General: No swelling. Normal range of motion.   Skin:     General: Skin is warm and dry.      Capillary Refill: Capillary refill takes less than 2 seconds.   Neurological:      General: No focal deficit present.      Mental Status: She is alert.   Psychiatric:         Mood and Affect: Mood normal.

## 2025-03-28 DIAGNOSIS — I10 ESSENTIAL HYPERTENSION: ICD-10-CM

## 2025-03-28 RX ORDER — LISINOPRIL 40 MG/1
40 TABLET ORAL DAILY
Qty: 30 TABLET | Refills: 3 | Status: SHIPPED | OUTPATIENT
Start: 2025-03-28

## 2025-03-28 NOTE — TELEPHONE ENCOUNTER
Received call from patient requesting a refill of her Lisinopril     Script sent to pharmacy for refill.

## 2025-04-01 ENCOUNTER — OFFICE VISIT (OUTPATIENT)
Dept: INTERNAL MEDICINE CLINIC | Facility: CLINIC | Age: 70
End: 2025-04-01

## 2025-04-01 VITALS
WEIGHT: 242.6 LBS | BODY MASS INDEX: 40.37 KG/M2 | TEMPERATURE: 97.6 F | OXYGEN SATURATION: 100 % | HEART RATE: 61 BPM | DIASTOLIC BLOOD PRESSURE: 68 MMHG | SYSTOLIC BLOOD PRESSURE: 116 MMHG

## 2025-04-01 DIAGNOSIS — M25.562 CHRONIC PAIN OF LEFT KNEE: ICD-10-CM

## 2025-04-01 DIAGNOSIS — F43.21 GRIEF: Primary | ICD-10-CM

## 2025-04-01 DIAGNOSIS — M50.30 DEGENERATIVE CERVICAL DISC: ICD-10-CM

## 2025-04-01 DIAGNOSIS — I10 ESSENTIAL HYPERTENSION: ICD-10-CM

## 2025-04-01 DIAGNOSIS — J30.1 SEASONAL ALLERGIC RHINITIS DUE TO POLLEN: ICD-10-CM

## 2025-04-01 DIAGNOSIS — G89.29 CHRONIC PAIN OF LEFT KNEE: ICD-10-CM

## 2025-04-01 DIAGNOSIS — E03.8 OTHER SPECIFIED HYPOTHYROIDISM: ICD-10-CM

## 2025-04-01 DIAGNOSIS — E11.9 TYPE 2 DIABETES MELLITUS WITHOUT COMPLICATION, WITHOUT LONG-TERM CURRENT USE OF INSULIN (HCC): ICD-10-CM

## 2025-04-01 PROCEDURE — 99213 OFFICE O/P EST LOW 20 MIN: CPT

## 2025-04-01 PROCEDURE — G2211 COMPLEX E/M VISIT ADD ON: HCPCS

## 2025-04-01 RX ORDER — LEVOTHYROXINE SODIUM 88 UG/1
TABLET ORAL
Qty: 90 TABLET | Refills: 2 | Status: SHIPPED | OUTPATIENT
Start: 2025-04-01

## 2025-04-01 RX ORDER — CETIRIZINE HYDROCHLORIDE 10 MG/1
10 TABLET ORAL DAILY
Qty: 30 TABLET | Refills: 2 | Status: SHIPPED | OUTPATIENT
Start: 2025-04-01 | End: 2025-06-30

## 2025-04-01 RX ORDER — SENNOSIDES 8.6 MG
650 CAPSULE ORAL EVERY 8 HOURS PRN
Qty: 90 TABLET | Refills: 3 | Status: SHIPPED | OUTPATIENT
Start: 2025-04-01

## 2025-04-01 NOTE — PROGRESS NOTES
Name: Blane Marti      : 1955      MRN: [id number]  Encounter Provider: Britta Robles DO  Encounter Date: 2025   Encounter department: [facility] [place]  :  Assessment & Plan  Grief  Patient presented for follow-up visit for mood change.  There was concern that the patient was depressed given fatigue, memory issues, and being tearful on exam.  Patient has been grieving her father who passed away in December.  Patient has not met criteria for DSM-5 pathological grieving.  Patient is going through the normal process of grieving at this time.  Patient relied on her father for emotional support as she is a sole caretaker of her disabled brother.  Patient reports that she does not have a lot of family in the area and is doing a lot on her own.  Patient's father lived with her for an extended period of time.  Patient reports she still going through the process of bereavement.  Patient scored 0 on the depression screening.  She feels as though she does not have concern for depression.  Patient reports that she has had some weight loss but this was intentional as she has been modifying her diet for her diabetes as well as liver disease.  Patient reports that she has been getting back on her feet and feels like she is headed in the right direction with her health both physically and mentally.     Plan:   Patient would like to keep her upcoming appointment with Dr. Rodriguez on . We will continue to follow closely for signs of clinical depression or changes in mood.       Type 2 diabetes mellitus without complication, without long-term current use of insulin ([facility])    Lab Results   Component Value Date    HGBA1C 6.3 (H) 10/11/2024   Patient's point-of-care glucose today was 6.0.  Patient was very enthusiastic about her hemoglobin A1c decreasing as she has been making a lot of changes to her diet in the last 2 months.      Orders:    POCT hemoglobin A1c    Chronic pain of  left knee  Patient reports she never received Voltaren from the pharmacy.  Patient would like a refill on medication.  Orders:    Diclofenac Sodium (VOLTAREN) 1 %; Apply 2 g topically 4 (four) times a day    Degenerative cervical disc  Patient uses Tylenol arthritis for her degenerative joint disease.  Patient was instructed to avoid NSAIDs use due to recent history of melena in stool.  Orders:    acetaminophen (Tylenol 8 Hour Arthritis Pain) 650 mg CR tablet; Take 1 tablet (650 mg total) by mouth every 8 (eight) hours as needed for mild pain    Seasonal allergic rhinitis due to pollen  Patient in need of refills of zyrtec.  Patient reports her allergies have been worsening this past week  Orders:    cetirizine (ZyrTEC) 10 mg tablet; Take 1 tablet (10 mg total) by mouth daily    Other specified hypothyroidism    Orders:    levothyroxine 88 mcg tablet; Take 1 tablet by mouth Monday- Friday          Depression Screening and Follow-up Plan: Patient was screened for depression during today's encounter. They screened negative with a PHQ-2 score of 0.        History of Present Illness   Jose Marti is a 70-year-old female who presents for a follow-up visit.  Her brother for whom she is the caretaker accompanies her to all visits.  Patient was asked to come back for follow-up in 3 weeks as there was concern for depression during last visit.  Patient was tearful and not willing to participate in depression discussion at last visit.  Today patient reports that she is feeling well.  Patient has been actively trying to have a healthier diet and lose weight.  Patient reports that she has just been upset when she thinks about the passing of her father this December.  Today marked the 4-month anniversary of his passing.  Family reports that she is the sole caretaker currently of her brother.  Previously she had help from her father who lived with her for 8 years.  Patient reports that she does not have a lot of family  support in the area and has been struggling with the change since her father's passing.  Patient states that she feels like she is getting better on track.  Patient did not score on the depression screening.  She is tearful when she discusses her father's passing, but otherwise is in good spirits during our visit.  Patient reports that she has cut added sugars from her diet.  She has been concerned when she heard that she has some fibrosis of her liver.  She has not been eating sweets and has cut out soda.  Patient has been focusing on high-protein and fruits and vegetables.  Patient has stopped eating red meat.  Patient states that she has felt less pain since she has lost a little bit of weight and become more active around the house.  Patient reports being in need of medication refills at this visit.  Patient has an appointment scheduled with her PCP and will be following up with Dr. Rodriguez on 4/23.  Patient would like to keep her appointment with her PCP and continue to discuss the changes she is making in her diet.      Review of Systems   Constitutional:  Negative for activity change, chills, fatigue and fever.   Respiratory:  Negative for cough, chest tightness, shortness of breath and wheezing.    Cardiovascular:  Negative for chest pain and palpitations.   Gastrointestinal:  Negative for abdominal distention, blood in stool, constipation, diarrhea, nausea and vomiting.   Musculoskeletal:  Positive for arthralgias and myalgias.   Neurological:  Negative for dizziness, weakness and numbness.   Psychiatric/Behavioral:  Negative for agitation and behavioral problems. The patient is not nervous/anxious.        Objective   /68 (BP Location: Left arm, Patient Position: Sitting, Cuff Size: Large)   Pulse 61   Temp 97.6 °F (36.4 °C) (Temporal)   Wt 110 kg (242 lb 9.6 oz)   LMP 01/01/2003   SpO2 100%   BMI 40.37 kg/m²      Physical Exam  Vitals reviewed.   Constitutional:       General: She is not in  acute distress.     Appearance: Normal appearance. She is obese. She is not ill-appearing.   HENT:      Head: Normocephalic and atraumatic.      Nose: Congestion present.      Mouth/Throat:      Mouth: Mucous membranes are moist.   Cardiovascular:      Rate and Rhythm: Normal rate and regular rhythm.      Pulses: Normal pulses.      Heart sounds: Normal heart sounds.   Pulmonary:      Effort: Pulmonary effort is normal.      Breath sounds: Normal breath sounds.   Abdominal:      General: Abdomen is flat. Bowel sounds are normal.      Palpations: Abdomen is soft.   Musculoskeletal:      Right lower leg: Edema (trace) present.      Left lower leg: Edema (trace) present.   Neurological:      Mental Status: She is oriented to person, place, and time. Mental status is at baseline.   Psychiatric:         Mood and Affect: Mood normal.

## 2025-04-01 NOTE — ASSESSMENT & PLAN NOTE
Lab Results   Component Value Date    HGBA1C 6.3 (H) 10/11/2024   Patient's point-of-care glucose today was 6.0.  Patient was very enthusiastic about her hemoglobin A1c decreasing as she has been making a lot of changes to her diet in the last 2 months.      Orders:    POCT hemoglobin A1c

## 2025-04-01 NOTE — ASSESSMENT & PLAN NOTE
Patient in need of refills of zyrtec.  Patient reports her allergies have been worsening this past week  Orders:    cetirizine (ZyrTEC) 10 mg tablet; Take 1 tablet (10 mg total) by mouth daily

## 2025-04-01 NOTE — ASSESSMENT & PLAN NOTE
Patient uses Tylenol arthritis for her degenerative joint disease.  Patient was instructed to avoid NSAIDs use due to recent history of melena in stool.  Orders:    acetaminophen (Tylenol 8 Hour Arthritis Pain) 650 mg CR tablet; Take 1 tablet (650 mg total) by mouth every 8 (eight) hours as needed for mild pain

## 2025-04-03 ENCOUNTER — HOSPITAL ENCOUNTER (OUTPATIENT)
Facility: HOSPITAL | Age: 70
Setting detail: OBSERVATION
Discharge: HOME | End: 2025-04-04
Attending: EMERGENCY MEDICINE | Admitting: STUDENT IN AN ORGANIZED HEALTH CARE EDUCATION/TRAINING PROGRAM
Payer: MEDICARE

## 2025-04-03 DIAGNOSIS — M79.671 RIGHT FOOT PAIN: Primary | ICD-10-CM

## 2025-04-03 DIAGNOSIS — M79.604 RIGHT LEG PAIN: ICD-10-CM

## 2025-04-03 DIAGNOSIS — R25.2 MUSCLE CRAMPING: ICD-10-CM

## 2025-04-03 DIAGNOSIS — E53.8 B12 DEFICIENCY: ICD-10-CM

## 2025-04-03 PROCEDURE — 99285 EMERGENCY DEPT VISIT HI MDM: CPT | Performed by: EMERGENCY MEDICINE

## 2025-04-03 PROCEDURE — 96375 TX/PRO/DX INJ NEW DRUG ADDON: CPT

## 2025-04-03 PROCEDURE — 96374 THER/PROPH/DIAG INJ IV PUSH: CPT

## 2025-04-03 PROCEDURE — 99284 EMERGENCY DEPT VISIT MOD MDM: CPT

## 2025-04-03 RX ORDER — KETOROLAC TROMETHAMINE 30 MG/ML
15 INJECTION, SOLUTION INTRAMUSCULAR; INTRAVENOUS ONCE
Status: COMPLETED | OUTPATIENT
Start: 2025-04-03 | End: 2025-04-03

## 2025-04-03 RX ORDER — DIAZEPAM 10 MG/2ML
2.5 INJECTION, SOLUTION INTRAMUSCULAR; INTRAVENOUS ONCE
Status: COMPLETED | OUTPATIENT
Start: 2025-04-03 | End: 2025-04-03

## 2025-04-03 RX ORDER — METHOCARBAMOL 500 MG/1
500 TABLET, FILM COATED ORAL ONCE
Status: COMPLETED | OUTPATIENT
Start: 2025-04-03 | End: 2025-04-03

## 2025-04-03 RX ADMIN — METHOCARBAMOL 500 MG: 500 TABLET ORAL at 23:40

## 2025-04-03 RX ADMIN — KETOROLAC TROMETHAMINE 15 MG: 30 INJECTION, SOLUTION INTRAMUSCULAR; INTRAVENOUS at 23:38

## 2025-04-03 RX ADMIN — DIAZEPAM 2.5 MG: 10 INJECTION, SOLUTION INTRAMUSCULAR; INTRAVENOUS at 23:49

## 2025-04-03 NOTE — Clinical Note
Case was discussed with SOD and the patient's admission status was agreed to be Admission Status: observation status to the service of Dr. Mullins

## 2025-04-04 ENCOUNTER — APPOINTMENT (EMERGENCY)
Dept: RADIOLOGY | Facility: HOSPITAL | Age: 70
End: 2025-04-04
Payer: MEDICARE

## 2025-04-04 VITALS
RESPIRATION RATE: 19 BRPM | SYSTOLIC BLOOD PRESSURE: 111 MMHG | OXYGEN SATURATION: 97 % | HEART RATE: 54 BPM | TEMPERATURE: 97.4 F | DIASTOLIC BLOOD PRESSURE: 59 MMHG

## 2025-04-04 PROBLEM — M79.671 RIGHT FOOT PAIN: Status: ACTIVE | Noted: 2025-04-04

## 2025-04-04 PROBLEM — R42 DIZZINESS: Status: RESOLVED | Noted: 2018-08-03 | Resolved: 2025-04-04

## 2025-04-04 PROBLEM — R45.89 DEPRESSED MOOD: Status: ACTIVE | Noted: 2025-04-04

## 2025-04-04 LAB
ANION GAP SERPL CALCULATED.3IONS-SCNC: 7 MMOL/L (ref 4–13)
ANION GAP SERPL CALCULATED.3IONS-SCNC: 8 MMOL/L (ref 4–13)
BASOPHILS # BLD AUTO: 0.02 THOUSANDS/ÂΜL (ref 0–0.1)
BASOPHILS NFR BLD AUTO: 0 % (ref 0–1)
BUN SERPL-MCNC: 17 MG/DL (ref 5–25)
BUN SERPL-MCNC: 17 MG/DL (ref 5–25)
CALCIUM SERPL-MCNC: 8.3 MG/DL (ref 8.4–10.2)
CALCIUM SERPL-MCNC: 8.6 MG/DL (ref 8.4–10.2)
CHLORIDE SERPL-SCNC: 108 MMOL/L (ref 96–108)
CHLORIDE SERPL-SCNC: 108 MMOL/L (ref 96–108)
CO2 SERPL-SCNC: 24 MMOL/L (ref 21–32)
CO2 SERPL-SCNC: 25 MMOL/L (ref 21–32)
CREAT SERPL-MCNC: 0.63 MG/DL (ref 0.6–1.3)
CREAT SERPL-MCNC: 0.64 MG/DL (ref 0.6–1.3)
EOSINOPHIL # BLD AUTO: 0.13 THOUSAND/ÂΜL (ref 0–0.61)
EOSINOPHIL NFR BLD AUTO: 2 % (ref 0–6)
ERYTHROCYTE [DISTWIDTH] IN BLOOD BY AUTOMATED COUNT: 12.8 % (ref 11.6–15.1)
GFR SERPL CREATININE-BSD FRML MDRD: 91 ML/MIN/1.73SQ M
GFR SERPL CREATININE-BSD FRML MDRD: 91 ML/MIN/1.73SQ M
GLUCOSE SERPL-MCNC: 100 MG/DL (ref 65–140)
GLUCOSE SERPL-MCNC: 93 MG/DL (ref 65–140)
HCT VFR BLD AUTO: 35.3 % (ref 34.8–46.1)
HGB BLD-MCNC: 11.2 G/DL (ref 11.5–15.4)
IMM GRANULOCYTES # BLD AUTO: 0.04 THOUSAND/UL (ref 0–0.2)
IMM GRANULOCYTES NFR BLD AUTO: 1 % (ref 0–2)
LYMPHOCYTES # BLD AUTO: 2.52 THOUSANDS/ÂΜL (ref 0.6–4.47)
LYMPHOCYTES NFR BLD AUTO: 32 % (ref 14–44)
MAGNESIUM SERPL-MCNC: 1.8 MG/DL (ref 1.9–2.7)
MCH RBC QN AUTO: 28.4 PG (ref 26.8–34.3)
MCHC RBC AUTO-ENTMCNC: 31.7 G/DL (ref 31.4–37.4)
MCV RBC AUTO: 90 FL (ref 82–98)
MONOCYTES # BLD AUTO: 0.83 THOUSAND/ÂΜL (ref 0.17–1.22)
MONOCYTES NFR BLD AUTO: 11 % (ref 4–12)
NEUTROPHILS # BLD AUTO: 4.28 THOUSANDS/ÂΜL (ref 1.85–7.62)
NEUTS SEG NFR BLD AUTO: 54 % (ref 43–75)
NRBC BLD AUTO-RTO: 0 /100 WBCS
PLATELET # BLD AUTO: 211 THOUSANDS/UL (ref 149–390)
PMV BLD AUTO: 11 FL (ref 8.9–12.7)
POTASSIUM SERPL-SCNC: 3.4 MMOL/L (ref 3.5–5.3)
POTASSIUM SERPL-SCNC: 3.5 MMOL/L (ref 3.5–5.3)
RBC # BLD AUTO: 3.94 MILLION/UL (ref 3.81–5.12)
SODIUM SERPL-SCNC: 139 MMOL/L (ref 135–147)
SODIUM SERPL-SCNC: 141 MMOL/L (ref 135–147)
VIT B12 SERPL-MCNC: 270 PG/ML (ref 180–914)
WBC # BLD AUTO: 7.82 THOUSAND/UL (ref 4.31–10.16)

## 2025-04-04 PROCEDURE — 83735 ASSAY OF MAGNESIUM: CPT

## 2025-04-04 PROCEDURE — 97167 OT EVAL HIGH COMPLEX 60 MIN: CPT

## 2025-04-04 PROCEDURE — 97163 PT EVAL HIGH COMPLEX 45 MIN: CPT

## 2025-04-04 PROCEDURE — 99222 1ST HOSP IP/OBS MODERATE 55: CPT | Performed by: STUDENT IN AN ORGANIZED HEALTH CARE EDUCATION/TRAINING PROGRAM

## 2025-04-04 PROCEDURE — 85025 COMPLETE CBC W/AUTO DIFF WBC: CPT

## 2025-04-04 PROCEDURE — 82607 VITAMIN B-12: CPT | Performed by: STUDENT IN AN ORGANIZED HEALTH CARE EDUCATION/TRAINING PROGRAM

## 2025-04-04 PROCEDURE — 96374 THER/PROPH/DIAG INJ IV PUSH: CPT

## 2025-04-04 PROCEDURE — 36415 COLL VENOUS BLD VENIPUNCTURE: CPT

## 2025-04-04 PROCEDURE — NC001 PR NO CHARGE: Performed by: STUDENT IN AN ORGANIZED HEALTH CARE EDUCATION/TRAINING PROGRAM

## 2025-04-04 PROCEDURE — 80048 BASIC METABOLIC PNL TOTAL CA: CPT

## 2025-04-04 PROCEDURE — 73630 X-RAY EXAM OF FOOT: CPT

## 2025-04-04 RX ORDER — LEVOTHYROXINE SODIUM 88 UG/1
88 TABLET ORAL
Status: DISCONTINUED | OUTPATIENT
Start: 2025-04-04 | End: 2025-04-04 | Stop reason: HOSPADM

## 2025-04-04 RX ORDER — ENOXAPARIN SODIUM 100 MG/ML
40 INJECTION SUBCUTANEOUS DAILY
Status: DISCONTINUED | OUTPATIENT
Start: 2025-04-04 | End: 2025-04-04 | Stop reason: HOSPADM

## 2025-04-04 RX ORDER — ACETAMINOPHEN 325 MG/1
975 TABLET ORAL EVERY 8 HOURS PRN
Status: DISCONTINUED | OUTPATIENT
Start: 2025-04-04 | End: 2025-04-04 | Stop reason: HOSPADM

## 2025-04-04 RX ORDER — ASPIRIN 81 MG/1
81 TABLET ORAL DAILY
Status: DISCONTINUED | OUTPATIENT
Start: 2025-04-04 | End: 2025-04-04 | Stop reason: HOSPADM

## 2025-04-04 RX ORDER — ATORVASTATIN CALCIUM 20 MG/1
20 TABLET, FILM COATED ORAL DAILY
Status: DISCONTINUED | OUTPATIENT
Start: 2025-04-04 | End: 2025-04-04 | Stop reason: HOSPADM

## 2025-04-04 RX ORDER — MAGNESIUM SULFATE HEPTAHYDRATE 40 MG/ML
2 INJECTION, SOLUTION INTRAVENOUS ONCE
Status: COMPLETED | OUTPATIENT
Start: 2025-04-04 | End: 2025-04-04

## 2025-04-04 RX ORDER — LORATADINE 10 MG/1
10 TABLET ORAL DAILY
Status: DISCONTINUED | OUTPATIENT
Start: 2025-04-04 | End: 2025-04-04 | Stop reason: HOSPADM

## 2025-04-04 RX ORDER — LIDOCAINE 50 MG/G
1 PATCH TOPICAL DAILY
Status: DISCONTINUED | OUTPATIENT
Start: 2025-04-04 | End: 2025-04-04 | Stop reason: HOSPADM

## 2025-04-04 RX ORDER — POTASSIUM CHLORIDE 1500 MG/1
40 TABLET, EXTENDED RELEASE ORAL ONCE
Status: COMPLETED | OUTPATIENT
Start: 2025-04-04 | End: 2025-04-04

## 2025-04-04 RX ORDER — LISINOPRIL 20 MG/1
40 TABLET ORAL DAILY
Status: DISCONTINUED | OUTPATIENT
Start: 2025-04-04 | End: 2025-04-04 | Stop reason: HOSPADM

## 2025-04-04 RX ORDER — GABAPENTIN 100 MG/1
100 CAPSULE ORAL ONCE
Status: COMPLETED | OUTPATIENT
Start: 2025-04-04 | End: 2025-04-04

## 2025-04-04 RX ORDER — HYDROMORPHONE HCL/PF 1 MG/ML
0.5 SYRINGE (ML) INJECTION ONCE
Refills: 0 | Status: COMPLETED | OUTPATIENT
Start: 2025-04-04 | End: 2025-04-04

## 2025-04-04 RX ADMIN — LORATADINE 10 MG: 10 TABLET ORAL at 08:47

## 2025-04-04 RX ADMIN — DICLOFENAC SODIUM 2 G: 10 GEL TOPICAL at 08:47

## 2025-04-04 RX ADMIN — ATORVASTATIN CALCIUM 20 MG: 20 TABLET, FILM COATED ORAL at 08:47

## 2025-04-04 RX ADMIN — LEVOTHYROXINE SODIUM 88 MCG: 88 TABLET ORAL at 06:10

## 2025-04-04 RX ADMIN — MAGNESIUM SULFATE HEPTAHYDRATE 2 G: 40 INJECTION, SOLUTION INTRAVENOUS at 11:32

## 2025-04-04 RX ADMIN — LIDOCAINE 1 PATCH: 700 PATCH TOPICAL at 08:47

## 2025-04-04 RX ADMIN — POTASSIUM CHLORIDE 40 MEQ: 1500 TABLET, EXTENDED RELEASE ORAL at 08:49

## 2025-04-04 RX ADMIN — ASPIRIN 81 MG: 81 TABLET, COATED ORAL at 08:47

## 2025-04-04 RX ADMIN — GABAPENTIN 100 MG: 100 CAPSULE ORAL at 01:29

## 2025-04-04 RX ADMIN — ACETAMINOPHEN 975 MG: 325 TABLET, FILM COATED ORAL at 04:45

## 2025-04-04 RX ADMIN — HYDROMORPHONE HYDROCHLORIDE 0.5 MG: 1 INJECTION, SOLUTION INTRAMUSCULAR; INTRAVENOUS; SUBCUTANEOUS at 00:08

## 2025-04-04 RX ADMIN — LISINOPRIL 40 MG: 20 TABLET ORAL at 08:47

## 2025-04-04 NOTE — ASSESSMENT & PLAN NOTE
Complaining of right foot pain which started 1 day prior with no inciting event or trauma.  Describes the pain like another before and feels like it is cramping.  Does note that she occasionally gets cramping in her legs.  Took Tylenol at home without relief.  Given Robaxin, opioids, Valium, Toradol without significant relief. XR right foot without concerns for osteomyelitis or fracture, noted possible calcaneal spur on my read.    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    Plan:  Avoid narcotic pain medications if able, continue regimen as outlined below  Voltaren gel 2g QID  Tylenol 975mg q8h PRN  Lidocaine patch q12h  Aqua-K  PT evaluation

## 2025-04-04 NOTE — DISCHARGE INSTR - AVS FIRST PAGE
Please follow up with your family doctor  Please follow up with podiatry  Please follow up with outpatient PT

## 2025-04-04 NOTE — ASSESSMENT & PLAN NOTE
Per chart review, patient was recently seen outpatient on 4/1 in which there was concern for depression given the recent passing of her father who passed away in December. At that time, she was complaining of fatigue, memory issues, and tearfulness during the exam. According to the chart, she relied on him for emotional support and is also the sole caretaker of her brother who struggles with intellectual disability    During the encounter here in the hospital, mention of her brother was accompanied by tearfulness    Suspect psychosocial component of patient's presentation.    Plan:  Continue to monitor s/s  Patient has outpatient visit on 4/23 for grief follow-up  Continue reassurance

## 2025-04-04 NOTE — CASE MANAGEMENT
Case Management Discharge Planning Note    Patient name Blane Marti  Location /-01 MRN [id number]  : 1955 Date 2025       Current Admission Date: 4/3/2025  Current Admission Diagnosis:Right foot pain   Patient Active Problem List    Diagnosis Date Noted Date Diagnosed    Right foot pain 2025     Depressed mood 2025     Onychomycosis 2025     Mixed stress and urge urinary incontinence 10/22/2024     Varicose veins of leg with swelling, bilateral 12/15/2023     Plantar fasciitis of right foot 2022     Vitamin D deficiency 2022     Chronic migraine without aura without status migrainosus, not intractable 2021     Cerebral aneurysm 2021     Gait instability 2020     Aneurysm of internal carotid artery 2020     Dyslipidemia 2019    Thickened endometrium 06/15/2018     Lung nodule < 6cm on CT 2018     Carpal tunnel syndrome, bilateral 08/15/2017     GERD (gastroesophageal reflux disease) 2017     Allergic rhinitis 02/15/2017 2023    Degenerative cervical disc 2016     Cervical radicular pain 2016    Cervical spondylosis without myelopathy 2016     Venous insufficiency 2015    Type 2 diabetes mellitus without complication (HCC) 2014     Morbid obesity (HCC) 2014     Mild cognitive impairment 2013    Hypothyroidism 2013     Obstructive sleep apnea 2013     Hypertension 10/19/2012       LOS (days): 0  Geometric Mean LOS (GMLOS) (days):   Days to GMLOS:     OBJECTIVE:            Current admission status: Observation   Preferred Pharmacy:   [facility] - [place], [place] - [address]  [address]  [address]  [place] [address]  Phone: [phone] Fax: [phone]    [id number] - [place], [place] - [address]  [address] [place] [address]  [place] [address]  Phone: [phone] Fax: [phone]    [facility]  Care Provider: Obey Rodriguez MD    Primary Insurance: MEDICARE  Secondary Insurance: [place] [employer] Replaced by [facility]    DISCHARGE DETAILS:    Discharge planning discussed with:: bedxside with pt  Freedom of Choice: Yes  Comments - Freedom of Choice: Pt reccs for OP PT/OT  CM contacted family/caregiver?: No- see comments (pt is A&O)  Were Treatment Team discharge recommendations reviewed with patient/caregiver?: Yes  Did patient/caregiver verbalize understanding of patient care needs?: Yes  Were patient/caregiver advised of the risks associated with not following Treatment Team discharge recommendations?: Yes    Contacts  Patient Contacts: Braden Collazo  Relationship to Patient:: Family (brother)  Contact Method: Phone  Phone Number: [phone]  Reason/Outcome: Emergency Contact    Requested Home Health Care         Is the patient interested in HHC at discharge?: No    DME Referral Provided  Referral made for DME?: No    Other Referral/Resources/Interventions Provided:  Interventions: Transportation    Would you like to participate in our [facility] service program?  : No - Declined    Treatment Team Recommendation: Home  Discharge Destination Plan:: Home  Transport at Discharge : Other (Comment) (hospital shuttle)

## 2025-04-04 NOTE — ASSESSMENT & PLAN NOTE
Patient describing dizziness after receiving medications in the ED, resultant ambulatory dysfunction.  Most likely iatrogenic and related to polypharmacy given patient's age and medications given-Valium, opioids, muscle relaxer, gabapentin.    Plan:  Continue to monitor signs and symptoms  Avoid polypharmacy and sedating medications

## 2025-04-04 NOTE — ASSESSMENT & PLAN NOTE
Per chart review, patient was recently seen outpatient on 4/1 in which there was concern for depression given the recent passing of her father who passed away in December. At that time, she was complaining of fatigue, memory issues, and tearfulness during the exam. According to the chart, she relied on him for emotional support and is also the sole caretaker of her brother who struggles with intellectual disability    During the encounter here in the hospital, mention of her brother was accompanied by tearfulness    Suspect psychosocial component of patient's presentation    Plan:  Continue to monitor s/s  Patient has outpatient visit on 4/23 for grief follow-up  Continue reassurance

## 2025-04-04 NOTE — DISCHARGE SUMMARY
"Discharge Summary - Internal Medicine   Name: Blane Marti 70 y.o. female I MRN: [id number]  Unit/Bed#: -01 I Date of Admission: 4/3/2025   Date of Service: 4/4/2025 I Hospital Day: 0    Assessment & Plan  Hypertension  Patient takes lisinopril 40 mg daily PTA, continue while inpatient  Hypothyroidism  Recently decreased dose outpatient to 88 mcg Monday to Friday given TSH 0.209 on 3/5    Plan:  Continue levothyroxine 88 mcg  Type 2 diabetes mellitus without complication (HCC)  Lab Results   Component Value Date    HGBA1C 6.3 (H) 10/11/2024       No results for input(s): \"POCGLU\" in the last 72 hours.    Blood Sugar Average: Last 72 hrs:    Previously A1c > 6.5%, more recently has been in prediabetic range, off of all oral antidiabetic medications.  Dizziness  Patient describing dizziness after receiving medications in the ED, resultant ambulatory dysfunction.  Most likely iatrogenic and related to polypharmacy given patient's age and medications given-Valium, opioids, muscle relaxer, gabapentin.    Plan:  Continue to monitor signs and symptoms  Avoid polypharmacy and sedating medications  Right foot pain  Complaining of right foot pain which started 1 day prior with no inciting event or trauma.  Describes the pain like another before and feels like it is cramping.  Does note that she occasionally gets cramping in her legs.  Took Tylenol at home without relief.  Given Robaxin, opioids, Valium, Toradol without significant relief. XR right foot without concerns for osteomyelitis or fracture, noted possible calcaneal spur on my read.    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    Plan:  Consistent with plantar fasciitis-discussed conservative therapy with her including ice pack, rolling foot " "on frozen water bottle  Will place ambulatory referral to podiatry and also give Voltaren on discharge  PT-outpatient PT  Depressed mood  Per chart review, patient was recently seen outpatient on 4/1 in which there was concern for depression given the recent passing of her father who passed away in December. At that time, she was complaining of fatigue, memory issues, and tearfulness during the exam. According to the chart, she relied on him for emotional support and is also the sole caretaker of her brother who struggles with intellectual disability    During the encounter here in the hospital, mention of her brother was accompanied by tearfulness    Suspect psychosocial component of patient's presentation.    Plan:  Continue to monitor s/s  Patient has outpatient visit on 4/23 for grief follow-up  Continue reassurance    Admission Date: 4/3/2025 2308  Discharge Date: 04/04/25  Admitting Diagnosis: Leg pain [M79.606]  Right leg pain [M79.604]  Right foot pain [M79.671]  Muscle cramping [R25.2]  Discharge Diagnosis:   Medical Problems       Resolved Problems  Date Reviewed: 4/1/2025   None         HPI: Per admitting doctor \"70-year-old F with a PMHx of hypothyroidism, T2DM, HTN, HLD, GERD, AARON, DJD, migraines who presents with a one-day history of right dorsolateral foot pain which radiates proximally and posterolaterally around the ankle region. This pain lasts for 10 seconds every 4-5 minutes, she states. She states she also took Tylenol yesterday which did not help. She does endorse a history of cramps in her legs.     In the ED, VSS unremarkable and HDS on RA. BMP unremarkable. XR right foot without obvious fracture or clear osteomyelitis, pending official read. She was given Valium 2.5mg, Dilaudid 0.5mg, Toradol 15mg, Gabapentin 100mg, and Robaxin 500mg without relief of pain. She did endorse dizziness after receiving these medications.\"    Procedures Performed: No orders of the defined types were placed in " this encounter.      Summary of Hospital Course: Patient was seen and examined in the morning.  Findings consistent with plantar fasciitis.  Plan as noted above including ambulatory podiatry follow-up.  PT/OT did see patient and recommended outpatient PT.    Significant Findings, Care, Treatment and Services Provided: Per EMR    Complications:N/A    Discharge Day Visit / Exam:   /59   Pulse (!) 54   Temp (!) 97.4 °F (36.3 °C)   Resp 19   LMP 01/01/2003   SpO2 97%   Physical Exam  Vitals reviewed.   HENT:      Head: Normocephalic and atraumatic.      Mouth/Throat:      Mouth: Mucous membranes are moist.      Pharynx: Oropharynx is clear.   Cardiovascular:      Rate and Rhythm: Normal rate and regular rhythm.   Abdominal:      General: Abdomen is flat. Bowel sounds are normal. There is no distension.      Tenderness: There is no abdominal tenderness.   Musculoskeletal:      Right lower leg: No edema.      Left lower leg: No edema.      Comments: Tenderness directly on plantar fascia, normal range of movement in all directions, no erythema, no asymmetrical warmth   Skin:     General: Skin is warm and dry.   Neurological:      Mental Status: She is alert and oriented to person, place, and time.   Psychiatric:         Mood and Affect: Mood normal.          Discussion with Family: Patient declined call to .     Condition at Discharge: fair       Discharge instructions/Information to patient and family:   See After Visit Summary (AVS) for information provided to patient and family.      Provisions for Follow-Up Care:  See after visit summary for information related to follow-up care and any pertinent home health orders.      PCP: Obey Rodriguez MD    Disposition: See After Visit Summary for discharge disposition information.    Planned Readmission: No     Discharge Medications:  See after visit summary for reconciled discharge medications provided to patient and family.      Discharge  Statement:  I have spent a total time of 60 minutes in caring for this patient on the day of the visit/encounter. .

## 2025-04-04 NOTE — H&P
"H&P - Internal Medicine   Name: Blane Marti 70 y.o. female I MRN: [id number]  Unit/Bed#: QCE I Date of Admission: 4/3/2025   Date of Service: 4/4/2025 I Hospital Day: 0     Assessment & Plan  Hypertension  Patient takes lisinopril 40 mg daily PTA, continue while inpatient  Hypothyroidism  Recently decreased dose outpatient to 88 mcg Monday to Friday given TSH 0.209 on 3/5    Plan:  Continue levothyroxine 88 mcg  Type 2 diabetes mellitus without complication (HCC)  Lab Results   Component Value Date    HGBA1C 6.3 (H) 10/11/2024       No results for input(s): \"POCGLU\" in the last 72 hours.    Blood Sugar Average: Last 72 hrs:  Well-controlled and not insulin-dependent, does not take any medications outpatient.  Managed primarily with diet and exercise. Glucose 93 on admission BMP. Continue to monitor while inpatient    Dizziness  Patient describing dizziness after receiving medications in the ED.  Most likely iatrogenic and related to polypharmacy given patient's age and medications given-Valium, opioids, muscle relaxer, gabapentin    Plan:  Continue to monitor signs and symptoms  Avoid polypharmacy and sedating medications  Right foot pain  Complaining of right foot pain which started 1 day prior with no inciting event or trauma.  Describes the pain like another before and feels like it is cramping.  Does note that she occasionally gets cramping in her legs.  Took Tylenol at home without relief.  Given Robaxin, opioids, Valium, Toradol without significant relief. XR right foot without concerns for osteomyelitis or fracture. This is most likely muscle cramping possibly due to dehydration    Plan:  Voltaren gel 2g QID  Tylenol 975mg q8h PRN  Lidocaine patch q12h  Aqua-K  Depressed mood  Per chart review, patient was recently seen outpatient on 4/1 in which there was concern for depression given the recent passing of her father who passed away in December. At that time, she was complaining of fatigue, memory " issues, and tearfulness during the exam. According to the chart, she relied on him for emotional support and is also the sole caretaker of her brother who struggles with intellectual disability    During the encounter here in the hospital, mention of her brother was accompanied by tearfulness    Suspect psychosocial component of patient's presentation    Plan:  Continue to monitor s/s  Patient has outpatient visit on 4/23 for grief follow-up  Continue reassurance    Code Status: Level 1 - Full Code  Admission Status: OBSERVATION  Disposition: Patient requires Med Surg    Admit to team: SOD TEAM B    History of Present Illness   70-year-old F with a PMHx of hypothyroidism, T2DM, HTN, HLD, GERD, AARON, DJD, migraines who presents with a one-day history of right dorsolateral foot pain which radiates proximally and posterolaterally around the ankle region. This pain lasts for 10 seconds every 4-5 minutes, she states. She states she also took Tylenol yesterday which did not help. She does endorse a history of cramps in her legs.    In the ED, VSS unremarkable and HDS on RA. BMP unremarkable. XR right foot without obvious fracture or clear osteomyelitis, pending official read. She was given Valium 2.5mg, Dilaudid 0.5mg, Toradol 15mg, Gabapentin 100mg, and Robaxin 500mg without relief of pain. She did endorse dizziness after receiving these medications.    Admitted to SOD-B under observation for further pain control.    Review of Systems   All other systems reviewed and are negative.    Historical Information   Past Medical History:   Diagnosis Date    Arthritis     Depression     Difficult intubation 01/08/2021 1/8/2021 1st attempt: Mac3, Grade 4 view. 2nd attempt: Mac 3, Grade 4 view, unable to pass bougie, 3rd attempt: Marcio Mack:, Grade 4 view, unable to pass bougie, 4th attempt:(by attending): Mac3, Grade 4 view unable to pass bougie, 5th attempt(by attending): Glidescope 3, Grade 3 view, ETT placed successfully with  "stylet. LMA used successfully to ventilate in between attempts    Disease of thyroid gland     hypo    Edema     LAST ASSESSED: 2014    GERD (gastroesophageal reflux disease)     Hypercholesterolemia     Hyperlipidemia     Hypertension     WELL CONTROLLED. CURRENTLY ON LISINOPRIL. LAST ASSESSED: 2017    Liver disease     Orthostatic hypotension 2020    Other headache syndrome     Other muscle spasm     LAST ASSESSED: 2014    Ovarian cyst 2006    Renal calculi      (spontaneous vaginal delivery) 1975    FEMALE     Past Surgical History:   Procedure Laterality Date    BACK SURGERY      HERNIATED DISC (L4/L5)    CHOLECYSTECTOMY      IR BIOPSY TRANSJUGULAR LIVER  11/10/2023    IR CEREBRAL ANGIOGRAPHY  2020    IR CEREBRAL ANGIOGRAPHY  2021    IR CEREBRAL ANGIOGRAPHY / INTERVENTION  2021    TONSILLECTOMY       Social History     Tobacco Use    Smoking status: Former    Smokeless tobacco: Never    Tobacco comments:     pt \"quit about 20 years ago\"   Vaping Use    Vaping status: Never Used   Substance and Sexual Activity    Alcohol use: Never    Drug use: No    Sexual activity: Not Currently     E-Cigarette/Vaping    E-Cigarette Use Never User      E-Cigarette/Vaping Substances    Nicotine No     THC No     CBD No     Flavoring No     Other No     Unknown No      Family History   Problem Relation Age of Onset    Lung cancer Mother     Cancer Mother         MALIGNANT NEOPLASM    Hypertension Father     Seizures Father     Stroke Father     Heart attack Father     Diabetes Brother     Hypertension Son     Lung disease Son     Hyperthyroidism Maternal Aunt     Hypothyroidism Maternal Aunt     Breast cancer Cousin     Lung cancer Cousin     Breast cancer Cousin     Neuropathy Family     Heart disease Other         CARDIAC DISORDER     Social History     Tobacco Use    Smoking status: Former    Smokeless tobacco: Never    Tobacco comments:     pt \"quit about 20 years ago\"   Vaping Use    " Vaping status: Never Used   Substance and Sexual Activity    Alcohol use: Never    Drug use: No    Sexual activity: Not Currently       Current Facility-Administered Medications:     acetaminophen (TYLENOL) tablet 975 mg, Q8H PRN    aspirin (ECOTRIN LOW STRENGTH) EC tablet 81 mg, Daily    atorvastatin (LIPITOR) tablet 20 mg, Daily    Diclofenac Sodium (VOLTAREN) 1 % topical gel 2 g, 4x Daily    enoxaparin (LOVENOX) subcutaneous injection 40 mg, Daily    levothyroxine tablet 88 mcg, Early Morning    lisinopril (ZESTRIL) tablet 40 mg, Daily    loratadine (CLARITIN) tablet 10 mg, Daily  Prior to Admission Medications   Prescriptions Last Dose Informant Patient Reported? Taking?   Aspirin Low Dose 81 MG EC tablet   No No   Sig: TAKE ONE TABLET BY MOUTH DAILY   Diclofenac Sodium (VOLTAREN) 1 %   No No   Sig: Apply 2 g topically 4 (four) times a day   acetaminophen (Tylenol 8 Hour Arthritis Pain) 650 mg CR tablet   No No   Sig: Take 1 tablet (650 mg total) by mouth every 8 (eight) hours as needed for mild pain   atorvastatin (LIPITOR) 20 mg tablet   No No   Sig: TAKE 1 TABLET BY MOUTH DAILY   cetirizine (ZyrTEC) 10 mg tablet   No No   Sig: Take 1 tablet (10 mg total) by mouth daily   ciclopirox (PENLAC) 8 % solution   No No   Sig: Apply topically daily at bedtime Apply topically daily.  On the seventh day of application remove with rubbing alcohol or nail polish remover.  Repeat 7-day cycle   clotrimazole (LOTRIMIN) 1 % cream  Self No No   Sig: Apply topically 2 (two) times a day   dicyclomine (BENTYL) 10 mg capsule   No No   Sig: Take 1 capsule (10 mg total) by mouth 4 (four) times a day as needed (Abdominal pain)   levothyroxine 88 mcg tablet   No No   Sig: Take 1 tablet by mouth Monday- Friday   lidocaine (Lidoderm) 5 %  Self No No   Sig: Apply 1 patch topically over 12 hours daily Remove & Discard patch within 12 hours or as directed by MD   Patient not taking: Reported on 1/13/2025   lidocaine (Lidoderm) 5 %   No No    Sig: Apply 1 patch topically over 12 hours daily Remove & Discard patch within 12 hours or as directed by MD. Apply to back   lisinopril (ZESTRIL) 40 mg tablet   No No   Sig: Take 1 tablet (40 mg total) by mouth daily   sodium chloride (OCEAN) 0.65 % nasal spray   No No   Si spray into each nostril as needed for congestion   tolnaftate (TINACTIN) 1 % external solution   No No   Sig: Apply topically daily at bedtime      Facility-Administered Medications: None     Tagamet [cimetidine]    Objective :  Temp:  [97.4 °F (36.3 °C)] 97.4 °F (36.3 °C)  HR:  [71] 71  BP: (144)/(102) 144/102  Resp:  [20] 20  SpO2:  [99 %] 99 %  O2 Device: None (Room air)    Intake & Output:  I/O       None          Weights:        There is no height or weight on file to calculate BMI.  Weight (last 2 days)       None          Physical Exam  Musculoskeletal:      Right lower leg: No edema.      Left lower leg: No edema.      Comments: Right foot without erythema, warmth, tenderness, swelling, or purulence. Pain non reproducible         Lab Results: I have reviewed the following results:  Recent Labs     25  0028   SODIUM 139   K 3.5      CO2 24   BUN 17   CREATININE 0.64   GLUC 93     Micro:  Lab Results   Component Value Date    BLOODCX No Growth After 5 Days. 10/16/2023    URINECX >100,000 cfu/ml Escherichia coli (A) 2024     Currently Ordered Meds:   Current Facility-Administered Medications:     acetaminophen (TYLENOL) tablet 975 mg, Q8H PRN    aspirin (ECOTRIN LOW STRENGTH) EC tablet 81 mg, Daily    atorvastatin (LIPITOR) tablet 20 mg, Daily    Diclofenac Sodium (VOLTAREN) 1 % topical gel 2 g, 4x Daily    enoxaparin (LOVENOX) subcutaneous injection 40 mg, Daily    levothyroxine tablet 88 mcg, Early Morning    lisinopril (ZESTRIL) tablet 40 mg, Daily    loratadine (CLARITIN) tablet 10 mg, Daily  VTE Pharmacologic Prophylaxis: Lovenox  VTE Mechanical Prophylaxis: SCDs    Administrative Statements   Portions of the  "record may have been created with voice recognition software.  Occasional wrong word or \"sound a like\" substitutions may have occurred due to the inherent limitations of voice recognition software.  Read the chart carefully and recognize, using context, where substitutions have occurred.  ==  Forrest Spangler MD  [facility]  Internal Medicine Residency PGY-1  "

## 2025-04-04 NOTE — ASSESSMENT & PLAN NOTE
"Lab Results   Component Value Date    HGBA1C 6.3 (H) 10/11/2024       No results for input(s): \"POCGLU\" in the last 72 hours.    Blood Sugar Average: Last 72 hrs:    Previously A1c > 6.5%, more recently has been in prediabetic range, off of all oral antidiabetic medications.  "

## 2025-04-04 NOTE — ASSESSMENT & PLAN NOTE
"Lab Results   Component Value Date    HGBA1C 6.3 (H) 10/11/2024       No results for input(s): \"POCGLU\" in the last 72 hours.    Blood Sugar Average: Last 72 hrs:  Well-controlled and not insulin-dependent, does not take any medications outpatient.  Managed primarily with diet and exercise. Glucose 93 on admission BMP. Continue to monitor while inpatient    "

## 2025-04-04 NOTE — PLAN OF CARE
Problem: PHYSICAL THERAPY ADULT  Goal: Performs mobility at highest level of function for planned discharge setting.  See evaluation for individualized goals.  Description: Treatment/Interventions: Functional transfer training, LE strengthening/ROM, Elevations, Therapeutic exercise, Endurance training, Bed mobility, Gait training, Equipment eval/education, Spoke to nursing, OT  Equipment Recommended: Walker       See flowsheet documentation for full assessment, interventions and recommendations.  Note: Prognosis: Good  Problem List: Decreased strength, Decreased mobility, Pain, Decreased endurance  Assessment: Pt is an 70 y.o. female presenting to [facility] on 4/3/25 for primary medical dx of R foot pain. Pt  has a past medical history of Arthritis, Depression, Difficult intubation, Disease of thyroid gland, Edema, GERD (gastroesophageal reflux disease), Hypercholesterolemia, Hyperlipidemia, Hypertension, Liver disease, Orthostatic hypotension, Other headache syndrome, Other muscle spasm, Ovarian cyst, Renal calculi, and  (spontaneous vaginal delivery). Pt presents as a high complexity evaluation due to Ongoing medical management for primary dx, Increased reliance on more restrictive AD compared to baseline, Decreased activity tolerance compared to baseline, Increased assistance needed from caregiver at current time, Continuous pulse oximetry monitoring . Pt currently requires min A for transfers with RW and min A for short distance ambulation with RW. Pt is limited by pain and deficits in endurance, strength, activity tolerance, mobility and balance limiting their ability to fully be independent and participate in the community. Pt would benefit from continued skilled acute care PT services to address impairments and promote functional independence. Recommend level 3 resources to improve mobility and promote PLOF. The patient's AM-PAC Basic Mobility Inpatient Short Form Raw Score is 17. A Raw score of greater than  or equal to 16 suggests the patient may benefit from discharge to home. Please also refer to the recommendation of the Physical Therapist for safe discharge planning. Pt left upright in chair with call bell and personal items within reach and all needs met.  Barriers to Discharge: Decreased caregiver support     Rehab Resource Intensity Level, PT: III (Minimum Resource Intensity)    See flowsheet documentation for full assessment.

## 2025-04-04 NOTE — ASSESSMENT & PLAN NOTE
Patient describing dizziness after receiving medications in the ED, resultant ambulatory dysfunction.  Most likely iatrogenic and related to polypharmacy given patient's age and medications given-Valium, opioids, muscle relaxer, gabapentin.    Plan:  Continue to monitor signs and symptoms  Avoid polypharmacy and sedating medications  Check orthostatics

## 2025-04-04 NOTE — ASSESSMENT & PLAN NOTE
Complaining of right foot pain which started 1 day prior with no inciting event or trauma.  Describes the pain like another before and feels like it is cramping.  Does note that she occasionally gets cramping in her legs.  Took Tylenol at home without relief.  Given Robaxin, opioids, Valium, Toradol without significant relief. XR right foot without concerns for osteomyelitis or fracture, noted possible calcaneal spur on my read.    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    Plan:  Consistent with plantar fasciitis-discussed conservative therapy with her including ice pack, rolling foot on frozen water bottle  Will place ambulatory referral to podiatry and also give Voltaren on discharge  PT-outpatient PT

## 2025-04-04 NOTE — ED PROVIDER NOTES
Time reflects when diagnosis was documented in both MDM as applicable and the Disposition within this note       Time User Action Codes Description Comment    4/4/2025  1:52 AM DenniseKeegan dang Michael [M79.604] Right leg pain     4/4/2025  3:24 AM DenniseKeegan dang Add [R25.2] Muscle cramping           ED Disposition       ED Disposition   Admit    Condition   Stable    Date/Time   Fri Apr 4, 2025  3:24 AM    Comment   Case was discussed with SOD and the patient's admission status was agreed to be Admission Status: observation status to the service of Dr. Butterfield .               Assessment & Plan       Medical Decision Making  70-year-old female presents ED for evaluation of right foot cramping x hours.  The cramping is intermittent.  No lower extremity edema.  Differential diagnosis: Muscle cramping, dehydration.  Do not suspect trauma, venous or arterial insufficiency, DVT.  Do not suspect compartment syndrome as patient's foot and calf is soft and nontender.  Doubt fracture.  Plan: BMP to evaluate for electrolyte abnormality.  Will give Toradol, Robaxin for pain.  Please see ED course for additional workup.    Amount and/or Complexity of Data Reviewed  Labs: ordered. Decision-making details documented in ED Course.  Radiology: ordered. Decision-making details documented in ED Course.    Risk  Prescription drug management.  Decision regarding hospitalization.        ED Course as of 04/04/25 0332   Fri Apr 04, 2025   0102 Basic metabolic panel(!)  Unremarkable   0111 Patient still complaining of cramping in her foot despite Toradol, Robaxin, Valium, Dilaudid.  Will try gabapentin.  If patient is still unable to walk, will admit for ambulatory dysfunction.   0249 XR foot 3+ views RIGHT  No acute fracture   0259 Will admit to SOD for intractable right lower extremity pain       Medications   ketorolac (TORADOL) injection 15 mg (15 mg Intravenous Given 4/3/25 9517)   methocarbamol (ROBAXIN) tablet 500 mg (500 mg Oral Given  4/3/25 2340)   diazepam (VALIUM) injection 2.5 mg (2.5 mg Intravenous Given 4/3/25 2349)   HYDROmorphone (DILAUDID) injection 0.5 mg (0.5 mg Intravenous Given 25 0008)   gabapentin (NEURONTIN) capsule 100 mg (100 mg Oral Given 25 0129)       ED Risk Strat Scores                                                History of Present Illness       Chief Complaint   Patient presents with    Leg Pain     Pt arrived via EMS. Pt reports intermittent R foot cramping starting around 7pm. Pt reports sob r/t pain.       Past Medical History:   Diagnosis Date    Arthritis     Depression     Difficult intubation 2021 1st attempt: Mac3, Grade 4 view. 2nd attempt: Mac 3, Grade 4 view, unable to pass bougie, 3rd attempt: [place]ath:, Grade 4 view, unable to pass bougie, 4th attempt:(by attending): Mac3, Grade 4 view unable to pass bougie, 5th attempt(by attending): Glidescope 3, Grade 3 view, ETT placed successfully with stylet. LMA used successfully to ventilate in between attempts    Disease of thyroid gland     hypo    Edema     LAST ASSESSED: 2014    GERD (gastroesophageal reflux disease)     Hypercholesterolemia     Hyperlipidemia     Hypertension     WELL CONTROLLED. CURRENTLY ON LISINOPRIL. LAST ASSESSED: [id number]    Liver disease     Orthostatic hypotension 2020    Other headache syndrome     Other muscle spasm     LAST ASSESSED: 2014    Ovarian cyst 2006    Renal calculi      (spontaneous vaginal delivery) 1975    FEMALE      Past Surgical History:   Procedure Laterality Date    BACK SURGERY      HERNIATED DISC (L4/L5)    CHOLECYSTECTOMY      IR BIOPSY TRANSJUGULAR LIVER  11/10/2023    IR CEREBRAL ANGIOGRAPHY  2020    IR CEREBRAL ANGIOGRAPHY  2021    IR CEREBRAL ANGIOGRAPHY / INTERVENTION  2021    TONSILLECTOMY        Family History   Problem Relation Age of Onset    Lung cancer Mother     Cancer Mother         MALIGNANT NEOPLASM    Hypertension Father     Seizures  "Father     Stroke Father     Heart attack Father     Diabetes Brother     Hypertension Son     Lung disease Son     Hyperthyroidism Maternal Aunt     Hypothyroidism Maternal Aunt     Breast cancer Cousin     Lung cancer Cousin     Breast cancer Cousin     Neuropathy Family     Heart disease Other         CARDIAC DISORDER      Social History     Tobacco Use    Smoking status: Former    Smokeless tobacco: Never    Tobacco comments:     pt \"quit about 20 years ago\"   Vaping Use    Vaping status: Never Used   Substance Use Topics    Alcohol use: Never    Drug use: No      E-Cigarette/Vaping    E-Cigarette Use Never User       E-Cigarette/Vaping Substances    Nicotine No     THC No     CBD No     Flavoring No     Other No     Unknown No       I have reviewed and agree with the history as documented.     70-year-old female presents ED for evaluation of 4 hours of right foot cramping.  Patient states that she feels cramping in the lateral aspect of her right foot.  She denies injury to the area.  She states that the cramping is intermittent, lasts less than 10 seconds at a time and occurs approximately every 2 to 3 minutes.  She denies weakness or numbness of her right foot, lower extremity swelling, fever, skin changes.  Patient denies a history of similar symptoms.  She tried using Tylenol at home for pain with minimal relief.  Patient denies a history of DVT or PE.        Review of Systems   Constitutional:  Negative for chills and fever.   Cardiovascular:  Negative for leg swelling.   Musculoskeletal:  Positive for myalgias. Negative for arthralgias.   Skin:  Negative for color change and wound.   Neurological:  Negative for weakness and numbness.           Objective       ED Triage Vitals [04/03/25 2312]   Temperature Pulse Blood Pressure Respirations SpO2 Patient Position - Orthostatic VS   (!) 97.4 °F (36.3 °C) 71 (!) 144/102 20 99 % Lying      Temp Source Heart Rate Source BP Location FiO2 (%) Pain Score    Oral " Monitor Left arm -- 10 - Worst Possible Pain      Vitals      Date and Time Temp Pulse SpO2 Resp BP Pain Score FACES Pain Rating User   04/04/25 0008 -- -- -- -- -- 10 - Worst Possible Pain -- BL   04/03/25 2338 -- -- -- -- -- 10 - Worst Possible Pain -- BL   04/03/25 2312 97.4 °F (36.3 °C) 71 99 % 20 144/102 10 - Worst Possible Pain -- BL            Physical Exam  Vitals and nursing note reviewed.   Constitutional:       General: She is not in acute distress.     Appearance: Normal appearance. She is obese. She is not ill-appearing, toxic-appearing or diaphoretic.   HENT:      Head: Normocephalic and atraumatic.      Nose: No congestion.      Mouth/Throat:      Mouth: Mucous membranes are moist.   Eyes:      Conjunctiva/sclera: Conjunctivae normal.   Cardiovascular:      Rate and Rhythm: Normal rate and regular rhythm.      Heart sounds: Normal heart sounds.      Comments: Dopplerable DP and PT pulses on the right foot  Pulmonary:      Effort: Pulmonary effort is normal.      Breath sounds: Normal breath sounds.   Skin:     General: Skin is warm and dry.   Neurological:      Mental Status: She is alert and oriented to person, place, and time.      Sensory: No sensory deficit.      Motor: No weakness.         Results Reviewed       Procedure Component Value Units Date/Time    Basic metabolic panel [[id number]]  (Abnormal) Collected: 04/04/25 0028    Lab Status: Final result Specimen: Blood from Arm, Right Updated: 04/04/25 0100     Sodium 139 mmol/L      Potassium 3.5 mmol/L      Chloride 108 mmol/L      CO2 24 mmol/L      ANION GAP 7 mmol/L      BUN 17 mg/dL      Creatinine 0.64 mg/dL      Glucose 93 mg/dL      Calcium 8.3 mg/dL      eGFR 91 ml/min/1.73sq m     Narrative:      National Kidney Disease Foundation guidelines for Chronic Kidney Disease (CKD):     Stage 1 with normal or high GFR (GFR > 90 mL/min/1.73 square meters)    Stage 2 Mild CKD (GFR = 60-89 mL/min/1.73 square meters)    Stage 3A Moderate CKD (GFR  = 45-59 mL/min/1.73 square meters)    Stage 3B Moderate CKD (GFR = 30-44 mL/min/1.73 square meters)    Stage 4 Severe CKD (GFR = 15-29 mL/min/1.73 square meters)    Stage 5 End Stage CKD (GFR <15 mL/min/1.73 square meters)  Note: GFR calculation is accurate only with a steady state creatinine            XR foot 3+ views RIGHT    (Results Pending)       Procedures    ED Medication and Procedure Management   Prior to Admission Medications   Prescriptions Last Dose Informant Patient Reported? Taking?   Aspirin Low Dose 81 MG EC tablet   No No   Sig: TAKE ONE TABLET BY MOUTH DAILY   Diclofenac Sodium (VOLTAREN) 1 %   No No   Sig: Apply 2 g topically 4 (four) times a day   acetaminophen (Tylenol 8 Hour Arthritis Pain) 650 mg CR tablet   No No   Sig: Take 1 tablet (650 mg total) by mouth every 8 (eight) hours as needed for mild pain   atorvastatin (LIPITOR) 20 mg tablet   No No   Sig: TAKE 1 TABLET BY MOUTH DAILY   cetirizine (ZyrTEC) 10 mg tablet   No No   Sig: Take 1 tablet (10 mg total) by mouth daily   ciclopirox (PENLAC) 8 % solution   No No   Sig: Apply topically daily at bedtime Apply topically daily.  On the seventh day of application remove with rubbing alcohol or nail polish remover.  Repeat 7-day cycle   clotrimazole (LOTRIMIN) 1 % cream  Self No No   Sig: Apply topically 2 (two) times a day   dicyclomine (BENTYL) 10 mg capsule   No No   Sig: Take 1 capsule (10 mg total) by mouth 4 (four) times a day as needed (Abdominal pain)   levothyroxine 100 mcg tablet   No No   Sig: TAKE ONE TABLET BY MOUTH TWO TIMES A WEEK   levothyroxine 88 mcg tablet   No No   Sig: Take 1 tablet by mouth Monday- Friday   lidocaine (Lidoderm) 5 %  Self No No   Sig: Apply 1 patch topically over 12 hours daily Remove & Discard patch within 12 hours or as directed by MD   Patient not taking: Reported on 1/13/2025   lidocaine (Lidoderm) 5 %   No No   Sig: Apply 1 patch topically over 12 hours daily Remove & Discard patch within 12 hours or  as directed by MD. Apply to back   lisinopril (ZESTRIL) 40 mg tablet   No No   Sig: Take 1 tablet (40 mg total) by mouth daily   sodium chloride (OCEAN) 0.65 % nasal spray   No No   Si spray into each nostril as needed for congestion   tolnaftate (TINACTIN) 1 % external solution   No No   Sig: Apply topically daily at bedtime      Facility-Administered Medications: None     Patient's Medications   Discharge Prescriptions    No medications on file     No discharge procedures on file.  ED SEPSIS DOCUMENTATION   Time reflects when diagnosis was documented in both MDM as applicable and the Disposition within this note       Time User Action Codes Description Comment    2025  1:52 AM Keegan Bowden [M79.604] Right leg pain     2025  3:24 AM Keegan Bowden [R25.2] Muscle cramping                  Keegan Bowden DO  25 0332

## 2025-04-04 NOTE — PHYSICAL THERAPY NOTE
Physical Therapy Evaluation    Patient Name: Blane Marti    Today's Date: 2025     Problem List  Principal Problem:    Right foot pain  Active Problems:    Hypertension    Hypothyroidism    Type 2 diabetes mellitus without complication (HCC)    Depressed mood       Past Medical History  Past Medical History:   Diagnosis Date    Arthritis     Depression     Difficult intubation 2021 1st attempt: Mac3, Grade 4 view. 2nd attempt: Mac 3, Grade 4 view, unable to pass bougie, 3rd attempt: Marcio Mack:, Grade 4 view, unable to pass bougie, 4th attempt:(by attending): Mac3, Grade 4 view unable to pass bougie, 5th attempt(by attending): Glidescope 3, Grade 3 view, ETT placed successfully with stylet. LMA used successfully to ventilate in between attempts    Disease of thyroid gland     hypo    Edema     LAST ASSESSED: 2014    GERD (gastroesophageal reflux disease)     Hypercholesterolemia     Hyperlipidemia     Hypertension     WELL CONTROLLED. CURRENTLY ON LISINOPRIL. LAST ASSESSED: 2017    Liver disease     Orthostatic hypotension 2020    Other headache syndrome     Other muscle spasm     LAST ASSESSED: 2014    Ovarian cyst 2006    Renal calculi      (spontaneous vaginal delivery) 1975    FEMALE        Past Surgical History  Past Surgical History:   Procedure Laterality Date    BACK SURGERY      HERNIATED DISC (L4/L5)    CHOLECYSTECTOMY      IR BIOPSY TRANSJUGULAR LIVER  11/10/2023    IR CEREBRAL ANGIOGRAPHY  2020    IR CEREBRAL ANGIOGRAPHY  2021    IR CEREBRAL ANGIOGRAPHY / INTERVENTION  2021    TONSILLECTOMY           25 1040   PT Last Visit   PT Visit Date 25   Note Type   Note type Evaluation   Pain Assessment   Pain Assessment Tool 0-10   Pain Score 10 - Worst Possible Pain   Pain Location/Orientation Orientation: Right;Location: Foot;Location: Leg   Pain Radiating Towards R buttocks    Effect of Pain on Daily Activities limits mobility   Patient's Stated Pain Goal No pain   Hospital Pain Intervention(s) Repositioned;Ambulation/increased activity;Emotional support   Restrictions/Precautions   Weight Bearing Precautions Per Order No   Other Precautions Pain;Multiple lines   Home Living   Type of Home Apartment   Home Layout One level;Performs ADLs on one level;Able to live on main level with bedroom/bathroom;Stairs to enter with rails  (3 vs 1 PRESTON)   Bathroom Shower/Tub Walk-in shower   Bathroom Toilet Standard   Bathroom Equipment Other (Comment)  (denies)   Home Equipment Other (Comment)  (denies)   Additional Comments pt lives with brother in one level apartment with 3 vs 1 PRESTON. pt's brother has an intellectual disability at baseline   Prior Function   Level of [place] Independent with ADLs;Independent with functional mobility;Independent with IADLS   Lives With Family  (brother)   Receives Help From Family   IADLs Independent with meal prep;Independent with medication management;Family/Friend/Other provides transportation  (takes uber vs walks to store/pharmacy)   Falls in the last 6 months 0   Comments pt reports being independent PTA. cares for her brother who has an intellectual disability and uses cane for ambulation. pt has other family members in [place] however limited social support   General   Family/Caregiver Present No   Cognition   Overall Cognitive Status WFL   Arousal/Participation Cooperative   Attention Within functional limits   Orientation Level Oriented X4   Memory Within functional limits   Following Commands Follows one step commands without difficulty   Comments pt pleasant and cooperative   Subjective   Subjective pt agreeable to mobilize   RLE Assessment   RLE Assessment X  (grossly 3+/5 functionally, unable to fully assess 2* to patient pain with MMT)   LLE Assessment   LLE Assessment X  (grossly 3+/5 functionally, unable to fully assess 2* to patient pain with  MMT)   Light Touch   RLE Light Touch Grossly intact   LLE Light Touch Grossly intact   Bed Mobility   Supine to Sit Unable to assess   Sit to Supine Unable to assess   Additional Comments pt greeted sitting EOB and left in chair at end of session   Transfers   Sit to Stand 4  Minimal assistance   Additional items Assist x 1;Increased time required;Verbal cues   Stand to Sit 4  Minimal assistance   Additional items Assist x 1;Armrests;Increased time required;Verbal cues   Additional Comments c RW   Ambulation/Elevation   Gait pattern Improper Weight shift;Inconsistent winter;Short stride;Excessively slow;Foward flexed   Gait Assistance 4  Minimal assist   Additional items Assist x 1;Verbal cues   Assistive Device Rolling walker   Distance 50'x2   Stair Management Assistance Not tested   Ambulation/Elevation Additional Comments no overt LOB, multiple standing rest breaks 2* to pain. pt tearful throughout.   Balance   Static Sitting Good   Dynamic Sitting Fair +   Static Standing Fair   Dynamic Standing Fair -   Ambulatory Poor +   Endurance Deficit   Endurance Deficit Yes   Endurance Deficit Description pt limited by pain and decreased activity tolerance   Activity Tolerance   Activity Tolerance Patient limited by fatigue;Patient limited by pain   Medical Staff Made Aware OT Bri   Nurse Made Aware yes-RN cleared   Assessment   Prognosis Good   Problem List Decreased strength;Decreased mobility;Pain;Decreased endurance   Assessment Pt is an 70 y.o. female presenting [facility] for primary medical dx of R foot pain. Pt  has a past medical history of Arthritis, Depression, Difficult intubation, Disease of thyroid gland, Edema, GERD (gastroesophageal reflux disease), Hypercholesterolemia, Hyperlipidemia, Hypertension, Liver disease, Orthostatic hypotension, Other headache syndrome, Other muscle spasm, Ovarian cyst, Renal calculi, and  (spontaneous vaginal delivery). Pt presents as a high complexity evaluation  due to Ongoing medical management for primary dx, Increased reliance on more restrictive AD compared to baseline, Decreased activity tolerance compared to baseline, Increased assistance needed from caregiver at current time, Continuous pulse oximetry monitoring . Pt currently requires min A for transfers with RW and min A for short distance ambulation with RW. Pt is limited by pain and deficits in endurance, strength, activity tolerance, mobility and balance limiting their ability to fully be independent and participate in the community. Pt would benefit from continued skilled acute care PT services to address impairments and promote functional independence. Recommend level 3 resources to improve mobility and promote PLOF. The patient's AM-PAC Basic Mobility Inpatient Short Form Raw Score is 17. A Raw score of greater than or equal to 16 suggests the patient may benefit from discharge to home. Please also refer to the recommendation of the Physical Therapist for safe discharge planning. Pt left upright in chair with call bell and personal items within reach and all needs met.   Barriers to Discharge Decreased caregiver support   Goals   Patient Goals to have less pain   STG Expiration Date 04/18/25   Short Term Goal #1 In 14 days pt will complete bed mobility at mod I to increase independence. Pt will complete transfers at mod I to increase safety and independence. Pt will ambulate 300' with LRAD to promote safe access to home and community. Pt will negotiate 3 steps at mod I to promote safe access to home and community.   PT Treatment Day 0   Plan   Treatment/Interventions Functional transfer training;LE strengthening/ROM;Elevations;Therapeutic exercise;Endurance training;Bed mobility;Gait training;Equipment eval/education;Spoke to nursing;OT   PT Frequency 2-3x/wk   Discharge Recommendation   Rehab Resource Intensity Level, PT III (Minimum Resource Intensity)   Equipment Recommended Walker   Walker Package  Recommended Wheeled walker   AM-PAC Basic Mobility Inpatient   Turning in Flat Bed Without Bedrails 3   Lying on Back to Sitting on Edge of Flat Bed Without Bedrails 3   Moving Bed to Chair 3   Standing Up From Chair Using Arms 3   Walk in Room 3   Climb 3-5 Stairs With Railing 2   Basic Mobility Inpatient Raw Score 17   Basic Mobility Standardized Score 39.67   [facility] Highest Level Of Mobility   -[facility] Goal 5: Stand one or more mins   -HLM Achieved 7: Walk 25 feet or more   Modified [place] Scale   Modified [place] Scale 3   GLADIS TavarezT

## 2025-04-04 NOTE — PROGRESS NOTES
"Senior Admission Note - Internal Medicine   Name: Blane Marti 70 y.o. female I MRN: [id number]  Unit/Bed#: QCE I Date of Admission: 4/3/2025   Date of Service: 4/4/2025 I Hospital Day: 0       History and physical reviewed as per Dr. Spangler. I agree with the documented assessment and plan, with the following additions/exceptions:    69-year-old female with history of hypertension, hypothyroidism, controlled diabetes, migraines, MASH with cirrhosis, chronic arthralgias who presents to the ED with 1 to 2-day history of right foot pain that she is unable to specifically describe, but says it lasts a few seconds and resolve spontaneously.  Pain is primarily on the outer portion of the foot.  Patient took Tylenol as well as leftover gabapentin without relief.  No trauma or recent falls, no leg swelling, no recent significant medication changes.      Of note was recently evaluated in the clinic for symptoms of depression, suspected to be in the setting of her father having passed away in recent months as well as being the primary caregiver for her brother, with intellectual disability.  Assessment & Plan  Hypertension  Patient takes lisinopril 40 mg daily PTA, continue while inpatient  Hypothyroidism  Recently decreased dose outpatient to 88 mcg Monday to Friday given TSH 0.209 on 3/5    Plan:  Continue levothyroxine 88 mcg  Type 2 diabetes mellitus without complication (HCC)  Lab Results   Component Value Date    HGBA1C 6.3 (H) 10/11/2024       No results for input(s): \"POCGLU\" in the last 72 hours.    Blood Sugar Average: Last 72 hrs:    Previously A1c > 6.5%, more recently has been in prediabetic range, off of all oral antidiabetic medications.  Dizziness  Patient describing dizziness after receiving medications in the ED, resultant ambulatory dysfunction.  Most likely iatrogenic and related to polypharmacy given patient's age and medications given-Valium, opioids, muscle relaxer, " gabapentin.    Plan:  Continue to monitor signs and symptoms  Avoid polypharmacy and sedating medications  Check orthostatics  Right foot pain  Complaining of right foot pain which started 1 day prior with no inciting event or trauma.  Describes the pain like another before and feels like it is cramping.  Does note that she occasionally gets cramping in her legs.  Took Tylenol at home without relief.  Given Robaxin, opioids, Valium, Toradol without significant relief. XR right foot without concerns for osteomyelitis or fracture, noted possible calcaneal spur on my read.    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    Plan:  Avoid narcotic pain medications if able, continue regimen as outlined below  Voltaren gel 2g QID  Tylenol 975mg q8h PRN  Lidocaine patch q12h  Aqua-K  PT evaluation  Depressed mood  Per chart review, patient was recently seen outpatient on 4/1 in which there was concern for depression given the recent passing of her father who passed away in December. At that time, she was complaining of fatigue, memory issues, and tearfulness during the exam. According to the chart, she relied on him for emotional support and is also the sole caretaker of her brother who struggles with intellectual disability    During the encounter here in the hospital, mention of her brother was accompanied by tearfulness    Suspect psychosocial component of patient's presentation.    Plan:  Continue to monitor s/s  Patient has outpatient visit on 4/23 for grief follow-up  Continue reassurance    Physical Exam  Vitals reviewed.   Constitutional:       Comments: Patient intermittently wincing in pain, occasionally distractible with conversation.  Tearful at mention of her brother.   HENT:      Head: Normocephalic  and atraumatic.      Mouth/Throat:      Mouth: Mucous membranes are moist.      Pharynx: Oropharynx is clear.   Eyes:      Pupils: Pupils are equal, round, and reactive to light.   Cardiovascular:      Rate and Rhythm: Normal rate and regular rhythm.   Pulmonary:      Effort: Pulmonary effort is normal. No respiratory distress.   Abdominal:      General: There is no distension.      Palpations: Abdomen is soft.      Tenderness: There is no abdominal tenderness.   Musculoskeletal:      Right lower leg: No edema.      Left lower leg: No edema.      Comments: No bony tenderness or deformity noted in the right foot. Full ROM of all five digits of right foot as well as ankle. No palpable muscle spasm.   Skin:     General: Skin is warm and dry.      Comments: No overlying skin changes noted in the right foot or leg.   Neurological:      General: No focal deficit present.      Mental Status: She is alert and oriented to person, place, and time. Mental status is at baseline.         Imaging Results Review: I personally reviewed the following image studies in PACS and associated radiology reports: xray(s). My interpretation of the radiology images/reports is: no acute fracture of the right foot, possible calcaneal spur.  Other Study Results Review: No additional pertinent studies reviewed.    Patient will placed in observation on SOD-B under Dr. Butterfield's service for further pain control and evaluation by physical therapy in the morning.    Forrest Hermosillo MD  [facility]  Internal Medicine Residency PGY-2

## 2025-04-04 NOTE — PLAN OF CARE
Problem: OCCUPATIONAL THERAPY ADULT  Goal: Performs self-care activities at highest level of function for planned discharge setting.  See evaluation for individualized goals.  Description: Treatment Interventions: ADL retraining, Functional transfer training, Endurance training, Patient/family training, Equipment evaluation/education, Compensatory technique education, Continued evaluation, Activityengagement, Energy conservation          See flowsheet documentation for full assessment, interventions and recommendations.   Outcome: Progressing  Note: Limitation: Decreased ADL status, Decreased endurance, Decreased high-level ADLs  Prognosis: Good  Assessment: Pt is a 70 y.o. female seen for OT evaluation s/p admission to [facility] on 4/3/2025 due to dizziness and R foot pain. Pt diagnosed with Right foot pain. Pt  has a past medical history of Arthritis, Depression, Difficult intubation, Disease of thyroid gland, Edema, GERD (gastroesophageal reflux disease), Hypercholesterolemia, Hyperlipidemia, Hypertension, Liver disease, Orthostatic hypotension, Other headache syndrome, Other muscle spasm, Ovarian cyst, Renal calculi, and  (spontaneous vaginal delivery).  Pt with active OT evaluation/treatment and activity orders. Pt reports living w/ brother who she is caregiver to in apartment w/ 3 vs 1 PRESTON. PTA, Pt was I w/ ADL/IADL and functional mobility, was not driving and was not using DME at baseline. Pt agreeable and willing to participate in OT evaluation. Pt was greeted EOB and left OOB in chair w/ alarm activated and all needs within reach. During evaluation, pt is Supervision ADL; Min A functional mobility, transfers. Pt currently presents with impairments in the following categories -steps to enter environment, limited home support, difficulty performing ADLS, and difficulty performing IADLS  activity tolerance, endurance, standing balance/tolerance, and sitting balance/tolerance. These impairments,  as well as pt's fatigue, pain, and risk for falls  limit pt's ability to safely engage in all baseline areas of occupation, includinggrooming, bathing, dressing, toileting, functional mobility/transfers, community mobility, laundry , medication management, meal prep, cleaning, care of children, and leisure activities . Pt would benefit from continued acute OT services throughout hospital course in order to maximize Pt's independence and overall occupational performance. Plan for OT interventions 2-3x per week. From OT standpoint,  recommend Level III (Minimum Resource Intensity) upon d/c when pt medically stable to d/c from acute care. Will continue to follow.     Rehab Resource Intensity Level, OT: III (Minimum Resource Intensity) (OP PT)

## 2025-04-04 NOTE — ASSESSMENT & PLAN NOTE
Patient describing dizziness after receiving medications in the ED.  Most likely iatrogenic and related to polypharmacy given patient's age and medications given-Valium, opioids, muscle relaxer, gabapentin    Plan:  Continue to monitor signs and symptoms  Avoid polypharmacy and sedating medications

## 2025-04-04 NOTE — ASSESSMENT & PLAN NOTE
Recently decreased dose outpatient to 88 mcg Monday to Friday given TSH 0.209 on 3/5    Plan:  Continue levothyroxine 88 mcg

## 2025-04-04 NOTE — OCCUPATIONAL THERAPY NOTE
Occupational Therapy Evaluation     Patient Name: Blane Marti  Today's Date: 2025  Problem List  Principal Problem:    Right foot pain  Active Problems:    Hypertension    Hypothyroidism    Type 2 diabetes mellitus without complication (HCC)    Depressed mood    Past Medical History  Past Medical History:   Diagnosis Date    Arthritis     Depression     Difficult intubation 2021 1st attempt: Mac3, Grade 4 view. 2nd attempt: Mac 3, Grade 4 view, unable to pass bougie, 3rd attempt: Marcio Mack:, Grade 4 view, unable to pass bougie, 4th attempt:(by attending): Mac3, Grade 4 view unable to pass bougie, 5th attempt(by attending): Glidescope 3, Grade 3 view, ETT placed successfully with stylet. LMA used successfully to ventilate in between attempts    Disease of thyroid gland     hypo    Edema     LAST ASSESSED: 2014    GERD (gastroesophageal reflux disease)     Hypercholesterolemia     Hyperlipidemia     Hypertension     WELL CONTROLLED. CURRENTLY ON LISINOPRIL. LAST ASSESSED: 2017    Liver disease     Orthostatic hypotension 2020    Other headache syndrome     Other muscle spasm     LAST ASSESSED: 2014    Ovarian cyst 2006    Renal calculi      (spontaneous vaginal delivery) 1975    FEMALE     Past Surgical History  Past Surgical History:   Procedure Laterality Date    BACK SURGERY      HERNIATED DISC (L4/L5)    CHOLECYSTECTOMY      IR BIOPSY TRANSJUGULAR LIVER  11/10/2023    IR CEREBRAL ANGIOGRAPHY  2020    IR CEREBRAL ANGIOGRAPHY  2021    IR CEREBRAL ANGIOGRAPHY / INTERVENTION  2021    TONSILLECTOMY             25 1046   OT Last Visit   OT Visit Date 25   Note Type   Note type Evaluation   Pain Assessment   Pain Assessment Tool 0-10   Pain Score 10 - Worst Possible Pain   Pain Location/Orientation Orientation: Right;Location: Foot;Location: Leg   Pain Radiating Towards R buttocks   Patient's Stated Pain Goal No pain   Hospital Pain  "Intervention(s) Repositioned;Ambulation/increased activity;Emotional support   Home Living   Type of Home Apartment   Home Layout One level;Performs ADLs on one level;Able to live on main level with bedroom/bathroom;Stairs to enter without rails  (3 vs 1 PRESTON w/out rails)   Bathroom Shower/Tub Walk-in shower   Bathroom Toilet Standard   Bathroom Equipment   (denies)   Home Equipment   (denies)   Additional Comments Pt reports living w/ brother who has mental disability in apartment w/ 3 vs 1STE, no rails. Pt denies AD/DME use PTA.   Prior Function   Level of [place] Independent with functional mobility;Independent with ADLs;Independent with IADLS   Lives With Family  (brother)   Receives Help From Family  (family in [place], no local support)   IADLs Independent with meal prep;Independent with medication management;Family/Friend/Other provides transportation   Falls in the last 6 months 0   Comments Pt reports being completely independent PTA. (-) . Ubers/walks when able. Pt is sole caregiver to brother who has mental disability. Pt states he uses [facility] but does not need physical assistance at home. Limited social support locally, has family in [place].   Lifestyle   Autonomy PTA, Pt reports I w/ ADL, IADL, FM w/out AD. (-)    Reciprocal Relationships Brother   Service to Others Caregiver to brother   General   Family/Caregiver Present No   Subjective   Subjective \"I counted the pain comes 4 times in a minute\"   ADL   Where Assessed Edge of bed   Eating Assistance 7  Independent   Grooming Assistance 5  Supervision/Setup   UB Bathing Assistance 5  Supervision/Setup   LB Bathing Assistance 5  Supervision/Setup   UB Dressing Assistance 5  Supervision/Setup   LB Dressing Assistance 5  Supervision/Setup   Toileting Assistance  5  Supervision/Setup   Functional Assistance 5  Supervision/Setup   Bed Mobility   Supine to Sit Unable to assess   Sit to Supine Unable to assess   Additional Comments Pt greeted " EOB and left sitting OOB in chair w/ all needs in reach   Transfers   Sit to Stand 4  Minimal assistance   Additional items Assist x 1;Increased time required;Verbal cues   Stand to Sit 4  Minimal assistance   Additional items Assist x 1;Increased time required;Verbal cues   Additional Comments w/ RW   Functional Mobility   Functional Mobility 4  Minimal assistance   Additional Comments Pt completes short household distance mobility w/ Min A using RW   Additional items Rolling walker   Balance   Static Sitting Good   Dynamic Sitting Fair   Static Standing Fair -   Dynamic Standing Fair -   Ambulatory Poor +   Activity Tolerance   Activity Tolerance Patient limited by fatigue;Patient limited by pain   Medical Staff Made Aware PT zoe Love w/ PT due to medical complexity and multiple comorbidities   Nurse Made Aware RN cleared   RUE Assessment   RUE Assessment WFL   LUE Assessment   LUE Assessment WFL   Hand Function   Gross Motor Coordination Functional   Fine Motor Coordination Functional   Sensation   Light Touch No apparent deficits   Cognition   Overall Cognitive Status WFL   Arousal/Participation Alert;Cooperative   Attention Within functional limits   Orientation Level Oriented X4   Memory Within functional limits   Following Commands Follows one step commands without difficulty   Comments Pt is pleasant and cooperative. Primarily Syriac speaking, able to speak English.   Assessment   Limitation Decreased ADL status;Decreased endurance;Decreased high-level ADLs   Prognosis Good   Assessment Pt is a 70 y.o. female seen for OT evaluation s/p admission to [facility] on 4/3/2025 due to dizziness and R foot pain. Pt diagnosed with Right foot pain. Pt  has a past medical history of Arthritis, Depression, Difficult intubation, Disease of thyroid gland, Edema, GERD (gastroesophageal reflux disease), Hypercholesterolemia, Hyperlipidemia, Hypertension, Liver disease, Orthostatic hypotension, Other  headache syndrome, Other muscle spasm, Ovarian cyst, Renal calculi, and  (spontaneous vaginal delivery).  Pt with active OT evaluation/treatment and activity orders. Pt reports living w/ brother who she is caregiver to in apartment w/ 3 vs 1 PRESTON. PTA, Pt was I w/ ADL/IADL and functional mobility, was not driving and was not using DME at baseline. Pt agreeable and willing to participate in OT evaluation. Pt was greeted EOB and left OOB in chair w/ alarm activated and all needs within reach. During evaluation, pt is Supervision ADL; Min A functional mobility, transfers. Pt currently presents with impairments in the following categories -steps to enter environment, limited home support, difficulty performing ADLS, and difficulty performing IADLS  activity tolerance, endurance, standing balance/tolerance, and sitting balance/tolerance. These impairments, as well as pt's fatigue, pain, and risk for falls  limit pt's ability to safely engage in all baseline areas of occupation, includinggrooming, bathing, dressing, toileting, functional mobility/transfers, community mobility, laundry , medication management, meal prep, cleaning, care of children, and leisure activities . Pt would benefit from continued acute OT services throughout hospital course in order to maximize Pt's independence and overall occupational performance. Plan for OT interventions 2-3x per week. From OT standpoint,  recommend Level III (Minimum Resource Intensity) upon d/c when pt medically stable to d/c from acute care. Will continue to follow.   Goals   Patient Goals to have less pain   LTG Time Frame 10-14   Long Term Goal See goals below   Plan   Treatment Interventions ADL retraining;Functional transfer training;Endurance training;Patient/family training;Equipment evaluation/education;Compensatory technique education;Continued evaluation;Activityengagement;Energy conservation   Goal Expiration Date 25   OT Treatment Day 0   OT Frequency  2-3x/wk   Discharge Recommendation   Rehab Resource Intensity Level, OT III (Minimum Resource Intensity)  (OP PT)   Additional Comments  The patient's raw score on the AM-PAC Daily Activity Inpatient Short Form is 19. A raw score of greater than or equal to 19 suggests the patient may benefit from discharge to home. Please refer to the recommendation of the Occupational Therapist for safe discharge planning.   AM-PAC Daily Activity Inpatient   Lower Body Dressing 3   Bathing 3   Toileting 3   Upper Body Dressing 3   Grooming 3   Eating 4   Daily Activity Raw Score 19   Daily Activity Standardized Score (Calc for Raw Score >=11) 40.22   AM-PAC Applied Cognition Inpatient   Following a Speech/Presentation 4   Understanding Ordinary Conversation 4   Taking Medications 4   Remembering Where Things Are Placed or Put Away 4   Remembering List of 4-5 Errands 4   Taking Care of Complicated Tasks 4   Applied Cognition Raw Score 24   Applied Cognition Standardized Score 62.21   End of Consult   Education Provided Yes   Patient Position at End of Consult Bedside chair;All needs within reach   Nurse Communication Nurse aware of consult       OT Goals:     - Pt will complete LB ADLs w/ Mod I using appropriate AD/DME as needed to maximize functional independence.    - Pt will complete UB ADLs w/ Mod I using appropriate AD/DME  as needed to maximize functional independence.    - Pt will complete toileting routine (transfers, hygiene, and clothing management) with Mod I  to maximize functional independence.    - Pt will complete bed mobility supine >< sit w/ Mod I using appropriate AD/DME as needed.    - Pt will transfer to bed, chair, and toilet w/ Mod I using appropriate AD/DME as needed.    - Pt will be Mod I with functional mobility for household distances using appropriate AD/DME as needed.     - Pt will increase activity tolerance (and sitting tolerance) by eating all meals OOB in the chair.     - Pt will increase standing  tolerance to 10-15 minutes to maximize independence w/ functional standing activities.      - Pt will tolerate therapeutic activities for greater than 30 minutes in order to increase tolerance for functional activities.     - Pt will independently integrate pacing/energy conservation strategies into functional activities.     - Pt will participate in ongoing OT assessment of cognitive skills to assist with safe d/c planning/recommendations.       Bri Rm, FLORA, OTR/L

## 2025-04-04 NOTE — ASSESSMENT & PLAN NOTE
Complaining of right foot pain which started 1 day prior with no inciting event or trauma.  Describes the pain like another before and feels like it is cramping.  Does note that she occasionally gets cramping in her legs.  Took Tylenol at home without relief.  Given Robaxin, opioids, Valium, Toradol without significant relief. XR right foot without concerns for osteomyelitis or fracture. This is most likely muscle cramping possibly due to dehydration    Plan:  Voltaren gel 2g QID  Tylenol 975mg q8h PRN  Lidocaine patch q12h  Aqua-K

## 2025-04-05 NOTE — ED ATTENDING ATTESTATION
4/3/2025  I, Boby De La O MD, saw and evaluated the patient. I have discussed the patient with the resident/non-physician practitioner and agree with the resident's/non-physician practitioner's findings, Plan of Care, and MDM as documented in the resident's/non-physician practitioner's note, except where noted. All available labs and Radiology studies were reviewed.  I was present for key portions of any procedure(s) performed by the resident/non-physician practitioner and I was immediately available to provide assistance.       At this point I agree with the current assessment done in the Emergency Department.  I have conducted an independent evaluation of this patient a history and physical is as follows:    ED Course     Impression right lower extremity pain cramping  Differential diagnosis: Sprain strain spasm, electrolyte abnormality, doubt DVT PE doubt NSTI doubt cellulitis    Plan check electrolytes Multimodal analgesia reassess      Critical Care Time  Procedures

## 2025-04-08 ENCOUNTER — TRANSITIONAL CARE MANAGEMENT (OUTPATIENT)
Dept: INTERNAL MEDICINE CLINIC | Facility: CLINIC | Age: 70
End: 2025-04-08

## 2025-04-23 ENCOUNTER — OFFICE VISIT (OUTPATIENT)
Dept: INTERNAL MEDICINE CLINIC | Facility: CLINIC | Age: 70
End: 2025-04-23

## 2025-04-23 VITALS
SYSTOLIC BLOOD PRESSURE: 118 MMHG | BODY MASS INDEX: 41.47 KG/M2 | TEMPERATURE: 98.2 F | WEIGHT: 249.2 LBS | DIASTOLIC BLOOD PRESSURE: 80 MMHG | HEART RATE: 76 BPM

## 2025-04-23 DIAGNOSIS — A04.8 H. PYLORI INFECTION: ICD-10-CM

## 2025-04-23 DIAGNOSIS — G47.33 OSA (OBSTRUCTIVE SLEEP APNEA): Primary | ICD-10-CM

## 2025-04-23 DIAGNOSIS — E66.813 CLASS 3 SEVERE OBESITY DUE TO EXCESS CALORIES WITH SERIOUS COMORBIDITY AND BODY MASS INDEX (BMI) OF 40.0 TO 44.9 IN ADULT: ICD-10-CM

## 2025-04-23 DIAGNOSIS — E11.9 TYPE 2 DIABETES MELLITUS WITHOUT COMPLICATION, WITHOUT LONG-TERM CURRENT USE OF INSULIN (HCC): ICD-10-CM

## 2025-04-23 DIAGNOSIS — E87.6 HYPOKALEMIA: ICD-10-CM

## 2025-04-23 DIAGNOSIS — E06.3 HYPOTHYROIDISM DUE TO HASHIMOTO THYROIDITIS: ICD-10-CM

## 2025-04-23 DIAGNOSIS — E04.1 THYROID NODULE: ICD-10-CM

## 2025-04-23 DIAGNOSIS — E53.8 B12 DEFICIENCY: ICD-10-CM

## 2025-04-23 DIAGNOSIS — E03.8 OTHER SPECIFIED HYPOTHYROIDISM: ICD-10-CM

## 2025-04-23 DIAGNOSIS — E78.00 ELEVATED LDL CHOLESTEROL LEVEL: ICD-10-CM

## 2025-04-23 LAB — SL AMB POCT HEMOGLOBIN AIC: 5.9 (ref ?–6.5)

## 2025-04-23 RX ORDER — LEVOTHYROXINE SODIUM 88 UG/1
88 TABLET ORAL
Qty: 90 TABLET | Refills: 1 | Status: SHIPPED | OUTPATIENT
Start: 2025-04-23

## 2025-04-23 RX ORDER — TIRZEPATIDE 2.5 MG/.5ML
2.5 INJECTION, SOLUTION SUBCUTANEOUS WEEKLY
Qty: 2 ML | Refills: 0 | Status: SHIPPED | OUTPATIENT
Start: 2025-04-23 | End: 2025-05-21

## 2025-04-23 RX ORDER — TIRZEPATIDE 2.5 MG/.5ML
2.5 INJECTION, SOLUTION SUBCUTANEOUS WEEKLY
Qty: 2 ML | Refills: 0 | Status: CANCELLED | OUTPATIENT
Start: 2025-04-23 | End: 2025-05-21

## 2025-04-23 NOTE — PROGRESS NOTES
Name: Blane Marti      : 1955      MRN: [id number]  Encounter Provider: Obey Rodriguez MD  Encounter Date: 2025   Encounter department: [facility] [place]  :  Assessment & Plan  AARON (obstructive sleep apnea)  Hx of AARON with other severe comorbidities including morbid obesity, DM, chronic pain, MASH  Will start tirzepatide and RTC in 1 month for increased dosing  Orders:    tirzepatide (Zepbound) 2.5 mg/0.5 mL auto-injector; Inject 0.5 mL (2.5 mg total) under the skin once a week for 28 days    Type 2 diabetes mellitus without complication, without long-term current use of insulin (HCC)    Lab Results   Component Value Date    HGBA1C 5.9 2025       Orders:    POCT hemoglobin A1c    Albumin / creatinine urine ratio; Future    Hypothyroidism due to Hashimoto thyroiditis  TSH low, T4 normal  Current therapy: levothyroxine 88 mcg M-F, 100 mcg S/S  Will send for levothyroxine 88 mcg daily and recheck in 1 month  Orders:    levothyroxine 88 mcg tablet; Take 1 tablet (88 mcg total) by mouth daily in the early morning    TSH, 3rd generation with Free T4 reflex; Future    Class 3 severe obesity due to excess calories with serious comorbidity and body mass index (BMI) of 40.0 to 44.9 in adult    WT today is 249, last visit was the same  Has severe comorbidities including AARON, OA, chronic radicular pain, steatohepatitis, T2DM  Has not had significant weight loss with dietary modification alone  Orders:    tirzepatide (Zepbound) 2.5 mg/0.5 mL auto-injector; Inject 0.5 mL (2.5 mg total) under the skin once a week for 28 days    Elevated LDL cholesterol level  LDL elevated 2 years ago, due for recheck. Not on medication. Given DM hx will need statin. Check baseline prior to initiation.   Orders:    Lipid panel; Future    H. pylori infection  S/p quad therapy, no eradication testing performed  Orders:    H. pylori breath test; Future    Thyroid nodule  2 known sub centimeter  thyroid nodules, last U/S in 2019  Orders:    US thyroid; Future    Hypokalemia    Orders:    Comprehensive metabolic panel; Future    Magnesium; Future    B12 deficiency    Orders:    Vitamin B12; Future          Urinary Incontinence Plan of Care: counseling topics discussed: keeping a bladder diary and weight loss.     History of Present Illness   70 year old female with hx of HTN, AARON, hypothyroid, DM, migraines, lumbar/cervical radiculopathy, MENA who presents for follow up.     No major complaints offered today. Was in the hospital in beginning of April for intractable right foot pain. She was referred to PT and has first appt tomorrow. Pain is getting better but still present. Worse with walking. Involves the right ankle, lateral part of dorsal foot. Radiates up the right leg in the back.     Review of Systems   Constitutional:  Negative for chills and fever.   HENT:  Negative for ear pain and sore throat.    Eyes:  Negative for pain and visual disturbance.   Respiratory:  Negative for cough and shortness of breath.    Cardiovascular:  Negative for chest pain and palpitations.   Gastrointestinal:  Negative for abdominal pain and vomiting.   Genitourinary:  Negative for dysuria and hematuria.   Musculoskeletal:  Positive for arthralgias. Negative for back pain.   Skin:  Negative for color change and rash.   Neurological:  Negative for seizures and syncope.   All other systems reviewed and are negative.      Objective   /80 (BP Location: Right arm, Patient Position: Sitting, Cuff Size: Large)   Pulse 76   Temp 98.2 °F (36.8 °C) (Temporal)   Wt 113 kg (249 lb 3.2 oz)   LMP 01/01/2003   BMI 41.47 kg/m²      Physical Exam  Vitals and nursing note reviewed.   Constitutional:       General: She is not in acute distress.     Appearance: She is well-developed. She is obese.   HENT:      Head: Normocephalic and atraumatic.   Eyes:      Conjunctiva/sclera: Conjunctivae normal.   Cardiovascular:      Rate and  Rhythm: Normal rate and regular rhythm.      Heart sounds: No murmur heard.  Pulmonary:      Effort: Pulmonary effort is normal. No respiratory distress.      Breath sounds: Normal breath sounds.   Abdominal:      Palpations: Abdomen is soft.      Tenderness: There is no abdominal tenderness.   Musculoskeletal:         General: No swelling.      Cervical back: Neck supple.      Right lower leg: No edema.      Left lower leg: No edema.   Skin:     General: Skin is warm and dry.      Capillary Refill: Capillary refill takes less than 2 seconds.   Neurological:      Mental Status: She is alert and oriented to person, place, and time.   Psychiatric:         Mood and Affect: Mood normal.

## 2025-04-23 NOTE — ASSESSMENT & PLAN NOTE
Lab Results   Component Value Date    HGBA1C 5.9 04/23/2025       Orders:    POCT hemoglobin A1c    Albumin / creatinine urine ratio; Future

## 2025-04-23 NOTE — ASSESSMENT & PLAN NOTE
TSH low, T4 normal  Current therapy: levothyroxine 88 mcg M-F, 100 mcg S/S  Will send for levothyroxine 88 mcg daily and recheck in 1 month  Orders:    levothyroxine 88 mcg tablet; Take 1 tablet (88 mcg total) by mouth daily in the early morning    TSH, 3rd generation with Free T4 reflex; Future

## 2025-04-24 ENCOUNTER — EVALUATION (OUTPATIENT)
Dept: PHYSICAL THERAPY | Facility: REHABILITATION | Age: 70
End: 2025-04-24
Payer: MEDICARE

## 2025-04-24 DIAGNOSIS — M54.50 RIGHT-SIDED LOW BACK PAIN WITHOUT SCIATICA, UNSPECIFIED CHRONICITY: ICD-10-CM

## 2025-04-24 DIAGNOSIS — M79.671 RIGHT FOOT PAIN: Primary | ICD-10-CM

## 2025-04-24 PROCEDURE — 97162 PT EVAL MOD COMPLEX 30 MIN: CPT

## 2025-04-24 NOTE — PROGRESS NOTES
PT Evaluation     Today's Date: 2025  Patient name: Blane Marti  : 1955  MRN: [id number]  Referring provider: Иван Yarbrough MD  Dx:  Encounter Diagnosis     ICD-10-CM    1. Right foot pain  M79.671 Ambulatory referral to Physical Therapy      2. Right-sided low back pain without sciatica, unspecified chronicity  M54.50           Start Time: 1300  Stop Time: 1345  Total time in clinic (min): 45 minutes       Assessment  Impairments: abnormal coordination, abnormal muscle firing, abnormal muscle tone, activity intolerance, impaired physical strength and pain with function  Symptom irritability: high    Assessment details:  Blane Marti is a 70 y.o. year old female presenting to outpatient pelvic health physical therapy with a referral from provider for right foot pain. Patient visited the ER due to the right foot pain, (-) findings for fx, or cellulitis. Patient subjectively reported no blood clots, however, noted no recent imaging indicating performance of a doppler. Patient notes pain limits ambulation tolerance, as she ambulates with an antalgic gait. AMH includes type two DM, liver disease, HTN, and hx of cerebral aneurysm (stable). Assessment performed in english per patient comfort as she speaks English. Advised if she needed the  to let me know.     Objective examination reveals (+) RLE ankle swelling, (+) RLE ankle pain, reduced RLE strengthening, and noted (+) RLE radicular sympomts.  S/S consistent with potential sciatic component influencing pain, however, I would like to rule out DVT per hx of symptoms and further chart review. PT in communication with PCP.  If (-) ultrasound, patient will benefit from skilled PT services to address these deficits and improve function. Based on motivation, patient is a positive candidate to respond fair to physical therapy with a fair prognosis.  Factors limiting good prognosis include PMH. Patient was educated on examination and  techniques, plan of care, goals of therapy, and HEP. Patient was provided an opportunity to ask any questions. Provided Pt initial HEP. Patient demonstrated and verbalized understanding. Verbally reported to hold exercises if increased pain or discomfort.  Pt will be seen 1-2x/week for 6-8 weeks. Patient will be re-assessed regularly in order to document progress and limit any regression. The goal of PT is to progress patient towards independence of self care management.     Understanding of Dx/Px/POC: good     Prognosis: good    Goals  In 2 weeks, patient will report a reduction in pain to < or equal to 2/10 to reduce irritability and improve function  In 2 weeks, patient will display independence with HEP  In 4 weeks, patient will improve DF MMT to > 0.5 to assist swing phase clearance  In 4 weeks, patient will display full ankle ROM compared to other extremity  In 6 weeks, patient will perform ambulation without pain  In 8 weeks, patient will report >70% improvement in symptoms to improve function        Plan  Patient would benefit from: skilled physical therapy  Planned modality interventions: TENS    Planned therapy interventions: balance, motor coordination training, neuromuscular re-education, patient/caregiver education, postural training, strengthening, stretching, therapeutic activities, therapeutic exercise, functional ROM exercises, body mechanics training and breathing training    Frequency: 2x week  Plan of Care beginning date: 4/24/2025  Plan of Care expiration date: 6/24/2025  Treatment plan discussed with: patient  Plan details: Plan includes manual, modalities, therapeutic exercise, therapeutic function, balance training, Daksha principles, HEP      Subjective Evaluation    History of Present Illness  Mechanism of injury: Patient notes a hx of left foot pain and was diagnosed with plantar fasciitis and was provided exercises and recommended a more stable shoe. More recently, patient notes an  "onset of right foot pain. Patient visited the ER after having an increase in right foot pain. Xray were taken and noted no fx. Patient notes no ARPIT of right foot pain. Patient denies any falls. During the day, \"She felt a feeling that was increasing throughout the day.\" Patient does note hx of back pain. 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 Recently lost her father in December.   Pain  Current pain rating: 3  At best pain rating: 3  At worst pain rating: 10  Quality: radiating      Diagnostic Tests  X-ray: normal          Goal: Ambulate without discomfort         Objective     Tenderness     Right Ankle/Foot   Tenderness in the dorsum foot and lateral malleolus.     Active Range of Motion   Left Ankle/Foot   Dorsiflexion (ke): 15 degrees   Plantar flexion: 45 degrees     Right Ankle/Foot   Dorsiflexion (ke): 3 degrees   Plantar flexion: 15 degrees     Strength/Myotome Testing     Left Ankle/Foot   Dorsiflexion: 5    Right Ankle/Foot   Dorsiflexion: 3+  Inversion: 3+  Eversion: 3+    Swelling   Left Ankle/Foot   Figure 8: 57 cm    Right Ankle/Foot   Figure 8: 60 cm    Functional Assessment        Comments  Ambulation: No AD, reduced RLE stance time secondary to pain. Reduced DF at initial contact and reduced PF strength.              Precautions: Standard  Patient Active Problem List   Diagnosis    Carpal tunnel syndrome, bilateral    Cervical spondylosis without myelopathy    Degenerative cervical disc    GERD (gastroesophageal reflux disease)    Hypertension    Hypothyroidism    Morbid obesity (HCC)    Obstructive sleep apnea    Type 2 diabetes mellitus without complication (HCC)    Lung nodule < 6cm on CT    Thickened endometrium    Aneurysm of internal carotid artery    Gait instability    Cerebral aneurysm    Chronic migraine without aura without status migrainosus, not intractable    Vitamin D deficiency    Plantar fasciitis of right foot    Dyslipidemia    Mild cognitive impairment    Venous insufficiency    " Cervical radicular pain    Allergic rhinitis    Varicose veins of leg with swelling, bilateral    Mixed stress and urge urinary incontinence    Onychomycosis    Right foot pain    Depressed mood       Body Region Impairment Result   Upper Body     Hip     Knee     Ankle     Movement Screen         Outcome Measure Eval Re-Eval D/C   Oswestry      PFDI      LEFI      EPDS      STERLING          POC Expiration:     Diagnosis: Right ankle pain   POC expires (Date that your POC expires) Auth Status? (BOMN, approved, pending) Unit limit (Daily) Auth Start date Expiration date PT/OT + Visit Limit?   06/24/2205          Date of Service 04/24/2025        Visits Used         Visits Remaining                           Neuro Re-Ed                                                                        Ther Ex         Standing Therex         Seated Therex                                                                                          Ther Activity                                             Manual Ther                                                               Modalities                           Outcome Measure

## 2025-04-28 ENCOUNTER — OFFICE VISIT (OUTPATIENT)
Dept: MULTI SPECIALTY CLINIC | Facility: CLINIC | Age: 70
End: 2025-04-28

## 2025-04-28 VITALS
BODY MASS INDEX: 41.77 KG/M2 | WEIGHT: 251 LBS | HEART RATE: 66 BPM | SYSTOLIC BLOOD PRESSURE: 125 MMHG | TEMPERATURE: 97.9 F | DIASTOLIC BLOOD PRESSURE: 72 MMHG

## 2025-04-28 DIAGNOSIS — B35.1 ONYCHOMYCOSIS: ICD-10-CM

## 2025-04-28 DIAGNOSIS — M79.671 RIGHT FOOT PAIN: ICD-10-CM

## 2025-04-28 RX ORDER — CICLOPIROX 80 MG/ML
SOLUTION TOPICAL
Qty: 6 ML | Refills: 3 | Status: SHIPPED | OUTPATIENT
Start: 2025-04-28

## 2025-04-28 NOTE — PROGRESS NOTES
Podiatry Clinic Visit  Blane Marti 70 y.o. female MRN: [id number]  Encounter: [id number]    Assessment & Plan        Diagnoses and all orders for this visit:    Right foot pain  -     Ambulatory Referral to Podiatry    Onychomycosis  -     ciclopirox (PENLAC) 8 % solution; Apply topically daily at bedtime           Plan:  Patient was examined, evaluated, and treated with all questions and concerns addressed.  Patient is following up for her mycotic nails.  They are improving with ciclopirox.  Refill dispensed.  Encouraged tight glycemic control and proper diet  Continue use of supportive shoe gear with wide toebox  Encouraged daily at home foot checks  Patient was re-appointed for 3 months    - Dr. Kelly  was available/present for entirety of patient encounter and present for all procedures.    History of Present Illness     HPI: Blane Marti is a 70 y.o. female who presents with complaint of mycotic nails.  She states they have improved with daily application of Penlac.  She requests a refill.  Denies any other concerns.  Denies any new wounds.  She cares for her nails herself.  Negative. The patient has no further podiatric complaints at this time.    Review of Systems   Constitutional: Negative.    HENT: Negative.    Eyes: Negative.    Respiratory: Negative.    Cardiovascular: Negative.    Gastrointestinal: Negative.    Musculoskeletal: Negative  Skin: See physical exam  Neurological: Negative.        Historical Information   Past Medical History:   Diagnosis Date    Arthritis     Depression     Difficult intubation 01/08/2021 1/8/2021 1st attempt: Mac3, Grade 4 view. 2nd attempt: Mac 3, Grade 4 view, unable to pass bougie, 3rd attempt: Marcio Mack:, Grade 4 view, unable to pass bougie, 4th attempt:(by attending): Mac3, Grade 4 view unable to pass bougie, 5th attempt(by attending): Glidescope 3, Grade 3 view, ETT placed successfully with stylet. LMA used successfully to ventilate in between attempts  "   Disease of thyroid gland     hypo    Edema     LAST ASSESSED: 2014    GERD (gastroesophageal reflux disease)     Hypercholesterolemia     Hyperlipidemia     Hypertension     WELL CONTROLLED. CURRENTLY ON LISINOPRIL. LAST ASSESSED: 2017    Liver disease     Orthostatic hypotension 2020    Other headache syndrome     Other muscle spasm     LAST ASSESSED: 2014    Ovarian cyst 2006    Renal calculi      (spontaneous vaginal delivery) 1975    FEMALE     Past Surgical History:   Procedure Laterality Date    BACK SURGERY      HERNIATED DISC (L4/L5)    CHOLECYSTECTOMY      IR BIOPSY TRANSJUGULAR LIVER  11/10/2023    IR CEREBRAL ANGIOGRAPHY  2020    IR CEREBRAL ANGIOGRAPHY  2021    IR CEREBRAL ANGIOGRAPHY / INTERVENTION  2021    TONSILLECTOMY       Social History   Social History     Substance and Sexual Activity   Alcohol Use Never     Social History     Substance and Sexual Activity   Drug Use No     Social History     Tobacco Use   Smoking Status Former   Smokeless Tobacco Never   Tobacco Comments    pt \"quit about 20 years ago\"     Family History:   Family History   Problem Relation Age of Onset    Lung cancer Mother     Cancer Mother         MALIGNANT NEOPLASM    Hypertension Father     Seizures Father     Stroke Father     Heart attack Father     Diabetes Brother     Hypertension Son     Lung disease Son     Hyperthyroidism Maternal Aunt     Hypothyroidism Maternal Aunt     Breast cancer Cousin     Lung cancer Cousin     Breast cancer Cousin     Neuropathy Family     Heart disease Other         CARDIAC DISORDER       Meds/Allergies   Not in a hospital admission.  Allergies   Allergen Reactions    Tagamet [Cimetidine] Hives       Objective     Current Vitals:   Blood Pressure: 125/72 (25 1327)  Pulse: 66 (25 1327)  Temperature: 97.9 °F (36.6 °C) (25 1327)  Temp Source: Temporal (25 1327)  Weight - Scale: 114 kg (251 lb) (25 1327)        /72 " (BP Location: Right arm, Patient Position: Sitting, Cuff Size: Large)   Pulse 66   Temp 97.9 °F (36.6 °C) (Temporal)   Wt 114 kg (251 lb)   LMP 01/01/2003   BMI 41.77 kg/m²       Lower Extremity Exam:    Foot Exam    Musculoskeletal:  MMT is 5/5 to all compartments of the LE B/L, +1/4 edema B/L, no pain on  palpation of bilateral lower extremities, Equinus is present. No pain with passive ROM of pedal joints.     Vascular:   DP pulses are present bilaterally and PT pulses are weak bilaterally., CFT< 3sec to all digits. Pedal hair is Absent. Skin temperature warm to warm B/L.     Dermatological:  No open Lesions. Skin of the LE is normal texture, temperature, turgor B/L. Interdigital maceration is not present.  Bilateral hallux nail plates are thickened, dystrophic, discolored with presence of subungual debris       Neurologic:  Gross sensation is intact. Monofilament sensation is Intact. Sharp sensation is present.

## 2025-04-29 ENCOUNTER — TELEPHONE (OUTPATIENT)
Dept: INTERNAL MEDICINE CLINIC | Facility: CLINIC | Age: 70
End: 2025-04-29

## 2025-04-29 NOTE — TELEPHONE ENCOUNTER
Prior authorization approved for patient's Zepbound 2.5 mg. Approved until 12/31/99 per insurance.     Contacted patient's [facility] pharmacy at . Introduced self and role to pharmacist. Pharmacist updated patient's Zepbound approved. Script processed and pharmacy working to fill medication.     No other needs noted at this time.

## 2025-04-29 NOTE — TELEPHONE ENCOUNTER
Prior authorization initiated for patient's Zepbound 2.5 mg on Cover my Meds.     Pending review.     Key: [id number]

## 2025-04-30 ENCOUNTER — OFFICE VISIT (OUTPATIENT)
Dept: PHYSICAL THERAPY | Facility: REHABILITATION | Age: 70
End: 2025-04-30
Payer: MEDICARE

## 2025-04-30 DIAGNOSIS — M54.50 RIGHT-SIDED LOW BACK PAIN WITHOUT SCIATICA, UNSPECIFIED CHRONICITY: ICD-10-CM

## 2025-04-30 DIAGNOSIS — M79.671 RIGHT FOOT PAIN: Primary | ICD-10-CM

## 2025-04-30 PROCEDURE — 97110 THERAPEUTIC EXERCISES: CPT

## 2025-04-30 NOTE — PROGRESS NOTES
Daily Note     Today's date: 2025  Patient name: Blane Marti  : 1955  MRN: [id number]  Referring provider: Иван Yarbrough MD  Dx:   Encounter Diagnosis     ICD-10-CM    1. Right foot pain  M79.671       2. Right-sided low back pain without sciatica, unspecified chronicity  M54.50           Start Time: 1138  Stop Time: 1230  Total time in clinic (min): 52 minutes    Subjective: Patient notes she walked today to the clinic ( >1 mile). Notes reduced right ankle pain but does note some knee pain. Patient followed up with podiatrist.       Objective: See treatment diary below. Noted reduction in swelling with figure 8: B/L ankle at 57 cm. No palpable calf tenderness, reduced TTP of calf.       Assessment: Tolerated treatment well. Noted a reduction in ankle swelling, no palpable tenderness of calf. Ruling out DVT and patient followed up with podiatrist. Further progression of potential lumbar radiculopathy in symptom management. Noted a reduction in symptoms with seated forward flexion. No significant change with extension. Progressed proximal hip strengthening with the incorporation of bridges and clamshell. Introduced foot intrinsics as patient noted an increase in RLE symptoms after she had been walking 3--4x per week. Noted improved ambulation tolerance today towards end of session noting reduced antalgic gait.  Patient would benefit from continued PT to assist with ruling in/out lumbar origin to symptoms as well as assist in progression of strength and improved ambulation tolerance.      Plan: Continue per plan of care.      Precautions: Standard  Patient Active Problem List   Diagnosis    Carpal tunnel syndrome, bilateral    Cervical spondylosis without myelopathy    Degenerative cervical disc    GERD (gastroesophageal reflux disease)    Hypertension    Hypothyroidism    Morbid obesity (HCC)    Obstructive sleep apnea    Type 2 diabetes mellitus without complication (HCC)    Lung nodule < 6cm  on CT    Thickened endometrium    Aneurysm of internal carotid artery    Gait instability    Cerebral aneurysm    Chronic migraine without aura without status migrainosus, not intractable    Vitamin D deficiency    Plantar fasciitis of right foot    Dyslipidemia    Mild cognitive impairment    Venous insufficiency    Cervical radicular pain    Allergic rhinitis    Varicose veins of leg with swelling, bilateral    Mixed stress and urge urinary incontinence    Onychomycosis    Right foot pain    Depressed mood       Body Region Impairment Result   Upper Body     Hip     Knee     Ankle     Movement Screen         Outcome Measure Eval Re-Eval D/C   Oswestry      PFDI      LEFI      EPDS      STERLING          POC Expiration:     Diagnosis: Right ankle pain   POC expires (Date that your POC expires) Auth Status? (BOMN, approved, pending) Unit limit (Daily) Auth Start date Expiration date PT/OT + Visit Limit?   06/24/2205          Date of Service 04/24/2025 4/30/2025       Visits Used 1 2       Visits Remaining                           Neuro Re-Ed                                                                        Ther Ex         Standing Therex         Seated Therex         Lumbar Repeated movements  Prone forearm extension   1 x 10     Standing lumbar extension   1 x 10       NE.NW with extension     Seated lumbar flexion with physioball  2 x 10   (Reduced, better)        Proximal hip strengthening  Bridges   1 x 10     Clamshells  1 x 15 B/L        Foot intrinsics   Toe yoga  2 x 10     Arch lifts  2 x 10        Standing DF ROM  1 x 10 DF knee extension B/L                                                     Ther Activity                                             Manual Ther                                                               Modalities                           Outcome Measure

## 2025-05-05 ENCOUNTER — OFFICE VISIT (OUTPATIENT)
Dept: PHYSICAL THERAPY | Facility: REHABILITATION | Age: 70
End: 2025-05-05
Payer: MEDICARE

## 2025-05-05 DIAGNOSIS — M54.50 RIGHT-SIDED LOW BACK PAIN WITHOUT SCIATICA, UNSPECIFIED CHRONICITY: ICD-10-CM

## 2025-05-05 DIAGNOSIS — M79.671 RIGHT FOOT PAIN: Primary | ICD-10-CM

## 2025-05-05 PROCEDURE — 97110 THERAPEUTIC EXERCISES: CPT

## 2025-05-05 NOTE — ASSESSMENT & PLAN NOTE
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 Damon 1/8/21  PO angiogram 8/2021 showed no evidence of residual/recurrence  No plan history of aneurysm.  Former smoker  Exam: non-focal.    Imaging:  CTA head/neck 2/21/25: 1.  No acute intracranial hemorrhage, significant mass effect or midline shift. 2.  No proximal large vessel occlusion or high-grade vascular stenosis in the head and neck. 3.  No recurrent aneurysm in the right ICA ophthalmic segment status post pipeline embolization. 4.  Stable tiny focal outpouching in the region of the left posterior communicating artery, likely infundibulum versus aneurysm.    Plan:  Reviewed imaging extensively with patient and Dr. Damon.  No evidence of recurrence or residual aneurysm on follow-up CTA.  Unchanged, stable left P-comm infundibulum based on prior angiography.  Recommend ongoing routine surveillance.   Given stability, discussed option of 2 year follow up. She would prefer to continue with yearly surveillance.   Follow up in 1 year with CTA head w/wo as joint appointment with Dr. Damon.  Call sooner with any questions or concerns.     Orders:    BUN; Future    Creatinine, serum; Future    CTA head w wo contrast; Future

## 2025-05-05 NOTE — PROGRESS NOTES
Daily Note     Today's date: 2025  Patient name: Blane Marti  : 1955  MRN: [id number]  Referring provider: Иван Yarbrough MD  Dx:   Encounter Diagnosis     ICD-10-CM    1. Right foot pain  M79.671       2. Right-sided low back pain without sciatica, unspecified chronicity  M54.50           Start Time: 1100  Stop Time: 1150  Total time in clinic (min): 50 minutes    Subjective: Patient notes overall improvement and compliance.       Objective: See treatment diary below. Note right medial knee pain (onset post walking last week)      Assessment: Tolerated treatment well. Today's session progressed proximal hip strengthening to assist with reduced LE pain. Noted RLE weakness in comparison to LLE. PT continuing to progress potential flexion preference, as note a reduction in radicular symptoms this week. Further progressed foot intrinsics and ankle strengthening. Introduced standing proximal hip strengthening, of which the patient tolerated well. Patient would benefit from continued PT to progress above deficits and reduce discomfort.       Plan: Continue per plan of care.      Precautions: Standard  Patient Active Problem List   Diagnosis    Carpal tunnel syndrome, bilateral    Cervical spondylosis without myelopathy    Degenerative cervical disc    GERD (gastroesophageal reflux disease)    Hypertension    Hypothyroidism    Morbid obesity (HCC)    Obstructive sleep apnea    Type 2 diabetes mellitus without complication (HCC)    Lung nodule < 6cm on CT    Thickened endometrium    Aneurysm of internal carotid artery    Gait instability    Cerebral aneurysm    Chronic migraine without aura without status migrainosus, not intractable    Vitamin D deficiency    Plantar fasciitis of right foot    Dyslipidemia    Mild cognitive impairment    Venous insufficiency    Cervical radicular pain    Allergic rhinitis    Varicose veins of leg with swelling, bilateral    Mixed stress and urge urinary incontinence     Onychomycosis    Right foot pain    Depressed mood       Body Region Impairment Result   Upper Body     Hip     Knee     Ankle     Movement Screen         Outcome Measure Eval Re-Eval D/C   Oswestry      PFDI      LEFI      EPDS      STERLING          POC Expiration:     Diagnosis: Right ankle pain   POC expires (Date that your POC expires) Auth Status? (BOMN, approved, pending) Unit limit (Daily) Auth Start date Expiration date PT/OT + Visit Limit?   06/24/2205          Date of Service 04/24/2025 4/30/2025 5/05/2025      Visits Used 1 2 3      Visits Remaining                           Neuro Re-Ed                                                                        Ther Ex         Aerobic   Nustep Seat 9 5 minutes level 3       Standing Therex         Seated Therex         Lumbar Repeated movements  Prone forearm extension   1 x 10     Standing lumbar extension   1 x 10       NE.NW with extension     Seated lumbar flexion with physioball  2 x 10   (Reduced, better)  Seated lumbar flexion with physioball   1 x 12       Proximal hip strengthening  Bridges   1 x 10     Clamshells  1 x 15 B/L  SLR   1 x 15 B/L    Bridges   1 x 10     Standing hip abduction   1 x 15 B/L     Hooklying BKFO   1 x 12 B/L with green TB    Sidelying clamshell with TB green TB  1 x 15         Foot intrinsics   Toe yoga  2 x 10     Arch lifts  2 x 10  Seated HR with #10 lb   1 x 15 B/L     Seated DF with TB   1 x 20 with green TB       Standing DF ROM  1 x 10 DF knee extension B/L                                                     Ther Activity                                             Manual Ther                                                               Modalities                           Outcome Measure

## 2025-05-07 ENCOUNTER — TELEPHONE (OUTPATIENT)
Dept: NEUROSURGERY | Facility: CLINIC | Age: 70
End: 2025-05-07

## 2025-05-07 ENCOUNTER — OFFICE VISIT (OUTPATIENT)
Dept: NEUROSURGERY | Facility: CLINIC | Age: 70
End: 2025-05-07
Payer: MEDICARE

## 2025-05-07 VITALS
RESPIRATION RATE: 16 BRPM | HEART RATE: 84 BPM | HEIGHT: 65 IN | TEMPERATURE: 97.6 F | WEIGHT: 251 LBS | SYSTOLIC BLOOD PRESSURE: 136 MMHG | BODY MASS INDEX: 41.82 KG/M2 | OXYGEN SATURATION: 96 % | DIASTOLIC BLOOD PRESSURE: 86 MMHG

## 2025-05-07 DIAGNOSIS — I67.1 ANEURYSM OF INTERNAL CAROTID ARTERY: Primary | ICD-10-CM

## 2025-05-07 PROCEDURE — 99214 OFFICE O/P EST MOD 30 MIN: CPT | Performed by: PHYSICIAN ASSISTANT

## 2025-05-07 NOTE — TELEPHONE ENCOUNTER
SPOKE WITH CENTRAL SCHEDULING QUIRINO NIEVES ADVISED PT NEED STO HAVE IV MICROPUNCTURE WITH ULTRASOUND IN IR SHE WILL WRITE THAT FOR CTA =-BA

## 2025-05-13 ENCOUNTER — HOSPITAL ENCOUNTER (OUTPATIENT)
Dept: RADIOLOGY | Facility: HOSPITAL | Age: 70
Discharge: HOME | End: 2025-05-13
Payer: MEDICARE

## 2025-05-13 DIAGNOSIS — E04.1 THYROID NODULE: ICD-10-CM

## 2025-05-13 PROCEDURE — 76536 US EXAM OF HEAD AND NECK: CPT

## 2025-05-15 ENCOUNTER — OFFICE VISIT (OUTPATIENT)
Dept: PHYSICAL THERAPY | Facility: REHABILITATION | Age: 70
End: 2025-05-15
Payer: MEDICARE

## 2025-05-15 ENCOUNTER — RA CDI HCC (OUTPATIENT)
Dept: OTHER | Facility: HOSPITAL | Age: 70
End: 2025-05-15

## 2025-05-15 DIAGNOSIS — M54.50 RIGHT-SIDED LOW BACK PAIN WITHOUT SCIATICA, UNSPECIFIED CHRONICITY: ICD-10-CM

## 2025-05-15 DIAGNOSIS — M79.671 RIGHT FOOT PAIN: Primary | ICD-10-CM

## 2025-05-15 PROCEDURE — 97140 MANUAL THERAPY 1/> REGIONS: CPT

## 2025-05-15 PROCEDURE — 97110 THERAPEUTIC EXERCISES: CPT

## 2025-05-15 NOTE — PROGRESS NOTES
Daily Note     Today's date: 5/15/2025  Patient name: Blane Marti  : 1955  MRN: [id number]  Referring provider: Иван Yarbrough MD  Dx:   Encounter Diagnosis     ICD-10-CM    1. Right foot pain  M79.671       2. Right-sided low back pain without sciatica, unspecified chronicity  M54.50           Start Time: 09  Stop Time: 1015  Total time in clinic (min): 45 minutes    Subjective: Had a flare up of symptoms R groin area and swelling in her R foot, tylenol and rest have improved but not resolved.       Objective: See treatment diary below.       Assessment: Increased difficulty with mat transfers especially due to R LE an was also unable to perform unassisted R SLR.  Updated her HEP and encouraged her to perform standing hip flexor stretch.  Pt could appreciate a better stretch on her R side.       Patient would benefit from continued PT to progress above deficits and reduce discomfort.       Plan: Continue per plan of care.      Precautions: Standard  Patient Active Problem List   Diagnosis    Carpal tunnel syndrome, bilateral    Cervical spondylosis without myelopathy    Degenerative cervical disc    GERD (gastroesophageal reflux disease)    Hypertension    Hypothyroidism    Morbid obesity (HCC)    Obstructive sleep apnea    Type 2 diabetes mellitus without complication (HCC)    Lung nodule < 6cm on CT    Thickened endometrium    Aneurysm of internal carotid artery    Gait instability    Cerebral aneurysm    Chronic migraine without aura without status migrainosus, not intractable    Vitamin D deficiency    Plantar fasciitis of right foot    Dyslipidemia    Mild cognitive impairment    Venous insufficiency    Cervical radicular pain    Allergic rhinitis    Varicose veins of leg with swelling, bilateral    Mixed stress and urge urinary incontinence    Onychomycosis    Right foot pain    Depressed mood       Body Region Impairment Result   Upper Body     Hip     Knee     Ankle     Movement Screen    "      Outcome Measure Eval Re-Eval D/C   Oswestry      PFDI      LEFI      EPDS      STERLING          POC Expiration:     Diagnosis: Right ankle pain   POC expires (Date that your POC expires) Auth Status? (BOMN, approved, pending) Unit limit (Daily) Auth Start date Expiration date PT/OT + Visit Limit?   06/24/2205          Date of Service 04/24/2025 4/30/2025 5/05/2025 5/15/25     Visits Used 1 2 3      Visits Remaining                           Neuro Re-Ed                                                                        Ther Ex         Aerobic   Nustep Seat 9 5 minutes level 3  Nustep 8'     Standing Therex         Seated Therex         Lumbar Repeated movements  Prone forearm extension   1 x 10     Standing lumbar extension   1 x 10       NE.NW with extension     Seated lumbar flexion with physioball  2 x 10   (Reduced, better)  Seated lumbar flexion with physioball   1 x 12  Seated lumbar flexion with physioball   1 x 12    LTR 10x     Proximal hip strengthening  Bridges   1 x 10     Clamshells  1 x 15 B/L  SLR   1 x 15 B/L    Bridges   1 x 10     Standing hip abduction   1 x 15 B/L     Hooklying BKFO   1 x 12 B/L with green TB    Sidelying clamshell with TB green TB  1 x 15    SLR  1x10 w/ A on R side    Bridges   1 x 10     Standing hip abduction   1 x 15 B/L     Hooklying BKFO   1 x 12 B/L with green TB    Sidelying clamshell with TB green TB  1 x 15      Foot intrinsics   Toe yoga  2 x 10     Arch lifts  2 x 10  Seated HR with #10 lb   1 x 15 B/L     Seated DF with TB   1 x 20 with green TB  Seated HR with #10 lb   1 x 15 B/L     Seated DF with TB   1 x 20 with green TB     Standing DF ROM  1 x 10 DF knee extension B/L        Standing hip flexor stretch    3x20\"                                         Ther Activity                                             Manual Ther                  Stretching of b/l hips incl. adductors    R > L 10'                                         Modalities                    "        Outcome Measure

## 2025-05-16 ENCOUNTER — APPOINTMENT (OUTPATIENT)
Dept: PHYSICAL THERAPY | Facility: REHABILITATION | Age: 70
End: 2025-05-16
Payer: MEDICARE

## 2025-05-21 ENCOUNTER — OFFICE VISIT (OUTPATIENT)
Dept: INTERNAL MEDICINE CLINIC | Facility: CLINIC | Age: 70
End: 2025-05-21

## 2025-05-21 VITALS
WEIGHT: 253 LBS | HEART RATE: 75 BPM | SYSTOLIC BLOOD PRESSURE: 129 MMHG | OXYGEN SATURATION: 98 % | TEMPERATURE: 97.8 F | DIASTOLIC BLOOD PRESSURE: 77 MMHG | BODY MASS INDEX: 42.1 KG/M2

## 2025-05-21 DIAGNOSIS — H53.8 BLURRY VISION, BILATERAL: ICD-10-CM

## 2025-05-21 DIAGNOSIS — R55 POSTURAL DIZZINESS WITH PRESYNCOPE: ICD-10-CM

## 2025-05-21 DIAGNOSIS — E04.1 THYROID NODULE: ICD-10-CM

## 2025-05-21 DIAGNOSIS — I67.1 ANEURYSM OF INTERNAL CAROTID ARTERY: ICD-10-CM

## 2025-05-21 DIAGNOSIS — R42 POSTURAL DIZZINESS WITH PRESYNCOPE: ICD-10-CM

## 2025-05-21 DIAGNOSIS — E11.9 TYPE 2 DIABETES MELLITUS WITHOUT COMPLICATION, WITHOUT LONG-TERM CURRENT USE OF INSULIN (HCC): Primary | ICD-10-CM

## 2025-05-21 NOTE — ASSESSMENT & PLAN NOTE
Lab Results   Component Value Date    HGBA1C 5.9 04/23/2025   Patient presented for 4-week follow-up after starting Zepbound.  Patient has been trying to lose weight.  Patient has been having difficulty after losing over 50 pounds in the last year.  Patient's diabetes has been well-controlled.  Patient did not start taking the Zepbound because she is scared of needles.  Patient states that she required education on how to use the injections.  Patient was shown how to use the injections and wasadministered the first dose.    Plan:   Encourage patient to come to the office weekly if she has any doubts about self administering the medication  Encouraged patient to write down any side effects that she is concerned about and bring to next appointment  Patient will present in 4 weeks for follow-up of presyncopal episodes and will discuss Zepbound tolerance

## 2025-05-21 NOTE — PROGRESS NOTES
Name: Blane Marti      : 1955      MRN: [id number]  Encounter Provider: Britta Robles DO  Encounter Date: 2025   Encounter department: [facility] [place]  :  Assessment & Plan  Type 2 diabetes mellitus without complication, without long-term current use of insulin (HCC)    Lab Results   Component Value Date    HGBA1C 5.9 2025   Patient presented for 4-week follow-up after starting Zepbound.  Patient has been trying to lose weight.  Patient has been having difficulty after losing over 50 pounds in the last year.  Patient's diabetes has been well-controlled.  Patient did not start taking the Zepbound because she is scared of needles.  Patient states that she required education on how to use the injections.  Patient was shown how to use the injections and wasadministered the first dose.    Plan:   Encourage patient to come to the office weekly if she has any doubts about self administering the medication  Encouraged patient to write down any side effects that she is concerned about and bring to next appointment  Patient will present in 4 weeks for follow-up of presyncopal episodes and will discuss Zepbound tolerance       Aneurysm of internal carotid artery  Stable, patient recently followed with neurosurgery. Patient does follow with neurosurgery for aneurysm of the ICA patient had embolization in .  Patient's visit last week was unremarkable, CTA showed no recurrent aneurysm of the right ICA  Follow-up CTA of head in 1 year per neurosurgery       Thyroid nodule  Discussed results with patient of thyroid ultrasound.  Patient expressed understanding.  Patient informed that she will need a repeat ultrasound in a year.  Bilateral thyroid nodules are stable however right nodule with concern for possible cyst.  Nodules do not meet criteria for biopsy       Postural dizziness with presyncope  Patient describing episodes of blurry vision.  Patient states that  she has had this happen in the last few months.  Most notable was once in March at a restaurant and another episode happened outside of [facility] 2 weeks ago.  Patient begins to have blurry vision, she is still able to see but would not be able to read anything if asked.  Patient reports that she starts to lose her balance as well as feeling she will fall.  Patient reports becoming sweaty during these episodes.   Patient does follow with neurosurgery for aneurysm of the ICA patient had embolization in 2021.  Patient's visit last week was unremarkable, CTA showed no recurrent aneurysm of the right  ICA  Orthostatics negative    Plan:  Echocardiogram to rule out cardiac cause of presyncope.  CT head with and without contrast  48-hour Holter monitor  Patient to follow-up in 4 weeks to monitor concern for presyncopal episodes and blurry vision    Orders:    Echo complete w/ contrast if indicated; Future    Holter monitor; Future    CT head w wo contrast; Future    Blurry vision, bilateral  See above  Orders:    Echo complete w/ contrast if indicated; Future    Holter monitor; Future    CT head w wo contrast; Future           History of Present Illness   70 year old female with hx of HTN, right ICA aneurysm status post embolization 2021 AARON, hypothyroidism and history of thyroid nodules, T2DM, migraines, lumbar/cervical radiculopathy, MAFLD who presents for follow up after starting zepbound.  Unfortunately, patient did  the medications as it was covered by her insurance.  However, she was unaware of how to administer the medication and was waiting for this appointment.  Patient was informed and demonstrated how to use the injections.  She was also given the first dose.  Patient also is very fearful of needles.  Patient was encouraged to come to the office weekly if she needs assistance with administering the medication.  Discussed lifestyle management, patient has been taking 30-minute walks daily with her  brother.  Patient expresses extreme concern for episodes of blurry vision as well as sweating, and unsteady gait.  Patient describing symptoms of presyncope.  Patient never fell, did not hit head, was having symptoms for approximately 2 to 3 hours.  Patient states these episodes have been happening for a few months.  Patient does have a history of right ICA aneurysm status post embolization in 2021.  Patient reports she has not had recurrence of headaches with these episodes.  Patient was just seen by neurosurgery and patient deemed stable with repeat imaging in 1 year.  We expressed to the patient concern for these episodes and would like her to closely follow-up in 4 weeks.          Review of Systems   Constitutional:  Negative for activity change, chills, fever and unexpected weight change.   Eyes:  Positive for visual disturbance. Negative for photophobia and redness.   Respiratory:  Negative for cough, shortness of breath and wheezing.    Cardiovascular:  Negative for chest pain, palpitations and leg swelling.   Gastrointestinal:  Negative for abdominal distention, abdominal pain, constipation, diarrhea, nausea and vomiting.   Genitourinary:  Negative for difficulty urinating.   Musculoskeletal:  Negative for arthralgias and back pain.   Neurological:  Positive for dizziness and weakness.       Objective   /77 (BP Location: Left arm, Patient Position: Sitting, Cuff Size: Large)   Pulse 75   Temp 97.8 °F (36.6 °C) (Temporal)   Wt 115 kg (253 lb)   LMP 01/01/2003   SpO2 98%   BMI 42.10 kg/m²      Physical Exam  Constitutional:       Appearance: Normal appearance. She is obese. She is not ill-appearing.   HENT:      Head: Normocephalic and atraumatic.      Nose: Nose normal.      Mouth/Throat:      Mouth: Mucous membranes are moist.   Neck:      Comments: Unable to palpate thyroid nodules  Cardiovascular:      Rate and Rhythm: Normal rate and regular rhythm.      Pulses: Normal pulses.      Heart  sounds: Normal heart sounds.      Comments: Orthostatics seated and standing negative  Pulmonary:      Effort: Pulmonary effort is normal.      Breath sounds: Normal breath sounds.   Abdominal:      General: Abdomen is flat. There is no distension.      Palpations: Abdomen is soft.     Musculoskeletal:      Cervical back: Normal range of motion.      Right lower leg: No edema.      Left lower leg: No edema.     Skin:     General: Skin is warm and dry.     Neurological:      Mental Status: She is oriented to person, place, and time. Mental status is at baseline.     Psychiatric:         Mood and Affect: Mood normal.         Behavior: Behavior normal.         Britta Robles,    Internal Medicine Residency PGY-1   [facility]

## 2025-05-21 NOTE — PATIENT INSTRUCTIONS
Please make an appointment for a CT scan of your head.     Please make an appointment for an echo which is an ultrasound of your heart.     Please have your labwork completed prior to your next visit.    Please use holter monitor for two days.     Please make an appointment in four weeks and follow up with Dr. Verdin on a Wednesday

## 2025-05-21 NOTE — ASSESSMENT & PLAN NOTE
Stable, patient recently followed with neurosurgery. Patient does follow with neurosurgery for aneurysm of the ICA patient had embolization in 2021.  Patient's visit last week was unremarkable, CTA showed no recurrent aneurysm of the right ICA  Follow-up CTA of head in 1 year per neurosurgery

## 2025-05-22 ENCOUNTER — OFFICE VISIT (OUTPATIENT)
Dept: PHYSICAL THERAPY | Facility: REHABILITATION | Age: 70
End: 2025-05-22
Payer: MEDICARE

## 2025-05-22 DIAGNOSIS — M79.671 RIGHT FOOT PAIN: ICD-10-CM

## 2025-05-22 DIAGNOSIS — M54.50 RIGHT-SIDED LOW BACK PAIN WITHOUT SCIATICA, UNSPECIFIED CHRONICITY: Primary | ICD-10-CM

## 2025-05-22 PROCEDURE — 97530 THERAPEUTIC ACTIVITIES: CPT

## 2025-05-22 PROCEDURE — 97110 THERAPEUTIC EXERCISES: CPT

## 2025-05-22 NOTE — PROGRESS NOTES
Breanna is a 38 year old who is being evaluated via a billable video visit.    Patient did E-Check in. Questionnaires blown and patient checked in without being called.    How would you like to obtain your AVS? MyChart  If the video visit is dropped, the invitation should be resent by: Text to cell phone: [phone]  Will anyone else be joining your video visit? 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 Pt. States she is working her program.     Acute pharyngitis, unspecified etiology  Return to clinic if symptoms persist or worsen. OTC's antipyretics/analgesics prn, fluids, rest, supportive cares. RST, labs pending.   Will call with results.   Consider antihistamine if allergy symptoms, PND drainange.   - Streptococcus A Rapid Screen w/Reflex to PCR; Future  - Mononucleosis screen; Future  - CBC with platelets and differential; Future                 Return in about 1 day (around 5/18/2022).    MADELINE Peterson [facility] CATHRYN Carlos is a 38 year old who presents for the following health issues .    History of Present Illness       Reason for visit:  Sore throat  Symptom onset:  1-3 days ago  Symptoms include:  My throat hurts  Symptom intensity:  Moderate  Symptom progression:  Staying the same  Had these symptoms before:  Yes  Has tried/received treatment for these symptoms:  Yes  Previous treatment was successful:  Yes  Prior treatment description:  Anabiotics  What makes it worse:  N/A  What makes it better:  Tylenol    She eats 2-3 servings of fruits and vegetables daily.She consumes 1 sweetened beverage(s) daily.She exercises with enough effort to increase her heart rate 10 to 19 minutes per day.  She exercises with enough effort to increase her heart rate 3 or less days per week.   She is taking medications regularly.     Two home Covid tests negative.  Able to get fluids in.  Daily Note     Today's date: 2025  Patient name: Blane Marti  : 1955  MRN: [id number]  Referring provider: Иван Yarbrough MD  Dx:   Encounter Diagnosis     ICD-10-CM    1. Right-sided low back pain without sciatica, unspecified chronicity  M54.50       2. Right foot pain  M79.671             Start Time: 1540  Stop Time: 1630  Total time in clinic (min): 50 minutes    Subjective: Pt is now having b/l groin pain as of today.  Received an injection yesterday for weight loss, currently has no appetite. Shares she has urgency issues and also urinary leakage.     Objective: See treatment diary below.       Assessment: Talked with pt regarding [place] leaving our clinic and Shahrzad will be taking over her care.  Challenged with mat exercises today but could perform and add gentle exhale with engagement of pelvic floor muscles. Also issued urge deferral, encouraged to start practicing now.      Patient would benefit from continued PT to progress above deficits and reduce discomfort.       Plan: Continue per plan of care.      Precautions: Standard  Patient Active Problem List   Diagnosis    Carpal tunnel syndrome, bilateral    Cervical spondylosis without myelopathy    Degenerative cervical disc    GERD (gastroesophageal reflux disease)    Hypertension    Hypothyroidism    Morbid obesity (HCC)    Obstructive sleep apnea    Type 2 diabetes mellitus without complication (HCC)    Lung nodule < 6cm on CT    Thickened endometrium    Aneurysm of internal carotid artery    Gait instability    Cerebral aneurysm    Chronic migraine without aura without status migrainosus, not intractable    Vitamin D deficiency    Plantar fasciitis of right foot    Dyslipidemia    Mild cognitive impairment    Venous insufficiency    Cervical radicular pain    Allergic rhinitis    Varicose veins of leg with swelling, bilateral    Mixed stress and urge urinary incontinence    Onychomycosis    Right foot pain    Depressed mood        Body Region Impairment Result   Upper Body     Hip     Knee     Ankle     Movement Screen         Outcome Measure Eval Re-Eval D/C   Oswestry      PFDI      LEFI      EPDS      STERLING          POC Expiration:     Diagnosis: Right ankle pain   POC expires (Date that your POC expires) Auth Status? (BOMN, approved, pending) Unit limit (Daily) Auth Start date Expiration date PT/OT + Visit Limit?   06/24/2205          Date of Service 04/24/2025 4/30/2025 5/05/2025 5/15/25 5/22    Visits Used 1 2 3  5    Visits Remaining                           Neuro Re-Ed                                                                        Ther Ex         Aerobic   Nustep Seat 9 5 minutes level 3  Nustep 8' Nustep L8 5'    Standing Therex         Seated Therex         Lumbar Repeated movements  Prone forearm extension   1 x 10     Standing lumbar extension   1 x 10       NE.NW with extension     Seated lumbar flexion with physioball  2 x 10   (Reduced, better)  Seated lumbar flexion with physioball   1 x 12  Seated lumbar flexion with physioball   1 x 12    LTR 10x     Proximal hip strengthening  Bridges   1 x 10     Clamshells  1 x 15 B/L  SLR   1 x 15 B/L    Bridges   1 x 10     Standing hip abduction   1 x 15 B/L     Hooklying BKFO   1 x 12 B/L with green TB    Sidelying clamshell with TB green TB  1 x 15    SLR  1x10 w/ A on R side    Bridges   1 x 10     Standing hip abduction   1 x 15 B/L     Hooklying BKFO   1 x 12 B/L with green TB    Sidelying clamshell with TB green TB  1 x 15  SLR  1x10 w/ A on R side    Bridges   1 x 10     Standing hip abduction   1 x 15 B/L     Hooklying BKFO   1 x 12 B/L with green TB    Sidelying clamshell with TB green TB  1 x 15    Foot intrinsics   Toe yoga  2 x 10     Arch lifts  2 x 10  Seated HR with #10 lb   1 x 15 B/L     Seated DF with TB   1 x 20 with green TB  Seated HR with #10 lb   1 x 15 B/L     Seated DF with TB   1 x 20 with green TB Seated HR with #10 lb   1 x 15 B/L     Seated DF    Tonsils are swollen, draining pus, 1 1/2-2 inches apart.      Pain in throat kept her awake.   Had chills yesterday.   Taking Tylenol.     Eating, appetite is low.   HA- yes, but gets these routinely so not sure is related.   Energy- low.   Work- homemaker.   No smoking.  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             Review of Systems   Constitutional, HEENT, cardiovascular, pulmonary, gi and gu systems are negative, except as otherwise noted.      Objective           Vitals:  No vitals were obtained today due to virtual visit.    Physical Exam   GENERAL: Healthy, alert and no distress  EYES: Eyes grossly normal to inspection.  No discharge or erythema, or obvious scleral/conjunctival abnormalities.  RESP: No audible wheeze, cough, or visible cyanosis.  No visible retractions or increased work of breathing.  Voice hoarse  SKIN: Visible skin clear. No significant rash, abnormal pigmentation or lesions.  NEURO: Cranial nerves grossly intact.  Mentation and speech appropriate for age.  PSYCH: Mentation appears normal, affect normal/bright, judgement and insight intact, normal speech and appearance well-groomed.    Labs ordered.             Video-Visit Details    Type of service:  Video Visit    Video End Time:8:21 AM    Originating Location (pt. Location): Home    Distant Location (provider location):  [email or web address]     Platform used for Video Visit: [employer]   "with TB   1 x 20 with green TB    Standing DF ROM  1 x 10 DF knee extension B/L        Standing hip flexor stretch    3x20\"                                         Ther Activity              Urge deferral strategy                               Manual Ther                  Stretching of b/l hips incl. adductors    R > L 10' 5'                                        Modalities                           Outcome Measure                              "

## 2025-05-27 ENCOUNTER — OFFICE VISIT (OUTPATIENT)
Dept: PHYSICAL THERAPY | Facility: REHABILITATION | Age: 70
End: 2025-05-27
Payer: MEDICARE

## 2025-05-27 DIAGNOSIS — M79.671 RIGHT FOOT PAIN: ICD-10-CM

## 2025-05-27 DIAGNOSIS — M54.50 RIGHT-SIDED LOW BACK PAIN WITHOUT SCIATICA, UNSPECIFIED CHRONICITY: Primary | ICD-10-CM

## 2025-05-27 PROCEDURE — 97110 THERAPEUTIC EXERCISES: CPT

## 2025-05-27 NOTE — PROGRESS NOTES
PT RE-EVALUATION    Today's Date: 2025  Patient name: Blane Marti  : 1955  MRN: [id number]  Referring provider: Иван Yarbrough MD  Dx:  Encounter Diagnosis     ICD-10-CM    1. Right-sided low back pain without sciatica, unspecified chronicity  M54.50       2. Right foot pain  M79.671                           Assessment  Impairments: abnormal coordination, abnormal muscle firing, abnormal muscle tone, activity intolerance, impaired physical strength and pain with function  Symptom irritability: low    Assessment details:    Blane Marti is progressing well in 6 visits. Patient subjectively reports overall improvement and a reduction in foot pain. Objective examination notes improvement in ankle AROM. Deficits at this time note continued proximal hip weakness, as well as reduced functional strength, specifically stair navigation. Patient's pain has been managed and reduced, however, recommend patient begin more functional strength progression. The goal going forward is improved independence with stair navigation.  At this time, patient will benefit from further skilled PT services to address above deficits. The goal of PT is to progress patient toward independence with self care management. Patient will benefit from 1x/week x 4 visits/ weeks. Plan of care to shift towards focusing more on strength for stair navigation and balance.   Understanding of Dx/Px/POC: good     Prognosis: good    Goals  In 2 weeks, patient will report a reduction in pain to < or equal to 2/10 to reduce irritability and improve function: Achieved  In 2 weeks, patient will display independence with HEP: Achieved  In 4 weeks, patient will improve DF MMT to > 0.5 to assist swing phase clearance: Achieved  In 4 weeks, patient will display full ankle ROM compared to other extremity: Achieved  In 6 weeks, patient will perform ambulation without pain: Achieved  In 8 weeks, patient will report >70% improvement in symptoms to  "improve function: Achieved      New Goal as of 5/27/2025  In 2 weeks, patient will improve stair navigation to 4 steps with reciprocal pattern, to assist with curb navigation  In 4 weeks, patient will improved single leg HR x 10 without pain and completion B/L  In 4 weeks, patient will improve hip flexion MMT B/L to 4/5 for swing phase ambulation.         Plan  Patient would benefit from: skilled physical therapy  Planned modality interventions: TENS    Planned therapy interventions: balance, motor coordination training, neuromuscular re-education, patient/caregiver education, postural training, strengthening, stretching, therapeutic activities, therapeutic exercise, functional ROM exercises, body mechanics training and breathing training    Frequency: 2x week  Plan of Care beginning date: 4/24/2025  Plan of Care expiration date: 7/8/2025  Treatment plan discussed with: patient  Plan details: Plan includes manual, modalities, therapeutic exercise, therapeutic function, balance training, [place] principles, HEP      Subjective Evaluation    History of Present Illness  Mechanism of injury: AMH 05/27/2025  Patient notes overall improvement since PT onset. Patient notes overall she is feeling 90% better! Patient notes improvement with ambulation. At this time, difficulty is some vertigo due to her weight loss management changes. Advised patient to follow up with her physician.         Patient notes a hx of left foot pain and was diagnosed with plantar fasciitis and was provided exercises and recommended a more stable shoe. More recently, patient notes an onset of right foot pain. Patient visited the ER after having an increase in right foot pain. Xray were taken and noted no fx. Patient notes no ARPIT of right foot pain. Patient denies any falls. During the day, \"She felt a feeling that was increasing throughout the day.\" Patient does note hx of back pain. 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 Recently lost her father " in December.   Pain  Current pain ratin  At best pain ratin  At worst pain ratin  Quality: radiating      Diagnostic Tests  X-ray: normal          Goal: Ambulate without discomfort         Objective     Active Range of Motion   Left Ankle/Foot   Dorsiflexion (ke): 15 degrees   Plantar flexion: 45 degrees     Right Ankle/Foot   Dorsiflexion (ke): 10 degrees   Plantar flexion: 55 degrees   Inversion: 25 degrees   Eversion: 35 degrees     Passive Range of Motion     Right Ankle/Foot    Dorsiflexion (ke): 15 degrees     Strength/Myotome Testing     Right Hip   Planes of Motion   Flexion: 4-  Abduction: 4-    Left Ankle/Foot   Dorsiflexion: 5    Right Ankle/Foot   Dorsiflexion: 4  Plantar flexion: 2+  Inversion: 4  Eversion: 4    Additional Strength Details  Unable to perform single leg HR    Swelling   Left Ankle/Foot   Figure 8: 57 cm    Right Ankle/Foot   Figure 8: 55 cm    Functional Assessment        Comments  Ambulation: Improved upright posture, reduced antalgic gait. No AD  Stair Navigation: Increased UE use, reduced hip flexor strength for step up. Reduced quad strength. MIN A for reciprocal pattern. Poor eccentric control.   Balance Narrow WAYNE: 30 seconds  Balance L Tandem with 1/2 step: 20 seconds  Balance R Tandem wih 1/2 step: 20 seconds             Precautions: Standard  Patient Active Problem List   Diagnosis   • Carpal tunnel syndrome, bilateral   • Cervical spondylosis without myelopathy   • Degenerative cervical disc   • GERD (gastroesophageal reflux disease)   • Hypertension   • Hypothyroidism   • Morbid obesity (HCC)   • Obstructive sleep apnea   • Type 2 diabetes mellitus without complication ([facility])   • Lung nodule < 6cm on CT   • Thickened endometrium   • Aneurysm of internal carotid artery   • Gait instability   • Cerebral aneurysm   • Chronic migraine without aura without status migrainosus, not intractable   • Vitamin D deficiency   • Plantar fasciitis of right foot   • Dyslipidemia    • Mild cognitive impairment   • Venous insufficiency   • Cervical radicular pain   • Allergic rhinitis   • Varicose veins of leg with swelling, bilateral   • Mixed stress and urge urinary incontinence   • Onychomycosis   • Right foot pain   • Depressed mood       Body Region Impairment Result   Upper Body     Hip     Knee     Ankle     Movement Screen         Outcome Measure Eval Re-Eval D/C   Oswestry      PFDI      LEFI      EPDS      STERLING          POC Expiration: 07/08/2025      Diagnosis: Right ankle pain   POC expires (Date that your POC expires) Auth Status? (BOMN, approved, pending) Unit limit (Daily) Auth Start date Expiration date PT/OT + Visit Limit?   06/24/2205          Date of Service 04/24/2025 4/30/2025 5/05/2025 5/15/25 5/22 05/27/2025   Visits Used 1 2 3 4 5 6   Visits Remaining                           Neuro Re-Ed                                                                        Ther Ex      RE-EVAL   Aerobic   Nustep Seat 9 5 minutes level 3  Nustep 8' Nustep L8 5' NuStep 8minutes L8   Standing Therex         Seated Therex         Lumbar Repeated movements  Prone forearm extension   1 x 10     Standing lumbar extension   1 x 10       NE.NW with extension     Seated lumbar flexion with physioball  2 x 10   (Reduced, better)  Seated lumbar flexion with physioball   1 x 12  Seated lumbar flexion with physioball   1 x 12    LTR 10x     Proximal hip strengthening  Bridges   1 x 10     Clamshells  1 x 15 B/L  SLR   1 x 15 B/L    Bridges   1 x 10     Standing hip abduction   1 x 15 B/L     Hooklying BKFO   1 x 12 B/L with green TB    Sidelying clamshell with TB green TB  1 x 15    SLR  1x10 w/ A on R side    Bridges   1 x 10     Standing hip abduction   1 x 15 B/L     Hooklying BKFO   1 x 12 B/L with green TB    Sidelying clamshell with TB green TB  1 x 15  SLR  1x10 w/ A on R side    Bridges   1 x 10     Standing hip abduction   1 x 15 B/L     Hooklying BKFO   1 x 12 B/L with green  "TB    Sidelying clamshell with TB green TB  1 x 15 Sidelying hip abduction   1 x 10     Supine marching   2 x 10 #1 lb B/L     Standing marching with TB at mid food  1 x 15    Foot intrinsics   Toe yoga  2 x 10     Arch lifts  2 x 10  Seated HR with #10 lb   1 x 15 B/L     Seated DF with TB   1 x 20 with green TB  Seated HR with #10 lb   1 x 15 B/L     Seated DF with TB   1 x 20 with green TB Seated HR with #10 lb   1 x 15 B/L     Seated DF with TB   1 x 20 with green TB Seated HR with weight   2 x 10 #10 B/L     Standing HR   2 x 10 B/L    Standing DF ROM  1 x 10 DF knee extension B/L        Standing hip flexor stretch    3x20\"     Functional      Stair Navigation   MIN A! Reduced LE ascent. Increased use of RUE                              Ther Activity              Urge deferral strategy                               Manual Ther                  Stretching of b/l hips incl. adductors    R > L 10' 5'                                        Modalities                           Outcome Measure                            "

## 2025-05-29 ENCOUNTER — APPOINTMENT (OUTPATIENT)
Dept: PHYSICAL THERAPY | Facility: REHABILITATION | Age: 70
End: 2025-05-29
Payer: MEDICARE

## 2025-06-02 ENCOUNTER — OFFICE VISIT (OUTPATIENT)
Dept: PHYSICAL THERAPY | Facility: REHABILITATION | Age: 70
End: 2025-06-02
Payer: MEDICARE

## 2025-06-02 DIAGNOSIS — M54.50 RIGHT-SIDED LOW BACK PAIN WITHOUT SCIATICA, UNSPECIFIED CHRONICITY: Primary | ICD-10-CM

## 2025-06-02 DIAGNOSIS — M79.671 RIGHT FOOT PAIN: ICD-10-CM

## 2025-06-02 PROCEDURE — 97110 THERAPEUTIC EXERCISES: CPT | Performed by: PHYSICAL THERAPIST

## 2025-06-02 NOTE — PROGRESS NOTES
Daily Note     Today's date: 2025  Patient name: Blane Marti  : 1955  MRN: [id number]  Referring provider: Иван Yarbrough MD  Dx:   Encounter Diagnosis     ICD-10-CM    1. Right-sided low back pain without sciatica, unspecified chronicity  M54.50       2. Right foot pain  M79.671           Start Time: 1015  Stop Time: 1100  Total time in clinic (min): 45 minutes    Subjective: Reports groin and foot pain is better. Back pain still persistent. Unclear on specific aggravating activities. Reports it improved with tylenol. Reports not sleeping well and thinks it is related to her weight loss medication.       Objective: See treatment diary below  Lumbar AROM: minimal limitation flexion, mod ext, WNL B SB    Assessment: Patient presenting with hypomobility of her lumbar spine, does not appear to be a derangement. Recommend lumbar mobility exercises that was challenging for her. Patient most challenged with posterior chain exercises, especially with quadruped positioning. Consolidated patient's exercises as her foot and groin pain have improved. Will focus on improving patient's lumbar mobility and strength to improve her functional mobility.       Plan: Continue per plan of care.      Precautions: Standard  Patient Active Problem List   Diagnosis    Carpal tunnel syndrome, bilateral    Cervical spondylosis without myelopathy    Degenerative cervical disc    GERD (gastroesophageal reflux disease)    Hypertension    Hypothyroidism    Morbid obesity (HCC)    Obstructive sleep apnea    Type 2 diabetes mellitus without complication (HCC)    Lung nodule < 6cm on CT    Thickened endometrium    Aneurysm of internal carotid artery    Gait instability    Cerebral aneurysm    Chronic migraine without aura without status migrainosus, not intractable    Vitamin D deficiency    Plantar fasciitis of right foot    Dyslipidemia    Mild cognitive impairment    Venous insufficiency    Cervical radicular pain    Allergic  rhinitis    Varicose veins of leg with swelling, bilateral    Mixed stress and urge urinary incontinence    Onychomycosis    Right foot pain    Depressed mood   History of L5 fracture    POC Expiration: 07/08/2025      Diagnosis: Right ankle pain   POC expires (Date that your POC expires) Auth Status? (BOMN, approved, pending) Unit limit (Daily) Auth Start date Expiration date PT/OT + Visit Limit?   06/24/2205          Date of Service 04/24/2025 4/30/2025 5/05/2025 5/15/25 5/22 05/27/2025 6/2   Visits Used 1 2 3 4 5 6 7   Visits Remaining                              Neuro Re-Ed                                                    Ther Ex      RE-EVAL    Aerobic   Nustep Seat 9 5 minutes level 3  Nustep 8' Nustep L8 5' NuStep 8minutes L8 NuStep 8minutes L5   Standing Therex          Seated Therex       Seated lumbar flexion in chair  10 x 10s hold   Lumbar Repeated movements  Prone forearm extension   1 x 10     Standing lumbar extension   1 x 10       NE.NW with extension     Seated lumbar flexion with physioball  2 x 10   (Reduced, better)  Seated lumbar flexion with physioball   1 x 12  Seated lumbar flexion with physioball   1 x 12    LTR 10x   LTR 15x b/l   Proximal hip strengthening  Bridges   1 x 10     Clamshells  1 x 15 B/L  SLR   1 x 15 B/L    Bridges   1 x 10     Standing hip abduction   1 x 15 B/L     Hooklying BKFO   1 x 12 B/L with green TB    Sidelying clamshell with TB green TB  1 x 15    SLR  1x10 w/ A on R side    Bridges   1 x 10     Standing hip abduction   1 x 15 B/L     Hooklying BKFO   1 x 12 B/L with green TB    Sidelying clamshell with TB green TB  1 x 15  SLR  1x10 w/ A on R side    Bridges   1 x 10     Standing hip abduction   1 x 15 B/L     Hooklying BKFO   1 x 12 B/L with green TB    Sidelying clamshell with TB green TB  1 x 15 Sidelying hip abduction   1 x 10     Supine marching   2 x 10 #1 lb B/L     Standing marching with TB at mid foot  1 x 15  Bridges  2 x 12    Quadruped  Alt arm  "lift  2 x 6 b/l   Foot intrinsics   Toe yoga  2 x 10     Arch lifts  2 x 10  Seated HR with #10 lb   1 x 15 B/L     Seated DF with TB   1 x 20 with green TB  Seated HR with #10 lb   1 x 15 B/L     Seated DF with TB   1 x 20 with green TB Seated HR with #10 lb   1 x 15 B/L     Seated DF with TB   1 x 20 with green TB Seated HR with weight   2 x 10 #10 B/L     Standing HR   2 x 10 B/L  Standing HR   2 x 20 B/L      Standing DF ROM  1 x 10 DF knee extension B/L         Standing hip flexor stretch    3x20\"      Functional      Stair Navigation   MIN A! Reduced LE ascent. Increased use of RUE                                  Ther Activity               Urge deferral strategy                                   Manual Ther                    Stretching of b/l hips incl. adductors    R > L 10' 5'                                             Modalities                              Outcome Measure                                "

## 2025-06-03 ENCOUNTER — APPOINTMENT (OUTPATIENT)
Dept: LAB | Facility: CLINIC | Age: 70
End: 2025-06-03
Payer: MEDICARE

## 2025-06-03 DIAGNOSIS — E78.00 ELEVATED LDL CHOLESTEROL LEVEL: ICD-10-CM

## 2025-06-03 DIAGNOSIS — R10.11 RUQ ABDOMINAL PAIN: ICD-10-CM

## 2025-06-03 DIAGNOSIS — I67.1 ANEURYSM OF INTERNAL CAROTID ARTERY: ICD-10-CM

## 2025-06-03 DIAGNOSIS — E87.6 HYPOKALEMIA: ICD-10-CM

## 2025-06-03 DIAGNOSIS — E11.9 TYPE 2 DIABETES MELLITUS WITHOUT COMPLICATION, WITHOUT LONG-TERM CURRENT USE OF INSULIN (HCC): ICD-10-CM

## 2025-06-03 DIAGNOSIS — A04.8 H. PYLORI INFECTION: ICD-10-CM

## 2025-06-03 DIAGNOSIS — K76.0 METABOLIC DYSFUNCTION-ASSOCIATED STEATOTIC LIVER DISEASE (MASLD): ICD-10-CM

## 2025-06-03 DIAGNOSIS — E53.8 B12 DEFICIENCY: ICD-10-CM

## 2025-06-03 LAB
ALBUMIN SERPL BCG-MCNC: 4.4 G/DL (ref 3.5–5)
ALP SERPL-CCNC: 65 U/L (ref 34–104)
ALT SERPL W P-5'-P-CCNC: 19 U/L (ref 7–52)
ANION GAP SERPL CALCULATED.3IONS-SCNC: 10 MMOL/L (ref 4–13)
AST SERPL W P-5'-P-CCNC: 23 U/L (ref 13–39)
BASOPHILS # BLD AUTO: 0.03 THOUSANDS/ÂΜL (ref 0–0.1)
BASOPHILS NFR BLD AUTO: 0 % (ref 0–1)
BILIRUB SERPL-MCNC: 0.8 MG/DL (ref 0.2–1)
BUN SERPL-MCNC: 22 MG/DL (ref 5–25)
CALCIUM SERPL-MCNC: 9.5 MG/DL (ref 8.4–10.2)
CHLORIDE SERPL-SCNC: 102 MMOL/L (ref 96–108)
CHOLEST SERPL-MCNC: 134 MG/DL (ref ?–200)
CO2 SERPL-SCNC: 26 MMOL/L (ref 21–32)
CREAT SERPL-MCNC: 0.81 MG/DL (ref 0.6–1.3)
CREAT UR-MCNC: 182.4 MG/DL
EOSINOPHIL # BLD AUTO: 0.14 THOUSAND/ÂΜL (ref 0–0.61)
EOSINOPHIL NFR BLD AUTO: 2 % (ref 0–6)
ERYTHROCYTE [DISTWIDTH] IN BLOOD BY AUTOMATED COUNT: 12.5 % (ref 11.6–15.1)
GFR SERPL CREATININE-BSD FRML MDRD: 74 ML/MIN/1.73SQ M
GLUCOSE P FAST SERPL-MCNC: 92 MG/DL (ref 65–99)
HCT VFR BLD AUTO: 43.2 % (ref 34.8–46.1)
HDLC SERPL-MCNC: 49 MG/DL
HGB BLD-MCNC: 13.7 G/DL (ref 11.5–15.4)
IMM GRANULOCYTES # BLD AUTO: 0.01 THOUSAND/UL (ref 0–0.2)
IMM GRANULOCYTES NFR BLD AUTO: 0 % (ref 0–2)
INR PPP: 1.03 (ref 0.85–1.19)
LDLC SERPL CALC-MCNC: 65 MG/DL (ref 0–100)
LYMPHOCYTES # BLD AUTO: 2.76 THOUSANDS/ÂΜL (ref 0.6–4.47)
LYMPHOCYTES NFR BLD AUTO: 33 % (ref 14–44)
MAGNESIUM SERPL-MCNC: 2.1 MG/DL (ref 1.9–2.7)
MCH RBC QN AUTO: 28.2 PG (ref 26.8–34.3)
MCHC RBC AUTO-ENTMCNC: 31.7 G/DL (ref 31.4–37.4)
MCV RBC AUTO: 89 FL (ref 82–98)
MICROALBUMIN UR-MCNC: 7.2 MG/L
MICROALBUMIN/CREAT 24H UR: 4 MG/G CREATININE (ref 0–30)
MONOCYTES # BLD AUTO: 0.72 THOUSAND/ÂΜL (ref 0.17–1.22)
MONOCYTES NFR BLD AUTO: 9 % (ref 4–12)
NEUTROPHILS # BLD AUTO: 4.61 THOUSANDS/ÂΜL (ref 1.85–7.62)
NEUTS SEG NFR BLD AUTO: 56 % (ref 43–75)
NONHDLC SERPL-MCNC: 85 MG/DL
NRBC BLD AUTO-RTO: 0 /100 WBCS
PLATELET # BLD AUTO: 287 THOUSANDS/UL (ref 149–390)
PMV BLD AUTO: 12.1 FL (ref 8.9–12.7)
POTASSIUM SERPL-SCNC: 4.3 MMOL/L (ref 3.5–5.3)
PROT SERPL-MCNC: 7.8 G/DL (ref 6.4–8.4)
PROTHROMBIN TIME: 13.8 SECONDS (ref 12.3–15)
RBC # BLD AUTO: 4.85 MILLION/UL (ref 3.81–5.12)
SODIUM SERPL-SCNC: 138 MMOL/L (ref 135–147)
TRIGL SERPL-MCNC: 100 MG/DL (ref ?–150)
VIT B12 SERPL-MCNC: 755 PG/ML (ref 180–914)
WBC # BLD AUTO: 8.27 THOUSAND/UL (ref 4.31–10.16)

## 2025-06-03 PROCEDURE — 83014 H PYLORI DRUG ADMIN: CPT

## 2025-06-03 PROCEDURE — 85610 PROTHROMBIN TIME: CPT

## 2025-06-03 PROCEDURE — 82570 ASSAY OF URINE CREATININE: CPT

## 2025-06-03 PROCEDURE — 80053 COMPREHEN METABOLIC PANEL: CPT

## 2025-06-03 PROCEDURE — 83013 H PYLORI (C-13) BREATH: CPT

## 2025-06-03 PROCEDURE — 36415 COLL VENOUS BLD VENIPUNCTURE: CPT

## 2025-06-03 PROCEDURE — 85025 COMPLETE CBC W/AUTO DIFF WBC: CPT

## 2025-06-03 PROCEDURE — 82607 VITAMIN B-12: CPT

## 2025-06-03 PROCEDURE — 82043 UR ALBUMIN QUANTITATIVE: CPT

## 2025-06-03 PROCEDURE — 83735 ASSAY OF MAGNESIUM: CPT

## 2025-06-03 PROCEDURE — 80061 LIPID PANEL: CPT

## 2025-06-04 LAB
LIVER FIBR SCORE SERPL CALC.FIBROSURE: 10.71
UREA BREATH TEST QL: NEGATIVE

## 2025-06-05 ENCOUNTER — RESULTS FOLLOW-UP (OUTPATIENT)
Dept: GASTROENTEROLOGY | Facility: CLINIC | Age: 70
End: 2025-06-05

## 2025-06-05 DIAGNOSIS — K76.0 METABOLIC DYSFUNCTION-ASSOCIATED STEATOTIC LIVER DISEASE (MASLD): Primary | ICD-10-CM

## 2025-06-05 RX ORDER — RESMETIROM 100 MG/1
100 TABLET, COATED ORAL DAILY
Qty: 90 TABLET | Refills: 1 | Status: CANCELLED | OUTPATIENT
Start: 2025-06-05

## 2025-06-06 ENCOUNTER — HOSPITAL ENCOUNTER (OUTPATIENT)
Dept: RADIOLOGY | Facility: HOSPITAL | Age: 70
Discharge: HOME | End: 2025-06-06

## 2025-06-06 DIAGNOSIS — R42 POSTURAL DIZZINESS WITH PRESYNCOPE: ICD-10-CM

## 2025-06-06 DIAGNOSIS — H53.8 BLURRY VISION, BILATERAL: ICD-10-CM

## 2025-06-06 DIAGNOSIS — R55 POSTURAL DIZZINESS WITH PRESYNCOPE: ICD-10-CM

## 2025-06-06 RX ORDER — RESMETIROM 100 MG/1
100 TABLET, COATED ORAL DAILY
Qty: 90 TABLET | Refills: 1 | Status: SHIPPED | OUTPATIENT
Start: 2025-06-06

## 2025-06-09 NOTE — PROGRESS NOTES
Daily Note     Today's date: 6/10/2025  Patient name: Blane Marti  : 1955  MRN: [id number]  Referring provider: Иван Yarbrough MD  Dx:   Encounter Diagnosis     ICD-10-CM    1. Right-sided low back pain without sciatica, unspecified chronicity  M54.50       2. Right foot pain  M79.671             Start Time: 1415  Stop Time: 1500  Total time in clinic (min): 45 minutes    Subjective: Reports was tired after last PT session. She went [employer] and got a pair of Justin. Feels so good no pain when walking in them. She wants to be able to use the washer/dryer downstairs in the house she lives.       Objective: See treatment diary below      Assessment: Patient with good recall and compliance to HEP. Focused on functional strength training to improve patient's ability to negotiate stairs. Patient demonstrates decreased quad strength, especially eccentrically for descending steps. Patient most challenged with quadruped positioning but was able to progress to hip extension lift. Will focus on improving patient's lumbar mobility and strength to improve her functional mobility.       Plan: Continue per plan of care.      Precautions: Standard  Patient Active Problem List   Diagnosis    Carpal tunnel syndrome, bilateral    Cervical spondylosis without myelopathy    Degenerative cervical disc    GERD (gastroesophageal reflux disease)    Hypertension    Hypothyroidism    Morbid obesity (HCC)    Obstructive sleep apnea    Type 2 diabetes mellitus without complication (HCC)    Lung nodule < 6cm on CT    Thickened endometrium    Aneurysm of internal carotid artery    Gait instability    Cerebral aneurysm    Chronic migraine without aura without status migrainosus, not intractable    Vitamin D deficiency    Plantar fasciitis of right foot    Dyslipidemia    Mild cognitive impairment    Venous insufficiency    Cervical radicular pain    Allergic rhinitis    Varicose veins of leg with swelling, bilateral    Mixed  stress and urge urinary incontinence    Onychomycosis    Right foot pain    Depressed mood   History of L5 fracture    POC Expiration: 07/08/2025      Diagnosis: Right ankle pain   POC expires (Date that your POC expires) Auth Status? (BOMN, approved, pending) Unit limit (Daily) Auth Start date Expiration date PT/OT + Visit Limit?   06/24/2205          Date of Service 5/05/2025 5/15/25 5/22 05/27/2025 6/2 6/10    Visits Used 3 4 5 6 7 8    Visits Remaining                              Neuro Re-Ed                                                    Ther Ex    RE-EVAL      Aerobic Nustep Seat 9 5 minutes level 3  Nustep 8' Nustep L8 5' NuStep 8minutes L8 NuStep 8minutes L5 Nustep Lvl 5  6 min    Standing Therex          Seated Therex     Seated lumbar flexion in chair  10 x 10s hold Seated lumbar flexion in chair  10 x 10s hold    Lumbar Repeated movements Seated lumbar flexion with physioball   1 x 12  Seated lumbar flexion with physioball   1 x 12    LTR 10x   LTR 15x b/l LTR 15x b/l    Proximal hip strengthening SLR   1 x 15 B/L    Bridges   1 x 10     Standing hip abduction   1 x 15 B/L     Hooklying BKFO   1 x 12 B/L with green TB    Sidelying clamshell with TB green TB  1 x 15    SLR  1x10 w/ A on R side    Bridges   1 x 10     Standing hip abduction   1 x 15 B/L     Hooklying BKFO   1 x 12 B/L with green TB    Sidelying clamshell with TB green TB  1 x 15  SLR  1x10 w/ A on R side    Bridges   1 x 10     Standing hip abduction   1 x 15 B/L     Hooklying BKFO   1 x 12 B/L with green TB    Sidelying clamshell with TB green TB  1 x 15 Sidelying hip abduction   1 x 10     Supine marching   2 x 10 #1 lb B/L     Standing marching with TB at mid foot  1 x 15  Bridges  2 x 12    Quadruped  Alt arm lift  2 x 6 b/l Quadruped  Alt arm lift  2 x 6 b/l    Foot intrinsics  Seated HR with #10 lb   1 x 15 B/L     Seated DF with TB   1 x 20 with green TB  Seated HR with #10 lb   1 x 15 B/L     Seated DF with TB   1 x 20 with  "green TB Seated HR with #10 lb   1 x 15 B/L     Seated DF with TB   1 x 20 with green TB Seated HR with weight   2 x 10 #10 B/L     Standing HR   2 x 10 B/L  Standing HR   2 x 20 B/L    Unilateral HR  2 x 10 b/l    Standing DF ROM          Standing hip flexor stretch  3x20\"        Functional    Stair Navigation   MIN A! Reduced LE ascent. Increased use of RUE  Step up  8\" 2 x 10 b/l    Lateral step up  4\"  2 x 6                                  Ther Activity             Urge deferral strategy                                     Manual Ther                    Stretching of b/l hips incl. adductors  R > L 10' 5'                                               Modalities                              Outcome Measure                                "

## 2025-06-10 ENCOUNTER — OFFICE VISIT (OUTPATIENT)
Dept: PHYSICAL THERAPY | Facility: REHABILITATION | Age: 70
End: 2025-06-10
Payer: MEDICARE

## 2025-06-10 DIAGNOSIS — M54.50 RIGHT-SIDED LOW BACK PAIN WITHOUT SCIATICA, UNSPECIFIED CHRONICITY: Primary | ICD-10-CM

## 2025-06-10 DIAGNOSIS — M79.671 RIGHT FOOT PAIN: ICD-10-CM

## 2025-06-10 PROCEDURE — 97110 THERAPEUTIC EXERCISES: CPT | Performed by: PHYSICAL THERAPIST

## 2025-06-10 NOTE — HOME EXERCISE EDUCATION
Program_ID:[id number]   Access Code: [email or web address]  URL: [email or web address]  Date: 06-  Prepared By: Jamaica Verdin    Program Notes      Exercises      - Seated Lumbar Flexion Stretch - 2-3 x daily - 7 x weekly - 1 sets - 10 reps      - Supine Lower Trunk Rotation - 1 x daily - 7 x weekly - 2 sets - 10 reps - 5 hold      - Supine Bridge - 1 x daily - 7 x weekly - 2 sets - 10 reps      - Clamshell - 1 x daily - 7 x weekly - 2 sets - 10 reps      - Quadruped Alternating Arm Lift - 1 x daily - 7 x weekly - 3 sets - 10 reps      - Unilateral heel raise - 1 x daily - 7 x weekly - 3 sets - 10 reps      - Step Up - 1 x daily - 7 x weekly - 3 sets - 10 reps

## 2025-06-10 NOTE — HOME EXERCISE EDUCATION
Program_ID:[id number]   Access Code: [email or web address]  URL: [email or web address]  Date: 06-  Prepared By: Jamaica Verdin    Program Notes      Exercises      - Seated Lumbar Flexion Stretch - 2-3 x daily - 7 x weekly - 1 sets - 10 reps      - Supine Lower Trunk Rotation - 1 x daily - 7 x weekly - 2 sets - 10 reps - 5 hold      - Supine Bridge - 1 x daily - 7 x weekly - 2 sets - 10 reps      - Clamshell - 1 x daily - 7 x weekly - 2 sets - 10 reps      - Quadruped Alternating Arm Lift - 1 x daily - 7 x weekly - 2 sets - 10 reps      - Quadruped Leg Lifts - 1 x daily - 7 x weekly - 2 sets - 10 reps      - Unilateral heel raise - 1 x daily - 7 x weekly - 3 sets - 10 reps      - Step Up - 1 x daily - 7 x weekly - 3 sets - 10 reps

## 2025-06-13 ENCOUNTER — HOSPITAL ENCOUNTER (OUTPATIENT)
Dept: RADIOLOGY | Facility: HOSPITAL | Age: 70
Discharge: HOME | End: 2025-06-13

## 2025-06-13 ENCOUNTER — HOSPITAL ENCOUNTER (OUTPATIENT)
Dept: RADIOLOGY | Facility: HOSPITAL | Age: 70
Discharge: HOME | End: 2025-06-13
Payer: MEDICARE

## 2025-06-13 DIAGNOSIS — H53.8 BLURRY VISION, BILATERAL: ICD-10-CM

## 2025-06-13 DIAGNOSIS — H53.8 VISION BLURRED: ICD-10-CM

## 2025-06-13 DIAGNOSIS — R42 POSTURAL DIZZINESS WITH PRESYNCOPE: ICD-10-CM

## 2025-06-13 DIAGNOSIS — R55 POSTURAL DIZZINESS WITH PRESYNCOPE: ICD-10-CM

## 2025-06-13 PROCEDURE — 70450 CT HEAD/BRAIN W/O DYE: CPT

## 2025-06-17 ENCOUNTER — OFFICE VISIT (OUTPATIENT)
Dept: PHYSICAL THERAPY | Facility: REHABILITATION | Age: 70
End: 2025-06-17
Payer: MEDICARE

## 2025-06-17 DIAGNOSIS — M54.50 RIGHT-SIDED LOW BACK PAIN WITHOUT SCIATICA, UNSPECIFIED CHRONICITY: Primary | ICD-10-CM

## 2025-06-17 DIAGNOSIS — M79.671 RIGHT FOOT PAIN: ICD-10-CM

## 2025-06-17 PROCEDURE — 97110 THERAPEUTIC EXERCISES: CPT

## 2025-06-17 NOTE — PROGRESS NOTES
Daily Note     Today's date: 2025  Patient name: Blane Marti  : 1955  MRN: [id number]  Referring provider: Иван Yarbrough MD  Dx:   Encounter Diagnosis     ICD-10-CM    1. Right-sided low back pain without sciatica, unspecified chronicity  M54.50       2. Right foot pain  M79.671             Start Time: 1500  Stop Time: 1540  Total time in clinic (min): 40 minutes    Subjective: Had some issues with getting an IV for a CT scan due to be a difficult stick. Pain persisted in her R arm for 2 days.  Walking 3x a week, 20 minutes out, 20 minutes back in.     Objective: See treatment diary below      Assessment: Did well with strengthening exercises, able to add reps. Improved ease of transfer observed and noted by herself to get on and off the table. Will focus on improving patient's lumbar mobility and strength to improve her functional mobility.       Plan: Continue per plan of care.      Precautions: Standard  Patient Active Problem List   Diagnosis    Carpal tunnel syndrome, bilateral    Cervical spondylosis without myelopathy    Degenerative cervical disc    GERD (gastroesophageal reflux disease)    Hypertension    Hypothyroidism    Morbid obesity (HCC)    Obstructive sleep apnea    Type 2 diabetes mellitus without complication (HCC)    Lung nodule < 6cm on CT    Thickened endometrium    Aneurysm of internal carotid artery    Gait instability    Cerebral aneurysm    Chronic migraine without aura without status migrainosus, not intractable    Vitamin D deficiency    Plantar fasciitis of right foot    Dyslipidemia    Mild cognitive impairment    Venous insufficiency    Cervical radicular pain    Allergic rhinitis    Varicose veins of leg with swelling, bilateral    Mixed stress and urge urinary incontinence    Onychomycosis    Right foot pain    Depressed mood   History of L5 fracture    POC Expiration: 2025      Diagnosis: Right ankle pain   POC expires (Date that your POC expires) Auth  "Status? (BOMN, approved, pending) Unit limit (Daily) Auth Start date Expiration date PT/OT + Visit Limit?   06/24/2205          Date of Service 5/05/2025 5/15/25 5/22 05/27/2025 6/2 6/10 6/17   Visits Used 3 4 5 6 7 8 9   Visits Remaining                              Neuro Re-Ed                                                    Ther Ex    RE-EVAL      Aerobic Nustep Seat 9 5 minutes level 3  Nustep 8' Nustep L8 5' NuStep 8minutes L8 NuStep 8minutes L5 Nustep Lvl 5  6 min L5 9'   Standing Therex          Seated Therex     Seated lumbar flexion in chair  10 x 10s hold Seated lumbar flexion in chair  10 x 10s hold 10x10\"    Lumbar Repeated movements Seated lumbar flexion with physioball   1 x 12  Seated lumbar flexion with physioball   1 x 12    LTR 10x   LTR 15x b/l LTR 15x b/l LTR 2x10   Proximal hip strengthening SLR   1 x 15 B/L    Bridges   1 x 10     Standing hip abduction   1 x 15 B/L     Hooklying BKFO   1 x 12 B/L with green TB    Sidelying clamshell with TB green TB  1 x 15    SLR  1x10 w/ A on R side    Bridges   1 x 10     Standing hip abduction   1 x 15 B/L     Hooklying BKFO   1 x 12 B/L with green TB    Sidelying clamshell with TB green TB  1 x 15  SLR  1x10 w/ A on R side    Bridges   1 x 10     Standing hip abduction   1 x 15 B/L     Hooklying BKFO   1 x 12 B/L with green TB    Sidelying clamshell with TB green TB  1 x 15 Sidelying hip abduction   1 x 10     Supine marching   2 x 10 #1 lb B/L     Standing marching with TB at mid foot  1 x 15  Bridges  2 x 12    Quadruped  Alt arm lift  2 x 6 b/l Quadruped  Alt arm lift  2 x 6 b/l Q-ped alr arm lift 3x10   Foot intrinsics  Seated HR with #10 lb   1 x 15 B/L     Seated DF with TB   1 x 20 with green TB  Seated HR with #10 lb   1 x 15 B/L     Seated DF with TB   1 x 20 with green TB Seated HR with #10 lb   1 x 15 B/L     Seated DF with TB   1 x 20 with green TB Seated HR with weight   2 x 10 #10 B/L     Standing HR   2 x 10 B/L  Standing HR   2 x 20 B/L " "   Unilateral HR  2 x 10 b/l Unilateral HR  2 x 10 b/l   Standing DF ROM          Standing hip flexor stretch  3x20\"        Functional    Stair Navigation   MIN A! Reduced LE ascent. Increased use of RUE  Step up  8\" 2 x 10 b/l    Lateral step up  4\"  2 x 6 Step up  8\" 2 x 10 b/l    Lateral step up  4\"  2 x 10                                    Ther Activity             Urge deferral strategy                                     Manual Ther                    Stretching of b/l hips incl. adductors  R > L 10' 5'                                               Modalities                              Outcome Measure                                "

## 2025-06-18 ENCOUNTER — HOSPITAL ENCOUNTER (OUTPATIENT)
Dept: NON INVASIVE DIAGNOSTICS | Facility: HOSPITAL | Age: 70
Discharge: HOME | End: 2025-06-18
Payer: MEDICARE

## 2025-06-18 VITALS
DIASTOLIC BLOOD PRESSURE: 77 MMHG | HEART RATE: 75 BPM | WEIGHT: 253 LBS | BODY MASS INDEX: 42.15 KG/M2 | SYSTOLIC BLOOD PRESSURE: 129 MMHG | HEIGHT: 65 IN

## 2025-06-18 DIAGNOSIS — R42 POSTURAL DIZZINESS WITH PRESYNCOPE: ICD-10-CM

## 2025-06-18 DIAGNOSIS — H53.8 BLURRY VISION, BILATERAL: ICD-10-CM

## 2025-06-18 DIAGNOSIS — R55 POSTURAL DIZZINESS WITH PRESYNCOPE: ICD-10-CM

## 2025-06-18 LAB
AORTIC ROOT: 3 CM
ASCENDING AORTA: 3.1 CM
BSA FOR ECHO PROCEDURE: 2.19 M2
E WAVE DECELERATION TIME: 303 MS
E/A RATIO: 0.91
FRACTIONAL SHORTENING: 36 (ref 28–44)
INTERVENTRICULAR SEPTUM IN DIASTOLE (PARASTERNAL SHORT AXIS VIEW): 1 CM
INTERVENTRICULAR SEPTUM: 1 CM (ref 0.6–1.1)
IVC: 13 MM
LAAS-AP2: 18.5 CM2
LAAS-AP4: 15.6 CM2
LEFT ATRIUM SIZE: 2.9 CM
LEFT ATRIUM VOLUME (MOD BIPLANE): 44 ML
LEFT ATRIUM VOLUME INDEX (MOD BIPLANE): 20.1 ML/M2
LEFT INTERNAL DIMENSION IN SYSTOLE: 2.5 CM (ref 2.1–4)
LEFT VENTRICULAR INTERNAL DIMENSION IN DIASTOLE: 3.9 CM (ref 3.5–6)
LEFT VENTRICULAR POSTERIOR WALL IN END DIASTOLE: 0.9 CM
LEFT VENTRICULAR STROKE VOLUME: 45 ML
LV EF US.2D.A4C+ESTIMATED: 64 %
LVSV (TEICH): 45 ML
MV E'TISSUE VEL-LAT: 12 CM/S
MV E'TISSUE VEL-SEP: 9 CM/S
MV PEAK A VEL: 0.88 M/S
MV PEAK E VEL: 80 CM/S
MV STENOSIS PRESSURE HALF TIME: 88 MS
MV VALVE AREA P 1/2 METHOD: 2.5
RA PRESSURE ESTIMATED: 3 MMHG
RIGHT ATRIUM AREA SYSTOLE A4C: 14.8 CM2
RIGHT VENTRICLE ID DIMENSION: 3.4 CM
RV PSP: 22 MMHG
SL CV LEFT ATRIUM LENGTH A2C: 5.9 CM
SL CV LV EF: 60
SL CV PED ECHO LEFT VENTRICLE DIASTOLIC VOLUME (MOD BIPLANE) 2D: 66 ML
SL CV PED ECHO LEFT VENTRICLE SYSTOLIC VOLUME (MOD BIPLANE) 2D: 22 ML
TR MAX PG: 19 MMHG
TR PEAK VELOCITY: 2.2 M/S
TRICUSPID ANNULAR PLANE SYSTOLIC EXCURSION: 1.8 CM
TRICUSPID VALVE PEAK REGURGITATION VELOCITY: 2.17 M/S

## 2025-06-18 PROCEDURE — 93306 TTE W/DOPPLER COMPLETE: CPT | Performed by: INTERNAL MEDICINE

## 2025-06-18 PROCEDURE — 93306 TTE W/DOPPLER COMPLETE: CPT

## 2025-06-18 PROCEDURE — 93226 XTRNL ECG REC<48 HR SCAN A/R: CPT

## 2025-06-18 PROCEDURE — 93225 XTRNL ECG REC<48 HRS REC: CPT

## 2025-06-24 ENCOUNTER — OFFICE VISIT (OUTPATIENT)
Dept: PHYSICAL THERAPY | Facility: REHABILITATION | Age: 70
End: 2025-06-24
Payer: MEDICARE

## 2025-06-24 DIAGNOSIS — M79.671 RIGHT FOOT PAIN: ICD-10-CM

## 2025-06-24 DIAGNOSIS — M54.50 RIGHT-SIDED LOW BACK PAIN WITHOUT SCIATICA, UNSPECIFIED CHRONICITY: Primary | ICD-10-CM

## 2025-06-24 PROCEDURE — 97110 THERAPEUTIC EXERCISES: CPT

## 2025-06-24 NOTE — PROGRESS NOTES
Daily Note     Today's date: 2025  Patient name: Blane Marti  : 1955  MRN: [id number]  Referring provider: Иван Yabrrough MD  Dx:   Encounter Diagnosis     ICD-10-CM    1. Right-sided low back pain without sciatica, unspecified chronicity  M54.50       2. Right foot pain  M79.671                        Subjective: Reports she was having trouble with urination - unable to go, however she drank a lot of water, x3 8 ounce glass a day and it helped to solve that problem and she was able to urinate with no issues.  Notes when she uses the stairs at home her knees started to bother her but no pain to her ankle or foot. She is going to see her primary physician tomorrow for follow up for CT  for headaches, dizziness, faint feeling and having some shortness of breath issues w some pain to her chest region.        .     Objective: See treatment diary below      Assessment: Patient tolerated session well. Patient performed NuStep for ROM and to increase blood flow to the area being treated, prepare the muscle for strength training and lengthening and to improve overall tolerance to activity.Today's session focused on lumbar mobility, strength and functional activities. Used Pball during lumbar flexion due to subjective above -feeling a little dizzy during flexion. Patient is challenged during quadriped exercises with fatigue noted and needed rest in between reps. Added hurdles today with positive response and was appropriately challenged. Cues provided to increase hip flexion during step over to avoid leg swing around compensatory strategies. Patient would benefit from ongoing PT.           Plan: Continue per plan of care.      Precautions: Standard  Patient Active Problem List   Diagnosis    Carpal tunnel syndrome, bilateral    Cervical spondylosis without myelopathy    Degenerative cervical disc    GERD (gastroesophageal reflux disease)    Hypertension    Hypothyroidism    Morbid obesity (HCC)     "Obstructive sleep apnea    Type 2 diabetes mellitus without complication (HCC)    Lung nodule < 6cm on CT    Thickened endometrium    Aneurysm of internal carotid artery    Gait instability    Cerebral aneurysm    Chronic migraine without aura without status migrainosus, not intractable    Vitamin D deficiency    Plantar fasciitis of right foot    Dyslipidemia    Mild cognitive impairment    Venous insufficiency    Cervical radicular pain    Allergic rhinitis    Varicose veins of leg with swelling, bilateral    Mixed stress and urge urinary incontinence    Onychomycosis    Right foot pain    Depressed mood   History of L5 fracture    POC Expiration: 07/08/2025      Diagnosis: Right ankle pain   POC expires (Date that your POC expires) Auth Status? (BOMN, approved, pending) Unit limit (Daily) Auth Start date Expiration date PT/OT + Visit Limit?   06/24/2205          Date of Service 5/15/25 5/22 05/27/2025 6/2 6/10 6/17 6/24   Visits Used 4 5 6 7 8 9 10   Visits Remaining                              Neuro Re-Ed                                                    Ther Ex   RE-EVAL       Aerobic Nustep 8' Nustep L8 5' NuStep 8minutes L8 NuStep 8minutes L5 Nustep Lvl 5  6 min L5 9' L5 9'   Standing Therex          Seated Therex    Seated lumbar flexion in chair  10 x 10s hold Seated lumbar flexion in chair  10 x 10s hold 10x10\"  10x10\"   Lumbar Repeated movements Seated lumbar flexion with physioball   1 x 12    LTR 10x   LTR 15x b/l LTR 15x b/l LTR 2x10 LTR 2x10   Proximal hip strengthening SLR  1x10 w/ A on R side    Bridges   1 x 10     Standing hip abduction   1 x 15 B/L     Hooklying BKFO   1 x 12 B/L with green TB    Sidelying clamshell with TB green TB  1 x 15  SLR  1x10 w/ A on R side    Bridges   1 x 10     Standing hip abduction   1 x 15 B/L     Hooklying BKFO   1 x 12 B/L with green TB    Sidelying clamshell with TB green TB  1 x 15 Sidelying hip abduction   1 x 10     Supine marching   2 x 10 #1 lb B/L " "    Standing marching with TB at mid foot  1 x 15  Bridges  2 x 12    Quadruped  Alt arm lift  2 x 6 b/l Quadruped  Alt arm lift  2 x 6 b/l Q-ped alr arm lift 3x10 Q-ped alr arm lift 3x10   Foot intrinsics  Seated HR with #10 lb   1 x 15 B/L     Seated DF with TB   1 x 20 with green TB Seated HR with #10 lb   1 x 15 B/L     Seated DF with TB   1 x 20 with green TB Seated HR with weight   2 x 10 #10 B/L     Standing HR   2 x 10 B/L  Standing HR   2 x 20 B/L    Unilateral HR  2 x 10 b/l Unilateral HR  2 x 10 b/l Unilateral HR  2 x 10 b/l       Standing DF ROM          Standing hip flexor stretch 3x20\"         Functional   Stair Navigation   MIN A! Reduced LE ascent. Increased use of RUE  Step up  8\" 2 x 10 b/l    Lateral step up  4\"  2 x 6 Step up  8\" 2 x 10 b/l    Lateral step up  4\"  2 x 10    Step up  6\" 2x10 b/l      Lateral step up  6\" 2x10 1 HHA      Hurdles x4   Fwd   2 finger assist                                   Ther Activity            Urge deferral strategy                                      Manual Ther                    Stretching of b/l hips incl. adductors R > L 10' 5'                                                Modalities                              Outcome Measure                                "

## 2025-06-25 ENCOUNTER — OFFICE VISIT (OUTPATIENT)
Dept: INTERNAL MEDICINE CLINIC | Facility: CLINIC | Age: 70
End: 2025-06-25

## 2025-06-25 VITALS
WEIGHT: 238 LBS | SYSTOLIC BLOOD PRESSURE: 126 MMHG | HEIGHT: 65 IN | BODY MASS INDEX: 39.65 KG/M2 | DIASTOLIC BLOOD PRESSURE: 78 MMHG | TEMPERATURE: 97.8 F | HEART RATE: 67 BPM

## 2025-06-25 DIAGNOSIS — R07.9 CHEST PAIN, UNSPECIFIED TYPE: Primary | ICD-10-CM

## 2025-06-25 DIAGNOSIS — E03.9 HYPOTHYROIDISM, UNSPECIFIED TYPE: ICD-10-CM

## 2025-06-25 DIAGNOSIS — R42 DIZZINESS: ICD-10-CM

## 2025-06-25 DIAGNOSIS — F32.A DEPRESSION, UNSPECIFIED DEPRESSION TYPE: ICD-10-CM

## 2025-06-27 NOTE — PROGRESS NOTES
Name: Blane Marti      : 1955      MRN: [id number]  Encounter Provider: Rolando Pike DO  Encounter Date: 2025   Encounter department: [facility]  :  Assessment & Plan  Dizziness  Patient presents for follow-up of dizziness  Patient was seen recently in the clinic due to episodes of blurry vision with dizziness that happened twice  Patient reported being sweaty during episodes of dizziness  Echocardiogram was ordered and was normal  CT head was unremarkable  48 hours Holter monitor still pending read  Patient denied any recent dizziness however she reported body shakes that happens before dizziness  Patient is very anxious about her symptoms  Will refer patient for neurology consultation  Orders:    Ambulatory Referral to Neurology; Future    Chest pain, unspecified type  Patient reported chest pain with shortness of breath that happens occasionally from time to time  Patient denied any radiation of the pain  EKG in the clinic is unremarkable  Echocardiogram recently was normal  Will get myocardial perfusion stress test to rule out angina  Orders:    NM myocardial perfusion spect (stress and/or rest); Future    Hypothyroidism, unspecified type  History of hypothyroidism  OAB TSH  Orders:    TSH, 3rd generation with Free T4 reflex; Future    Depression, unspecified depression type  Patient was tearful during exam  Patient has a lot of stressors in her life including taking care of her disabled brother  Patient lost her father 4 months  Patient's symptoms are concerning for depression and anxiety however patient is declining any further treatment for her symptoms  Patient denied any suicidal ideation  Suspecting that some of the patient's symptoms could be related to depression and anxiety                History of Present Illness   HPI  70-year-old female patient with past medical history of hypertension, right ICA aneurysm s/p embolization, AARON, hypothyroidism,  "type 2 diabetes, migraine, NAFLD who presented to clinic for follow-up on her dizziness.  Patient denied any episodes of recent dizziness however she reported episodes of body shaking and headache.  Review of Systems   Constitutional:  Negative for activity change, chills, diaphoresis, fatigue and fever.   HENT:  Negative for dental problem, ear discharge, facial swelling, hearing loss, postnasal drip, sneezing and sore throat.    Respiratory:  Positive for chest tightness. Negative for apnea, wheezing and stridor.    Cardiovascular:  Positive for chest pain. Negative for palpitations and leg swelling.   Gastrointestinal:  Negative for blood in stool, constipation and rectal pain.   Genitourinary:  Negative for enuresis, flank pain, menstrual problem and vaginal bleeding.   Musculoskeletal:  Negative for arthralgias, back pain, joint swelling and neck pain.   Skin:  Negative for color change, pallor and rash.   Neurological:  Positive for headaches. Negative for tremors, seizures and light-headedness.   Psychiatric/Behavioral:  Positive for dysphoric mood. Negative for agitation, confusion and hallucinations. The patient is nervous/anxious. The patient is not hyperactive.        Objective   /78 (BP Location: Right arm, Patient Position: Sitting, Cuff Size: Large)   Pulse 67   Temp 97.8 °F (36.6 °C) (Temporal)   Ht 5' 5\" (1.651 m)   Wt 108 kg (238 lb)   LMP 01/01/2003   BMI 39.61 kg/m²      Physical Exam  Constitutional:       General: She is not in acute distress.     Appearance: She is obese. She is not ill-appearing, toxic-appearing or diaphoretic.   HENT:      Head: Normocephalic and atraumatic.      Nose: Nose normal. No congestion or rhinorrhea.      Mouth/Throat:      Mouth: Mucous membranes are moist.      Pharynx: No oropharyngeal exudate or posterior oropharyngeal erythema.     Cardiovascular:      Rate and Rhythm: Normal rate and regular rhythm.      Pulses: Normal pulses.      Heart sounds: No " murmur heard.     No friction rub. No gallop.   Pulmonary:      Effort: Pulmonary effort is normal. No respiratory distress.      Breath sounds: No stridor. No wheezing or rhonchi.   Abdominal:      General: Abdomen is flat. There is no distension.      Palpations: There is no mass.      Tenderness: There is no abdominal tenderness. There is no guarding or rebound.      Hernia: No hernia is present.     Musculoskeletal:         General: No swelling, tenderness, deformity or signs of injury. Normal range of motion.     Skin:     General: Skin is warm.      Coloration: Skin is not jaundiced or pale.      Findings: No bruising or erythema.     Neurological:      General: No focal deficit present.      Mental Status: She is alert.      Cranial Nerves: No cranial nerve deficit.      Sensory: No sensory deficit.      Motor: No weakness.     Psychiatric:         Mood and Affect: Mood normal.         Behavior: Behavior normal.         Thought Content: Thought content normal.         Judgment: Judgment normal.       Administrative Statements   I have spent a total time of 30 minutes in caring for this patient on the day of the visit/encounter including Diagnostic results, Prognosis, and Risks and benefits of tx options.

## 2025-07-01 ENCOUNTER — OFFICE VISIT (OUTPATIENT)
Dept: PHYSICAL THERAPY | Facility: REHABILITATION | Age: 70
End: 2025-07-01
Payer: MEDICARE

## 2025-07-01 DIAGNOSIS — M54.50 RIGHT-SIDED LOW BACK PAIN WITHOUT SCIATICA, UNSPECIFIED CHRONICITY: Primary | ICD-10-CM

## 2025-07-01 DIAGNOSIS — M79.671 RIGHT FOOT PAIN: ICD-10-CM

## 2025-07-01 PROCEDURE — 97110 THERAPEUTIC EXERCISES: CPT

## 2025-07-01 NOTE — PROGRESS NOTES
Daily Note     Today's date: 2025  Patient name: Blane Marti  : 1955  MRN: [id number]  Referring provider: Иван Yarbrough MD  Dx:   Encounter Diagnosis     ICD-10-CM    1. Right-sided low back pain without sciatica, unspecified chronicity  M54.50       2. Right foot pain  M79.671               Start Time: 1625  Stop Time: 1710  Total time in clinic (min): 45 minutes    Subjective: Pt has noticed significant improvement with leakage t/o the day.  Takes a shower at night, puts a new pad and then maybe 1-2 pads a day.       .     Objective: See treatment diary below    HR 65 bpm before session  HR 81 bpm after session  O2sat 95% before session  O2sat 97% after session      Assessment: Patient tolerated session well. Reviewed and practiced sit to stand and when lifting to decrease risk of leakage.  Pt continues to be consistent with her home exercises.  Patient would benefit from ongoing PT.     New Goal as of 2025  In 2 weeks, patient will improve stair navigation to 4 steps with reciprocal pattern, to assist with curb navigation  In 4 weeks, patient will improved single leg HR x 10 without pain and completion B/L  In 4 weeks, patient will improve hip flexion MMT B/L to 4/5 for swing phase ambulation.          Plan: Continue per plan of care.      Precautions: Standard  Patient Active Problem List   Diagnosis    Carpal tunnel syndrome, bilateral    Cervical spondylosis without myelopathy    Degenerative cervical disc    GERD (gastroesophageal reflux disease)    Hypertension    Hypothyroidism    Morbid obesity (HCC)    Obstructive sleep apnea    Type 2 diabetes mellitus without complication (HCC)    Lung nodule < 6cm on CT    Thickened endometrium    Aneurysm of internal carotid artery    Gait instability    Cerebral aneurysm    Chronic migraine without aura without status migrainosus, not intractable    Vitamin D deficiency    Plantar fasciitis of right foot    Dyslipidemia    Mild cognitive  "impairment    Venous insufficiency    Cervical radicular pain    Allergic rhinitis    Varicose veins of leg with swelling, bilateral    Mixed stress and urge urinary incontinence    Onychomycosis    Right foot pain    Depressed mood   History of L5 fracture    POC Expiration: 07/08/2025      Diagnosis: Right ankle pain   POC expires (Date that your POC expires) Auth Status? (BOMN, approved, pending) Unit limit (Daily) Auth Start date Expiration date PT/OT + Visit Limit?   06/24/2205          Date of Service 5/15/25 5/22 05/27/2025 6/2 6/10 6/17 6/24 7/1   Visits Used 4 5 6 7 8 9 10 11   Visits Remaining                                 Neuro Re-Ed                                                         Ther Ex   RE-EVAL        Aerobic Nustep 8' Nustep L8 5' NuStep 8minutes L8 NuStep 8minutes L5 Nustep Lvl 5  6 min L5 9' L5 9' L5 8'   Standing Therex           Seated Therex    Seated lumbar flexion in chair  10 x 10s hold Seated lumbar flexion in chair  10 x 10s hold 10x10\"  10x10\"    Lumbar Repeated movements Seated lumbar flexion with physioball   1 x 12    LTR 10x   LTR 15x b/l LTR 15x b/l LTR 2x10 LTR 2x10    Proximal hip strengthening SLR  1x10 w/ A on R side    Bridges   1 x 10     Standing hip abduction   1 x 15 B/L     Hooklying BKFO   1 x 12 B/L with green TB    Sidelying clamshell with TB green TB  1 x 15  SLR  1x10 w/ A on R side    Bridges   1 x 10     Standing hip abduction   1 x 15 B/L     Hooklying BKFO   1 x 12 B/L with green TB    Sidelying clamshell with TB green TB  1 x 15 Sidelying hip abduction   1 x 10     Supine marching   2 x 10 #1 lb B/L     Standing marching with TB at mid foot  1 x 15  Bridges  2 x 12    Quadruped  Alt arm lift  2 x 6 b/l Quadruped  Alt arm lift  2 x 6 b/l Q-ped alr arm lift 3x10 Q-ped alr arm lift 3x10    Foot intrinsics  Seated HR with #10 lb   1 x 15 B/L     Seated DF with TB   1 x 20 with green TB Seated HR with #10 lb   1 x 15 B/L     Seated DF with TB   1 x 20 with " "green TB Seated HR with weight   2 x 10 #10 B/L     Standing HR   2 x 10 B/L  Standing HR   2 x 20 B/L    Unilateral HR  2 x 10 b/l Unilateral HR  2 x 10 b/l Unilateral HR  2 x 10 b/l     2x10   Standing DF ROM           Standing hip flexor stretch 3x20\"          Functional   Stair Navigation   MIN A! Reduced LE ascent. Increased use of RUE  Step up  8\" 2 x 10 b/l    Lateral step up  4\"  2 x 6 Step up  8\" 2 x 10 b/l    Lateral step up  4\"  2 x 10    Step up  6\" 2x10 b/l      Lateral step up  6\" 2x10 1 HHA      Hurdles x4   Fwd   2 finger assist   Step up  6\" 2x10 b/l      Lateral step up  6\" 2x10 1 HHA    Sit to stand w/ PFM 2x10    10# KB lift from 8 inch step 2x10                                    Ther Activity             Urge deferral strategy      Doing much better down to 2 pads                                    Manual Ther                      Stretching of b/l hips incl. adductors R > L 10' 5'                                                     Modalities                                 Outcome Measure                                  "

## 2025-07-08 ENCOUNTER — OFFICE VISIT (OUTPATIENT)
Dept: PHYSICAL THERAPY | Facility: REHABILITATION | Age: 70
End: 2025-07-08
Payer: MEDICARE

## 2025-07-08 DIAGNOSIS — M54.50 RIGHT-SIDED LOW BACK PAIN WITHOUT SCIATICA, UNSPECIFIED CHRONICITY: Primary | ICD-10-CM

## 2025-07-08 DIAGNOSIS — M79.671 RIGHT FOOT PAIN: ICD-10-CM

## 2025-07-08 PROCEDURE — 97164 PT RE-EVAL EST PLAN CARE: CPT | Performed by: PHYSICAL THERAPIST

## 2025-07-08 PROCEDURE — 97110 THERAPEUTIC EXERCISES: CPT | Performed by: PHYSICAL THERAPIST

## 2025-07-08 NOTE — PROGRESS NOTES
Physical Therapy Discharge    Today's Date: 2025  Patient name: Blane Marti  : 1955  MRN: [id number]  Referring provider: Иван Yarbrough MD  Dx:  Encounter Diagnosis     ICD-10-CM    1. Right-sided low back pain without sciatica, unspecified chronicity  M54.50       2. Right foot pain  M79.671             Start Time: 1415  Stop Time: 1500  Total time in clinic (min): 45 minutes       Assessment    Assessment details: Patient has been seen in PT for her c/o b/l ankle and back pain. Patient reporting good improvement in her pain and now is able to be more mobile with social activities and in the community. She is also able to negotiate stairs without pain which was a major goal of hers. Objectively, patient has demonstrated improvement in her BLE strength which has supported her functional goals. Patient has met all her PT goals. At this time patient is independent with her HEP and is ready for discharge. Patient in agreement with plan and is aware to return to PT if her status changes.     Goals  In 2 weeks, patient will report a reduction in pain to < or equal to 2/10 to reduce irritability and improve function: Achieved  In 2 weeks, patient will display independence with HEP: Achieved  In 4 weeks, patient will improve DF MMT to > 0.5 to assist swing phase clearance: Achieved  In 4 weeks, patient will display full ankle ROM compared to other extremity: Achieved  In 6 weeks, patient will perform ambulation without pain: Achieved  In 8 weeks, patient will report >70% improvement in symptoms to improve function: Achieved      New Goal as of 2025  In 2 weeks, patient will improve stair navigation to 4 steps with reciprocal pattern, to assist with curb navigation - Met  In 4 weeks, patient will improved single leg HR x 10 without pain and completion B/L - Met  In 4 weeks, patient will improve hip flexion MMT B/L to 4/5 for swing phase ambulation. - Met        Plan    Treatment plan discussed  "with: patient      Subjective Evaluation    History of Present Illness  Mechanism of injury: Interval History (7/8/25): She is doing well. She does not feel ankle pain anymore. [place] to [place] today. Wants to walk >30 minutes but the weather is too hot. She is able to go downstairs to do laundry now. Able to do the stairs without knee pain. She completes her exercises multiple times a day and keeps the papers on her wall.     Pelvic floor: used to use a [employer]. Now only using a panty liner and it is dry at the end of the day.    [facility] 05/27/2025  Patient notes overall improvement since PT onset. Patient notes overall she is feeling 90% better! Patient notes improvement with ambulation. At this time, difficulty is some vertigo due to her weight loss management changes. Advised patient to follow up with her physician.         Patient notes a hx of left foot pain and was diagnosed with plantar fasciitis and was provided exercises and recommended a more stable shoe. More recently, patient notes an onset of right foot pain. Patient visited the ER after having an increase in right foot pain. Xray were taken and noted no fx. Patient notes no ARPIT of right foot pain. Patient denies any falls. During the day, \"She felt a feeling that was increasing throughout the day.\" Patient does note hx of back pain. 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 Recently lost her father in December.   Pain  No pain reported      Diagnostic Tests  X-ray: normal      Objective     Strength/Myotome Testing     Left Hip   Planes of Motion   Flexion: 4    Right Hip   Planes of Motion   Flexion: 4    Left Ankle/Foot   Left ankle plantar flexion strength: >10 UHR.    Right Ankle/Foot   Right ankle plantar flexion strength: >10 UHR.    Functional Assessment        Comments  Balance L Tandem: 30s  Balance R Tandem: 30s             Precautions: Standard  Patient Active Problem List   Diagnosis   • Carpal tunnel " "syndrome, bilateral   • Cervical spondylosis without myelopathy   • Degenerative cervical disc   • GERD (gastroesophageal reflux disease)   • Hypertension   • Hypothyroidism   • Morbid obesity (HCC)   • Obstructive sleep apnea   • Type 2 diabetes mellitus without complication ([facility])   • Lung nodule < 6cm on CT   • Thickened endometrium   • Aneurysm of internal carotid artery   • Gait instability   • Cerebral aneurysm   • Chronic migraine without aura without status migrainosus, not intractable   • Vitamin D deficiency   • Plantar fasciitis of right foot   • Dyslipidemia   • Mild cognitive impairment   • Venous insufficiency   • Cervical radicular pain   • Allergic rhinitis   • Varicose veins of leg with swelling, bilateral   • Mixed stress and urge urinary incontinence   • Onychomycosis   • Right foot pain   • Depressed mood       Diagnosis: Right ankle pain   POC expires (Date that your POC expires) Auth Status? (BOMN, approved, pending) Unit limit (Daily) Auth Start date Expiration date PT/OT + Visit Limit?   06/24/2205          Date of Service 6/2 6/10 6/17 6/24 7/1 7/8   Visits Used 7 8 9 10 11 12   Visits Remaining      Discharge                     Neuro Re-Ed                                               Ther Ex         Aerobic NuStep 8minutes L5 Nustep Lvl 5  6 min L5 9' L5 9' L5 8' Nustep  L5, 5'   Standing Therex         Seated Therex Seated lumbar flexion in chair  10 x 10s hold Seated lumbar flexion in chair  10 x 10s hold 10x10\"  10x10\"     Lumbar Repeated movements LTR 15x b/l LTR 15x b/l LTR 2x10 LTR 2x10     Proximal hip strengthening Bridges  2 x 12    Quadruped  Alt arm lift  2 x 6 b/l Quadruped  Alt arm lift  2 x 6 b/l Q-ped alr arm lift 3x10 Q-ped alr arm lift 3x10  QP alt leg lift  2 x 10   Foot intrinsics  Standing HR   2 x 20 B/L    Unilateral HR  2 x 10 b/l Unilateral HR  2 x 10 b/l Unilateral HR  2 x 10 b/l     2x10    Standing DF ROM         Standing hip flexor stretch         Functional  " "Step up  8\" 2 x 10 b/l    Lateral step up  4\"  2 x 6 Step up  8\" 2 x 10 b/l    Lateral step up  4\"  2 x 10    Step up  6\" 2x10 b/l      Lateral step up  6\" 2x10 1 HHA      Hurdles x4   Fwd   2 finger assist   Step up  6\" 2x10 b/l      Lateral step up  6\" 2x10 1 HHA    Sit to stand w/ PFM 2x10    10# KB lift from 8 inch step 2x10                               Ther Activity              Doing much better down to 2 pads                               Manual Ther                  Stretching of b/l hips incl. adductors                                             Modalities                           Outcome Measure                              "

## 2025-07-15 ENCOUNTER — APPOINTMENT (OUTPATIENT)
Dept: PHYSICAL THERAPY | Facility: REHABILITATION | Age: 70
End: 2025-07-15
Payer: MEDICARE

## 2025-07-21 ENCOUNTER — HOSPITAL ENCOUNTER (OUTPATIENT)
Dept: NON INVASIVE DIAGNOSTICS | Facility: CLINIC | Age: 70
Discharge: HOME | End: 2025-07-21
Payer: MEDICARE

## 2025-07-21 VITALS — OXYGEN SATURATION: 98 % | HEART RATE: 53 BPM | DIASTOLIC BLOOD PRESSURE: 78 MMHG | SYSTOLIC BLOOD PRESSURE: 118 MMHG

## 2025-07-21 DIAGNOSIS — R07.9 CHEST PAIN, UNSPECIFIED TYPE: ICD-10-CM

## 2025-07-21 LAB
RATE PRESSURE PRODUCT: NORMAL
SL CV REST NUCLEAR ISOTOPE DOSE: 10.22 MCI
SL CV STRESS NUCLEAR ISOTOPE DOSE: 30.7 MCI
SL CV STRESS RECOVERY BP: NORMAL MMHG
SL CV STRESS RECOVERY HR: 74 BPM
SL CV STRESS RECOVERY O2 SAT: 98 %
SPECT HRT GATED+EF W RNC IV: 65 %
STRESS ANGINA INDEX: 0
STRESS BASELINE BP: NORMAL MMHG
STRESS BASELINE HR: 53 BPM
STRESS O2 SAT REST: 98 %
STRESS PEAK HR: 107 BPM
STRESS POST O2 SAT PEAK: 99 %
STRESS POST PEAK BP: 130 MMHG
STRESS/REST PERFUSION RATIO: 1.07

## 2025-07-21 PROCEDURE — 93017 CV STRESS TEST TRACING ONLY: CPT

## 2025-07-21 PROCEDURE — 78452 HT MUSCLE IMAGE SPECT MULT: CPT

## 2025-07-21 PROCEDURE — 78452 HT MUSCLE IMAGE SPECT MULT: CPT | Performed by: STUDENT IN AN ORGANIZED HEALTH CARE EDUCATION/TRAINING PROGRAM

## 2025-07-21 PROCEDURE — A9502 TC99M TETROFOSMIN: HCPCS

## 2025-07-21 PROCEDURE — 93018 CV STRESS TEST I&R ONLY: CPT | Performed by: STUDENT IN AN ORGANIZED HEALTH CARE EDUCATION/TRAINING PROGRAM

## 2025-07-21 PROCEDURE — 93016 CV STRESS TEST SUPVJ ONLY: CPT | Performed by: STUDENT IN AN ORGANIZED HEALTH CARE EDUCATION/TRAINING PROGRAM

## 2025-07-21 RX ORDER — REGADENOSON 0.08 MG/ML
0.4 INJECTION, SOLUTION INTRAVENOUS ONCE
Status: COMPLETED | OUTPATIENT
Start: 2025-07-21 | End: 2025-07-21

## 2025-07-21 RX ADMIN — REGADENOSON 0.4 MG: 0.08 INJECTION, SOLUTION INTRAVENOUS at 13:01

## 2025-07-22 ENCOUNTER — APPOINTMENT (OUTPATIENT)
Dept: PHYSICAL THERAPY | Facility: REHABILITATION | Age: 70
End: 2025-07-22
Payer: MEDICARE

## 2025-07-22 LAB
CHEST PAIN STATEMENT: NORMAL
MAX DIASTOLIC BP: 78 MMHG
MAX PREDICTED HEART RATE: 151 BPM
PROTOCOL NAME: NORMAL
REASON FOR TERMINATION: NORMAL
STRESS POST EXERCISE DUR MIN: 3 MIN
STRESS POST EXERCISE DUR SEC: 11 SEC
STRESS POST PEAK HR: 102 BPM
STRESS POST PEAK SYSTOLIC BP: 130 MMHG
TARGET HR FORMULA: NORMAL
TEST INDICATION: NORMAL

## 2025-07-29 ENCOUNTER — APPOINTMENT (OUTPATIENT)
Dept: PHYSICAL THERAPY | Facility: REHABILITATION | Age: 70
End: 2025-07-29
Payer: MEDICARE

## 2025-07-29 DIAGNOSIS — I10 ESSENTIAL HYPERTENSION: ICD-10-CM

## 2025-07-29 RX ORDER — LISINOPRIL 40 MG/1
40 TABLET ORAL DAILY
Qty: 30 TABLET | Refills: 2 | Status: SHIPPED | OUTPATIENT
Start: 2025-07-29

## 2025-08-06 ENCOUNTER — OFFICE VISIT (OUTPATIENT)
Dept: INTERNAL MEDICINE CLINIC | Facility: CLINIC | Age: 70
End: 2025-08-06

## 2025-08-06 VITALS
WEIGHT: 227.2 LBS | RESPIRATION RATE: 14 BRPM | DIASTOLIC BLOOD PRESSURE: 74 MMHG | HEIGHT: 65 IN | TEMPERATURE: 97.3 F | BODY MASS INDEX: 37.85 KG/M2 | SYSTOLIC BLOOD PRESSURE: 116 MMHG | HEART RATE: 74 BPM | OXYGEN SATURATION: 95 %

## 2025-08-06 DIAGNOSIS — E06.3 HYPOTHYROIDISM DUE TO HASHIMOTO THYROIDITIS: ICD-10-CM

## 2025-08-06 DIAGNOSIS — E11.9 TYPE 2 DIABETES MELLITUS WITHOUT COMPLICATION, WITHOUT LONG-TERM CURRENT USE OF INSULIN (HCC): ICD-10-CM

## 2025-08-06 DIAGNOSIS — R06.02 SHORTNESS OF BREATH: Primary | ICD-10-CM

## 2025-08-06 DIAGNOSIS — Z23 ENCOUNTER FOR IMMUNIZATION: ICD-10-CM

## 2025-08-06 PROCEDURE — G2211 COMPLEX E/M VISIT ADD ON: HCPCS | Performed by: INTERNAL MEDICINE

## 2025-08-06 PROCEDURE — 99214 OFFICE O/P EST MOD 30 MIN: CPT | Performed by: INTERNAL MEDICINE

## 2025-08-06 PROCEDURE — G0010 ADMIN HEPATITIS B VACCINE: HCPCS | Performed by: INTERNAL MEDICINE

## 2025-08-06 PROCEDURE — 90746 HEPB VACCINE 3 DOSE ADULT IM: CPT | Performed by: INTERNAL MEDICINE

## 2025-08-06 RX ORDER — ALBUTEROL SULFATE 90 UG/1
2 INHALANT RESPIRATORY (INHALATION) EVERY 6 HOURS PRN
Qty: 6.7 G | Refills: 2 | Status: SHIPPED | OUTPATIENT
Start: 2025-08-06